# Patient Record
Sex: MALE | Race: ASIAN | NOT HISPANIC OR LATINO | Employment: OTHER | URBAN - METROPOLITAN AREA
[De-identification: names, ages, dates, MRNs, and addresses within clinical notes are randomized per-mention and may not be internally consistent; named-entity substitution may affect disease eponyms.]

---

## 2017-03-18 ENCOUNTER — HOSPITAL ENCOUNTER (EMERGENCY)
Facility: HOSPITAL | Age: 62
Discharge: HOME | End: 2017-03-18
Attending: EMERGENCY MEDICINE
Payer: MEDICARE

## 2017-03-18 VITALS
HEART RATE: 81 BPM | BODY MASS INDEX: 28.93 KG/M2 | RESPIRATION RATE: 16 BRPM | WEIGHT: 180 LBS | SYSTOLIC BLOOD PRESSURE: 121 MMHG | TEMPERATURE: 99 F | OXYGEN SATURATION: 99 % | HEIGHT: 66 IN | DIASTOLIC BLOOD PRESSURE: 63 MMHG

## 2017-03-18 DIAGNOSIS — J40 BRONCHITIS: Primary | ICD-10-CM

## 2017-03-18 PROCEDURE — 99283 EMERGENCY DEPT VISIT LOW MDM: CPT | Mod: ,,, | Performed by: EMERGENCY MEDICINE

## 2017-03-18 PROCEDURE — 99283 EMERGENCY DEPT VISIT LOW MDM: CPT

## 2017-03-18 RX ORDER — BENZONATATE 100 MG/1
100 CAPSULE ORAL 3 TIMES DAILY PRN
Qty: 21 CAPSULE | Refills: 0 | Status: SHIPPED | OUTPATIENT
Start: 2017-03-18 | End: 2017-08-29

## 2017-03-18 RX ORDER — AZITHROMYCIN 250 MG/1
500 TABLET, FILM COATED ORAL DAILY
Qty: 6 TABLET | Refills: 0 | Status: SHIPPED | OUTPATIENT
Start: 2017-03-18 | End: 2017-08-29

## 2017-03-18 NOTE — ED AVS SNAPSHOT
[facility]  [address] [facility] [address] [facility] [address]               Ladarius Solis   3/18/2017 10:17 PM   ED    Description:  Male : 1955   Department:  [facility]           Your Care was Coordinated By:     Provider Role From To    Roberto Soto MD Attending Provider 17 0176 --      Reason for Visit     Sinusitis           Diagnoses this Visit        Comments    Bronchitis    -  Primary       ED Disposition     None           To Do List           Follow-up Information     Follow up with SUSANNA Love In 2 days.    Specialty:  Family Medicine    Contact information:    [address]  [place] [address]  [phone]          Follow up with [facility].    Specialty:  Emergency Medicine    Why:  If symptoms worsen    Contact information:    [address] [facility] [id number] [phone]       These Medications        Disp Refills Start End    azithromycin (ZITHROMAX Z-LOUIS) 250 MG tablet 6 tablet 0 3/18/2017     Take 2 tablets (500 mg total) by mouth once daily. - Oral    Pharmacy: [facility] - [place], [place] - [address] Ph #: [phone]         [facility]     [facility] Nurse Care Line -  Assistance  Registered nurses in the [facility] provide clinical advisement, health education, appointment booking, and other advisory services.  Call for this free service at [phone].             Medications           Message regarding Medications     Verify the changes and/or additions to your medication regime listed below are the same as discussed with your clinician today.  If any of these changes or additions are incorrect, please notify your healthcare provider.        START taking these NEW medications        Refills    azithromycin (ZITHROMAX Z-LOUIS) 250 MG tablet 0    Sig: Take 2 tablets (500 mg total) by mouth once  "daily.    Class: Print    Route: Oral           Verify that the below list of medications is an accurate representation of the medications you are currently taking.  If none reported, the list may be blank. If incorrect, please contact your healthcare provider. Carry this list with you in case of emergency.           Current Medications     lisinopril 10 MG tablet Take 10 mg by mouth once daily.    albuterol 90 mcg/actuation inhaler Inhale 1-2 puffs into the lungs every 6 (six) hours as needed for Wheezing.    azithromycin (ZITHROMAX Z-LOUIS) 250 MG tablet Take 2 tablets (500 mg total) by mouth once daily.    benzonatate (TESSALON) 100 MG capsule Take 1 capsule (100 mg total) by mouth 3 (three) times daily as needed for Cough.           Clinical Reference Information           Your Vitals Were     BP Pulse Temp Resp Height Weight    121/63 (BP Location: Right arm, Patient Position: Sitting) 81 99.4 °F (37.4 °C) (Oral) 16 5' 6" (1.676 m) 81.6 kg (180 lb)    SpO2 BMI             99% 29.05 kg/m2         Allergies as of 3/18/2017     No Known Allergies      Immunizations Administered on Date of Encounter - 3/18/2017     None      ED Micro, Lab, POCT     None      ED Imaging Orders     None        Discharge Instructions         Bronchitis, Antibiotic Treatment (Adult)    Bronchitis is an infection of the air passages (bronchial tubes) in your lungs. It often occurs when you have a cold. This illness is contagious during the first few days and is spread through the air by coughing and sneezing, or by direct contact (touching the sick person and then touching your own eyes, nose, or mouth).  Symptoms of bronchitis include cough with mucus (phlegm) and low-grade fever. Bronchitis usually lasts 7 to 14 days. Mild cases can be treated with simple home remedies. More severe infection is treated with an antibiotic.  Home care  Follow these guidelines when caring for yourself at home:  · If your symptoms are severe, rest at home " for the first 2 to 3 days. When you go back to your usual activities, don't let yourself get too tired.  · Do not smoke. Also avoid being exposed to secondhand smoke.  · You may use over-the-counter medicines to control fever or pain, unless another medicine was prescribed. (Note: If you have chronic liver or kidney disease or have ever had a stomach ulcer or gastrointestinal bleeding, talk with your healthcare provider before using these medicines. Also talk to your provider if you are taking medicine to prevent blood clots.) Aspirin should never be given to anyone younger than 18 years of age who is ill with a viral infection or fever. It may cause severe liver or brain damage.  · Your appetite may be poor, so a light diet is fine. Avoid dehydration by drinking 6 to 8 glasses of fluids per day (such as water, soft drinks, sports drinks, juices, tea, or soup). Extra fluids will help loosen secretions in the nose and lungs.  · Over-the-counter cough, cold, and sore-throat medicines will not shorten the length of the illness, but they may be helpful to reduce symptoms. (Note: Do not use decongestants if you have high blood pressure.)  · Finish all antibiotic medicine. Do this even if you are feeling better after only a few days.  Follow-up care  Follow up with your healthcare provider, or as advised. If you had an X-ray or ECG (electrocardiogram), a specialist will review it. You will be notified of any new findings that may affect your care.  Note: If you are age 65 or older, or if you have a chronic lung disease or condition that affects your immune system, or you smoke, talk to your healthcare provider about having pneumococcal vaccinations and a yearly influenza vaccination (flu shot).  When to seek medical advice  Call your healthcare provider right away if any of these occur:  · Fever of 100.4°F (38°C) or higher  · Coughing up increased amounts of colored sputum  · Weakness, drowsiness, headache, facial pain,  ear pain, or a stiff neck  Call 911, or get immediate medical care  Contact emergency services right away if any of these occur.  · Coughing up blood  · Worsening weakness, drowsiness, headache, or stiff neck  · Trouble breathing, wheezing, or pain with breathing  Date Last Reviewed: 9/13/2015  © 2262-8940 [employer]. [address] [place] [address], [place] [address]. All rights reserved. This information is not intended as a substitute for professional medical care. Always follow your healthcare professional's instructions.           [facility] complies with applicable Federal civil rights laws and does not discriminate on the basis of race, color, national origin, age, disability, or sex.        Language Assistance Services     ATTENTION: Language assistance services are available, free of charge. Please call [phone].      ATENCIÓN: Si habla español, tiene a simon disposición servicios gratuitos de asistencia lingüística. Llame al [phone].     CHÚ Ý: N?u b?n nói Ti?ng Vi?t, có các d?ch v? h? tr? ngôn ng? mi?n phí dành cho b?n. G?i s? [phone].

## 2017-03-19 NOTE — ED PROVIDER NOTES
Encounter Date: 3/18/2017       History     Chief Complaint   Patient presents with    Sinusitis     Sinus congestion and sore throat     Review of patient's allergies indicates:  No Known Allergies  HPI Comments: 3 days of cough and congestion.  No fever    Past Medical History:   Diagnosis Date    Hypertension      History reviewed. No pertinent surgical history.  History reviewed. No pertinent family history.  Social History   Substance Use Topics    Smoking status: Never Smoker    Smokeless tobacco: None    Alcohol use No     Review of Systems   Constitutional: Negative for chills, diaphoresis and fever.   HENT: Positive for congestion.    Eyes: Negative for visual disturbance.   Respiratory: Negative for chest tightness and shortness of breath.        Physical Exam   Initial Vitals   BP Pulse Resp Temp SpO2   03/18/17 1841 03/18/17 1841 03/18/17 1841 03/18/17 1841 03/18/17 1841   121/63 81 16 99.4 °F (37.4 °C) 99 %     Physical Exam    Nursing note and vitals reviewed.  Constitutional: He appears well-developed and well-nourished.   HENT:   Head: Normocephalic.   Eyes: Conjunctivae and EOM are normal. Pupils are equal, round, and reactive to light.   Neck: Normal range of motion. Neck supple.   Cardiovascular: Normal rate, regular rhythm and normal heart sounds.   Pulmonary/Chest: Breath sounds normal. No respiratory distress. He has no wheezes. He has no rhonchi. He has no rales. He exhibits no tenderness.   Abdominal: Soft. Bowel sounds are normal.         ED Course   Procedures  Labs Reviewed - No data to display                            ED Course     Clinical Impression:   The encounter diagnosis was Bronchitis.    Disposition:   Disposition: Discharged  Condition: Stable       Roberto Soto MD  03/18/17 1447

## 2017-03-19 NOTE — DISCHARGE INSTRUCTIONS
Bronchitis, Antibiotic Treatment (Adult)    Bronchitis is an infection of the air passages (bronchial tubes) in your lungs. It often occurs when you have a cold. This illness is contagious during the first few days and is spread through the air by coughing and sneezing, or by direct contact (touching the sick person and then touching your own eyes, nose, or mouth).  Symptoms of bronchitis include cough with mucus (phlegm) and low-grade fever. Bronchitis usually lasts 7 to 14 days. Mild cases can be treated with simple home remedies. More severe infection is treated with an antibiotic.  Home care  Follow these guidelines when caring for yourself at home:  · If your symptoms are severe, rest at home for the first 2 to 3 days. When you go back to your usual activities, don't let yourself get too tired.  · Do not smoke. Also avoid being exposed to secondhand smoke.  · You may use over-the-counter medicines to control fever or pain, unless another medicine was prescribed. (Note: If you have chronic liver or kidney disease or have ever had a stomach ulcer or gastrointestinal bleeding, talk with your healthcare provider before using these medicines. Also talk to your provider if you are taking medicine to prevent blood clots.) Aspirin should never be given to anyone younger than 18 years of age who is ill with a viral infection or fever. It may cause severe liver or brain damage.  · Your appetite may be poor, so a light diet is fine. Avoid dehydration by drinking 6 to 8 glasses of fluids per day (such as water, soft drinks, sports drinks, juices, tea, or soup). Extra fluids will help loosen secretions in the nose and lungs.  · Over-the-counter cough, cold, and sore-throat medicines will not shorten the length of the illness, but they may be helpful to reduce symptoms. (Note: Do not use decongestants if you have high blood pressure.)  · Finish all antibiotic medicine. Do this even if you are feeling better after only a  few days.  Follow-up care  Follow up with your healthcare provider, or as advised. If you had an X-ray or ECG (electrocardiogram), a specialist will review it. You will be notified of any new findings that may affect your care.  Note: If you are age 65 or older, or if you have a chronic lung disease or condition that affects your immune system, or you smoke, talk to your healthcare provider about having pneumococcal vaccinations and a yearly influenza vaccination (flu shot).  When to seek medical advice  Call your healthcare provider right away if any of these occur:  · Fever of 100.4°F (38°C) or higher  · Coughing up increased amounts of colored sputum  · Weakness, drowsiness, headache, facial pain, ear pain, or a stiff neck  Call 911, or get immediate medical care  Contact emergency services right away if any of these occur.  · Coughing up blood  · Worsening weakness, drowsiness, headache, or stiff neck  · Trouble breathing, wheezing, or pain with breathing  Date Last Reviewed: 9/13/2015 © 2000-2016 [employer]. [address] [place] [address], [place] [address]. All rights reserved. This information is not intended as a substitute for professional medical care. Always follow your healthcare professional's instructions.

## 2017-03-19 NOTE — ED TRIAGE NOTES
Pt presents to ED c/o rhinnorhea, sore throat, cough, and headache. Pt has productive cough of clear sputum. Pt denies fever, chills, N/V at this time. Pt stated that chest hurts when he coughs, denies SOB.     LOC: Patient name and date of birth verified.  The patient is awake, alert and aware of environment with an appropriate affect, the patient is oriented x 3 and speaking appropriately.  Pt in NAD.    APPEARANCE: Patient resting comfortably and in no acute distress, patient is clean and well groomed, patient's clothing is properly fastened.  SKIN: The skin is warm and dry, color consistent with ethnicity, patient has normal skin turgor and moist mucus membranes, skin intact, no breakdown or brusing noted.  MUSCULOSKELETAL: Patient moving all extremities well, no obvious swelling or deformities noted.  RESPIRATORY: Airway is open and patent, respirations are spontaneous, patient has a normal effort and rate, no accessory muscle use noted.  CARDIAC: Patient has a normal rate and rhythm, no periphreal edema noted, capillary refill < 3 seconds.  ABDOMEN: Soft and non tender to palpation, no distention noted. Bowel sounds present in all four quadrants.  NEUROLOGIC: Eyes open spontaneously, behavior appropriate to situation, follows commands, facial expression symmetrical, bilateral hand grasp equal and even, purposeful motor response noted, normal sensation in all extremities when touched with a finger.

## 2017-05-22 ENCOUNTER — TELEPHONE (OUTPATIENT)
Dept: ENDOSCOPY | Facility: HOSPITAL | Age: 62
End: 2017-05-22

## 2017-05-22 NOTE — TELEPHONE ENCOUNTER
Mr. Solis is a  patient of [facility] (WILVER Pierre, SUSANNA) Patient states that this provider would like to refer the patient to [facility] to have an endoscopy procedure.    I called [facility] at [phone] and spoke with Brittaney. I requested to speak with WILVER Pierre or her staff and was transferred to a voicemail of Jeff Webb.  Informed TOÑO Webb that someone from WILVER Pierre's office must call the  department, at [facility], to have this referral entered, [phone] number given.  [phone] call back number given, as well.

## 2017-08-11 ENCOUNTER — TELEPHONE (OUTPATIENT)
Dept: GASTROENTEROLOGY | Facility: CLINIC | Age: 62
End: 2017-08-11

## 2017-08-14 ENCOUNTER — TELEPHONE (OUTPATIENT)
Dept: GASTROENTEROLOGY | Facility: CLINIC | Age: 62
End: 2017-08-14

## 2017-08-29 ENCOUNTER — OFFICE VISIT (OUTPATIENT)
Dept: GASTROENTEROLOGY | Facility: CLINIC | Age: 62
End: 2017-08-29
Payer: MEDICARE

## 2017-08-29 ENCOUNTER — TELEPHONE (OUTPATIENT)
Dept: GASTROENTEROLOGY | Facility: CLINIC | Age: 62
End: 2017-08-29

## 2017-08-29 VITALS
HEIGHT: 66 IN | SYSTOLIC BLOOD PRESSURE: 152 MMHG | WEIGHT: 160.69 LBS | HEART RATE: 74 BPM | BODY MASS INDEX: 25.83 KG/M2 | DIASTOLIC BLOOD PRESSURE: 92 MMHG

## 2017-08-29 DIAGNOSIS — D12.6 COLON ADENOMA: Primary | ICD-10-CM

## 2017-08-29 DIAGNOSIS — K63.5 POLYP OF COLON, UNSPECIFIED PART OF COLON, UNSPECIFIED TYPE: Primary | ICD-10-CM

## 2017-08-29 PROCEDURE — 99213 OFFICE O/P EST LOW 20 MIN: CPT | Mod: PBBFAC | Performed by: INTERNAL MEDICINE

## 2017-08-29 PROCEDURE — 99203 OFFICE O/P NEW LOW 30 MIN: CPT | Mod: S$PBB,,, | Performed by: INTERNAL MEDICINE

## 2017-08-29 PROCEDURE — 99999 PR PBB SHADOW E&M-EST. PATIENT-LVL III: CPT | Mod: PBBFAC,,, | Performed by: INTERNAL MEDICINE

## 2017-08-29 RX ORDER — ACETAMINOPHEN 325 MG/1
325 TABLET ORAL EVERY 6 HOURS PRN
COMMUNITY
End: 2018-01-08

## 2017-08-29 NOTE — LETTER
August 29, 2017      Syd Harley MD  [address], [address]  [facility] [address]           [facility]  [address]  [place] [address]  Phone: [phone]  Fax: [phone]          Patient: Ladarius Solis   MR Number: [id number]   YOB: 1955   Date of Visit: 8/29/2017       Dear Dr. Syd Harley:    Thank you for referring Ladarius Solis to me for evaluation. Attached you will find relevant portions of my assessment and plan of care.    If you have questions, please do not hesitate to call me. I look forward to following Ladarius Solis along with you.    Sincerely,    Raul August MD    Enclosure  CC:  No Recipients    If you would like to receive this communication electronically, please contact [email or web address] or [phone] to request more information on [email or web address] Link access.    For providers and/or their staff who would like to refer a patient to [facility], please contact us through our one-stop-shop provider referral line, [facility], at [phone].    If you feel you have received this communication in error or would no longer like to receive these types of communications, please e-mail [email or web address]

## 2017-08-29 NOTE — PROGRESS NOTES
Advanced Endoscopy / [facility]ic[facility]iliary [facility]    Reason for visit (Chief Complaint): large colon adenoma    Referring provider/PCP: Syd Harley MD  [address], [address]  [place] [address]    History of Present Illness: Patient presents for evaluation of a large colon adenoma found on recent screening colonoscopy. Dr. Harley performed a screening colonoscopy in Aug 2017. A large, 25mm sessile polyp was seen near the hepatic flexure. This was tattooed and bx'd. Bx revealed adenoma. Pt is sent here to discuss options for treatment.    Denies abd pain, nausea, vomiting, GI bleeding, wt loss, diarrhea.         Review of Systems   Constitutional: no fever, chills or change in weight   Eyes: no visual changes   ENT: no sore throat or dysphagia  Respiratory: no cough or shortness of breath   Cardiovascular: no chest pain or palpitations   Gastrointestinal: as per HPI  Hematologic/Lymphatic: no easy bruising or lymphadenopathy   Musculoskeletal: no arthralgias or myalgias   Neurological: no seizures, tremors or change in mental status  Behavioral/Psych: no auditory or visual hallucinations    Past Medical History:   Diagnosis Date    Hypertension        History reviewed. No pertinent surgical history.    History reviewed. No pertinent family history.    Social History     Social History    Marital status: Single     Spouse name: N/A    Number of children: N/A    Years of education: N/A     Occupational History    Not on file.     Social History Main Topics    Smoking status: Never Smoker    Smokeless tobacco: Not on file    Alcohol use No    Drug use: No    Sexual activity: Not on file     Other Topics Concern    Not on file     Social History Narrative    No narrative on file       Current Outpatient Prescriptions on File Prior to Visit   Medication Sig Dispense Refill    lisinopril 10 MG tablet Take 10 mg by mouth once daily.      [DISCONTINUED] albuterol 90 mcg/actuation inhaler  Inhale 1-2 puffs into the lungs every 6 (six) hours as needed for Wheezing. 1 Inhaler 0    [DISCONTINUED] azithromycin (ZITHROMAX Z-LOUIS) 250 MG tablet Take 2 tablets (500 mg total) by mouth once daily. 6 tablet 0    [DISCONTINUED] benzonatate (TESSALON) 100 MG capsule Take 1 capsule (100 mg total) by mouth 3 (three) times daily as needed for Cough. 21 capsule 0     No current facility-administered medications on file prior to visit.        Review of patient's allergies indicates:  No Known Allergies    Physical Exam:  General: Well-developed, well-appearing, no acute distress  Neuro: alert and oriented to person, place, time; normal appearing gait  Eyes: No scleral icterus, pupils equally round, normal-appearing conjunctiva  Neck: supple; no cervical lymphadenopathy  CVS: regular rate and rhythm; no murmurs  Lungs: clear to auscultation bilaterally; no labored breathing, no wheezes  Abdomen: soft, non-distended, non-tender, no rebound tenderness or guarding, nomal bowel sounds  Extremities: no cyanosis, edema, or clubbing  Skin: no rash, no jaundice        Laboratory:   Lab Results   Component Value Date    ALT 21 03/17/2006    AST 43 (H) 03/17/2006    ALKPHOS 48 (L) 03/17/2006    BILITOT 1.3 (H) 03/17/2006     Lab Results   Component Value Date    WBC 5.81 10/10/2016    HGB 15.3 10/10/2016    HCT 43.4 10/10/2016    MCV 87 10/10/2016     10/10/2016     Lab Results   Component Value Date    INR 1.1 03/17/2006    INR 1.0 01/04/2006    INR 1.0 01/02/2006       Imaging:  Reviewed outside colonoscopy report and path.    Assessment/Plan:  Large colon adenoma- Discussed options for treatment including endoscopic and surgical. Pt would like to proceed with EMR attempt.    The impression and plan was discussed in detail with the patient. All questions have been answered and the patient voices understanding of our plan at this point. The risk of the procedure was discussed in detail which includes but not limited to  bleeding, infection, inflammation, perforation, and death. An opportunity to ask questions was offered, and all questions were answered to the patient's satisfaction.    PLAN:  Colonoscopy with EMR.      Raul August MD, FASGE  Section Head of Advanced Endoscopy  Department of Gastroenterology

## 2017-09-07 ENCOUNTER — TELEPHONE (OUTPATIENT)
Dept: ENDOSCOPY | Facility: HOSPITAL | Age: 62
End: 2017-09-07

## 2017-09-07 DIAGNOSIS — Z12.11 SPECIAL SCREENING FOR MALIGNANT NEOPLASMS, COLON: Primary | ICD-10-CM

## 2017-09-07 RX ORDER — POLYETHYLENE GLYCOL 3350, SODIUM SULFATE ANHYDROUS, SODIUM BICARBONATE, SODIUM CHLORIDE, POTASSIUM CHLORIDE 236; 22.74; 6.74; 5.86; 2.97 G/4L; G/4L; G/4L; G/4L; G/4L
4 POWDER, FOR SOLUTION ORAL ONCE
Qty: 4000 ML | Refills: 0 | Status: SHIPPED | OUTPATIENT
Start: 2017-09-07 | End: 2017-09-07

## 2017-09-25 ENCOUNTER — SURGERY (OUTPATIENT)
Age: 62
End: 2017-09-25

## 2017-09-25 ENCOUNTER — HOSPITAL ENCOUNTER (OUTPATIENT)
Facility: HOSPITAL | Age: 62
Discharge: HOME | End: 2017-09-25
Attending: INTERNAL MEDICINE | Admitting: INTERNAL MEDICINE
Payer: MEDICARE

## 2017-09-25 ENCOUNTER — ANESTHESIA EVENT (OUTPATIENT)
Dept: ENDOSCOPY | Facility: HOSPITAL | Age: 62
End: 2017-09-25
Payer: MEDICARE

## 2017-09-25 ENCOUNTER — ANESTHESIA (OUTPATIENT)
Dept: ENDOSCOPY | Facility: HOSPITAL | Age: 62
End: 2017-09-25
Payer: MEDICARE

## 2017-09-25 VITALS
RESPIRATION RATE: 18 BRPM | BODY MASS INDEX: 28.93 KG/M2 | HEART RATE: 63 BPM | DIASTOLIC BLOOD PRESSURE: 84 MMHG | SYSTOLIC BLOOD PRESSURE: 142 MMHG | HEIGHT: 66 IN | OXYGEN SATURATION: 100 % | WEIGHT: 180 LBS | TEMPERATURE: 99 F

## 2017-09-25 DIAGNOSIS — D12.6 COLON ADENOMA: Primary | ICD-10-CM

## 2017-09-25 PROCEDURE — D9220A PRA ANESTHESIA: Mod: CRNA,,, | Performed by: NURSE ANESTHETIST, CERTIFIED REGISTERED

## 2017-09-25 PROCEDURE — 45390 COLONOSCOPY W/RESECTION: CPT | Performed by: INTERNAL MEDICINE

## 2017-09-25 PROCEDURE — 27201028 HC NEEDLE, SCLERO: Performed by: INTERNAL MEDICINE

## 2017-09-25 PROCEDURE — 45390 COLONOSCOPY W/RESECTION: CPT | Mod: 59,,, | Performed by: INTERNAL MEDICINE

## 2017-09-25 PROCEDURE — 45381 COLONOSCOPY SUBMUCOUS NJX: CPT | Performed by: INTERNAL MEDICINE

## 2017-09-25 PROCEDURE — D9220A PRA ANESTHESIA: Mod: ANES,,, | Performed by: ANESTHESIOLOGY

## 2017-09-25 PROCEDURE — 27201240 HC KIT, EMR: Performed by: INTERNAL MEDICINE

## 2017-09-25 PROCEDURE — 37000009 HC ANESTHESIA EA ADD 15 MINS: Performed by: INTERNAL MEDICINE

## 2017-09-25 PROCEDURE — 25000003 PHARM REV CODE 250: Performed by: INTERNAL MEDICINE

## 2017-09-25 PROCEDURE — 63600175 PHARM REV CODE 636 W HCPCS: Performed by: NURSE ANESTHETIST, CERTIFIED REGISTERED

## 2017-09-25 PROCEDURE — 45385 COLONOSCOPY W/LESION REMOVAL: CPT | Mod: ,,, | Performed by: INTERNAL MEDICINE

## 2017-09-25 PROCEDURE — 45385 COLONOSCOPY W/LESION REMOVAL: CPT | Performed by: INTERNAL MEDICINE

## 2017-09-25 PROCEDURE — 37000008 HC ANESTHESIA 1ST 15 MINUTES: Performed by: INTERNAL MEDICINE

## 2017-09-25 PROCEDURE — 45381 COLONOSCOPY SUBMUCOUS NJX: CPT | Mod: 59,,, | Performed by: INTERNAL MEDICINE

## 2017-09-25 PROCEDURE — 27201089 HC SNARE, DISP (ANY): Performed by: INTERNAL MEDICINE

## 2017-09-25 PROCEDURE — 88305 TISSUE EXAM BY PATHOLOGIST: CPT | Performed by: PATHOLOGY

## 2017-09-25 RX ORDER — SODIUM CHLORIDE 9 MG/ML
INJECTION, SOLUTION INTRAVENOUS CONTINUOUS
Status: DISCONTINUED | OUTPATIENT
Start: 2017-09-25 | End: 2017-09-25 | Stop reason: HOSPADM

## 2017-09-25 RX ORDER — PROPOFOL 10 MG/ML
VIAL (ML) INTRAVENOUS
Status: DISCONTINUED | OUTPATIENT
Start: 2017-09-25 | End: 2017-09-25

## 2017-09-25 RX ORDER — LIDOCAINE HCL/PF 100 MG/5ML
SYRINGE (ML) INTRAVENOUS
Status: DISCONTINUED | OUTPATIENT
Start: 2017-09-25 | End: 2017-09-25

## 2017-09-25 RX ORDER — ONDANSETRON 2 MG/ML
4 INJECTION INTRAMUSCULAR; INTRAVENOUS DAILY PRN
Status: DISCONTINUED | OUTPATIENT
Start: 2017-09-25 | End: 2017-09-25 | Stop reason: HOSPADM

## 2017-09-25 RX ORDER — PROPOFOL 10 MG/ML
VIAL (ML) INTRAVENOUS CONTINUOUS PRN
Status: DISCONTINUED | OUTPATIENT
Start: 2017-09-25 | End: 2017-09-25

## 2017-09-25 RX ADMIN — LIDOCAINE HYDROCHLORIDE 50 MG: 20 INJECTION, SOLUTION INTRAVENOUS at 10:09

## 2017-09-25 RX ADMIN — PROPOFOL 150 MCG/KG/MIN: 10 INJECTION, EMULSION INTRAVENOUS at 10:09

## 2017-09-25 RX ADMIN — PROPOFOL 50 MG: 10 INJECTION, EMULSION INTRAVENOUS at 10:09

## 2017-09-25 RX ADMIN — SODIUM CHLORIDE: 0.9 INJECTION, SOLUTION INTRAVENOUS at 09:09

## 2017-09-25 NOTE — ANESTHESIA POSTPROCEDURE EVALUATION
"Anesthesia Post Evaluation    Patient: Ladarius Solis    Procedure(s) Performed: Procedure(s) (LRB):  COLONOSCOPY/emr (N/A)    Final Anesthesia Type: general  Patient location during evaluation: PACU  Patient participation: Yes- Able to Participate  Level of consciousness: awake and alert  Post-procedure vital signs: reviewed and stable  Pain management: adequate  Airway patency: patent  PONV status at discharge: No PONV  Anesthetic complications: no      Cardiovascular status: blood pressure returned to baseline  Respiratory status: unassisted  Hydration status: euvolemic  Follow-up not needed.        Visit Vitals  BP (!) 142/84   Pulse 63   Temp 36.9 °C (98.5 °F) (Skin)   Resp 18   Ht 5' 6" (1.676 m)   Wt 81.6 kg (180 lb)   SpO2 100%   BMI 29.05 kg/m²       Pain/Kevin Score: Pain Assessment Performed: Yes (9/25/2017 11:30 AM)  Presence of Pain: denies (9/25/2017 11:30 AM)  Kevin Score: 10 (9/25/2017 11:30 AM)      "

## 2017-09-25 NOTE — PLAN OF CARE
Patient states they are ready to be discharged. Instructions and report given to patient and family. Both verbalize understanding. Patient tolerating po liquids with no difficulty. Patient denies pain. Anesthesia consent and surgical consent in chart upon patient's discharge from [facility].

## 2017-09-25 NOTE — H&P (VIEW-ONLY)
[facility]    Reason for visit (Chief Complaint): large colon adenoma    Referring provider/PCP: Syd Harley MD  [address], [address]  [place] [address]    History of Present Illness: Patient presents for evaluation of a large colon adenoma found on recent screening colonoscopy. Dr. Harley performed a screening colonoscopy in Aug 2017. A large, 25mm sessile polyp was seen near the hepatic flexure. This was tattooed and bx'd. Bx revealed adenoma. Pt is sent here to discuss options for treatment.    Denies abd pain, nausea, vomiting, GI bleeding, wt loss, diarrhea.         Review of Systems   Constitutional: no fever, chills or change in weight   Eyes: no visual changes   ENT: no sore throat or dysphagia  Respiratory: no cough or shortness of breath   Cardiovascular: no chest pain or palpitations   Gastrointestinal: as per HPI  Hematologic/Lymphatic: no easy bruising or lymphadenopathy   Musculoskeletal: no arthralgias or myalgias   Neurological: no seizures, tremors or change in mental status  Behavioral/Psych: no auditory or visual hallucinations    Past Medical History:   Diagnosis Date    Hypertension        History reviewed. No pertinent surgical history.    History reviewed. No pertinent family history.    Social History     Social History    Marital status: Single     Spouse name: N/A    Number of children: N/A    Years of education: N/A     Occupational History    Not on file.     Social History Main Topics    Smoking status: Never Smoker    Smokeless tobacco: Not on file    Alcohol use No    Drug use: No    Sexual activity: Not on file     Other Topics Concern    Not on file     Social History Narrative    No narrative on file       Current Outpatient Prescriptions on File Prior to Visit   Medication Sig Dispense Refill    lisinopril 10 MG tablet Take 10 mg by mouth once daily.      [DISCONTINUED] albuterol 90 mcg/actuation inhaler  Inhale 1-2 puffs into the lungs every 6 (six) hours as needed for Wheezing. 1 Inhaler 0    [DISCONTINUED] azithromycin (ZITHROMAX Z-LOUIS) 250 MG tablet Take 2 tablets (500 mg total) by mouth once daily. 6 tablet 0    [DISCONTINUED] benzonatate (TESSALON) 100 MG capsule Take 1 capsule (100 mg total) by mouth 3 (three) times daily as needed for Cough. 21 capsule 0     No current facility-administered medications on file prior to visit.        Review of patient's allergies indicates:  No Known Allergies    Physical Exam:  General: Well-developed, well-appearing, no acute distress  Neuro: alert and oriented to person, place, time; normal appearing gait  Eyes: No scleral icterus, pupils equally round, normal-appearing conjunctiva  Neck: supple; no cervical lymphadenopathy  CVS: regular rate and rhythm; no murmurs  Lungs: clear to auscultation bilaterally; no labored breathing, no wheezes  Abdomen: soft, non-distended, non-tender, no rebound tenderness or guarding, nomal bowel sounds  Extremities: no cyanosis, edema, or clubbing  Skin: no rash, no jaundice        Laboratory:   Lab Results   Component Value Date    ALT 21 03/17/2006    AST 43 (H) 03/17/2006    ALKPHOS 48 (L) 03/17/2006    BILITOT 1.3 (H) 03/17/2006     Lab Results   Component Value Date    WBC 5.81 10/10/2016    HGB 15.3 10/10/2016    HCT 43.4 10/10/2016    MCV 87 10/10/2016     10/10/2016     Lab Results   Component Value Date    INR 1.1 03/17/2006    INR 1.0 01/04/2006    INR 1.0 01/02/2006       Imaging:  Reviewed outside colonoscopy report and path.    Assessment/Plan:  Large colon adenoma- Discussed options for treatment including endoscopic and surgical. Pt would like to proceed with EMR attempt.    The impression and plan was discussed in detail with the patient. All questions have been answered and the patient voices understanding of our plan at this point. The risk of the procedure was discussed in detail which includes but not limited to  bleeding, infection, inflammation, perforation, and death. An opportunity to ask questions was offered, and all questions were answered to the patient's satisfaction.    PLAN:  Colonoscopy with EMR.      Raul August MD, FASGE  Section Head of Advanced Endoscopy  Department of Gastroenterology

## 2017-09-25 NOTE — TRANSFER OF CARE
"Anesthesia Transfer of Care Note    Patient: Ladarius Solis    Procedure(s) Performed: Procedure(s) (LRB):  COLONOSCOPY/emr (N/A)    Patient location: PACU    Anesthesia Type: general    Transport from OR: Transported from OR on room air with adequate spontaneous ventilation    Post pain: adequate analgesia    Post assessment: no apparent anesthetic complications    Post vital signs: stable    Level of consciousness: awake and alert    Nausea/Vomiting: no nausea/vomiting    Complications: none    Transfer of care protocol was followed      Last vitals:   Visit Vitals  BP (!) 160/94   Pulse 74   Temp 36.7 °C (98.1 °F)   Resp 18   Ht 5' 6" (1.676 m)   Wt 81.6 kg (180 lb)   SpO2 97%   BMI 29.05 kg/m²     "

## 2017-09-25 NOTE — PATIENT INSTRUCTIONS
Discharge Summary/Instructions after an Endoscopic Procedure  Patient Name: Ladarius Solis  Patient MRN: [id number]  Patient YOB: 1955 Monday, September 25, 2017  Raul August MD  RESTRICTIONS:  During your procedure today, you received medications for sedation.  These   medications may affect your judgment, balance and coordination.  Therefore,   for 24 hours, you have the following restrictions:   - DO NOT drive a car, operate machinery, make legal/financial decisions,   sign important papers or drink alcohol.    ACTIVITY:  The following day: return to full activity including work, except no heavy   lifting, straining or running for 3 days if polyps were removed.  DIET:  Eat and drink normally unless instructed otherwise.  TREATMENT FOR COMMON SIDE EFFECTS:  - Mild abdominal pain, belching, bloating or excessive gas: rest, eat   lightly and use a heating pad.  - Sore Throat: treat with throat lozenges and/or gargle with warm salt   water.  SYMPTOMS TO WATCH FOR AND REPORT TO YOUR PHYSICIAN:  1. Abdominal pain or bloating, other than gas cramps.  2. Chest pain.  3. Back pain.  4. Chills or fever occurring within 24 hours after the procedure.  5. Rectal bleeding, which would show as bright red, maroon, or black stools.   (A tablespoon of blood from the rectum is not serious, especially if   hemorrhoids are present.)  6. Vomiting.  7. Weakness or dizziness.  8. Because air was used during the procedure, expelling large amounts of air   from your rectum or belching is normal.  9. If a bowel prep was taken, you may not have a bowel movement for 1-3   days.  This is normal.  GO DIRECTLY TO THE EMERGENCY ROOM IF YOU HAVE ANY OF THE FOLLOWING:   Difficulty breathing   Chills and/or fever over 101 F   Persistent vomiting and/or vomiting blood   Severe abdominal pain   Severe chest pain   Black, tarry stools   Bleeding- more than one tablespoon  Your doctor recommends these additional instructions:  If any  biopsies were taken, your doctors clinic will call you in 1 to 2   weeks with any results.  We are waiting for your pathology results.   Your physician has recommended a repeat colonoscopy in six months for   surveillance after today's piecemeal polypectomy.   Return to your referring physician as previously scheduled.   Return to your primary care physician as previously scheduled.   Do not take any aspirin, ibuprofen (including Advil, Motrin or Nuprin),   naproxen (including Aleve), or any other non-steroidal anti-inflammatory   drugs for 2 weeks after your polyp removal.  For questions, problems or results please call your physician - Raul August MD at Work:  [phone].  [facility], EMERGENCY ROOM PHONE NUMBER: [phone]  IF A COMPLICATION OR EMERGENCY SITUATION ARISES AND YOU ARE UNABLE TO REACH   YOUR PHYSICIAN - GO DIRECTLY TO THE EMERGENCY ROOM.  Raul August MD  9/25/2017 11:20:43 AM  This report has been verified and signed electronically.

## 2017-09-25 NOTE — ANESTHESIA PREPROCEDURE EVALUATION
Past Medical History:   Diagnosis Date    Colon polyps     Hypertension      Past Surgical History:   Procedure Laterality Date    COLONOSCOPY       There is no problem list on file for this patient.    Please See ROS/PMH and Active Problem List above                                                                                                              09/25/2017  Ladarius Solis is a 61 y.o., male.    Anesthesia Evaluation    I have reviewed the Patient Summary Reports.    I have reviewed the Nursing Notes.   I have reviewed the Medications.     Review of Systems  Anesthesia Hx:  No problems with previous Anesthesia    Hematology/Oncology:  Hematology Normal        EENT/Dental:EENT/Dental Normal   Cardiovascular:   Exercise tolerance: good Hypertension    Musculoskeletal:  Musculoskeletal Normal    Neurological:  Neurology Normal    Dermatological:  Skin Normal    Psych:  Psychiatric Normal           Physical Exam  General:  Well nourished    Airway/Jaw/Neck:  Airway Findings: Mouth Opening: Normal Tongue: Normal  General Airway Assessment: Adult  Mallampati: II  TM Distance: 4 - 6 cm  Jaw/Neck Findings:  Neck ROM: Normal ROM     Eyes/Ears/Nose:  Eyes/Ears/Nose Findings:    Dental:  Dental Findings: In tact   Chest/Lungs:  Chest/Lungs Findings: Clear to auscultation, Normal Respiratory Rate     Heart/Vascular:  Heart Findings: Rate: Normal  Rhythm: Regular Rhythm        Mental Status:  Mental Status Findings:  Cooperative, Alert and Oriented         Anesthesia Plan  Type of Anesthesia, risks & benefits discussed:  Anesthesia Type:  general  Patient's Preference:   Intra-op Monitoring Plan:   Intra-op Monitoring Plan Comments:   Post Op Pain Control Plan:   Post Op Pain Control Plan Comments:   Induction:   IV  Beta Blocker:  Patient is not currently on a Beta-Blocker (No further documentation required).       Informed Consent: Patient understands risks and agrees with Anesthesia plan.  Questions  answered. Anesthesia consent signed with patient.  ASA Score: 2     Day of Surgery Review of History & Physical:    H&P update referred to the surgeon.         Ready For Surgery From Anesthesia Perspective.

## 2017-09-25 NOTE — INTERVAL H&P NOTE
The patient has been examined and the H&P has been reviewed:    I concur with the findings and no changes have occurred since H&P was written.    Anesthesia/Surgery risks, benefits and alternative options discussed and understood by patient/family.          Active Hospital Problems    Diagnosis  POA    Colon adenoma [D12.6]  Yes      Resolved Hospital Problems    Diagnosis Date Resolved POA   No resolved problems to display.

## 2017-09-29 ENCOUNTER — TELEPHONE (OUTPATIENT)
Dept: GASTROENTEROLOGY | Facility: CLINIC | Age: 62
End: 2017-09-29

## 2017-09-29 NOTE — TELEPHONE ENCOUNTER
----- Message from Raul August MD sent at 9/28/2017  3:54 PM CDT -----  Misty- Pt will need a repeat colonoscopy in 6 months. Can be at [facility] or [place].

## 2018-01-08 ENCOUNTER — OFFICE VISIT (OUTPATIENT)
Dept: URGENT CARE | Facility: CLINIC | Age: 63
End: 2018-01-08
Payer: MEDICARE

## 2018-01-08 VITALS
DIASTOLIC BLOOD PRESSURE: 52 MMHG | WEIGHT: 180 LBS | HEART RATE: 85 BPM | OXYGEN SATURATION: 96 % | HEIGHT: 66 IN | BODY MASS INDEX: 28.93 KG/M2 | SYSTOLIC BLOOD PRESSURE: 90 MMHG | TEMPERATURE: 98 F

## 2018-01-08 DIAGNOSIS — E86.0 DEHYDRATION: Primary | ICD-10-CM

## 2018-01-08 PROCEDURE — 99214 OFFICE O/P EST MOD 30 MIN: CPT | Mod: S$GLB,,, | Performed by: PHYSICIAN ASSISTANT

## 2018-01-08 RX ORDER — CEFACLOR 500 MG/1
500 TABLET, FILM COATED, EXTENDED RELEASE ORAL 2 TIMES DAILY
COMMUNITY
End: 2018-02-05

## 2018-01-08 NOTE — PROGRESS NOTES
"Subjective:       Patient ID: Ladarius Solis is a 62 y.o. male.    Vitals:  height is 5' 6" (1.676 m) and weight is 81.6 kg (180 lb). His temperature is 98.2 °F (36.8 °C). His blood pressure is 90/52 (abnormal) and his pulse is 85. His oxygen saturation is 96%.     Chief Complaint: Dizziness (Pt. became dizzy and light headed today after work--hadn't eaten anything) and Leg Problem (Left leg stiffness and weakness increasing from cervical spinal stenosis)    Pt. became dizzy and light headed today after work--hadn't eaten anything.   Pts. Left leg is stiff and painful due to his cervical spinal stenosis and he is at a high level of pain today.      Dizziness:   Chronicity:  New  Onset:  Today  Progression since onset:  Gradually improving  Frequency:  Every few hours  Pain Scale:  10/10  Severity:  Severe  Duration:  1 hour  Dizziness characteristics:  Off-balance and lightheaded/impending faint  Initial Spell Date and Length:  1 hour  Frequency of Spells:  Hourly  Duration of Spells:  1 hour   Associated symptoms: weakness and light-headedness.no ear congestion, no ear pain, no fever, no headaches, no nausea, no vomiting (1), no aural fullness, no numbness in extremities and no chest pain.  Aggravated by:  Position changes and exertion  Treatments tried:  Nothing  Improvements on treatment:  No relief    Review of Systems   Constitution: Positive for weakness and malaise/fatigue. Negative for chills, decreased appetite and fever.   HENT: Negative for ear pain and sore throat.    Eyes: Negative for blurred vision.   Cardiovascular: Negative for chest pain.   Respiratory: Negative for shortness of breath.    Skin: Negative for rash.   Musculoskeletal: Positive for muscle cramps, muscle weakness, myalgias and stiffness. Negative for back pain.   Gastrointestinal: Negative for abdominal pain, diarrhea, nausea and vomiting (1).   Neurological: Positive for dizziness, light-headedness, loss of balance and vertigo. " Negative for headaches.   Psychiatric/Behavioral: The patient is not nervous/anxious.        Objective:      Physical Exam   Constitutional: He is oriented to person, place, and time. He appears well-developed and well-nourished. No distress.   HENT:   Head: Normocephalic and atraumatic.   Right Ear: Hearing, tympanic membrane, external ear and ear canal normal.   Left Ear: Hearing, tympanic membrane, external ear and ear canal normal.   Nose: Nose normal. No mucosal edema. Right sinus exhibits no maxillary sinus tenderness and no frontal sinus tenderness. Left sinus exhibits no maxillary sinus tenderness and no frontal sinus tenderness.   Mouth/Throat: Uvula is midline and oropharynx is clear and moist. Mucous membranes are dry. No oropharyngeal exudate, posterior oropharyngeal edema or posterior oropharyngeal erythema.   Eyes: Conjunctivae, EOM and lids are normal. Pupils are equal, round, and reactive to light. Right eye exhibits no discharge. Left eye exhibits no discharge.   Neck: Normal range of motion. Neck supple.   Cardiovascular: Normal rate, regular rhythm and normal heart sounds.  Exam reveals no gallop and no friction rub.    No murmur heard.  Pulmonary/Chest: Effort normal and breath sounds normal. No respiratory distress. He has no decreased breath sounds. He has no wheezes. He has no rhonchi. He has no rales.   Musculoskeletal: Normal range of motion.   Lymphadenopathy:        Head (right side): No submandibular and no tonsillar adenopathy present.        Head (left side): No submandibular and no tonsillar adenopathy present.   Neurological: He is alert and oriented to person, place, and time. He is not disoriented. No sensory deficit. He displays a negative Romberg sign. Coordination normal.   Skin: Skin is warm and dry. No rash noted. No erythema.   Psychiatric: He has a normal mood and affect. His behavior is normal.   Nursing note and vitals reviewed.      Assessment:       1. Dehydration         Plan:       Advised pt to go home and hydrate. If he is not able to hydrate at home, or for any new or worsening symptoms, to report to the ER as he may need IV fluids. Discussed treatment options with patient. Patient understood and verbally agreed with treatment plan.    Dehydration      Patient Instructions     -Go home and hydrate with water.  -If dizziness does not improve, if symptoms worsen, if you can't drink water for any reason, or for any new symptoms, please report to the emergency room. Otherwise, follow up with your primary care provider.    Please follow up with your primary care provider within 2-5 days if your signs and symptoms have not resolved or worsen.     If your condition worsens or fails to improve we recommend that you receive another evaluation at the emergency room immediately or contact your primary medical clinic to discuss your concerns.   You must understand that you have received an Urgent Care treatment only and that you may be released before all of your medical problems are known or treated. You, the patient, will arrange for follow up care as instructed.         Dehydration    The human body is comprised largely of water. If you lose more fluids than you take in, you can become dehydrated. This means there are not enough fluids in your body for it to function right. Mild dehydration can cause weakness, confusion, or muscle cramps. In extreme cases, it can lead to brain damage and even death. That's why prompt treatment is crucial.  Risk factors  Anyone can become dehydrated. But infants, children, and older adults are at greatest risk. You are most likely to lose fluids with severe vomiting, diarrhea, or a fever. Exercising or working hard--especially in hot weather--can also cause excess fluid loss.  What to do  Drinking liquids is the best way to prevent dehydration. Water is best, but juice or frozen pops can also help. Your doctor may suggest electrolyte solutions for sick  infants and young children.  When to go to the emergency room (ER)  Go to an ER right away for these symptoms:  Adults  · Very dark urine and little urine output  · Dizziness, weakness, confusion, fainting  Children  · Sunken eyes  · Little or no urine output (for infants, no wet diaper in 8 hours)  · Very dark urine  · Skin that doesn't bounce back quickly when pinched  · Crying without tears  What to expect in the ER  Your blood pressure, temperature, and heart rate will be checked. You may have blood or urine tests. The main treatment for dehydration is fluids. You may be given these to drink. Or, you may receive them through a vein in your arm. You also may be treated for diarrhea, vomiting, or a high fever.   Date Last Reviewed: 7/18/2015 © 2000-2017 [employer]. [address] [place] [address]. All rights reserved. This information is not intended as a substitute for professional medical care. Always follow your healthcare professional's instructions.        Dehydration (Adult)  Dehydration occurs when your body loses too much fluid. This may be the result of prolonged vomiting or diarrhea, excessive sweating, or a high fever. It may also happen if you dont drink enough fluid when youre sick or out in the heat. Misuse of diuretics (water pills) can also be a cause.  Symptoms include thirst and decreased urine output. You may also feel dizzy, weak, fatigued, or very drowsy. The diet described below is usually enough to treat dehydration. In some cases, you may need medicine.  Home care  · Drink at least 12 8-ounce glasses of fluid every day to resolve the dehydration. Fluid may include water; orange juice; lemonade; apple, grape, or cranberry juice; clear fruit drinks; electrolyte replacement and sports drinks; and teas and coffee without caffeine. If you have been diagnosed with a kidney disease, ask your doctor how much and what types of fluids you should drink to prevent  dehydration. If you have kidney disease, fluid can build up in the body. This can be dangerous to your health.  · If you have a fever, muscle aches, or a headache as a result of a cold or flu, you may take acetaminophen or ibuprofen, unless another medicine was prescribed. If you have chronic liver or kidney disease, or have ever had a stomach ulcer or gastrointestinal bleeding, talk with your health care provider before using these medicines. Don't take aspirin if you are younger than 18 and have a fever. Aspirin raises the chance for severe liver injury.  Follow-up care  Follow up with your health care provider, or as advised.  When to seek medical advice  Call your health care provider right away if any of these occur:  · Continued vomiting  · Frequent diarrhea (more than 5 times a day); blood (red or black color) or mucus in diarrhea  · Blood in vomit or stool  · Swollen abdomen or increasing abdominal pain  · Weakness, dizziness, or fainting  · Unusual drowsiness or confusion  · Reduced urine output or extreme thirst  · Fever of 100.4°F (34°C) or higher  Date Last Reviewed: 5/31/2015  © 3803-1596 [employer]. [address] [place] [address]. All rights reserved. This information is not intended as a substitute for professional medical care. Always follow your healthcare professional's instructions.

## 2018-01-09 NOTE — PATIENT INSTRUCTIONS
-Go home and hydrate with water.  -If dizziness does not improve, if symptoms worsen, if you can't drink water for any reason, or for any new symptoms, please report to the emergency room. Otherwise, follow up with your primary care provider.    Please follow up with your primary care provider within 2-5 days if your signs and symptoms have not resolved or worsen.     If your condition worsens or fails to improve we recommend that you receive another evaluation at the emergency room immediately or contact your primary medical clinic to discuss your concerns.   You must understand that you have received an Urgent Care treatment only and that you may be released before all of your medical problems are known or treated. You, the patient, will arrange for follow up care as instructed.         Dehydration    The human body is comprised largely of water. If you lose more fluids than you take in, you can become dehydrated. This means there are not enough fluids in your body for it to function right. Mild dehydration can cause weakness, confusion, or muscle cramps. In extreme cases, it can lead to brain damage and even death. That's why prompt treatment is crucial.  Risk factors  Anyone can become dehydrated. But infants, children, and older adults are at greatest risk. You are most likely to lose fluids with severe vomiting, diarrhea, or a fever. Exercising or working hard--especially in hot weather--can also cause excess fluid loss.  What to do  Drinking liquids is the best way to prevent dehydration. Water is best, but juice or frozen pops can also help. Your doctor may suggest electrolyte solutions for sick infants and young children.  When to go to the emergency room (ER)  Go to an ER right away for these symptoms:  Adults  · Very dark urine and little urine output  · Dizziness, weakness, confusion, fainting  Children  · Sunken eyes  · Little or no urine output (for infants, no wet diaper in 8 hours)  · Very dark  urine  · Skin that doesn't bounce back quickly when pinched  · Crying without tears  What to expect in the ER  Your blood pressure, temperature, and heart rate will be checked. You may have blood or urine tests. The main treatment for dehydration is fluids. You may be given these to drink. Or, you may receive them through a vein in your arm. You also may be treated for diarrhea, vomiting, or a high fever.   Date Last Reviewed: 7/18/2015 © 2000-2017 [employer]. [address] [place] [id number]. All rights reserved. This information is not intended as a substitute for professional medical care. Always follow your healthcare professional's instructions.        Dehydration (Adult)  Dehydration occurs when your body loses too much fluid. This may be the result of prolonged vomiting or diarrhea, excessive sweating, or a high fever. It may also happen if you dont drink enough fluid when youre sick or out in the heat. Misuse of diuretics (water pills) can also be a cause.  Symptoms include thirst and decreased urine output. You may also feel dizzy, weak, fatigued, or very drowsy. The diet described below is usually enough to treat dehydration. In some cases, you may need medicine.  Home care  · Drink at least 12 8-ounce glasses of fluid every day to resolve the dehydration. Fluid may include water; orange juice; lemonade; apple, grape, or cranberry juice; clear fruit drinks; electrolyte replacement and sports drinks; and teas and coffee without caffeine. If you have been diagnosed with a kidney disease, ask your doctor how much and what types of fluids you should drink to prevent dehydration. If you have kidney disease, fluid can build up in the body. This can be dangerous to your health.  · If you have a fever, muscle aches, or a headache as a result of a cold or flu, you may take acetaminophen or ibuprofen, unless another medicine was prescribed. If you have chronic liver or kidney disease, or  have ever had a stomach ulcer or gastrointestinal bleeding, talk with your health care provider before using these medicines. Don't take aspirin if you are younger than 18 and have a fever. Aspirin raises the chance for severe liver injury.  Follow-up care  Follow up with your health care provider, or as advised.  When to seek medical advice  Call your health care provider right away if any of these occur:  · Continued vomiting  · Frequent diarrhea (more than 5 times a day); blood (red or black color) or mucus in diarrhea  · Blood in vomit or stool  · Swollen abdomen or increasing abdominal pain  · Weakness, dizziness, or fainting  · Unusual drowsiness or confusion  · Reduced urine output or extreme thirst  · Fever of 100.4°F (34°C) or higher  Date Last Reviewed: 5/31/2015  © 4595-2372 [employer]. [address] [place] [address], [place] [address]. All rights reserved. This information is not intended as a substitute for professional medical care. Always follow your healthcare professional's instructions.

## 2018-01-10 ENCOUNTER — HOSPITAL ENCOUNTER (OUTPATIENT)
Dept: RADIOLOGY | Facility: HOSPITAL | Age: 63
Discharge: HOME | End: 2018-01-10
Attending: PHYSICIAN ASSISTANT
Payer: MEDICARE

## 2018-01-10 ENCOUNTER — OFFICE VISIT (OUTPATIENT)
Dept: ORTHOPEDICS | Facility: CLINIC | Age: 63
End: 2018-01-10
Payer: MEDICARE

## 2018-01-10 DIAGNOSIS — M25.562 ACUTE PAIN OF LEFT KNEE: Primary | ICD-10-CM

## 2018-01-10 DIAGNOSIS — M25.562 ACUTE PAIN OF LEFT KNEE: ICD-10-CM

## 2018-01-10 PROCEDURE — 20610 DRAIN/INJ JOINT/BURSA W/O US: CPT | Mod: PBBFAC | Performed by: PHYSICIAN ASSISTANT

## 2018-01-10 PROCEDURE — 73560 X-RAY EXAM OF KNEE 1 OR 2: CPT | Mod: 26,59,RT, | Performed by: RADIOLOGY

## 2018-01-10 PROCEDURE — 99212 OFFICE O/P EST SF 10 MIN: CPT | Mod: PBBFAC,25 | Performed by: PHYSICIAN ASSISTANT

## 2018-01-10 PROCEDURE — 73562 X-RAY EXAM OF KNEE 3: CPT | Mod: 26,LT,, | Performed by: RADIOLOGY

## 2018-01-10 PROCEDURE — 99999 PR PBB SHADOW E&M-EST. PATIENT-LVL II: CPT | Mod: PBBFAC,,, | Performed by: PHYSICIAN ASSISTANT

## 2018-01-10 PROCEDURE — 99214 OFFICE O/P EST MOD 30 MIN: CPT | Mod: 25,S$PBB,, | Performed by: PHYSICIAN ASSISTANT

## 2018-01-10 PROCEDURE — 73560 X-RAY EXAM OF KNEE 1 OR 2: CPT | Mod: TC,RT,59

## 2018-01-10 PROCEDURE — 20610 DRAIN/INJ JOINT/BURSA W/O US: CPT | Mod: S$PBB,LT,, | Performed by: PHYSICIAN ASSISTANT

## 2018-01-10 RX ADMIN — TRIAMCINOLONE ACETONIDE 40 MG: 40 INJECTION, SUSPENSION INTRA-ARTICULAR; INTRAMUSCULAR at 11:01

## 2018-01-12 RX ORDER — TRIAMCINOLONE ACETONIDE 40 MG/ML
40 INJECTION, SUSPENSION INTRA-ARTICULAR; INTRAMUSCULAR
Status: COMPLETED | OUTPATIENT
Start: 2018-01-10 | End: 2018-01-10

## 2018-01-12 NOTE — PROGRESS NOTES
Subjective:      Patient ID: Ladarius Solis is a 62 y.o. male.    Chief Complaint: No chief complaint on file.    HPI    Patient is a 62 year old male who presents to clinic with chief complaint of a-traumatic intermittent left knee tenderness and stiffness x weeks. Everything make the pain worse. He has not done any treatment for this. He denied locking catching popping cracking or feeling unstable.     Review of Systems   Constitution: Negative for chills and fever.   Cardiovascular: Negative for chest pain.   Respiratory: Negative for cough and shortness of breath.    Skin: Negative for color change, dry skin, itching, nail changes, poor wound healing and rash.   Musculoskeletal:        Left knee pain   Neurological: Negative for dizziness.   Psychiatric/Behavioral: Negative for altered mental status. The patient is not nervous/anxious.    All other systems reviewed and are negative.        Objective:      General    Constitutional: He is oriented to person, place, and time. He appears well-developed and well-nourished. No distress.   HENT:   Head: Atraumatic.   Eyes: Conjunctivae are normal.   Cardiovascular: Normal rate.    Pulmonary/Chest: Effort normal.   Neurological: He is alert and oriented to person, place, and time.   Psychiatric: He has a normal mood and affect. His behavior is normal.             Left Knee Exam     Inspection   Swelling: absent  Deformity: deformity  Bruising: absent    Tenderness   The patient tender to palpation of the medial joint line and lateral joint line.    Range of Motion   The patient has normal left knee ROM.    Tests   Meniscus   Lupe:  Medial - negative Lateral - negative  Stability Lachman: normal (-1 to 2mm) PCL-Posterior Drawer: normal (0 to 2mm)  MCL - Valgus: normal (0 to 2mm)  LCL - Varus: normal (0 to 2mm)    Other   Sensation: normal    Muscle Strength   Left Lower Extremity   Quadriceps:  5/5   Hamstrin/5       RADS: There is mild DJD is present in  the patella. No fracture dislocation bone destruction seen.        Assessment:       Encounter Diagnosis   Name Primary?    Acute pain of left knee Yes          Plan:       Discussed treatment options with patient. At this time he would like injection into his knee. He is to return to clinic as needed.     PROCEDURE:  I have explained the risks, benefits, and alternatives of the procedure in detail.  The patient voices understanding and all questions have been answered.  The patient agrees to proceed as planned. So after I performed a sterile prep of the skin in the normal fashion the left knee is injected using a 22 gauge needle from the anterolateral approach with a combination of 4cc 1% plain lidocaine and 40 mg of kenalog.  The patient is cautioned and immediate relief of pain is secondary to the local anesthetic and will be temporary.  After the anesthetic wears off there may be a increase in pain that may last for a few hours or a few days and they should use ice to help alleviate this flair up of pain.

## 2018-01-20 ENCOUNTER — TELEPHONE (OUTPATIENT)
Dept: GASTROENTEROLOGY | Facility: CLINIC | Age: 63
End: 2018-01-20

## 2018-01-20 DIAGNOSIS — K63.5 POLYP OF COLON, UNSPECIFIED PART OF COLON, UNSPECIFIED TYPE: Primary | ICD-10-CM

## 2018-01-24 ENCOUNTER — TELEPHONE (OUTPATIENT)
Dept: GASTROENTEROLOGY | Facility: CLINIC | Age: 63
End: 2018-01-24

## 2018-01-24 NOTE — TELEPHONE ENCOUNTER
Spoke with patient. Colonoscopy scheduled for 3/9 at. Reviewed prep instructions. Mr Solis verbalized understanding. Endoscopy Scheduling nurse will send colon prep to patient.

## 2018-02-05 ENCOUNTER — PATIENT MESSAGE (OUTPATIENT)
Dept: GASTROENTEROLOGY | Facility: CLINIC | Age: 63
End: 2018-02-05

## 2018-02-05 ENCOUNTER — LAB VISIT (OUTPATIENT)
Dept: LAB | Facility: HOSPITAL | Age: 63
End: 2018-02-05
Payer: MEDICARE

## 2018-02-05 ENCOUNTER — INITIAL CONSULT (OUTPATIENT)
Dept: RHEUMATOLOGY | Facility: CLINIC | Age: 63
End: 2018-02-05
Payer: MEDICARE

## 2018-02-05 VITALS
HEIGHT: 66 IN | DIASTOLIC BLOOD PRESSURE: 66 MMHG | SYSTOLIC BLOOD PRESSURE: 114 MMHG | HEART RATE: 80 BPM | BODY MASS INDEX: 26.02 KG/M2 | WEIGHT: 161.88 LBS

## 2018-02-05 DIAGNOSIS — M25.50 ARTHRALGIA, UNSPECIFIED JOINT: Primary | ICD-10-CM

## 2018-02-05 DIAGNOSIS — M54.10 RADICULOPATHY AFFECTING UPPER EXTREMITY: ICD-10-CM

## 2018-02-05 DIAGNOSIS — M25.50 ARTHRALGIA, UNSPECIFIED JOINT: ICD-10-CM

## 2018-02-05 DIAGNOSIS — R20.2 POSITIVE TINEL'S SIGN: ICD-10-CM

## 2018-02-05 DIAGNOSIS — R20.2 POSITIVE PHALEN MANEUVER: ICD-10-CM

## 2018-02-05 LAB
ALBUMIN SERPL BCP-MCNC: 3.5 G/DL
ALP SERPL-CCNC: 70 U/L
ALT SERPL W/O P-5'-P-CCNC: 21 U/L
ANION GAP SERPL CALC-SCNC: 5 MMOL/L
AST SERPL-CCNC: 16 U/L
BASOPHILS # BLD AUTO: 0.06 K/UL
BASOPHILS NFR BLD: 1.1 %
BILIRUB SERPL-MCNC: 0.5 MG/DL
BUN SERPL-MCNC: 25 MG/DL
CALCIUM SERPL-MCNC: 9.1 MG/DL
CCP AB SER IA-ACNC: 3.5 U/ML
CHLORIDE SERPL-SCNC: 104 MMOL/L
CO2 SERPL-SCNC: 28 MMOL/L
CREAT SERPL-MCNC: 1 MG/DL
CRP SERPL-MCNC: 2 MG/L
DIFFERENTIAL METHOD: ABNORMAL
EOSINOPHIL # BLD AUTO: 0.1 K/UL
EOSINOPHIL NFR BLD: 2.4 %
ERYTHROCYTE [DISTWIDTH] IN BLOOD BY AUTOMATED COUNT: 13.4 %
ERYTHROCYTE [SEDIMENTATION RATE] IN BLOOD BY WESTERGREN METHOD: 1 MM/HR
EST. GFR  (AFRICAN AMERICAN): >60 ML/MIN/1.73 M^2
EST. GFR  (NON AFRICAN AMERICAN): >60 ML/MIN/1.73 M^2
GLUCOSE SERPL-MCNC: 107 MG/DL
HCT VFR BLD AUTO: 38.3 %
HGB BLD-MCNC: 13 G/DL
IMM GRANULOCYTES # BLD AUTO: 0.01 K/UL
IMM GRANULOCYTES NFR BLD AUTO: 0.2 %
LYMPHOCYTES # BLD AUTO: 1.6 K/UL
LYMPHOCYTES NFR BLD: 30.3 %
MCH RBC QN AUTO: 30.3 PG
MCHC RBC AUTO-ENTMCNC: 33.9 G/DL
MCV RBC AUTO: 89 FL
MONOCYTES # BLD AUTO: 0.5 K/UL
MONOCYTES NFR BLD: 10 %
NEUTROPHILS # BLD AUTO: 3 K/UL
NEUTROPHILS NFR BLD: 56 %
NRBC BLD-RTO: 0 /100 WBC
PLATELET # BLD AUTO: 157 K/UL
PMV BLD AUTO: 9.8 FL
POTASSIUM SERPL-SCNC: 4.5 MMOL/L
PROT SERPL-MCNC: 6.5 G/DL
RBC # BLD AUTO: 4.29 M/UL
RHEUMATOID FACT SERPL-ACNC: <10 IU/ML
SODIUM SERPL-SCNC: 137 MMOL/L
WBC # BLD AUTO: 5.31 K/UL

## 2018-02-05 PROCEDURE — 99999 PR PBB SHADOW E&M-EST. PATIENT-LVL III: CPT | Mod: PBBFAC,GC,, | Performed by: INTERNAL MEDICINE

## 2018-02-05 PROCEDURE — 85651 RBC SED RATE NONAUTOMATED: CPT

## 2018-02-05 PROCEDURE — 86431 RHEUMATOID FACTOR QUANT: CPT

## 2018-02-05 PROCEDURE — 85025 COMPLETE CBC W/AUTO DIFF WBC: CPT

## 2018-02-05 PROCEDURE — 86140 C-REACTIVE PROTEIN: CPT

## 2018-02-05 PROCEDURE — 99205 OFFICE O/P NEW HI 60 MIN: CPT | Mod: S$PBB,GC,, | Performed by: INTERNAL MEDICINE

## 2018-02-05 PROCEDURE — 80053 COMPREHEN METABOLIC PANEL: CPT

## 2018-02-05 PROCEDURE — 99213 OFFICE O/P EST LOW 20 MIN: CPT | Mod: PBBFAC | Performed by: INTERNAL MEDICINE

## 2018-02-05 PROCEDURE — 86200 CCP ANTIBODY: CPT

## 2018-02-05 PROCEDURE — 36415 COLL VENOUS BLD VENIPUNCTURE: CPT

## 2018-02-05 RX ORDER — DOXAZOSIN 2 MG/1
2 TABLET ORAL DAILY
COMMUNITY
End: 2018-10-08 | Stop reason: ALTCHOICE

## 2018-02-05 ASSESSMENT — ROUTINE ASSESSMENT OF PATIENT INDEX DATA (RAPID3)
PATIENT GLOBAL ASSESSMENT SCORE: 6
TOTAL RAPID3 SCORE: 5
AM STIFFNESS SCORE: 1, YES
PAIN SCORE: 7
PSYCHOLOGICAL DISTRESS SCORE: 3.3
MDHAQ FUNCTION SCORE: .6
FATIGUE SCORE: 10
WHEN YOU AWAKENED IN THE MORNING OVER THE LAST WEEK, PLEASE INDICATE THE AMOUNT OF TIME IT TAKES UNTIL YOU ARE AS LIMBER AS YOU WILL BE FOR THE DAY: 6 HOURS

## 2018-02-05 NOTE — PROGRESS NOTES
Subjective:       Patient ID: Ladarius Solis is a 62 y.o. male.    Chief Complaint: Initial Visit    HPI   Pt is a 63 yo with HTN, h/o carpal tunnel, cervical spinal stenosis presenting with c/o arthralgias. Pt reports his left knee has been hurting intermittently for the last 3-4 months. He denies trauma or swelling. Pt was seen by Ortho on 1/10/18 and underwent a intra articular steroid injection which did not help his pain. The pain is worse after immobility. Other joints involved includes the hands/arms bilaterally, he describes a sensation of pain through out the entire body. He reports all the joints of the hands hurt. Pt reports numbness/tingling of the 2-5 th digits b/l as well.  Pt had an EMG done in 2010 which was suspicious for radiculopathy +/- myelopathy.  Of note pt reports caffeine causes a decrease in his ROM of the shoulders.   He has not tried any OTC products for his joint pains.     Denies fevers,vision changes, SOB, mucosal ulcers, rashes, Raynaud's, bowel/bladder changes.     No family history of an autoimmune conditions.   PMH/Surgical/Social history updated in Epic                                   Widespread pain index 10  Note the areas which the patient has had pain over the last week:                   Shoulder-girdle, left1               Shoulder-girdle, right1                         Upper arm left1                       Upper arm right1                         Lower arm left1                       Lower arm right1    Hip (buttock, trochanter) left  Hip (buttock, trochanter) right                           Upper leg, left                         Upper leg, right                           Lower leg, left1                         Lower leg, right1                                     Jaw, left                                   Jaw, right                                        Chest                                  Abdomen                               Upper back                        "       Lower back1                                        Neck1  Score will be from 0-19:                                         Symptom severity score 5  Fatigue 3  Waking Unrefreshed 2  Cognitive Symptoms   0 = no problem, 1=slight or mild problem 2= moderate; considerable problems often present and/or at a moderate level, 3 = severe, pervasive, continuous, life disturbing problem  For each of the 3 symptoms, indicate the level of severity over the past week using the Scale.  The symptom severity score is the sum of the severity of the 3 symptoms (fatigue, waking unrefreshed, and cognitive symptoms) plus the number of the following symptoms occurring during the previous 6 months:   Headaches  Pain or cramps in the lower abdomen  Depression  The final score is between 0 and 12                                          Criteria  Patient has fibromyalgia if the following 3 conditions are met:  1.  Widespread pain index greater than or equal to 7 and symptom severity score greater than or equal to 5 or widespread pain index between 3- 6, and symptom severity score greater than or equal to 9.    2.  Symptoms have been present in a similar level for at least 3 months  3.  The patient does not have a disorder that would otherwise sufficiently explain the pain          Review of Systems   Constitutional: Positive for activity change and fatigue. Negative for chills and fever.   Eyes: Negative for pain and visual disturbance.   Respiratory: Positive for cough.    Gastrointestinal: Negative for abdominal pain, diarrhea and nausea.   Musculoskeletal: Positive for arthralgias, back pain, neck pain and neck stiffness. Negative for gait problem, joint swelling and myalgias.   Skin: Negative for color change, pallor, rash and wound.   Neurological: Positive for numbness.         Objective:   /66   Pulse 80   Ht 5' 6" (1.676 m)   Wt 73.4 kg (161 lb 14.4 oz)   BMI 26.13 kg/m²      Physical Exam   Constitutional: He is " oriented to person, place, and time and well-developed, well-nourished, and in no distress.   HENT:   Mouth/Throat: Oropharynx is clear and moist.   Eyes: Conjunctivae and EOM are normal. No scleral icterus.   Neck:   Limited ROM in all directions on exam. Flexion/extension.    Cardiovascular: Normal rate, regular rhythm, normal heart sounds and intact distal pulses.    No murmur heard.  Pulmonary/Chest: Effort normal. He has no wheezes. He exhibits tenderness.   Abdominal: Soft. Bowel sounds are normal.       Right Side Rheumatological Exam     The patient is tender to palpation of the shoulder, elbow, wrist, knee, 1st PIP, 1st MCP, 2nd PIP, 2nd MCP, 3rd PIP, 3rd MCP, 4th PIP, 4th MCP, 5th PIP and 5th MCP    Left Side Rheumatological Exam     The patient is tender to palpation of the shoulder, elbow, wrist, knee, 1st PIP, 1st MCP, 2nd PIP, 2nd MCP, 3rd PIP, 3rd MCP, 4th PIP, 4th MCP, 5th PIP and 5th MCP.      Neurological: He is alert and oriented to person, place, and time. Gait normal.   US- hyporeflexia    Skin: Skin is warm and dry. No rash noted. No erythema. No pallor.     Musculoskeletal: He exhibits tenderness. He exhibits no edema or deformity.           Old labs and xrays reviewed:      Ref. Range 2/5/2018 09:56   CCP Antibodies Latest Ref Range: <5.0 U/mL 3.5   Rheumatoid Factor Latest Ref Range: 0.0 - 15.0 IU/mL <10.0        Ref. Range 1/12/2006 17:37 2/5/2018 09:56   Sed Rate Latest Ref Range: 0 - 10 mm/Hr 0 1        Ref. Range 2/5/2018 09:56   CRP Latest Ref Range: 0.0 - 8.2 mg/L 2.0       MRI 2010:   IMPRESSION:   MULTILEVEL DEGENERATIVE CHANGE WITH MODERATE CANAL STENOSIS AND NEURAL   FORAMINAL NARROWING, WORSE AT C5-6 AND C6-7.       XR knee 1/18:   Narrative     3 views:    Left knee: There is mild DJD is present in the patella. No fracture dislocation bone destruction seen.       Assessment:       1. Arthralgia, unspecified joint    2. Positive Tinel's sign    3. Radiculopathy affecting upper  extremity    4. Positive Phalen maneuver        Pt is a 63 yo with HTN, h/o carpal tunnel, cervical spinal stenosis presenting with c/o arthralgias and sx consistent with peripheral neuropathy.   W/u so far negative for rheumatoid arthritis, esr/crp normal- No findings of an autoimmune condition based on labs and assessment today. Pt has OA but given his positive Tinel's sign and Phalen's suspect his pain is due to his cervical stenosis or carpal tunnel.   Pt has multiple tender points on exam with an elevated WPI and SSS possibly fibromyalgia as well.   Concern that cervical symptoms have progressed.   Plan:   - ESR/CRP, RF, CCP, CBC, CMP  - EMG ordered  - Try Tylenol extra strength   - further management based on findings of above but may need to go back to neurosurgery for further management.       Signed,   Raffi Harman  PGY-5

## 2018-02-06 NOTE — PROGRESS NOTES
I have personally taken the history and examined the patient to verify the fellow's notation, and I agree with the impression and plan stated.   Somewhat unusual presentation of hand pain with hx and features that could indicated cervical radiculopathy or carpal tunnel syndrome or inflammatory arthritis (less likely) with superimposed features of fibromyalgia.  We will evaluate further.

## 2018-02-07 ENCOUNTER — TELEPHONE (OUTPATIENT)
Dept: GASTROENTEROLOGY | Facility: CLINIC | Age: 63
End: 2018-02-07

## 2018-02-07 NOTE — TELEPHONE ENCOUNTER
"----- Message from Raul August MD sent at 2/7/2018  3:58 PM CST -----  Contact: [phone]  Please let pt know that neither his anemia, nor any renal problems, would be related to his prior procedure.    ----- Message -----  From: Anali Rodriguez MA  Sent: 2/6/2018  12:25 PM  To: Raul August MD    Please advise  ----- Message -----  From: Sue Dillon MA  Sent: 2/6/2018   8:24 AM  To: Mariangel August  Pt had a colonoscopy in Sept 2017, had some polyps removed. Pt states that no too long after the procedure he started having some arthritic issues and met with Dr Harman ([facility])  Pt said that Dr Harman told him that he has anemia and "renal problems"   Pt is asking if Dr August will go over Dr Harman's notes and see if any these conditions could be related to his procedure    [phone]      "

## 2018-02-26 ENCOUNTER — TELEPHONE (OUTPATIENT)
Dept: ORTHOPEDICS | Facility: CLINIC | Age: 63
End: 2018-02-26

## 2018-02-26 ENCOUNTER — HOSPITAL ENCOUNTER (EMERGENCY)
Facility: HOSPITAL | Age: 63
Discharge: HOME | End: 2018-02-26
Attending: EMERGENCY MEDICINE
Payer: MEDICARE

## 2018-02-26 VITALS
BODY MASS INDEX: 28.93 KG/M2 | WEIGHT: 180 LBS | DIASTOLIC BLOOD PRESSURE: 58 MMHG | OXYGEN SATURATION: 100 % | RESPIRATION RATE: 18 BRPM | HEIGHT: 66 IN | HEART RATE: 80 BPM | TEMPERATURE: 98 F | SYSTOLIC BLOOD PRESSURE: 106 MMHG

## 2018-02-26 DIAGNOSIS — M25.562 CHRONIC PAIN OF LEFT KNEE: Primary | ICD-10-CM

## 2018-02-26 DIAGNOSIS — M25.562 LEFT KNEE PAIN: ICD-10-CM

## 2018-02-26 DIAGNOSIS — G89.29 CHRONIC PAIN OF LEFT KNEE: Primary | ICD-10-CM

## 2018-02-26 PROCEDURE — 99284 EMERGENCY DEPT VISIT MOD MDM: CPT | Mod: 25

## 2018-02-26 PROCEDURE — 99283 EMERGENCY DEPT VISIT LOW MDM: CPT | Mod: ,,, | Performed by: EMERGENCY MEDICINE

## 2018-02-26 PROCEDURE — 29505 APPLICATION LONG LEG SPLINT: CPT | Mod: LT

## 2018-02-26 RX ORDER — ACETAMINOPHEN AND CODEINE PHOSPHATE 300; 30 MG/1; MG/1
1 TABLET ORAL EVERY 6 HOURS PRN
Qty: 12 TABLET | Refills: 0 | Status: SHIPPED | OUTPATIENT
Start: 2018-02-26 | End: 2018-03-08

## 2018-02-26 RX ORDER — IBUPROFEN 800 MG/1
800 TABLET ORAL 3 TIMES DAILY
Qty: 60 TABLET | Refills: 0 | Status: ON HOLD | OUTPATIENT
Start: 2018-02-26 | End: 2018-10-17 | Stop reason: HOSPADM

## 2018-02-26 NOTE — TELEPHONE ENCOUNTER
Called and Informed patient of appointment  With PMR on 02/28/18 at 9:20 am. Patient states verbal understanding and has no further questions.

## 2018-02-26 NOTE — ED TRIAGE NOTES
"Pt presents to the ED c/o left knee pain x 1 week. Denies any trauma. Pt states, "It may be something to do with my cervical spinal stenosis." Pt states he received an injection in the left knee at the ortho clinic one month ago with minimal relief. Pt states he thinks he might need an MRI. Pt reports a burning sensation to the left knee. Endorses tingling in left toes. Denies numbness to LLE. Pt ambulatory.  "

## 2018-02-26 NOTE — ED PROVIDER NOTES
Encounter Date: 2/26/2018       History     Chief Complaint   Patient presents with    Knee Pain     L knee, denies injury     Pain in left knee that has been going on for about 6 weeks. Worse over last 2 days. Seen in rheum clinic about 1 month ago. And seen in ortho clinc about 2 months ago. Given steroid injection and had xrays which were negative. Had a hx of spinal stenosis and may be djd related to that per rheum.           Review of patient's allergies indicates:  No Known Allergies  Past Medical History:   Diagnosis Date    Cervical spinal stenosis 2002    Colon polyps     Hypertension     Vertigo     left ear issues     Past Surgical History:   Procedure Laterality Date    COLONOSCOPY      COLONOSCOPY N/A 9/25/2017    Procedure: COLONOSCOPY/emr;  Surgeon: Raul August MD;  Location: [facility] ([address] [place] [address]);  Service: Endoscopy;  Laterality: N/A;     No family history on file.  Social History   Substance Use Topics    Smoking status: Never Smoker    Smokeless tobacco: Never Used    Alcohol use No     Review of Systems   Constitutional: Negative.    HENT: Negative.    Eyes: Negative.    Respiratory: Negative.    Cardiovascular: Negative.    Gastrointestinal: Negative.    Endocrine: Negative.    Genitourinary: Negative.    Musculoskeletal: Positive for arthralgias and neck pain. Negative for joint swelling.   Skin: Negative.    Allergic/Immunologic: Negative.    Neurological: Negative.    All other systems reviewed and are negative.      Physical Exam     Initial Vitals [02/26/18 1011]   BP Pulse Resp Temp SpO2   (!) 106/58 80 18 97.6 °F (36.4 °C) 100 %      MAP       74         Physical Exam    Nursing note and vitals reviewed.  Constitutional: He appears well-developed and well-nourished.   HENT:   Right Ear: External ear normal.   Left Ear: External ear normal.   Dental fracture to left upper incisor.    Eyes: EOM are normal. Pupils are equal, round, and reactive to light.   Neck: Normal range of  motion. Neck supple.   Cardiovascular: Normal rate, regular rhythm and normal heart sounds.   Pulmonary/Chest: Breath sounds normal.   Abdominal: Soft. Bowel sounds are normal.   Musculoskeletal: He exhibits tenderness.   Tender to left medial aspect of knee, no effusion, normal gait and range of motion.    Neurological: He is alert and oriented to person, place, and time. He has normal strength.   Skin: Skin is warm and dry. Capillary refill takes less than 2 seconds.         ED Course   Procedures  Labs Reviewed - No data to display                               Clinical Impression:   Left knee pain.                          Anshu Álvarez MD  02/26/18 1030       Anshu Álvarez MD  02/26/18 1045       Anshu Álvarez MD  02/26/18 1046

## 2018-02-26 NOTE — TELEPHONE ENCOUNTER
----- Message from Brielle Vazquez sent at 2/26/2018  2:48 PM CST -----  Contact: Self [phone]  Pt is requesting an order for an MRI of his left knee as the pain has increased. On a pain scale of 1 to 10 it is a 10.  Pt went to the ED this morning and was told to follow up with ortho.    Pt is also interested in a referral to pain management.    Pt would like a call and may be reached at [phone].    Thank you.  HERB

## 2018-02-27 ENCOUNTER — TELEPHONE (OUTPATIENT)
Dept: ENDOSCOPY | Facility: HOSPITAL | Age: 63
End: 2018-02-27

## 2018-02-27 DIAGNOSIS — Z12.11 SPECIAL SCREENING FOR MALIGNANT NEOPLASMS, COLON: Primary | ICD-10-CM

## 2018-02-27 RX ORDER — POLYETHYLENE GLYCOL 3350, SODIUM SULFATE ANHYDROUS, SODIUM BICARBONATE, SODIUM CHLORIDE, POTASSIUM CHLORIDE 236; 22.74; 6.74; 5.86; 2.97 G/4L; G/4L; G/4L; G/4L; G/4L
4 POWDER, FOR SOLUTION ORAL ONCE
Qty: 4000 ML | Refills: 0 | Status: SHIPPED | OUTPATIENT
Start: 2018-02-27 | End: 2018-02-27

## 2018-02-27 NOTE — TELEPHONE ENCOUNTER
Called about Colonoscopy scheduled 3/9/18 at 0930.  Left voicemail with call back number for questions.  Instructions mailed.

## 2018-02-28 ENCOUNTER — OFFICE VISIT (OUTPATIENT)
Dept: PHYSICAL MEDICINE AND REHAB | Facility: CLINIC | Age: 63
End: 2018-02-28
Payer: MEDICARE

## 2018-02-28 VITALS
SYSTOLIC BLOOD PRESSURE: 118 MMHG | HEART RATE: 73 BPM | BODY MASS INDEX: 25.01 KG/M2 | DIASTOLIC BLOOD PRESSURE: 72 MMHG | HEIGHT: 66 IN | WEIGHT: 155.63 LBS

## 2018-02-28 DIAGNOSIS — M25.562 ACUTE PAIN OF LEFT KNEE: ICD-10-CM

## 2018-02-28 DIAGNOSIS — M17.12 PRIMARY OSTEOARTHRITIS OF LEFT KNEE: Primary | ICD-10-CM

## 2018-02-28 PROCEDURE — 99203 OFFICE O/P NEW LOW 30 MIN: CPT | Mod: S$PBB,,, | Performed by: PHYSICAL MEDICINE & REHABILITATION

## 2018-02-28 PROCEDURE — 99999 PR PBB SHADOW E&M-EST. PATIENT-LVL III: CPT | Mod: PBBFAC,,, | Performed by: PHYSICAL MEDICINE & REHABILITATION

## 2018-02-28 PROCEDURE — 99213 OFFICE O/P EST LOW 20 MIN: CPT | Mod: PBBFAC | Performed by: PHYSICAL MEDICINE & REHABILITATION

## 2018-02-28 RX ORDER — GABAPENTIN 300 MG/1
300 CAPSULE ORAL 3 TIMES DAILY
Qty: 90 CAPSULE | Refills: 2 | Status: SHIPPED | OUTPATIENT
Start: 2018-02-28 | End: 2018-03-27 | Stop reason: SDUPTHER

## 2018-02-28 RX ORDER — DICLOFENAC SODIUM 10 MG/G
2 GEL TOPICAL 4 TIMES DAILY
Qty: 500 G | Refills: 3 | Status: ON HOLD | OUTPATIENT
Start: 2018-02-28 | End: 2018-10-17

## 2018-02-28 NOTE — PROGRESS NOTES
Subjective:       Patient ID: Ladarius Solis is a 62 y.o. male.    Chief Complaint: Knee Pain    HPI   Mr. Rivera is 61 y/o male who is coming first time to clinic for Left knee pain.   Referred by Thomas Ponce ( he saw her on 1/10/18).  Today, he reports Left knee pain, for . 1 month.  He states that he was seen by Ortho, and received steroid injection that did not help at all.  He reports no trauma, no fall.   He has intermittent left knee tenderness and stiffness for several  Weeks.  NO swelling. No warmth.  Everything make the pain worse. He has not done any treatment for this.   He denied locking catching popping cracking or feeling unstable.   He states that Xray did nto show much, but spurs.  He was told that he needs MRI of knee.   He states that his both legs give away sometimes.   He is here for evaluation and treatment.    Past Medical History:   Diagnosis Date    Cervical spinal stenosis 2002    Colon polyps     Hypertension     Vertigo     left ear issues       Past Surgical History:   Procedure Laterality Date    COLONOSCOPY      COLONOSCOPY N/A 9/25/2017    Procedure: COLONOSCOPY/emr;  Surgeon: Raul August MD;  Location: [address];  Service: Endoscopy;  Laterality: N/A;       No family history on file.    Social History     Social History    Marital status: Single     Spouse name: N/A    Number of children: N/A    Years of education: N/A     Social History Main Topics    Smoking status: Never Smoker    Smokeless tobacco: Never Used    Alcohol use No    Drug use: No    Sexual activity: Not Asked     Other Topics Concern    None     Social History Narrative    None       Current Outpatient Prescriptions   Medication Sig Dispense Refill    acetaminophen-codeine 300-30mg (TYLENOL #3) 300-30 mg Tab Take 1 tablet by mouth every 6 (six) hours as needed. 12 tablet 0    diclofenac sodium (VOLTAREN) 1 % Gel Apply 2 g topically 4 (four) times daily. 500 g 3    doxazosin  (CARDURA) 2 MG tablet Take 2 mg by mouth once daily.      gabapentin (NEURONTIN) 300 MG capsule Take 1 capsule (300 mg total) by mouth 3 (three) times daily. . 90 capsule 2    ibuprofen (ADVIL,MOTRIN) 800 MG tablet Take 1 tablet (800 mg total) by mouth 3 (three) times daily. 60 tablet 0    lisinopril 10 MG tablet Take 10 mg by mouth once daily.       No current facility-administered medications for this visit.        Review of patient's allergies indicates:  No Known Allergies  Review of Systems   Constitutional: Negative for appetite change and fatigue.   Eyes: Negative for visual disturbance.   Respiratory: Negative for shortness of breath.    Cardiovascular: Negative for chest pain.   Gastrointestinal: Negative for constipation and diarrhea.   Genitourinary: Negative for dysuria, frequency and urgency.   Musculoskeletal: Positive for arthralgias (Left knee pain). Negative for back pain, gait problem, joint swelling, myalgias, neck pain and neck stiffness.   Neurological: Negative for dizziness, tremors, weakness, numbness and headaches.   Psychiatric/Behavioral: Negative for dysphoric mood.   All other systems reviewed and are negative.        Objective:      Physical Exam    GENERAL: The patient is alert, oriented, pleasant.  HEENT: PERRLA  NECK: supple, no masses,    CV: S1S2, RRR, no murmurs, rales.  LUNGS: CTA bilateral.   ABDOMEN: soft, non-tender, non distended, bowel sounds nl.  EXTREMITIES: no edema, +2 pulses DP, b/l  SKIN: no skin rash, no skin breakdowns.  MUSCULOSKELETAL:   Gait : normal, walks with no AD.  Full range of motion in all joints x4 extremities, except in Lt knee,  Flex >100, ext to 0.   + min crepitus in LT knee. NO edema, no erythema.   No laxity, neg ant/post drawer sign,   No instability.  Muscle strength 5/5 throughout x4 extremities.   No  joint laxity throughout x4 extremities.   NEUROLOGIC: Cranial nerves II through XII intact.   Deep tendon reflexes is normal, +2 in the upper  and lower extremities bilaterally.   Muscle tone is normal.   Sensory is intact to light touch and pinprick throughout x4 extremities.     Assessment:       1. Acute pain of left knee    2. Primary osteoarthritis of left knee        Plan:        Primary osteoarthritis of left knee  -     MRI Knee Without Contrast Left; Future  -     diclofenac sodium (VOLTAREN) 1 % Gel; Apply 2 g topically 4 (four) times daily.  Dispense: 500 g; Refill: 3  -     HME - OTHER    Acute pain of left knee  -     MRI Knee Without Contrast Left; Future  -     diclofenac sodium (VOLTAREN) 1 % Gel; Apply 2 g topically 4 (four) times daily.  Dispense: 500 g; Refill: 3  -     HME - OTHER      RTC in 4 weeks.       Total time spent face to face with patient was 30 minutes.   More than 50% of that time was spent in counseling on diagnosis , prognosis and treatment options.   I also  patient  on common and most usual side effect of prescribed medications.   I reviewed Primary care , and other specialty's notes to better coordinate patient's  care.   All questions were answered, and patient voiced understanding.

## 2018-03-07 ENCOUNTER — HOSPITAL ENCOUNTER (OUTPATIENT)
Dept: RADIOLOGY | Facility: HOSPITAL | Age: 63
Discharge: HOME | End: 2018-03-07
Attending: PHYSICAL MEDICINE & REHABILITATION
Payer: MEDICARE

## 2018-03-07 DIAGNOSIS — M17.12 PRIMARY OSTEOARTHRITIS OF LEFT KNEE: ICD-10-CM

## 2018-03-07 DIAGNOSIS — M25.562 ACUTE PAIN OF LEFT KNEE: ICD-10-CM

## 2018-03-07 PROCEDURE — 73721 MRI JNT OF LWR EXTRE W/O DYE: CPT | Mod: 26,LT,GC, | Performed by: RADIOLOGY

## 2018-03-07 PROCEDURE — 73721 MRI JNT OF LWR EXTRE W/O DYE: CPT | Mod: TC,LT

## 2018-03-09 ENCOUNTER — ANESTHESIA (OUTPATIENT)
Dept: ENDOSCOPY | Facility: HOSPITAL | Age: 63
End: 2018-03-09
Payer: MEDICARE

## 2018-03-09 ENCOUNTER — HOSPITAL ENCOUNTER (OUTPATIENT)
Facility: HOSPITAL | Age: 63
Discharge: HOME | End: 2018-03-09
Attending: INTERNAL MEDICINE | Admitting: INTERNAL MEDICINE
Payer: MEDICARE

## 2018-03-09 ENCOUNTER — SURGERY (OUTPATIENT)
Age: 63
End: 2018-03-09

## 2018-03-09 ENCOUNTER — ANESTHESIA EVENT (OUTPATIENT)
Dept: ENDOSCOPY | Facility: HOSPITAL | Age: 63
End: 2018-03-09
Payer: MEDICARE

## 2018-03-09 VITALS
BODY MASS INDEX: 25.71 KG/M2 | HEIGHT: 66 IN | WEIGHT: 160 LBS | HEART RATE: 64 BPM | RESPIRATION RATE: 16 BRPM | DIASTOLIC BLOOD PRESSURE: 80 MMHG | OXYGEN SATURATION: 100 % | SYSTOLIC BLOOD PRESSURE: 138 MMHG | TEMPERATURE: 98 F

## 2018-03-09 DIAGNOSIS — D12.6 COLON ADENOMA: Primary | ICD-10-CM

## 2018-03-09 PROCEDURE — 27201012 HC FORCEPS, HOT/COLD, DISP: Performed by: INTERNAL MEDICINE

## 2018-03-09 PROCEDURE — 25000003 PHARM REV CODE 250: Performed by: INTERNAL MEDICINE

## 2018-03-09 PROCEDURE — D9220A PRA ANESTHESIA: Mod: PT,ANES,, | Performed by: ANESTHESIOLOGY

## 2018-03-09 PROCEDURE — 88305 TISSUE EXAM BY PATHOLOGIST: CPT | Performed by: PATHOLOGY

## 2018-03-09 PROCEDURE — 88305 TISSUE EXAM BY PATHOLOGIST: CPT | Mod: 26,,, | Performed by: PATHOLOGY

## 2018-03-09 PROCEDURE — D9220A PRA ANESTHESIA: Mod: PT,CRNA,, | Performed by: NURSE ANESTHETIST, CERTIFIED REGISTERED

## 2018-03-09 PROCEDURE — 37000009 HC ANESTHESIA EA ADD 15 MINS: Performed by: INTERNAL MEDICINE

## 2018-03-09 PROCEDURE — 63600175 PHARM REV CODE 636 W HCPCS: Performed by: NURSE ANESTHETIST, CERTIFIED REGISTERED

## 2018-03-09 PROCEDURE — 45380 COLONOSCOPY AND BIOPSY: CPT | Performed by: INTERNAL MEDICINE

## 2018-03-09 PROCEDURE — 37000008 HC ANESTHESIA 1ST 15 MINUTES: Performed by: INTERNAL MEDICINE

## 2018-03-09 PROCEDURE — 45380 COLONOSCOPY AND BIOPSY: CPT | Mod: PT,,, | Performed by: INTERNAL MEDICINE

## 2018-03-09 RX ORDER — LIDOCAINE HCL/PF 100 MG/5ML
SYRINGE (ML) INTRAVENOUS
Status: DISCONTINUED | OUTPATIENT
Start: 2018-03-09 | End: 2018-03-09

## 2018-03-09 RX ORDER — PROPOFOL 10 MG/ML
VIAL (ML) INTRAVENOUS CONTINUOUS PRN
Status: DISCONTINUED | OUTPATIENT
Start: 2018-03-09 | End: 2018-03-09

## 2018-03-09 RX ORDER — SODIUM CHLORIDE 0.9 % (FLUSH) 0.9 %
3 SYRINGE (ML) INJECTION
Status: DISCONTINUED | OUTPATIENT
Start: 2018-03-09 | End: 2018-03-09 | Stop reason: HOSPADM

## 2018-03-09 RX ORDER — MIDAZOLAM HYDROCHLORIDE 1 MG/ML
INJECTION, SOLUTION INTRAMUSCULAR; INTRAVENOUS
Status: DISCONTINUED | OUTPATIENT
Start: 2018-03-09 | End: 2018-03-09

## 2018-03-09 RX ORDER — SODIUM CHLORIDE 9 MG/ML
INJECTION, SOLUTION INTRAVENOUS CONTINUOUS
Status: DISCONTINUED | OUTPATIENT
Start: 2018-03-09 | End: 2018-03-09 | Stop reason: HOSPADM

## 2018-03-09 RX ORDER — FENTANYL CITRATE 50 UG/ML
25 INJECTION, SOLUTION INTRAMUSCULAR; INTRAVENOUS EVERY 5 MIN PRN
Status: DISCONTINUED | OUTPATIENT
Start: 2018-03-09 | End: 2018-03-09 | Stop reason: HOSPADM

## 2018-03-09 RX ORDER — PROPOFOL 10 MG/ML
VIAL (ML) INTRAVENOUS
Status: DISCONTINUED | OUTPATIENT
Start: 2018-03-09 | End: 2018-03-09

## 2018-03-09 RX ADMIN — PROPOFOL 40 MG: 10 INJECTION, EMULSION INTRAVENOUS at 09:03

## 2018-03-09 RX ADMIN — LIDOCAINE HYDROCHLORIDE 50 MG: 20 INJECTION, SOLUTION INTRAVENOUS at 09:03

## 2018-03-09 RX ADMIN — MIDAZOLAM HYDROCHLORIDE 2 MG: 1 INJECTION, SOLUTION INTRAMUSCULAR; INTRAVENOUS at 09:03

## 2018-03-09 RX ADMIN — PROPOFOL 200 MCG/KG/MIN: 10 INJECTION, EMULSION INTRAVENOUS at 09:03

## 2018-03-09 RX ADMIN — SODIUM CHLORIDE: 0.9 INJECTION, SOLUTION INTRAVENOUS at 09:03

## 2018-03-09 NOTE — ANESTHESIA PREPROCEDURE EVALUATION
03/09/2018  Ladarius Solis is a 62 y.o., male.    Anesthesia Evaluation    I have reviewed the Patient Summary Reports.        Review of Systems  Anesthesia Hx:  No problems with previous Anesthesia    Social:  Non-Smoker    Hematology/Oncology:  Hematology Normal   Oncology Normal     EENT/Dental:EENT/Dental Normal   Cardiovascular:   Hypertension    Pulmonary:  Pulmonary Normal    Renal/:  Renal/ Normal     Hepatic/GI:  Hepatic/GI Normal    Musculoskeletal:   Arthritis     Neurological:  Neurology Normal    Endocrine:  Endocrine Normal    Dermatological:  Skin Normal    Psych:  Psychiatric Normal           Physical Exam  General:  Well nourished    Airway/Jaw/Neck:  Airway Findings: Mouth Opening: Normal Tongue: Normal  General Airway Assessment: Adult  Mallampati: II  Improves to II with phonation.  TM Distance: Normal, at least 6 cm  Jaw/Neck Findings:  Neck ROM: Normal ROM      Dental:  Dental Findings: In tact   Chest/Lungs:  Chest/Lungs Findings: Clear to auscultation, Normal Respiratory Rate     Heart/Vascular:  Heart Findings: Rate: Normal  Rhythm: Regular Rhythm  Sounds: Normal             Anesthesia Plan  Type of Anesthesia, risks & benefits discussed:  Anesthesia Type:  general  Patient's Preference: General  Intra-op Monitoring Plan:   Intra-op Monitoring Plan Comments:   Post Op Pain Control Plan:   Post Op Pain Control Plan Comments:   Induction:   IV  Beta Blocker:  Patient is not currently on a Beta-Blocker (No further documentation required).       Informed Consent: Patient understands risks and agrees with Anesthesia plan.  Questions answered. Anesthesia consent signed with patient.  ASA Score: 2     Day of Surgery Review of History & Physical: I have interviewed and examined the patient. I have reviewed the patient's H&P dated:  There are no significant changes.          Ready  For Surgery From Anesthesia Perspective.

## 2018-03-09 NOTE — TRANSFER OF CARE
"Anesthesia Transfer of Care Note    Patient: Ladarius Solis    Procedure(s) Performed: Procedure(s) (LRB):  COLONOSCOPY (N/A)    Patient location: [facility]    Anesthesia Type: general    Transport from OR: Transported from OR on room air with adequate spontaneous ventilation    Post pain: adequate analgesia    Post assessment: no apparent anesthetic complications    Post vital signs: stable    Level of consciousness: sedated    Nausea/Vomiting: no nausea/vomiting    Complications: none    Transfer of care protocol was followed      Last vitals:   Visit Vitals  /69 (BP Location: Left arm, Patient Position: Lying)   Pulse 72   Temp 36.5 °C (97.7 °F) (Axillary)   Resp 12   Ht 5' 6" (1.676 m)   Wt 72.6 kg (160 lb)   SpO2 100%   BMI 25.82 kg/m²     "

## 2018-03-09 NOTE — PROVATION PATIENT INSTRUCTIONS
Discharge Summary/Instructions after an Endoscopic Procedure  Patient Name: Ladarius Solis  Patient MRN: [id number]  Patient YOB: 1955 Friday, March 09, 2018  Raul August MD  RESTRICTIONS:  During your procedure today, you received medications for sedation.  These   medications may affect your judgment, balance and coordination.  Therefore,   for 24 hours, you have the following restrictions:   - DO NOT drive a car, operate machinery, make legal/financial decisions,   sign important papers or drink alcohol.    ACTIVITY:  The following day: return to full activity including work, except no heavy   lifting, straining or running for 3 days if polyps were removed.  DIET:  Eat and drink normally unless instructed otherwise.     TREATMENT FOR COMMON SIDE EFFECTS:  - Mild abdominal pain, nausea, belching, bloating or excessive gas:  rest,   eat lightly and use a heating pad.  - Sore Throat: treat with throat lozenges and/or gargle with warm salt   water.  - Because air was used during the procedure, expelling large amounts of air   from your rectum or belching is normal.  - If a bowel prep was taken, you may not have a bowel movement for 1-3 days.    This is normal.  SYMPTOMS TO WATCH FOR AND REPORT TO YOUR PHYSICIAN:  1. Abdominal pain or bloating, other than gas cramps.  2. Chest pain.  3. Back pain.  4. Signs of infection such as: chills or fever occurring within 24 hours   after the procedure.  5. Rectal bleeding, which would show as bright red, maroon, or black stools.   (A tablespoon of blood from the rectum is not serious, especially if   hemorrhoids are present.)  6. Vomiting.  7. Weakness or dizziness.  GO DIRECTLY TO THE NEAREST EMERGENCY ROOM IF YOU HAVE ANY OF THE FOLLOWING:      Difficulty breathing  Chills and/or fever over 101 F   Persistent vomiting and/or vomiting blood   Severe abdominal pain   Severe chest pain   Black, tarry stools   Bleeding- more than one tablespoon   Any other symptom or  condition that you feel may need urgent attention  Your doctor recommends these additional instructions:  If any biopsies were taken, your doctors clinic will contact you in 1 to 2   weeks with any results.  You are being discharged to home.   Return to your primary care physician as previously scheduled.   We are waiting for your pathology results.   Your physician has recommended a repeat colonoscopy in three years for   surveillance based on pathology results.  For questions, problems or results please call your physician - Raul August MD at Work:  [phone].  [facility] [place], EMERGENCY ROOM PHONE NUMBER: [phone]  IF A COMPLICATION OR EMERGENCY SITUATION ARISES AND YOU ARE UNABLE TO REACH   YOUR PHYSICIAN - GO DIRECTLY TO THE EMERGENCY ROOM.  Raul August MD  3/9/2018 10:53:45 AM  This report has been verified and signed electronically.

## 2018-03-09 NOTE — ANESTHESIA POSTPROCEDURE EVALUATION
"Anesthesia Post Evaluation    Patient: Ladarius Solis    Procedure(s) Performed: Procedure(s) (LRB):  COLONOSCOPY (N/A)    Final Anesthesia Type: general  Patient location during evaluation: PACU  Patient participation: Yes- Able to Participate  Level of consciousness: awake and alert  Post-procedure vital signs: reviewed and stable  Pain management: adequate  Airway patency: patent  PONV status at discharge: No PONV  Anesthetic complications: no      Cardiovascular status: blood pressure returned to baseline and stable  Respiratory status: unassisted  Hydration status: euvolemic  Follow-up not needed.        Visit Vitals  /70 (BP Location: Left arm, Patient Position: Lying)   Pulse 73   Temp 36.5 °C (97.7 °F) (Axillary)   Resp 16   Ht 5' 6" (1.676 m)   Wt 72.6 kg (160 lb)   SpO2 100%   BMI 25.82 kg/m²       Pain/Kevin Score: Pain Assessment Performed: Yes (3/9/2018  9:57 AM)  Presence of Pain: non-verbal indicators absent (3/9/2018  9:57 AM)      "

## 2018-03-09 NOTE — H&P
Short Stay Endoscopy History and Physical    PCP - SUSANNA Love  Referring Physician - Syd Harley MD  [address] [facility], [address]  [place] [address]    Procedure - colonoscopy  ASA - per anesthesia  Mallampati - per anesthesia  History of Anesthesia problems - no  Family history Anesthesia problems -  no   Plan of anesthesia - General    HPI:  This is a 62 y.o. male here for evaluation of: f/u after colon EMR    Reflux - no  Dysphagia - no  Abdominal pain - no  Diarrhea - no    ROS:  Constitutional: No fevers, chills, No weight loss  CV: No chest pain  Pulm: No cough, No shortness of breath  Ophtho: No vision changes  GI: see HPI  Derm: No rash    Medical History:  has a past medical history of Cervical spinal stenosis (); Colon polyps; Hypertension; and Vertigo.    Surgical History:  has a past surgical history that includes Colonoscopy and Colonoscopy (N/A, 2017).    Family History: family history is not on file..    Social History:  reports that he has never smoked. He has never used smokeless tobacco. He reports that he does not drink alcohol or use drugs.    Review of patient's allergies indicates:  No Known Allergies    Medications:   Prescriptions Prior to Admission   Medication Sig Dispense Refill Last Dose    doxazosin (CARDURA) 2 MG tablet Take 2 mg by mouth once daily.   Past Week at Unknown time    gabapentin (NEURONTIN) 300 MG capsule Take 1 capsule (300 mg total) by mouth 3 (three) times daily. . 90 capsule 2 Past Week at Unknown time    lisinopril 10 MG tablet Take 10 mg by mouth once daily.   Past Week at Unknown time    [] acetaminophen-codeine 300-30mg (TYLENOL #3) 300-30 mg Tab Take 1 tablet by mouth every 6 (six) hours as needed. 12 tablet 0     diclofenac sodium (VOLTAREN) 1 % Gel Apply 2 g topically 4 (four) times daily. 500 g 3     ibuprofen (ADVIL,MOTRIN) 800 MG tablet Take 1 tablet (800 mg total) by mouth 3 (three) times daily. 60 tablet 0  Unknown at Unknown time       Physical Exam:    Vital Signs:   Vitals:    03/09/18 0912   BP: (!) 163/93   Pulse: 76   Resp: 18   Temp: 97.9 °F (36.6 °C)       General Appearance: Well appearing in no acute distress  Eyes:    No scleral icterus  ENT: Neck supple  Lungs: CTA anteriorly  Heart:  Regular rate  Abdomen: Soft, non tender, non distended with normal bowel sounds.  Extremities: No edema  Skin: No rash    Labs:  Lab Results   Component Value Date    WBC 5.31 02/05/2018    HGB 13.0 (L) 02/05/2018    HCT 38.3 (L) 02/05/2018     02/05/2018    CHOL 163 01/02/2006    TRIG 256 (H) 01/02/2006    HDL 33.0 (L) 01/02/2006    ALT 21 02/05/2018    AST 16 02/05/2018     02/05/2018    K 4.5 02/05/2018     02/05/2018    CREATININE 1.0 02/05/2018    BUN 25 (H) 02/05/2018    CO2 28 02/05/2018    TSH 1.9 04/10/2007    INR 1.1 03/17/2006    HGBA1C 5.3 01/02/2006       I have explained the risks and benefits of this endoscopic procedure to the patient including but not limited to bleeding, inflammation, infection, perforation, and death.      Raul August MD

## 2018-03-19 ENCOUNTER — OFFICE VISIT (OUTPATIENT)
Dept: URGENT CARE | Facility: CLINIC | Age: 63
End: 2018-03-19
Payer: MEDICARE

## 2018-03-19 VITALS
HEIGHT: 66 IN | HEART RATE: 94 BPM | SYSTOLIC BLOOD PRESSURE: 92 MMHG | DIASTOLIC BLOOD PRESSURE: 60 MMHG | RESPIRATION RATE: 18 BRPM | BODY MASS INDEX: 25.71 KG/M2 | OXYGEN SATURATION: 98 % | WEIGHT: 160 LBS | TEMPERATURE: 98 F

## 2018-03-19 DIAGNOSIS — R53.1 WEAKNESS: ICD-10-CM

## 2018-03-19 DIAGNOSIS — R42 VERTIGO: Primary | ICD-10-CM

## 2018-03-19 DIAGNOSIS — R03.1 LOW BLOOD PRESSURE READING: ICD-10-CM

## 2018-03-19 LAB
GLUCOSE SERPL-MCNC: 116 MG/DL (ref 70–110)
POC ANION GAP: 17 MMOL/L
POC BUN: 21 MMOL/L
POC CHLORIDE: 100 MMOL/L
POC CREATININE: 1.5 MG/DL (ref 0.6–1.3)
POC HEMATOCRIT: 42 %PCV (ref 42–52)
POC HEMOGLOBIN: 14.3 G/DL (ref 13.5–18)
POC ICA: 1.27 MMOL/L
POC POTASSIUM: 4.1 MMOL/L
POC SODIUM: 140 MMOL/L
POC TCO2: 27 MMOL/L

## 2018-03-19 PROCEDURE — 99214 OFFICE O/P EST MOD 30 MIN: CPT | Mod: S$GLB,,, | Performed by: NURSE PRACTITIONER

## 2018-03-19 PROCEDURE — 80047 BASIC METABLC PNL IONIZED CA: CPT | Mod: QW,S$GLB,, | Performed by: NURSE PRACTITIONER

## 2018-03-19 RX ORDER — MECLIZINE HYDROCHLORIDE 25 MG/1
25 TABLET ORAL 3 TIMES DAILY PRN
Qty: 30 TABLET | Refills: 0 | Status: ON HOLD | OUTPATIENT
Start: 2018-03-19 | End: 2018-10-17

## 2018-03-19 NOTE — PROGRESS NOTES
"Subjective:       Patient ID: Ladarius Solis is a 62 y.o. male.    Vitals:  height is 5' 6" (1.676 m) and weight is 72.6 kg (160 lb). His tympanic temperature is 98.3 °F (36.8 °C). His blood pressure is 92/60 and his pulse is 94. His respiration is 18 and oxygen saturation is 98%.     Chief Complaint: Knee Pain (left since late january)    This is a 62 y.o. male with Past Medical History:  2002: Cervical spinal stenosis  No date: Colon polyps  No date: Hypertension  No date: Vertigo      Comment: left ear issues   who presents today with a chief complaint of left knee pain for 2 months.  HE HAD AN MRI OF HIS LEFT KNEE ON 3/17/18. HE HAS NOT RECEIVED A PHONE CALL ABOUT HIS RESULTS. HE STATES THAT HE READ HIS RESULTS ON HIS MY [facility] ACCOUNT.  HE HAS AN APPOINTMENT WITH Dr. KUHN (RHEUMATOLOGY) ON 3/21/18 FOR AM EMG. PATIENT ALSO BEGAN TAKING GABAPENTIN 300 MG TID ON 2/28/18. HE STATES THAT HE WAS ONLY TAKING IT ONCE A DAY INSTEAD OF 3 BECAUSE HE DIDN'T LIKE THE WAY IT MADE HIM FEEL. HE STOPPED TAKING IT 1 WEEK AGO     HE STATES THAT HE IS HERE TO FIND OUT WHY HE IS HAVING WEAKNESS AND DIZZINESS. HE STATES THAT HE HAD "AN EPISODE TODAY."  HE STATES THAT IT BEGAN AFTER HIS COLONOSCOPY ON 3/9/18.  DENIES BLOOD IN STOOL OR ABDOMINAL PAIN AFTER PROCEDURE. HE REPORTS A HISTORY OF ANEMIA PER Dr. KUHN. HE STATES THAT HE DIDN'T KNOW THAT HE WAS ANEMIC. HE ALSO REPORTS THAT HIS DIZZINESS IS WORSE WHEN LYING DOWN, IT SOMETIMES FEELS LIKE THE ROOM IS SPINNING. CHART REVIEW REVEALS H/O VERTIGO. PATIENT STATES THAT HE HAS BEEN TREATED WITH MECLIZINE FOR VERTIGO IN THE PAST AND IT HELPED HIM.  HE HAS AN APPOINTMENT WITH HIS PCP TOMORROW AT [facility] FOR FOLLOW UP ON BLOOD PRESSURE MEDICATION.  HE HAS BEEN ON THIS MEDICATION FOR > 10 YEARS. HE TOOK LISINOPRIL TODAY BUT UNSURE IF HE TOOK HIS CARDURA.  PATIENT HAS EATEN TODAY. DENIES CHEST PAIN, SHORTNESS OF BREATH, ABDOMINAL PAIN.     OF NOTE PATIENT HAS BEEN " TAKING AN OTC IRON SUPPLEMENT X APPROXIMATELY 1 MONTH.       Knee Pain    Incident onset: 2 months  The incident occurred at home. There was no injury mechanism. The pain is present in the left knee.     Review of Systems   Constitution: Positive for weakness. Negative for chills and fever.   HENT: Negative for sore throat.    Eyes: Negative for blurred vision.   Cardiovascular: Negative for chest pain.   Respiratory: Negative for shortness of breath.    Skin: Negative for rash.   Musculoskeletal: Positive for joint pain (left knee). Negative for back pain.   Gastrointestinal: Negative for abdominal pain, diarrhea, nausea and vomiting.   Neurological: Positive for dizziness. Negative for headaches.   Psychiatric/Behavioral: The patient is not nervous/anxious.        Objective:      Physical Exam   Constitutional: He is oriented to person, place, and time. He appears well-developed and well-nourished. He is cooperative.  Non-toxic appearance. He does not appear ill. No distress.   HENT:   Head: Normocephalic and atraumatic.   Right Ear: Hearing, tympanic membrane, external ear and ear canal normal.   Left Ear: Hearing, tympanic membrane, external ear and ear canal normal.   Nose: Nose normal. No mucosal edema, rhinorrhea or nasal deformity. No epistaxis. Right sinus exhibits no maxillary sinus tenderness and no frontal sinus tenderness. Left sinus exhibits no maxillary sinus tenderness and no frontal sinus tenderness.   Mouth/Throat: Uvula is midline, oropharynx is clear and moist and mucous membranes are normal. No trismus in the jaw. Normal dentition. No uvula swelling. No posterior oropharyngeal erythema.   Eyes: Conjunctivae and lids are normal. Right eye exhibits no discharge. Left eye exhibits no discharge. No scleral icterus.   Sclera clear bilat   Neck: Trachea normal, normal range of motion, full passive range of motion without pain and phonation normal. Neck supple.   Cardiovascular: Normal rate, regular  rhythm, normal heart sounds, intact distal pulses and normal pulses.    Pulmonary/Chest: Effort normal and breath sounds normal. No respiratory distress.   Abdominal: Soft. Normal appearance and bowel sounds are normal. He exhibits no distension, no pulsatile midline mass and no mass. There is no tenderness.   Musculoskeletal: Normal range of motion. He exhibits no edema or deformity.   Neurological: He is alert and oriented to person, place, and time. He exhibits normal muscle tone. Coordination normal.   Skin: Skin is warm, dry and intact. He is not diaphoretic. No pallor.   Psychiatric: He has a normal mood and affect. His speech is normal and behavior is normal. Judgment and thought content normal. Cognition and memory are normal.   Nursing note and vitals reviewed.      Assessment:       1. Vertigo    2. Weakness    3. Low blood pressure reading        Plan:         Vertigo  -     meclizine (ANTIVERT) 25 mg tablet; Take 1 tablet (25 mg total) by mouth 3 (three) times daily as needed for Dizziness.  Dispense: 30 tablet; Refill: 0    Weakness  -     POCT Chemistry Panel    Low blood pressure reading      Patient Instructions   HOME TO REST AND HYDRATE.   IF DIZZINESS OR WEAKNESS WORSENS, GO TO THE EMERGENCY ROOM.  TRY MECLIZINE FOR VERTIGO  THERE IS NO EVIDENCE OF DEHYDRATION OR ANEMIA ON YOUR BLOOD TEST TODAY.   FOLLOW UP WITH Dr. CUEVA TO DISCUSS MRI RESULTS AND FURTHER TREATMENT.  FOLLOW UP WITH YOUR PCP TOMORROW AS SCHEDULED TO DISCUSS BLOOD PRESSURE, BLOOD TESTS AND DIZZINESS.   Weakness (Uncertain Cause)  Based on your exam today, the exact cause of your weakness is not certain. However, your weakness does not seem to be a sign of a serious illness at this time. Keep an eye on your symptoms and get medical advice as instructed below.  Home care  · Rest at home today. Do not over-exert yourself.  · Take any medicine as prescribed.  · For the next few days, drink extra fluids (unless your healthcare provider  wants you to restrict fluids for other reasons). Do not skip meals.  Follow-up care  Follow up with your healthcare provider or as advised.  When to seek medical advice  Call your healthcare provider for any of the following  · Worsening of your symptoms  · Symptoms don't start getting better within 2 days  · Fever of 100.4º F (38º C) or higher, or as directed by your healthcare provider·    Call 911  Get emergency medical care for any of these:  · Chest, arm, neck, jaw or upper back pain  · Trouble breathing  · Numbness or weakness of the face, one arm or one leg  · Slurred speech, confusion, trouble speaking, walking or seeing  · Blood in vomit or stool (black or red color)  · Loss of consciousness  Date Last Reviewed: 6/10/2015  © 9138-0678 [employer]. [address] [place] [address]. All rights reserved. This information is not intended as a substitute for professional medical care. Always follow your healthcare professional's instructions.        Dizziness (Uncertain Cause)  Dizziness is a common symptom. It may be described as lightheadedness, spinning, or feeling like you are going to faint. Dizziness can have many causes.  Be sure to tell the healthcare provider about:  · All medicines you take, including prescription, over-the-counter, herbs, and supplements  · Any other symptoms you have  · Any health problems you are being treated for  · Anything that causes the dizziness to get worse or better  Today's exam did not show an exact cause for your dizziness. Other tests may be needed. Follow up with your healthcare provider.  Home care  · Dizziness that occurs with sudden standing may be a sign of mild dehydration. Drink extra fluids for the next few days.  · If you recently started a new medicine, stopped a medicine, or had the dose of a current medicine changed, talk with the prescribing healthcare provider. Your medicine plan may need adjustment.  · If dizziness lasts more than a  few seconds, sit or lie down until it passes. This may help prevent injury in case you pass out.  · Do not drive or use power tools or dangerous equipment until you have had no dizziness for at least 48 hours.  Follow-up care  Follow up with your healthcare provider for further evaluation within the next 7 days or as advised.  When to seek medical advice  Call your healthcare provider for any of the following:  · Worsening of symptoms or new symptoms  · Passing out or seizure  · Repeated vomiting  · Headache  · Palpitations (the sense that your heart is fluttering or beating fast or hard)  · Shortness of breath  · Blood in vomit or stool (black or red color)  · Weakness of an arm or leg or one side of the face  · Vision or hearing changes  · Trouble walking or speaking  · Chest, arm, neck, back, or jaw pain  Date Last Reviewed: 8/23/2015  © 3730-1113 [employer]. [address] [place] [address], [place] [address]. All rights reserved. This information is not intended as a substitute for professional medical care. Always follow your healthcare professional's instructions.

## 2018-03-19 NOTE — PATIENT INSTRUCTIONS
HOME TO REST AND HYDRATE.   IF DIZZINESS OR WEAKNESS WORSENS, GO TO THE EMERGENCY ROOM.  TRY MECLIZINE FOR VERTIGO  THERE IS NO EVIDENCE OF DEHYDRATION OR ANEMIA ON YOUR BLOOD TEST TODAY.   FOLLOW UP WITH Dr. CUEVA TO DISCUSS MRI RESULTS AND FURTHER TREATMENT.  FOLLOW UP WITH YOUR PCP TOMORROW AS SCHEDULED TO DISCUSS BLOOD PRESSURE, BLOOD TESTS AND DIZZINESS.   Weakness (Uncertain Cause)  Based on your exam today, the exact cause of your weakness is not certain. However, your weakness does not seem to be a sign of a serious illness at this time. Keep an eye on your symptoms and get medical advice as instructed below.  Home care  · Rest at home today. Do not over-exert yourself.  · Take any medicine as prescribed.  · For the next few days, drink extra fluids (unless your healthcare provider wants you to restrict fluids for other reasons). Do not skip meals.  Follow-up care  Follow up with your healthcare provider or as advised.  When to seek medical advice  Call your healthcare provider for any of the following  · Worsening of your symptoms  · Symptoms don't start getting better within 2 days  · Fever of 100.4º F (38º C) or higher, or as directed by your healthcare provider·    Call 911  Get emergency medical care for any of these:  · Chest, arm, neck, jaw or upper back pain  · Trouble breathing  · Numbness or weakness of the face, one arm or one leg  · Slurred speech, confusion, trouble speaking, walking or seeing  · Blood in vomit or stool (black or red color)  · Loss of consciousness  Date Last Reviewed: 6/10/2015  © 1910-8738 [employer]. [address] [place] [address], [place] [address]. All rights reserved. This information is not intended as a substitute for professional medical care. Always follow your healthcare professional's instructions.        Dizziness (Uncertain Cause)  Dizziness is a common symptom. It may be described as lightheadedness, spinning, or feeling like you are going to faint.  Dizziness can have many causes.  Be sure to tell the healthcare provider about:  · All medicines you take, including prescription, over-the-counter, herbs, and supplements  · Any other symptoms you have  · Any health problems you are being treated for  · Anything that causes the dizziness to get worse or better  Today's exam did not show an exact cause for your dizziness. Other tests may be needed. Follow up with your healthcare provider.  Home care  · Dizziness that occurs with sudden standing may be a sign of mild dehydration. Drink extra fluids for the next few days.  · If you recently started a new medicine, stopped a medicine, or had the dose of a current medicine changed, talk with the prescribing healthcare provider. Your medicine plan may need adjustment.  · If dizziness lasts more than a few seconds, sit or lie down until it passes. This may help prevent injury in case you pass out.  · Do not drive or use power tools or dangerous equipment until you have had no dizziness for at least 48 hours.  Follow-up care  Follow up with your healthcare provider for further evaluation within the next 7 days or as advised.  When to seek medical advice  Call your healthcare provider for any of the following:  · Worsening of symptoms or new symptoms  · Passing out or seizure  · Repeated vomiting  · Headache  · Palpitations (the sense that your heart is fluttering or beating fast or hard)  · Shortness of breath  · Blood in vomit or stool (black or red color)  · Weakness of an arm or leg or one side of the face  · Vision or hearing changes  · Trouble walking or speaking  · Chest, arm, neck, back, or jaw pain  Date Last Reviewed: 8/23/2015  © 2906-9835 [employer]. [address] [place] [address], [place] [address]. All rights reserved. This information is not intended as a substitute for professional medical care. Always follow your healthcare professional's instructions.

## 2018-03-21 ENCOUNTER — PROCEDURE VISIT (OUTPATIENT)
Dept: NEUROLOGY | Facility: CLINIC | Age: 63
End: 2018-03-21
Payer: MEDICARE

## 2018-03-21 DIAGNOSIS — M25.50 ARTHRALGIA, UNSPECIFIED JOINT: ICD-10-CM

## 2018-03-21 DIAGNOSIS — R20.2 POSITIVE TINEL'S SIGN: ICD-10-CM

## 2018-03-21 DIAGNOSIS — M54.10 RADICULOPATHY AFFECTING UPPER EXTREMITY: ICD-10-CM

## 2018-03-21 PROCEDURE — 95913 NRV CNDJ TEST 13/> STUDIES: CPT | Mod: 26,S$PBB,, | Performed by: PSYCHIATRY & NEUROLOGY

## 2018-03-21 PROCEDURE — 95913 NRV CNDJ TEST 13/> STUDIES: CPT | Mod: PBBFAC | Performed by: PSYCHIATRY & NEUROLOGY

## 2018-03-21 PROCEDURE — 95886 MUSC TEST DONE W/N TEST COMP: CPT | Mod: PBBFAC | Performed by: PSYCHIATRY & NEUROLOGY

## 2018-03-21 PROCEDURE — 95886 MUSC TEST DONE W/N TEST COMP: CPT | Mod: 26,S$PBB,, | Performed by: PSYCHIATRY & NEUROLOGY

## 2018-03-27 ENCOUNTER — OFFICE VISIT (OUTPATIENT)
Dept: PHYSICAL MEDICINE AND REHAB | Facility: CLINIC | Age: 63
End: 2018-03-27
Payer: MEDICARE

## 2018-03-27 VITALS
SYSTOLIC BLOOD PRESSURE: 123 MMHG | BODY MASS INDEX: 24.43 KG/M2 | HEART RATE: 82 BPM | DIASTOLIC BLOOD PRESSURE: 76 MMHG | WEIGHT: 152 LBS | HEIGHT: 66 IN

## 2018-03-27 DIAGNOSIS — G56.03 BILATERAL CARPAL TUNNEL SYNDROME: ICD-10-CM

## 2018-03-27 DIAGNOSIS — M54.2 NECK PAIN: ICD-10-CM

## 2018-03-27 DIAGNOSIS — M17.12 PRIMARY OSTEOARTHRITIS OF LEFT KNEE: Primary | ICD-10-CM

## 2018-03-27 DIAGNOSIS — M54.12 CERVICAL RADICULOPATHY AT C7: ICD-10-CM

## 2018-03-27 DIAGNOSIS — M25.562 ACUTE PAIN OF LEFT KNEE: ICD-10-CM

## 2018-03-27 PROCEDURE — 99213 OFFICE O/P EST LOW 20 MIN: CPT | Mod: PBBFAC | Performed by: PHYSICAL MEDICINE & REHABILITATION

## 2018-03-27 PROCEDURE — 99214 OFFICE O/P EST MOD 30 MIN: CPT | Mod: S$PBB,,, | Performed by: PHYSICAL MEDICINE & REHABILITATION

## 2018-03-27 PROCEDURE — 99999 PR PBB SHADOW E&M-EST. PATIENT-LVL III: CPT | Mod: PBBFAC,,, | Performed by: PHYSICAL MEDICINE & REHABILITATION

## 2018-03-27 RX ORDER — GABAPENTIN 300 MG/1
300 CAPSULE ORAL 3 TIMES DAILY
Qty: 90 CAPSULE | Refills: 11 | Status: ON HOLD | OUTPATIENT
Start: 2018-03-27 | End: 2018-10-17

## 2018-03-27 NOTE — PROGRESS NOTES
Subjective:       Patient ID: Ladarius Solis is a 62 y.o. male.    Chief Complaint: Knee Pain and Neck Pain    Knee Pain    Pertinent negatives include no numbness.   Neck Pain    Pertinent negatives include no chest pain, headaches, numbness or weakness.      Mr. Nicole Durand is 61 y/o male who returns to clinic for Left knee pain.   [facility] 02/28/18.  Since [facility], he had MRI of Left knee, on 3/7/18, and he is here to discuss MRI of Lt knee results.  He reports that his Lt knee pain is the same, Voltaren gel does not help much.   He states that he wears knee brace off/on  that helps some.  He was also seen by Neurology for neck and RT arm pain.   He had EMG/NCS, that showed b/l CTS.   He has b/l wrist braces that he wears at night.    Today, he still c/o Left knee pain, for >2 months.  Current knee pain is 4, the worst pain is 8/10.  He was seen by Ortho, and received steroid injection that did not help at all.  He reports no trauma, no fall.   He has intermittent left knee tenderness and stiffness for several weeks.  No swelling. No warmth.  Everything makes LT knee pain worse.   He takes Gabapentin 300 mg PO QHS, and Tylenol #3 , 1-2 tabs/day.  He denies locking catching popping cracking or feeling unstable.   He states that Xray did not show much, but spurs and arthritis.  He states that his both legs give away sometimes.   He is here for follow up, and MRI of knee results discussion.      Past Medical History:   Diagnosis Date    Cervical spinal stenosis 2002    Colon polyps     Hypertension     Vertigo     left ear issues       Past Surgical History:   Procedure Laterality Date    COLONOSCOPY      COLONOSCOPY N/A 9/25/2017    Procedure: COLONOSCOPY/emr;  Surgeon: Raul August MD;  Location: [facility] ([address] [place] [address]);  Service: Endoscopy;  Laterality: N/A;    COLONOSCOPY N/A 3/9/2018    Procedure: COLONOSCOPY;  Surgeon: Raul August MD;  Location: [facility] ([address] [place] [address]);  Service: Endoscopy;  Laterality:  N/A;       No family history on file.    Social History     Social History    Marital status: Single     Spouse name: N/A    Number of children: N/A    Years of education: N/A     Social History Main Topics    Smoking status: Never Smoker    Smokeless tobacco: Never Used    Alcohol use No    Drug use: No    Sexual activity: Not Asked     Other Topics Concern    None     Social History Narrative    None       Current Outpatient Prescriptions   Medication Sig Dispense Refill    doxazosin (CARDURA) 2 MG tablet Take 2 mg by mouth once daily.      gabapentin (NEURONTIN) 300 MG capsule Take 1 capsule (300 mg total) by mouth 3 (three) times daily. . 90 capsule 11    lisinopril 10 MG tablet Take 10 mg by mouth once daily.      meclizine (ANTIVERT) 25 mg tablet Take 1 tablet (25 mg total) by mouth 3 (three) times daily as needed for Dizziness. 30 tablet 0    diclofenac sodium (VOLTAREN) 1 % Gel Apply 2 g topically 4 (four) times daily. 500 g 3    ibuprofen (ADVIL,MOTRIN) 800 MG tablet Take 1 tablet (800 mg total) by mouth 3 (three) times daily. 60 tablet 0     No current facility-administered medications for this visit.        Review of patient's allergies indicates:  No Known Allergies  Review of Systems   Constitutional: Negative for appetite change and fatigue.   Eyes: Negative for visual disturbance.   Respiratory: Negative for shortness of breath.    Cardiovascular: Negative for chest pain.   Gastrointestinal: Negative for constipation and diarrhea.   Genitourinary: Negative for dysuria, frequency and urgency.   Musculoskeletal: Positive for arthralgias (Left knee pain) and neck pain (neck pain, RT arm pain and b/l CTS.). Negative for back pain, gait problem, joint swelling, myalgias and neck stiffness.   Neurological: Negative for dizziness, tremors, weakness, numbness and headaches.   Psychiatric/Behavioral: Negative for dysphoric mood.   All other systems reviewed and are negative.        Objective:       Physical Exam    GENERAL: The patient is alert, oriented, pleasant.  HEENT: PERRLA  NECK: supple, no masses,    CV: S1S2, RRR, no murmurs, rales.  LUNGS: CTA bilateral.   ABDOMEN: soft, non-tender, non distended, bowel sounds nl.  EXTREMITIES: no edema, +2 pulses DP, b/l  SKIN: no skin rash, no skin breakdowns.  MUSCULOSKELETAL:   Gait : normal, walks with no AD.  Full range of motion in all joints x4 extremities, except in Lt knee,  Flex >100, ext to 0.   + min crepitus in LT knee. NO edema, no erythema.   No laxity, neg ant/post drawer sign,   No instability.  Muscle strength 5/5 throughout x4 extremities.   No  joint laxity throughout x4 extremities.   NEUROLOGIC: Cranial nerves II through XII intact.   Deep tendon reflexes is normal, +2 in the upper and lower extremities bilaterally.   Muscle tone is normal.   Sensory is intact to light touch and pinprick throughout x4 extremities.       EMG/NCS (3/21/18) showed:   This is an abnormal study.   There is electrophysiologic evidence of:   1. Mild to moderately severe bilateral carpal tunnel syndrome, somewhat worse on the left.   2. Chronic denervation of the right C7 myotome consistent with radiculopathy.     MRI of Lt knee ( 03/07/18)   Menisci:  There is no tear of the lateral meniscus. Horizontal tear in the body of the medial meniscus extending to the inferior meniscal surface.  Ligaments:  ACL, PCL, and LCL complex are intact.  There is high signal seen medial to the MCL, compatible with grade 1 MCL injury.  Tendons:  Extensor mechanism is maintained.  Cartilage:   Patellofemoral: Articular cartilage is maintained.  Medial tibiofemoral: There is 10 x 20 mm osteochondral defect in the medial femoral condyle with surrounding edematous changes.  Additionally, there is thinning of the medial tibial articular cartilage.  Lateral tibiofemoral: Articular cartilage is maintained.  Bone: No fracture or marrow replacing process.  Miscellaneous: There is a small  joint effusion. Subcutaneous edema, medially.  Impression:   1. Osteochondral defect in the medial femoral condyle with surrounding edematous changes, as above.  2. Horizontal tear in the body of the medial meniscus extending to the inferior meniscal surface.  3. Grade 1 medial collateral ligament injury.      MRI OF CERVICAL SPINE ( 2010) showed:  C2-3 - THERE IS NO SIGNIFICANT NEURAL FORAMEN OR SPINAL CANAL STENOSIS.    C3-4 - THERE IS MILD UNCOVERTEBRAL JOINT SPURRING AND MILD NEURAL   FORAMINAL NARROWING ON THE LEFT.    C4-5 - THERE IS NO SIGNIFICANT NEURAL FORAMINAL OR SPINAL CANAL STENOSIS.    C5-6 - THERE IS POSTERIOR DISC-OSTEOPHYTE COMPLEX AND UNCOVERTEBRAL JOINT   SPURRING RESULTING IN AT MOST MILD CANAL STENOSIS AND MODERATE BILATERAL   NEURAL FORAMINAL NARROWING.    C6-7 - THERE IS POSTERIOR DISC-OSTEOPHYTE COMPLEX AND UNCOVERTEBRAL JOINT   SPURRING SOMEWHAT GREATER ON THE RIGHT RESULTING IN MILD CANAL AND   BILATERAL NEURAL FORAMINAL NARROWING.    C7-T1 - THERE IS NO SIGNIFICANT  NEURAL FORAMINAL OR SPINAL CANAL   STENOSIS.   IMULTILEVEL DEGENERATIVE CHANGE WITH MODERATE CANAL STENOSIS AND NEURAL   FORAMINAL NARROWING, WORSE AT C5-6 AND C6-7.    IMPRESSION:  MULTILEVEL DEGENERATIVE CHANGE WITH MODERATE CANAL STENOSIS AND NEURAL   FORAMINAL NARROWING, WORSE AT C5-6 AND C6-7.    Assessment:       1. Primary osteoarthritis of left knee    2. Acute pain of left knee    3. Neck pain    4. Bilateral carpal tunnel syndrome    5. Cervical radiculopathy at C7        Plan:        Primary osteoarthritis of left knee  -     gabapentin (NEURONTIN) 300 MG capsule; Take 1 capsule (300 mg total) by mouth 3 (three) times daily. .  Dispense: 90 capsule; Refill: 11  -     Ambulatory referral to Orthopedics    Acute pain of left knee  -     gabapentin (NEURONTIN) 300 MG capsule; Take 1 capsule (300 mg total) by mouth 3 (three) times daily. .  Dispense: 90 capsule; Refill: 11  -     Ambulatory referral to  Orthopedics    Neck pain  -     gabapentin (NEURONTIN) 300 MG capsule; Take 1 capsule (300 mg total) by mouth 3 (three) times daily. .  Dispense: 90 capsule; Refill: 11  -     Ambulatory referral to Orthopedics    Bilateral carpal tunnel syndrome  -     gabapentin (NEURONTIN) 300 MG capsule; Take 1 capsule (300 mg total) by mouth 3 (three) times daily. .  Dispense: 90 capsule; Refill: 11  -     Ambulatory referral to Orthopedics    Cervical radiculopathy at C7  -     gabapentin (NEURONTIN) 300 MG capsule; Take 1 capsule (300 mg total) by mouth 3 (three) times daily. .  Dispense: 90 capsule; Refill: 11  -     Ambulatory referral to Orthopedics      Patient with chronic Lt knee pain secondary to medial meniscus tear, and grade 1 medial collateral ligament injury.   Also with Neck pain, with possible Rt C7 cervical radiculopathy, and mod-severe B/l CTS ( dx. With recent EMG/NCS).    -- will resume Gabapentin 300 mg PO QHS, and Tylenol #3 prn.  --Lt knee pain, MRI of LT knee results discussed in details,  refer back to Ortho.   -- B/l CTS, recommend to wear braces, does not want to be referred for surgical repair.       RTC prn ( per pt's request).     Total time spent face to face with patient was 25 minutes.   More than 50% of that time was spent in counseling on diagnosis , prognosis and treatment options.   I also  patient  on common and most usual side effect of prescribed medications.   I reviewed Primary care , and other specialty's notes to better coordinate patient's  care.   All questions were answered, and patient voiced understanding.

## 2018-04-16 ENCOUNTER — OFFICE VISIT (OUTPATIENT)
Dept: ORTHOPEDICS | Facility: CLINIC | Age: 63
End: 2018-04-16
Payer: MEDICARE

## 2018-04-16 VITALS
DIASTOLIC BLOOD PRESSURE: 88 MMHG | HEIGHT: 66 IN | WEIGHT: 157.88 LBS | HEART RATE: 70 BPM | BODY MASS INDEX: 25.37 KG/M2 | SYSTOLIC BLOOD PRESSURE: 128 MMHG

## 2018-04-16 DIAGNOSIS — M25.562 ACUTE PAIN OF LEFT KNEE: Primary | ICD-10-CM

## 2018-04-16 PROCEDURE — 99499 UNLISTED E&M SERVICE: CPT | Mod: S$PBB,,, | Performed by: PHYSICIAN ASSISTANT

## 2018-04-16 PROCEDURE — 99999 PR PBB SHADOW E&M-EST. PATIENT-LVL III: CPT | Mod: PBBFAC,,, | Performed by: PHYSICIAN ASSISTANT

## 2018-04-16 PROCEDURE — 99213 OFFICE O/P EST LOW 20 MIN: CPT | Mod: PBBFAC | Performed by: PHYSICIAN ASSISTANT

## 2018-04-16 NOTE — PROGRESS NOTES
CC: LEFT knee pain    62 y.o. Male who presents as a new patient to me. He works in retail. Complaint is left knee pain x 7-8 months with progressive worsening. Denies discrete injury. Pain localizes medially over the medial femoral condyle. Denies swelling or effusions. No prominent mechanical symptoms. Denies instability.  Worse with prolonged walking or standing with associated stiffness. Better with rest. One steroid injection in 2/2018 with minimal relief. No prior problems otherwise. Treatment thus far has included rest, activity modifications, oral medications.     Also reports fairly persistent numbness and tingling over the medial lower leg with extension into the medial and dorsal foot.  Denies more proximal radicular symptoms.  He does report chronic intermittent low back pain.     Negative for tobacco.   Negative for diabetes.      Pain Score: 10-Worst pain ever     This patient is seen in consultation requested by Álvaro Reyez PA-C. We will communicate findings back to the requesting provider via electronic letter.     REVIEW OF SYSTEMS:   Constitution: Negative. Negative for chills, fever and night sweats.    Hematologic/Lymphatic: Negative for bleeding problem. Does not bruise/bleed easily.   Skin: Negative for dry skin, itching and rash.   Musculoskeletal: Negative for falls. Positive for left knee pain and  muscle weakness.     PAST MEDICAL HISTORY:   Past Medical History:   Diagnosis Date    Cervical spinal stenosis 2002    Colon polyps     Hypertension     Vertigo     left ear issues       PAST SURGICAL HISTORY:   Past Surgical History:   Procedure Laterality Date    COLONOSCOPY      COLONOSCOPY N/A 9/25/2017    Procedure: COLONOSCOPY/emr;  Surgeon: Raul August MD;  Location: [address];  Service: Endoscopy;  Laterality: N/A;    COLONOSCOPY N/A 3/9/2018    Procedure: COLONOSCOPY;  Surgeon: Raul August MD;  Location: [address];  Service: Endoscopy;  Laterality: N/A;  "      FAMILY HISTORY:   History reviewed. No pertinent family history.    SOCIAL HISTORY:   Social History     Social History    Marital status: Single     Spouse name: N/A    Number of children: N/A    Years of education: N/A     Occupational History    Not on file.     Social History Main Topics    Smoking status: Never Smoker    Smokeless tobacco: Never Used    Alcohol use No    Drug use: No    Sexual activity: Not on file     Other Topics Concern    Not on file     Social History Narrative    No narrative on file       MEDICATIONS:     Current Outpatient Prescriptions:     diclofenac sodium (VOLTAREN) 1 % Gel, Apply 2 g topically 4 (four) times daily., Disp: 500 g, Rfl: 3    doxazosin (CARDURA) 2 MG tablet, Take 2 mg by mouth once daily., Disp: , Rfl:     gabapentin (NEURONTIN) 300 MG capsule, Take 1 capsule (300 mg total) by mouth 3 (three) times daily. ., Disp: 90 capsule, Rfl: 11    ibuprofen (ADVIL,MOTRIN) 800 MG tablet, Take 1 tablet (800 mg total) by mouth 3 (three) times daily., Disp: 60 tablet, Rfl: 0    lisinopril 10 MG tablet, Take 10 mg by mouth once daily., Disp: , Rfl:     meclizine (ANTIVERT) 25 mg tablet, Take 1 tablet (25 mg total) by mouth 3 (three) times daily as needed for Dizziness., Disp: 30 tablet, Rfl: 0    ALLERGIES:   Review of patient's allergies indicates:  No Known Allergies     PHYSICAL EXAMINATION:  /77   Pulse 72   Ht 5' 6" (1.676 m)   Wt 71.2 kg (157 lb)   BMI 25.34 kg/m²   General: Well-developed well-nourished 62 y.o. malein no acute distress   Cardiovascular: Regular rhythm by palpation of distal pulse, normal color and temperature, no concerning varicosities on symptomatic side   Lungs: No labored breathing or wheezing appreciated   Neuro: Alert and oriented ×3   Psychiatric: well oriented to person, place and time, demonstrates normal mood and affect   Skin: No rashes, lesions or ulcers, normal temperature, turgor, and texture on involved " extremity    Ortho/SPM Exam   Examination of the left lower extremity demonstrates no swelling or effusion.  Prominent tenderness over the medial joint line, pes bursa, and medial femoral condyle. Minimal lateral joint line tenderness. Pain medially with varus load.  Stable to varus and valgus stress at 0 and 30°.  Negative Lachman.  Negative posterior drawer.  Central patellar tracking.  Negative patellar apprehension.  Positive patellar grind.  Generalized tenderness to touch over the medial lower leg with associated paresthesias.  No focal symptoms over the tarsal tunnel.  Bilateral pes planus with minimal hindfoot valgus on standing.     IMAGING:  X-rays including standing, weight bearing AP and flexion bilateral knees, LEFT knee lateral and sunrise views ordered and images reviewed by me show:    There is mild DJD is present in the patella. No fracture dislocation bone destruction seen.    MRI reviewed by me and discussed with patient. Study shows:    1. Osteochondral defect in the medial femoral condyle with surrounding edematous changes, as above.   2. Horizontal tear in the body of the medial meniscus extending to the inferior meniscal surface.   3. Grade 1 medial collateral ligament injury.  Minimal adjacent level medial tibial plateau stress reaction    ASSESSMENT:      ICD-10-CM ICD-9-CM   1. Complex tear of medial meniscus of left knee as current injury, initial encounter S83.232A 836.0   2. Bone marrow edema D75.89 289.89   3. Chondromalacia of knee, left M94.262 717.7   4. Pes anserinus bursitis of left knee M70.52 726.61   5. Left knee pain, unspecified chronicity M25.562 719.46   6. Numbness and tingling of left leg R20.0 782.0    R20.2          PLAN:     Findings were discussed with the patient.  This is a knee headed down the arthritic pathway.  Knee arthroscopy would not be entirely therapeutic in my opinion.  Fairly diffuse bony stress reaction on the femoral side. My recommendation is for an  initial course of conservative treatment to include a medial  brace and medial arch supports. We will obtain insurance authorization for the Euflexxa Injection series. RTC next week to begin the injection. All questions answered.     Patient complains of persistent numbness and tingling in the lower leg.  No entirely consistent with typical lumbar radiculopathy.  Given the extent and duration of this complaint, we'll obtain EMG nerve conduction study for workup.       Procedures

## 2018-04-16 NOTE — LETTER
April 16, 2018      Ny Horne MD  [address]  [facility] [address]           [facility] - Orthopedics  [address]  [facility] [address]  Phone: [phone]          Patient: Ladarius Solis   MR Number: [id number]   YOB: 1955   Date of Visit: 4/16/2018       Dear Dr. Ny Horne:    Thank you for referring Ladarius Solis to me for evaluation. Attached you will find relevant portions of my assessment and plan of care.    If you have questions, please do not hesitate to call me. I look forward to following Ladarius Solis along with you.    Sincerely,    Álvaro Reyez PA-C    Enclosure  CC:  No Recipients    If you would like to receive this communication electronically, please contact [email or web address] or [phone] to request more information on [employer] Link access.    For providers and/or their staff who would like to refer a patient to [facility], please contact us through our one-stop-shop provider referral line, [facility] , at [phone].    If you feel you have received this communication in error or would no longer like to receive these types of communications, please e-mail [email or web address]

## 2018-04-16 NOTE — PROGRESS NOTES
Today for his continued of left knee pain.  He has completed an MRI ordered by Mira Gamboa PA-C which doesn't fact demonstrate meniscal tear.  With this in mind we'll consult to sports medicine for evaluation of surgical interventions he has failed injection therapies and medications as well as activity modification or distal be a no charge visit

## 2018-04-17 ENCOUNTER — OFFICE VISIT (OUTPATIENT)
Dept: SPORTS MEDICINE | Facility: CLINIC | Age: 63
End: 2018-04-17
Payer: MEDICARE

## 2018-04-17 ENCOUNTER — HOSPITAL ENCOUNTER (OUTPATIENT)
Dept: RADIOLOGY | Facility: HOSPITAL | Age: 63
Discharge: HOME | End: 2018-04-17
Attending: ORTHOPAEDIC SURGERY
Payer: MEDICARE

## 2018-04-17 VITALS
WEIGHT: 157 LBS | HEART RATE: 72 BPM | BODY MASS INDEX: 25.23 KG/M2 | DIASTOLIC BLOOD PRESSURE: 77 MMHG | SYSTOLIC BLOOD PRESSURE: 123 MMHG | HEIGHT: 66 IN

## 2018-04-17 DIAGNOSIS — M25.562 LEFT KNEE PAIN, UNSPECIFIED CHRONICITY: ICD-10-CM

## 2018-04-17 DIAGNOSIS — R20.0 NUMBNESS AND TINGLING OF LEFT LEG: ICD-10-CM

## 2018-04-17 DIAGNOSIS — S83.232A COMPLEX TEAR OF MEDIAL MENISCUS OF LEFT KNEE AS CURRENT INJURY, INITIAL ENCOUNTER: Primary | ICD-10-CM

## 2018-04-17 DIAGNOSIS — M94.262 CHONDROMALACIA OF KNEE, LEFT: ICD-10-CM

## 2018-04-17 DIAGNOSIS — R93.7 BONE MARROW EDEMA: ICD-10-CM

## 2018-04-17 DIAGNOSIS — R20.2 NUMBNESS AND TINGLING OF LEFT LEG: ICD-10-CM

## 2018-04-17 DIAGNOSIS — M70.52 PES ANSERINUS BURSITIS OF LEFT KNEE: ICD-10-CM

## 2018-04-17 PROCEDURE — 99213 OFFICE O/P EST LOW 20 MIN: CPT | Mod: PBBFAC,25,PO | Performed by: ORTHOPAEDIC SURGERY

## 2018-04-17 PROCEDURE — 73562 X-RAY EXAM OF KNEE 3: CPT | Mod: TC,FY,PO,RT

## 2018-04-17 PROCEDURE — 99204 OFFICE O/P NEW MOD 45 MIN: CPT | Mod: S$PBB,,, | Performed by: ORTHOPAEDIC SURGERY

## 2018-04-17 PROCEDURE — 73562 X-RAY EXAM OF KNEE 3: CPT | Mod: 26,59,RT, | Performed by: RADIOLOGY

## 2018-04-17 PROCEDURE — 99999 PR PBB SHADOW E&M-EST. PATIENT-LVL III: CPT | Mod: PBBFAC,,, | Performed by: ORTHOPAEDIC SURGERY

## 2018-04-17 PROCEDURE — 73564 X-RAY EXAM KNEE 4 OR MORE: CPT | Mod: 26,LT,, | Performed by: RADIOLOGY

## 2018-04-17 NOTE — LETTER
April 17, 2018      Álvaro Reyez PA-C  [address]  [facility] [address]           [facility]  [address]  [facility] [address]  Phone: [phone]          Patient: Ladarius Solis   MR Number: [id number]   YOB: 1955   Date of Visit: 4/17/2018       Dear Álvaro Reyez:    Thank you for referring Ladarius Solis to me for evaluation. Attached you will find relevant portions of my assessment and plan of care.    If you have questions, please do not hesitate to call me. I look forward to following Ladarius Solis along with you.    Sincerely,    GRETEL Carr MD    Enclosure  CC:  No Recipients    If you would like to receive this communication electronically, please contact [email or web address] or [phone] to request more information on [employer] Link access.    For providers and/or their staff who would like to refer a patient to [facility], please contact us through our one-stop-shop provider referral line, [facility], at [phone].    If you feel you have received this communication in error or would no longer like to receive these types of communications, please e-mail [email or web address]

## 2018-04-18 ENCOUNTER — TELEPHONE (OUTPATIENT)
Dept: SPORTS MEDICINE | Facility: CLINIC | Age: 63
End: 2018-04-18

## 2018-04-18 NOTE — TELEPHONE ENCOUNTER
LVM. Scheduled EMG. Informed pt to call back if time and date do not work.     ----- Message from Soniya Wolf sent at 4/17/2018  6:38 PM CDT -----  EMG was signed. Can you call and schedule the patient?  Thanks!

## 2018-05-09 NOTE — PROGRESS NOTES
Patient is here for follow up of his left knee chondromalacia and arthritis. He has been wearing his medial  brace which has been helping; planning on picking up the arch supports sometime this week.  He is here today for a planned Euflexxa Injection (1/3) (LOT #[id number]). He understands potential risks and benefits of the procedure which includes pseudoseptic reaction and pain. He has failed conservative management to this point for his knee pain including NSAIDS.    Exam findings of left knee remain unchanged from most recent visit. No erythema, warmth, or signs of infection.     The patient tolerated the procedure well. We discussed post-injection care of the knee. RTC next week for injection #2. All questions were answered.     Large Joint Aspiration/Injection  Date/Time: 5/10/2018 10:56 PM  Performed by: GRETEL YEPEZ  Authorized by: GRETEL YEPEZ     Consent Done?:  Yes (Verbal)  Indications:  Pain  Procedure site marked: Yes    Timeout: Prior to procedure the correct patient, procedure, and site was verified      Location:  Knee  Site:  L knee  Prep: Patient was prepped and draped in usual sterile fashion    Ultrasonic Guidance for needle placement: No  Needle size:  22 G  Approach:  Anterolateral  Medications:  20 mg sodium hyaluronate (EUFLEXXA) 10 mg/mL(mw 2.4 -3.6 million)  Patient tolerance:  Patient tolerated the procedure well with no immediate complications

## 2018-05-10 ENCOUNTER — OFFICE VISIT (OUTPATIENT)
Dept: SPORTS MEDICINE | Facility: CLINIC | Age: 63
End: 2018-05-10
Payer: MEDICARE

## 2018-05-10 VITALS
BODY MASS INDEX: 25.23 KG/M2 | HEART RATE: 79 BPM | WEIGHT: 157 LBS | DIASTOLIC BLOOD PRESSURE: 78 MMHG | SYSTOLIC BLOOD PRESSURE: 132 MMHG | HEIGHT: 66 IN

## 2018-05-10 DIAGNOSIS — M17.12 PRIMARY OSTEOARTHRITIS OF LEFT KNEE: ICD-10-CM

## 2018-05-10 DIAGNOSIS — M94.262 CHONDROMALACIA OF KNEE, LEFT: Primary | ICD-10-CM

## 2018-05-10 PROCEDURE — 99499 UNLISTED E&M SERVICE: CPT | Mod: S$PBB,,, | Performed by: ORTHOPAEDIC SURGERY

## 2018-05-10 PROCEDURE — 99213 OFFICE O/P EST LOW 20 MIN: CPT | Mod: PBBFAC,PO | Performed by: ORTHOPAEDIC SURGERY

## 2018-05-10 PROCEDURE — 20610 DRAIN/INJ JOINT/BURSA W/O US: CPT | Mod: PBBFAC,PO | Performed by: ORTHOPAEDIC SURGERY

## 2018-05-10 PROCEDURE — 99999 PR PBB SHADOW E&M-EST. PATIENT-LVL III: CPT | Mod: PBBFAC,,, | Performed by: ORTHOPAEDIC SURGERY

## 2018-05-10 RX ADMIN — Medication 20 MG: at 10:05

## 2018-05-10 NOTE — PROCEDURES
Large Joint Aspiration/Injection  Date/Time: 5/10/2018 8:28 AM  Performed by: GRETEL YEPEZ  Authorized by: GRETEL YEPEZ     Consent Done?:  Yes (Verbal)  Indications:  Pain  Procedure site marked: Yes    Timeout: Prior to procedure the correct patient, procedure, and site was verified      Location:  Knee  Site:  R knee  Prep: Patient was prepped and draped in usual sterile fashion    Ultrasonic Guidance for needle placement: No  Needle size:  22 G  Approach:  Anterolateral  Medications:  20 mg sodium hyaluronate (EUFLEXXA) 10 mg/mL(mw 2.4 -3.6 million)  Patient tolerance:  Patient tolerated the procedure well with no immediate complications

## 2018-05-16 NOTE — PROGRESS NOTES
Patient is here for follow up of his right knee chondromalacia and arthritis. He has been wearing his medial  brace which has been helping; planning on picking up the arch supports sometime this week.  He is here today for a planned Euflexxa Injection (2/3) (LOT #[id number]). He understands potential risks and benefits of the procedure which includes pseudoseptic reaction and pain. He has failed conservative management to this point for his knee pain including NSAIDS.     brace has been helpful.    Exam findings of right knee remain unchanged from most recent visit. No erythema, warmth, or signs of infection.     The patient tolerated the procedure well. We discussed post-injection care of the knee. RTC next week for injection #3. All questions were answered.     Large Joint Aspiration/Injection  Date/Time: 5/17/2018 7:15 AM  Performed by: GRETEL YEPEZ  Authorized by: GRETEL YEPEZ     Consent Done?:  Yes (Verbal)  Indications:  Pain  Procedure site marked: Yes    Timeout: Prior to procedure the correct patient, procedure, and site was verified      Location:  Knee  Site:  R knee  Prep: Patient was prepped and draped in usual sterile fashion    Ultrasonic Guidance for needle placement: No  Needle size:  22 G  Approach:  Anterolateral  Patient tolerance:  Patient tolerated the procedure well with no immediate complications

## 2018-05-17 ENCOUNTER — OFFICE VISIT (OUTPATIENT)
Dept: SPORTS MEDICINE | Facility: CLINIC | Age: 63
End: 2018-05-17
Payer: MEDICARE

## 2018-05-17 VITALS
BODY MASS INDEX: 25.23 KG/M2 | HEIGHT: 66 IN | WEIGHT: 157 LBS | DIASTOLIC BLOOD PRESSURE: 80 MMHG | SYSTOLIC BLOOD PRESSURE: 128 MMHG | HEART RATE: 75 BPM

## 2018-05-17 DIAGNOSIS — M94.261 CHONDROMALACIA, KNEE, RIGHT: Primary | ICD-10-CM

## 2018-05-17 PROCEDURE — 20610 DRAIN/INJ JOINT/BURSA W/O US: CPT | Mod: PBBFAC,PO | Performed by: ORTHOPAEDIC SURGERY

## 2018-05-17 PROCEDURE — 99213 OFFICE O/P EST LOW 20 MIN: CPT | Mod: PBBFAC,PO | Performed by: ORTHOPAEDIC SURGERY

## 2018-05-17 PROCEDURE — 99499 UNLISTED E&M SERVICE: CPT | Mod: S$PBB,,, | Performed by: ORTHOPAEDIC SURGERY

## 2018-05-17 PROCEDURE — 99999 PR PBB SHADOW E&M-EST. PATIENT-LVL III: CPT | Mod: PBBFAC,,, | Performed by: ORTHOPAEDIC SURGERY

## 2018-05-24 ENCOUNTER — TELEPHONE (OUTPATIENT)
Dept: SPORTS MEDICINE | Facility: CLINIC | Age: 63
End: 2018-05-24

## 2018-05-24 ENCOUNTER — OFFICE VISIT (OUTPATIENT)
Dept: SPORTS MEDICINE | Facility: CLINIC | Age: 63
End: 2018-05-24
Payer: MEDICARE

## 2018-05-24 VITALS
HEART RATE: 72 BPM | BODY MASS INDEX: 25.23 KG/M2 | HEIGHT: 66 IN | DIASTOLIC BLOOD PRESSURE: 77 MMHG | WEIGHT: 157 LBS | SYSTOLIC BLOOD PRESSURE: 127 MMHG

## 2018-05-24 DIAGNOSIS — M17.12 PRIMARY OSTEOARTHRITIS OF LEFT KNEE: Primary | ICD-10-CM

## 2018-05-24 PROCEDURE — 99213 OFFICE O/P EST LOW 20 MIN: CPT | Mod: PBBFAC,PO,25 | Performed by: ORTHOPAEDIC SURGERY

## 2018-05-24 PROCEDURE — 20610 DRAIN/INJ JOINT/BURSA W/O US: CPT | Mod: S$PBB,LT,, | Performed by: ORTHOPAEDIC SURGERY

## 2018-05-24 PROCEDURE — 99499 UNLISTED E&M SERVICE: CPT | Mod: S$PBB,,, | Performed by: ORTHOPAEDIC SURGERY

## 2018-05-24 PROCEDURE — 99999 PR PBB SHADOW E&M-EST. PATIENT-LVL III: CPT | Mod: PBBFAC,,, | Performed by: ORTHOPAEDIC SURGERY

## 2018-05-24 PROCEDURE — 20610 DRAIN/INJ JOINT/BURSA W/O US: CPT | Mod: PBBFAC,PO | Performed by: ORTHOPAEDIC SURGERY

## 2018-05-24 NOTE — PROGRESS NOTES
Patient is here for follow up of his right knee chondromalacia and arthritis. He has been wearing his medial  brace which has been helping; planning on picking up the arch supports sometime this week.  He is here today for a planned Euflexxa Injection (3/3) (LOT #[id number]). He understands potential risks and benefits of the procedure which includes pseudoseptic reaction and pain. He has failed conservative management to this point for his knee pain including NSAIDS.     brace has been helpful.    Exam findings of right knee remain unchanged from most recent visit. No erythema, warmth, or signs of infection.     The patient tolerated the procedure well. We discussed post-injection care of the knee. RTC after EMG test or as needed. All questions were answered.     Large Joint Aspiration/Injection  Date/Time: 5/24/2018 8:23 AM  Performed by: HIEN MCCALL  Authorized by: HIEN MCCALL     Indications:  Pain  Procedure site marked: Yes    Timeout: Prior to procedure the correct patient, procedure, and site was verified      Location:  Knee  Prep: Patient was prepped and draped in usual sterile fashion    Needle size:  21 G  Approach:  Superior  Medications:  20 mg sodium hyaluronate (EUFLEXXA) 10 mg/mL(mw 2.4 -3.6 million)  Patient tolerance:  Patient tolerated the procedure well with no immediate complications

## 2018-06-07 ENCOUNTER — PROCEDURE VISIT (OUTPATIENT)
Dept: NEUROLOGY | Facility: CLINIC | Age: 63
End: 2018-06-07
Payer: MEDICARE

## 2018-06-07 DIAGNOSIS — R93.7 BONE MARROW EDEMA: ICD-10-CM

## 2018-06-07 DIAGNOSIS — M25.562 LEFT KNEE PAIN, UNSPECIFIED CHRONICITY: ICD-10-CM

## 2018-06-07 DIAGNOSIS — R20.0 NUMBNESS AND TINGLING OF LEFT LEG: ICD-10-CM

## 2018-06-07 DIAGNOSIS — R20.2 NUMBNESS AND TINGLING OF LEFT LEG: ICD-10-CM

## 2018-06-07 DIAGNOSIS — M94.262 CHONDROMALACIA OF KNEE, LEFT: ICD-10-CM

## 2018-06-07 DIAGNOSIS — S83.232A COMPLEX TEAR OF MEDIAL MENISCUS OF LEFT KNEE AS CURRENT INJURY, INITIAL ENCOUNTER: ICD-10-CM

## 2018-06-07 DIAGNOSIS — M70.52 PES ANSERINUS BURSITIS OF LEFT KNEE: ICD-10-CM

## 2018-06-07 PROCEDURE — 95909 NRV CNDJ TST 5-6 STUDIES: CPT | Mod: 26,S$PBB,, | Performed by: PSYCHIATRY & NEUROLOGY

## 2018-06-07 PROCEDURE — 95885 MUSC TST DONE W/NERV TST LIM: CPT | Mod: 26,S$PBB,, | Performed by: PSYCHIATRY & NEUROLOGY

## 2018-06-07 PROCEDURE — 95909 NRV CNDJ TST 5-6 STUDIES: CPT | Mod: PBBFAC | Performed by: PSYCHIATRY & NEUROLOGY

## 2018-06-07 PROCEDURE — 95885 MUSC TST DONE W/NERV TST LIM: CPT | Mod: PBBFAC | Performed by: PSYCHIATRY & NEUROLOGY

## 2018-06-20 NOTE — PROGRESS NOTES
CC: LEFT knee pain    62 y.o. Male who returns for follow up.  States the knee is feeling better.  Excellent response to Visco injections.  No longer using his  brace because the knee is feeling better. SANE nearly 100.  He does have some intermittent residual liking and popping mechanical symptoms medially.  He is looking into getting a lateral heel wedge.    Negative for tobacco.   Negative for diabetes.        REVIEW OF SYSTEMS:   Constitution: Negative. Negative for chills, fever and night sweats.    Hematologic/Lymphatic: Negative for bleeding problem. Does not bruise/bleed easily.   Skin: Negative for dry skin, itching and rash.   Musculoskeletal: Negative for falls.  No significant knee pain    PAST MEDICAL HISTORY:   Past Medical History:   Diagnosis Date    Cervical spinal stenosis 2002    Colon polyps     Hypertension     Vertigo     left ear issues       PAST SURGICAL HISTORY:   Past Surgical History:   Procedure Laterality Date    COLONOSCOPY      COLONOSCOPY N/A 9/25/2017    Procedure: COLONOSCOPY/emr;  Surgeon: Raul August MD;  Location: [address]);  Service: Endoscopy;  Laterality: N/A;    COLONOSCOPY N/A 3/9/2018    Procedure: COLONOSCOPY;  Surgeon: Raul August MD;  Location: [address]);  Service: Endoscopy;  Laterality: N/A;       FAMILY HISTORY:   History reviewed. No pertinent family history.    SOCIAL HISTORY:   Social History     Social History    Marital status: Single     Spouse name: N/A    Number of children: N/A    Years of education: N/A     Occupational History    Not on file.     Social History Main Topics    Smoking status: Never Smoker    Smokeless tobacco: Never Used    Alcohol use No    Drug use: No    Sexual activity: Not on file     Other Topics Concern    Not on file     Social History Narrative    No narrative on file       MEDICATIONS:     Current Outpatient Prescriptions:     doxazosin (CARDURA) 2 MG tablet, Take 2 mg by mouth  "once daily., Disp: , Rfl:     ibuprofen (ADVIL,MOTRIN) 800 MG tablet, Take 1 tablet (800 mg total) by mouth 3 (three) times daily., Disp: 60 tablet, Rfl: 0    lisinopril 10 MG tablet, Take 10 mg by mouth once daily., Disp: , Rfl:     diclofenac sodium (VOLTAREN) 1 % Gel, Apply 2 g topically 4 (four) times daily., Disp: 500 g, Rfl: 3    gabapentin (NEURONTIN) 300 MG capsule, Take 1 capsule (300 mg total) by mouth 3 (three) times daily. ., Disp: 90 capsule, Rfl: 11    meclizine (ANTIVERT) 25 mg tablet, Take 1 tablet (25 mg total) by mouth 3 (three) times daily as needed for Dizziness., Disp: 30 tablet, Rfl: 0    ALLERGIES:   Review of patient's allergies indicates:  No Known Allergies     PHYSICAL EXAMINATION:  /77   Pulse 70   Ht 5' 6" (1.676 m)   Wt 71.2 kg (157 lb)   BMI 25.34 kg/m²   General: Well-developed well-nourished 62 y.o. malein no acute distress   Cardiovascular: Regular rhythm by palpation of distal pulse, normal color and temperature, no concerning varicosities on symptomatic side   Lungs: No labored breathing or wheezing appreciated   Neuro: Alert and oriented ×3   Psychiatric: well oriented to person, place and time, demonstrates normal mood and affect   Skin: No rashes, lesions or ulcers, normal temperature, turgor, and texture on involved extremity    Ortho/SPM Exam   Examination of the left lower extremity demonstrates no swelling or effusion.  No significant medial joint line tenderness.  Full active range of motion without pain.  Positive Lupe's test for mild discomfort medially. Bilateral pes planus with minimal hindfoot valgus on standing.     IMAGING:  X-rays including standing, weight bearing AP and flexion bilateral knees, LEFT knee lateral and sunrise views ordered and images reviewed by me show:    There is mild DJD is present in the patella. No fracture dislocation bone destruction seen.    MRI reviewed by me and discussed with patient. Study shows:    1. Osteochondral " defect in the medial femoral condyle with surrounding edematous changes, as above.   2. Horizontal tear in the body of the medial meniscus extending to the inferior meniscal surface.   3. Grade 1 medial collateral ligament injury.  Minimal adjacent level medial tibial plateau stress reaction    EMG:   This study is normal. There is no electrophysiologic evidence for a peripheral neuropathy or a lumbar radiculopathy.     ASSESSMENT:      ICD-10-CM ICD-9-CM   1. Complex tear of medial meniscus of left knee as current injury, subsequent encounter S83.232D V58.89   2. Bone marrow edema D75.89 289.89     Intermittent numbness and tingling over the medial aspect of the lower leg with extension into the foot, plantar more than dorsal.  Neurodiagnostics negative.  No tarsal tunnel syndrome on exam.    PLAN:     The patient has responded well to conservative treatment.  Continue current care.  If pain is persistent or recurrent at the knee, consider arthroscopy.  May need repeat MRI to assess for changes to bone marrow edema. Possible subchondroplasty.     Workup negative for neurogenic symptoms.  Patient not too bothered by this at this time.  Hold off on additional studies or treatment.    Procedures

## 2018-06-21 ENCOUNTER — OFFICE VISIT (OUTPATIENT)
Dept: SPORTS MEDICINE | Facility: CLINIC | Age: 63
End: 2018-06-21
Payer: MEDICARE

## 2018-06-21 VITALS
DIASTOLIC BLOOD PRESSURE: 77 MMHG | SYSTOLIC BLOOD PRESSURE: 119 MMHG | WEIGHT: 157 LBS | HEIGHT: 66 IN | BODY MASS INDEX: 25.23 KG/M2 | HEART RATE: 70 BPM

## 2018-06-21 DIAGNOSIS — S83.232D COMPLEX TEAR OF MEDIAL MENISCUS OF LEFT KNEE AS CURRENT INJURY, SUBSEQUENT ENCOUNTER: Primary | ICD-10-CM

## 2018-06-21 DIAGNOSIS — R93.7 BONE MARROW EDEMA: ICD-10-CM

## 2018-06-21 PROCEDURE — 99213 OFFICE O/P EST LOW 20 MIN: CPT | Mod: S$PBB,,, | Performed by: ORTHOPAEDIC SURGERY

## 2018-06-21 PROCEDURE — 99213 OFFICE O/P EST LOW 20 MIN: CPT | Mod: PBBFAC,PO | Performed by: ORTHOPAEDIC SURGERY

## 2018-06-21 PROCEDURE — 99999 PR PBB SHADOW E&M-EST. PATIENT-LVL III: CPT | Mod: PBBFAC,,, | Performed by: ORTHOPAEDIC SURGERY

## 2018-10-08 ENCOUNTER — OFFICE VISIT (OUTPATIENT)
Dept: URGENT CARE | Facility: CLINIC | Age: 63
End: 2018-10-08
Payer: MEDICARE

## 2018-10-08 VITALS
DIASTOLIC BLOOD PRESSURE: 82 MMHG | OXYGEN SATURATION: 98 % | RESPIRATION RATE: 18 BRPM | HEART RATE: 73 BPM | BODY MASS INDEX: 27.32 KG/M2 | WEIGHT: 170 LBS | SYSTOLIC BLOOD PRESSURE: 138 MMHG | TEMPERATURE: 97 F | HEIGHT: 66 IN

## 2018-10-08 DIAGNOSIS — M25.562 ACUTE PAIN OF LEFT KNEE: Primary | ICD-10-CM

## 2018-10-08 PROCEDURE — 99214 OFFICE O/P EST MOD 30 MIN: CPT | Mod: 25,S$GLB,, | Performed by: FAMILY MEDICINE

## 2018-10-08 PROCEDURE — 96372 THER/PROPH/DIAG INJ SC/IM: CPT | Mod: S$GLB,,, | Performed by: FAMILY MEDICINE

## 2018-10-08 RX ORDER — IBUPROFEN 800 MG/1
800 TABLET ORAL EVERY 8 HOURS PRN
Qty: 30 TABLET | Refills: 1 | Status: ON HOLD | OUTPATIENT
Start: 2018-10-08 | End: 2018-10-17 | Stop reason: HOSPADM

## 2018-10-08 RX ORDER — KETOROLAC TROMETHAMINE 30 MG/ML
30 INJECTION, SOLUTION INTRAMUSCULAR; INTRAVENOUS
Status: COMPLETED | OUTPATIENT
Start: 2018-10-08 | End: 2018-10-08

## 2018-10-08 RX ADMIN — KETOROLAC TROMETHAMINE 30 MG: 30 INJECTION, SOLUTION INTRAMUSCULAR; INTRAVENOUS at 11:10

## 2018-10-08 NOTE — PROGRESS NOTES
"Subjective:       Patient ID: Ladarius Solis is a 62 y.o. male.    Vitals:  height is 5' 6" (1.676 m) and weight is 77.1 kg (170 lb). His oral temperature is 97.3 °F (36.3 °C). His blood pressure is 138/82 and his pulse is 73. His respiration is 18 and oxygen saturation is 98%.     Chief Complaint: Knee Pain    This is a 62 y.o. male who presents today with a chief complaint of left knee pain. Hx. Torn Meniscus. He is using an othotic foot support as directed without help. He will call his Orthopedist for appointment. He is requesting pain medication to help him function.      Knee Pain    The incident occurred more than 1 week ago. The incident occurred at home. There was no injury mechanism. The pain is present in the left knee. The quality of the pain is described as aching. The pain is at a severity of 10/10. The pain has been constant since onset. Associated symptoms include tingling. Pertinent negatives include no inability to bear weight. He reports no foreign bodies present. The symptoms are aggravated by weight bearing and movement. He has tried acetaminophen for the symptoms. The treatment provided no relief.     Review of Systems   Musculoskeletal: Positive for joint pain, joint swelling, myalgias and stiffness. Negative for falls.   Neurological: Positive for tingling.       Objective:      Physical Exam   Constitutional: He appears well-developed.   HENT:   Head: Normocephalic and atraumatic.   Eyes: EOM are normal. Pupils are equal, round, and reactive to light.   Neck: Normal range of motion. Neck supple.   Cardiovascular: Normal rate and regular rhythm.   Pulmonary/Chest: Effort normal and breath sounds normal.   Musculoskeletal: Normal range of motion. He exhibits no tenderness (left knee - crepitus and clicking noted) or deformity.   Nursing note and vitals reviewed.      Assessment:       1. Acute pain of left knee        Plan:         Acute pain of left knee  -     Ambulatory Referral to " Sports Medicine    Other orders  -     ketorolac injection 30 mg; Inject 1 mL (30 mg total) into the muscle one time.  -     ibuprofen (ADVIL,MOTRIN) 800 MG tablet; Take 1 tablet (800 mg total) by mouth every 8 (eight) hours as needed.  Dispense: 30 tablet; Refill: 1    advised patient that we do not do chronic pain management and would refer him to his PCP (Alma Pierre for chronic pain management)

## 2018-10-08 NOTE — PATIENT INSTRUCTIONS
Knee Pain  Knee pain is very common. Its especially common in active people who put a lot of pressure on their knees, like runners. It affects women more often than men.  Your kneecap (patella) is a thick, round bone. It covers and protects the front portion of your knee joint. It moves along a groove in your thighbone (femur) as part of the patellofemoral joint. A layer of cartilage surrounds the underside of your kneecap. This layer protects it from grinding against your femur.  When this cartilage softens and breaks down, it can cause knee pain. This is partly because of repetitive stress. The stress irritates the lining of the joint. This causes pain in the underlying bone.  What causes knee pain?  Many things can cause knee pain. You may have more than one cause. Some of these include:  · Overuse of the knee joint  · The kneecap doesnt line up with the tissue around it  · Damage to small nerves in the area  · Damage to the ligament-like structure that holds the kneecap in place (retinaculum)  · Breakdown of the bone under the cartilage  · Swelling in the soft tissues around the kneecap  · Injury  You might be more likely to have knee pain if you:  · Exercise a lot  · Recently increased the intensity of your workouts  · Have a body mass index (BMI) greater than 25  · Have poor alignment of your kneecap  · Walk with your feet turned overly outward or inward  · Have weakness in surrounding muscle groups (inner quad or hip adductor muscles)  · Have too much tightness in surrounding muscle groups (hamstrings or iliotibial band)  · Have a recent history of injury to the area  · Are female  Symptoms of knee pain  This type of knee pain is a dull, aching pain in the front of the knee in the area under and around the kneecap. This pain may start quickly or slowly. Your pain might be worse when you squat, run, or sit for a long time. You might also sometimes feel like your knee is giving out. You may have symptoms in  one or both of your knees.  Diagnosing knee pain  Your healthcare provider will ask about your medical history and your symptoms. Be sure to describe any activities that make your knee pain worse. He or she will look at your knee. This will include tests of your range of motion, strength, and areas of pain of your knee. Your knee alignment will be checked.  Your healthcare provider will need to rule out other causes of your knee pain, such as arthritis. You may need an imaging test, such as an X-ray or MRI.  Treatment for knee pain  Treatments that can help ease your symptoms may include:  · Avoiding activities for a while that make your pain worse, returning to activity over time  · Icing the outside of your knee when it causes you pain  · Taking over-the-counter pain medicine  · Wearing a knee brace or taping your knee to support it  · Wearing special shoe inserts to help keep your feet in the proper alignment  · Doing special exercises to stretch and strengthen the muscles around your hip and your knee  These steps help most people manage knee pain. But some cases of knee pain need to be treated with surgery. You may need surgery right away. Or you may need it later if other treatments dont work. Your healthcare provider may refer you to an orthopedic surgeon. He or she will talk with you about your choices.  Preventing knee pain  Losing weight and correcting excess muscle tightness or muscle weakness may help lower your risk.  In some cases, you can prevent knee pain. To help prevent a flare-up of knee pain, you do these things:  · Regularly do all the exercises your doctor or physical therapist advises  · Support your knee as advised by your doctor or physical therapist  · Increase training gradually, and ease up on training when needed  · Have an expert check your gait for running or other sporting activities  · Stretch properly before and after exercise  · Replace your running shoes regularly  · Lose excess  weight     When to call your healthcare provider  Call your healthcare provider right away if:  · Your symptoms dont get better after a few weeks of treatment  · You have any new symptoms   Date Last Reviewed: 4/1/2017  © 4128-5683 [employer]. [address] [place] [address], [place] [address]. All rights reserved. This information is not intended as a substitute for professional medical care. Always follow your healthcare professional's instructions.

## 2018-10-11 ENCOUNTER — OFFICE VISIT (OUTPATIENT)
Dept: SPORTS MEDICINE | Facility: CLINIC | Age: 63
End: 2018-10-11
Payer: MEDICARE

## 2018-10-11 VITALS
BODY MASS INDEX: 27.32 KG/M2 | HEART RATE: 71 BPM | DIASTOLIC BLOOD PRESSURE: 82 MMHG | HEIGHT: 66 IN | WEIGHT: 170 LBS | SYSTOLIC BLOOD PRESSURE: 143 MMHG

## 2018-10-11 DIAGNOSIS — M94.262 CHONDROMALACIA OF KNEE, LEFT: ICD-10-CM

## 2018-10-11 DIAGNOSIS — R93.7 BONE MARROW EDEMA: ICD-10-CM

## 2018-10-11 DIAGNOSIS — S83.232D COMPLEX TEAR OF MEDIAL MENISCUS OF LEFT KNEE AS CURRENT INJURY, SUBSEQUENT ENCOUNTER: Primary | ICD-10-CM

## 2018-10-11 PROCEDURE — 99999 PR PBB SHADOW E&M-EST. PATIENT-LVL IV: CPT | Mod: PBBFAC,,, | Performed by: ORTHOPAEDIC SURGERY

## 2018-10-11 PROCEDURE — 99214 OFFICE O/P EST MOD 30 MIN: CPT | Mod: PBBFAC,PO | Performed by: ORTHOPAEDIC SURGERY

## 2018-10-11 PROCEDURE — 99214 OFFICE O/P EST MOD 30 MIN: CPT | Mod: S$PBB,,, | Performed by: ORTHOPAEDIC SURGERY

## 2018-10-11 NOTE — PROGRESS NOTES
CC: LEFT knee pain    62 y.o. Male who returns for follow up. Works at [employer]. Diagnosis is L Knee MMT with associated degenerative changes. Completed Visco injection series at the end of may with good relief up until a few weeks ago. Recurrent medial based pain with mechanical symptoms which have become more consistent recently. Has not been wearing the  brace. No relief from the heel wedge. Pain worse with prolonged time on his feet. Additional treatment has included multiple injections, PT, bracing, activity modifications and oral medications.    Negative for tobacco.   Negative for diabetes.      REVIEW OF SYSTEMS:   Constitution: Negative. Negative for chills, fever and night sweats.    Hematologic/Lymphatic: Negative for bleeding problem. Does not bruise/bleed easily.   Skin: Negative for dry skin, itching and rash.   Musculoskeletal: Negative for falls.      PAST MEDICAL HISTORY:   Past Medical History:   Diagnosis Date    Carpal tunnel syndrome, bilateral     Cervical spinal stenosis 2002    Colon polyps     Hypertension     Vertigo     left ear issues       PAST SURGICAL HISTORY:   Past Surgical History:   Procedure Laterality Date    COLONOSCOPY      COLONOSCOPY N/A 9/25/2017    Procedure: COLONOSCOPY/emr;  Surgeon: Raul August MD;  Location: [address]);  Service: Endoscopy;  Laterality: N/A;    COLONOSCOPY N/A 3/9/2018    Procedure: COLONOSCOPY;  Surgeon: Raul August MD;  Location: [address]);  Service: Endoscopy;  Laterality: N/A;    COLONOSCOPY N/A 3/9/2018    Performed by Raul August MD at [facility] ([address] [place] [address])    COLONOSCOPY/emr N/A 9/25/2017    Performed by Raul August MD at [facility] ([address] [place] [address])       FAMILY HISTORY:   Family History   Problem Relation Age of Onset    No Known Problems Mother     No Known Problems Father        SOCIAL HISTORY:   Social History     Socioeconomic History    Marital status: Single     Spouse name: Not on file    Number  "of children: Not on file    Years of education: Not on file    Highest education level: Not on file   Social Needs    Financial resource strain: Not on file    Food insecurity - worry: Not on file    Food insecurity - inability: Not on file    Transportation needs - medical: Not on file    Transportation needs - non-medical: Not on file   Occupational History    Occupation:    Tobacco Use    Smoking status: Never Smoker    Smokeless tobacco: Never Used   Substance and Sexual Activity    Alcohol use: No    Drug use: No    Sexual activity: Not on file   Other Topics Concern    Not on file   Social History Narrative    Not on file       MEDICATIONS:     Current Outpatient Medications:     ibuprofen (ADVIL,MOTRIN) 800 MG tablet, Take 1 tablet (800 mg total) by mouth 3 (three) times daily., Disp: 60 tablet, Rfl: 0    ibuprofen (ADVIL,MOTRIN) 800 MG tablet, Take 1 tablet (800 mg total) by mouth every 8 (eight) hours as needed., Disp: 30 tablet, Rfl: 1    lisinopril 10 MG tablet, Take 10 mg by mouth once daily., Disp: , Rfl:     diclofenac sodium (VOLTAREN) 1 % Gel, Apply 2 g topically 4 (four) times daily., Disp: 500 g, Rfl: 3    gabapentin (NEURONTIN) 300 MG capsule, Take 1 capsule (300 mg total) by mouth 3 (three) times daily. ., Disp: 90 capsule, Rfl: 11    meclizine (ANTIVERT) 25 mg tablet, Take 1 tablet (25 mg total) by mouth 3 (three) times daily as needed for Dizziness., Disp: 30 tablet, Rfl: 0  No current facility-administered medications for this visit.     Facility-Administered Medications Ordered in Other Visits:     sodium hyaluronate (EUFLEXXA) 10 mg/mL(mw 2.4 -3.6 million) Syrg 20 mg, 20 mg, Intra-articular, , W Toi Carr MD, 20 mg at 05/10/18 2608    ALLERGIES:   Review of patient's allergies indicates:  No Known Allergies     PHYSICAL EXAMINATION:  BP (!) 143/82   Pulse 71   Ht 5' 6" (1.676 m)   Wt 77.1 kg (170 lb)   BMI 27.44 kg/m²   General: Well-developed " well-nourished 62 y.o. malein no acute distress   Cardiovascular: Regular rhythm by palpation of distal pulse, normal color and temperature, no concerning varicosities on symptomatic side   Lungs: No labored breathing or wheezing appreciated   Neuro: Alert and oriented ×3   Psychiatric: well oriented to person, place and time, demonstrates normal mood and affect   Skin: No rashes, lesions or ulcers, normal temperature, turgor, and texture on involved extremity    Ortho/SPM Exam   Examination of the left lower extremity demonstrates no swelling or effusion. Intact extensor mechanism.  Positive medial joint line tenderness. Positive Lupe's test for pain and mechanical symptoms. Minimal lateral joint line tenderness. Central patellar tracking.  Ligamentously stable.  Bilateral pes planus with minimal hindfoot valgus on standing.     IMAGING:  X-rays including standing, weight bearing AP and flexion bilateral knees, LEFT knee lateral and sunrise views ordered and images reviewed by me show:    There is mild DJD is present in the patella. No fracture dislocation bone destruction seen.    MRI (3/2018) reviewed by me and discussed with patient. Study shows:    1. Osteochondral defect in the medial femoral condyle with surrounding edematous changes .   2. Horizontal tear in the body of the medial meniscus extending to the inferior meniscal surface.   3. Grade 1 medial collateral ligament injury.  Minimal adjacent level medial tibial plateau stress reaction    EMG:   This study is normal. There is no electrophysiologic evidence for a peripheral neuropathy or a lumbar radiculopathy.     ASSESSMENT:      ICD-10-CM ICD-9-CM   1. Primary osteoarthritis of left knee M17.12 715.16   2. Complex tear of medial meniscus of left knee as current injury, subsequent encounter S83.232D V58.89   3. Bone marrow edema D75.89 289.89   4. Chondromalacia of knee, left M94.262 717.7     Intermittent numbness and tingling over the medial  aspect of the lower leg with extension into the foot, plantar more than dorsal.  Neurodiagnostics negative.  No tarsal tunnel syndrome on exam.    PLAN:     -Finding and treatment options discussed with the patient  -The knee has been a longstanding issue and we have exhausted conservative treatment. Current complaints are not well tolerated with simple day-to-day activity.  Prominent mechanical symptoms by report.  Patient does have signs of medial overload arthritic disease and he understands that a knee arthroscopy very likely would not completely address his current complaints.  Risks of surgery include continued or recurrent pain, stiffness, and potential need for further surgery to include arthroplasty. Nonetheless he wishes to proceed.  -We will plan for a L Knee arthroscopic chondroplasty, partial medial meniscectomy, parapatellar releases as indicated, +/- subchondroplasty [facility] pending a repeat MRI to reassess the area of subchondral bony edema seen on prior scan >6 mos ago.   -Planned DOS for 10/19  -RTC for formal preoperative appointment   -No med clearance needed    Informed Consent:    The details of the surgical procedure were explained, including the location of probable incisions and a description of possible hardware and/or grafts to be used. We also discussed the potential benefit of Amniox tissue biologic augmentation with associated literature support, theoretical risks and benefits. Alternatives to both operative and non-operative options with associated risks and benefits were discussed. The patient understands the likely convalescence after surgery and, in particular, the expected postop rehab and recovery course. The outlined risks and potential complications of the proposed procedure include but are not limited to: infection, poor wound healing, scarring, deformity, stiffness, swelling, continued or recurrent pain, instability, hardware or prosthetic failure if implanted, symptomatic  hardware requiring removal, weakness, neurovascular injury, numbness, chronic regional pain disorder, tissue nonhealing/irreparability/retear, subsequent contralateral limb injury or pathology, chondral injury, arthritis, fracture, blood clot formation, inability to return to previous level of activity, anesthetic or regional block complication up to death, need for additional procedure as indicated intraoperatively, and potential need for further surgery.    The patient was also informed and understands that the risks of surgery are greater for patients with a current condition or history of heart disease, obesity, clotting disorders, recurrent infections, steroid use, current or past smoking, and factors such as sedentary lifestyle and noncompliance with medications, therapy or follow-up. The degree of the increased risk is hard to estimate with any degree of precision. If applicable, smoking cessation was discussed.     All questions were answered. The patient has verbalized understanding of these issues and wishes to proceed with the surgery as discussed.          Procedures

## 2018-10-11 NOTE — LETTER
October 15, 2018      Darrel Forbes MD  [address] [facility] [address]           [place] [id number] [facility] [address]  Phone: [phone]          Patient: Ladarius Solis   MR Number: [id number]   YOB: 1955   Date of Visit: 10/11/2018       Dear Dr. Darrel Forbes:    Thank you for referring Ladarius Solis to me for evaluation. Attached you will find relevant portions of my assessment and plan of care.    If you have questions, please do not hesitate to call me. I look forward to following Ladarius Solis along with you.    Sincerely,    GRETEL Carr MD    Enclosure  CC:  No Recipients    If you would like to receive this communication electronically, please contact [email or web address] or [phone] to request more information on [employer] Link access.    For providers and/or their staff who would like to refer a patient to [facility], please contact us through our one-stop-shop provider referral line, [facility], at [phone].    If you feel you have received this communication in error or would no longer like to receive these types of communications, please e-mail [email or web address]

## 2018-10-15 ENCOUNTER — OFFICE VISIT (OUTPATIENT)
Dept: SPORTS MEDICINE | Facility: CLINIC | Age: 63
End: 2018-10-15
Payer: MEDICARE

## 2018-10-15 ENCOUNTER — TELEPHONE (OUTPATIENT)
Dept: SPORTS MEDICINE | Facility: CLINIC | Age: 63
End: 2018-10-15

## 2018-10-15 VITALS
HEART RATE: 79 BPM | DIASTOLIC BLOOD PRESSURE: 78 MMHG | WEIGHT: 170 LBS | HEIGHT: 66 IN | BODY MASS INDEX: 27.32 KG/M2 | SYSTOLIC BLOOD PRESSURE: 146 MMHG

## 2018-10-15 DIAGNOSIS — R93.7 BONE MARROW EDEMA: ICD-10-CM

## 2018-10-15 DIAGNOSIS — M23.204 OTHER OLD TEAR OF MEDIAL MENISCUS OF LEFT KNEE: ICD-10-CM

## 2018-10-15 DIAGNOSIS — M94.262 CHONDROMALACIA OF LEFT KNEE: Primary | ICD-10-CM

## 2018-10-15 PROCEDURE — 99999 PR PBB SHADOW E&M-EST. PATIENT-LVL IV: CPT | Mod: PBBFAC,,, | Performed by: PHYSICIAN ASSISTANT

## 2018-10-15 PROCEDURE — 99214 OFFICE O/P EST MOD 30 MIN: CPT | Mod: PBBFAC,PO | Performed by: PHYSICIAN ASSISTANT

## 2018-10-15 PROCEDURE — 99499 UNLISTED E&M SERVICE: CPT | Mod: S$PBB,,, | Performed by: PHYSICIAN ASSISTANT

## 2018-10-15 RX ORDER — OXYCODONE AND ACETAMINOPHEN 10; 325 MG/1; MG/1
1 TABLET ORAL EVERY 6 HOURS PRN
Qty: 28 TABLET | Refills: 0 | Status: SHIPPED | OUTPATIENT
Start: 2018-10-15 | End: 2018-12-05

## 2018-10-15 RX ORDER — MUPIROCIN 20 MG/G
OINTMENT TOPICAL
Status: CANCELLED | OUTPATIENT
Start: 2018-10-15

## 2018-10-15 RX ORDER — ASPIRIN 81 MG/1
81 TABLET ORAL 2 TIMES DAILY
Refills: 0 | COMMUNITY
Start: 2018-10-15 | End: 2019-03-29

## 2018-10-15 RX ORDER — ONDANSETRON 4 MG/1
4 TABLET, FILM COATED ORAL EVERY 8 HOURS PRN
Qty: 30 TABLET | Refills: 0 | Status: SHIPPED | OUTPATIENT
Start: 2018-10-15 | End: 2018-10-22

## 2018-10-15 NOTE — TELEPHONE ENCOUNTER
Called patient to let him know he should take his percocet every 8 hours instead of every six hours. Left a number for him to reach me at and said if he had any questions he could give me a call back.

## 2018-10-15 NOTE — TELEPHONE ENCOUNTER
----- Message from Sandra Allan sent at 10/15/2018  1:45 PM CDT -----  Contact: Denisse @ [facility]  Denisse requesting a call back in regards to oxyCODONE-acetaminophen (PERCOCET)  mg per tablet.  [facility]- [phone]

## 2018-10-15 NOTE — H&P (VIEW-ONLY)
Ladarius Solis  is here for a completion of his perioperative paperwork. he  Is scheduled to undergo  Left Knee arthroscopic chondroplasty, meniscectomy, prepatellar releases as indicates, +/- subchondroplasty pending MRI results    on 10/19/18.  He is a healthy individual and does not need clearance for this procedure.     Risks, indications and benefits of the surgical procedure were discussed with the patient. All questions with regard to surgery, rehab, expected return to functional activities, activities of daily living and recreational endeavors were answered to his satisfaction.    Patient was informed and understands the risks of surgery are greater for patients with a current condition or hx of heart disease, obesity, clotting disorders, recurrent infections, steroid use, current or past smoking, and factors such as sedentary lifestyle and noncompliance with medications, therapy or f/u. The degree of the increased risk is hard to estimate w/ any degree of precision.    Once no other questions were asked, a brief history and physical exam was then performed.    PAST MEDICAL HISTORY:   Past Medical History:   Diagnosis Date    Carpal tunnel syndrome, bilateral     Cervical spinal stenosis 2002    Colon polyps     Hypertension     Vertigo     left ear issues     PAST SURGICAL HISTORY:   Past Surgical History:   Procedure Laterality Date    COLONOSCOPY      COLONOSCOPY N/A 9/25/2017    Procedure: COLONOSCOPY/emr;  Surgeon: Raul August MD;  Location: [address]);  Service: Endoscopy;  Laterality: N/A;    COLONOSCOPY N/A 3/9/2018    Procedure: COLONOSCOPY;  Surgeon: Raul August MD;  Location: [address]);  Service: Endoscopy;  Laterality: N/A;    COLONOSCOPY N/A 3/9/2018    Performed by Raul August MD at [facility] ([address] [place] [address])    COLONOSCOPY/emr N/A 9/25/2017    Performed by Raul August MD at [facility] ([address] [place] [address])     FAMILY HISTORY:   Family History   Problem Relation Age  of Onset    No Known Problems Mother     No Known Problems Father      SOCIAL HISTORY:   Social History     Socioeconomic History    Marital status: Single     Spouse name: Not on file    Number of children: Not on file    Years of education: Not on file    Highest education level: Not on file   Social Needs    Financial resource strain: Not on file    Food insecurity - worry: Not on file    Food insecurity - inability: Not on file    Transportation needs - medical: Not on file    Transportation needs - non-medical: Not on file   Occupational History    Occupation:    Tobacco Use    Smoking status: Never Smoker    Smokeless tobacco: Never Used   Substance and Sexual Activity    Alcohol use: No    Drug use: No    Sexual activity: Not on file   Other Topics Concern    Not on file   Social History Narrative    Not on file       MEDICATIONS:   Current Outpatient Medications:     diclofenac sodium (VOLTAREN) 1 % Gel, Apply 2 g topically 4 (four) times daily., Disp: 500 g, Rfl: 3    gabapentin (NEURONTIN) 300 MG capsule, Take 1 capsule (300 mg total) by mouth 3 (three) times daily. ., Disp: 90 capsule, Rfl: 11    ibuprofen (ADVIL,MOTRIN) 800 MG tablet, Take 1 tablet (800 mg total) by mouth 3 (three) times daily., Disp: 60 tablet, Rfl: 0    ibuprofen (ADVIL,MOTRIN) 800 MG tablet, Take 1 tablet (800 mg total) by mouth every 8 (eight) hours as needed., Disp: 30 tablet, Rfl: 1    lisinopril 10 MG tablet, Take 10 mg by mouth once daily., Disp: , Rfl:     meclizine (ANTIVERT) 25 mg tablet, Take 1 tablet (25 mg total) by mouth 3 (three) times daily as needed for Dizziness., Disp: 30 tablet, Rfl: 0  No current facility-administered medications for this visit.     Facility-Administered Medications Ordered in Other Visits:     sodium hyaluronate (EUFLEXXA) 10 mg/mL(mw 2.4 -3.6 million) Syrg 20 mg, 20 mg, Intra-articular, , W Toi Carr MD, 20 mg at 05/10/18 2234  ALLERGIES: Review of  patient's allergies indicates:  No Known Allergies    Review of Systems   Constitution: Negative. Negative for chills, fever and night sweats.   HENT: Negative for congestion and headaches.    Eyes: Negative for blurred vision, left vision loss and right vision loss.   Cardiovascular: Negative for chest pain and syncope.   Respiratory: Negative for cough and shortness of breath.    Endocrine: Negative for polydipsia, polyphagia and polyuria.   Hematologic/Lymphatic: Negative for bleeding problem. Does not bruise/bleed easily.   Skin: Negative for dry skin, itching and rash.   Musculoskeletal: Negative for falls and muscle weakness.   Gastrointestinal: Negative for abdominal pain and bowel incontinence.   Genitourinary: Negative for bladder incontinence and nocturia.   Neurological: Negative for disturbances in coordination, loss of balance and seizures.   Psychiatric/Behavioral: Negative for depression. The patient does not have insomnia.    Allergic/Immunologic: Negative for hives and persistent infections.     PHYSICAL EXAM:  GEN: A&Ox3, WD WN NAD  HEENT: WNL  CHEST: CTAB, no W/R/R  HEART: RRR, no M/R/G   ABD: Soft, NT ND, BS x4 QUADS  MS: Refer to previous note for detailed MS exam  NEURO: CN II-XII intact       The surgical consent was then reviewed with the patient, who agreed with all the contents of the consent form and it was signed.     PHYSICAL THERAPY:  He was also instructed regarding physical therapy and will begin on POD#1-3 at [facility].    POST OP CARE: Instructions were reviewed including care of the wound and dressing after surgery and when he can shower.     PAIN MANAGEMENT: Ladarius Solis was instructed regarding the Polar ice unit that will be in place after surgery and his postoperative pain medications.     MEDICATION:  Percocet 10 mg 1-2 q 4 hours PRN for pain  Zofran 4 mg q 8 hours PRN for nausea and vomiting.  Aspirin 81mg BID x 2 weeks for DVT prophylaxis starting on the  evening after surgery.    Patient was instructed to purchase and take Colace to counter possible GI side effects of taking opiates.     DVT prophylaxis was discussed with the patient today including risk factors for developing DVTs and history of DVTs. The patient was asked if any specific recommendations were given from the doctor/s that did pre-operative surgical clearance.      If the patient was previously taking 81mg baby aspirin, they were told to not take additional baby aspirin, using the above stated aspirin and to restart the 81mg aspirin daily after completion of the aspirin dose.      Patient was also told to buy over the counter Prilosec medication and take it once daily for GI protection as long as they are taking NSAIDs or Aspirin.     The patient was told that narcotic pain medications may make them drowsy and instructions were given to not sign legal documents, drive or operate heavy machinery, cars, or equipment while under the influence of narcotic medications.     Dr. Carr was not present in clinic today. Patient did not have any further questions for me or Dr. Carr.    As there were no other questions to be asked, he was given my business card along with Dr. Carr's business card if he has any questions or concerns prior to surgery or in the postop period.

## 2018-10-15 NOTE — H&P
Ladarius Solis  is here for a completion of his perioperative paperwork. he  Is scheduled to undergo  Left Knee arthroscopic chondroplasty, meniscectomy, prepatellar releases as indicates, +/- subchondroplasty pending MRI results    on 10/19/18.  He is a healthy individual and does not need clearance for this procedure.     Risks, indications and benefits of the surgical procedure were discussed with the patient. All questions with regard to surgery, rehab, expected return to functional activities, activities of daily living and recreational endeavors were answered to his satisfaction.    Patient was informed and understands the risks of surgery are greater for patients with a current condition or hx of heart disease, obesity, clotting disorders, recurrent infections, steroid use, current or past smoking, and factors such as sedentary lifestyle and noncompliance with medications, therapy or f/u. The degree of the increased risk is hard to estimate w/ any degree of precision.    Once no other questions were asked, a brief history and physical exam was then performed.    PAST MEDICAL HISTORY:   Past Medical History:   Diagnosis Date    Carpal tunnel syndrome, bilateral     Cervical spinal stenosis 2002    Colon polyps     Hypertension     Vertigo     left ear issues     PAST SURGICAL HISTORY:   Past Surgical History:   Procedure Laterality Date    COLONOSCOPY      COLONOSCOPY N/A 9/25/2017    Procedure: COLONOSCOPY/emr;  Surgeon: Raul August MD;  Location: [address]);  Service: Endoscopy;  Laterality: N/A;    COLONOSCOPY N/A 3/9/2018    Procedure: COLONOSCOPY;  Surgeon: Raul August MD;  Location: [address]);  Service: Endoscopy;  Laterality: N/A;    COLONOSCOPY N/A 3/9/2018    Performed by Raul August MD at [facility] ([address] [place] [address])    COLONOSCOPY/emr N/A 9/25/2017    Performed by Raul August MD at [facility] ([address] [place] [address])     FAMILY HISTORY:   Family History   Problem Relation Age  of Onset    No Known Problems Mother     No Known Problems Father      SOCIAL HISTORY:   Social History     Socioeconomic History    Marital status: Single     Spouse name: Not on file    Number of children: Not on file    Years of education: Not on file    Highest education level: Not on file   Social Needs    Financial resource strain: Not on file    Food insecurity - worry: Not on file    Food insecurity - inability: Not on file    Transportation needs - medical: Not on file    Transportation needs - non-medical: Not on file   Occupational History    Occupation:    Tobacco Use    Smoking status: Never Smoker    Smokeless tobacco: Never Used   Substance and Sexual Activity    Alcohol use: No    Drug use: No    Sexual activity: Not on file   Other Topics Concern    Not on file   Social History Narrative    Not on file       MEDICATIONS:   Current Outpatient Medications:     diclofenac sodium (VOLTAREN) 1 % Gel, Apply 2 g topically 4 (four) times daily., Disp: 500 g, Rfl: 3    gabapentin (NEURONTIN) 300 MG capsule, Take 1 capsule (300 mg total) by mouth 3 (three) times daily. ., Disp: 90 capsule, Rfl: 11    ibuprofen (ADVIL,MOTRIN) 800 MG tablet, Take 1 tablet (800 mg total) by mouth 3 (three) times daily., Disp: 60 tablet, Rfl: 0    ibuprofen (ADVIL,MOTRIN) 800 MG tablet, Take 1 tablet (800 mg total) by mouth every 8 (eight) hours as needed., Disp: 30 tablet, Rfl: 1    lisinopril 10 MG tablet, Take 10 mg by mouth once daily., Disp: , Rfl:     meclizine (ANTIVERT) 25 mg tablet, Take 1 tablet (25 mg total) by mouth 3 (three) times daily as needed for Dizziness., Disp: 30 tablet, Rfl: 0  No current facility-administered medications for this visit.     Facility-Administered Medications Ordered in Other Visits:     sodium hyaluronate (EUFLEXXA) 10 mg/mL(mw 2.4 -3.6 million) Syrg 20 mg, 20 mg, Intra-articular, , W Toi Carr MD, 20 mg at 05/10/18 [id number]  ALLERGIES: Review of  patient's allergies indicates:  No Known Allergies    Review of Systems   Constitution: Negative. Negative for chills, fever and night sweats.   HENT: Negative for congestion and headaches.    Eyes: Negative for blurred vision, left vision loss and right vision loss.   Cardiovascular: Negative for chest pain and syncope.   Respiratory: Negative for cough and shortness of breath.    Endocrine: Negative for polydipsia, polyphagia and polyuria.   Hematologic/Lymphatic: Negative for bleeding problem. Does not bruise/bleed easily.   Skin: Negative for dry skin, itching and rash.   Musculoskeletal: Negative for falls and muscle weakness.   Gastrointestinal: Negative for abdominal pain and bowel incontinence.   Genitourinary: Negative for bladder incontinence and nocturia.   Neurological: Negative for disturbances in coordination, loss of balance and seizures.   Psychiatric/Behavioral: Negative for depression. The patient does not have insomnia.    Allergic/Immunologic: Negative for hives and persistent infections.     PHYSICAL EXAM:  GEN: A&Ox3, WD WN NAD  HEENT: WNL  CHEST: CTAB, no W/R/R  HEART: RRR, no M/R/G   ABD: Soft, NT ND, BS x4 QUADS  MS: Refer to previous note for detailed MS exam  NEURO: CN II-XII intact       The surgical consent was then reviewed with the patient, who agreed with all the contents of the consent form and it was signed.     PHYSICAL THERAPY:  He was also instructed regarding physical therapy and will begin on POD#1-3 at [facility].    POST OP CARE: Instructions were reviewed including care of the wound and dressing after surgery and when he can shower.     PAIN MANAGEMENT: Ladarius Solis was instructed regarding the Polar ice unit that will be in place after surgery and his postoperative pain medications.     MEDICATION:  Percocet 10 mg 1-2 q 4 hours PRN for pain  Zofran 4 mg q 8 hours PRN for nausea and vomiting.  Aspirin 81mg BID x 2 weeks for DVT prophylaxis starting on the  evening after surgery.    Patient was instructed to purchase and take Colace to counter possible GI side effects of taking opiates.     DVT prophylaxis was discussed with the patient today including risk factors for developing DVTs and history of DVTs. The patient was asked if any specific recommendations were given from the doctor/s that did pre-operative surgical clearance.      If the patient was previously taking 81mg baby aspirin, they were told to not take additional baby aspirin, using the above stated aspirin and to restart the 81mg aspirin daily after completion of the aspirin dose.      Patient was also told to buy over the counter Prilosec medication and take it once daily for GI protection as long as they are taking NSAIDs or Aspirin.     The patient was told that narcotic pain medications may make them drowsy and instructions were given to not sign legal documents, drive or operate heavy machinery, cars, or equipment while under the influence of narcotic medications.     Dr. Carr was not present in clinic today. Patient did not have any further questions for me or Dr. Carr.    As there were no other questions to be asked, he was given my business card along with Dr. Carr's business card if he has any questions or concerns prior to surgery or in the postop period.

## 2018-10-16 ENCOUNTER — HOSPITAL ENCOUNTER (OUTPATIENT)
Dept: RADIOLOGY | Facility: HOSPITAL | Age: 63
Discharge: HOME | End: 2018-10-16
Attending: ORTHOPAEDIC SURGERY
Payer: MEDICARE

## 2018-10-16 DIAGNOSIS — S83.232D COMPLEX TEAR OF MEDIAL MENISCUS OF LEFT KNEE AS CURRENT INJURY, SUBSEQUENT ENCOUNTER: ICD-10-CM

## 2018-10-16 DIAGNOSIS — M94.262 CHONDROMALACIA OF KNEE, LEFT: ICD-10-CM

## 2018-10-16 DIAGNOSIS — R93.7 BONE MARROW EDEMA: ICD-10-CM

## 2018-10-16 PROCEDURE — 73721 MRI JNT OF LWR EXTRE W/O DYE: CPT | Mod: TC,LT

## 2018-10-16 PROCEDURE — 73721 MRI JNT OF LWR EXTRE W/O DYE: CPT | Mod: 26,LT,, | Performed by: RADIOLOGY

## 2018-10-17 ENCOUNTER — ANESTHESIA (OUTPATIENT)
Dept: SURGERY | Facility: HOSPITAL | Age: 63
End: 2018-10-17
Payer: MEDICARE

## 2018-10-17 ENCOUNTER — HOSPITAL ENCOUNTER (OUTPATIENT)
Facility: HOSPITAL | Age: 63
Discharge: HOME | End: 2018-10-17
Attending: ORTHOPAEDIC SURGERY | Admitting: ORTHOPAEDIC SURGERY
Payer: MEDICARE

## 2018-10-17 ENCOUNTER — PATIENT MESSAGE (OUTPATIENT)
Dept: ADMINISTRATIVE | Facility: OTHER | Age: 63
End: 2018-10-17

## 2018-10-17 ENCOUNTER — ANESTHESIA EVENT (OUTPATIENT)
Dept: SURGERY | Facility: HOSPITAL | Age: 63
End: 2018-10-17
Payer: MEDICARE

## 2018-10-17 DIAGNOSIS — M23.204 OTHER OLD TEAR OF MEDIAL MENISCUS OF LEFT KNEE: ICD-10-CM

## 2018-10-17 DIAGNOSIS — M94.262 CHONDROMALACIA OF LEFT KNEE: ICD-10-CM

## 2018-10-17 DIAGNOSIS — R93.7 BONE MARROW EDEMA: ICD-10-CM

## 2018-10-17 PROCEDURE — G8978 MOBILITY CURRENT STATUS: HCPCS | Mod: CI

## 2018-10-17 PROCEDURE — 71000016 HC POSTOP RECOV ADDL HR: Performed by: ORTHOPAEDIC SURGERY

## 2018-10-17 PROCEDURE — 25000003 PHARM REV CODE 250: Performed by: PHYSICIAN ASSISTANT

## 2018-10-17 PROCEDURE — D9220A PRA ANESTHESIA: Mod: CRNA,,, | Performed by: NURSE ANESTHETIST, CERTIFIED REGISTERED

## 2018-10-17 PROCEDURE — 27200651 HC AIRWAY, LMA: Performed by: NURSE ANESTHETIST, CERTIFIED REGISTERED

## 2018-10-17 PROCEDURE — 36000711: Performed by: ORTHOPAEDIC SURGERY

## 2018-10-17 PROCEDURE — 63600175 PHARM REV CODE 636 W HCPCS: Performed by: STUDENT IN AN ORGANIZED HEALTH CARE EDUCATION/TRAINING PROGRAM

## 2018-10-17 PROCEDURE — 64448 NJX AA&/STRD FEM NRV NFS IMG: CPT | Mod: 59,LT,, | Performed by: ANESTHESIOLOGY

## 2018-10-17 PROCEDURE — 27201423 OPTIME MED/SURG SUP & DEVICES STERILE SUPPLY: Performed by: ORTHOPAEDIC SURGERY

## 2018-10-17 PROCEDURE — 25000003 PHARM REV CODE 250: Performed by: NURSE ANESTHETIST, CERTIFIED REGISTERED

## 2018-10-17 PROCEDURE — 63600175 PHARM REV CODE 636 W HCPCS: Performed by: NURSE ANESTHETIST, CERTIFIED REGISTERED

## 2018-10-17 PROCEDURE — 37000009 HC ANESTHESIA EA ADD 15 MINS: Performed by: ORTHOPAEDIC SURGERY

## 2018-10-17 PROCEDURE — 94761 N-INVAS EAR/PLS OXIMETRY MLT: CPT

## 2018-10-17 PROCEDURE — G8980 MOBILITY D/C STATUS: HCPCS | Mod: CI

## 2018-10-17 PROCEDURE — 76942 ECHO GUIDE FOR BIOPSY: CPT | Performed by: ANESTHESIOLOGY

## 2018-10-17 PROCEDURE — 63600175 PHARM REV CODE 636 W HCPCS: Performed by: ANESTHESIOLOGY

## 2018-10-17 PROCEDURE — G8979 MOBILITY GOAL STATUS: HCPCS | Mod: CI

## 2018-10-17 PROCEDURE — 25000003 PHARM REV CODE 250: Performed by: ORTHOPAEDIC SURGERY

## 2018-10-17 PROCEDURE — 71000039 HC RECOVERY, EACH ADD'L HOUR: Performed by: ORTHOPAEDIC SURGERY

## 2018-10-17 PROCEDURE — 37000008 HC ANESTHESIA 1ST 15 MINUTES: Performed by: ORTHOPAEDIC SURGERY

## 2018-10-17 PROCEDURE — 71000015 HC POSTOP RECOV 1ST HR: Performed by: ORTHOPAEDIC SURGERY

## 2018-10-17 PROCEDURE — D9220A PRA ANESTHESIA: Mod: ANES,,, | Performed by: ANESTHESIOLOGY

## 2018-10-17 PROCEDURE — 63600175 PHARM REV CODE 636 W HCPCS: Performed by: ORTHOPAEDIC SURGERY

## 2018-10-17 PROCEDURE — 25000003 PHARM REV CODE 250: Performed by: ANESTHESIOLOGY

## 2018-10-17 PROCEDURE — 27800517 HC TRAY,EPIDURAL-CONTINUOUS: Performed by: ANESTHESIOLOGY

## 2018-10-17 PROCEDURE — 36000710: Performed by: ORTHOPAEDIC SURGERY

## 2018-10-17 PROCEDURE — 29881 ARTHRS KNE SRG MNISECTMY M/L: CPT | Mod: LT,,, | Performed by: ORTHOPAEDIC SURGERY

## 2018-10-17 PROCEDURE — 27000221 HC OXYGEN, UP TO 24 HOURS

## 2018-10-17 PROCEDURE — 97161 PT EVAL LOW COMPLEX 20 MIN: CPT

## 2018-10-17 PROCEDURE — 63600175 PHARM REV CODE 636 W HCPCS: Performed by: PHYSICIAN ASSISTANT

## 2018-10-17 PROCEDURE — 71000033 HC RECOVERY, INTIAL HOUR: Performed by: ORTHOPAEDIC SURGERY

## 2018-10-17 RX ORDER — SODIUM CHLORIDE 0.9 % (FLUSH) 0.9 %
3 SYRINGE (ML) INJECTION
Status: DISCONTINUED | OUTPATIENT
Start: 2018-10-17 | End: 2018-10-17 | Stop reason: HOSPADM

## 2018-10-17 RX ORDER — EPINEPHRINE 1 MG/ML
INJECTION INTRAMUSCULAR; INTRAVENOUS; SUBCUTANEOUS
Status: DISCONTINUED | OUTPATIENT
Start: 2018-10-17 | End: 2018-10-17 | Stop reason: HOSPADM

## 2018-10-17 RX ORDER — ROPIVACAINE HYDROCHLORIDE 5 MG/ML
INJECTION, SOLUTION EPIDURAL; INFILTRATION; PERINEURAL
Status: COMPLETED | OUTPATIENT
Start: 2018-10-17 | End: 2018-10-17

## 2018-10-17 RX ORDER — OXYCODONE HYDROCHLORIDE 5 MG/1
5 TABLET ORAL EVERY 4 HOURS PRN
Status: DISCONTINUED | OUTPATIENT
Start: 2018-10-17 | End: 2018-10-17 | Stop reason: HOSPADM

## 2018-10-17 RX ORDER — OXYCODONE HYDROCHLORIDE 5 MG/1
15 TABLET ORAL EVERY 4 HOURS PRN
Status: DISCONTINUED | OUTPATIENT
Start: 2018-10-17 | End: 2018-10-17 | Stop reason: HOSPADM

## 2018-10-17 RX ORDER — CEFAZOLIN SODIUM 1 G/3ML
2 INJECTION, POWDER, FOR SOLUTION INTRAMUSCULAR; INTRAVENOUS
Status: DISCONTINUED | OUTPATIENT
Start: 2018-10-17 | End: 2018-10-17 | Stop reason: HOSPADM

## 2018-10-17 RX ORDER — LIDOCAINE HYDROCHLORIDE 10 MG/ML
1 INJECTION, SOLUTION EPIDURAL; INFILTRATION; INTRACAUDAL; PERINEURAL ONCE
Status: COMPLETED | OUTPATIENT
Start: 2018-10-17 | End: 2018-10-17

## 2018-10-17 RX ORDER — LIDOCAINE HCL/PF 100 MG/5ML
SYRINGE (ML) INTRAVENOUS
Status: DISCONTINUED | OUTPATIENT
Start: 2018-10-17 | End: 2018-10-17

## 2018-10-17 RX ORDER — PROPOFOL 10 MG/ML
VIAL (ML) INTRAVENOUS
Status: DISCONTINUED | OUTPATIENT
Start: 2018-10-17 | End: 2018-10-17

## 2018-10-17 RX ORDER — MIDAZOLAM HYDROCHLORIDE 1 MG/ML
0.5 INJECTION INTRAMUSCULAR; INTRAVENOUS
Status: DISCONTINUED | OUTPATIENT
Start: 2018-10-17 | End: 2018-10-17 | Stop reason: HOSPADM

## 2018-10-17 RX ORDER — ONDANSETRON 8 MG/1
8 TABLET, ORALLY DISINTEGRATING ORAL ONCE
Status: COMPLETED | OUTPATIENT
Start: 2018-10-17 | End: 2018-10-17

## 2018-10-17 RX ORDER — SODIUM CHLORIDE 9 MG/ML
INJECTION, SOLUTION INTRAVENOUS CONTINUOUS PRN
Status: DISCONTINUED | OUTPATIENT
Start: 2018-10-17 | End: 2018-10-17

## 2018-10-17 RX ORDER — FENTANYL CITRATE 50 UG/ML
25 INJECTION, SOLUTION INTRAMUSCULAR; INTRAVENOUS EVERY 5 MIN PRN
Status: DISCONTINUED | OUTPATIENT
Start: 2018-10-17 | End: 2018-10-17 | Stop reason: HOSPADM

## 2018-10-17 RX ORDER — MUPIROCIN 20 MG/G
OINTMENT TOPICAL
Status: DISCONTINUED | OUTPATIENT
Start: 2018-10-17 | End: 2018-10-17 | Stop reason: HOSPADM

## 2018-10-17 RX ORDER — SODIUM CHLORIDE 9 MG/ML
INJECTION, SOLUTION INTRAVENOUS CONTINUOUS
Status: DISCONTINUED | OUTPATIENT
Start: 2018-10-17 | End: 2018-10-17 | Stop reason: HOSPADM

## 2018-10-17 RX ADMIN — ONDANSETRON 8 MG: 8 TABLET, ORALLY DISINTEGRATING ORAL at 03:10

## 2018-10-17 RX ADMIN — OXYCODONE HYDROCHLORIDE 15 MG: 5 TABLET ORAL at 12:10

## 2018-10-17 RX ADMIN — ROPIVACAINE HYDROCHLORIDE 10 ML: 5 INJECTION, SOLUTION EPIDURAL; INFILTRATION; PERINEURAL at 10:10

## 2018-10-17 RX ADMIN — LIDOCAINE HYDROCHLORIDE 0.3 MG: 10 INJECTION, SOLUTION EPIDURAL; INFILTRATION; INTRACAUDAL at 09:10

## 2018-10-17 RX ADMIN — LIDOCAINE HYDROCHLORIDE 50 MG: 20 INJECTION, SOLUTION INTRAVENOUS at 11:10

## 2018-10-17 RX ADMIN — SODIUM CHLORIDE: 0.9 INJECTION, SOLUTION INTRAVENOUS at 10:10

## 2018-10-17 RX ADMIN — MUPIROCIN: 20 OINTMENT TOPICAL at 09:10

## 2018-10-17 RX ADMIN — MIDAZOLAM HYDROCHLORIDE 2 MG: 1 INJECTION, SOLUTION INTRAMUSCULAR; INTRAVENOUS at 10:10

## 2018-10-17 RX ADMIN — CEFAZOLIN 2 G: 330 INJECTION, POWDER, FOR SOLUTION INTRAMUSCULAR; INTRAVENOUS at 11:10

## 2018-10-17 RX ADMIN — PROPOFOL 150 MG: 10 INJECTION, EMULSION INTRAVENOUS at 11:10

## 2018-10-17 RX ADMIN — ROPIVACAINE HYDROCHLORIDE 8 ML/HR: 2 INJECTION, SOLUTION EPIDURAL; INFILTRATION at 02:10

## 2018-10-17 RX ADMIN — SODIUM CHLORIDE: 0.9 INJECTION, SOLUTION INTRAVENOUS at 09:10

## 2018-10-17 NOTE — ANESTHESIA PREPROCEDURE EVALUATION
10/17/2018  Ladarius Solis is a 62 y.o., male.    Anesthesia Evaluation         Review of Systems  Anesthesia Hx:  No problems with previous Anesthesia   Social:  Non-Smoker    Cardiovascular:   Exercise tolerance: good Hypertension    Pulmonary:  Pulmonary Normal    Hepatic/GI:  Hepatic/GI Normal    Musculoskeletal:   Arthritis     Neurological:   Neuromuscular Disease,    Endocrine:  Endocrine Normal        Physical Exam  General:  Well nourished    Airway/Jaw/Neck:  Airway Findings: Mouth Opening: Normal Tongue: Normal  General Airway Assessment: Adult  Mallampati: II  TM Distance: Normal, at least 6 cm      Dental:  Dental Findings:    Chest/Lungs:  Chest/Lungs Clear    Heart/Vascular:  Heart Findings: Normal            Anesthesia Plan  Type of Anesthesia, risks & benefits discussed:  Anesthesia Type:  general  Patient's Preference:   Intra-op Monitoring Plan: standard ASA monitors  Intra-op Monitoring Plan Comments:   Post Op Pain Control Plan: multimodal analgesia, IV/PO Opioids PRN and peripheral nerve block  Post Op Pain Control Plan Comments: Opioids and analgesic adjuvants as needed  Regional blocks if applicable/indicated  Induction:   IV  Beta Blocker:  Patient is not currently on a Beta-Blocker (No further documentation required).       Informed Consent: Patient understands risks and agrees with Anesthesia plan.  Questions answered. Anesthesia consent signed with patient.  ASA Score: 1     Day of Surgery Review of History & Physical:    H&P update referred to the surgeon.     Anesthesia Plan Notes: I have personally evaluated the patient and discussed risk/benefits/alternatives of general anesthesia.        Ready For Surgery From Anesthesia Perspective.

## 2018-10-17 NOTE — TRANSFER OF CARE
"Anesthesia Transfer of Care Note    Patient: Ladarius Solis    Procedure(s) Performed: Procedure(s) (LRB):  ARTHROSCOPY, KNEE, WITH MENISCECTOMY (Left)  ARTHROSCOPY, KNEE, WITH CHONDROPLASTY (Left)    Patient location: PACU    Anesthesia Type: general    Transport from OR: Transported from OR on 6-10 L/min O2 by face mask with adequate spontaneous ventilation    Post pain: adequate analgesia    Post assessment: no apparent anesthetic complications    Post vital signs: stable    Level of consciousness: responds to stimulation    Nausea/Vomiting: no nausea/vomiting    Complications: none    Transfer of care protocol was followed      Last vitals:   Visit Vitals  BP (!) 146/83 (BP Location: Right arm, Patient Position: Lying)   Pulse (!) 57   Temp 36.6 °C (97.9 °F) (Oral)   Resp 18   Ht 5' 6" (1.676 m)   Wt 81.6 kg (180 lb)   SpO2 100%   BMI 29.05 kg/m²     "

## 2018-10-17 NOTE — PLAN OF CARE
Lata Taylor states she will stay with the patient for 48hrs after surgery. Dr. Mak is at the bedside going over medication ball  with both patient and patient's friend Lata. The patient and her friend were educated on pain ball, what symptoms to watch out for, 24hr help line given, and handout given, verbalized understanding.

## 2018-10-17 NOTE — OP NOTE
[facility]   OPERATIVE REPORT   ORTHOPAEDIC SURGERY   PROVIDER: DR. VERO YEPEZ    PATIENT INFORMATION   Ladarius Solis 62 y.o. male 1955   MRN: [id number]   LOCATION: [facility]     DATE OF PROCEDURE: 10/17/2018     PREOPERATIVE DIAGNOSES:   Left  1. knee medial meniscus tear   2. knee chondromalacia  3. knee bone stress edema     POSTOPERATIVE DIAGNOSES:   Left  1. knee medial meniscus tear, complex degenerative  2. knee chondromalacia  3. knee medial synovial plica  4. knee bone stress edema     PROCEDURE PERFORMED:   Left  1. Knee arthroscopic partial medial meniscectomy (CPT 69744)  2. knee arthroscopic chondroplasty (CPT 14677)  3. knee arthroscopic plica excision(CPT 96220)    Surgeon(s) and Role:     * GRETEL Yepez MD - Primary     * SMA Charisma    ANESTHESIA: General with adductor block    ESTIMATED BLOOD LOSS: 15 cc    IMPLANTS: * No implants in log *     SPECIMENS:   Specimen (12h ago, onward)    None        COMPLICATIONS: None.     INTRAOPERATIVE COUNTS: Correct.     PROPHYLACTIC IV ANTIBIOTICS: Given per [facility] Protocol.    INDICATIONS FOR PROCEDURE:  The patient presented complaining of chronic left knee pain.  Imaging has confirmed medial meniscus pathology, along with chondromalacia of the knee.   The patient has failed a course of conservative treatment.  Given the extent and duration of these complaints, the patient has been indicated for arthroscopic intervention to include meniscus treatment as indicated, chondroplasty, debridement and possible parapatellar releases.  A bone stress reaction of the medial femoral condyle was seen on previous MRI.  This was re-evaluated on repeat MRI yesterday and found to be much improved.  Decision was made not to proceed with the subchondroplasty procedure.  Details of such were reviewed preoperatively to include the expected postoperative rehabilitation and recovery course.  Outlined risks and potential complications  of surgery include but are not limited to: infection, stiffness, progression of arthritis, tissue re-tearing, neurovascular injury, blood clot formation, potential need further surgery. The patient has elected to proceed.    PROCEDURE IN DETAIL:  The patient was identified in preop holding. Permit was obtained for the above procedure. IV antibiotic was initiated for prophylaxis and the patient was brought to the operating room.       After Anesthesia was administered, a Time Out was verbalized with all OR staff agreeing to the patient and procedure.      The left knee and leg were prepped and draped in the usual sterile fashion.      Diagnostic arthroscopy was undertaken after establishing routine anteromedial and anterolateral scope portals.      Significant Findings:  1. Patellofemoral compartment - minimal grade 1-2 chondromalacia seen otherwise predominantly intact compartment; anterior compartment synovitis seen with a medial synovial plica.  Mild adjacent chondral irritation related to the plica.  2. Notch - Normal ACL and PCL  3. Medial Compartment - Meniscus, complex degenerative type tear involving predominantly the inner 3rd from the posterior root to the midbody.  There was a horizontal cleavage tear component. Diffuse grade 2-3 chondromalacia of the medial femoral condyle seen. No grade 4 lesions or focal chondral defect.  Smaller area of grade 2 chondromalacia of the medial tibial plateau  4. Lateral compartment - Meniscus, no tear.  Cartilage, minimal chondral wear  5. Loose bodies - none  6. Other - mild anterior compartment synovitis    Detailed description:     1. Meniscectomy: Partial medial meniscectomy was carried out from the posterior horn to mid body with a combination of biters and tejas.  Trimming was completed to stable, contoured edges.   After debridement, probing demonstrated intact root attachments.  2. Chondroplasty: Debridement was carried out at the medial femoral condyle and  tibial plateau to smooth and stabilize the cartilage with a non-aggressive shaver.  Again there was no exposed bone.  3.  Synovectomy:  A combination of the shaver and cautery devices were also used to take down the medial synovial plica and to remove all inflamed synovial tissue within the anterior compartment.  Care was taken to ensure hemostasis throughout.    Photos were taken. The portals were closed in standard fashion using 3-0 Nylon suture.    The dressing was placed and the patient was awakened and transferred to the recovery room in satisfactory condition.  There were no apparent complications.     POSTOPERATIVE PLAN: Patient may weight bear as tolerates on crutches. Full range of motion to tolerance. Will start physical therapy right away. ASA 81 mg BID x 2 weeks for DVT prophylaxis.

## 2018-10-17 NOTE — INTERVAL H&P NOTE
The patient has been examined and the H&P has been reviewed:    I concur with the findings and no changes have occurred since H&P was written.    Anesthesia/Surgery risks, benefits and alternative options discussed and understood by patient/family.    MRI from yesterday reviewed. Interval partial resolution of the MFC bone stress edema compared to the study in March. Small residual area of reaction mid-posterior, approximately 10 mm in diameter. Adjacent area of FT chondral loss. Discussed with Ladarius. We will not proceed with subchondroplasty in this case. Proceed with scope partial meniscectomy.       Active Hospital Problems    Diagnosis  POA    Chondromalacia of left knee [M94.262]  Yes      Resolved Hospital Problems   No resolved problems to display.

## 2018-10-17 NOTE — PLAN OF CARE
Dr Fry is at the bedside explaining the block with the patient. Different pain options discussed. If he is to receive a pain medication ball he will need someone to stay home with him for 48 hrs to maintain safety. The patient verbalizes understanding. Today he will be going home with Lata Taylor and she will be staying with him today. She states she can not stay with him for 48hrs. She is making phone calls to see if someone in the patient's family can stay with him for 48hrs. Dr. fry is aware.

## 2018-10-17 NOTE — ANESTHESIA PROCEDURE NOTES
Adductor Canal Catheter    Patient location during procedure: pre-op   Block not for primary anesthetic.  Reason for block: at surgeon's request and post-op pain management   Post-op Pain Location: left knee pain  Start time: 10/17/2018 10:12 AM  Timeout: 10/17/2018 10:10 AM   End time: 10/17/2018 10:22 AM  Staffing  Anesthesiologist: Esther Eldridge MD  Resident/CRNA: Ruth Mak MD  Performed: resident/CRNA   Preanesthetic Checklist  Completed: patient identified, site marked, surgical consent, pre-op evaluation, timeout performed, IV checked, risks and benefits discussed and monitors and equipment checked  Peripheral Block  Patient position: supine  Prep: ChloraPrep and site prepped and draped  Patient monitoring: heart rate, cardiac monitor, continuous pulse ox, continuous capnometry and frequent blood pressure checks  Block type: adductor canal  Laterality: left  Injection technique: continuous  Needle  Needle type: Tuohy   Needle gauge: 17 G  Needle length: 3.5 in  Needle localization: anatomical landmarks and ultrasound guidance  Catheter type: spring wound  Catheter size: 19 G  Test dose: lidocaine 1.5% with Epi 1-to-200,000 and negative   -ultrasound image captured on disc.  Assessment  Injection assessment: negative aspiration, negative parasthesia and local visualized surrounding nerve  Paresthesia pain: none  Heart rate change: no  Slow fractionated injection: yes  Additional Notes  VSS.  [facility] RN monitoring vitals throughout procedure.  Patient tolerated procedure well.

## 2018-10-17 NOTE — PT/OT/SLP PROGRESS
Physical Therapy Crutch Evaluation/Training    Ladarius Solis   MRN: [id number]   Admitting Diagnosis: <principal problem not specified>                          Billable Minutes:  Evaluation x 20 min     Order: crutch train  Order Date: 10/17/18    Precautions Weight Bearing Status: weight bearing as tolerated: left leg    Patient Active Problem List   Diagnosis    Colon adenoma    Acute pain of left knee    Primary osteoarthritis of left knee    Neck pain    Bilateral carpal tunnel syndrome    Cervical radiculopathy at C7    Chondromalacia of left knee     Past Medical History:   Diagnosis Date    Carpal tunnel syndrome, bilateral     Cervical spinal stenosis 2002    Colon polyps     Hypertension     Vertigo     left ear issues     Past Surgical History:   Procedure Laterality Date    COLONOSCOPY      COLONOSCOPY N/A 9/25/2017    Procedure: COLONOSCOPY/emr;  Surgeon: Raul August MD;  Location: [facility] ([address] [place] [address]);  Service: Endoscopy;  Laterality: N/A;    COLONOSCOPY N/A 3/9/2018    Procedure: COLONOSCOPY;  Surgeon: Raul August MD;  Location: [facility] ([address] [place] [address]);  Service: Endoscopy;  Laterality: N/A;    COLONOSCOPY N/A 3/9/2018    Performed by Raul August MD at [facility] ([address] [place] [address])    COLONOSCOPY/emr N/A 9/25/2017    Performed by Raul August MD at [facility] ([address] [place] [address])       Subjective Information     Prior level of function: independent  Residence: lives with their family apartment, number of outside stairs: 0   Support available: sister  Equipment owned: Crutches, Type: axillary ( just delivered and fitted to pt    Mental Status: Oriented X 4, Alert and Lethargic  Skin: Intact  Sensation: Intact  Posture: Fair  Hearing: Intact  Vision:  Intact    Pain at time of assessment: Patient is unable to quantify. located in left leg, aggravated by flexion, relieved by cessation of activity.    Objective findings/Assessment  Bed mobility: supervsion with HOB elevated  Transfers: sit to stand  with SBA  Gross assessment: SBA for all mobility  Standing balance: SBA with/without AD  ROM: WFL RLE, LLE limited knee flexion; all other WFL  Strength: WFL RLE; LLE WFL except no resistance to QS, HS  Patient requires crutch fitting and training.    Treatment  Gait: x 20-30 steps with B axillary crutches with 3 point gait pattern,  X 30 feet with no AD, SBA for both trials; v/c for crutches placement/sequecing for WS/WB precautions   Transfers: sit to stand with SBA using axillary crutches  Education provided in form of: gait training, crutch use/safety, transfer safety     AM-PAC 6 CLICK MOBILITY  How much help from another person does this patient currently need?   1 = Unable, Total/Dependent Assistance  2 = A lot, Maximum/Moderate Assistance  3 = A little, Minimum/Contact Guard/Supervision  4 = None, Modified [place]/Independent          AM-PAC Raw Score CMS G-Code Modifier Level of Impairment Assistance   6 % Total / Unable   7 - 9 CM 80 - 100% Maximal Assist   10 - 14 CL 60 - 80% Moderate Assist   15 - 19 CK 40 - 60% Moderate Assist   20 - 22 CJ 20 - 40% Minimal Assist   23 CI 1-20% SBA / CGA   24 CH 0% Independent/ Mod I     Goals/Discharge Status  Patient safely and effectively ambulates with crutches with  modified independent,  weight bearing as tolerated: left leg on level surfaces.    Recommended Plan:  Patient to be discharged to home with family support. progress to OP PT when appropriate.     Leonid Santillan, PT  10/17/2018

## 2018-10-18 VITALS
DIASTOLIC BLOOD PRESSURE: 86 MMHG | HEIGHT: 66 IN | BODY MASS INDEX: 28.93 KG/M2 | RESPIRATION RATE: 18 BRPM | OXYGEN SATURATION: 99 % | TEMPERATURE: 98 F | HEART RATE: 55 BPM | WEIGHT: 180 LBS | SYSTOLIC BLOOD PRESSURE: 177 MMHG

## 2018-10-18 NOTE — ANESTHESIA POSTPROCEDURE EVALUATION
"Anesthesia Post Evaluation    Patient: Ladarius Solis    Procedure(s) Performed: Procedure(s) (LRB):  ARTHROSCOPY, KNEE, WITH MENISCECTOMY (Left)  ARTHROSCOPY, KNEE, WITH CHONDROPLASTY (Left)    Final Anesthesia Type: general  Patient location during evaluation: PACU  Patient participation: Yes- Able to Participate  Level of consciousness: awake and alert and oriented  Post-procedure vital signs: reviewed and stable  Pain management: adequate  Airway patency: patent  PONV status at discharge: No PONV  Anesthetic complications: no      Cardiovascular status: blood pressure returned to baseline  Respiratory status: unassisted, spontaneous ventilation and room air  Hydration status: euvolemic  Follow-up not needed.        Visit Vitals  BP (!) 177/86 (BP Location: Left arm, Patient Position: Sitting)   Pulse (!) 55   Temp 36.5 °C (97.7 °F)   Resp 18   Ht 5' 6" (1.676 m)   Wt 81.6 kg (180 lb)   SpO2 99%   BMI 29.05 kg/m²       Pain/Kevin Score: Pain Assessment Performed: Yes (10/17/2018  3:15 PM)  Presence of Pain: denies (10/17/2018  3:15 PM)  Pain Rating Prior to Med Admin: 0 (10/17/2018  3:15 PM)  Pain Rating Post Med Admin: 0 (10/17/2018  3:15 PM)  Kevin Score: 10 (10/17/2018  3:15 PM)        "

## 2018-10-18 NOTE — ANESTHESIA POST-OP PAIN MANAGEMENT
Contacted patient regarding pain control and OnQ.  Patient states pain is well controlled and denies any new issues.  Will follow-up with patient tomorrow after PNC removed.    Signing Physician:  Ruth Mak MD   Anesthesiology PGY-3/CA-2  10/18/2018  1:17 PM

## 2018-10-19 ENCOUNTER — PATIENT MESSAGE (OUTPATIENT)
Dept: ADMINISTRATIVE | Facility: OTHER | Age: 63
End: 2018-10-19

## 2018-10-19 NOTE — ADDENDUM NOTE
Addendum  created 10/19/18 1344 by Franklin Payne MD    Intraprocedure Event edited, Sign clinical note

## 2018-10-19 NOTE — ANESTHESIA POST-OP PAIN MANAGEMENT
Called patient at [phone]. Unavailable. Left message with instructions for PNC removal and to ensure the blue tip was intact. Encouraged patient to call with any questions/concerns.

## 2018-10-20 ENCOUNTER — PATIENT MESSAGE (OUTPATIENT)
Dept: ADMINISTRATIVE | Facility: OTHER | Age: 63
End: 2018-10-20

## 2018-10-22 ENCOUNTER — CLINICAL SUPPORT (OUTPATIENT)
Dept: REHABILITATION | Facility: HOSPITAL | Age: 63
End: 2018-10-22
Attending: PHYSICIAN ASSISTANT
Payer: MEDICARE

## 2018-10-22 DIAGNOSIS — M94.262 CHONDROMALACIA OF LEFT KNEE: ICD-10-CM

## 2018-10-22 DIAGNOSIS — R93.7 BONE MARROW EDEMA: ICD-10-CM

## 2018-10-22 DIAGNOSIS — M23.204 OTHER OLD TEAR OF MEDIAL MENISCUS OF LEFT KNEE: ICD-10-CM

## 2018-10-22 PROCEDURE — 97161 PT EVAL LOW COMPLEX 20 MIN: CPT

## 2018-10-22 PROCEDURE — G8978 MOBILITY CURRENT STATUS: HCPCS | Mod: CK

## 2018-10-22 PROCEDURE — G8979 MOBILITY GOAL STATUS: HCPCS | Mod: CI

## 2018-10-22 PROCEDURE — 97110 THERAPEUTIC EXERCISES: CPT

## 2018-10-22 PROCEDURE — 97140 MANUAL THERAPY 1/> REGIONS: CPT

## 2018-10-22 NOTE — PROGRESS NOTES
[facility]  Physical Therapy Initial Evaluation    Name: Ladarius Solis  Clinic Number: [id number]    Therapy Diagnosis: No diagnosis found.  Physician: Antonieta Ham PA-C    Physician Orders: {AMB PT KNEE ORDERS:01577} ***  Medical Diagnosis:   M94.262 (ICD-10-CM) - Chondromalacia of left knee   M23.204 (ICD-10-CM) - Other old tear of medial meniscus of left knee   D75.89 (ICD-10-CM) - Bone marrow edema       Evaluation Date: 10/22/2018  Authorization Period Expiration: 12/31/2018  Plan of Care Certification Period: 12/17/2018  Visit # / Visits authorized: 1/ 20    Time In: ***  Time Out: ***  Total Billable Time: *** minutes    Precautions: ***      Referral note:Left Knee arthroscopic chondroplasty, meniscectomy, prepatellar releases as indicates, +/- subchondroplasty pending MRI results     on 10/19/18.   Start PT on POD #1-3  Follow PT per protocol    Op note:POSTOPERATIVE PLAN: Patient may weight bear as tolerates on crutches. Full range of motion to tolerance. Will start physical therapy right away. ASA 81 mg BID x 2 weeks for DVT prophylaxis.      Subjective   Date of onset: ***  History of current condition - Ladarius reports: ***       Past Medical History:   Diagnosis Date    Carpal tunnel syndrome, bilateral     Cervical spinal stenosis 2002    Colon polyps     Hypertension     Vertigo     left ear issues     Ladarius Solis  has a past surgical history that includes Colonoscopy; ARTHROSCOPY, KNEE, WITH MENISCECTOMY (Left, 10/17/2018); ARTHROSCOPY, KNEE, WITH CHONDROPLASTY (Left, 10/17/2018); COLONOSCOPY (N/A, 3/9/2018); and COLONOSCOPY/emr (N/A, 9/25/2017).    Ladarius has a current medication list which includes the following prescription(s): aspirin, lisinopril, ondansetron, and oxycodone-acetaminophen, and the following Facility-Administered Medications: sodium hyaluronate (euflexxa).    Review of patient's allergies indicates:  No Known Allergies      Imaging, MRI studies:   Reading Physician Reading Date Result Priority   Geremias Florez MD 10/17/2018    Gary Vieira MD 10/17/2018       Narrative     EXAMINATION:  MRI KNEE WITHOUT CONTRAST LEFT    CLINICAL HISTORY:  pain;Complex tear of medial meniscus, current injury, left knee, subsequent encounter    TECHNIQUE:  Multiplanar, multisequence MRI of the left knee performed per routine protocol without contrast.    COMPARISON:  MRI left knee dated 03/07/2018 and left knee radiograph dated 04/17/2018    FINDINGS:  Menisci:  There is horizontal tear of the medial meniscus body with extension to the tibial surface.  Lateral meniscus is intact.    Ligaments:  The ACL and PCL are intact.  LCL complex structures are intact.  MCL is intact with improved degree of periligamentous fluid.    Tendons:  Extensor mechanism is maintained.    Cartilage:    Patellofemoral: Articular cartilage is maintained.    Medial tibiofemoral: There is a 1.1 x 1.1 cm nondisplaced subchondral insufficiency fracture of the medial femoral condyle (series 9, image 13).  There is mild persistent surrounding marrow edema, though overall has significantly improved from prior exam.  Overlying cartilage appears maintained.    Lateral tibiofemoral: Articular cartilage is maintained.    Bone: No marrow replacing process.    Miscellaneous: Small knee joint effusion.  Mild prepatellar soft tissue edema.      Impression       Horizontal tear of the body of the medial meniscus, unchanged.    Nondisplaced subchondral insufficiency fracture of the medial femoral condyle with significant improvement of marrow edema.    Small knee joint effusion.    Electronically signed by resident: Gary Vieira  Date: 10/17/2018  Time: 08:14    Electronically signed by: Geremias Florez MD  Date: 10/17/2018  Time: 11:20            Prior Therapy: ***  Social History: *** {LIVES WITH:50158}  Occupation: ***  Prior Level of Function: ***  Current Level of Function:  "***    Pain:  Current {0-10:20507::"0"}/10, worst {0-10:20507::"0"}/10, best {0-10:20507::"0"}/10   Location: {RIGHT LEFT BILATERAL:24802} {LOCATION ON BODY:02963}  Description: {Pain Description:31529}  Aggravating Factors: {Causes; Pain:56291}  Easing Factors: {Pain (activities that relieve):41489}    Pts goals: ***    Objective     Strength:  Hip Left Right   Flexion {AMB PT VESTIBULAR STRENGTH:66781} {AMB PT VESTIBULAR STRENGTH:31308}   Abduction {AMB PT VESTIBULAR STRENGTH:68576} {AMB PT VESTIBULAR STRENGTH:98412}   Adduction {AMB PT VESTIBULAR STRENGTH:84922} {AMB PT VESTIBULAR STRENGTH:21202}   Extension {AMB PT VESTIBULAR STRENGTH:36077} {AMB PT VESTIBULAR STRENGTH:25991}   External Rot {AMB PT VESTIBULAR STRENGTH:46464} {AMB PT VESTIBULAR STRENGTH:59304}   Internal Rot {AMB PT VESTIBULAR STRENGTH:99707} {AMB PT VESTIBULAR STRENGTH:45650}       Knee Left Right   Extension {AMB PT VESTIBULAR STRENGTH:20127} {AMB PT VESTIBULAR STRENGTH:99691}   Flexion {AMB PT VESTIBULAR STRENGTH:08939} {AMB PT VESTIBULAR STRENGTH:88962}         Ankle Left Right   Dorsiflexion {AMB PT VESTIBULAR STRENGTH:87600} {AMB PT VESTIBULAR STRENGTH:39802}   Plantarflexion {AMB PT VESTIBULAR STRENGTH:12651} {AMB PT VESTIBULAR STRENGTH:65420}   Inversion {AMB PT VESTIBULAR STRENGTH:25021} {AMB PT VESTIBULAR STRENGTH:03017}   Eversion {AMB PT VESTIBULAR STRENGTH:48785} {AMB PT VESTIBULAR STRENGTH:80372}       Knee Left Right   Extension *** (***) degrees *** (***) degrees   Flexion *** (***) degrees *** (***) degrees     TTP        CMS Impairment/Limitation/Restriction for FOTO knee Survey    Therapist reviewed FOTO scores for Ladarius Solis on 10/22/2018.   FOTO documents entered into [employer] - see Media section.    Limitation Score: ***%  Category: {Blank single:05612::"Other","Self Care","Body Position","Carrying","Mobility"}    Current : {G Codes:92336}  Goal: {G Codes:20948}  Discharge: {G Codes:91946}         TREATMENT " "  Treatment Time In: ***  Treatment Time Out: ***  Total Treatment time separate from Evaluation time:***    Ladarius received therapeutic exercises to develop {AMB PT PROGRESS OBJECTIVE:31214} for *** minutes including:  ***    Ladarius received the following manual therapy techniques: {AMB PT PROGRESS MANUAL THERAPY:67556} were applied to the: *** for {5-30:28555} minutes, including:  ***      Ladarius received {Blank single:43673::"hot","cold"} pack for *** minutes to {Blank single:83581::"increase circulation and promote tissue healing","to decrease circulation, pain, and swelling"}.    Home Exercises and Patient Education Provided    Education provided re: Importance of ice and use of HEP to manage symptoms.     Written Home Exercises Provided: see therex.  Exercises were reviewed and Ladarius was able to demonstrate them prior to the end of the session.   Pt received a written copy of exercises to perform at home. Ladarius demonstrated {Desc; good/fair/poor:09224} understanding of the education provided.     See EMR under Patient Instructions for exercises provided 10/22/2018.  Assessment   Ladarius is a 62 y.o. male referred to outpatient Physical Therapy with a medical diagnosis of   M94.262 (ICD-10-CM) - Chondromalacia of left knee   M23.204 (ICD-10-CM) - Other old tear of medial meniscus of left knee   D75.89 (ICD-10-CM) - Bone marrow edema     . Pt presents with ***    Pt prognosis is {REHAB PROGNOSIS OHS:82040}.   Pt will benefit from skilled outpatient Physical Therapy to address the deficits stated above and in the chart below, provide pt/family education, and to maximize pt's level of independence.     Plan of care discussed with patient: Yes  Pt's spiritual, cultural and educational needs considered and patient is agreeable to the plan of care and goals as stated below:     Anticipated Barriers for therapy: ***    Medical Necessity is demonstrated by the following  History  Co-morbidities and personal " "factors that may impact the plan of care Co-morbidities:   {Co-morbidities:90185}    Personal Factors:   {Personal Factors:06358}     {Desc; low/moderate/high:681121}   Examination  Body Structures and Functions, activity limitations and participation restrictions that may impact the plan of care Body Regions:   {Body Regions:15854}    Body Systems:    {Body Systems:12365}    Participation Restrictions:   ***    Activity limitations:   Learning and applying knowledge  {Learning and applying knowledge:23925}    General Tasks and Commands  {Gen tasks and commands:84604}    Communication  {Communication:31800}    Mobility  {Mobility:39936}    Self care  {Self Care:30973}    Domestic Life  {Domestic Life:82051}    Interactions/Relationships  {Interactions/Relationships:89175}    Life Areas  {Life Areas:26994}    Community and Social Life  {Community/Social Life:45095}         {Desc; low/moderate/high:367753}   Clinical Presentation {Clinical Presentation :23350} {Desc; low/moderate/high:456864}   Decision Making/ Complexity Score: {Desc; low/moderate/high:064340}     Goals:  Short Term GOALS: 4 weeks. Pt agrees with goals set.  1. Patient demonstrates independence with HEP.   2. Patient demonstrates independence with Postural Awareness.   3. Patient demonstrates independence with body mechanics.     Long Term GOALS: 8-12 weeks. Pt agrees with goals set.  1. Patient demonstrates increased *** to *** to improve tolerance to functional activities pain free.   2. Patient demonstrates increased strength BLE's to 5/5 or greater to improve tolerance to functional activities pain free.   3. Patient demonstrates improved overall function per FOTO Knee Survey to ***% Limitation or less.         Plan   Certification Period/Plan of care expiration: 10/22/2018 to 12/17/2018.    Outpatient Physical Therapy {NUMBERS 1-5:16034} times weekly for {0-10:20624::"0"} weeks to include the following interventions: {TX PLAN:32053}.     Alfonso " Tj, PT

## 2018-10-22 NOTE — PROGRESS NOTES
"[facility]  Physical Therapy Initial Evaluation    Name: Ladarius Solis  Clinic Number: [id number]    Therapy Diagnosis: No diagnosis found.  Physician: Antonieta Ham PA-C    Physician Orders: PT Eval and Treat   Medical Diagnosis:   M94.262 (ICD-10-CM) - Chondromalacia of left knee   M23.204 (ICD-10-CM) - Other old tear of medial meniscus of left knee   D75.89 (ICD-10-CM) - Bone marrow edema       Evaluation Date: 10/22/2018  Authorization Period Expiration: 12/31/2018  Plan of Care Certification Period: 12/17/2018  Visit # / Visits authorized: 1/ 20    Time In: 1300  Time Out: 1350  Total Billable Time: 50 minutes    Precautions: WBAT on crutches; progress per meniscectomy protocol.       Referral note:Left Knee arthroscopic chondroplasty, meniscectomy, prepatellar releases as indicates, +/- subchondroplasty pending MRI results     on 10/19/18.   Start PT on POD #1-3  Follow PT per protocol    Op note:POSTOPERATIVE PLAN: Patient may weight bear as tolerates on crutches. Full range of motion to tolerance. Will start physical therapy right away. ASA 81 mg BID x 2 weeks for DVT prophylaxis.      Subjective   Date of onset: s/p menisectomy 10/19  History of current condition - Ladarius reports: he's currently in 5/10 pain; he feels "groggy" due to taking a pain pill today. He feels like his legs feel weak today.   He says in left knee cap and torn meniscus was giving him problems; his knee had started locking. He reports insidious onset of pain/dysfunction. He states the locking was the main reason he elected to have surgery.        Past Medical History:   Diagnosis Date    Carpal tunnel syndrome, bilateral     Cervical spinal stenosis 2002    Colon polyps     Hypertension     Vertigo     left ear issues     Ladarius Solis  has a past surgical history that includes Colonoscopy; ARTHROSCOPY, KNEE, WITH MENISCECTOMY (Left, 10/17/2018); ARTHROSCOPY, KNEE, WITH CHONDROPLASTY " (Left, 10/17/2018); COLONOSCOPY (N/A, 3/9/2018); and COLONOSCOPY/emr (N/A, 9/25/2017).    Ladarius has a current medication list which includes the following prescription(s): aspirin, lisinopril, ondansetron, and oxycodone-acetaminophen, and the following Facility-Administered Medications: sodium hyaluronate (euflexxa).    Review of patient's allergies indicates:  No Known Allergies     Imaging, MRI studies:   Reading Physician Reading Date Result Priority   Geremias Florez MD 10/17/2018    Gary Vieira MD 10/17/2018       Narrative     EXAMINATION:  MRI KNEE WITHOUT CONTRAST LEFT    CLINICAL HISTORY:  pain;Complex tear of medial meniscus, current injury, left knee, subsequent encounter    TECHNIQUE:  Multiplanar, multisequence MRI of the left knee performed per routine protocol without contrast.    COMPARISON:  MRI left knee dated 03/07/2018 and left knee radiograph dated 04/17/2018    FINDINGS:  Menisci:  There is horizontal tear of the medial meniscus body with extension to the tibial surface.  Lateral meniscus is intact.    Ligaments:  The ACL and PCL are intact.  LCL complex structures are intact.  MCL is intact with improved degree of periligamentous fluid.    Tendons:  Extensor mechanism is maintained.    Cartilage:    Patellofemoral: Articular cartilage is maintained.    Medial tibiofemoral: There is a 1.1 x 1.1 cm nondisplaced subchondral insufficiency fracture of the medial femoral condyle (series 9, image 13).  There is mild persistent surrounding marrow edema, though overall has significantly improved from prior exam.  Overlying cartilage appears maintained.    Lateral tibiofemoral: Articular cartilage is maintained.    Bone: No marrow replacing process.    Miscellaneous: Small knee joint effusion.  Mild prepatellar soft tissue edema.      Impression       Horizontal tear of the body of the medial meniscus, unchanged.    Nondisplaced subchondral insufficiency fracture of the medial femoral condyle  "with significant improvement of marrow edema.    Small knee joint effusion.    Electronically signed by resident: Gary Vieira  Date: 10/17/2018  Time: 08:14    Electronically signed by: Geremias Florez MD  Date: 10/17/2018  Time: 11:20            Prior Therapy: None  Social History: He lives with a friend  Occupation: Part-time at [employer]; patient isn't sure if/when he'll go back.   Prior Level of Function: Independent with difficulty due to knee locking.   Current Level of Function: limited mobility; needs assistance from roommate; ADLs/IADLs are "slow."     Pain:  Current 5/10, worst 5/10, best 0/10 (with pain pack after operation).   Location: left knee   Description: "warmth over knee."   Aggravating Factors: Walking and movement; ice machine requires to have his leg elevated, which hurts his knee.   Easing Factors: rest    Pts goals: Try to get as much ROM back as he can.     Objective     Observation: Pt is stated age, pleasant, appears slightly drowsy.     Gait: Pt is WBAT with crutches; pt was able to leave clinic while holding crutches with no significant antalgic gait noted.     Strength:  Hip Left Right   Flexion 4/5 5/5     Knee Left Right   Extension 4+/5 5/5   Flexion 4+/5 5/5     Range of Motion: A(PROM)  Knee Left Right   Extension -3 (0) degrees 0 (NT) degrees   Flexion 121 (125) degrees 145 (NT) degrees     Joint Mobility: Patellar mobility hypomobile 4-way, partially limited by swelling and dressings; TF joint mobility slightly hypomobile not limited by pain.       CMS Impairment/Limitation/Restriction for FOTO knee Survey    Therapist reviewed FOTO scores for Ladarius Solis on 10/22/2018.   FOTO documents entered into [employer] - see Media section.    Limitation Score: (to be determined when patient takes FOTO%  Category: Mobility    Based on Clinical Judgement:  Current : CK = at least 40% but < 60% impaired, limited or restricted  Goal: CI = at least 1% but < 20% impaired, limited or " restricted  Discharge: NA         TREATMENT   Treatment Time In: 1327  Treatment Time Out: 1350  Total Treatment time separate from Evaluation time:23    Ladarius received therapeutic exercises to develop strength for 15 minutes including:  Quad sets 5 sec hold 20x  Supine SLR 10x  Bridging 10x    Ladarius received the following manual therapy techniques: Joint mobilizations were applied to the: (L) knee joint for 8 minutes, including:  Patellar mobilization 4-way   Anterior TF glide to increase extension gr2-4      Home Exercises and Patient Education Provided    Education provided re: Importance of HEP to manage symptoms and promote optimal recovery.     Written Home Exercises Provided: see Media section.  Exercises were reviewed and Ladarius was able to demonstrate them prior to the end of the session.   Pt received a written copy of exercises to perform at home. Ladarius demonstrated good  understanding of the education provided.     See EMR under Media for exercises provided 10/22/2018.  Assessment   Ladarius is a 62 y.o. male referred to outpatient Physical Therapy with a medical diagnosis of   M94.262 (ICD-10-CM) - Chondromalacia of left knee   M23.204 (ICD-10-CM) - Other old tear of medial meniscus of left knee   D75.89 (ICD-10-CM) - Bone marrow edema     Pt presents with limitations in gait mechanics, ROM, and strength needed for functional mobility. Initial manual and exercise treatments tolerated well with no pain reported. Good quad activation palpated with quad sets, mild/mod difficulty noted with supine SLR, no increased knee ext lag noted with repetitions.     Pt prognosis is Good.   Pt will benefit from skilled outpatient Physical Therapy to address the deficits stated above and in the chart below, provide pt/family education, and to maximize pt's level of independence.     Plan of care discussed with patient: Yes  Pt's spiritual, cultural and educational needs considered and patient is agreeable to the  plan of care and goals as stated below:     Anticipated Barriers for therapy: None    Medical Necessity is demonstrated by the following  History  Co-morbidities and personal factors that may impact the plan of care Co-morbidities:   Hx of neck pain; colon adenoma      Personal Factors:   no deficits     low   Examination  Body Structures and Functions, activity limitations and participation restrictions that may impact the plan of care Body Regions:   lower extremities    Body Systems:    ROM  strength  balance  gait  transfers  motor control    Participation Restrictions:   Walking, bending, work    Activity limitations:   Learning and applying knowledge  no deficits    General Tasks and Commands  no deficits    Communication  no deficits    Mobility  walking  moving around using equipment (WC)    Self care  No deficits    Domestic Life  cooking  doing house work (cleaning house, washing dishes, laundry)  assisting others    Interactions/Relationships  no deficits    Life Areas  no deficits    Community and Social Life  no deficits         low   Clinical Presentation stable and uncomplicated low   Decision Making/ Complexity Score: low     Goals:  Short Term GOALS: 2 weeks. Pt agrees with goals set.  1. Patient demonstrates independence with HEP.   2. Patient demonstrates L knee AROM 0 to 125 deg to increase functional mobility tolerance.    3. Patient maintains TKE with flexion SLR indicating good quad control.     Long Term GOALS: 8 weeks. Pt agrees with goals set.  1. Patient demonstrates L knee AROM 0 to 140 deg to increase functional mobility tolerance.    2. Patient demonstrates increased strength BLE's to 5/5 to improve tolerance to functional activities.   3. Patient reports at least 75% decreased pain with IADLs to increase functional independence.          Plan   Certification Period/Plan of care expiration: 10/22/2018 to 12/17/2018.    Outpatient Physical Therapy 2 times weekly for 8 weeks to include  the following interventions: Gait Training, Manual Therapy, Moist Heat/ Ice, Neuromuscular Re-ed, Patient Education, Therapeutic Activites and Therapeutic Exercise.     Debby Morris, PT, DPT

## 2018-10-24 ENCOUNTER — PATIENT MESSAGE (OUTPATIENT)
Dept: ADMINISTRATIVE | Facility: OTHER | Age: 63
End: 2018-10-24

## 2018-10-26 ENCOUNTER — CLINICAL SUPPORT (OUTPATIENT)
Dept: REHABILITATION | Facility: HOSPITAL | Age: 63
End: 2018-10-26
Attending: PHYSICIAN ASSISTANT
Payer: MEDICARE

## 2018-10-26 PROCEDURE — 97110 THERAPEUTIC EXERCISES: CPT

## 2018-10-26 PROCEDURE — 97140 MANUAL THERAPY 1/> REGIONS: CPT

## 2018-10-26 NOTE — PROGRESS NOTES
"  Physical Therapy Daily Treatment Note     Name: Ladarius Solis  Clinic Number: [id number]    Therapy Diagnosis: No diagnosis found.  Physician: Antonieta Ham PA-C     Physician Orders: PT Eval and Treat   Medical Diagnosis:   M94.262 (ICD-10-CM) - Chondromalacia of left knee   M23.204 (ICD-10-CM) - Other old tear of medial meniscus of left knee   D75.89 (ICD-10-CM) - Bone marrow edema         Evaluation Date: 10/22/2018  Authorization Period Expiration: 12/31/2018  Plan of Care Certification Period: 12/17/2018  Visit # / Visits authorized: 2/ 20     Time In: 1220  Time Out: 1320  Total Billable Time: 50 minutes     Precautions: WBAT on crutches; progress per meniscectomy protocol.         Referral note:Left Knee arthroscopic chondroplasty, meniscectomy, prepatellar releases as indicates, +/- subchondroplasty pending MRI results     on 10/19/18.   Start PT on POD #1-3  Follow PT per protocol     Op note:POSTOPERATIVE PLAN: Patient may weight bear as tolerates on crutches. Full range of motion to tolerance. Will start physical therapy right away. ASA 81 mg BID x 2 weeks for DVT prophylaxis    Subjective     Pt reports:mod pain in L knee when arrived for tx.   He non compliant with home exercise program.  Response to previous treatment: no pain after last tx.   Functional change: antalgic pain    Pain: 3/10  Location: left knee      Objective   PROM L knee - WNL - ambulating into PT w/o AD  Ladarius received therapeutic exercises to develop strength, endurance, ROM, flexibility, posture and core stabilization for 35 minutes including:  Stationary bike x 10' for increased ROM  L QS into hyperext 30x/3"  L SLR 3x10  L SLR 10x 1#  L SLR 10x 2#  L SAQ 2# 30x   L SL hip abd 30x       Ladarius received the following manual therapy techniques: Joint mobilizations and Soft tissue Mobilization were applied to the: L knee  for 15 minutes, including: patella mobs, joint capsular mobs, and stretching.    Home Exercises " Provided and Patient Education Provided     Education provided:   Posture awareness and RICE    Written Home Exercises Provided: yes.  Exercises were reviewed and Ladarius was able to demonstrate them prior to the end of the session.  Ladarius demonstrated good  understanding of the education provided.         Assessment     Pt tolerating tx well with no increased pain but noted mm fatigue after complete. VC/TC for correcting form and technique and quad activation/control.  Ladarius is progressing well towards his goals.   Pt prognosis is Good.     Pt will continue to benefit from skilled outpatient physical therapy to address the deficits listed in the problem list box on initial evaluation, provide pt/family education and to maximize pt's level of independence in the home and community environment.     Pt's spiritual, cultural and educational needs considered and pt agreeable to plan of care and goals.    Anticipated barriers to physical therapy: none    Goals: Short Term GOALS: 2 weeks. Pt agrees with goals set.  1. Patient demonstrates independence with HEP. (not met, Progressing)  2. Patient demonstrates L knee AROM 0 to 125 deg to increase functional mobility tolerance. (not met, Progressing)   3. Patient maintains TKE with flexion SLR indicating good quad control. (not met, Progressing)     Long Term GOALS: 8 weeks. Pt agrees with goals set.  1. Patient demonstrates L knee AROM 0 to 140 deg to increase functional mobility tolerance. (not met, Progressing)   2. Patient demonstrates increased strength BLE's to 5/5 to improve tolerance to functional activities.(not met, Progressing)   3. Patient reports at least 75% decreased pain with IADLs to increase functional independence.  (not met, Progressing)         Plan     Cont with POC and protocol      Luis Muñoz, PTA, STS

## 2018-10-31 ENCOUNTER — TELEPHONE (OUTPATIENT)
Dept: SPORTS MEDICINE | Facility: CLINIC | Age: 63
End: 2018-10-31

## 2018-10-31 DIAGNOSIS — Z98.890 S/P LEFT KNEE ARTHROSCOPY: Primary | ICD-10-CM

## 2018-10-31 RX ORDER — OXYCODONE AND ACETAMINOPHEN 5; 325 MG/1; MG/1
1 TABLET ORAL EVERY 6 HOURS PRN
Qty: 28 TABLET | Refills: 0 | Status: SHIPPED | OUTPATIENT
Start: 2018-10-31 | End: 2019-03-29

## 2018-10-31 NOTE — TELEPHONE ENCOUNTER
Spoke with pt to see how often he was taking the Percocet 10 mg and let him know Antonieta is refilling the rx to 5 mg

## 2018-10-31 NOTE — TELEPHONE ENCOUNTER
----- Message from Nancy Sylvester RN sent at 10/31/2018 11:45 AM CDT -----  Contact: self      ----- Message -----  From: Ariadna Umaña  Sent: 10/31/2018   9:08 AM  To: Jitendra Fung Staff    Pt called requesting a refill for oxyCODONE-acetaminophen (PERCOCET)  mg per tablet and would like it called into [facility] [address] [place] [address] [place],[phone]. Pt could be reached at [phone]

## 2018-11-02 ENCOUNTER — CLINICAL SUPPORT (OUTPATIENT)
Dept: REHABILITATION | Facility: HOSPITAL | Age: 63
End: 2018-11-02
Attending: PHYSICIAN ASSISTANT
Payer: MEDICARE

## 2018-11-02 DIAGNOSIS — M94.262 CHONDROMALACIA OF LEFT KNEE: Primary | ICD-10-CM

## 2018-11-02 PROCEDURE — 97140 MANUAL THERAPY 1/> REGIONS: CPT

## 2018-11-02 PROCEDURE — 97110 THERAPEUTIC EXERCISES: CPT

## 2018-11-02 NOTE — PROGRESS NOTES
"  Physical Therapy Daily Treatment Note     Name: Ladarius Solis  Clinic Number: [id number]    Therapy Diagnosis: No diagnosis found.  Physician: Antonieta Ham PA-C     Physician Orders: PT Eval and Treat   Medical Diagnosis:   M94.262 (ICD-10-CM) - Chondromalacia of left knee   M23.204 (ICD-10-CM) - Other old tear of medial meniscus of left knee   D75.89 (ICD-10-CM) - Bone marrow edema         Evaluation Date: 10/22/2018  Authorization Period Expiration: 12/31/2018  Plan of Care Certification Period: 12/17/2018  Visit # / Visits authorized: 3/ 20     Time In: 1220  Time Out: 1320  Total Billable Time: 50 minutes     Precautions: WBAT on crutches; progress per meniscectomy protocol.         Referral note:Left Knee arthroscopic chondroplasty, meniscectomy, prepatellar releases as indicates, +/- subchondroplasty pending MRI results     Op note:POSTOPERATIVE PLAN: Patient may weight bear as tolerates on crutches. Full range of motion to tolerance. Will start physical therapy right away. ASA 81 mg BID x 2 weeks for DVT prophylaxis    Subjective     Pt reports:min pain in L knee when arrived for tx. Taking pain medication daily   He non compliant with home exercise program due to fatigue and exhaustion    Response to previous treatment: min soreness after last tx.  Functional change: antalgic pain    Pain: 3/10  Location: left knee      Objective   PROM L knee - WNL   Ladarius received therapeutic exercises to develop strength, endurance, ROM, flexibility, posture and core stabilization for 40 minutes including:  Stationary bike x 10' for increased ROM  L QS into hyperext 30x/3"  L SLR 3x10  L SLR 10x 1#  L SLR 10x 2#  L SAQ 4# 30x   L SL hip abd 30x   Prone L hip ext 30x   Hip add ball squeeze 3x10/3" core activation   Bridge 3x10 core activation      Ladarius received the following manual therapy techniques: Joint mobilizations and Soft tissue Mobilization were applied to the: L knee  for 10 minutes, including: " patella mobs, joint capsular mobs, and stretching.    RICE L knee for 10' to decreased circulation, edema, and pain     Home Exercises Provided and Patient Education Provided     Education provided:   Posture awareness and RICE    Written Home Exercises Provided: yes.  Exercises were reviewed and Ladarius was able to demonstrate them prior to the end of the session.  Ladarius demonstrated good  understanding of the education provided.         Assessment     Pt tolerating tx well with no increased pain. Pt appears over medicated for tx. VC/TC for correcting form and technique and quad activation/control.  Ladarius is progressing well towards his goals.   Pt prognosis is Good.     Pt will continue to benefit from skilled outpatient physical therapy to address the deficits listed in the problem list box on initial evaluation, provide pt/family education and to maximize pt's level of independence in the home and community environment.     Pt's spiritual, cultural and educational needs considered and pt agreeable to plan of care and goals.    Anticipated barriers to physical therapy: none    Goals: Short Term GOALS: 2 weeks. Pt agrees with goals set.  1. Patient demonstrates independence with HEP. (not met, Progressing)  2. Patient demonstrates L knee AROM 0 to 125 deg to increase functional mobility tolerance. (not met, Progressing)   3. Patient maintains TKE with flexion SLR indicating good quad control. (not met, Progressing)     Long Term GOALS: 8 weeks. Pt agrees with goals set.  1. Patient demonstrates L knee AROM 0 to 140 deg to increase functional mobility tolerance. (not met, Progressing)   2. Patient demonstrates increased strength BLE's to 5/5 to improve tolerance to functional activities.(not met, Progressing)   3. Patient reports at least 75% decreased pain with IADLs to increase functional independence.  (not met, Progressing)         Plan     Cont with POC and protocol      Luis Muñoz, PTA, STS

## 2018-11-05 NOTE — PROGRESS NOTES
S:Ladarius Solis presents for post-operative evaluation.     DATE OF PROCEDURE: 10/17/2018  PROCEDURE PERFORMED:   Left  1. Knee arthroscopic partial medial meniscectomy   2. knee arthroscopic chondroplasty  3. knee arthroscopic plica excision    Patellofemoral compartment - grade 1-2 chondromalacia   Medial Compartment - grade 2-3 chondromalacia of the medial femoral condyle seen. No grade 4 lesions or focal chondral defect.  Smaller area of grade 2 chondromalacia of the medial tibial plateau  Lateral compartment - minimal chondral wear    Ladarius Solis reports to be doing well 2wk s/p the above mentioned procedure. Denies fevers, chills, night sweats, chest pain, difficulty breathing, calf pain or tenderness. Has been in for PT 3x since surgery at the [place] location. Seeing good progress daily. Generalized soreness with prolonged walking. Pain levels are improving. States the knee feels better than pre-op. Has d/c'd pain medication.     O: LLE - The incisions are healing well.  No signs of infection.  Sutures were removed. No significant pain or unusual tenderness. Full extension. Flexion to 125.     A/P: Arthroscopic pictures were reviewed with the patient. Discussed underlying degenerative changes. Plan to follow the rehab plan as previously outlined.  Focus on quadriceps strengthening and functional stabilization.  RTC in 4 weeks.

## 2018-11-06 ENCOUNTER — OFFICE VISIT (OUTPATIENT)
Dept: SPORTS MEDICINE | Facility: CLINIC | Age: 63
End: 2018-11-06
Payer: MEDICARE

## 2018-11-06 VITALS
SYSTOLIC BLOOD PRESSURE: 126 MMHG | DIASTOLIC BLOOD PRESSURE: 70 MMHG | WEIGHT: 180 LBS | HEART RATE: 83 BPM | BODY MASS INDEX: 28.93 KG/M2 | HEIGHT: 66 IN

## 2018-11-06 DIAGNOSIS — Z47.89 SURGICAL AFTERCARE, MUSCULOSKELETAL SYSTEM: Primary | ICD-10-CM

## 2018-11-06 PROCEDURE — 99024 POSTOP FOLLOW-UP VISIT: CPT | Mod: POP,,, | Performed by: ORTHOPAEDIC SURGERY

## 2018-11-06 PROCEDURE — 99213 OFFICE O/P EST LOW 20 MIN: CPT | Mod: PBBFAC,PO | Performed by: ORTHOPAEDIC SURGERY

## 2018-11-06 PROCEDURE — 99999 PR PBB SHADOW E&M-EST. PATIENT-LVL III: CPT | Mod: PBBFAC,,, | Performed by: ORTHOPAEDIC SURGERY

## 2018-11-07 ENCOUNTER — TELEPHONE (OUTPATIENT)
Dept: SPORTS MEDICINE | Facility: CLINIC | Age: 63
End: 2018-11-07

## 2018-11-07 NOTE — TELEPHONE ENCOUNTER
Spoke to the patient. Informed him that the packet he gave us is not something the doctor has to fill out. It looks like it is requesting medical records. Explained that he would have to sign a medical release form in order for our medical record dept to send those records off for him.    Told him he could find a release of information form online or he could come to our office and fill one out.    Larisa Hernandez MA  [facility]

## 2018-11-07 NOTE — TELEPHONE ENCOUNTER
----- Message from Sandra Allan sent at 11/7/2018 10:34 AM CST -----  Contact: Self  Patient returning Larisa's missed call.  Patient can be reached at [phone]

## 2018-11-16 ENCOUNTER — CLINICAL SUPPORT (OUTPATIENT)
Dept: REHABILITATION | Facility: HOSPITAL | Age: 63
End: 2018-11-16
Attending: PHYSICIAN ASSISTANT
Payer: MEDICARE

## 2018-11-16 DIAGNOSIS — G89.29 CHRONIC PAIN OF LEFT KNEE: Primary | ICD-10-CM

## 2018-11-16 DIAGNOSIS — M25.562 CHRONIC PAIN OF LEFT KNEE: Primary | ICD-10-CM

## 2018-11-16 DIAGNOSIS — M17.12 PRIMARY OSTEOARTHRITIS OF LEFT KNEE: ICD-10-CM

## 2018-11-16 PROCEDURE — 97110 THERAPEUTIC EXERCISES: CPT

## 2018-11-16 NOTE — PROGRESS NOTES
"  Physical Therapy Daily Treatment Note     Name: Ladarius Solis  Clinic Number: [id number]    Therapy Diagnosis: Chronic pain of left knee  Physician: Antonieta Ham PA-C     Physician Orders: PT Eval and Treat   Medical Diagnosis:   M94.262 (ICD-10-CM) - Chondromalacia of left knee   M23.204 (ICD-10-CM) - Other old tear of medial meniscus of left knee   D75.89 (ICD-10-CM) - Bone marrow edema         Evaluation Date: 10/22/2018  Authorization Period Expiration: 12/31/2018  Plan of Care Certification Period: 12/17/2018  Visit # / Visits authorized: 4/ 20     Time In: 2:50  Time Out: 3:30  Total Billable Time: 15 minutes     Precautions: WBAT on crutches; progress per meniscectomy protocol.         Referral note:Left Knee arthroscopic chondroplasty, meniscectomy, prepatellar releases as indicates, +/- subchondroplasty pending MRI results     Op note:POSTOPERATIVE PLAN: Patient may weight bear as tolerates on crutches. Full range of motion to tolerance. Will start physical therapy right away. ASA 81 mg BID x 2 weeks for DVT prophylaxis    Subjective     Pt reports: his knee has been feeling stiff. He has been feeling really tired and hasn't been able to do his exercises due to fatigue. He saw his MD who said that it could be a side-effect of pain medication; he stopped taking the medication right away but he has still felt fatigued.     He non compliant with home exercise program due to fatigue and exhaustion    Response to previous treatment: min soreness after last tx.  Functional change: antalgic pain    Pain: 3/10  Location: left knee      Objective     Update:   - AROM knee extension: 0  - AROM knee flexion: 130  - PROM knee flexion: 135    Ladarius received therapeutic exercises to develop strength, endurance, ROM, flexibility, posture and core stabilization for 15 minutes including:  Stationary bike x 10' for increased ROM  AAROM heel slides c ball 2x 2min  L QS into hyperext 30x/5"  L HS sets 30x/5"  SLR " flexion/abduction/extension 3x10 each   Shuttle squats 3x10 50#  Bridge 3x10     Ladarius received the following manual therapy techniques: Joint mobilizations and Soft tissue Mobilization were applied to the: L knee  for 0 minutes, including: patella mobs, joint capsular mobs, and stretching.      Home Exercises Provided and Patient Education Provided     Education provided:   Posture awareness and RICE    Written Home Exercises Provided: yes.  Exercises were reviewed and Ladarius was able to demonstrate them prior to the end of the session.  Ladarius demonstrated good  understanding of the education provided.         Assessment     Pt completed slight increase in exercise volume without report of symptom aggravation. Encouraged pt to contact MD regarding feeling of being tired/lacking energy despite having D/C pain medication. Pt reported feeling well at end of session.     Ladarius is progressing well towards his goals.   Pt prognosis is Good.     Pt will continue to benefit from skilled outpatient physical therapy to address the deficits listed in the problem list box on initial evaluation, provide pt/family education and to maximize pt's level of independence in the home and community environment.     Pt's spiritual, cultural and educational needs considered and pt agreeable to plan of care and goals.    Anticipated barriers to physical therapy: none    Goals: Short Term GOALS: 2 weeks. Pt agrees with goals set.  1. Patient demonstrates independence with HEP. (not met, Progressing)  2. Patient demonstrates L knee AROM 0 to 125 deg to increase functional mobility tolerance. (not met, Progressing)   3. Patient maintains TKE with flexion SLR indicating good quad control. (not met, Progressing)     Long Term GOALS: 8 weeks. Pt agrees with goals set.  1. Patient demonstrates L knee AROM 0 to 140 deg to increase functional mobility tolerance. (not met, Progressing)   2. Patient demonstrates increased strength BLE's to  5/5 to improve tolerance to functional activities.(not met, Progressing)   3. Patient reports at least 75% decreased pain with IADLs to increase functional independence.  (not met, Progressing)         Plan     Follow up with regards to energy level and tolerance to today's session. Progress per POC and protocol.     Debby Morris, PT, DPT

## 2018-11-20 ENCOUNTER — PATIENT MESSAGE (OUTPATIENT)
Dept: ADMINISTRATIVE | Facility: OTHER | Age: 63
End: 2018-11-20

## 2018-11-23 ENCOUNTER — CLINICAL SUPPORT (OUTPATIENT)
Dept: REHABILITATION | Facility: HOSPITAL | Age: 63
End: 2018-11-23
Attending: PHYSICIAN ASSISTANT
Payer: MEDICARE

## 2018-11-23 PROCEDURE — 97110 THERAPEUTIC EXERCISES: CPT

## 2018-11-23 NOTE — PROGRESS NOTES
"  Physical Therapy Daily Treatment Note     Name: Ladarius Solis  Clinic Number: [id number]    Therapy Diagnosis: Chronic pain of left knee  Physician: Antonieta Ham PA-C     Physician Orders: PT Eval and Treat   Medical Diagnosis:   M94.262 (ICD-10-CM) - Chondromalacia of left knee   M23.204 (ICD-10-CM) - Other old tear of medial meniscus of left knee   D75.89 (ICD-10-CM) - Bone marrow edema         Evaluation Date: 10/22/2018  Authorization Period Expiration: 12/31/2018  Plan of Care Certification Period: 12/17/2018  Visit # / Visits authorized: 5/ 20     Time In: 1045  Time Out: 1145  Total Billable Time: 15 minutes       Op note:POSTOPERATIVE PLAN: Patient may weight bear as tolerates on crutches. Full range of motion to tolerance. Will start physical therapy right away. ASA 81 mg BID x 2 weeks for DVT prophylaxis    Subjective     Pt reports: his knee has been feeling stiff but no pain. Pt is NON COMPLIANT WITH HEP.  Response to previous treatment: min soreness   Functional change: improved gait pattern    Pain: 0/10  Location: left knee      Objective   CMS Impairment/Limitation/Restriction for FOTO Knee Survey  Status Limitation G-Code CMS Severity Modifier  Intake 32% 68%  Predicted 57% 43% Goal Status+ CK - At least 40 percent but less than 60 percent  11/23/2018 39% 61% Current Status CL - At least 60 percent but less than 80 percent  D/C Status CL **only report if this is discharge survey    Ladarius received therapeutic exercises to develop strength, endurance, ROM, flexibility, posture and core stabilization for 15 minutes including:    Stationary bike x 10' for increased ROM, circulation, and endurance   L QS CKC 3x10/5"  L SLR 3x10  L Hip abd 3x10  L prone ext 3x10  L prone knee flexion 3x10  Bridge 3x10   Wall squats 3x10  Standing calf raises 3x10    Home Exercises Provided and Patient Education Provided     Education provided:   Posture awareness and RICE    Written Home Exercises Provided: " yes.  Exercises were reviewed and Ladarius was able to demonstrate them prior to the end of the session.  Ladarius demonstrated good  understanding of the education provided.         Assessment     Pt tolerating tx well with min mm fatigue after tx but no pain. VC/TC for correcting form/technique with therex. Discussed importance with HEP     Ladarius is progressing well towards his goals.   Pt prognosis is Good.     Pt will continue to benefit from skilled outpatient physical therapy to address the deficits listed in the problem list box on initial evaluation, provide pt/family education and to maximize pt's level of independence in the home and community environment.     Pt's spiritual, cultural and educational needs considered and pt agreeable to plan of care and goals.    Anticipated barriers to physical therapy: none    Goals: Short Term GOALS: 2 weeks. Pt agrees with goals set.  1. Patient demonstrates independence with HEP. (not met, Progressing)  2. Patient demonstrates L knee AROM 0 to 125 deg to increase functional mobility tolerance. (not met, Progressing)   3. Patient maintains TKE with flexion SLR indicating good quad control. (not met, Progressing)     Long Term GOALS: 8 weeks. Pt agrees with goals set.  1. Patient demonstrates L knee AROM 0 to 140 deg to increase functional mobility tolerance. (not met, Progressing)   2. Patient demonstrates increased strength BLE's to 5/5 to improve tolerance to functional activities.(not met, Progressing)   3. Patient reports at least 75% decreased pain with IADLs to increase functional independence.  (not met, Progressing)         Plan     Follow up with regards to energy level and tolerance to today's session. Progress per POC and protocol.     Luis Muñoz, PTA, STS

## 2018-11-30 ENCOUNTER — CLINICAL SUPPORT (OUTPATIENT)
Dept: REHABILITATION | Facility: HOSPITAL | Age: 63
End: 2018-11-30
Attending: PHYSICIAN ASSISTANT
Payer: MEDICARE

## 2018-11-30 PROCEDURE — 97110 THERAPEUTIC EXERCISES: CPT

## 2018-11-30 NOTE — PROGRESS NOTES
"  Physical Therapy Daily Treatment Note     Name: Ladarius Solis  Clinic Number: [id number]    Therapy Diagnosis: Chronic pain of left knee  Physician: Antonieta Ham PA-C     Physician Orders: PT Eval and Treat   Medical Diagnosis:   M94.262 (ICD-10-CM) - Chondromalacia of left knee   M23.204 (ICD-10-CM) - Other old tear of medial meniscus of left knee   D75.89 (ICD-10-CM) - Bone marrow edema         Evaluation Date: 10/22/2018  Authorization Period Expiration: 12/31/2018  Plan of Care Certification Period: 12/17/2018  Visit # / Visits authorized: 6/ 20     Time In: 3:35  Time Out: 4:20  Total Billable Time: 25 minutes       Op note:POSTOPERATIVE PLAN: Patient may weight bear as tolerates on crutches. Full range of motion to tolerance. Will start physical therapy right away. ASA 81 mg BID x 2 weeks for DVT prophylaxis    Subjective     Pt reports: he has an appointment with his PCP on Wednesday due to still feeling extremely fatigued. His knee is a little painful right now due to walking from the bus stop on [address], rated 2/10   Pt is NON COMPLIANT WITH HEP.  Response to previous treatment: min soreness   Functional change: improved gait pattern    Pain: 0/10  Location: left knee      Objective     Update:   AROM: 0 to 130 deg   Hip flexion: 4/5  Knee extension: 4+/5  Knee flexion: 4+/5      CMS Impairment/Limitation/Restriction for FOTO Knee Survey  Status Limitation G-Code CMS Severity Modifier  Intake 32% 68%  Predicted 57% 43% Goal Status+ CK - At least 40 percent but less than 60 percent  11/23/2018 39% 61% Current Status CL - At least 60 percent but less than 80 percent  D/C Status CL **only report if this is discharge survey    Ladarius received therapeutic exercises to develop strength, endurance, ROM, flexibility, posture and core stabilization for 25 minutes including:    Stationary bike x 10' for increased ROM, circulation, and endurance   L QS CKC 3x10/5"  L SLR 3x10  L Hip abd 3x10  L prone " ext 3x10  L prone knee flexion 3x10  Bridge 3x10   Wall squats 3x10  Standing calf raises 2x20    Home Exercises Provided and Patient Education Provided     Education provided:   HEP adherence and RICE    Written Home Exercises Provided: yes.  Exercises were reviewed and Ladarius was able to demonstrate them prior to the end of the session.  Ladarius demonstrated good  understanding of the education provided.         Assessment     ROM remains excellent. Strength deficits noted upon reassessment. Discussed importance with HEP to decrease stiffness and increase activity tolerance. Min VC/TC still required for ex's; pt appears to be fatigued, but was able to complete entire session without issues. He reported feeling better/less stiff at end of session.     Ladarius is progressing well towards his goals.   Pt prognosis is Good.     Pt will continue to benefit from skilled outpatient physical therapy to address the deficits listed in the problem list box on initial evaluation, provide pt/family education and to maximize pt's level of independence in the home and community environment.     Pt's spiritual, cultural and educational needs considered and pt agreeable to plan of care and goals.    Anticipated barriers to physical therapy: none    Goals: Short Term GOALS: 2 weeks. Pt agrees with goals set.  1. Patient demonstrates independence with HEP. (not met, Progressing)  2. Patient demonstrates L knee AROM 0 to 125 deg to increase functional mobility tolerance. (not met, Progressing)   3. Patient maintains TKE with flexion SLR indicating good quad control. (not met, Progressing)     Long Term GOALS: 8 weeks. Pt agrees with goals set.  1. Patient demonstrates L knee AROM 0 to 140 deg to increase functional mobility tolerance. (not met, Progressing)   2. Patient demonstrates increased strength BLE's to 5/5 to improve tolerance to functional activities.(not met, Progressing)   3. Patient reports at least 75% decreased pain  with IADLs to increase functional independence.  (not met, Progressing)         Plan     Follow up with regards to energy level and tolerance to today's session. Progress per POC and protocol.     Debby Morris, PT, DPT

## 2018-12-03 ENCOUNTER — OFFICE VISIT (OUTPATIENT)
Dept: SPORTS MEDICINE | Facility: CLINIC | Age: 63
End: 2018-12-03
Payer: MEDICARE

## 2018-12-03 VITALS
BODY MASS INDEX: 28.93 KG/M2 | WEIGHT: 180 LBS | HEIGHT: 66 IN | SYSTOLIC BLOOD PRESSURE: 142 MMHG | DIASTOLIC BLOOD PRESSURE: 86 MMHG | HEART RATE: 78 BPM

## 2018-12-03 DIAGNOSIS — M94.262 CHONDROMALACIA OF LEFT KNEE: ICD-10-CM

## 2018-12-03 DIAGNOSIS — Z98.890 S/P LEFT KNEE ARTHROSCOPY: Primary | ICD-10-CM

## 2018-12-03 PROCEDURE — 99213 OFFICE O/P EST LOW 20 MIN: CPT | Mod: PBBFAC,PO | Performed by: PHYSICIAN ASSISTANT

## 2018-12-03 PROCEDURE — 99999 PR PBB SHADOW E&M-EST. PATIENT-LVL III: CPT | Mod: PBBFAC,,, | Performed by: PHYSICIAN ASSISTANT

## 2018-12-03 PROCEDURE — 99024 POSTOP FOLLOW-UP VISIT: CPT | Mod: POP,,, | Performed by: PHYSICIAN ASSISTANT

## 2018-12-03 NOTE — PROGRESS NOTES
S:Ladarius Solis presents for post-operative evaluation.     DATE OF PROCEDURE: 10/17/2018  PROCEDURE PERFORMED:   Left  1. Knee arthroscopic partial medial meniscectomy   2. knee arthroscopic chondroplasty  3. knee arthroscopic plica excision    Patellofemoral compartment - grade 1-2 chondromalacia   Medial Compartment - grade 2-3 chondromalacia of the medial femoral condyle seen. No grade 4 lesions or focal chondral defect.  Smaller area of grade 2 chondromalacia of the medial tibial plateau  Lateral compartment - minimal chondral wear    Ladarius Solis reports to be doing well 6wk s/p the above mentioned procedure.  Has been going to PT once a week with Wilmar at [facility]. He states that he feels like his quad muscle is weak, but he is not completing a HEP. Generalized soreness with prolonged walking. Pain levels are improving. States the knee feels better than pre-op. He has been taking percocet 5mg once a week as needed for pain.    O: LLE - The incisions are well healed. No significant pain or unusual tenderness. Full extension. Flexion to 130.    A/P: Discussed underlying degenerative changes. Plan to follow the rehab plan as previously outlined.  Focus on quadriceps strengthening and functional stabilization.  RTC in 6 weeks for 3 month post op appt left knee with Dr. Carr.

## 2018-12-05 ENCOUNTER — IMMUNIZATION (OUTPATIENT)
Dept: INTERNAL MEDICINE | Facility: CLINIC | Age: 63
End: 2018-12-05
Payer: MEDICARE

## 2018-12-05 ENCOUNTER — OFFICE VISIT (OUTPATIENT)
Dept: INTERNAL MEDICINE | Facility: CLINIC | Age: 63
End: 2018-12-05
Payer: MEDICARE

## 2018-12-05 VITALS
OXYGEN SATURATION: 98 % | BODY MASS INDEX: 26.93 KG/M2 | DIASTOLIC BLOOD PRESSURE: 90 MMHG | HEART RATE: 73 BPM | WEIGHT: 167.56 LBS | SYSTOLIC BLOOD PRESSURE: 140 MMHG | HEIGHT: 66 IN

## 2018-12-05 DIAGNOSIS — M65.30 TRIGGER FINGER OF RIGHT HAND, UNSPECIFIED FINGER: ICD-10-CM

## 2018-12-05 DIAGNOSIS — M79.641 RIGHT HAND PAIN: Primary | ICD-10-CM

## 2018-12-05 DIAGNOSIS — I10 ESSENTIAL HYPERTENSION: ICD-10-CM

## 2018-12-05 DIAGNOSIS — G56.03 BILATERAL CARPAL TUNNEL SYNDROME: ICD-10-CM

## 2018-12-05 DIAGNOSIS — Z11.59 NEED FOR HEPATITIS C SCREENING TEST: ICD-10-CM

## 2018-12-05 PROCEDURE — 90686 IIV4 VACC NO PRSV 0.5 ML IM: CPT | Mod: PBBFAC

## 2018-12-05 PROCEDURE — 99214 OFFICE O/P EST MOD 30 MIN: CPT | Mod: S$PBB,,, | Performed by: PHYSICIAN ASSISTANT

## 2018-12-05 PROCEDURE — 99214 OFFICE O/P EST MOD 30 MIN: CPT | Mod: PBBFAC,25 | Performed by: PHYSICIAN ASSISTANT

## 2018-12-05 PROCEDURE — 99999 PR PBB SHADOW E&M-EST. PATIENT-LVL IV: CPT | Mod: PBBFAC,,, | Performed by: PHYSICIAN ASSISTANT

## 2018-12-05 RX ORDER — DICLOFENAC SODIUM 75 MG/1
75 TABLET, DELAYED RELEASE ORAL 2 TIMES DAILY PRN
Qty: 30 TABLET | Refills: 0 | Status: SHIPPED | OUTPATIENT
Start: 2018-12-05 | End: 2019-03-29

## 2018-12-05 RX ORDER — LISINOPRIL 10 MG/1
10 TABLET ORAL DAILY
Qty: 30 TABLET | Refills: 6 | Status: SHIPPED | OUTPATIENT
Start: 2018-12-05 | End: 2019-07-03 | Stop reason: SDUPTHER

## 2018-12-05 NOTE — PATIENT INSTRUCTIONS
What is Trigger Finger?  Trigger finger is an inflammation of tissue inside your finger or thumb. It is also called tenosynovitis (ten-oh-sin-oh-VY-tis). Tendons (cordlike fibers that attach muscle to bone and allow you to bend the joints) become swollen. So does the synovium (a slick membrane that allows the tendons to move easily). This makes it difficult to straighten the finger or thumb.    Causes  Repeated use of a tool with strong gripping, such as a drill or wrench, can irritate and inflame the tendons and the synovium. It is also more common in certain medical conditions such as rheumatoid arthritis, gout, and diabetes. But often the cause of trigger finger is unknown.  Inside your finger  Tendons connect muscles in your forearm to the bones in your fingers. The tendons in each finger are surrounded by a protective tendon sheath. This sheath is lined with synovium, which produces a fluid that allows the tendons to slide easily when you bend and straighten the finger. If a tendon is irritated, it becomes inflamed.  When a tendon is inflamed  When a tendon is inflamed, it causes the lining of the tendon sheath to swell and thicken. Or the tendon itself may thicken. Then the sheath pinches the tendon, and the tendon can no longer slide easily inside the sheath. When you straighten your finger, the tendon sticks or locks as it tries to squeeze back through the sheath.    Symptoms  The first sign of trigger finger may be pain where the finger or thumb joins the palm. You may also notice some swelling. As the tendon becomes more inflamed, the finger may start to catch when you try to straighten it. When the locked tendon releases, the finger jumps, as if you were releasing the trigger of a gun. This further irritates the tendon, and may set up a cycle of catching and swelling.   Date Last Reviewed: 9/28/2015  © 7503-5987 [employer]. [address] [place] [address], [place] [address]. All rights reserved.  This information is not intended as a substitute for professional medical care. Always follow your healthcare professional's instructions.      YOU CAN CALL TO SCHEDULE NEW PATIENT APPOINTMENT NEW PCP DR. CRUZ AT YOUR CONVENIENCE.

## 2018-12-05 NOTE — PROGRESS NOTES
Subjective:       Patient ID: Ladarius Solis is a 63 y.o. male.    Chief Complaint: Establish Care    HPI     New pt.     Presents to establish care. Last PCP at [facility] Dr. Pierre.     C/o intermittent right hand stiffness and joint locking for the past 3-4 months. Hx of bilateral carpal tunnel syndrome  Tried ibuprofen without much help.    HTN: BP elevated today, hx of taking lisinopril but out of meds. Denies cp, sob, ha, vision changes or edema.       Health Maintenance  Colonoscopy UTD, last 3/2018 (+polyps, repeat in 3 years_  FLU due      Past Medical History:   Diagnosis Date    Carpal tunnel syndrome, bilateral     Cervical spinal stenosis 2002    Colon polyps     Hypertension     Vertigo     left ear issues     Review of patient's allergies indicates:  No Known Allergies      Current Outpatient Medications:     aspirin (ECOTRIN) 81 MG EC tablet, Take 1 tablet (81 mg total) by mouth 2 (two) times daily. for 14 days, Disp: , Rfl: 0    lisinopril 10 MG tablet, Take 10 mg by mouth once daily., Disp: , Rfl:     oxyCODONE-acetaminophen (PERCOCET)  mg per tablet, Take 1 tablet by mouth every 6 (six) hours as needed for Pain., Disp: 28 tablet, Rfl: 0    oxyCODONE-acetaminophen (PERCOCET) 5-325 mg per tablet, Take 1 tablet by mouth every 6 (six) hours as needed for Pain., Disp: 28 tablet, Rfl: 0  No current facility-administered medications for this visit.     Facility-Administered Medications Ordered in Other Visits:     sodium hyaluronate (EUFLEXXA) 10 mg/mL(mw 2.4 -3.6 million) Syrg 20 mg, 20 mg, Intra-articular, , GRETEL Carr MD, 20 mg at 05/10/18 1987    Past Surgical History:   Procedure Laterality Date    ARTHROSCOPIC CHONDROPLASTY OF KNEE JOINT Left 10/17/2018    Procedure: ARTHROSCOPY, KNEE, WITH CHONDROPLASTY;  Surgeon: GRETEL Carr MD;  Location: [facility] OR [address] [place] [address];  Service: Orthopedics;  Laterality: Left;    ARTHROSCOPY, KNEE, WITH CHONDROPLASTY Left 10/17/2018     [facility]    ARTHROSCOPY, KNEE, WITH MENISCECTOMY Left 10/17/2018    Performed by GRETEL Carr MD at [facility] OR 2ND FLR    COLONOSCOPY      COLONOSCOPY N/A 9/25/2017    Procedure: COLONOSCOPY/emr;  Surgeon: Raul August MD;  [facility]  Service: Endoscopy;  Laterality: N/A;    COLONOSCOPY N/A 3/9/2018    Procedure: COLONOSCOPY;  Surgeon: Raul August MD;  [facility]  Service: Endoscopy;  Laterality: N/A;    COLONOSCOPY N/A 3/9/2018    Performed by [facility] at [facility] (2ND FLR)    COLONOSCOPY/emr N/A 9/25/2017    Performed by [facility] at [facility] (2ND FLR)    KNEE ARTHROSCOPY W/ MENISCECTOMY Left 10/17/2018    Procedure: ARTHROSCOPY, KNEE, WITH MENISCECTOMY;  Surgeon: GRETEL Carr MD;  Location: [facility] OR [address] [place] [address];  Service: Orthopedics;  Laterality: Left;  regional w/catheter adductor  Dimitry/[employer] notified 10-12 LO     Social History     Tobacco Use    Smoking status: Never Smoker    Smokeless tobacco: Never Used   Substance Use Topics    Alcohol use: No    Drug use: No     Family History   Problem Relation Age of Onset    No Known Problems Mother     Arthritis Father     Dementia Father     Heart disease Father            Review of Systems   Constitutional: Negative for activity change, chills, diaphoresis and unexpected weight change.   HENT: Negative for hearing loss, rhinorrhea and trouble swallowing.    Eyes: Negative for discharge and visual disturbance.   Respiratory: Negative for chest tightness and wheezing.    Cardiovascular: Negative for chest pain, palpitations and leg swelling.   Gastrointestinal: Negative for abdominal pain, blood in stool, constipation, diarrhea and vomiting.   Endocrine: Negative for polydipsia and polyuria.   Genitourinary: Negative for difficulty urinating, hematuria and urgency.   Musculoskeletal: Positive for arthralgias. Negative for joint swelling  "and neck pain.   Neurological: Negative for headaches.   Psychiatric/Behavioral: Negative for confusion and dysphoric mood.       Objective: BP (!) 140/90   Pulse 73   Ht 5' 6" (1.676 m)   Wt 76 kg (167 lb 8.8 oz)   SpO2 98%   BMI 27.04 kg/m²         Physical Exam   Constitutional: He is oriented to person, place, and time. He appears well-developed and well-nourished. No distress.   HENT:   Head: Normocephalic and atraumatic.   Mouth/Throat: Oropharynx is clear and moist.   Eyes: Pupils are equal, round, and reactive to light.   Cardiovascular: Normal rate and regular rhythm. Exam reveals no friction rub.   No murmur heard.  Pulmonary/Chest: Effort normal and breath sounds normal. He has no wheezes. He has no rales.   Abdominal: Soft. Bowel sounds are normal. There is no tenderness.   Musculoskeletal:        Right hand: He exhibits decreased range of motion (flexion). He exhibits normal capillary refill and no deformity. Normal sensation noted. Normal strength noted.   No acute locking noted today  No palpable nodules or cords     Neurological: He is alert and oriented to person, place, and time.   Skin: Skin is warm and dry. Capillary refill takes less than 2 seconds. No rash noted.   Psychiatric: He has a normal mood and affect.   Vitals reviewed.      Assessment:       1. Right hand pain    2. Bilateral carpal tunnel syndrome    3. Trigger finger of right hand, unspecified finger    4. Essential hypertension    5. Need for hepatitis C screening test        Plan:         Ladarius was seen today for establish care.    Diagnoses and all orders for this visit:    Right hand pain  -     Ambulatory consult to Orthopedics  -     diclofenac (VOLTAREN) 75 MG EC tablet; Take 1 tablet (75 mg total) by mouth 2 (two) times daily as needed.    Bilateral carpal tunnel syndrome  -     Ambulatory consult to Orthopedics  -     diclofenac (VOLTAREN) 75 MG EC tablet; Take 1 tablet (75 mg total) by mouth 2 (two) times daily as " needed.    Trigger finger of right hand, unspecified finger  -     Ambulatory consult to Orthopedics  -     diclofenac (VOLTAREN) 75 MG EC tablet; Take 1 tablet (75 mg total) by mouth 2 (two) times daily as needed.    Essential hypertension  -     Lipid panel; Future  -     lisinopril 10 MG tablet; Take 1 tablet (10 mg total) by mouth once daily.    Need for hepatitis C screening test  -     Hepatitis C antibody; Future        -Pt to complete Release of information,   -Reports labs at prior pcp about 2-3 months ago concerned about lab cost.   -Pt pt schedule lab at later date  -Elects Dr. Stanton as new PCP, advised to call and schedule new patient appt at his convenience.         NITA WatersC

## 2018-12-06 DIAGNOSIS — M79.642 BILATERAL HAND PAIN: Primary | ICD-10-CM

## 2018-12-06 DIAGNOSIS — M79.641 BILATERAL HAND PAIN: Primary | ICD-10-CM

## 2018-12-07 ENCOUNTER — TELEPHONE (OUTPATIENT)
Dept: ORTHOPEDICS | Facility: CLINIC | Age: 63
End: 2018-12-07

## 2018-12-07 ENCOUNTER — CLINICAL SUPPORT (OUTPATIENT)
Dept: REHABILITATION | Facility: HOSPITAL | Age: 63
End: 2018-12-07
Attending: PHYSICIAN ASSISTANT
Payer: MEDICARE

## 2018-12-07 PROCEDURE — 97110 THERAPEUTIC EXERCISES: CPT

## 2018-12-07 NOTE — TELEPHONE ENCOUNTER
Attempted to call patient to confirm appt on 12/10/2018 with Dr Tyler at [facility]. Line was busy so I was unable to leave a message.

## 2018-12-07 NOTE — PROGRESS NOTES
Physical Therapy Daily Treatment Note     Name: Ladarius Solis  Clinic Number: [id number]    Therapy Diagnosis: Chronic pain of left knee  Physician: Antonieta Ham PA-C     Physician Orders: PT Eval and Treat   Medical Diagnosis:   M94.262 (ICD-10-CM) - Chondromalacia of left knee   M23.204 (ICD-10-CM) - Other old tear of medial meniscus of left knee   D75.89 (ICD-10-CM) - Bone marrow edema         Evaluation Date: 10/22/2018  Authorization Period Expiration: 12/31/2018  Plan of Care Certification Period: 12/17/2018  Visit # / Visits authorized: 7/ 20     Time In: 1:00  Time Out: 1:53  Total Billable Time: 53 minutes       Op note:POSTOPERATIVE PLAN: Patient may weight bear as tolerates on crutches. Full range of motion to tolerance. Will start physical therapy right away. ASA 81 mg BID x 2 weeks for DVT prophylaxis    Subjective     Pt reports: he saw his surgeon who told him he needs to do his exercises everyday. He didn't have a PCP, so he has an appointment to see an internal medicine [facility] at [facility]. He's not in any pain, he's just stiff. He has not done any exercises since last session, he was planning on doing them this morning but he woke up late and was rushing to get here.    Pt is NON COMPLIANT WITH HEP.  Response to previous treatment: min soreness   Functional change: improved gait pattern    Pain: 0/10  Location: left knee      Objective     Update:   AROM: 0 to 130 deg   Hip flexion: 4/5  Knee extension: 4+/5  Knee flexion: 4+/5      CMS Impairment/Limitation/Restriction for FOTO Knee Survey  Status Limitation G-Code CMS Severity Modifier  Intake 32% 68%  Predicted 57% 43% Goal Status+ CK - At least 40 percent but less than 60 percent  11/23/2018 39% 61% Current Status CL - At least 60 percent but less than 80 percent  D/C Status CL **only report if this is discharge survey    Ladarius received therapeutic exercises to develop strength, endurance, ROM, flexibility, posture and core  "stabilization for 53 minutes including:    Stationary bike x 10' for increased ROM, circulation, and endurance   L QS CKC 3x10/5"  L SLR 1.5# 3x10  L Hip abd 1.5# 3x10  L prone ext 3x10  L prone knee flexion 3x10  Bridge 3x10   DL leg press 60# 3x10   Wall squats 3x10  Standing calf raises 2x20  (L) SLS on foam c 1 UE support 3x30 sec  NBOS on foam no UE 3x30 sec    Home Exercises Provided and Patient Education Provided     Education provided:   HEP adherence and RICE    Written Home Exercises Provided: yes.  Exercises were reviewed and Ladarius was able to demonstrate them prior to the end of the session.  Ladarius demonstrated good  understanding of the education provided.         Assessment     ROM remains excellent. Increased difficulty with strengthening ex's and added static balance interventions (no LOB occurred). Discussed importance with HEP to decrease stiffness and increase activity tolerance. Min VC/TC still required for ex's; pt appears to be fatigued, but was able to complete entire session without issues. He reported feeling a little sore but good overall upon departure.     Ladarius is progressing well towards his goals.   Pt prognosis is Good.     Pt will continue to benefit from skilled outpatient physical therapy to address the deficits listed in the problem list box on initial evaluation, provide pt/family education and to maximize pt's level of independence in the home and community environment.     Pt's spiritual, cultural and educational needs considered and pt agreeable to plan of care and goals.    Anticipated barriers to physical therapy: none    Goals: Short Term GOALS: 2 weeks. Pt agrees with goals set.  1. Patient demonstrates independence with HEP. (not met, Progressing)  2. Patient demonstrates L knee AROM 0 to 125 deg to increase functional mobility tolerance. (not met, Progressing)   3. Patient maintains TKE with flexion SLR indicating good quad control. (not met, Progressing)     Long " Term GOALS: 8 weeks. Pt agrees with goals set.  1. Patient demonstrates L knee AROM 0 to 140 deg to increase functional mobility tolerance. (not met, Progressing)   2. Patient demonstrates increased strength BLE's to 5/5 to improve tolerance to functional activities.(not met, Progressing)   3. Patient reports at least 75% decreased pain with IADLs to increase functional independence.  (not met, Progressing)         Plan     Follow up with regards to energy level and tolerance to today's session. Progress per POC and protocol.     Debby Morris, PT, DPT

## 2018-12-10 ENCOUNTER — HOSPITAL ENCOUNTER (OUTPATIENT)
Dept: RADIOLOGY | Facility: OTHER | Age: 63
Discharge: HOME | End: 2018-12-10
Attending: ORTHOPAEDIC SURGERY
Payer: MEDICARE

## 2018-12-10 ENCOUNTER — OFFICE VISIT (OUTPATIENT)
Dept: ORTHOPEDICS | Facility: CLINIC | Age: 63
End: 2018-12-10
Payer: MEDICARE

## 2018-12-10 VITALS
HEART RATE: 77 BPM | BODY MASS INDEX: 26.93 KG/M2 | WEIGHT: 167.56 LBS | DIASTOLIC BLOOD PRESSURE: 88 MMHG | HEIGHT: 66 IN | SYSTOLIC BLOOD PRESSURE: 163 MMHG

## 2018-12-10 DIAGNOSIS — M65.341 TRIGGER FINGER, RIGHT RING FINGER: Primary | ICD-10-CM

## 2018-12-10 DIAGNOSIS — M79.642 BILATERAL HAND PAIN: ICD-10-CM

## 2018-12-10 DIAGNOSIS — M79.641 BILATERAL HAND PAIN: ICD-10-CM

## 2018-12-10 DIAGNOSIS — G56.01 CARPAL TUNNEL SYNDROME OF RIGHT WRIST: ICD-10-CM

## 2018-12-10 PROCEDURE — 99999 PR PBB SHADOW E&M-EST. PATIENT-LVL III: CPT | Mod: PBBFAC,,, | Performed by: ORTHOPAEDIC SURGERY

## 2018-12-10 PROCEDURE — 20550 NJX 1 TENDON SHEATH/LIGAMENT: CPT | Mod: PBBFAC | Performed by: ORTHOPAEDIC SURGERY

## 2018-12-10 PROCEDURE — 73130 X-RAY EXAM OF HAND: CPT | Mod: 50,TC,FY

## 2018-12-10 PROCEDURE — 99213 OFFICE O/P EST LOW 20 MIN: CPT | Mod: PBBFAC,25 | Performed by: ORTHOPAEDIC SURGERY

## 2018-12-10 PROCEDURE — 20526 THER INJECTION CARP TUNNEL: CPT | Mod: PBBFAC | Performed by: ORTHOPAEDIC SURGERY

## 2018-12-10 PROCEDURE — 73130 X-RAY EXAM OF HAND: CPT | Mod: 26,50,, | Performed by: RADIOLOGY

## 2018-12-10 PROCEDURE — 99203 OFFICE O/P NEW LOW 30 MIN: CPT | Mod: S$PBB,25,, | Performed by: ORTHOPAEDIC SURGERY

## 2018-12-10 RX ORDER — DEXAMETHASONE SODIUM PHOSPHATE 4 MG/ML
4 INJECTION, SOLUTION INTRA-ARTICULAR; INTRALESIONAL; INTRAMUSCULAR; INTRAVENOUS; SOFT TISSUE
Status: DISCONTINUED | OUTPATIENT
Start: 2018-12-10 | End: 2018-12-10 | Stop reason: HOSPADM

## 2018-12-10 RX ADMIN — DEXAMETHASONE SODIUM PHOSPHATE 4 MG: 4 INJECTION, SOLUTION INTRAMUSCULAR; INTRAVENOUS at 03:12

## 2018-12-10 NOTE — PROGRESS NOTES
[facility]  History & Physical  Orthopedics    SUBJECTIVE:      Chief Complaint: leti hand N/T, RRF triggering    Referring Provider: Maru Robles PA-C     History of Present Illness:  Patient is a 63 y.o. left hand dominant male who presents today with complaints of RRF triggering and b/l hand N/T.  He is currently on medical leave from his job 2/2 knee surgery but will be returning once cleared.  He reports R>L hand N/T in the IF, LF, and RF.  He has not tried any treatment.     The patient is a/an [employer] employee.    Onset of symptoms/DOI was 3 months ago.    Symptoms are aggravated by activity.    Symptoms are alleviated by rest.    Symptoms consist of pain and N/T, triggering.    The patient rates their pain as a 8/10.    Attempted treatment(s) and/or interventions include rest.     The patient denies any fevers, chills, N/V, D/C and presents for evaluation.       Past Medical History:   Diagnosis Date    Arthritis     Carpal tunnel syndrome, bilateral     Cervical spinal stenosis 2002    Colon polyps     Hypertension     Vertigo     left ear issues     Past Surgical History:   Procedure Laterality Date    ARTHROSCOPIC CHONDROPLASTY OF KNEE JOINT Left 10/17/2018    Procedure: ARTHROSCOPY, KNEE, WITH CHONDROPLASTY;  Surgeon: GRETEL Carr MD;  Location: [facility] OR [address] [place] [address];  Service: Orthopedics;  Laterality: Left;    ARTHROSCOPY, KNEE, WITH CHONDROPLASTY Left 10/17/2018    Performed by GRETEL Carr MD at [facility] OR [address] [place] [address]    ARTHROSCOPY, KNEE, WITH MENISCECTOMY Left 10/17/2018    Performed by GRETEL Carr MD at [facility] OR [address] [place] [address]    COLONOSCOPY      COLONOSCOPY N/A 9/25/2017    Procedure: COLONOSCOPY/emr;  Surgeon: Raul August MD;  Location: [address]);  Service: Endoscopy;  Laterality: N/A;    COLONOSCOPY N/A 3/9/2018    Procedure: COLONOSCOPY;  Surgeon: Raul August MD;  Location: [facility] ([address] [place] [address]);  Service: Endoscopy;  Laterality: N/A;     COLONOSCOPY N/A 3/9/2018    Performed by Raul August MD at [facility] ENDO (2ND FLR)    COLONOSCOPY/emr N/A 9/25/2017    Performed by Raul August MD at [facility] ENDO (2ND FLR)    KNEE ARTHROSCOPY W/ MENISCECTOMY Left 10/17/2018    Procedure: ARTHROSCOPY, KNEE, WITH MENISCECTOMY;  Surgeon: GRETEL Carr MD;  Location: [facility] OR 2ND FLR;  Service: Orthopedics;  Laterality: Left;  regional w/catheter adductor  Dimitry/[employer] notified 10-12 LO     Review of patient's allergies indicates:  No Known Allergies  Social History     Social History Narrative    Not on file     Family History   Problem Relation Age of Onset    No Known Problems Mother     Arthritis Father     Dementia Father     Heart disease Father          Current Outpatient Medications:     lisinopril 10 MG tablet, Take 1 tablet (10 mg total) by mouth once daily., Disp: 30 tablet, Rfl: 6    aspirin (ECOTRIN) 81 MG EC tablet, Take 1 tablet (81 mg total) by mouth 2 (two) times daily. for 14 days, Disp: , Rfl: 0    diclofenac (VOLTAREN) 75 MG EC tablet, Take 1 tablet (75 mg total) by mouth 2 (two) times daily as needed., Disp: 30 tablet, Rfl: 0    oxyCODONE-acetaminophen (PERCOCET) 5-325 mg per tablet, Take 1 tablet by mouth every 6 (six) hours as needed for Pain., Disp: 28 tablet, Rfl: 0  No current facility-administered medications for this visit.     Facility-Administered Medications Ordered in Other Visits:     sodium hyaluronate (EUFLEXXA) 10 mg/mL(mw 2.4 -3.6 million) Syrg 20 mg, 20 mg, Intra-articular, , GRETEL Carr MD, 20 mg at 05/10/18 4656      Review of Systems:  Constitutional: no fever or chills  Eyes: no visual changes  ENT: no nasal congestion or sore throat  Respiratory: no cough or shortness of breath  Cardiovascular: no chest pain  Gastrointestinal: no nausea or vomiting, tolerating diet  Musculoskeletal: pain and numbness/tingling    OBJECTIVE:      Vital Signs (Most Recent):  Vitals:    12/10/18 1335   BP: (!) 163/88   BP  "Location: Left arm   Patient Position: Sitting   BP Method: Large (Automatic)   Pulse: 77   Weight: 76 kg (167 lb 8.8 oz)   Height: 5' 6" (1.676 m)     Body mass index is 27.04 kg/m².      Physical Exam:  Constitutional: The patient appears well-developed and well-nourished. No distress.   Head: Normocephalic and atraumatic.   Nose: Nose normal.   Eyes: Conjunctivae and EOM are normal.   Neck: No tracheal deviation present.   Cardiovascular: Normal rate and intact distal pulses.    Pulmonary/Chest: Effort normal. No respiratory distress.   Abdominal: There is no guarding.   Neurological: The patient is alert.   Psychiatric: The patient has a normal mood and affect.     Bilateral Hand/Wrist Examination:    Observation/Inspection:  Swelling  none    Deformity  none  Discoloration  none     Scars   none    Atrophy  None  TTP A1 pulley with triggering seen RRF    HAND/WRIST EXAMINATION:  Finkelstein's Test   Neg  Snuff box tenderness   Neg  Hurtado's Test    Neg  Hook of Hamate Tenderness  Neg  CMC grind    Neg  Circumduction test   Neg    Neurovascular Exam:  Digits WWP, brisk CR < 3s throughout  NVI motor/LTS to M/R/U nerves, radial pulse 2+  Tinel's Test - Carpal Tunnel  +  Tinel's Test - Cubital Tunnel  Neg  Phalen's Test    +  Median Nerve Compression Test +    ROM hand/wrist/elbow full, painless      Diagnostic Results:     Xray - leti hands with no acute fractures or dislocations  EMG - b/l CTS    ASSESSMENT/PLAN:      63 y.o. yo male with b/l CTS, RRF trigger finger  1) Pt does not want to try braces as he does not tolerate them well  2) RRF trigger finger and CT injections today  3) F/U 6 weeks for reevaluation        Cooper Tyler M.D.    "

## 2018-12-10 NOTE — PROCEDURES
Carpal Tunnel  Date/Time: 12/10/2018 3:07 PM  Performed by: Cooper Tyler MD  Authorized by: Cooper Tyler MD     Consent Done?: Yes (Verbal)  Site marked: The procedure site was marked   Timeout: Prior to procedure the correct patient, procedure, and site was verified      Location:  Wrist (right carpal tunnel)  Prep: Patient was prepped and draped in usual sterile fashion    Ultrasonic Guidance for needle placement: No  Needle size:  25 G  Approach:  Volar  Medications:  4 mg dexamethasone 4 mg/mL  Patient tolerance:  Patient tolerated the procedure well with no immediate complications

## 2018-12-10 NOTE — LETTER
December 10, 2018      Maru Robles PA-C  [address]  [facility] [address]           [facility]  [address], [address]  [facility] [address]  Phone: [phone]          Patient: Ladarius Solis   MR Number: [id number]   YOB: 1955   Date of Visit: 12/10/2018       Dear Maru Robles:    Thank you for referring Ladarius Solis to me for evaluation. Attached you will find relevant portions of my assessment and plan of care.    If you have questions, please do not hesitate to call me. I look forward to following Ladarius Solis along with you.    Sincerely,    Cooper Tyler MD    Enclosure  CC:  No Recipients    If you would like to receive this communication electronically, please contact [email or web address] or [phone] to request more information on [employer] Link access.    For providers and/or their staff who would like to refer a patient to [facility], please contact us through our one-stop-shop provider referral line, [facility] , at [phone].    If you feel you have received this communication in error or would no longer like to receive these types of communications, please e-mail [email or web address]

## 2018-12-10 NOTE — PROCEDURES
Tendon Sheath: R ring MCP  Date/Time: 12/10/2018 3:06 PM  Performed by: Cooper Tyler MD  Authorized by: Cooper Tyler MD     Consent Done?: Yes (Verbal)  Timeout: prior to procedure the correct patient, procedure, and site was verified   Indications:  Pain  Site marked: the procedure site was marked    Timeout: prior to procedure the correct patient, procedure, and site was verified    Location:  Ring finger  Site:  R ring MCP  Prep: patient was prepped and draped in usual sterile fashion    Ultrasonic guidance for needle placement?: No    Needle size:  25 G  Approach:  Volar  Medications:  4 mg dexamethasone 4 mg/mL  Patient tolerance:  Patient tolerated the procedure well with no immediate complications

## 2018-12-14 ENCOUNTER — CLINICAL SUPPORT (OUTPATIENT)
Dept: REHABILITATION | Facility: HOSPITAL | Age: 63
End: 2018-12-14
Attending: PHYSICIAN ASSISTANT
Payer: MEDICARE

## 2018-12-14 PROCEDURE — 97110 THERAPEUTIC EXERCISES: CPT

## 2018-12-14 NOTE — PROGRESS NOTES
"  Physical Therapy Daily Treatment Note     Name: Ladarius Solis  Clinic Number: [id number]    Therapy Diagnosis: Chronic pain of left knee  Physician: Antonieta aHm PA-C     Physician Orders: PT Eval and Treat   Medical Diagnosis:   M94.262 (ICD-10-CM) - Chondromalacia of left knee   M23.204 (ICD-10-CM) - Other old tear of medial meniscus of left knee   D75.89 (ICD-10-CM) - Bone marrow edema         Evaluation Date: 10/22/2018  Authorization Period Expiration: 12/31/2018  Plan of Care Certification Period: 12/17/2018  Visit # / Visits authorized: 8/ 20     Time In: 1:00  Time Out: 1:55  Total Billable Time: 38 minutes       Op note:POSTOPERATIVE PLAN: Patient may weight bear as tolerates on crutches. Full range of motion to tolerance. Will start physical therapy right away. ASA 81 mg BID x 2 weeks for DVT prophylaxis    Subjective     Pt reports: he did his HEP for the first time this morning. He is not sure why he kept procrastinating about doing the exercises, but he did not find them to be very taxing, he feels he will be able to do them more regularly.    He feels stiff currently   Pt is NON COMPLIANT WITH HEP.  Response to previous treatment: min soreness   Functional change: improved gait pattern    Pain: 0/10  Location: left knee      Objective     Update:   AROM: 0 to 130 deg   Hip flexion: 4/5  Knee extension: 4+/5  Knee flexion: 4+/5      Ladarius received therapeutic exercises to develop strength, endurance, ROM, flexibility, posture and core stabilization for 38 minutes including:    Stationary bike x 10' for increased ROM, circulation, and endurance   L QS CKC 3x10/5"  L SLR 2# 3x10  L Hip abd 3x10  L prone ext 3x10  L prone knee flexion 3x10  Bridge 3x10   DL leg press 60# 3x10   Wall squats 3x10  Standing calf raises 2x20  (L) SLS on firm no UE support 3x1 min  (L) SLS on foam c 1 UE support 3x30 sec  NBOS on foam no UE 3x30 sec    Home Exercises Provided and Patient Education Provided "     Education provided:   HEP adherence and RICE    Written Home Exercises Provided: yes.  Exercises were reviewed and Ladarius was able to demonstrate them prior to the end of the session.  Ladarius demonstrated good  understanding of the education provided.         Assessment     ROM remains excellent. Increased fatigue noted with SLR c 2# ankle weight. No other exercises presented significant difficulty. Pt continues to require VC &/or TC for proper exercise form. Reviewed HEP, gave pt strategies for incorporating exercises into daily routine. Patient reported feeling well upon departure.     Ladarius is progressing well towards his goals.   Pt prognosis is Good.     Pt will continue to benefit from skilled outpatient physical therapy to address the deficits listed in the problem list box on initial evaluation, provide pt/family education and to maximize pt's level of independence in the home and community environment.     Pt's spiritual, cultural and educational needs considered and pt agreeable to plan of care and goals.    Anticipated barriers to physical therapy: none    Goals: Short Term GOALS: 2 weeks. Pt agrees with goals set.  1. Patient demonstrates independence with HEP. (not met, Progressing)  2. Patient demonstrates L knee AROM 0 to 125 deg to increase functional mobility tolerance. (not met, Progressing)   3. Patient maintains TKE with flexion SLR indicating good quad control. (not met, Progressing)     Long Term GOALS: 8 weeks. Pt agrees with goals set.  1. Patient demonstrates L knee AROM 0 to 140 deg to increase functional mobility tolerance. (not met, Progressing)   2. Patient demonstrates increased strength BLE's to 5/5 to improve tolerance to functional activities.(not met, Progressing)   3. Patient reports at least 75% decreased pain with IADLs to increase functional independence.  (not met, Progressing)         Plan     Follow up with regards to energy level and tolerance to today's session.  Progress per POC and protocol.     Debby Morris, PT, DPT

## 2018-12-16 ENCOUNTER — PATIENT MESSAGE (OUTPATIENT)
Dept: ADMINISTRATIVE | Facility: OTHER | Age: 63
End: 2018-12-16

## 2018-12-21 ENCOUNTER — CLINICAL SUPPORT (OUTPATIENT)
Dept: REHABILITATION | Facility: HOSPITAL | Age: 63
End: 2018-12-21
Attending: PHYSICIAN ASSISTANT
Payer: MEDICARE

## 2018-12-21 PROCEDURE — 97110 THERAPEUTIC EXERCISES: CPT

## 2018-12-21 NOTE — PROGRESS NOTES
"  Physical Therapy Daily Treatment Note     Name: Ladarius Solis  Clinic Number: [id number]    Therapy Diagnosis: Chronic pain of left knee  Physician: Antonieta Ham PA-C     Physician Orders: PT Eval and Treat   Medical Diagnosis:   M94.262 (ICD-10-CM) - Chondromalacia of left knee   M23.204 (ICD-10-CM) - Other old tear of medial meniscus of left knee   D75.89 (ICD-10-CM) - Bone marrow edema         Evaluation Date: 10/22/2018  Authorization Period Expiration: 12/31/2018  Plan of Care Certification Period: 12/17/2018  Visit # / Visits authorized: 8/ 20     Time In: 11:00  Time Out: 11:50  Total Billable Time: 30 minutes       Op note:POSTOPERATIVE PLAN: Patient may weight bear as tolerates on crutches. Full range of motion to tolerance. Will start physical therapy right away. ASA 81 mg BID x 2 weeks for DVT prophylaxis    Subjective     Pt reports: he did his HEP everyday since last session, no issues with exercises.    He feels stiff currently     Response to previous treatment: min soreness   Functional change: improved gait pattern    Pain: 0/10  Location: left knee      Objective     Update:   AROM: 0 to 130 deg   Hip flexion: 4/5  Knee extension: 4+/5  Knee flexion: 4+/5      Ladarius received therapeutic exercises to develop strength, endurance, ROM, flexibility, posture and core stabilization for 30 minutes including:    Stationary bike x 10' for increased ROM, circulation, and endurance   L QS CKC 3x10/5"  L SLR 2# 3x10  L Hip abd 2# 3x10  L prone ext 2# 3x10  L prone knee flexion 3x10  Bridge 3x10   Clamshells 30x B   DL leg press 60# 3x10   Wall squats 3x10  Standing calf raises 2x20  (L) SLS on firm no UE support 3x1 min  (L) SLS on foam c 1 UE support 3x30 sec  NBOS on foam no UE 3x30 sec    Home Exercises Provided and Patient Education Provided     Education provided:   HEP adherence and RICE    Written Home Exercises Provided: yes.  Exercises were reviewed and Ladarius was able to demonstrate " them prior to the end of the session.  Ladarius demonstrated good  understanding of the education provided.         Assessment     ROM remains excellent. Improved endurance noted with SLR c 2# ankle weight. Mild/mod balance impairment noted with SLS on foam, no LOB. Pt continues to require VC &/or TC for proper exercise form. Patient reported feeling well upon departure.     Ladarius is progressing well towards his goals.   Pt prognosis is Good.     Pt will continue to benefit from skilled outpatient physical therapy to address the deficits listed in the problem list box on initial evaluation, provide pt/family education and to maximize pt's level of independence in the home and community environment.     Pt's spiritual, cultural and educational needs considered and pt agreeable to plan of care and goals.    Anticipated barriers to physical therapy: none    Goals: Short Term GOALS: 2 weeks. Pt agrees with goals set.  1. Patient demonstrates independence with HEP. (not met, Progressing)  2. Patient demonstrates L knee AROM 0 to 125 deg to increase functional mobility tolerance. (not met, Progressing)   3. Patient maintains TKE with flexion SLR indicating good quad control. (not met, Progressing)     Long Term GOALS: 8 weeks. Pt agrees with goals set.  1. Patient demonstrates L knee AROM 0 to 140 deg to increase functional mobility tolerance. (not met, Progressing)   2. Patient demonstrates increased strength BLE's to 5/5 to improve tolerance to functional activities.(not met, Progressing)   3. Patient reports at least 75% decreased pain with IADLs to increase functional independence.  (not met, Progressing)         Plan     Follow up with regards to energy level and tolerance to today's session. Progress per POC and protocol.     Debby Morris, PT, DPT

## 2018-12-28 ENCOUNTER — CLINICAL SUPPORT (OUTPATIENT)
Dept: REHABILITATION | Facility: HOSPITAL | Age: 63
End: 2018-12-28
Attending: PHYSICIAN ASSISTANT
Payer: MEDICARE

## 2018-12-28 DIAGNOSIS — M17.12 PRIMARY OSTEOARTHRITIS OF LEFT KNEE: ICD-10-CM

## 2018-12-28 DIAGNOSIS — M25.562 CHRONIC PAIN OF LEFT KNEE: Primary | ICD-10-CM

## 2018-12-28 DIAGNOSIS — G89.29 CHRONIC PAIN OF LEFT KNEE: Primary | ICD-10-CM

## 2018-12-28 PROCEDURE — 97110 THERAPEUTIC EXERCISES: CPT

## 2018-12-28 NOTE — PROGRESS NOTES
"  Physical Therapy Daily Treatment Note     Name: Ladarius Solis  Clinic Number: [id number]    Therapy Diagnosis: Chronic pain of left knee  Physician: Antonieta Ham PA-C     Physician Orders: PT Eval and Treat   Medical Diagnosis:   M94.262 (ICD-10-CM) - Chondromalacia of left knee   M23.204 (ICD-10-CM) - Other old tear of medial meniscus of left knee   D75.89 (ICD-10-CM) - Bone marrow edema         Evaluation Date: 10/22/2018  Authorization Period Expiration: 12/31/2018  Plan of Care Certification Period: 12/17/2018  Visit # / Visits authorized: 9/ 20     Time In: 1106  Time Out: 1205  Total Billable Time: 45  minutes       Op note:POSTOPERATIVE PLAN: Patient may weight bear as tolerates on crutches. Full range of motion to tolerance. Will start physical therapy right away. ASA 81 mg BID x 2 weeks for DVT prophylaxis    Subjective     Pt states feeling well with no c/o pn in L knee currently but does have some stiffness.    He feels stiff currently     Response to previous treatment: min soreness   Functional change: improved gait pattern    Pain: 0/10  Location: left knee      Objective       Ladarius received therapeutic exercises to develop strength, endurance, ROM, flexibility, posture and core stabilization for 45 minutes including:    Stationary bike x 5' for increased ROM, circulation, and endurance   L SLR 2# 3x10  L Hip abd 2# 3x10  L prone ext 2# 3x10  L prone knee flexion 3x10  Bridge 3x10   LAQ 3 x 10   Clamshells 30x B   DL shuttle press 62.5# 3x10   Step ups 5" 3 x 10   Wall squats 3x10  Standing calf raises 2x20  (L) SLS on foam oval 3 x 30 sec     CP x 10 min to L knee.      Not performed today:   (L) SLS on foam c 1 UE support 3x30 sec  NBOS on foam no UE 3x30 sec  L QS CKC 3x10/5"    Home Exercises Provided and Patient Education Provided     Education provided:   HEP adherence and RICE    Written Home Exercises Provided: yes.  Exercises were reviewed and Ladarius was able to demonstrate " them prior to the end of the session.  Ladarius demonstrated good  understanding of the education provided.         Assessment   Pt showed improved quad function and control during therex.  Pt cont to lack some core stability and strength.  Pt simon tx w/ no c/o pn.      Ladarius is progressing well towards his goals.   Pt prognosis is Good.     Pt will continue to benefit from skilled outpatient physical therapy to address the deficits listed in the problem list box on initial evaluation, provide pt/family education and to maximize pt's level of independence in the home and community environment.     Pt's spiritual, cultural and educational needs considered and pt agreeable to plan of care and goals.    Anticipated barriers to physical therapy: none    Goals: Short Term GOALS: 2 weeks. Pt agrees with goals set.  1. Patient demonstrates independence with HEP. (not met, Progressing)  2. Patient demonstrates L knee AROM 0 to 125 deg to increase functional mobility tolerance. (not met, Progressing)   3. Patient maintains TKE with flexion SLR indicating good quad control. (not met, Progressing)     Long Term GOALS: 8 weeks. Pt agrees with goals set.  1. Patient demonstrates L knee AROM 0 to 140 deg to increase functional mobility tolerance. (not met, Progressing)   2. Patient demonstrates increased strength BLE's to 5/5 to improve tolerance to functional activities.(not met, Progressing)   3. Patient reports at least 75% decreased pain with IADLs to increase functional independence.  (not met, Progressing)         Plan     Cont to progress towards goals set by PT.  Work to improve core stability and strength next visit.      Jairo Mcknight, PTA

## 2019-01-04 ENCOUNTER — CLINICAL SUPPORT (OUTPATIENT)
Dept: REHABILITATION | Facility: HOSPITAL | Age: 64
End: 2019-01-04
Attending: PHYSICIAN ASSISTANT
Payer: MEDICARE

## 2019-01-04 PROCEDURE — 97110 THERAPEUTIC EXERCISES: CPT

## 2019-01-04 NOTE — PROGRESS NOTES
"  Physical Therapy Daily Treatment Note     Name: Ladarius Solis  Clinic Number: [id number]    Therapy Diagnosis: Chronic pain of left knee  Physician: Antonieta Ham PA-C     Physician Orders: PT Eval and Treat   Medical Diagnosis:   M94.262 (ICD-10-CM) - Chondromalacia of left knee   M23.204 (ICD-10-CM) - Other old tear of medial meniscus of left knee   D75.89 (ICD-10-CM) - Bone marrow edema         Evaluation Date: 10/22/2018  Authorization Period Expiration: 12/31/2018  Plan of Care Certification Period: 12/17/2018  Visit # / Visits authorized: 10/ 20     Time In: 3:00  Time Out: 4:00  Total Billable Time: 30  minutes       Op note:POSTOPERATIVE PLAN: Patient may weight bear as tolerates on crutches. Full range of motion to tolerance. Will start physical therapy right away. ASA 81 mg BID x 2 weeks for DVT prophylaxis    Subjective     Pt states : He is still doing his exercises and he feels more "enthusiastic" about getting better/stronger. He still feels like he has no energy, he needs to find a PCP and discuss this issue.     Response to previous treatment: min soreness   Functional change: improved gait pattern    Pain: 0/10  Location: left knee      Objective     UPDATE:  Knee flexion: 4/5  Knee extension: 5/5      CMS Impairment/Limitation/Restriction for FOTO Knee Survey    Therapist reviewed FOTO scores for Ladarius Solis on 1/4/2019.   FOTO documents entered into [email or web address] - see Media section.    Limitation Score: 64%  Category: Other    Current : CL = least 60% but < 80% impaired, limited or restricted  Goal: CK = at least 40% but < 60% impaired, limited or restricted         TREATMENT:    Ladarius received therapeutic exercises to develop strength, endurance, ROM, flexibility, posture and core stabilization for 30 minutes including:    Stationary bike x 10' for increased ROM, circulation, and endurance   LAQ 2.5# 3x10   L SLR 2.5# 3x10  L Hip abd 2.5# 3x10  L prone ext 2.5# 3x10  L prone knee " "flexion 2.5# 3x10  Bridge 3x10   Clamshells 30x B   DL shuttle press 62.5# 3x10   Step ups 5" 3 x 10   Wall squats 3x10  Standing calf raises 2x20  (L) SLS on foam oval 3 x 30 sec     CP x 10 min to L knee.        Home Exercises Provided and Patient Education Provided     Education provided:   HEP adherence and RICE    Written Home Exercises Provided: yes.  Exercises were reviewed and Ladarius was able to demonstrate them prior to the end of the session.  Ladarius demonstrated good  understanding of the education provided.         Assessment     Pt continues to demonstrate strength deficit in HS musculature, good quad note noted with MMT and SLR/LAQ. Pt continues to present with slight tired affect, reporting he has still not seen PCP about the issue despite saying he was seeking PCP appointment to discuss fatigue multiple times over the last 2 months. PT encouraged pt to make PCP appointment. Increased ankle weight with isotonics tolerated well. Patient reported feeling well upon departure.        Ladarius is progressing well towards his goals.   Pt prognosis is Good.     Pt will continue to benefit from skilled outpatient physical therapy to address the deficits listed in the problem list box on initial evaluation, provide pt/family education and to maximize pt's level of independence in the home and community environment.     Pt's spiritual, cultural and educational needs considered and pt agreeable to plan of care and goals.    Anticipated barriers to physical therapy: none    Goals: Short Term GOALS: 2 weeks. Pt agrees with goals set.  1. Patient demonstrates independence with HEP. (Achieved)  2. Patient demonstrates L knee AROM 0 to 125 deg to increase functional mobility tolerance. (Achieved)   3. Patient maintains TKE with flexion SLR indicating good quad control. (Achieved)     Long Term GOALS: 8 weeks. Pt agrees with goals set.  1. Patient demonstrates L knee AROM 0 to 140 deg to increase functional mobility " tolerance. (not met, Progressing)   2. Patient demonstrates increased strength BLE's to 5/5 to improve tolerance to functional activities.(not met, Progressing)   3. Patient reports at least 75% decreased pain with IADLs to increase functional independence.  (not met, Progressing)         Plan     Progress under current protocol/goals as tolerated.     Debby Morris, PT, DPT

## 2019-01-11 ENCOUNTER — CLINICAL SUPPORT (OUTPATIENT)
Dept: REHABILITATION | Facility: HOSPITAL | Age: 64
End: 2019-01-11
Attending: PHYSICIAN ASSISTANT
Payer: MEDICARE

## 2019-01-11 PROCEDURE — 97110 THERAPEUTIC EXERCISES: CPT

## 2019-01-11 NOTE — PROGRESS NOTES
Physical Therapy Daily Treatment Note     Name: Ladarius Solis  Clinic Number: [id number]    Therapy Diagnosis: Chronic pain of left knee  Physician: Antonieta Ham PA-C     Physician Orders: PT Eval and Treat   Medical Diagnosis:   M94.262 (ICD-10-CM) - Chondromalacia of left knee   M23.204 (ICD-10-CM) - Other old tear of medial meniscus of left knee   D75.89 (ICD-10-CM) - Bone marrow edema         Evaluation Date: 10/22/2018  Authorization Period Expiration: 12/31/2018  Plan of Care Certification Period: 12/17/2018  Visit # / Visits authorized: 11/ 20     Time In: 1:00  Time Out: 2:00  Total Billable Time: 30  minutes       Op note:POSTOPERATIVE PLAN: Patient may weight bear as tolerates on crutches. Full range of motion to tolerance. Will start physical therapy right away. ASA 81 mg BID x 2 weeks for DVT prophylaxis    Subjective     Pt states : He is still doing his exercises but he hasn't done it the last few days due to significant decreased energy level. He will see a new PCP on 1/31.     Response to previous treatment: min soreness   Functional change: improved gait pattern    Pain: 0/10  Location: left knee      Objective     UPDATE:  Knee flexion: 4+/5  Knee extension: 5/5      CMS Impairment/Limitation/Restriction for FOTO Knee Survey    Therapist reviewed FOTO scores for Ladarius Slois on 1/11/2019.   FOTO documents entered into [employer] - see Media section.    Limitation Score: 64%  Category: Other    Current : CL = least 60% but < 80% impaired, limited or restricted  Goal: CK = at least 40% but < 60% impaired, limited or restricted         TREATMENT:    Ladarius received therapeutic exercises to develop strength, endurance, ROM, flexibility, posture and core stabilization for 30 minutes including:    Stationary bike x 10' for increased ROM, circulation, and endurance   LAQ 2.5# 3x10   L SLR 2.5# 3x10  L Hip abd 2.5# 3x10  L prone ext 2.5# 3x10  L prone knee flexion 2.5# 3x10  L hip add 2.5#  "3x10  Bridge 3x10   Clamshells 30x B   DL shuttle press 80# 3x10   Step ups 5" 3 x 10   Wall squats 3x10  Standing calf raises 2x20  (L) SLS on foam oval 3 x 30 sec            Home Exercises Provided and Patient Education Provided     Education provided:   HEP adherence and RICE    Written Home Exercises Provided: yes.  Exercises were reviewed and Ladarius was able to demonstrate them prior to the end of the session.  Ladarius demonstrated good  understanding of the education provided.         Assessment     Pt continues to demonstrate strength deficit in HS musculature, good quad note noted with MMT and SLR/LAQ. Pt continues to present with slight tired affect, reporting he has still not seen PCP about the issue despite saying he was seeking PCP appointment to discuss fatigue multiple times over the last 2 months. PT encouraged pt to make PCP appointment. Increased ankle weight with isotonics tolerated well. Patient reported feeling well upon departure.        Ladarius is progressing well towards his goals.   Pt prognosis is Good.     Pt will continue to benefit from skilled outpatient physical therapy to address the deficits listed in the problem list box on initial evaluation, provide pt/family education and to maximize pt's level of independence in the home and community environment.     Pt's spiritual, cultural and educational needs considered and pt agreeable to plan of care and goals.    Anticipated barriers to physical therapy: none    Goals: Short Term GOALS: 2 weeks. Pt agrees with goals set.  1. Patient demonstrates independence with HEP. (Achieved)  2. Patient demonstrates L knee AROM 0 to 125 deg to increase functional mobility tolerance. (Achieved)   3. Patient maintains TKE with flexion SLR indicating good quad control. (Achieved)     Long Term GOALS: 8 weeks. Pt agrees with goals set.  1. Patient demonstrates L knee AROM 0 to 140 deg to increase functional mobility tolerance. (not met, Progressing)   2. " Patient demonstrates increased strength BLE's to 5/5 to improve tolerance to functional activities.(not met, Progressing)   3. Patient reports at least 75% decreased pain with IADLs to increase functional independence.  (not met, Progressing)         Plan     Progress under current protocol/goals as tolerated.     Debby Morris, PT, DPT

## 2019-01-14 NOTE — PROGRESS NOTES
S:Ladarius Solis presents for post-operative evaluation.     DATE OF PROCEDURE: 10/17/2018  PROCEDURE PERFORMED:   Left  1. Knee arthroscopic partial medial meniscectomy   2. knee arthroscopic chondroplasty  3. knee arthroscopic plica excision    Patellofemoral compartment - grade 1-2 chondromalacia   Medial Compartment - grade 2-3 chondromalacia of the medial femoral condyle seen. No grade 4 lesions or focal chondral defect.  Smaller area of grade 2 chondromalacia of the medial tibial plateau  Lateral compartment - minimal chondral wear    Ladarius Solis reports to be doing well 3 mos (90 days) s/p the above mentioned procedure.  Has been going to PT once a week with Wilmar at [facility]. He states that he feels like his quad muscle is weak, but he is not completing a HEP. Generalized soreness with prolonged walking. Pain levels are improving. States the knee feels sig better than pre-op. He has been taking Percocet 5mg once a week as needed for pain.    O: LLE - The incisions are well healed. No focal TTP. Full extension. Flexion to 130. Neg [place]. No effusion. Quad weakness present. The knee looks deconditioned still.    A/P: Discussed underlying degenerative changes. Focus on quadriceps strengthening and functional stabilization.  Instructed to stop narcotic usage and take oral anti-inflammatory medication on days he has some soreness. Euflexxa is an option in the future. RTC as needed.

## 2019-01-15 ENCOUNTER — PATIENT MESSAGE (OUTPATIENT)
Dept: ADMINISTRATIVE | Facility: OTHER | Age: 64
End: 2019-01-15

## 2019-01-17 ENCOUNTER — OFFICE VISIT (OUTPATIENT)
Dept: SPORTS MEDICINE | Facility: CLINIC | Age: 64
End: 2019-01-17
Payer: MEDICARE

## 2019-01-17 VITALS
DIASTOLIC BLOOD PRESSURE: 89 MMHG | HEIGHT: 66 IN | HEART RATE: 81 BPM | WEIGHT: 167 LBS | SYSTOLIC BLOOD PRESSURE: 134 MMHG | BODY MASS INDEX: 26.84 KG/M2

## 2019-01-17 DIAGNOSIS — M94.262 CHONDROMALACIA OF LEFT KNEE: Primary | ICD-10-CM

## 2019-01-17 PROCEDURE — 99213 OFFICE O/P EST LOW 20 MIN: CPT | Mod: PBBFAC,PO | Performed by: ORTHOPAEDIC SURGERY

## 2019-01-17 PROCEDURE — 99999 PR PBB SHADOW E&M-EST. PATIENT-LVL III: ICD-10-PCS | Mod: PBBFAC,,, | Performed by: ORTHOPAEDIC SURGERY

## 2019-01-17 PROCEDURE — 99024 PR POST-OP FOLLOW-UP VISIT: ICD-10-PCS | Mod: POP,,, | Performed by: ORTHOPAEDIC SURGERY

## 2019-01-17 PROCEDURE — 99024 POSTOP FOLLOW-UP VISIT: CPT | Mod: POP,,, | Performed by: ORTHOPAEDIC SURGERY

## 2019-01-17 PROCEDURE — 99999 PR PBB SHADOW E&M-EST. PATIENT-LVL III: CPT | Mod: PBBFAC,,, | Performed by: ORTHOPAEDIC SURGERY

## 2019-01-21 ENCOUNTER — OFFICE VISIT (OUTPATIENT)
Dept: ORTHOPEDICS | Facility: CLINIC | Age: 64
End: 2019-01-21
Payer: MEDICARE

## 2019-01-21 VITALS
HEIGHT: 66 IN | BODY MASS INDEX: 26.86 KG/M2 | WEIGHT: 167.13 LBS | SYSTOLIC BLOOD PRESSURE: 129 MMHG | HEART RATE: 76 BPM | DIASTOLIC BLOOD PRESSURE: 79 MMHG

## 2019-01-21 DIAGNOSIS — G56.03 BILATERAL CARPAL TUNNEL SYNDROME: Primary | ICD-10-CM

## 2019-01-21 DIAGNOSIS — M65.341 TRIGGER FINGER, RIGHT RING FINGER: ICD-10-CM

## 2019-01-21 PROCEDURE — 99999 PR PBB SHADOW E&M-EST. PATIENT-LVL III: CPT | Mod: PBBFAC,,, | Performed by: ORTHOPAEDIC SURGERY

## 2019-01-21 PROCEDURE — 99213 OFFICE O/P EST LOW 20 MIN: CPT | Mod: S$PBB,,, | Performed by: ORTHOPAEDIC SURGERY

## 2019-01-21 PROCEDURE — 99999 PR PBB SHADOW E&M-EST. PATIENT-LVL III: ICD-10-PCS | Mod: PBBFAC,,, | Performed by: ORTHOPAEDIC SURGERY

## 2019-01-21 PROCEDURE — 99213 PR OFFICE/OUTPT VISIT, EST, LEVL III, 20-29 MIN: ICD-10-PCS | Mod: S$PBB,,, | Performed by: ORTHOPAEDIC SURGERY

## 2019-01-21 PROCEDURE — 99213 OFFICE O/P EST LOW 20 MIN: CPT | Mod: PBBFAC | Performed by: ORTHOPAEDIC SURGERY

## 2019-01-21 NOTE — PROGRESS NOTES
[facility]  History & Physical  Orthopedics    SUBJECTIVE:      Chief Complaint: leti hand N/T, RRF triggering    Referring Provider: No ref. provider found     History of Present Illness:  Patient is a 63 y.o. left hand dominant male who presents today with complaints of RRF triggering and b/l hand N/T.  He is currently on medical leave from his job 2/2 knee surgery but will be returning once cleared.  He reports R>L hand N/T in the IF, LF, and RF.  He has not tried any treatment.     Interval history 1/21/19: The patient returns today. He reports the cold weather has made his symtpoms slightly worse since last visit.  He did not get much relief from the injection.  He presents for reevaluation.    The patient is a/an [employer] employee.    Onset of symptoms/DOI was 3 months ago.    Symptoms are aggravated by activity.    Symptoms are alleviated by rest.    Symptoms consist of pain and N/T, triggering.    The patient rates their pain as a 8/10.    Attempted treatment(s) and/or interventions include rest.     The patient denies any fevers, chills, N/V, D/C and presents for evaluation.       Past Medical History:   Diagnosis Date    Arthritis     Carpal tunnel syndrome, bilateral     Cervical spinal stenosis 2002    Colon polyps     Hypertension     Vertigo     left ear issues     Past Surgical History:   Procedure Laterality Date    ARTHROSCOPY, KNEE, WITH CHONDROPLASTY Left 10/17/2018    Performed by GRETEL Carr MD at [facility] OR 2ND FLR    ARTHROSCOPY, KNEE, WITH MENISCECTOMY Left 10/17/2018    Performed by GRETEL Carr MD at [facility] OR 2ND FLR    COLONOSCOPY      COLONOSCOPY N/A 3/9/2018    Performed by Raul August MD at [facility] (2ND FLR)    COLONOSCOPY/emr N/A 9/25/2017    Performed by Raul August MD at [facility] (2ND FLR)     Review of patient's allergies indicates:  No Known Allergies  Social History     Social History Narrative    Not on file     Family History  "  Problem Relation Age of Onset    No Known Problems Mother     Arthritis Father     Dementia Father     Heart disease Father          Current Outpatient Medications:     lisinopril 10 MG tablet, Take 1 tablet (10 mg total) by mouth once daily., Disp: 30 tablet, Rfl: 6    aspirin (ECOTRIN) 81 MG EC tablet, Take 1 tablet (81 mg total) by mouth 2 (two) times daily. for 14 days, Disp: , Rfl: 0    diclofenac (VOLTAREN) 75 MG EC tablet, Take 1 tablet (75 mg total) by mouth 2 (two) times daily as needed., Disp: 30 tablet, Rfl: 0    oxyCODONE-acetaminophen (PERCOCET) 5-325 mg per tablet, Take 1 tablet by mouth every 6 (six) hours as needed for Pain., Disp: 28 tablet, Rfl: 0  No current facility-administered medications for this visit.     Facility-Administered Medications Ordered in Other Visits:     sodium hyaluronate (EUFLEXXA) 10 mg/mL(mw 2.4 -3.6 million) Syrg 20 mg, 20 mg, Intra-articular, , W Toi Carr MD, 20 mg at 05/10/18 7075      Review of Systems:  Constitutional: no fever or chills  Eyes: no visual changes  ENT: no nasal congestion or sore throat  Respiratory: no cough or shortness of breath  Cardiovascular: no chest pain  Gastrointestinal: no nausea or vomiting, tolerating diet  Musculoskeletal: pain and numbness/tingling    OBJECTIVE:      Vital Signs (Most Recent):  Vitals:    01/21/19 1303   BP: 129/79   BP Location: Left arm   Patient Position: Sitting   BP Method: Large (Automatic)   Pulse: 76   Weight: 75.8 kg (167 lb 1.7 oz)   Height: 5' 6" (1.676 m)     Body mass index is 26.97 kg/m².      Physical Exam:  Constitutional: The patient appears well-developed and well-nourished. No distress.   Head: Normocephalic and atraumatic.   Nose: Nose normal.   Eyes: Conjunctivae and EOM are normal.   Neck: No tracheal deviation present.   Cardiovascular: Normal rate and intact distal pulses.    Pulmonary/Chest: Effort normal. No respiratory distress.   Abdominal: There is no guarding.   Neurological: " The patient is alert.   Psychiatric: The patient has a normal mood and affect.     Bilateral Hand/Wrist Examination:    Observation/Inspection:  Swelling  none    Deformity  none  Discoloration  none     Scars   none    Atrophy  None  TTP A1 pulley with triggering seen RRF    HAND/WRIST EXAMINATION:  Finkelstein's Test   Neg  Snuff box tenderness   Neg  Hurtado's Test    Neg  Hook of Hamate Tenderness  Neg  CMC grind    Neg  Circumduction test   Neg    Neurovascular Exam:  Digits WWP, brisk CR < 3s throughout  NVI motor/LTS to M/R/U nerves, radial pulse 2+  Tinel's Test - Carpal Tunnel  +  Tinel's Test - Cubital Tunnel  Neg  Phalen's Test    +  Median Nerve Compression Test +    ROM hand/wrist/elbow full, painless      Diagnostic Results:     Xray - leti hands with no acute fractures or dislocations  EMG - b/l CTS    ASSESSMENT/PLAN:      63 y.o. yo male with b/l CTS, RRF trigger finger  1) Pt not interested in CT braces or surgical intervention  2) We will try a course of OT for his CTS and RRF trigger finger  3) NIghtttime extension splinting advised  4) F/U 3 mos or sooner jairo Tyler M.D.

## 2019-01-25 ENCOUNTER — CLINICAL SUPPORT (OUTPATIENT)
Dept: REHABILITATION | Facility: HOSPITAL | Age: 64
End: 2019-01-25
Attending: PHYSICIAN ASSISTANT
Payer: MEDICARE

## 2019-01-25 PROCEDURE — 97110 THERAPEUTIC EXERCISES: CPT

## 2019-01-29 NOTE — PROGRESS NOTES
Physical Therapy Daily Treatment Note     Name: Ladarius Solis  Clinic Number: [id number]    Therapy Diagnosis: Chronic pain of left knee  Physician: Antonieta Ham PA-C     Physician Orders: PT Eval and Treat   Medical Diagnosis:   M94.262 (ICD-10-CM) - Chondromalacia of left knee   M23.204 (ICD-10-CM) - Other old tear of medial meniscus of left knee   D75.89 (ICD-10-CM) - Bone marrow edema         Evaluation Date: 10/22/2018  Authorization Period Expiration: 12/31/2018  Plan of Care Certification Period: 12/17/2018  Visit # / Visits authorized: 11/ 20     Time In: 1:00  Time Out: 2:00  Total Billable Time: 30  minutes       Op note:POSTOPERATIVE PLAN: Patient may weight bear as tolerates on crutches. Full range of motion to tolerance. Will start physical therapy right away. ASA 81 mg BID x 2 weeks for DVT prophylaxis    Subjective     Pt states : He is still doing his exercises but he hasn't done it the last few days due to significant decreased energy level. He will see a new PCP on 1/31.     Response to previous treatment: min soreness   Functional change: improved gait pattern    Pain: 0/10  Location: left knee      Objective     UPDATE:  Knee flexion: 4+/5  Knee extension: 5/5      CMS Impairment/Limitation/Restriction for FOTO Knee Survey    Therapist reviewed FOTO scores for Ladarius Solis on 1/25/2019.   FOTO documents entered into [employer] - see Media section.    Limitation Score: 64%  Category: Other    Current : CL = least 60% but < 80% impaired, limited or restricted  Goal: CK = at least 40% but < 60% impaired, limited or restricted         TREATMENT:    Ladarius received therapeutic exercises to develop strength, endurance, ROM, flexibility, posture and core stabilization for 30 minutes including:    Stationary bike x 10' for increased ROM, circulation, and endurance   LAQ 2.5# 3x10   L SLR 2.5# 3x10  L Hip abd 2.5# 3x10  L prone ext 2.5# 3x10  L prone knee flexion 2.5# 3x10  L hip add 2.5#  "3x10  Bridge 3x10   Clamshells 30x B   DL shuttle press 80# 3x10   Step ups 5" 3 x 10   Wall squats 3x10  Standing calf raises 2x20  (L) SLS on foam oval 3 x 30 sec            Home Exercises Provided and Patient Education Provided     Education provided:   HEP adherence and RICE    Written Home Exercises Provided: yes.  Exercises were reviewed and Ladarius was able to demonstrate them prior to the end of the session.  Ladarius demonstrated good  understanding of the education provided.         Assessment     Pt continues to demonstrate strength deficit in HS musculature, good quad note noted with MMT and SLR/LAQ. Pt continues to present with slight tired affect, reporting he has still not seen PCP about the issue despite saying he was seeking PCP appointment to discuss fatigue multiple times over the last 2 months. PT encouraged pt to make PCP appointment. Increased ankle weight with isotonics tolerated well. Patient reported feeling well upon departure.        Ladarius is progressing well towards his goals.   Pt prognosis is Good.     Pt will continue to benefit from skilled outpatient physical therapy to address the deficits listed in the problem list box on initial evaluation, provide pt/family education and to maximize pt's level of independence in the home and community environment.     Pt's spiritual, cultural and educational needs considered and pt agreeable to plan of care and goals.    Anticipated barriers to physical therapy: none    Goals: Short Term GOALS: 2 weeks. Pt agrees with goals set.  1. Patient demonstrates independence with HEP. (Achieved)  2. Patient demonstrates L knee AROM 0 to 125 deg to increase functional mobility tolerance. (Achieved)   3. Patient maintains TKE with flexion SLR indicating good quad control. (Achieved)     Long Term GOALS: 8 weeks. Pt agrees with goals set.  1. Patient demonstrates L knee AROM 0 to 140 deg to increase functional mobility tolerance. (not met, Progressing)   2. " Patient demonstrates increased strength BLE's to 5/5 to improve tolerance to functional activities.(not met, Progressing)   3. Patient reports at least 75% decreased pain with IADLs to increase functional independence.  (not met, Progressing)         Plan     Progress under current protocol/goals as tolerated. Discuss D/C after PCP appointment     Debby Morris, PT, DPT

## 2019-01-31 ENCOUNTER — OFFICE VISIT (OUTPATIENT)
Dept: INTERNAL MEDICINE | Facility: CLINIC | Age: 64
End: 2019-01-31
Payer: MEDICARE

## 2019-01-31 VITALS
BODY MASS INDEX: 26.47 KG/M2 | SYSTOLIC BLOOD PRESSURE: 138 MMHG | OXYGEN SATURATION: 98 % | DIASTOLIC BLOOD PRESSURE: 80 MMHG | WEIGHT: 164 LBS | HEART RATE: 76 BPM

## 2019-01-31 DIAGNOSIS — D12.6 COLON ADENOMA: ICD-10-CM

## 2019-01-31 DIAGNOSIS — R01.1 CARDIAC MURMUR: ICD-10-CM

## 2019-01-31 DIAGNOSIS — R73.09 ELEVATED GLUCOSE: ICD-10-CM

## 2019-01-31 DIAGNOSIS — G89.29 CHRONIC PAIN OF LEFT KNEE: ICD-10-CM

## 2019-01-31 DIAGNOSIS — M25.562 CHRONIC PAIN OF LEFT KNEE: ICD-10-CM

## 2019-01-31 DIAGNOSIS — Z00.00 ROUTINE PHYSICAL EXAMINATION: Primary | ICD-10-CM

## 2019-01-31 DIAGNOSIS — R53.83 FATIGUE, UNSPECIFIED TYPE: ICD-10-CM

## 2019-01-31 DIAGNOSIS — G56.03 BILATERAL CARPAL TUNNEL SYNDROME: ICD-10-CM

## 2019-01-31 DIAGNOSIS — I34.0 MITRAL VALVE INSUFFICIENCY, UNSPECIFIED ETIOLOGY: ICD-10-CM

## 2019-01-31 DIAGNOSIS — R35.0 FREQUENCY OF MICTURITION: ICD-10-CM

## 2019-01-31 DIAGNOSIS — M54.2 NECK PAIN: ICD-10-CM

## 2019-01-31 DIAGNOSIS — Z12.5 SCREENING PSA (PROSTATE SPECIFIC ANTIGEN): ICD-10-CM

## 2019-01-31 DIAGNOSIS — M54.12 CERVICAL RADICULOPATHY AT C7: ICD-10-CM

## 2019-01-31 DIAGNOSIS — E78.5 DYSLIPIDEMIA: ICD-10-CM

## 2019-01-31 PROCEDURE — 99999 PR PBB SHADOW E&M-EST. PATIENT-LVL III: ICD-10-PCS | Mod: PBBFAC,,, | Performed by: INTERNAL MEDICINE

## 2019-01-31 PROCEDURE — 99215 PR OFFICE/OUTPT VISIT, EST, LEVL V, 40-54 MIN: ICD-10-PCS | Mod: S$PBB,,, | Performed by: INTERNAL MEDICINE

## 2019-01-31 PROCEDURE — 99999 PR PBB SHADOW E&M-EST. PATIENT-LVL III: CPT | Mod: PBBFAC,,, | Performed by: INTERNAL MEDICINE

## 2019-01-31 PROCEDURE — 99213 OFFICE O/P EST LOW 20 MIN: CPT | Mod: PBBFAC | Performed by: INTERNAL MEDICINE

## 2019-01-31 PROCEDURE — 99215 OFFICE O/P EST HI 40 MIN: CPT | Mod: S$PBB,,, | Performed by: INTERNAL MEDICINE

## 2019-01-31 NOTE — PROGRESS NOTES
Subjective:       Patient ID: Ladarius Solis is a 63 y.o. male.    Chief Complaint: Establish Care; Knee Pain; and Fatigue    HPI:  Patient is new to me here to establish care and discuss fatigue.  Says he had a meniscus surgery on the left knee about 3 months ago.  That has been slow to heal but he feels significant fatigue.  Gets tired easily despite sleeping.  He thinks he wakes up well rested.  He has a history of hypertension that seems to be well controlled.  He has gained a little weight around the time surgery but is now losing it back.  He does wake up at night once urinate.  He denies edema.  No trouble lying flat.  About 13 years ago he had a workup for a TIA that did include trace or minimal aortic and mitral insufficiency.  That he knows of that has not given him any trouble since I updated his personal and family history.      Review of Systems   Constitutional: Positive for activity change, fatigue and unexpected weight change. Negative for chills and fever.   HENT: Negative for hearing loss, nosebleeds, rhinorrhea and trouble swallowing.    Eyes: Negative for pain, discharge and visual disturbance.   Respiratory: Negative for cough, chest tightness, shortness of breath and wheezing.    Cardiovascular: Negative for chest pain and palpitations.   Gastrointestinal: Negative for abdominal pain, blood in stool, constipation, diarrhea, nausea and vomiting.   Endocrine: Negative for polydipsia and polyuria.   Genitourinary: Positive for urgency. Negative for difficulty urinating and hematuria.   Musculoskeletal: Positive for arthralgias and neck pain. Negative for joint swelling.   Skin: Negative for rash.   Neurological: Positive for weakness. Negative for dizziness and headaches.   Hematological: Does not bruise/bleed easily.   Psychiatric/Behavioral: Positive for dysphoric mood. Negative for confusion, sleep disturbance and suicidal ideas.       Objective:      Physical Exam   Constitutional: He is  oriented to person, place, and time. He appears well-developed and well-nourished. No distress.   HENT:   Head: Normocephalic and atraumatic.   Right Ear: External ear normal.   Left Ear: External ear normal.   Nose: Nose normal. No rhinorrhea.   Mouth/Throat: Oropharynx is clear and moist and mucous membranes are normal. No oropharyngeal exudate.   Eyes: Conjunctivae and EOM are normal. Pupils are equal, round, and reactive to light.   Neck: Normal range of motion. Neck supple. No JVD present. No thyromegaly present.   No supraclavicular nodes palpated bilaterally.    Cardiovascular: Normal rate, regular rhythm and intact distal pulses.   Murmur (2-3/6 at the mitral and aortic region) heard.  Pulmonary/Chest: Effort normal and breath sounds normal. He has no wheezes. He has no rales.   Abdominal: Soft. Bowel sounds are normal. He exhibits no distension and no mass. There is no tenderness.   Genitourinary: Rectum normal and prostate normal. Rectal exam shows no tenderness and guaiac negative stool. Prostate is not enlarged and not tender.   Genitourinary Comments: Hemoccult Card Test revealed + control.    Musculoskeletal: Normal range of motion. He exhibits tenderness (Left knee with range of motion). He exhibits no edema.   Lymphadenopathy:     He has no cervical adenopathy.        Right: No supraclavicular adenopathy present.        Left: No supraclavicular adenopathy present.   Neurological: He is alert and oriented to person, place, and time. He has normal reflexes. No cranial nerve deficit.   Reflex Scores:       Bicep reflexes are 2+ on the right side and 2+ on the left side.       Patellar reflexes are 2+ on the right side and 2+ on the left side.  Skin: Skin is warm and dry. No rash noted.   Psychiatric: He has a normal mood and affect. His behavior is normal. He does not exhibit a depressed mood.       Assessment:       1. Routine physical examination    2. Cervical radiculopathy at C7    3. Bilateral  carpal tunnel syndrome    4. Chronic pain of left knee    5. Neck pain    6. Colon adenoma    7. Frequency of micturition    8. Screening PSA (prostate specific antigen)    9. Elevated glucose    10. Dyslipidemia    11. Cardiac murmur    12. Fatigue, unspecified type    13. Mitral valve insufficiency, unspecified etiology        Plan:       Ladarius was seen today for establish care, knee pain and fatigue.    Diagnoses and all orders for this visit:    Routine physical examination  -     Lipid panel; Future  -     CBC auto differential; Future  -     Comprehensive metabolic panel; Future  -     Hemoglobin A1c; Future  -     TSH; Future  -     Hepatitis C antibody; Future    Cervical radiculopathy at C7  -     Lipid panel; Future  -     CBC auto differential; Future  -     Comprehensive metabolic panel; Future  -     Hemoglobin A1c; Future  -     TSH; Future  -     Hepatitis C antibody; Future    Bilateral carpal tunnel syndrome  -     Lipid panel; Future  -     CBC auto differential; Future  -     Comprehensive metabolic panel; Future  -     Hemoglobin A1c; Future  -     TSH; Future  -     Hepatitis C antibody; Future    Chronic pain of left knee  -     Lipid panel; Future  -     CBC auto differential; Future  -     Comprehensive metabolic panel; Future  -     Hemoglobin A1c; Future  -     TSH; Future  -     Hepatitis C antibody; Future    Neck pain  -     Lipid panel; Future  -     CBC auto differential; Future  -     Comprehensive metabolic panel; Future  -     Hemoglobin A1c; Future  -     TSH; Future  -     Hepatitis C antibody; Future    Colon adenoma  -     Lipid panel; Future  -     CBC auto differential; Future  -     Comprehensive metabolic panel; Future  -     Hemoglobin A1c; Future  -     TSH; Future  -     Hepatitis C antibody; Future    Frequency of micturition  -     Lipid panel; Future  -     CBC auto differential; Future  -     Comprehensive metabolic panel; Future  -     Hemoglobin A1c; Future  -      TSH; Future  -     Hepatitis C antibody; Future    Screening PSA (prostate specific antigen)  -     PSA, Screening; Future    Elevated glucose  -     Hemoglobin A1c; Future    Dyslipidemia  -     Lipid panel; Future  -     CBC auto differential; Future  -     Comprehensive metabolic panel; Future  -     Hemoglobin A1c; Future  -     TSH; Future  -     Hepatitis C antibody; Future    Cardiac murmur  -     Transthoracic echo (TTE) complete (Cupid Only); Future  -     Brain natriuretic peptide; Future    Fatigue, unspecified type  -     Transthoracic echo (TTE) complete (Cupid Only); Future  -     Brain natriuretic peptide; Future    Mitral valve insufficiency, unspecified etiology  -     Transthoracic echo (TTE) complete (Cupid Only); Future  -     Brain natriuretic peptide; Future        monitor symptoms.  Healthy low-salt diet.  Continue to see Ortho.  Review after studies

## 2019-02-01 ENCOUNTER — CLINICAL SUPPORT (OUTPATIENT)
Dept: REHABILITATION | Facility: HOSPITAL | Age: 64
End: 2019-02-01
Attending: PHYSICIAN ASSISTANT
Payer: MEDICARE

## 2019-02-01 PROCEDURE — 97110 THERAPEUTIC EXERCISES: CPT

## 2019-02-06 ENCOUNTER — HOSPITAL ENCOUNTER (OUTPATIENT)
Dept: CARDIOLOGY | Facility: CLINIC | Age: 64
Discharge: HOME | End: 2019-02-06
Attending: INTERNAL MEDICINE
Payer: MEDICARE

## 2019-02-06 VITALS
WEIGHT: 160 LBS | DIASTOLIC BLOOD PRESSURE: 76 MMHG | HEART RATE: 74 BPM | BODY MASS INDEX: 25.71 KG/M2 | SYSTOLIC BLOOD PRESSURE: 122 MMHG | HEIGHT: 66 IN

## 2019-02-06 DIAGNOSIS — I34.0 MITRAL VALVE INSUFFICIENCY, UNSPECIFIED ETIOLOGY: ICD-10-CM

## 2019-02-06 DIAGNOSIS — R01.1 CARDIAC MURMUR: ICD-10-CM

## 2019-02-06 DIAGNOSIS — R53.83 FATIGUE, UNSPECIFIED TYPE: ICD-10-CM

## 2019-02-06 LAB
ASCENDING AORTA: 3.27 CM
AV INDEX (PROSTH): 0.98
AV MEAN GRADIENT: 2.71 MMHG
AV PEAK GRADIENT: 5.02 MMHG
AV VALVE AREA: 2.94 CM2
AV VELOCITY RATIO: 0.97
BSA FOR ECHO PROCEDURE: 1.84 M2
CV ECHO LV RWT: 0.4 CM
DOP CALC AO PEAK VEL: 1.12 M/S
DOP CALC AO VTI: 21.85 CM
DOP CALC LVOT AREA: 2.98 CM2
DOP CALC LVOT DIAMETER: 1.95 CM
DOP CALC LVOT PEAK VEL: 1.09 M/S
DOP CALC LVOT STROKE VOLUME: 64.18 CM3
DOP CALCLVOT PEAK VEL VTI: 21.5 CM
E WAVE DECELERATION TIME: 223.24 MSEC
E/A RATIO: 0.93
E/E' RATIO: 7.41
ECHO LV POSTERIOR WALL: 0.8 CM (ref 0.6–1.1)
FRACTIONAL SHORTENING: 33 % (ref 28–44)
INTERVENTRICULAR SEPTUM: 0.9 CM (ref 0.6–1.1)
IVRT: 0.08 MSEC
LA MAJOR: 4.92 CM
LA MINOR: 4.7 CM
LA WIDTH: 3.75 CM
LEFT ATRIUM SIZE: 3.35 CM
LEFT ATRIUM VOLUME INDEX: 28.2 ML/M2
LEFT ATRIUM VOLUME: 51.33 CM3
LEFT INTERNAL DIMENSION IN SYSTOLE: 2.65 CM (ref 2.1–4)
LEFT VENTRICLE DIASTOLIC VOLUME INDEX: 37.94 ML/M2
LEFT VENTRICLE DIASTOLIC VOLUME: 69.02 ML
LEFT VENTRICLE MASS INDEX: 55.3 G/M2
LEFT VENTRICLE SYSTOLIC VOLUME INDEX: 14.2 ML/M2
LEFT VENTRICLE SYSTOLIC VOLUME: 25.85 ML
LEFT VENTRICULAR INTERNAL DIMENSION IN DIASTOLE: 3.98 CM (ref 3.5–6)
LEFT VENTRICULAR MASS: 100.61 G
LV LATERAL E/E' RATIO: 6.3
LV SEPTAL E/E' RATIO: 9
MV PEAK A VEL: 0.68 M/S
MV PEAK E VEL: 0.63 M/S
PISA TR MAX VEL: 2.21 M/S
PULM VEIN S/D RATIO: 0.94
PV PEAK D VEL: 0.5 M/S
PV PEAK S VEL: 0.47 M/S
RA MAJOR: 4.8 CM
RA PRESSURE: 3 MMHG
RA WIDTH: 4.14 CM
RIGHT VENTRICULAR END-DIASTOLIC DIMENSION: 4.01 CM
RV TISSUE DOPPLER FREE WALL SYSTOLIC VELOCITY 1 (APICAL 4 CHAMBER VIEW): 8.37 M/S
SINUS: 3.5 CM
STJ: 2.64 CM
TDI LATERAL: 0.1
TDI SEPTAL: 0.07
TDI: 0.09
TR MAX PG: 19.54 MMHG
TRICUSPID ANNULAR PLANE SYSTOLIC EXCURSION: 1.88 CM
TV REST PULMONARY ARTERY PRESSURE: 23 MMHG

## 2019-02-06 PROCEDURE — 93306 TRANSTHORACIC ECHO (TTE) COMPLETE (CUPID ONLY): ICD-10-PCS | Mod: 26,S$PBB,, | Performed by: INTERNAL MEDICINE

## 2019-02-06 PROCEDURE — 93306 TTE W/DOPPLER COMPLETE: CPT | Mod: PBBFAC | Performed by: INTERNAL MEDICINE

## 2019-02-07 ENCOUNTER — PATIENT MESSAGE (OUTPATIENT)
Dept: INTERNAL MEDICINE | Facility: CLINIC | Age: 64
End: 2019-02-07

## 2019-02-08 ENCOUNTER — CLINICAL SUPPORT (OUTPATIENT)
Dept: REHABILITATION | Facility: HOSPITAL | Age: 64
End: 2019-02-08
Attending: PHYSICIAN ASSISTANT
Payer: MEDICARE

## 2019-02-08 PROCEDURE — 97110 THERAPEUTIC EXERCISES: CPT

## 2019-02-08 NOTE — PROGRESS NOTES
"  Physical Therapy Daily Treatment Note     Name: Ladarius Regalado [place]  Clinic Number: [id number]    Therapy Diagnosis: Chronic pain of left knee  Physician: Antonieta Ham PA-C     Physician Orders: PT Eval and Treat   Medical Diagnosis:   M94.262 (ICD-10-CM) - Chondromalacia of left knee   M23.204 (ICD-10-CM) - Other old tear of medial meniscus of left knee   D75.89 (ICD-10-CM) - Bone marrow edema         Evaluation Date: 10/22/2018  Authorization Period Expiration: 12/31/2018  Plan of Care Certification Period: 12/17/2018  Visit # / Visits authorized: 13/ 20     Time In: 2:00  Time Out: 2:50  Total Billable Time: 30  minutes       Op note:POSTOPERATIVE PLAN: Patient may weight bear as tolerates on crutches. Full range of motion to tolerance. Will start physical therapy right away. ASA 81 mg BID x 2 weeks for DVT prophylaxis    Subjective     Pt states : He heard back from his PCP and all of his test are normal; he's wondering if he'd have more energy when he goes back to work. He feels better than he did last session.   PATIENT IS NON COMPLIANT WITH HEP FOR LAST 3 WEEKS  Response to previous treatment: min soreness   Functional change: improved gait pattern    Pain: 0/10  Location: left knee      Objective     UPDATE:  Knee flexion: 5-/5  Knee extension: 5/5      TREATMENT:    Ladarius received therapeutic exercises to develop strength, endurance, ROM, flexibility, posture and core stabilization for 30 minutes including:    Stationary bike x 10' for increased ROM, circulation, and endurance   LAQ 2.5# 3x10   L SLR 2.5# 3x10  L Hip abd 2.5# 3x10  L prone ext 2.5# 3x10  L prone knee flexion 2.5# 3x10  L hip add 2.5# 3x10  Bridge 3x12 c increased knee flexion each set.   Clamshells 30x B   DL shuttle press 60# 3x10   Step ups 5" 3 x 10   Heel taps 4" 3x10  Wall squats 3x10  SL Standing calf raises 2x20  (L) SLS on foam oval 3 x 30 sec         Home Exercises Provided and Patient Education Provided     Education " provided:   HEP adherence and RICE    Written Home Exercises Provided: yes.  Exercises were reviewed and Ladarius was able to demonstrate them prior to the end of the session.  Ladarius demonstrated good  understanding of the education provided.     See Media section for updated HEP at discharge on 2/8/2019.     Assessment     Reviewed exercises for updated HEP due to patient being appropriate to discharge today. Emphasized importance of daily adherence to HEP in order to continue progress achieved during PT. Pt demonstrated good understanding of education provided. Patient reported feeling well upon departure.     Ladarius is progressing well towards his goals.   Pt prognosis is Good.       Pt's spiritual, cultural and educational needs considered and pt agreeable to plan of care and goals.    Anticipated barriers to physical therapy: none    Goals: Short Term GOALS: 2 weeks. Pt agrees with goals set.  1. Patient demonstrates independence with HEP. (Achieved)  2. Patient demonstrates L knee AROM 0 to 125 deg to increase functional mobility tolerance. (Achieved)   3. Patient maintains TKE with flexion SLR indicating good quad control. (Achieved)     Long Term GOALS: 8 weeks. Pt agrees with goals set.  1. Patient demonstrates L knee AROM 0 to 135 deg to increase functional mobility tolerance. (Achieved)  2. Patient demonstrates increased strength BLE's to 5/5 to improve tolerance to functional activities.(not met, Progressing)   3. Patient reports at least 75% decreased pain with IADLs to increase functional independence. (Achieved)       Plan     Patient is appropriate to be discharged and continue with independent HEP at this time. Patient instructed to contact PT with any questions or concerns following discharge.      Debby Morris, PT, DPT

## 2019-02-11 ENCOUNTER — CLINICAL SUPPORT (OUTPATIENT)
Dept: REHABILITATION | Facility: HOSPITAL | Age: 64
End: 2019-02-11
Attending: ORTHOPAEDIC SURGERY
Payer: MEDICARE

## 2019-02-11 DIAGNOSIS — M65.341 TRIGGER RING FINGER OF RIGHT HAND: ICD-10-CM

## 2019-02-11 PROBLEM — M65.30 RIGHT TRIGGER FINGER: Status: ACTIVE | Noted: 2019-02-11

## 2019-02-11 PROCEDURE — 97165 OT EVAL LOW COMPLEX 30 MIN: CPT

## 2019-02-11 PROCEDURE — 97022 WHIRLPOOL THERAPY: CPT

## 2019-02-11 PROCEDURE — 97110 THERAPEUTIC EXERCISES: CPT

## 2019-02-11 NOTE — PLAN OF CARE
[facility] Occupational Therapy  Initial Evaluation     Name: Ladarius Solis  Clinic Number: [id number]    Therapy Diagnosis:   Encounter Diagnosis   Name Primary?    Trigger ring finger of right hand     bilateral carpal tunnel syndrome   Physician: Cooper Tyler MD    Physician Orders:  eval and treat   Medical Diagnosis: G56.03 bilateral carpal tunnel and M65.341 trigger finger right ring finger   Surgical Procedure/ Date :none   Evaluation Date: 2/11/2019  Insurance:medicare   Insurance Authorization period Expiration: 1/21/2020  Plan of Care Certification Period: 4/30/19     Visit # / Visits Authortized: 1 / 1 per computer at time of eval   Time In:1:00  Time Out: 2:00  Total Billable Time: 60 minutes    Precautions: Standard    Subjective     Involved Side:  right  Dominant Side: Left  Date of Onset:  1  1/2 years ago   Mechanism of Injury:  Started with carpal tunnel issues about a year ago , he is not really worried with any of the carpal tunnel issues , main issue he wants therapy to focus on is right ring tigger finger.  He got trigger finger injection dec 2018 and reports that he has not has any change in pain or function.        History of Current Condition: bilateral carpal tunnel and  Ring  Trigger ifinger issues     Imaging: none   Previous Therapy: Pt has never had therapy for hands     Patient's Goals for Therapy: concerned about finger sticking and he has pain Pain:  Functional Pain Scale Rating 0-10:   4/10 on average  210 at best  6/10 at worst   Locationbilateral: carpal tunnel syndrome and right ring trigger finger.   Description: Burning trigger finger pain   Aggravating Factors: Bending  Easing Factors: massage    Occupation:   Works in furniture and lifting items at [employer]   Working presently: employed  Duties:  Medical til roday 2/11/19 for previous knee surgery      Functional Limitations/Social History:    Previous functional status includes: Independent with  all ADLs.     Current FunctionalStatus   Home/Living environment : lives with their family      Limitation of Functional Status as follows:   ADLs/IADLs:     - Feeding:Not Necessary    - Bathing:Not Necessary    - Dressing/Grooming: Not Necessary    - Driving: Not Necessary     Leisure: Driving      Past Medical History/Physical Systems Review:   Ladarius Solis  has a past medical history of Arthritis, Carpal tunnel syndrome, bilateral, Cervical spinal stenosis, Colon polyps, Hypertension, and Vertigo.    Ladarius Solis  has a past surgical history that includes Colonoscopy; Colonoscopy (N/A, 9/25/2017); Colonoscopy (N/A, 3/9/2018); Knee arthroscopy w/ meniscectomy (Left, 10/17/2018); and arthroscopic chondroplasty of knee joint (Left, 10/17/2018).    Ladarius has a current medication list which includes the following prescription(s): aspirin, diclofenac, lisinopril, and oxycodone-acetaminophen, and the following Facility-Administered Medications: sodium hyaluronate (euflexxa).    Review of patient's allergies indicates:  No Known Allergies       Objective     Observation/Appearance:  Skin intact and Skin dry     Edema. Measured in centimeters. No edema noted     Hand ROM. Measured in degrees.   2/11/2019 2/11/2019       Left Right              Index: MP  wnl wnl                 PIP     wnl wnl                 DIP wnl wnl                 FONSECA wnl wnl              Long:  MP wnl wnl                 PIP wnl wnl                 DIP wnl wnl                 FONSECA                Ring:   MP wnl 0/50                 PIP wnl 0/75                 DIP wnl 0/80                 FONSECA                Small:  MP wnl wnl                  PIP wnl wnl                  DIP wnl wnl                 FONSECA                Thumb: MP wnl wnl                   IP wnl wnl          Rad ADD/ABD wnl wnl          Pal ADD/ABD wnl wnl          Opposition  wnl wnl               WRIST          flexion wnl wnl      Ext  wnl wnl      RD wnl wnl       UD wnl wnl      Pro wnl wnl       Sup  wnl  wnl                             Sensation  Median Nerve Distribution 2/11/2019 2/11/2019    Left Right   [place] [employer]     Normal 1.65-2.83 X X   Diminished Light Touch 3.22-3.61     Diminished Protective 3.84-4.31     Loss of Protective 4.56-6.65  X Index finger only   Untestable >6.65     2 Point Discrimination     Static     Dynamic          Strength (Dyanmometer) and Pinch Strength (Pinch Gauge)  Measured in pounds and psi. Average of three trials.   2/11/2019 2/11/2019        Left Right        Rung II 60 50       Tiwari Pinch 14 13       3pt Pinch 10 9       2pt Pinch 8 8           CMS Impairment/Limitation/Restriction for FOTO initial  Survey    Therapist reviewed FOTO scores for Ladarius Solis on 2/11/2019.   FOTO  Forgot to complete foto on this visit        Limitation Score:   Category: Self care         Current :   Goal:  Discharge:          Treatment     Treatment Time In: 1:30  Treatment Time Out: 2:00  Total Treatment time separate from Evaluation time:40      Ladarius received the following manual therapy techniques for 10 minutes:   -STM with right ring finger over A 1pulley       Ladarius received therapeutic exercises for 10 minutes including:  - TGE and MP blocked in extension with pip flexion     Fabricated trigger finger orficast splint for MP blocking splint to be be worn during the day with activity .   Recommended  Use of wrist splint for night time wear for positioning for carpal tunnel .     Home Exercise Program/Education:  Issued HEP: TGE , use of Mp blocking splint      and educated on modality use for pain management . Exercises were reviewed and Ladarius was able to demonstrate them prior to the end of the session.   Pt received a written copy of exercises to perform at home. Ladarius demonstrated good  understanding of the education provided.  Pt was advised to perform these exercises free of pain, and to stop performing them if pain  occurs.    Patient/Family Education: role of OT, goals for OT, scheduling/cancellations - pt verbalized understanding. Discussed insurance limitations with patient.    Additional Education provided:  None     Assessment     Ladarius Solis is a 63 y.o. male referred to outpatient occupational/hand therapy and presents with a medical diagnosis of  Right trigger finger ( ring finger) resulting in  Decrease function of right hand  and demonstrates limitations as described in the chart below. Following  medical record review it is determined that pt will benefit from occupational therapy services in order to maximize pain free and/or functional use of right hand .     The patient's rehab potential is Good.     Anticipated barriers to occupational therapy: none   Pt has no cultural, educational or language barriers to learning provided.    Profile and History Assessment of Occupational Performance Level of Clinical Decision Making Complexity Score   Occupational Profile:   Ladarius Solis is a 63 y.o. male who lives with their family and is currently employed . Ladarius Solis has difficulty with  Dressing, fine motor to manage clothing   driving/transportation management  affecting his/her daily functional abilities. His/her main goal for therapy is  Right gripping and release .     Comorbidities:    has a past medical history of Arthritis, Carpal tunnel syndrome, bilateral, Cervical spinal stenosis, Colon polyps, Hypertension, and Vertigo.    Medical and Therapy History Review:   Brief               Performance Deficits    Physical:  Joint Mobility    Cognitive:  No Deficits    Psychosocial:    No Deficits     Clinical Decision Making:  low    Assessment Process:  Problem-Focused Assessments    Modification/Need for Assistance:  Not Necessary    Intervention Selection:  Limited Treatment Options       low  Based on PMHX, co morbidities , data from assessments and functional level of assistance required  with task and clinical presentation directly impacting function.       The following goals were discussed with the patient and patient is in agreement with them as to be addressed in the treatment plan.          GOALS: 6  weeks. Pt agrees with goals set.  Goals:     Short Term (4 weeks on 3/11/19 ):  1)   Patient to be IND with HEP and modalities for pain management, progressing   2)   Increase ROM 10 degrees  For  Pip flexion  motion to increase functional hand use for right hand ( ring finger ) , progressing   3)   Increase  strength by  5 lbs. to grasp  Right  hand , progressing       Long Term (by discharge):  1)   Pt will report 1 out of 10 pain with right  ., progressing   2)   Patient to score of CJ on FOTO to demonstrate improved perception of functionalright hand/ arm  Use. Progressing   3)   Pt will return to prior level of function for ADLs and household management, progressing              Plan   Certification Period/Plan of care expiration: 2/11/2019 to  4/30/19 .    Outpatient Occupational Therapy 1 times weekly for 2 weeks may include the following interventions: Fluidotherapy, Manual therapy/joint mobilizations, US 3 mhz, Therapeutic exercises/activities. and Strengthening.      Bailey Lara, OT

## 2019-02-12 ENCOUNTER — PATIENT MESSAGE (OUTPATIENT)
Dept: SPORTS MEDICINE | Facility: CLINIC | Age: 64
End: 2019-02-12

## 2019-02-18 ENCOUNTER — CLINICAL SUPPORT (OUTPATIENT)
Dept: REHABILITATION | Facility: HOSPITAL | Age: 64
End: 2019-02-18
Attending: PHYSICIAN ASSISTANT
Payer: MEDICARE

## 2019-02-18 PROCEDURE — 97110 THERAPEUTIC EXERCISES: CPT

## 2019-02-18 NOTE — PROGRESS NOTES
Occupational Therapy Daily Treatment Note   Date 2/18/2019    Name: Ladarius Solis  Clinic Number: [id number]  Name: Ladarius Solis  Clinic Number: [id number]     Therapy Diagnosis:        Encounter Diagnosis   Name Primary?    Trigger ring finger of right hand      bilateral carpal tunnel syndrome   Physician: Cooper Tyler MD     Physician Orders:  eval and treat   Medical Diagnosis: G56.03 bilateral carpal tunnel and M65.341 trigger finger right ring finger   Surgical Procedure/ Date :none   Evaluation Date: 2/11/2019  Insurance:medicare   Insurance Authorization period Expiration: 1/21/2020  Plan of Care Certification Period: 4/30/19      Visit # / Visits Authortized: 1 / 1 per computer at time of eval   Time In:1:00  Time Out: 2:00  Total Billable Time: 60 minutes     Precautions: Standard  Involved Side:  right  Dominant Side: Left  Date of Onset:  1  1/2 years ago   Mechanism of Injury:  Started with carpal tunnel issues about a year ago , he is not really worried with any of the carpal tunnel issues , main issue he wants therapy to focus on is right ring tigger finger.  He got trigger finger injection dec 2018 and reports that he has not has any change in pain or function.        History of Current Condition: bilateral carpal tunnel and  Ring  Trigger ifinger issues      Imaging: none   Previous Therapy: Pt has never had therapy for hands      Patient's Goals for Therapy: concerned about finger sticking and he has pain Pain:  Functional Pain Scale Rating 0-10:   4/10 on average  210 at best  6/10 at worst   Locationbilateral: carpal tunnel syndrome and right ring trigger finger.   Description: Burning trigger finger pain   Aggravating Factors: Bending  Easing Factors: massage      Subjective   Pt states that he thinks that trigger finger brace has helped .     Pt reports: he was compliant with home exercise program given last session.   Response to previous treatment:good  , he feels that brace is helping    Functional change:     Pain: 3/10  Location: right  Ring fingers      Objective     Ladarius received the following manual therapy techniques for 10 minutes:   -STM with right ring finger over A 1pulley       Modalities: ultrasound 1.0 w/cm2 X 6 minutes     Ladarius received therapeutic exercises for 10 minutes including:  - TGE and MP blocked in extension with pip flexion      Continue wearing trigger finger orficast splint for MP blocking splint to be be worn during the day with activity .   Recommended  Use of wrist splint for night time wear for positioning for carpal tunnel .      Home Exercise Program/Education:  Issued HEP: TGE , use of Mp blocking splint        Home Exercises and Education Provided     Education provided: nothing additional   -   - Progress towards goals     Written Home Exercises Provided: Patient instructed to cont prior HEP.  Exercises were reviewed and Ladarius was able to demonstrate them prior to the end of the session.  Ladarius demonstrated good  understanding of the education provided.   .        Assessment     Pt would continue to benefit from skilled OT. Yes      Ladarius is progressing well towards his goals and there are no updates to goals at this time. Pt prognosis is Good.     Pt will continue to benefit from skilled outpatient occupational therapy to address the deficits listed in the problem list on initial evaluation provide pt/family education and to maximize pt's level of independence in the home and community environment.     Anticipated barriers to occupational therapy: none     Pt's spiritual, cultural and educational needs considered and pt agreeable to plan of care and goals.        GOALS: 6  weeks. Pt agrees with goals set.       Short Term (4 weeks on 3/11/19 ):  1)   Patient to be IND with HEP and modalities for pain management, progressing   2)   Increase ROM 10 degrees  For  Pip flexion  motion to increase functional hand use for  right hand ( ring finger ) , progressing   3)   Increase  strength by  5 lbs. to grasp  Right  hand , progressing         Long Term (by discharge):  1)   Pt will report 1 out of 10 pain with right  ., progressing   2)   Patient to score of CJ on FOTO to demonstrate improved perception of functionalright hand/ arm  Use. Progressing   3)   Pt will return to prior level of function for ADLs and household management, progressing              Discussed Plan of Care with patient: Yes  Updates/Grading for next session: no      Bailey Lara OT

## 2019-02-26 ENCOUNTER — CLINICAL SUPPORT (OUTPATIENT)
Dept: REHABILITATION | Facility: HOSPITAL | Age: 64
End: 2019-02-26
Attending: PHYSICIAN ASSISTANT
Payer: MEDICARE

## 2019-02-26 DIAGNOSIS — M65.341 TRIGGER RING FINGER OF RIGHT HAND: Primary | ICD-10-CM

## 2019-02-26 PROCEDURE — 97035 APP MDLTY 1+ULTRASOUND EA 15: CPT

## 2019-02-26 PROCEDURE — 97110 THERAPEUTIC EXERCISES: CPT

## 2019-02-26 NOTE — PROGRESS NOTES
Occupational Therapy Daily Treatment Note   Date 2/26/2019    Name: Ladarius Solis  Clinic Number: [id number]  Name: Ladarius Solis  Clinic Number: [id number]     Therapy Diagnosis:        Encounter Diagnosis   Name Primary?    Trigger ring finger of right hand      bilateral carpal tunnel syndrome   Physician: Cooper Tyler MD     Physician Orders:  eval and treat   Medical Diagnosis: G56.03 bilateral carpal tunnel and M65.341 trigger finger right ring finger   Surgical Procedure/ Date :none   Evaluation Date: 2/11/2019  Insurance:medicare   Insurance Authorization period Expiration: 1/21/2020  Plan of Care Certification Period: 4/30/19      Visit # / Visits Authortized: 3 / 19 updated in computer   Time In:2:00  Time Out: 3:00  Total Billable Time: 60 minutes     Precautions: Standard  Involved Side:  right  Dominant Side: Left  Date of Onset:  1  1/2 years ago   Mechanism of Injury:  Started with carpal tunnel issues about a year ago , he is not really worried with any of the carpal tunnel issues , main issue he wants therapy to focus on is right ring tigger finger.  He got trigger finger injection dec 2018 and reports that he has not has any change in pain or function.        History of Current Condition: bilateral carpal tunnel and  Ring  Trigger ifinger issues      Imaging: none   Previous Therapy: Pt has never had therapy for hands      Patient's Goals for Therapy: concerned about finger sticking and he has pain Pain:  Functional Pain Scale Rating 0-10:   4/10 on average  210 at best  6/10 at worst   Locationbilateral: carpal tunnel syndrome and right ring trigger finger.   Description: Burning trigger finger pain   Aggravating Factors: Bending  Easing Factors: massage      Subjective   Pt states that he thinks that trigger finger brace has helped .     Pt reports: he was compliant with home exercise program given last session.   Response to previous treatment:good , he  feels that brace is helping    Functional change:     Pain: 3/10  Location: right  Ring fingers      Objective     Ladarius received the following manual therapy techniques for 10 minutes:   -STM with right ring finger over A 1pulley       Modalities: ultrasound 1.0 w/cm2 X 6 minutes     Ladarius received therapeutic exercises for 10 minutes including:  - TGE and MP blocked in extension with pip flexion      Continue wearing trigger finger orficast splint for MP blocking splint to be be worn during the day with activity .   Recommended  Use of wrist splint for night time wear for positioning for carpal tunnel .      Home Exercise Program/Education:  Issued HEP: TGE , use of Mp blocking splint        Home Exercises and Education Provided     Education provided: nothing additional   -   - Progress towards goals     Written Home Exercises Provided: Patient instructed to cont prior HEP.  Exercises were reviewed and Ladarius was able to demonstrate them prior to the end of the session.  Ladarius demonstrated good  understanding of the education provided.   .        Assessment     Pt would continue to benefit from skilled OT. Yes      Ladarius is progressing well towards his goals and there are no updates to goals at this time. Pt prognosis is Good.     Pt will continue to benefit from skilled outpatient occupational therapy to address the deficits listed in the problem list on initial evaluation provide pt/family education and to maximize pt's level of independence in the home and community environment.     Anticipated barriers to occupational therapy: none     Pt's spiritual, cultural and educational needs considered and pt agreeable to plan of care and goals.        GOALS: 6  weeks. Pt agrees with goals set.       Short Term (4 weeks on 3/11/19 ):  1)   Patient to be IND with HEP and modalities for pain management, progressing   2)   Increase ROM 10 degrees  For  Pip flexion  motion to increase functional hand use for right  hand ( ring finger ) , progressing   3)   Increase  strength by  5 lbs. to grasp  Right  hand , progressing         Long Term (by discharge):  1)   Pt will report 1 out of 10 pain with right  ., progressing   2)   Patient to score of CJ on FOTO to demonstrate improved perception of functionalright hand/ arm  Use. Progressing   3)   Pt will return to prior level of function for ADLs and household management, progressing              Discussed Plan of Care with patient: Yes  Updates/Grading for next session: no      Bailey Lara OT

## 2019-03-06 ENCOUNTER — CLINICAL SUPPORT (OUTPATIENT)
Dept: REHABILITATION | Facility: HOSPITAL | Age: 64
End: 2019-03-06
Attending: PHYSICIAN ASSISTANT
Payer: MEDICARE

## 2019-03-06 DIAGNOSIS — M65.341 TRIGGER RING FINGER OF RIGHT HAND: Primary | ICD-10-CM

## 2019-03-06 PROCEDURE — 97110 THERAPEUTIC EXERCISES: CPT

## 2019-03-06 PROCEDURE — 97035 APP MDLTY 1+ULTRASOUND EA 15: CPT

## 2019-03-06 NOTE — PROGRESS NOTES
Occupational Therapy Daily Treatment Note   Date 3/6/2019    Name: Ladarius Solis  Clinic Number: [id number]  Name: Ladarius Solis  Clinic Number: [id number]     Therapy Diagnosis:        Encounter Diagnosis   Name Primary?    Trigger ring finger of right hand      bilateral carpal tunnel syndrome   Physician: Cooper Tyler MD     Physician Orders:  eval and treat   Medical Diagnosis: G56.03 bilateral carpal tunnel and M65.341 trigger finger right ring finger   Surgical Procedure/ Date :none   Evaluation Date: 2/11/2019  Insurance:medicare   Insurance Authorization period Expiration: 1/21/2020  Plan of Care Certification Period: 4/30/19      Visit # / Visits Authortized: 4// 19 updated in computer   Time In:2:00  Time Out: 3:00  Total Billable Time: 60 minutes     Precautions: Standard  Involved Side:  right  Dominant Side: Left  Date of Onset:  1  1/2 years ago   Mechanism of Injury:  Started with carpal tunnel issues about a year ago , he is not really worried with any of the carpal tunnel issues , main issue he wants therapy to focus on is right ring tigger finger.  He got trigger finger injection dec 2018 and reports that he has not has any change in pain or function.        History of Current Condition: bilateral carpal tunnel and  Ring  Trigger ifinger issues      Imaging: none   Previous Therapy: Pt has never had therapy for hands      Patient's Goals for Therapy: concerned about finger sticking and he has pain Pain:  Functional Pain Scale Rating 0-10:   4/10 on average  210 at best  6/10 at worst   Locationbilateral: carpal tunnel syndrome and right ring trigger finger.   Description: Burning trigger finger pain   Aggravating Factors: Bending  Easing Factors: massage      Subjective   Pt states that he thinks that trigger finger brace has helped .     Pt reports: he was compliant with home exercise program given last session.   Response to previous treatment:good , he  feels that brace is helping    Functional change:     Pain: 3/10  Location: right  Ring fingers      Objective     Ladarius received the following manual therapy techniques for 10 minutes:   -STM with right ring finger over A 1pulley       Modalities: ultrasound 1.0 w/cm2 X 6 minutes     Ladarius received therapeutic exercises for 10 minutes including:  - TGE and MP blocked in extension with pip flexion         AROM right ring finger   Mp 0/90  Pip 0/90   Dip 0/60  Continue wearing trigger finger orficast splint for MP blocking splint to be be worn during the day with activity .   Recommended  Use of wrist splint for night time wear for positioning for carpal tunnel .      Home Exercise Program/Education:  Issued HEP: TGE , use of Mp blocking splint        Home Exercises and Education Provided     Education provided: nothing additional   -   - Progress towards goals     Written Home Exercises Provided: Patient instructed to cont prior HEP.  Exercises were reviewed and Ladarius was able to demonstrate them prior to the end of the session.  Ladarius demonstrated good  understanding of the education provided.   .        Assessment     Pt would continue to benefit from skilled OT. Yes      Ladarius is progressing well towards his goals and there are no updates to goals at this time. Pt prognosis is Good.     Pt will continue to benefit from skilled outpatient occupational therapy to address the deficits listed in the problem list on initial evaluation provide pt/family education and to maximize pt's level of independence in the home and community environment.     Anticipated barriers to occupational therapy: none     Pt's spiritual, cultural and educational needs considered and pt agreeable to plan of care and goals.        GOALS: 6  weeks. Pt agrees with goals set.       Short Term (4 weeks on 3/11/19 ):  1)   Patient to be IND with HEP and modalities for pain management, progressing   2)   Increase ROM 10 degrees  For  Pip  flexion  motion to increase functional hand use for right hand ( ring finger ) , progressing   3)   Increase  strength by  5 lbs. to grasp  Right  hand , progressing         Long Term (by discharge):  1)   Pt will report 1 out of 10 pain with right  ., progressing   2)   Patient to score of CJ on FOTO to demonstrate improved perception of functionalright hand/ arm  Use. Progressing   3)   Pt will return to prior level of function for ADLs and household management, progressing              Discussed Plan of Care with patient: Yes  Updates/Grading for next session: no      Bailey Lara, OT

## 2019-03-11 ENCOUNTER — CLINICAL SUPPORT (OUTPATIENT)
Dept: REHABILITATION | Facility: HOSPITAL | Age: 64
End: 2019-03-11
Attending: PHYSICIAN ASSISTANT
Payer: MEDICARE

## 2019-03-11 DIAGNOSIS — M65.341 TRIGGER RING FINGER OF RIGHT HAND: Primary | ICD-10-CM

## 2019-03-11 PROCEDURE — 97022 WHIRLPOOL THERAPY: CPT

## 2019-03-11 PROCEDURE — 97110 THERAPEUTIC EXERCISES: CPT

## 2019-03-11 NOTE — PROGRESS NOTES
Occupational Therapy Daily Treatment Note   Date 3/11/2019    Name: Ladarius Solis  Clinic Number: [id number]  Name: Ladarius Solis  Clinic Number: [id number]     Therapy Diagnosis:        Encounter Diagnosis   Name Primary?    Trigger ring finger of right hand      bilateral carpal tunnel syndrome   Physician: Cooper Tyler MD     Physician Orders:  eval and treat   Medical Diagnosis: G56.03 bilateral carpal tunnel and M65.341 trigger finger right ring finger   Surgical Procedure/ Date :none   Evaluation Date: 2/11/2019  Insurance:medicare   Insurance Authorization period Expiration: 1/21/2020  Plan of Care Certification Period: 4/30/19      Visit # / Visits Authortized: 4// 19 updated in computer   Time In:2:00  Time Out: 3:00  Total Billable Time: 60 minutes     Precautions: Standard  Involved Side:  right  Dominant Side: Left  Date of Onset:  1  1/2 years ago   Mechanism of Injury:  Started with carpal tunnel issues about a year ago , he is not really worried with any of the carpal tunnel issues , main issue he wants therapy to focus on is right ring tigger finger.  He got trigger finger injection dec 2018 and reports that he has not has any change in pain or function.        History of Current Condition: bilateral carpal tunnel and  Ring  Trigger ifinger issues      Imaging: none   Previous Therapy: Pt has never had therapy for hands      Patient's Goals for Therapy: concerned about finger sticking and he has pain Pain:  Functional Pain Scale Rating 0-10:   4/10 on average  210 at best  6/10 at worst   Locationbilateral: carpal tunnel syndrome and right ring trigger finger.   Description: Burning trigger finger pain   Aggravating Factors: Bending  Easing Factors: massage      Subjective   Pt states that he thinks that trigger finger brace has helped .     Pt reports: he was compliant with home exercise program given last session.   Response to previous treatment:good , he  "feels that brace is helping    Functional change:     Pain: 3/10  Location: right  Ring fingers      Objective     Ladarius received the following manual therapy techniques for 10 minutes:   -STM with right ring finger over A 1pulley       Modalities: ultrasound 1.0 w/cm2 X 6 minutes     Ladarius received therapeutic exercises for 10 minutes including:  - TGE and MP blocked in extension with pip flexion      Yellow putty gripping , scapes 2"   AROM right ring finger   Mp 0/90  Pip 0/90   Dip 0/60  Continue wearing trigger finger orficast splint for MP blocking splint to be be worn during the day with activity .   Recommended  Use of wrist splint for night time wear for positioning for carpal tunnel .      Home Exercise Program/Education:  Issued HEP: TGE , use of Mp blocking splint        Home Exercises and Education Provided     Education provided: nothing additional   -   - Progress towards goals     Written Home Exercises Provided: Patient instructed to cont prior HEP.  Exercises were reviewed and Ladarius was able to demonstrate them prior to the end of the session.  Ladarius demonstrated good  understanding of the education provided.   .        Assessment     Pt would continue to benefit from skilled OT. Yes      Ladarius is progressing well towards his goals and there are no updates to goals at this time. Pt prognosis is Good.     Pt will continue to benefit from skilled outpatient occupational therapy to address the deficits listed in the problem list on initial evaluation provide pt/family education and to maximize pt's level of independence in the home and community environment.     Anticipated barriers to occupational therapy: none     Pt's spiritual, cultural and educational needs considered and pt agreeable to plan of care and goals.        GOALS: 6  weeks. Pt agrees with goals set.       Short Term (4 weeks on 3/11/19 ):  1)   Patient to be IND with HEP and modalities for pain management, progressing   2)   " Increase ROM 10 degrees  For  Pip flexion  motion to increase functional hand use for right hand ( ring finger ) , progressing   3)   Increase  strength by  5 lbs. to grasp  Right  hand , progressing         Long Term (by discharge):  1)   Pt will report 1 out of 10 pain with right  ., progressing   2)   Patient to score of CJ on FOTO to demonstrate improved perception of functionalright hand/ arm  Use. Progressing   3)   Pt will return to prior level of function for ADLs and household management, progressing    Certification Period/Plan of care expiration: 2/11/2019 to  4/30/19 .     Outpatient Occupational Therapy 1 times weekly for 2 weeks may include the following interventions: Fluidotherapy, Manual therapy/joint mobilizations, US 3 mhz, Therapeutic exercises/activities. and Strengthening.               Discussed Plan of Care with patient: Yes  Updates/Grading for next session: no      Bailey Lara, OT

## 2019-03-29 ENCOUNTER — OFFICE VISIT (OUTPATIENT)
Dept: OPTOMETRY | Facility: CLINIC | Age: 64
End: 2019-03-29
Payer: MEDICARE

## 2019-03-29 DIAGNOSIS — H10.503 BLEPHAROCONJUNCTIVITIS OF BOTH EYES, UNSPECIFIED BLEPHAROCONJUNCTIVITIS TYPE: Primary | ICD-10-CM

## 2019-03-29 DIAGNOSIS — H04.123 DRY EYE SYNDROME OF BOTH EYES: ICD-10-CM

## 2019-03-29 PROCEDURE — 92002 INTRM OPH EXAM NEW PATIENT: CPT | Mod: S$PBB,,, | Performed by: OPTOMETRIST

## 2019-03-29 PROCEDURE — 99213 OFFICE O/P EST LOW 20 MIN: CPT | Mod: PBBFAC,PO | Performed by: OPTOMETRIST

## 2019-03-29 PROCEDURE — 99999 PR PBB SHADOW E&M-EST. PATIENT-LVL III: CPT | Mod: PBBFAC,,, | Performed by: OPTOMETRIST

## 2019-03-29 PROCEDURE — 92002 PR EYE EXAM, NEW PATIENT,INTERMED: ICD-10-PCS | Mod: S$PBB,,, | Performed by: OPTOMETRIST

## 2019-03-29 PROCEDURE — 99999 PR PBB SHADOW E&M-EST. PATIENT-LVL III: ICD-10-PCS | Mod: PBBFAC,,, | Performed by: OPTOMETRIST

## 2019-03-29 RX ORDER — NEOMYCIN SULFATE, POLYMYXIN B SULFATE AND DEXAMETHASONE 3.5; 10000; 1 MG/ML; [USP'U]/ML; MG/ML
1 SUSPENSION/ DROPS OPHTHALMIC 4 TIMES DAILY
Qty: 5 ML | Refills: 0 | Status: SHIPPED | OUTPATIENT
Start: 2019-03-29 | End: 2019-04-08

## 2019-03-29 NOTE — PROGRESS NOTES
HPI     Pt has had redness,sorness,and crust forming for the past three days OU.   Cold compresses helped with soreness.     Last edited by Stephanie Dickens on 3/29/2019  1:09 PM. (History)        ROS     Positive for: Eyes    Negative for: Constitutional, Gastrointestinal, Neurological, Skin,   Genitourinary, Musculoskeletal, HENT, Endocrine, Cardiovascular,   Respiratory, Psychiatric, Allergic/Imm, Heme/Lymph    Last edited by Pawan Masterson, OD on 3/29/2019  1:17 PM. (History)        Assessment /Plan     For exam results, see Encounter Report.    Blepharoconjunctivitis of both eyes, unspecified blepharoconjunctivitis type  -     neomycin-polymyxin-dexamethasone (MAXITROL) 3.5mg/mL-10,000 unit/mL-0.1 % DrpS; Place 1 drop into both eyes 4 (four) times daily. for 10 days  Dispense: 5 mL; Refill: 0    Dry eye syndrome of both eyes    1. Recommend cold compresses and lid crubs at least BID OU. Rx Maxitrol QID OU x 10 days. RTC 2 weeks Routine  2. Will address w/rec of gel drops at next visit.

## 2019-03-29 NOTE — PATIENT INSTRUCTIONS
Treating Blepharitis: Self-Care    To treat the problem, keep your eyelids clean.      To use an eyelid scrub:  1. Wash your hands with soap and warm water.  2. Use a ready-made eyelid scrub. Or mix 3 drops of baby shampoo in 1/4 cup of warm water.  3. Dip a lint-free pad, cotton swab, or clean washcloth in the scrub.  4. Close one eye and gently scrub the base of the eyelid.  5. Rinse the lid in cool water and dry with a clean towel.  6. Repeat on your other eye.  Date Last Reviewed: 6/6/2015  © [id number] [employer]. [address] [place] [address], [place] [address]. All rights reserved. This information is not intended as a substitute for professional medical care. Always follow your healthcare professional's instructions.

## 2019-04-01 ENCOUNTER — CLINICAL SUPPORT (OUTPATIENT)
Dept: REHABILITATION | Facility: HOSPITAL | Age: 64
End: 2019-04-01
Attending: PHYSICIAN ASSISTANT
Payer: MEDICARE

## 2019-04-01 DIAGNOSIS — M65.341 TRIGGER RING FINGER OF RIGHT HAND: Primary | ICD-10-CM

## 2019-04-01 PROCEDURE — 97110 THERAPEUTIC EXERCISES: CPT

## 2019-04-01 PROCEDURE — 97022 WHIRLPOOL THERAPY: CPT

## 2019-04-01 NOTE — PROGRESS NOTES
Occupational Therapy Daily Treatment Note   Date 4/1/2019    Name: Ladarius Solis  Clinic Number: [id number]  Name: Ladarius Solis  Clinic Number: [id number]     Therapy Diagnosis:        Encounter Diagnosis   Name Primary?    Trigger ring finger of right hand      bilateral carpal tunnel syndrome   Physician: Cooper Tyler MD     Physician Orders:  eval and treat   Medical Diagnosis: G56.03 bilateral carpal tunnel and M65.341 trigger finger right ring finger   Surgical Procedure/ Date :none   Evaluation Date: 2/11/2019  Insurance:medicare   Insurance Authorization period Expiration: 1/21/2020  Plan of Care Certification Period: 4/30/19      Visit # / Visits Authortized: 5/ 19 updated in computer   Time In:2:00  Time Out: 3:00  Total Billable Time: 60 minutes     Precautions: Standard  Involved Side:  right  Dominant Side: Left  Date of Onset:  1  1/2 years ago   Mechanism of Injury:  Started with carpal tunnel issues about a year ago , he is not really worried with any of the carpal tunnel issues , main issue he wants therapy to focus on is right ring tigger finger.  He got trigger finger injection dec 2018 and reports that he has not has any change in pain or function.        History of Current Condition: bilateral carpal tunnel and  Ring  Trigger ifinger issues      Imaging: none   Previous Therapy: Pt has never had therapy for hands      Patient's Goals for Therapy: concerned about finger sticking and he has pain Pain:  Functional Pain Scale Rating 0-10:   4/10 on average  210 at best  6/10 at worst   Locationbilateral: carpal tunnel syndrome and right ring trigger finger.   Description: Burning trigger finger pain   Aggravating Factors: Bending  Easing Factors: massage      Subjective   Pt states that he thinks that trigger finger brace has helped .     Pt reports: he was compliant with home exercise program given last session.   Response to previous treatment:good , he  "feels that brace is helping    Functional change:     Pain: 3/10  Location: right  Ring fingers      Objective     Ladarius received the following manual therapy techniques for 10 minutes:   -STM with right ring finger over A 1pulley       Modalities: ultrasound 1.0 w/cm2 X 6 minutes     Ladarius received therapeutic exercises for 10 minutes including:  - TGE and MP blocked in extension with pip flexion      Yellow putty gripping , scapes 2"   AROM right ring finger   Mp 0/90  Pip 0/90   Dip 0/60  Continue wearing trigger finger orficast splint for MP blocking splint to be be worn during the day with activity .   Recommended  Use of wrist splint for night time wear for positioning for carpal tunnel .      Home Exercise Program/Education:  Issued HEP: TGE , use of Mp blocking splint        Home Exercises and Education Provided     Education provided: nothing additional   -   - Progress towards goals     Written Home Exercises Provided: Patient instructed to cont prior HEP.  Exercises were reviewed and Ladarius was able to demonstrate them prior to the end of the session.  Ladarius demonstrated good  understanding of the education provided.   .        Assessment     Pt would continue to benefit from skilled OT. Yes      Ladarius is progressing well towards his goals and there are no updates to goals at this time. Pt prognosis is Good.     Pt will continue to benefit from skilled outpatient occupational therapy to address the deficits listed in the problem list on initial evaluation provide pt/family education and to maximize pt's level of independence in the home and community environment.     Anticipated barriers to occupational therapy: none     Pt's spiritual, cultural and educational needs considered and pt agreeable to plan of care and goals.        GOALS: 6  weeks. Pt agrees with goals set.       Short Term (4 weeks on 3/11/19 ):  1)   Patient to be IND with HEP and modalities for pain management, progressing   2)   " Increase ROM 10 degrees  For  Pip flexion  motion to increase functional hand use for right hand ( ring finger ) , progressing   3)   Increase  strength by  5 lbs. to grasp  Right  hand , progressing         Long Term (by discharge):  1)   Pt will report 1 out of 10 pain with right  ., progressing   2)   Patient to score of CJ on FOTO to demonstrate improved perception of functionalright hand/ arm  Use. Progressing   3)   Pt will return to prior level of function for ADLs and household management, progressing    Certification Period/Plan of care expiration: 2/11/2019 to  4/30/19 .     Outpatient Occupational Therapy 1 times weekly for 2 weeks may include the following interventions: Fluidotherapy, Manual therapy/joint mobilizations, US 3 mhz, Therapeutic exercises/activities. and Strengthening.               Discussed Plan of Care with patient: Yes  Updates/Grading for next session: no      Bailey Lara, OT

## 2019-04-04 ENCOUNTER — TELEPHONE (OUTPATIENT)
Dept: INTERNAL MEDICINE | Facility: CLINIC | Age: 64
End: 2019-04-04

## 2019-04-04 NOTE — TELEPHONE ENCOUNTER
That is not something I can do if I did not see him. Maybe there are more details but doesn't sound like something I can do for what he gave us.

## 2019-04-04 NOTE — TELEPHONE ENCOUNTER
----- Message from Altaf Shukla sent at 4/4/2019  3:27 PM CDT -----  Contact: Patient [phone]  Patient is returning a phone call.  Who left a message for the patient: Dr lai  Does patient know what this is regarding:  Previous message  Comments:     Please call an advise  Thank you

## 2019-04-04 NOTE — TELEPHONE ENCOUNTER
----- Message from Ann Marie S Angelo sent at 4/3/2019  4:45 PM CDT -----  Contact: Pt self Mobile/Home [phone]  Patient is calling in regards to him wanting to know if you would write him a letter for missing work for almost the whole month of April due to him having a family emergency? He said that he would like for you to write a letter for him to bring to his job [employer] stating that he stayed home from work due to his father having a severe case of dementia. He did not have any address of phone numbers for his job.

## 2019-04-05 ENCOUNTER — TELEPHONE (OUTPATIENT)
Dept: OPHTHALMOLOGY | Facility: CLINIC | Age: 64
End: 2019-04-05

## 2019-04-12 ENCOUNTER — OFFICE VISIT (OUTPATIENT)
Dept: OPTOMETRY | Facility: CLINIC | Age: 64
End: 2019-04-12
Payer: MEDICARE

## 2019-04-12 DIAGNOSIS — Z96.1 PSEUDOPHAKIA: ICD-10-CM

## 2019-04-12 DIAGNOSIS — H52.13 MYOPIA WITH ASTIGMATISM AND PRESBYOPIA, BILATERAL: ICD-10-CM

## 2019-04-12 DIAGNOSIS — H04.123 DRY EYE SYNDROME OF BOTH EYES: Primary | ICD-10-CM

## 2019-04-12 DIAGNOSIS — H52.203 MYOPIA WITH ASTIGMATISM AND PRESBYOPIA, BILATERAL: ICD-10-CM

## 2019-04-12 DIAGNOSIS — H52.4 MYOPIA WITH ASTIGMATISM AND PRESBYOPIA, BILATERAL: ICD-10-CM

## 2019-04-12 PROCEDURE — 99213 OFFICE O/P EST LOW 20 MIN: CPT | Mod: PBBFAC,PO | Performed by: OPTOMETRIST

## 2019-04-12 PROCEDURE — 92015 DETERMINE REFRACTIVE STATE: CPT | Mod: ,,, | Performed by: OPTOMETRIST

## 2019-04-12 PROCEDURE — 92014 PR EYE EXAM, EST PATIENT,COMPREHESV: ICD-10-PCS | Mod: S$PBB,,, | Performed by: OPTOMETRIST

## 2019-04-12 PROCEDURE — 99999 PR PBB SHADOW E&M-EST. PATIENT-LVL III: CPT | Mod: PBBFAC,,, | Performed by: OPTOMETRIST

## 2019-04-12 PROCEDURE — 92014 COMPRE OPH EXAM EST PT 1/>: CPT | Mod: S$PBB,,, | Performed by: OPTOMETRIST

## 2019-04-12 PROCEDURE — 92015 PR REFRACTION: ICD-10-PCS | Mod: ,,, | Performed by: OPTOMETRIST

## 2019-04-12 PROCEDURE — 99999 PR PBB SHADOW E&M-EST. PATIENT-LVL III: ICD-10-PCS | Mod: PBBFAC,,, | Performed by: OPTOMETRIST

## 2019-04-12 NOTE — PROGRESS NOTES
HPI     Patient here for annual eye exam. Patient c/o of stiffness, dryness, and   discharge. Patient denies refraction today. Patient denies any other   ocular complaints.    Eye meds: maxitrol 4-5x daily ou    No previous ocular sx.    Hx of blepharoconjunctivitis ou, dry eye syndrome ou.    Last edited by Cristiane Mensah on 4/12/2019  1:22 PM. (History)        ROS     Negative for: Constitutional, Gastrointestinal, Neurological, Skin,   Genitourinary, Musculoskeletal, HENT, Endocrine, Cardiovascular, Eyes,   Respiratory, Psychiatric, Allergic/Imm, Heme/Lymph    Last edited by Pawan Masterson, OD on 4/12/2019  1:49 PM. (History)        Assessment /Plan     For exam results, see Encounter Report.    Dry eye syndrome of both eyes    Pseudophakia    Myopia with astigmatism and presbyopia, bilateral      1. Rec Refresh Liquigel TID OU.   2. Good result.   3. SRx released to patient. Patient educated on lens options. Normal ocular health. RTC 1 year for routine exam.

## 2019-04-22 ENCOUNTER — CLINICAL SUPPORT (OUTPATIENT)
Dept: REHABILITATION | Facility: HOSPITAL | Age: 64
End: 2019-04-22
Attending: PHYSICIAN ASSISTANT
Payer: MEDICARE

## 2019-04-22 ENCOUNTER — OFFICE VISIT (OUTPATIENT)
Dept: ORTHOPEDICS | Facility: CLINIC | Age: 64
End: 2019-04-22
Payer: MEDICARE

## 2019-04-22 VITALS
DIASTOLIC BLOOD PRESSURE: 77 MMHG | HEART RATE: 80 BPM | BODY MASS INDEX: 25.71 KG/M2 | WEIGHT: 160 LBS | SYSTOLIC BLOOD PRESSURE: 123 MMHG | HEIGHT: 66 IN

## 2019-04-22 DIAGNOSIS — M65.341 TRIGGER RING FINGER OF RIGHT HAND: Primary | ICD-10-CM

## 2019-04-22 PROCEDURE — 99999 PR PBB SHADOW E&M-EST. PATIENT-LVL III: ICD-10-PCS | Mod: PBBFAC,,, | Performed by: ORTHOPAEDIC SURGERY

## 2019-04-22 PROCEDURE — 99999 PR PBB SHADOW E&M-EST. PATIENT-LVL III: CPT | Mod: PBBFAC,,, | Performed by: ORTHOPAEDIC SURGERY

## 2019-04-22 PROCEDURE — 99213 OFFICE O/P EST LOW 20 MIN: CPT | Mod: S$PBB,,, | Performed by: ORTHOPAEDIC SURGERY

## 2019-04-22 PROCEDURE — 99213 PR OFFICE/OUTPT VISIT, EST, LEVL III, 20-29 MIN: ICD-10-PCS | Mod: S$PBB,,, | Performed by: ORTHOPAEDIC SURGERY

## 2019-04-22 PROCEDURE — 99213 OFFICE O/P EST LOW 20 MIN: CPT | Mod: PBBFAC | Performed by: ORTHOPAEDIC SURGERY

## 2019-04-22 NOTE — PROGRESS NOTES
Occupational Therapy Daily Treatment Note   Date 4/22/2019    Name: Ladarius Solis  Clinic Number: [id number]  Name: Ladarius Solis  Clinic Number: [id number]     Therapy Diagnosis:        Encounter Diagnosis   Name Primary?    Trigger ring finger of right hand      bilateral carpal tunnel syndrome   Physician: Cooper Tyler MD     Physician Orders:  eval and treat   Medical Diagnosis: G56.03 bilateral carpal tunnel and M65.341 trigger finger right ring finger   Surgical Procedure/ Date :none   Evaluation Date: 2/11/2019  Insurance:medicare   Insurance Authorization period Expiration: 1/21/2020  Plan of Care Certification Period: 6/30/19      Visit # / Visits Authortized: 6/19 updated in computer   Time In:10:00  Time Out: 11:oo  Total Billable Time: 60 minutes     Precautions: Standard  Involved Side:  right  Dominant Side: Left  Date of Onset:  1  1/2 years ago   Mechanism of Injury:  Started with carpal tunnel issues about a year ago , he is not really worried with any of the carpal tunnel issues , main issue he wants therapy to focus on is right ring tigger finger.  He got trigger finger injection dec 2018 and reports that he has not has any change in pain or function.        History of Current Condition: bilateral carpal tunnel and  Ring  Trigger ifinger issues      Imaging: none   Previous Therapy: Pt has never had therapy for hands      Patient's Goals for Therapy: concerned about finger sticking and he has pain Pain:  Functional Pain Scale Rating 0-10:   4/10 on average  210 at best  6/10 at worst   Locationbilateral: carpal tunnel syndrome and right ring trigger finger.   Description: Burning trigger finger pain   Aggravating Factors: Bending  Easing Factors: massage      Subjective   Pt states that he thinks that trigger finger brace has helped .     Pt reports: he was compliant with home exercise program given last session.   Response to previous treatment:good , he  "feels that brace is helping    Functional change:     Pain: 3/10  Location: right  Ring fingers      Objective     Ladarius received the following manual therapy techniques for 10 minutes:   -STM with right ring finger over A 1pulley       Modalities: ultrasound 1.0 w/cm2 X 6 minutes     Ladarius received therapeutic exercises for 10 minutes including:  - TGE and MP blocked in extension with pip flexion      Yellow putty gripping , scapes 2"   AROM right ring finger   Mp 0/90  Pip 0/90   Dip 0/60  Continue wearing trigger finger orficast splint for MP blocking splint to be be worn during the day with activity .   Recommended  Use of wrist splint for night time wear for positioning for carpal tunnel .      Home Exercise Program/Education:  Issued HEP: TGE , use of Mp blocking splint        Home Exercises and Education Provided     Education provided: nothing additional   -   - Progress towards goals     Written Home Exercises Provided: Patient instructed to cont prior HEP.  Exercises were reviewed and Ladarius was able to demonstrate them prior to the end of the session.  Ladarius demonstrated good  understanding of the education provided.   .        Assessment     Pt would continue to benefit from skilled OT. Yes      Ladarius is progressing well towards his goals and there are no updates to goals at this time. Pt prognosis is Good.     Pt will continue to benefit from skilled outpatient occupational therapy to address the deficits listed in the problem list on initial evaluation provide pt/family education and to maximize pt's level of independence in the home and community environment.     Anticipated barriers to occupational therapy: none     Pt's spiritual, cultural and educational needs considered and pt agreeable to plan of care and goals.        GOALS: 6  weeks. Pt agrees with goals set.       Short Term (4 weeks on 3/11/19 ):  1)   Patient to be IND with HEP and modalities for pain management, progressing   2)   " Increase ROM 10 degrees  For  Pip flexion  motion to increase functional hand use for right hand ( ring finger ) , progressing   3)   Increase  strength by  5 lbs. to grasp  Right  hand , progressing         Long Term (by discharge):  1)   Pt will report 1 out of 10 pain with right  ., progressing   2)   Patient to score of CJ on FOTO to demonstrate improved perception of functionalright hand/ arm  Use. Progressing   3)   Pt will return to prior level of function for ADLs and household management, progressing    Certification Period/Plan of care expiration:  4/22/19 to 6/30/19 .     Outpatient Occupational Therapy 1 times weekly for 2 weeks may include the following interventions: Fluidotherapy, Manual therapy/joint mobilizations, US 3 mhz, Therapeutic exercises/activities. and Strengthening.               Discussed Plan of Care with patient: Yes  Updates/Grading for next session: no      Bailey Lara, OT

## 2019-04-22 NOTE — PROGRESS NOTES
[facility]  History & Physical  Orthopedics    SUBJECTIVE:      Chief Complaint: leti hand N/T, RRF triggering    Referring Provider: No ref. provider found     Interval History 4/22/19:  Here for follow-up for RRF triggering and bilateral carpal tunnel syndrome. Right ring finger improved with injection on 12/10/18 and OT a fair amount. Pain has improved, but still actively triggers. Has some right hand numbness at baseline as well and numbness going up dorsal forearm, which he attributes to radiculopathy.    History of Present Illness:  Patient is a 63 y.o. left hand dominant male who presents today with complaints of RRF triggering and b/l hand N/T.  He is currently on medical leave from his job 2/2 knee surgery but will be returning once cleared.  He reports R>L hand N/T in the IF, LF, and RF.  He has not tried any treatment.     Interval history 1/21/19: The patient returns today. He reports the cold weather has made his symtpoms slightly worse since last visit.  He did not get much relief from the injection.  He presents for reevaluation.    The patient is a/an [employer] employee.    Onset of symptoms/DOI was 3 months ago.    Symptoms are aggravated by activity.    Symptoms are alleviated by rest.    Symptoms consist of pain and N/T, triggering.    The patient rates their pain as a 8/10.    Attempted treatment(s) and/or interventions include rest.     The patient denies any fevers, chills, N/V, D/C and presents for evaluation.       Past Medical History:   Diagnosis Date    Arthritis     Carpal tunnel syndrome, bilateral     Cervical spinal stenosis 2002    Colon polyps     Hypertension     Vertigo     left ear issues     Past Surgical History:   Procedure Laterality Date    ARTHROSCOPY, KNEE, WITH CHONDROPLASTY Left 10/17/2018    [facility] 2ND FLR    ARTHROSCOPY, KNEE, WITH MENISCECTOMY Left 10/17/2018    Performed by [facility] at [facility]  "OR 2ND FLR    COLONOSCOPY      COLONOSCOPY N/A 3/9/2018    [facility] (2ND FLR)    COLONOSCOPY/emr N/A 9/25/2017    [facility] at [facility] (2ND FLR)     Review of patient's allergies indicates:  No Known Allergies  Social History     Social History Narrative    Not on file     Family History   Problem Relation Age of Onset    No Known Problems Mother     Arthritis Father     Dementia Father     Heart disease Father     No Known Problems Brother     Diabetes Neg Hx          Current Outpatient Medications:     lisinopril 10 MG tablet, Take 1 tablet (10 mg total) by mouth once daily., Disp: 30 tablet, Rfl: 6  No current facility-administered medications for this visit.     Facility-Administered Medications Ordered in Other Visits:     sodium hyaluronate (EUFLEXXA) 10 mg/mL(mw 2.4 -3.6 million) Syrg 20 mg, 20 mg, Intra-articular, , W Toi Carr MD, 20 mg at 05/10/18 0588      Review of Systems:  Constitutional: no fever or chills  Eyes: no visual changes  ENT: no nasal congestion or sore throat  Respiratory: no cough or shortness of breath  Cardiovascular: no chest pain  Gastrointestinal: no nausea or vomiting, tolerating diet  Musculoskeletal: pain and numbness/tingling    OBJECTIVE:      Vital Signs (Most Recent):  Vitals:    04/22/19 1300   BP: 123/77   Pulse: 80   Weight: 72.6 kg (160 lb)   Height: 5' 6" (1.676 m)     Body mass index is 25.82 kg/m².      Physical Exam:  Constitutional: The patient appears well-developed and well-nourished. No distress.   Head: Normocephalic and atraumatic.   Nose: Nose normal.   Eyes: Conjunctivae and EOM are normal.   Neck: No tracheal deviation present.   Cardiovascular: Normal rate and intact distal pulses.    Pulmonary/Chest: Effort normal. No respiratory distress.   Abdominal: There is no guarding.   Neurological: The patient is alert.   Psychiatric: The patient has a normal mood and affect.     Bilateral " Hand/Wrist Examination:    Observation/Inspection:  Swelling  none    Deformity  none  Discoloration  none     Scars   none    Atrophy  None  TTP A1 pulley with triggering seen RRF    HAND/WRIST EXAMINATION:  Finkelstein's Test   Neg  Snuff box tenderness   Neg  Hurtado's Test    Neg  Hook of Hamate Tenderness  Neg  CMC grind    Neg  Circumduction test   Neg    Neurovascular Exam:  Digits WWP, brisk CR < 3s throughout  NVI motor/LTS to M/R/U nerves, radial pulse 2+  Tinel's Test - Carpal Tunnel  +  Tinel's Test - Cubital Tunnel  Neg  Phalen's Test    +  Median Nerve Compression Test -    ROM hand/wrist/elbow full, painless      Diagnostic Results:     Xray - leti hands with no acute fractures or dislocations  EMG - b/l CTS    ASSESSMENT/PLAN:      63 y.o. yo male with b/l CTS, RRF trigger finger  1) Pt not interested in surgical intervention or injection at this time  2) Continue OT for his CTS and RRF trigger finger  3) Nightttime extension splinting advised  4) F/U as needed or to discuss surgical management    Cooper Tyler M.D.

## 2019-04-29 ENCOUNTER — CLINICAL SUPPORT (OUTPATIENT)
Dept: REHABILITATION | Facility: HOSPITAL | Age: 64
End: 2019-04-29
Attending: PHYSICIAN ASSISTANT
Payer: MEDICARE

## 2019-04-29 DIAGNOSIS — M65.341 TRIGGER RING FINGER OF RIGHT HAND: Primary | ICD-10-CM

## 2019-04-29 PROCEDURE — 97022 WHIRLPOOL THERAPY: CPT

## 2019-04-29 PROCEDURE — 97110 THERAPEUTIC EXERCISES: CPT

## 2019-04-29 PROCEDURE — 97035 APP MDLTY 1+ULTRASOUND EA 15: CPT

## 2019-04-29 NOTE — PROGRESS NOTES
Occupational Therapy Daily Treatment Note   Date 4/29/2019    Name: Ladarius Solis  Clinic Number: [id number]  Name: Ladarius Solis  Clinic Number: [id number]     Therapy Diagnosis:        Encounter Diagnosis   Name Primary?    Trigger ring finger of right hand      bilateral carpal tunnel syndrome   Physician: Cooper Tyler MD     Physician Orders:  eval and treat   Medical Diagnosis: G56.03 bilateral carpal tunnel and M65.341 trigger finger right ring finger   Surgical Procedure/ Date :none   Evaluation Date: 2/11/2019  Insurance:medicare   Insurance Authorization period Expiration: 1/21/2020  Plan of Care Certification Period: 6/30/19      Visit # / Visits Authortized: 7/ 19 updated in computer   Time In:10:00  Time Out: 11:oo  Total Billable Time: 60 minutes     Precautions: Standard  Involved Side:  right  Dominant Side: Left  Date of Onset:  1  1/2 years ago   Mechanism of Injury:  Started with carpal tunnel issues about a year ago , he is not really worried with any of the carpal tunnel issues , main issue he wants therapy to focus on is right ring tigger finger.  He got trigger finger injection dec 2018 and reports that he has not has any change in pain or function.        History of Current Condition: bilateral carpal tunnel and  Ring  Trigger ifinger issues      Imaging: none   Previous Therapy: Pt has never had therapy for hands      Patient's Goals for Therapy: concerned about finger sticking and he has pain Pain:  Functional Pain Scale Rating 0-10:   4/10 on average  210 at best  6/10 at worst   Locationbilateral: carpal tunnel syndrome and right ring trigger finger.   Description: Burning trigger finger pain   Aggravating Factors: Bending  Easing Factors: massage      Subjective   Pt states that he thinks that trigger finger brace has helped .     Pt reports: he was compliant with home exercise program given last session.   Response to previous treatment:good , he  "feels that brace is helping    Functional change:     Pain: 3/10  Location: right  Ring fingers      Objective     Ladarius received the following manual therapy techniques for 10 minutes:   -STM with right ring finger over A 1pulley       Modalities: ultrasound 1.0 w/cm2 X 6 minutes     Ladarius received therapeutic exercises for 10 minutes including:  - TGE and MP blocked in extension with pip flexion      Yellow putty gripping , scapes 2"   AROM right ring finger   Mp 0/90  Pip 0/90   Dip 0/60  Continue wearing trigger finger orficast splint for MP blocking splint to be be worn during the day with activity .   Recommended  Use of wrist splint for night time wear for positioning for carpal tunnel .      Home Exercise Program/Education:  Issued HEP: TGE , use of Mp blocking splint        Home Exercises and Education Provided     Education provided: nothing additional   -   - Progress towards goals     Written Home Exercises Provided: Patient instructed to cont prior HEP.  Exercises were reviewed and Ladarius was able to demonstrate them prior to the end of the session.  Ladarius demonstrated good  understanding of the education provided.   .        Assessment     Pt would continue to benefit from skilled OT. Yes      Ladarius is progressing well towards his goals and there are no updates to goals at this time. Pt prognosis is Good.     Pt will continue to benefit from skilled outpatient occupational therapy to address the deficits listed in the problem list on initial evaluation provide pt/family education and to maximize pt's level of independence in the home and community environment.     Anticipated barriers to occupational therapy: none     Pt's spiritual, cultural and educational needs considered and pt agreeable to plan of care and goals.        GOALS: 6  weeks. Pt agrees with goals set.       Short Term (4 weeks on 3/11/19 ):  1)   Patient to be IND with HEP and modalities for pain management, progressing   2)   " Increase ROM 10 degrees  For  Pip flexion  motion to increase functional hand use for right hand ( ring finger ) , progressing   3)   Increase  strength by  5 lbs. to grasp  Right  hand , progressing         Long Term (by discharge):  1)   Pt will report 1 out of 10 pain with right  ., progressing   2)   Patient to score of CJ on FOTO to demonstrate improved perception of functionalright hand/ arm  Use. Progressing   3)   Pt will return to prior level of function for ADLs and household management, progressing    Certification Period/Plan of care expiration:  4/22/19 to 6/30/19 .     Outpatient Occupational Therapy 1 times weekly for 2 weeks may include the following interventions: Fluidotherapy, Manual therapy/joint mobilizations, US 3 mhz, Therapeutic exercises/activities. and Strengthening.               Discussed Plan of Care with patient: Yes  Updates/Grading for next session: no      Bailey Lara, OT

## 2019-05-06 ENCOUNTER — CLINICAL SUPPORT (OUTPATIENT)
Dept: REHABILITATION | Facility: HOSPITAL | Age: 64
End: 2019-05-06
Attending: PHYSICIAN ASSISTANT
Payer: MEDICARE

## 2019-05-06 DIAGNOSIS — M65.341 TRIGGER RING FINGER OF RIGHT HAND: Primary | ICD-10-CM

## 2019-05-06 PROCEDURE — 97035 APP MDLTY 1+ULTRASOUND EA 15: CPT

## 2019-05-06 PROCEDURE — 97110 THERAPEUTIC EXERCISES: CPT

## 2019-05-06 PROCEDURE — 97022 WHIRLPOOL THERAPY: CPT

## 2019-05-06 NOTE — PROGRESS NOTES
Occupational Therapy Daily Treatment Note   Date 5/6/2019    Name: Ladarius Solis  Clinic Number: [id number]  Name: Ladarius Solis  Clinic Number: [id number]     Therapy Diagnosis:        Encounter Diagnosis   Name Primary?    Trigger ring finger of right hand      bilateral carpal tunnel syndrome   Physician: Cooper Tyler MD     Physician Orders:  eval and treat   Medical Diagnosis: G56.03 bilateral carpal tunnel and M65.341 trigger finger right ring finger   Surgical Procedure/ Date :none   Evaluation Date: 2/11/2019  Insurance:medicare   Insurance Authorization period Expiration: 1/21/2020  Plan of Care Certification Period: 6/30/19      Visit # / Visits Authortized:8/ 19 updated in computer   Time In:10:00  Time Out: 11:oo  Total Billable Time: 60 minutes     Precautions: Standard  Involved Side:  right  Dominant Side: Left  Date of Onset:  1  1/2 years ago   Mechanism of Injury:  Started with carpal tunnel issues about a year ago , he is not really worried with any of the carpal tunnel issues , main issue he wants therapy to focus on is right ring tigger finger.  He got trigger finger injection dec 2018 and reports that he has not has any change in pain or function.        History of Current Condition: bilateral carpal tunnel and  Ring  Trigger ifinger issues      Imaging: none   Previous Therapy: Pt has never had therapy for hands      Patient's Goals for Therapy: concerned about finger sticking and he has pain Pain:  Functional Pain Scale Rating 0-10:   4/10 on average  210 at best  6/10 at worst   Locationbilateral: carpal tunnel syndrome and right ring trigger finger.   Description: Burning trigger finger pain   Aggravating Factors: Bending  Easing Factors: massage      Subjective   Pt states that he thinks that trigger finger brace has helped .     Pt reports: he was compliant with home exercise program given last session.   Response to previous treatment:good , he  "feels that brace is helping    Functional change:     Pain: 3/10  Location: right  Ring fingers      Objective     Ladarius received the following manual therapy techniques for 10 minutes:   -STM with right ring finger over A 1pulley       Modalities: ultrasound 1.0 w/cm2 X 6 minutes  And fluido X 10 minutes to  Right hand     Ladarius received therapeutic exercises for 10 minutes including:  - TGE and MP blocked in extension with pip flexion      Yellow putty gripping , scapes 2"   AROM right ring finger   Mp 0/90  Pip 0/90   Dip 0/60  Continue wearing trigger finger orficast splint for MP blocking splint to be be worn during the day with activity .   Recommended  Use of wrist splint for night time wear for positioning for carpal tunnel .      Home Exercise Program/Education:  Issued HEP: TGE , use of Mp blocking splint        Home Exercises and Education Provided     Education provided: nothing additional   -   - Progress towards goals     Written Home Exercises Provided: Patient instructed to cont prior HEP.  Exercises were reviewed and Ladarius was able to demonstrate them prior to the end of the session.  Ladarius demonstrated good  understanding of the education provided.   .        Assessment     Pt would continue to benefit from skilled OT. Yes      Ladarius is progressing well towards his goals and there are no updates to goals at this time. Pt prognosis is Good.     Pt will continue to benefit from skilled outpatient occupational therapy to address the deficits listed in the problem list on initial evaluation provide pt/family education and to maximize pt's level of independence in the home and community environment.     Anticipated barriers to occupational therapy: none     Pt's spiritual, cultural and educational needs considered and pt agreeable to plan of care and goals.        GOALS: 6  weeks. Pt agrees with goals set.       Short Term (4 weeks on 3/11/19 ):  1)   Patient to be IND with HEP and modalities " for pain management, progressing   2)   Increase ROM 10 degrees  For  Pip flexion  motion to increase functional hand use for right hand ( ring finger ) , progressing   3)   Increase  strength by  5 lbs. to grasp  Right  hand , progressing         Long Term (by discharge):  1)   Pt will report 1 out of 10 pain with right  ., progressing   2)   Patient to score of CJ on FOTO to demonstrate improved perception of functionalright hand/ arm  Use. Progressing   3)   Pt will return to prior level of function for ADLs and household management, progressing    Certification Period/Plan of care expiration:  4/22/19 to 6/30/19 .     Outpatient Occupational Therapy 1 times weekly for 2 weeks may include the following interventions: Fluidotherapy, Manual therapy/joint mobilizations, US 3 mhz, Therapeutic exercises/activities. and Strengthening.               Discussed Plan of Care with patient: Yes  Updates/Grading for next session: no      Bailey Lara OT

## 2019-05-13 ENCOUNTER — CLINICAL SUPPORT (OUTPATIENT)
Dept: REHABILITATION | Facility: HOSPITAL | Age: 64
End: 2019-05-13
Attending: PHYSICIAN ASSISTANT
Payer: MEDICARE

## 2019-05-13 DIAGNOSIS — M65.341 TRIGGER RING FINGER OF RIGHT HAND: Primary | ICD-10-CM

## 2019-05-13 PROCEDURE — 97035 APP MDLTY 1+ULTRASOUND EA 15: CPT

## 2019-05-13 PROCEDURE — 97110 THERAPEUTIC EXERCISES: CPT

## 2019-05-13 PROCEDURE — 97022 WHIRLPOOL THERAPY: CPT

## 2019-05-13 NOTE — PROGRESS NOTES
Occupational Therapy Daily Treatment Note   Date 5/13/2019    Name: Ladarius Solis  Clinic Number: [id number]  Name: Ladarius Solis  Clinic Number: [id number]     Therapy Diagnosis:        Encounter Diagnosis   Name Primary?    Trigger ring finger of right hand      bilateral carpal tunnel syndrome   Physician: Cooper Tyler MD     Physician Orders:  eval and treat   Medical Diagnosis: G56.03 bilateral carpal tunnel and M65.341 trigger finger right ring finger   Surgical Procedure/ Date :none   Evaluation Date: 2/11/2019  Insurance:medicare   Insurance Authorization period Expiration: 1/21/2020  Plan of Care Certification Period: 6/30/19      Visit # / Visits Authortized 9/ 19 updated in computer   Time In:9:00  Time Out: 10;00 am   Total Billable Time: 60 minutes     Precautions: Standard  Involved Side:  right  Dominant Side: Left  Date of Onset:  1  1/2 years ago   Mechanism of Injury:  Started with carpal tunnel issues about a year ago , he is not really worried with any of the carpal tunnel issues , main issue he wants therapy to focus on is right ring tigger finger.    He got trigger finger injection dec 2018 and reports that he has not has any change in pain or function.        History of Current Condition: bilateral carpal tunnel and  Ring  Trigger ifinger issues      Imaging: none   Previous Therapy: Pt has never had therapy for hands      Patient's Goals for Therapy: concerned about finger sticking and he has pain Pain:  Functional Pain Scale Rating 0-10:   4/10 on average  210 at best  6/10 at worst   Locationbilateral: carpal tunnel syndrome and right ring trigger finger.   Description: Burning trigger finger pain   Aggravating Factors: Bending  Easing Factors: massage      Subjective   Pt states that he thinks that trigger finger brace has helped .  He tends to wear the brace more at night     Pt reports: he was compliant with home exercise program given last  "session.   Response to previous treatment:good , he feels that brace is helping    Functional change:     Pain: 3/10  Location: right  Ring fingers      Objective     Ladarius received the following manual therapy techniques for 10 minutes:   -STM with right ring finger over A 1pulley       Modalities: ultrasound 1.0 w/cm2 X 6 minutes  And fluido X 10 minutes to  Right hand     Ladarius received therapeutic exercises for 10 minutes including:  - TGE and MP blocked in extension with pip flexion      Yellow putty gripping , scapes 2"   AROM right ring finger     Mp 0/90  Pip 0/90   Dip 0/60  Continue wearing trigger finger orficast splint for MP blocking splint to be be worn during the day with activity .   Recommended  Use of wrist splint for night time wear for positioning for carpal tunnel .      Home Exercise Program/Education:  Issued HEP: TGE , use of Mp blocking splint        Home Exercises and Education Provided     Education provided: nothing additional   -   - Progress towards goals     Written Home Exercises Provided: Patient instructed to cont prior HEP.  Exercises were reviewed and Ladarius was able to demonstrate them prior to the end of the session.  Ladarius demonstrated good  understanding of the education provided.   .        Assessment     Pt would continue to benefit from skilled OT. Yes      Ladarius is progressing well towards his goals and there are no updates to goals at this time. Pt prognosis is Good.     Pt will continue to benefit from skilled outpatient occupational therapy to address the deficits listed in the problem list on initial evaluation provide pt/family education and to maximize pt's level of independence in the home and community environment.     Anticipated barriers to occupational therapy: none     Pt's spiritual, cultural and educational needs considered and pt agreeable to plan of care and goals.        GOALS: 6  weeks. Pt agrees with goals set.       Short Term (4 weeks on " 3/11/19 ):  1)   Patient to be IND with HEP and modalities for pain management, progressing   2)   Increase ROM 10 degrees  For  Pip flexion  motion to increase functional hand use for right hand ( ring finger ) , progressing   3)   Increase  strength by  5 lbs. to grasp  Right  hand , progressing         Long Term (by discharge):  1)   Pt will report 1 out of 10 pain with right  ., progressing   2)   Patient to score of CJ on FOTO to demonstrate improved perception of functionalright hand/ arm  Use. Progressing   3)   Pt will return to prior level of function for ADLs and household management, progressing    Certification Period/Plan of care expiration:  4/22/19 to 6/30/19 .     Outpatient Occupational Therapy 1 times weekly for 2 weeks may include the following interventions: Fluidotherapy, Manual therapy/joint mobilizations, US 3 mhz, Therapeutic exercises/activities. and Strengthening.               Discussed Plan of Care with patient: Yes  Updates/Grading for next session: no      Bailey Lara, OT

## 2019-05-20 ENCOUNTER — CLINICAL SUPPORT (OUTPATIENT)
Dept: REHABILITATION | Facility: HOSPITAL | Age: 64
End: 2019-05-20
Attending: PHYSICIAN ASSISTANT
Payer: MEDICARE

## 2019-05-20 DIAGNOSIS — M65.341 TRIGGER RING FINGER OF RIGHT HAND: Primary | ICD-10-CM

## 2019-05-20 PROCEDURE — 97110 THERAPEUTIC EXERCISES: CPT

## 2019-05-20 PROCEDURE — 97035 APP MDLTY 1+ULTRASOUND EA 15: CPT

## 2019-05-20 NOTE — PROGRESS NOTES
Occupational Therapy Daily Treatment Note   Date 5/20/2019    Name: Ladarius Solis  Clinic Number: [id number]  Name: Ladarius Solis  Clinic Number: [id number]     Therapy Diagnosis:        Encounter Diagnosis   Name Primary?    Trigger ring finger of right hand      bilateral carpal tunnel syndrome   Physician: Cooper Tyler MD     Physician Orders:  eval and treat   Medical Diagnosis: G56.03 bilateral carpal tunnel and M65.341 trigger finger right ring finger   Surgical Procedure/ Date :none   Evaluation Date: 2/11/2019  Insurance:medicare   Insurance Authorization period Expiration: 1/21/2020  Plan of Care Certification Period: 6/30/19      Visit # / Visits Authortized 10 /  19 updated in computer   Time In:9:00  Time Out: 10;00 am   Total Billable Time: 60 minutes     Precautions: Standard  Involved Side:  right  Dominant Side: Left  Date of Onset:  1  1/2 years ago   Mechanism of Injury:  Started with carpal tunnel issues about a year ago , he is not really worried with any of the carpal tunnel issues , main issue he wants therapy to focus on is right ring tigger finger.    He got trigger finger injection dec 2018 and reports that he has not has any change in pain or function.        History of Current Condition: bilateral carpal tunnel and  Ring  Trigger ifinger issues      Imaging: none   Previous Therapy: Pt has never had therapy for hands      Patient's Goals for Therapy: concerned about finger sticking and he has pain Pain:  Functional Pain Scale Rating 0-10:   4/10 on average  210 at best  6/10 at worst   Locationbilateral: carpal tunnel syndrome and right ring trigger finger.   Description: Burning trigger finger pain   Aggravating Factors: Bending  Easing Factors: massage      Subjective   Pt states that he thinks that trigger finger brace has helped .  He tends to wear the brace more at night . Therapist asked him to bring in splint today to reassess fit however pt  "forgot To bring the splint in.     Pt reports: he was compliant with home exercise program given last session.   Response to previous treatment:good , he feels that brace is helping    Functional change:     Pain: 3/10  Location: right  Ring fingers      Objective     Ladarius received the following manual therapy techniques for 10 minutes:   -STM with right ring finger over A 1pulley       Modalities: ultrasound 1.0 w/cm2 X 6 minutes  And fluido X 10 minutes to  Right hand     Ladarius received therapeutic exercises for 10 minutes including:  - TGE and MP blocked in extension with pip flexion      Yellow putty gripping , scapes 2"   AROM right ring finger     Mp 0/90  Pip 0/90   Dip 0/60  Continue wearing trigger finger orficast splint for MP blocking splint to be be worn during the day with activity .   Recommended  Use of wrist splint for night time wear for positioning for carpal tunnel .      Home Exercise Program/Education:  Issued HEP: TGE , use of Mp blocking splint        Home Exercises and Education Provided     Education provided: nothing additional   -   - Progress towards goals     Written Home Exercises Provided: Patient instructed to cont prior HEP.  Exercises were reviewed and Ladarius was able to demonstrate them prior to the end of the session.  Ladarius demonstrated good  understanding of the education provided.   .        Assessment     Pt would continue to benefit from skilled OT. Yes      Ladarius is progressing well towards his goals and there are no updates to goals at this time. Pt prognosis is Good.     Pt will continue to benefit from skilled outpatient occupational therapy to address the deficits listed in the problem list on initial evaluation provide pt/family education and to maximize pt's level of independence in the home and community environment.     Anticipated barriers to occupational therapy: none     Pt's spiritual, cultural and educational needs considered and pt agreeable to plan " of care and goals.        GOALS: 6  weeks. Pt agrees with goals set.       Short Term (4 weeks on 3/11/19 ):  1)   Patient to be IND with HEP and modalities for pain management, progressing   2)   Increase ROM 10 degrees  For  Pip flexion  motion to increase functional hand use for right hand ( ring finger ) , progressing   3)   Increase  strength by  5 lbs. to grasp  Right  hand , progressing         Long Term (by discharge):  1)   Pt will report 1 out of 10 pain with right  ., progressing   2)   Patient to score of CJ on FOTO to demonstrate improved perception of functionalright hand/ arm  Use. Progressing   3)   Pt will return to prior level of function for ADLs and household management, progressing    Certification Period/Plan of care expiration:  4/22/19 to 6/30/19 .     Outpatient Occupational Therapy 1 times weekly for 2 weeks may include the following interventions: Fluidotherapy, Manual therapy/joint mobilizations, US 3 mhz, Therapeutic exercises/activities. and Strengthening.               Discussed Plan of Care with patient: Yes  Updates/Grading for next session: no      Bailey Lara OT

## 2019-06-03 ENCOUNTER — CLINICAL SUPPORT (OUTPATIENT)
Dept: REHABILITATION | Facility: HOSPITAL | Age: 64
End: 2019-06-03
Attending: PHYSICIAN ASSISTANT
Payer: MEDICARE

## 2019-06-03 DIAGNOSIS — M65.341 TRIGGER RING FINGER OF RIGHT HAND: Primary | ICD-10-CM

## 2019-06-03 PROCEDURE — 97022 WHIRLPOOL THERAPY: CPT

## 2019-06-03 PROCEDURE — 97035 APP MDLTY 1+ULTRASOUND EA 15: CPT

## 2019-06-03 PROCEDURE — 97110 THERAPEUTIC EXERCISES: CPT

## 2019-06-03 PROCEDURE — 97140 MANUAL THERAPY 1/> REGIONS: CPT

## 2019-06-03 NOTE — PROGRESS NOTES
Occupational Therapy Daily Treatment Note   Date 6/3/2019    Name: Ladarius Solis  Clinic Number: [id number]  Name: Ladarius Solis  Clinic Number: [id number]     Therapy Diagnosis:        Encounter Diagnosis   Name Primary?    Trigger ring finger of right hand      bilateral carpal tunnel syndrome   Physician: Cooper Tyler MD     Physician Orders:  eval and treat   Medical Diagnosis: G56.03 bilateral carpal tunnel and M65.341 trigger finger right ring finger   Surgical Procedure/ Date :none   Evaluation Date: 2/11/2019  Insurance:medicare   Insurance Authorization period Expiration: 1/21/2020  Plan of Care Certification Period: 6/30/19      Visit # / Visits Authortized 11 / 19 updated in computer   Time In:9:55 AM  Time Out: 10:40 AM   Total Time: 45 minutes  Total Treatment Time: 20 minutes  Charges: fluido, 1 MT, , 1 TE     Precautions: Standard  Involved Side:  right  Dominant Side: Left  Date of Onset:  1  1/2 years ago   Mechanism of Injury:  Started with carpal tunnel issues about a year ago , he is not really worried with any of the carpal tunnel issues , main issue he wants therapy to focus on is right ring tigger finger.    He got trigger finger injection dec 2018 and reports that he has not has any change in pain or function.        History of Current Condition: bilateral carpal tunnel and  Ring  Trigger ifinger issues      Imaging: none   Previous Therapy: Pt has never had therapy for hands      Patient's Goals for Therapy: concerned about finger sticking and he has pain Pain:  Functional Pain Scale Rating 0-10:   4/10 on average  210 at best  6/10 at worst   Locationbilateral: carpal tunnel syndrome and right ring trigger finger.   Description: Burning trigger finger pain   Aggravating Factors: Bending  Easing Factors: massage      Subjective        Pt reports: Pt states that he is not wearing his brace as often because it does not feel comfortable. Pt reports that  he was still using his right hand during without the brace for activities.   Response to previous treatment:Pt reports that his finger has been feeling stiff and is still triggering often.   Functional change: none    Pain: 4/10  Location: right  Ring finger      Objective       Ladarius received the following manual therapy techniques for 10 minutes:   -STM with right ring finger over A 1pulley       Modalities: ultrasound 1.0 w/cm2 X 6 minutes  And fluido X 15 minutes to  Right hand     Ladarius received therapeutic exercises for 10 minutes including:  - Finger hooks and MP blocked in extension with pip flexion    - finger extensions and ABD 2 x 10 reps.    Mp 0/90  Pip 0/90   Dip 0/60       Home Exercise Program/Education:  Issued HEP: TGE , use of Mp blocking splint        Home Exercises and Education Provided     Education provided: nothing additional   -   - Progress towards goals: good     Written Home Exercises Provided: Patient instructed to cont prior HEP.  Exercises were reviewed and Ladarius was able to demonstrate them prior to the end of the session.  Ladarius demonstrated good  understanding of the education provided.   .        Assessment   Pt reports that he has not been wearing his splint because it is uncomfortable and was still using his right hand during activities. He also states that he has not been doing his home exercises because his fingers feel stiff. He reports that he is still triggering often. Pt's exercises were modified today because he was triggering during the exercises. He tolerated the above modified activities with mild difficulty. Pt was educated on the importance of wearing his orthotic during activities and at night as well.    Pt would continue to benefit from skilled OT. Yes      Ladarius is progressing well towards his goals and there are no updates to goals at this time. Pt prognosis is Good.     Pt will continue to benefit from skilled outpatient occupational therapy to  "address the deficits listed in the problem list on initial evaluation provide pt/family education and to maximize pt's level of independence in the home and community environment.     Anticipated barriers to occupational therapy: none     Pt's spiritual, cultural and educational needs considered and pt agreeable to plan of care and goals.        GOALS: 6  weeks. Pt agrees with goals set.       Short Term (4 weeks on 3/11/19 ):  1)   Patient to be IND with HEP and modalities for pain management.  progressing  2)   Increase ROM 10 degrees  For  Pip flexion  motion to increase functional hand use for right hand ( ring finger ) , progressing   3)   Increase  strength by  5 lbs. to grasp  Right  hand , progressing         Long Term (by discharge):  1)   Pt will report 1 out of 10 pain with right  ., progressing   2)   Patient to score of CJ on FOTO to demonstrate improved perception of functionalright hand/ arm  Use. Progressing   3)   Pt will return to prior level of function for ADLs and household management, progressing    Certification Period/Plan of care expiration:  4/22/19 to 6/30/19 .     Outpatient Occupational Therapy 1 times weekly for 2 weeks may include the following interventions: Fluidotherapy, Manual therapy/joint mobilizations, US 3 mhz, Therapeutic exercises/activities. and Strengthening.               Discussed Plan of Care with patient: Yes  Updates/Grading for next session: no    Polina JUAREZ  "I certify that I was present in the room directing the student in service delivery and guiding them using my skilled judgement. As the co-signing therapist, I have reviewed the student's documentation and am responsible for the treatment, assessment and plan."  Therapist: DARIELA Barrios, WILMER  Occupational Therapist  Certified Hand Therapist      Malu Barrera, OT   "

## 2019-06-10 ENCOUNTER — CLINICAL SUPPORT (OUTPATIENT)
Dept: REHABILITATION | Facility: HOSPITAL | Age: 64
End: 2019-06-10
Attending: PHYSICIAN ASSISTANT
Payer: MEDICARE

## 2019-06-10 DIAGNOSIS — M65.341 TRIGGER RING FINGER OF RIGHT HAND: Primary | ICD-10-CM

## 2019-06-10 PROCEDURE — 97035 APP MDLTY 1+ULTRASOUND EA 15: CPT

## 2019-06-10 PROCEDURE — 97110 THERAPEUTIC EXERCISES: CPT

## 2019-06-10 NOTE — PROGRESS NOTES
Occupational Therapy Daily Treatment Note   Date 6/10/2019    Name: Ladarius Solis  Clinic Number: [id number]  Name: Ladarius Solis  Clinic Number: [id number]     Therapy Diagnosis:        Encounter Diagnosis   Name Primary?    Trigger ring finger of right hand      bilateral carpal tunnel syndrome   Physician: Cooper Tyler MD     Physician Orders:  eval and treat   Medical Diagnosis: G56.03 bilateral carpal tunnel and M65.341 trigger finger right ring finger   Surgical Procedure/ Date :none   Evaluation Date: 2/11/2019  Insurance:medicare   Insurance Authorization period Expiration: 1/21/2020  Plan of Care Certification Period: 6/30/19      Visit # / Visits Authortized 12 / 19 updated in computer   Time In:9:55 AM  Time Out: 10:40 AM   Total Time: 45 minutes  Total Treatment Time: 20 minutes  Charges: fluido, 1 MT, , 1 TE     Precautions: Standard  Involved Side:  right  Dominant Side: Left  Date of Onset:  1  1/2 years ago   Mechanism of Injury:  Started with carpal tunnel issues about a year ago , he is not really worried with any of the carpal tunnel issues , main issue he wants therapy to focus on is right ring tigger finger.    He got trigger finger injection dec 2018 and reports that he has not has any change in pain or function.        History of Current Condition: bilateral carpal tunnel and  Ring  Trigger ifinger issues      Imaging: none   Previous Therapy: Pt has never had therapy for hands      Patient's Goals for Therapy: concerned about finger sticking and he has pain Pain:  Functional Pain Scale Rating 0-10:   4/10 on average  210 at best  6/10 at worst   Locationbilateral: carpal tunnel syndrome and right ring trigger finger.   Description: Burning trigger finger pain   Aggravating Factors: Bending  Easing Factors: massage      Subjective        Pt reports: Pt states that he is not wearing his brace as often because it does not feel comfortable. Pt reports  that he was still using his right hand during without the brace for activities.   Response to previous treatment:Pt reports that his finger has been feeling stiff and is still triggering often.   Functional change: none    Pain: 4/10  Location: right  Ring finger      Objective       Ladarius received the following manual therapy techniques for 10 minutes:   -STM with right ring finger over A 1pulley       Modalities: ultrasound 1.0 w/cm2 X 6 minutes  And fluido X 15 minutes to  Right hand     Ladarius received therapeutic exercises for 10 minutes including:  - Finger hooks and MP blocked in extension with pip flexion    - finger extensions and ABD 2 x 10 reps.    Mp 0/90  Pip 0/90   Dip 0/60       Home Exercise Program/Education:  Issued HEP: TGE , use of Mp blocking splint        Home Exercises and Education Provided     Education provided: nothing additional   -   - Progress towards goals: good     Written Home Exercises Provided: Patient instructed to cont prior HEP.  Exercises were reviewed and Ladarius was able to demonstrate them prior to the end of the session.  Ladarius demonstrated good  understanding of the education provided.   .        Assessment   Pt reports that he has not been wearing his splint because it is uncomfortable and was still using his right hand during activities. He also states that he has not been doing his home exercises because his fingers feel stiff. He reports that he is still triggering often. Pt's exercises were modified today because he was triggering during the exercises. He tolerated the above modified activities with mild difficulty. Pt was educated on the importance of wearing his orthotic during activities and at night as well.    Pt would continue to benefit from skilled OT. Yes      Ladarius is progressing well towards his goals and there are no updates to goals at this time. Pt prognosis is Good.     Pt will continue to benefit from skilled outpatient occupational therapy to  "address the deficits listed in the problem list on initial evaluation provide pt/family education and to maximize pt's level of independence in the home and community environment.     Anticipated barriers to occupational therapy: none     Pt's spiritual, cultural and educational needs considered and pt agreeable to plan of care and goals.        GOALS: 6  weeks. Pt agrees with goals set.       Short Term (4 weeks on 3/11/19 ):  1)   Patient to be IND with HEP and modalities for pain management.  progressing  2)   Increase ROM 10 degrees  For  Pip flexion  motion to increase functional hand use for right hand ( ring finger ) , progressing   3)   Increase  strength by  5 lbs. to grasp  Right  hand , progressing         Long Term (by discharge):  1)   Pt will report 1 out of 10 pain with right  ., progressing   2)   Patient to score of CJ on FOTO to demonstrate improved perception of functionalright hand/ arm  Use. Progressing   3)   Pt will return to prior level of function for ADLs and household management, progressing    Certification Period/Plan of care expiration:  4/22/19 to 6/30/19 .     Outpatient Occupational Therapy 1 times weekly for 2 weeks may include the following interventions: Fluidotherapy, Manual therapy/joint mobilizations, US 3 mhz, Therapeutic exercises/activities. and Strengthening.               Discussed Plan of Care with patient: Yes  Updates/Grading for next session: no    Polina JUAREZ  "I certify that I was present in the room directing the student in service delivery and guiding them using my skilled judgement. As the co-signing therapist, I have reviewed the student's documentation and am responsible for the treatment, assessment and plan."  Therapist: DARIELA Barrios, WILMER  Occupational Therapist  Certified Hand Therapist      Bailey Lara, OT   "

## 2019-06-17 ENCOUNTER — CLINICAL SUPPORT (OUTPATIENT)
Dept: REHABILITATION | Facility: HOSPITAL | Age: 64
End: 2019-06-17
Attending: PHYSICIAN ASSISTANT
Payer: MEDICARE

## 2019-06-17 DIAGNOSIS — M65.341 TRIGGER RING FINGER OF RIGHT HAND: Primary | ICD-10-CM

## 2019-06-17 PROCEDURE — 97110 THERAPEUTIC EXERCISES: CPT

## 2019-06-17 PROCEDURE — 97022 WHIRLPOOL THERAPY: CPT

## 2019-06-17 NOTE — PROGRESS NOTES
Occupational Therapy Daily Treatment Note   Date 6/17/2019    Name: Ladarius Solis  Clinic Number: [id number]  Name: Ladarius Solis  Clinic Number: [id number]     Therapy Diagnosis:        Encounter Diagnosis   Name Primary?    Trigger ring finger of right hand      bilateral carpal tunnel syndrome   Physician: Cooper Tyler MD     Physician Orders:  eval and treat   Medical Diagnosis: G56.03 bilateral carpal tunnel and M65.341 trigger finger right ring finger   Surgical Procedure/ Date :none   Evaluation Date: 2/11/2019  Insurance:medicare   Insurance Authorization period Expiration: 1/21/2020  Plan of Care Certification Period: 6/30/19      Visit # / Visits Authortized 15/   19 updated in computer   Time In:9:oo AM  Time Out: 10:00 AM   Total Time: 45 minutes  Total Treatment Time: 20 minutes  Charges: fluido, 1 MT, , 1 TE     Precautions: Standard  Involved Side:  right  Dominant Side: Left  Date of Onset:  1  1/2 years ago   Mechanism of Injury:  Started with carpal tunnel issues about a year ago , he is not really worried with any of the carpal tunnel issues , main issue he wants therapy to focus on is right ring tigger finger.    He got trigger finger injection dec 2018 and reports that he has not has any change in pain or function.        History of Current Condition: bilateral carpal tunnel and  Ring  Trigger ifinger issues      Imaging: none   Previous Therapy: Pt has never had therapy for hands      Patient's Goals for Therapy: concerned about finger sticking and he has pain Pain:  Functional Pain Scale Rating 0-10:   4/10 on average  210 at best  6/10 at worst   Locationbilateral: carpal tunnel syndrome and right ring trigger finger.   Description: Burning trigger finger pain   Aggravating Factors: Bending  Easing Factors: massage      Subjective        Pt reports: Pt states that he is not wearing his brace as often because it does not feel comfortable. Pt reports  that he was still using his right hand during without the brace for activities.   Response to previous treatment:Pt reports that his finger has been feeling stiff and is still triggering often.   Functional change: none    Pain: 4/10  Location: right  Ring finger      Objective       Ladarius received the following manual therapy techniques for 10 minutes:   -STM with right ring finger over A 1pulley       Modalities: ultrasound 1.0 w/cm2 X 6 minutes  And fluido X 15 minutes to  Right hand     Ladarius received therapeutic exercises for 10 minutes including:  - Finger hooks and MP blocked in extension with pip flexion    - finger extensions and ABD 2 x 10 reps.    Mp 0/90  Pip 0/90   Dip 0/60       Home Exercise Program/Education:  Issued HEP: TGE , use of Mp blocking splint        Home Exercises and Education Provided     Education provided: nothing additional   -   - Progress towards goals: good     Written Home Exercises Provided: Patient instructed to cont prior HEP.  Exercises were reviewed and Ladarius was able to demonstrate them prior to the end of the session.  Ladarius demonstrated good  understanding of the education provided.   .        Assessment   Pt reports that he has not been wearing his splint because it is uncomfortable and was still using his right hand during activities. He also states that he has not been doing his home exercises because his fingers feel stiff. He reports that he is still triggering often. Pt's exercises were modified today because he was triggering during the exercises. He tolerated the above modified activities with mild difficulty. Pt was educated on the importance of wearing his orthotic during activities and at night as well.    Pt would continue to benefit from skilled OT. Yes      Ladarius is progressing well towards his goals and there are no updates to goals at this time. Pt prognosis is Good.     Pt will continue to benefit from skilled outpatient occupational therapy to  address the deficits listed in the problem list on initial evaluation provide pt/family education and to maximize pt's level of independence in the home and community environment.     Anticipated barriers to occupational therapy: none     Pt's spiritual, cultural and educational needs considered and pt agreeable to plan of care and goals.        GOALS: 6  weeks. Pt agrees with goals set.       Short Term (4 weeks on 3/11/19 ):  1)   Patient to be IND with HEP and modalities for pain management.  progressing  2)   Increase ROM 10 degrees  For  Pip flexion  motion to increase functional hand use for right hand ( ring finger ) , progressing   3)   Increase  strength by  5 lbs. to grasp  Right  hand , progressing         Long Term (by discharge):  1)   Pt will report 1 out of 10 pain with right  ., progressing   2)   Patient to score of CJ on FOTO to demonstrate improved perception of functionalright hand/ arm  Use. Progressing   3)   Pt will return to prior level of function for ADLs and household management, progressing    Certification Period/Plan of care expiration:  4/22/19 to 6/30/19 .     Outpatient Occupational Therapy 1 times weekly for 2 weeks may include the following interventions: Fluidotherapy, Manual therapy/joint mobilizations, US 3 mhz, Therapeutic exercises/activities. and Strengthening.               Discussed Plan of Care with patient: Yes  Updates/Grading for next session: no    Bailey Lara OT

## 2019-06-24 ENCOUNTER — CLINICAL SUPPORT (OUTPATIENT)
Dept: REHABILITATION | Facility: HOSPITAL | Age: 64
End: 2019-06-24
Attending: PHYSICIAN ASSISTANT
Payer: MEDICARE

## 2019-06-24 DIAGNOSIS — M65.341 TRIGGER RING FINGER OF RIGHT HAND: Primary | ICD-10-CM

## 2019-06-24 PROCEDURE — 97035 APP MDLTY 1+ULTRASOUND EA 15: CPT

## 2019-06-24 PROCEDURE — 97022 WHIRLPOOL THERAPY: CPT

## 2019-06-24 PROCEDURE — 97110 THERAPEUTIC EXERCISES: CPT

## 2019-06-24 NOTE — PROGRESS NOTES
Occupational Therapy Daily Treatment Note   Date 6/24/2019    Name: Ladarius Solis  Clinic Number: [id number]  Name: Ladarius Solis  Clinic Number: [id number]     Therapy Diagnosis:        Encounter Diagnosis   Name Primary?    Trigger ring finger of right hand      bilateral carpal tunnel syndrome   Physician: Cooper Tyler MD     Physician Orders:  eval and treat   Medical Diagnosis: G56.03 bilateral carpal tunnel and M65.341 trigger finger right ring finger   Surgical Procedure/ Date :none   Evaluation Date: 2/11/2019  Insurance:medicare   Insurance Authorization period Expiration: 1/21/2020  Plan of Care Certification Period:  7/30/19       Visit # / Visits Authortized 15/   19 updated in computer   Time In:9:oo AM  Time Out: 10:00 AM   Total Time: 45 minutes  Total Treatment Time: 20 minutes  Charges: fluido, 1 MT, , 1 TE     Precautions: Standard  Involved Side:  right  Dominant Side: Left  Date of Onset:  1  1/2 years ago   Mechanism of Injury:  Started with carpal tunnel issues about a year ago , he is not really worried with any of the carpal tunnel issues , main issue he wants therapy to focus on is right ring tigger finger.    He got trigger finger injection dec 2018 and reports that he has not has any change in pain or function.        History of Current Condition: bilateral carpal tunnel and  Ring  Trigger ifinger issues      Imaging: none   Previous Therapy: Pt has never had therapy for hands      Patient's Goals for Therapy: concerned about finger sticking and he has pain Pain:  Functional Pain Scale Rating 0-10:   4/10 on average  210 at best  6/10 at worst   Locationbilateral: carpal tunnel syndrome and right ring trigger finger.   Description: Burning trigger finger pain   Aggravating Factors: Bending  Easing Factors: massage      Subjective        Pt reports: Pt states that he is not wearing his brace as often because it does not feel comfortable. Pt reports  that he was still using his right hand during without the brace for activities.  He has noticed that he is not experiencing any  More clicking in the fingers.     Response to previous treatment:Pt reports that his finger has been feeling stiff and is still triggering often.   Functional change: none    Pain: 4/10  Location: right  Ring finger      Objective       Ladarius received the following manual therapy techniques for 10 minutes:   -STM with right ring finger over A 1pulley       Modalities: ultrasound 1.0 w/cm2 X 6 minutes  And fluido X 15 minutes to  Right hand       Ladarius received therapeutic exercises for 10 minutes including:  - Finger hooks and MP blocked in extension with pip flexion    - finger extensions and ABD 2 x 10 reps.    Mp 0/90  Pip 0/90   Dip 0/60       Home Exercise Program/Education:  Issued HEP: TGE , use of Mp blocking splint        Home Exercises and Education Provided     Education provided: nothing additional   -   - Progress towards goals: good     Written Home Exercises Provided: Patient instructed to cont prior HEP.  Exercises were reviewed and Ladarius was able to demonstrate them prior to the end of the session.  Ladarius demonstrated good  understanding of the education provided.   .        Assessment   Pt reports that he has not been wearing his splint because it is uncomfortable and was still using his right hand during activities. He also states that he has not been doing his home exercises because his fingers feel stiff. He reports that he is still triggering often. Pt's exercises were modified today because he was triggering during the exercises. He tolerated the above modified activities with mild difficulty. Pt was educated on the importance of wearing his orthotic during activities and at night as well.    Pt would continue to benefit from skilled OT. Yes      Ladarius is progressing well towards his goals and there are no updates to goals at this time. Pt prognosis is  Good.     Pt will continue to benefit from skilled outpatient occupational therapy to address the deficits listed in the problem list on initial evaluation provide pt/family education and to maximize pt's level of independence in the home and community environment.     Anticipated barriers to occupational therapy: none     Pt's spiritual, cultural and educational needs considered and pt agreeable to plan of care and goals.        GOALS: 6  weeks. Pt agrees with goals set.       Short Term (4 weeks on 3/11/19 ):  1)   Patient to be IND with HEP and modalities for pain management.  progressing  2)   Increase ROM 10 degrees  For  Pip flexion  motion to increase functional hand use for right hand ( ring finger ) , progressing   3)   Increase  strength by  5 lbs. to grasp  Right  hand , progressing         Long Term (by discharge):  1)   Pt will report 1 out of 10 pain with right  ., progressing   2)   Patient to score of CJ on FOTO to demonstrate improved perception of functionalright hand/ arm  Use. Progressing   3)   Pt will return to prior level of function for ADLs and household management, progressing    Certification Period/Plan of care expiration:  6/24/19 to 7/30/19      Outpatient Occupational Therapy 1 times weekly for 2 weeks may include the following interventions: Fluidotherapy, Manual therapy/joint mobilizations, US 3 mhz, Therapeutic exercises/activities. and Strengthening.               Discussed Plan of Care with patient: Yes  Updates/Grading for next session: no    Bailey Lara OT

## 2019-07-01 ENCOUNTER — CLINICAL SUPPORT (OUTPATIENT)
Dept: REHABILITATION | Facility: HOSPITAL | Age: 64
End: 2019-07-01
Attending: PHYSICIAN ASSISTANT
Payer: MEDICARE

## 2019-07-01 DIAGNOSIS — M65.341 TRIGGER RING FINGER OF RIGHT HAND: Primary | ICD-10-CM

## 2019-07-01 PROCEDURE — 97110 THERAPEUTIC EXERCISES: CPT

## 2019-07-01 NOTE — PROGRESS NOTES
Occupational Therapy Daily Treatment Note   Date 7/1/2019    Name: Ladarius Solis  Clinic Number: [id number]  Name: Ladarius Solis  Clinic Number: [id number]     Therapy Diagnosis:        Encounter Diagnosis   Name Primary?    Trigger ring finger of right hand      bilateral carpal tunnel syndrome   Physician: Cooper Tyler MD     Physician Orders:  eval and treat   Medical Diagnosis: G56.03 bilateral carpal tunnel and M65.341 trigger finger right ring finger   Surgical Procedure/ Date :none   Evaluation Date: 2/11/2019  Insurance:medicare   Insurance Authorization period Expiration: 1/21/2020  Plan of Care Certification Period:  7/30/19       Visit # / Visits Authortized 15/   19 updated in computer   Time In:9:oo AM  Time Out: 10:00 AM   Total Time: 45 minutes  Total Treatment Time: 20 minutes  Charges: fluido, 1 MT, , 1 TE     Precautions: Standard  Involved Side:  right  Dominant Side: Left  Date of Onset:  1  1/2 years ago   Mechanism of Injury:  Started with carpal tunnel issues about a year ago , he is not really worried with any of the carpal tunnel issues , main issue he wants therapy to focus on is right ring tigger finger.    He got trigger finger injection dec 2018 and reports that he has not has any change in pain or function.        History of Current Condition: bilateral carpal tunnel and  Ring  Trigger ifinger issues      Imaging: none   Previous Therapy: Pt has never had therapy for hands      Patient's Goals for Therapy: concerned about finger sticking and he has pain Pain:  Functional Pain Scale Rating 0-10:   4/10 on average  210 at best  6/10 at worst   Locationbilateral: carpal tunnel syndrome and right ring trigger finger.   Description: Burning trigger finger pain   Aggravating Factors: Bending  Easing Factors: massage      Subjective        Pt reports: Pt states that he is not wearing his brace as often because it does not feel comfortable. Pt reports  that he was still using his right hand during without the brace for activities.  He has noticed that he is not experiencing any  More clicking in the fingers.     Response to previous treatment:Pt reports that his finger has been feeling stiff and is still triggering often.   Functional change: none    Pain: 4/10  Location: right  Ring finger      Objective       Ladarius received the following manual therapy techniques for 10 minutes:   -STM with right ring finger over A 1pulley       Modalities: ultrasound 1.0 w/cm2 X 6 minutes  And fluido X 15 minutes to  Right hand       Ladarius received therapeutic exercises for 10 minutes including:  - Finger hooks and MP blocked in extension with pip flexion    - finger extensions and ABD 2 x 10 reps.    Mp 0/90  Pip 0/90   Dip 0/60       Home Exercise Program/Education:  Issued HEP: TGE , use of Mp blocking splint        Home Exercises and Education Provided     Education provided: nothing additional   -   - Progress towards goals: good     Written Home Exercises Provided: Patient instructed to cont prior HEP.  Exercises were reviewed and Ladarius was able to demonstrate them prior to the end of the session.  Ladarius demonstrated good  understanding of the education provided.   .        Assessment   Pt reports that he has not been wearing his splint because it is uncomfortable and was still using his right hand during activities. He also states that he has not been doing his home exercises because his fingers feel stiff. He reports that he is still triggering often. Pt's exercises were modified today because he was triggering during the exercises. He tolerated the above modified activities with mild difficulty. Pt was educated on the importance of wearing his orthotic during activities and at night as well.    Pt would continue to benefit from skilled OT. Yes      Ladarius is progressing well towards his goals and there are no updates to goals at this time. Pt prognosis is  Good.     Pt will continue to benefit from skilled outpatient occupational therapy to address the deficits listed in the problem list on initial evaluation provide pt/family education and to maximize pt's level of independence in the home and community environment.     Anticipated barriers to occupational therapy: none     Pt's spiritual, cultural and educational needs considered and pt agreeable to plan of care and goals.        GOALS: 6  weeks. Pt agrees with goals set.       Short Term   1)   Patient to be IND with HEP and modalities for pain management.  progressing  2)   Increase ROM 10 degrees  For  Pip flexion  motion to increase functional hand use for right hand ( ring finger ) , progressing   3)   Increase  strength by  5 lbs. to grasp  Right  hand , progressing         Long Term (by discharge):  1)   Pt will report 1 out of 10 pain with right  ., progressing   2)   Patient to score of CJ on FOTO to demonstrate improved perception of functionalright hand/ arm  Use. Progressing   3)   Pt will return to prior level of function for ADLs and household management, progressing    Certification Period/Plan of care expiration:  6/24/19 to 7/30/19          Outpatient Occupational Therapy 1 times weekly for 2 weeks may include the following interventions: Fluidotherapy, Manual therapy/joint mobilizations, US 3 mhz, Therapeutic exercises/activities. and Strengthening.               Discussed Plan of Care with patient: Yes  Updates/Grading for next session: no    Bailey Lara OT

## 2019-07-03 DIAGNOSIS — I10 ESSENTIAL HYPERTENSION: ICD-10-CM

## 2019-07-03 RX ORDER — LISINOPRIL 10 MG/1
10 TABLET ORAL DAILY
Qty: 90 TABLET | Refills: 3 | Status: SHIPPED | OUTPATIENT
Start: 2019-07-03 | End: 2020-07-27 | Stop reason: SDUPTHER

## 2019-07-08 ENCOUNTER — CLINICAL SUPPORT (OUTPATIENT)
Dept: REHABILITATION | Facility: HOSPITAL | Age: 64
End: 2019-07-08
Attending: PHYSICIAN ASSISTANT
Payer: MEDICARE

## 2019-07-08 DIAGNOSIS — M65.341 TRIGGER RING FINGER OF RIGHT HAND: Primary | ICD-10-CM

## 2019-07-08 PROCEDURE — 97110 THERAPEUTIC EXERCISES: CPT

## 2019-07-08 PROCEDURE — 97022 WHIRLPOOL THERAPY: CPT

## 2019-07-08 NOTE — PROGRESS NOTES
Occupational Therapy Daily Treatment Note   Date 7/8/2019    Name: Ladarius Solis  Clinic Number: [id number]  Name: Ladarius Solis  Clinic Number: [id number]     Therapy Diagnosis:        Encounter Diagnosis   Name Primary?    Trigger ring finger of right hand      bilateral carpal tunnel syndrome   Physician: Cooper Tyler MD     Physician Orders:  eval and treat   Medical Diagnosis: G56.03 bilateral carpal tunnel and M65.341 trigger finger right ring finger   Surgical Procedure/ Date :none   Evaluation Date: 2/11/2019  Insurance:medicare   Insurance Authorization period Expiration: 1/21/2020  Plan of Care Certification Period:  7/30/19       Visit # / Visits Authortized 15/   19 updated in computer   Time In:9:oo AM  Time Out: 10:00 AM   Total Time: 45 minutes  Total Treatment Time: 20 minutes  Charges: fluido, 1 MT, , 1 TE     Precautions: Standard  Involved Side:  right  Dominant Side: Left  Date of Onset:  1  1/2 years ago   Mechanism of Injury:  Started with carpal tunnel issues about a year ago , he is not really worried with any of the carpal tunnel issues , main issue he wants therapy to focus on is right ring tigger finger.    He got trigger finger injection dec 2018 and reports that he has not has any change in pain or function.        History of Current Condition: bilateral carpal tunnel and  Ring  Trigger ifinger issues      Imaging: none   Previous Therapy: Pt has never had therapy for hands      Patient's Goals for Therapy: concerned about finger sticking and he has pain Pain:  Functional Pain Scale Rating 0-10:   4/10 on average  210 at best  6/10 at worst   Locationbilateral: carpal tunnel syndrome and right ring trigger finger.   Description: Burning trigger finger pain   Aggravating Factors: Bending  Easing Factors: massage      Subjective        Pt reports: Pt states that he is not wearing his brace as often because it does not feel comfortable. Pt reports  that he was still using his right hand during without the brace for activities.  He has noticed that he is not experiencing any  More clicking in the fingers.     Response to previous treatment:Pt reports that his finger has been feeling stiff and is still triggering often.   Functional change: none    Pain: 4/10  Location: right  Ring finger      Objective       Ladarius received the following manual therapy techniques for 10 minutes:   -STM with right ring finger over A 1pulley       Modalities: ultrasound 1.0 w/cm2 X 6 minutes  And fluido X 15 minutes to  Right hand       Ladarius received therapeutic exercises for 10 minutes including:  - Finger hooks and MP blocked in extension with pip flexion    - finger extensions and ABD 2 x 10 reps.    Mp 0/90  Pip 0/90   Dip 0/60       Home Exercise Program/Education:  Issued HEP: TGE , use of Mp blocking splint        Home Exercises and Education Provided     Education provided: nothing additional   -   - Progress towards goals: good     Written Home Exercises Provided: Patient instructed to cont prior HEP.  Exercises were reviewed and Ladarius was able to demonstrate them prior to the end of the session.  Ladarius demonstrated good  understanding of the education provided.   .        Assessment   Pt reports that he has not been wearing his splint because it is uncomfortable and was still using his right hand during activities. He also states that he has not been doing his home exercises because his fingers feel stiff. He reports that he is still triggering often. Pt's exercises were modified today because he was triggering during the exercises. He tolerated the above modified activities with mild difficulty. Pt was educated on the importance of wearing his orthotic during activities and at night as well.    Pt would continue to benefit from skilled OT. Yes      Ladarius is progressing well towards his goals and there are no updates to goals at this time. Pt prognosis is  Good.     Pt will continue to benefit from skilled outpatient occupational therapy to address the deficits listed in the problem list on initial evaluation provide pt/family education and to maximize pt's level of independence in the home and community environment.     Anticipated barriers to occupational therapy: none     Pt's spiritual, cultural and educational needs considered and pt agreeable to plan of care and goals.        GOALS: 6  weeks. Pt agrees with goals set.       Short Term   1)   Patient to be IND with HEP and modalities for pain management.  progressing  2)   Increase ROM 10 degrees  For  Pip flexion  motion to increase functional hand use for right hand ( ring finger ) , progressing   3)   Increase  strength by  5 lbs. to grasp  Right  hand , progressing         Long Term (by discharge):  1)   Pt will report 1 out of 10 pain with right  ., progressing   2)   Patient to score of CJ on FOTO to demonstrate improved perception of functionalright hand/ arm  Use. Progressing   3)   Pt will return to prior level of function for ADLs and household management, progressing    Certification Period/Plan of care expiration:  6/24/19 to 7/30/19          Outpatient Occupational Therapy 1 times weekly for 2 weeks may include the following interventions: Fluidotherapy, Manual therapy/joint mobilizations, US 3 mhz, Therapeutic exercises/activities. and Strengthening.               Discussed Plan of Care with patient: Yes  Updates/Grading for next session: no    Bailey Lara OT

## 2019-07-15 ENCOUNTER — CLINICAL SUPPORT (OUTPATIENT)
Dept: REHABILITATION | Facility: HOSPITAL | Age: 64
End: 2019-07-15
Attending: PHYSICIAN ASSISTANT
Payer: MEDICARE

## 2019-07-15 DIAGNOSIS — M65.341 TRIGGER RING FINGER OF RIGHT HAND: Primary | ICD-10-CM

## 2019-07-15 PROCEDURE — 97110 THERAPEUTIC EXERCISES: CPT

## 2019-07-15 PROCEDURE — 97022 WHIRLPOOL THERAPY: CPT

## 2019-07-15 PROCEDURE — 97035 APP MDLTY 1+ULTRASOUND EA 15: CPT

## 2019-07-15 NOTE — PROGRESS NOTES
Occupational Therapy Daily Treatment Note   Date 7/15/2019    Name: Ladarius Solis  Clinic Number: [id number]  Name: Ladarius Solis  Clinic Number: [id number]     Therapy Diagnosis:        Encounter Diagnosis   Name Primary?    Trigger ring finger of right hand      bilateral carpal tunnel syndrome   Physician: Cooper Tyler MD     Physician Orders:  eval and treat   Medical Diagnosis: G56.03 bilateral carpal tunnel and M65.341 trigger finger right ring finger   Surgical Procedure/ Date :none   Evaluation Date: 2/11/2019  Insurance:medicare   Insurance Authorization period Expiration: 1/21/2020  Plan of Care Certification Period:  7/30/19       Visit # / Visits Authortized 15/   19 updated in computer   Time In:8:00 am   Time Out: 9:00 AM   Total Time: 45 minutes  Total Treatment Time: 20 minutes  Charges: fluido, 1 MT, , 1 TE     Precautions: Standard  Involved Side:  right  Dominant Side: Left  Date of Onset:  1  1/2 years ago   Mechanism of Injury:  Started with carpal tunnel issues about a year ago , he is not really worried with any of the carpal tunnel issues , main issue he wants therapy to focus on is right ring tigger finger.    He got trigger finger injection dec 2018 and reports that he has not has any change in pain or function.        History of Current Condition: bilateral carpal tunnel and  Ring  Trigger ifinger issues      Imaging: none   Previous Therapy: Pt has never had therapy for hands      Patient's Goals for Therapy: concerned about finger sticking and he has pain Pain:  Functional Pain Scale Rating 0-10:   4/10 on average  210 at best  6/10 at worst   Locationbilateral: carpal tunnel syndrome and right ring trigger finger.   Description: Burning trigger finger pain   Aggravating Factors: Bending  Easing Factors: massage      Subjective        Pt reports: Pt states that he is not wearing his brace as often because it does not feel comfortable. Pt reports  that he was still using his right hand during without the brace for activities.  He has noticed that he is not experiencing any  More clicking in the fingers.     Response to previous treatment:Pt reports that his finger has been feeling stiff and is still triggering often.   Functional change: none    Pain: 4/10  Location: right  Ring finger      Objective       Ladarius received the following manual therapy techniques for 10 minutes:   -STM with right ring finger over A 1pulley       Modalities: ultrasound 1.0 w/cm2 X 6 minutes  And fluido X 15 minutes to  Right hand       Ladarius received therapeutic exercises for 10 minutes including:  - Finger hooks and MP blocked in extension with pip flexion    - finger extensions and ABD 2 x 10 reps.    Mp 0/90  Pip 0/90   Dip 0/60       Home Exercise Program/Education:  Issued HEP: TGE , use of Mp blocking splint        Home Exercises and Education Provided     Education provided: nothing additional   -   - Progress towards goals: good     Written Home Exercises Provided: Patient instructed to cont prior HEP.  Exercises were reviewed and Ladarius was able to demonstrate them prior to the end of the session.  Ladarius demonstrated good  understanding of the education provided.   .        Assessment   Pt reports that he has not been wearing his splint because it is uncomfortable and was still using his right hand during activities. He also states that he has not been doing his home exercises because his fingers feel stiff. He reports that he is still triggering often. Pt's exercises were modified today because he was triggering during the exercises. He tolerated the above modified activities with mild difficulty. Pt was educated on the importance of wearing his orthotic during activities and at night as well.    Pt would continue to benefit from skilled OT. Yes      Ladarius is progressing well towards his goals and there are no updates to goals at this time. Pt prognosis is  Good.     Pt will continue to benefit from skilled outpatient occupational therapy to address the deficits listed in the problem list on initial evaluation provide pt/family education and to maximize pt's level of independence in the home and community environment.     Anticipated barriers to occupational therapy: none     Pt's spiritual, cultural and educational needs considered and pt agreeable to plan of care and goals.        GOALS: 6  weeks. Pt agrees with goals set.       Short Term   1)   Patient to be IND with HEP and modalities for pain management.  progressing  2)   Increase ROM 10 degrees  For  Pip flexion  motion to increase functional hand use for right hand ( ring finger ) , progressing   3)   Increase  strength by  5 lbs. to grasp  Right  hand , progressing         Long Term (by discharge):  1)   Pt will report 1 out of 10 pain with right  ., progressing   2)   Patient to score of CJ on FOTO to demonstrate improved perception of functionalright hand/ arm  Use. Progressing   3)   Pt will return to prior level of function for ADLs and household management, progressing    Certification Period/Plan of care expiration:  6/24/19 to 7/30/19          Outpatient Occupational Therapy 1 times weekly for 2 weeks may include the following interventions: Fluidotherapy, Manual therapy/joint mobilizations, US 3 mhz, Therapeutic exercises/activities. and Strengthening.               Discussed Plan of Care with patient: Yes  Updates/Grading for next session: no    Bailey Lara OT

## 2019-07-21 ENCOUNTER — OFFICE VISIT (OUTPATIENT)
Dept: URGENT CARE | Facility: CLINIC | Age: 64
End: 2019-07-21
Payer: MEDICARE

## 2019-07-21 VITALS
DIASTOLIC BLOOD PRESSURE: 73 MMHG | BODY MASS INDEX: 25.71 KG/M2 | HEART RATE: 77 BPM | RESPIRATION RATE: 15 BRPM | SYSTOLIC BLOOD PRESSURE: 120 MMHG | OXYGEN SATURATION: 98 % | WEIGHT: 160 LBS | TEMPERATURE: 97 F | HEIGHT: 66 IN

## 2019-07-21 DIAGNOSIS — R10.11 RIGHT UPPER QUADRANT ABDOMINAL PAIN: Primary | ICD-10-CM

## 2019-07-21 LAB
BILIRUB UR QL STRIP: NEGATIVE
GLUCOSE UR QL STRIP: NEGATIVE
KETONES UR QL STRIP: NEGATIVE
LEUKOCYTE ESTERASE UR QL STRIP: NEGATIVE
PH, POC UA: 5 (ref 5–8)
POC BLOOD, URINE: NEGATIVE
POC NITRATES, URINE: NEGATIVE
PROT UR QL STRIP: NEGATIVE
SP GR UR STRIP: 1.02 (ref 1–1.03)
UROBILINOGEN UR STRIP-ACNC: NEGATIVE (ref 0.3–2.2)

## 2019-07-21 PROCEDURE — 81003 URINALYSIS AUTO W/O SCOPE: CPT | Mod: QW,S$GLB,, | Performed by: NURSE PRACTITIONER

## 2019-07-21 PROCEDURE — 99214 OFFICE O/P EST MOD 30 MIN: CPT | Mod: S$GLB,,, | Performed by: NURSE PRACTITIONER

## 2019-07-21 PROCEDURE — 99214 PR OFFICE/OUTPT VISIT, EST, LEVL IV, 30-39 MIN: ICD-10-PCS | Mod: S$GLB,,, | Performed by: NURSE PRACTITIONER

## 2019-07-21 PROCEDURE — 81003 POCT URINALYSIS, DIPSTICK, AUTOMATED, W/O SCOPE: ICD-10-PCS | Mod: QW,S$GLB,, | Performed by: NURSE PRACTITIONER

## 2019-07-21 NOTE — PROGRESS NOTES
"Subjective:       Patient ID: Ladarius Solis is a 63 y.o. male.    Vitals:  height is 5' 6" (1.676 m) and weight is 72.6 kg (160 lb). His temperature is 97.3 °F (36.3 °C). His blood pressure is 120/73 and his pulse is 77. His respiration is 15 and oxygen saturation is 98%.     Chief Complaint: Abdominal Pain    Patient presents with complaint of constant right upper quadrant abdominal pain for 1 month.  States that the pain is about a 6/10.  States that it is constant and daily.  At 1st he said that nothing made it worse and then he admitted that drinking Coke and eating spicy food seems to make it worse.  He does have a reported case of nausea 3 days ago but this subsided.  He denies any diarrhea or constipation.  He states that he is actually very regular and goes to the bathroom every day.  Denies any hard stools.  Denies any bloody or mucousy stools.  Denies any fever or chills.  States that it is a soreness.  Denies any cramping.  Denies any surgical history to his abdomen.  He does not drink alcohol.  Denies any diaphoresis, chest pain, shortness of breath, indigestion, or belching.  He does feel like maybe his abdomen is tena, denies actual distension.  States that he had a colonoscopy 2 years ago and had polyps which were benign.  No recent changes he is here today just to finally figure out what's going on.    Abdominal Pain   This is a new problem. The current episode started 1 to 4 weeks ago (1month). The onset quality is sudden. The problem occurs constantly. The problem has been unchanged. The pain is located in the RLQ. The pain is at a severity of 6/10. The pain is mild. The quality of the pain is sharp. The abdominal pain radiates to the chest. Pertinent negatives include no anorexia, arthralgias, belching, constipation, diarrhea, dysuria, fever, flatus, frequency, headaches, hematochezia, hematuria, melena, myalgias, nausea, vomiting or weight loss. Exacerbated by: when drinking lots of " Nuvyyo. He has tried nothing for the symptoms. There is no history of abdominal surgery, colon cancer, Crohn's disease, gallstones, GERD, irritable bowel syndrome, pancreatitis, PUD or ulcerative colitis. Patient's medical history does not include kidney stones and UTI.       Constitution: Negative for appetite change, chills, sweating and fever.   Cardiovascular: Negative for chest pain.   Respiratory: Negative for shortness of breath.    Gastrointestinal: Positive for abdominal pain and abdominal bloating. Negative for abdominal trauma, history of abdominal surgery, nausea, vomiting, constipation, diarrhea, bright red blood in stool, dark colored stools and heartburn.   Genitourinary: Negative for dysuria, frequency, hematuria and pelvic pain.   Musculoskeletal: Negative for joint pain, back pain and muscle ache.   Neurological: Negative for headaches.       Objective:      Physical Exam   Constitutional: He is oriented to person, place, and time. Vital signs are normal. He appears well-developed and well-nourished.  Non-toxic appearance. He does not have a sickly appearance. He does not appear ill. No distress.   HENT:   Head: Normocephalic and atraumatic.   Right Ear: External ear normal.   Left Ear: External ear normal.   Nose: Nose normal.   Mouth/Throat: Mucous membranes are normal.   Eyes: Conjunctivae and lids are normal.   Neck: Trachea normal and full passive range of motion without pain. Neck supple.   Cardiovascular: Normal rate, regular rhythm and normal heart sounds.   Pulmonary/Chest: Effort normal and breath sounds normal. No respiratory distress.   Abdominal: Soft. Normal appearance and bowel sounds are normal. He exhibits no distension, no abdominal bruit, no pulsatile midline mass and no mass. There is tenderness in the right upper quadrant. There is positive Loyd's sign. There is no rigidity, no rebound, no guarding, no CVA tenderness and no tenderness at McBurney's point. No hernia.    Musculoskeletal: Normal range of motion. He exhibits no edema.   Neurological: He is alert and oriented to person, place, and time. He has normal strength.   Skin: Skin is warm, dry and intact. Capillary refill takes less than 2 seconds. No rash noted. He is not diaphoretic. No pallor.   Psychiatric: He has a normal mood and affect. His speech is normal and behavior is normal. Judgment and thought content normal. Cognition and memory are normal.   Nursing note and vitals reviewed.      Results for orders placed or performed in visit on 07/21/19   POCT Urinalysis, Dipstick, Automated, W/O Scope   Result Value Ref Range    POC Blood, Urine Negative Negative    POC Bilirubin, Urine Negative Negative    POC Urobilinogen, Urine NEGATIVE 0.3 - 2.2    POC Ketones, Urine Negative Negative    POC Protein, Urine Negative Negative    POC Nitrates, Urine Negative Negative    POC Glucose, Urine Negative Negative    pH, UA 5.0 5 - 8    POC Specific Gravity, Urine 1.025 1.003 - 1.029    POC Leukocytes, Urine Negative Negative     Assessment:       1. Right upper quadrant abdominal pain        Plan:       Scheduled for ultrasound to rule out gallstones.  He was offered Bentyl, he states that the pain is not crampy and he deferred this.  Right upper quadrant abdominal pain  -     POCT Urinalysis, Dipstick, Automated, W/O Scope  -     US Abdomen Complete; Future; Expected date: 07/21/2019      Patient Instructions   Stop the use of foods and drinks that trigger including coke and spicy foods.  Avoid fatty or fried foods as well.  US at 8:45 am, go 30 mins early.  We will call you with results.  Follow up closely with your PCP, General Surgery, or GI depending on results of US.  Go to the ER for any worsening pain, fever, chills, uncontrolled vomiting.  Possible Gallstone with Biliary Colic (Presumed)    Your abdominal pain is may be due to spasm of the gallbladder. This is called gallbladder or biliary colic. The gallbladder is a  small sac under the liver, which stores and releases bile. Bile is a fluid that aids in the digestion of fat. Eating fatty food stimulates the gallbladder to contract, and release the bile. A gallstone may form in this sac. Although most people do not have symptoms, when the stone moves and blocks the passage of bile out of the bladder, it can cause pain and even an infection.  To be more certain of the diagnosis, you may need to have an ultrasound, CT-scan or other special test.  A number of things increase the risk for developing gallstones:  · Women are more likely  · Obesity  · Increasing age  · Losing or gaining weight quickly  · High calorie diet  · Pregnancy  · Hormone therapy  · Diabetes  The most common symptoms are:  · Abdominal pain, cramping, aching  · Nausea, vomiting  · Fever  Many illnesses can cause these symptoms. This pain usually starts in the upper right side of your abdomen. Sometimes it can radiate to your right shoulder, back and arm. It usually starts suddenly, becomes more intense quickly, and then gradually decreases and disappears over a couple of hours. Elderly people and diabetics may have difficulty showing where the pain is exactly.  Home care  · Rest in bed and follow a clear liquid diet until feeling better. If pain or nausea medicine was given to help with your symptoms, take these as directed.  · You can take acetaminophen or ibuprofen for pain, unless you were given a different pain medicine to use.Note: If you have chronic liver or kidney disease or ever had a stomach ulcer or GI bleeding or are taking blood thinner medications, talk with your doctor before using these medicines.  · Fat in your diet makes the gallbladder contract and may cause increased pain. Therefore, avoid fat in your diet over the next two days and follow a low-fat diet after that. If you are overweight, a low fat diet will help you lose weight.  Follow-up care  If a test was already scheduled for you, keep  this appointment. Be sure you know how to prepare yourself for the test. Usually, you will be asked not to eat or drink anything for at least 8 hours before the test. Schedule an appointment with your own doctor after your test is complete to discuss the findings.  When to seek medical advice  Call your healthcare provider if any of the following occur:  · Pain gets worse or moves to the right lower abdomen  · Repeated vomiting  · Swelling of the abdomen  · Pain lasts over 6 hours  · Fever of 100.4º F (38º C) or higher, or as directed by your healthcare provider  · Weakness, dizziness  · Dark urine or light colored stools  · Yellow color of the skin or eyes  · Chest, arm, back, neck or jaw pain  Call 911  Get emergency medical care right away if any of these occur:  · Trouble breathing  · Very confused  · Very drowsy or trouble awakening  · Fainting or loss of consciousness  · Rapid heart rate  Date Last Reviewed: 8/23/2015  © 6626-0828 [employer]. [address] [place] [address], [place] [address]. All rights reserved. This information is not intended as a substitute for professional medical care. Always follow your healthcare professional's instructions.

## 2019-07-21 NOTE — PATIENT INSTRUCTIONS
Stop the use of foods and drinks that trigger including coke and spicy foods.  Avoid fatty or fried foods as well.  US at 8:45 am, go 30 mins early.  We will call you with results.  Follow up closely with your PCP, General Surgery, or GI depending on results of US.  Go to the ER for any worsening pain, fever, chills, uncontrolled vomiting.  Possible Gallstone with Biliary Colic (Presumed)    Your abdominal pain is may be due to spasm of the gallbladder. This is called gallbladder or biliary colic. The gallbladder is a small sac under the liver, which stores and releases bile. Bile is a fluid that aids in the digestion of fat. Eating fatty food stimulates the gallbladder to contract, and release the bile. A gallstone may form in this sac. Although most people do not have symptoms, when the stone moves and blocks the passage of bile out of the bladder, it can cause pain and even an infection.  To be more certain of the diagnosis, you may need to have an ultrasound, CT-scan or other special test.  A number of things increase the risk for developing gallstones:  · Women are more likely  · Obesity  · Increasing age  · Losing or gaining weight quickly  · High calorie diet  · Pregnancy  · Hormone therapy  · Diabetes  The most common symptoms are:  · Abdominal pain, cramping, aching  · Nausea, vomiting  · Fever  Many illnesses can cause these symptoms. This pain usually starts in the upper right side of your abdomen. Sometimes it can radiate to your right shoulder, back and arm. It usually starts suddenly, becomes more intense quickly, and then gradually decreases and disappears over a couple of hours. Elderly people and diabetics may have difficulty showing where the pain is exactly.  Home care  · Rest in bed and follow a clear liquid diet until feeling better. If pain or nausea medicine was given to help with your symptoms, take these as directed.  · You can take acetaminophen or ibuprofen for pain, unless you were given  a different pain medicine to use.Note: If you have chronic liver or kidney disease or ever had a stomach ulcer or GI bleeding or are taking blood thinner medications, talk with your doctor before using these medicines.  · Fat in your diet makes the gallbladder contract and may cause increased pain. Therefore, avoid fat in your diet over the next two days and follow a low-fat diet after that. If you are overweight, a low fat diet will help you lose weight.  Follow-up care  If a test was already scheduled for you, keep this appointment. Be sure you know how to prepare yourself for the test. Usually, you will be asked not to eat or drink anything for at least 8 hours before the test. Schedule an appointment with your own doctor after your test is complete to discuss the findings.  When to seek medical advice  Call your healthcare provider if any of the following occur:  · Pain gets worse or moves to the right lower abdomen  · Repeated vomiting  · Swelling of the abdomen  · Pain lasts over 6 hours  · Fever of 100.4º F (38º C) or higher, or as directed by your healthcare provider  · Weakness, dizziness  · Dark urine or light colored stools  · Yellow color of the skin or eyes  · Chest, arm, back, neck or jaw pain  Call 911  Get emergency medical care right away if any of these occur:  · Trouble breathing  · Very confused  · Very drowsy or trouble awakening  · Fainting or loss of consciousness  · Rapid heart rate  Date Last Reviewed: 8/23/2015  © 8338-6914 [employer]. [address] [place] [address], [place] [address]. All rights reserved. This information is not intended as a substitute for professional medical care. Always follow your healthcare professional's instructions.

## 2019-07-22 ENCOUNTER — TELEPHONE (OUTPATIENT)
Dept: URGENT CARE | Facility: CLINIC | Age: 64
End: 2019-07-22

## 2019-07-22 ENCOUNTER — HOSPITAL ENCOUNTER (OUTPATIENT)
Dept: RADIOLOGY | Facility: HOSPITAL | Age: 64
Discharge: HOME | End: 2019-07-22
Attending: NURSE PRACTITIONER
Payer: MEDICARE

## 2019-07-22 DIAGNOSIS — R10.11 RIGHT UPPER QUADRANT ABDOMINAL PAIN: ICD-10-CM

## 2019-07-22 PROCEDURE — 76700 US EXAM ABDOM COMPLETE: CPT | Mod: 26,,, | Performed by: INTERNAL MEDICINE

## 2019-07-22 PROCEDURE — 76700 US ABDOMEN COMPLETE: ICD-10-PCS | Mod: 26,,, | Performed by: INTERNAL MEDICINE

## 2019-07-22 PROCEDURE — 76700 US EXAM ABDOM COMPLETE: CPT | Mod: TC

## 2019-07-25 ENCOUNTER — TELEPHONE (OUTPATIENT)
Dept: URGENT CARE | Facility: CLINIC | Age: 64
End: 2019-07-25

## 2019-07-25 NOTE — TELEPHONE ENCOUNTER
Patient stated that he is still experiencing constant pain in his right side. He stated that his primary care doctor is full, but he states that he will go to the ER to get an MRI per the PA that accessed him . I told him that when he goes to have that done to call and give an update.

## 2019-07-27 ENCOUNTER — HOSPITAL ENCOUNTER (EMERGENCY)
Facility: HOSPITAL | Age: 64
Discharge: HOME | End: 2019-07-27
Attending: EMERGENCY MEDICINE
Payer: MEDICARE

## 2019-07-27 VITALS
BODY MASS INDEX: 28.12 KG/M2 | WEIGHT: 175 LBS | HEIGHT: 66 IN | HEART RATE: 81 BPM | OXYGEN SATURATION: 96 % | TEMPERATURE: 98 F | RESPIRATION RATE: 15 BRPM | DIASTOLIC BLOOD PRESSURE: 71 MMHG | SYSTOLIC BLOOD PRESSURE: 126 MMHG

## 2019-07-27 DIAGNOSIS — R06.09 EXERTIONAL DYSPNEA: ICD-10-CM

## 2019-07-27 DIAGNOSIS — R10.11 RIGHT UPPER QUADRANT ABDOMINAL PAIN: Primary | ICD-10-CM

## 2019-07-27 LAB
ALBUMIN SERPL BCP-MCNC: 4.1 G/DL (ref 3.5–5.2)
ALP SERPL-CCNC: 89 U/L (ref 55–135)
ALT SERPL W/O P-5'-P-CCNC: 63 U/L (ref 10–44)
ANION GAP SERPL CALC-SCNC: 11 MMOL/L (ref 8–16)
AST SERPL-CCNC: 39 U/L (ref 10–40)
BACTERIA #/AREA URNS HPF: ABNORMAL /HPF
BASOPHILS # BLD AUTO: 0.03 K/UL (ref 0–0.2)
BASOPHILS NFR BLD: 0.5 % (ref 0–1.9)
BILIRUB SERPL-MCNC: 0.6 MG/DL (ref 0.1–1)
BILIRUB UR QL STRIP: NEGATIVE
BNP SERPL-MCNC: 37 PG/ML (ref 0–99)
BUN SERPL-MCNC: 19 MG/DL (ref 8–23)
CALCIUM SERPL-MCNC: 9.7 MG/DL (ref 8.7–10.5)
CHLORIDE SERPL-SCNC: 104 MMOL/L (ref 95–110)
CLARITY UR: CLEAR
CO2 SERPL-SCNC: 23 MMOL/L (ref 23–29)
COLOR UR: YELLOW
CREAT SERPL-MCNC: 0.9 MG/DL (ref 0.5–1.4)
DIFFERENTIAL METHOD: ABNORMAL
EOSINOPHIL # BLD AUTO: 0.1 K/UL (ref 0–0.5)
EOSINOPHIL NFR BLD: 1.6 % (ref 0–8)
ERYTHROCYTE [DISTWIDTH] IN BLOOD BY AUTOMATED COUNT: 13.3 % (ref 11.5–14.5)
EST. GFR  (AFRICAN AMERICAN): >60 ML/MIN/1.73 M^2
EST. GFR  (NON AFRICAN AMERICAN): >60 ML/MIN/1.73 M^2
GLUCOSE SERPL-MCNC: 101 MG/DL (ref 70–110)
GLUCOSE UR QL STRIP: NEGATIVE
HCT VFR BLD AUTO: 40.2 % (ref 40–54)
HGB BLD-MCNC: 13.4 G/DL (ref 14–18)
HGB UR QL STRIP: ABNORMAL
HYALINE CASTS #/AREA URNS LPF: 0 /LPF
KETONES UR QL STRIP: NEGATIVE
LEUKOCYTE ESTERASE UR QL STRIP: NEGATIVE
LIPASE SERPL-CCNC: 22 U/L (ref 4–60)
LYMPHOCYTES # BLD AUTO: 1.7 K/UL (ref 1–4.8)
LYMPHOCYTES NFR BLD: 26.9 % (ref 18–48)
MCH RBC QN AUTO: 29.8 PG (ref 27–31)
MCHC RBC AUTO-ENTMCNC: 33.3 G/DL (ref 32–36)
MCV RBC AUTO: 90 FL (ref 82–98)
MICROSCOPIC COMMENT: ABNORMAL
MONOCYTES # BLD AUTO: 0.5 K/UL (ref 0.3–1)
MONOCYTES NFR BLD: 8.3 % (ref 4–15)
NEUTROPHILS # BLD AUTO: 4 K/UL (ref 1.8–7.7)
NEUTROPHILS NFR BLD: 62.7 % (ref 38–73)
NITRITE UR QL STRIP: NEGATIVE
PH UR STRIP: 6 [PH] (ref 5–8)
PLATELET # BLD AUTO: 159 K/UL (ref 150–350)
PMV BLD AUTO: 11.4 FL (ref 9.2–12.9)
POTASSIUM SERPL-SCNC: 4 MMOL/L (ref 3.5–5.1)
PROT SERPL-MCNC: 7 G/DL (ref 6–8.4)
PROT UR QL STRIP: ABNORMAL
RBC # BLD AUTO: 4.49 M/UL (ref 4.6–6.2)
RBC #/AREA URNS HPF: 1 /HPF (ref 0–4)
SODIUM SERPL-SCNC: 138 MMOL/L (ref 136–145)
SP GR UR STRIP: 1.02 (ref 1–1.03)
TROPONIN I SERPL DL<=0.01 NG/ML-MCNC: 0.01 NG/ML (ref 0–0.03)
URN SPEC COLLECT METH UR: ABNORMAL
UROBILINOGEN UR STRIP-ACNC: NEGATIVE EU/DL
WBC # BLD AUTO: 6.36 K/UL (ref 3.9–12.7)
WBC #/AREA URNS HPF: 15 /HPF (ref 0–5)

## 2019-07-27 PROCEDURE — 63600175 PHARM REV CODE 636 W HCPCS: Performed by: EMERGENCY MEDICINE

## 2019-07-27 PROCEDURE — 84484 ASSAY OF TROPONIN QUANT: CPT

## 2019-07-27 PROCEDURE — 85025 COMPLETE CBC W/AUTO DIFF WBC: CPT

## 2019-07-27 PROCEDURE — 83880 ASSAY OF NATRIURETIC PEPTIDE: CPT

## 2019-07-27 PROCEDURE — 96374 THER/PROPH/DIAG INJ IV PUSH: CPT | Mod: 59

## 2019-07-27 PROCEDURE — 87086 URINE CULTURE/COLONY COUNT: CPT

## 2019-07-27 PROCEDURE — 99284 EMERGENCY DEPT VISIT MOD MDM: CPT | Mod: 25

## 2019-07-27 PROCEDURE — 93005 ELECTROCARDIOGRAM TRACING: CPT

## 2019-07-27 PROCEDURE — 83690 ASSAY OF LIPASE: CPT

## 2019-07-27 PROCEDURE — 81000 URINALYSIS NONAUTO W/SCOPE: CPT

## 2019-07-27 PROCEDURE — 80053 COMPREHEN METABOLIC PANEL: CPT

## 2019-07-27 PROCEDURE — 25500020 PHARM REV CODE 255: Performed by: EMERGENCY MEDICINE

## 2019-07-27 RX ORDER — KETOROLAC TROMETHAMINE 30 MG/ML
30 INJECTION, SOLUTION INTRAMUSCULAR; INTRAVENOUS
Status: COMPLETED | OUTPATIENT
Start: 2019-07-27 | End: 2019-07-27

## 2019-07-27 RX ADMIN — IOHEXOL 75 ML: 350 INJECTION, SOLUTION INTRAVENOUS at 09:07

## 2019-07-27 RX ADMIN — KETOROLAC TROMETHAMINE 30 MG: 30 INJECTION, SOLUTION INTRAMUSCULAR at 10:07

## 2019-07-27 NOTE — ED PROVIDER NOTES
Encounter Date: 7/27/2019    SCRIBE #1 NOTE: I, Bonnie Enrique, am scribing for, and in the presence of,  Dr. Biswas. I have scribed the entire note.       History     Chief Complaint   Patient presents with    Abdominal Pain     had ultrasound last week  and dx with fatty liver, pt refered to ED for MRI, pt complains of RUQ pain x 1 mth more pain after eating and after sitting for a long period of time, denies n/v/     Ladarius Solis is a 63 y.o. male who  has a past medical history of Arthritis, Carpal tunnel syndrome, bilateral, Cervical spinal stenosis (2002), Colon polyps, Hypertension, and Vertigo.    The patient presents to the ED due to right upper quadrant abdominal stabbing pain for a month. Pain is constant and radiates to his back which worsens after eating and sitting for long periods of time. He notes feeling bloated all the time which has reduced his appetite. Patient had an ultrasound last week in which he was diagnosed with fatty liver. It was suggested he refer to the ED for a MRI. He denies any nausea, vomiting, diarrhea, blood in stool, constipation or swelling in lower extremities.     The history is provided by the patient.     Review of patient's allergies indicates:  No Known Allergies  Past Medical History:   Diagnosis Date    Arthritis     Carpal tunnel syndrome, bilateral     Cervical spinal stenosis 2002    Colon polyps     Hypertension     Vertigo     left ear issues     Past Surgical History:   Procedure Laterality Date    ARTHROSCOPY, KNEE, WITH CHONDROPLASTY Left 10/17/2018    Performed by GRETEL Carr MD at [facility] OR 2ND FLR    ARTHROSCOPY, KNEE, WITH MENISCECTOMY Left 10/17/2018    Performed by GRETEL Carr MD at [facility] OR 2ND FLR    COLONOSCOPY      COLONOSCOPY N/A 3/9/2018    Performed by Raul August MD at [facility] ENDO (2ND FLR)    COLONOSCOPY/emr N/A 9/25/2017    Performed by Raul Augsut MD at [facility] ENDO (2ND FLR)     Family History   Problem Relation Age  of Onset    No Known Problems Mother     Arthritis Father     Dementia Father     Heart disease Father     No Known Problems Brother     Diabetes Neg Hx      Social History     Tobacco Use    Smoking status: Never Smoker    Smokeless tobacco: Never Used   Substance Use Topics    Alcohol use: No    Drug use: No     Review of Systems   Constitutional: Negative for chills and fever.   HENT: Negative for sore throat.    Respiratory: Negative for shortness of breath.    Cardiovascular: Negative for chest pain.   Gastrointestinal: Positive for abdominal pain. Negative for constipation, diarrhea, nausea and vomiting.   Genitourinary: Negative for dysuria, frequency and urgency.   Musculoskeletal: Negative for back pain.   Skin: Negative for rash and wound.   Neurological: Negative for weakness.   Hematological: Does not bruise/bleed easily.   Psychiatric/Behavioral: Negative for agitation, behavioral problems and confusion.       Physical Exam     Initial Vitals [07/27/19 1849]   BP Pulse Resp Temp SpO2   (!) 150/83 88 17 98.6 °F (37 °C) 95 %      MAP       --         Physical Exam    Nursing note and vitals reviewed.  Constitutional: He appears well-developed and well-nourished. He is not diaphoretic. No distress.   HENT:   Head: Normocephalic and atraumatic.   Mouth/Throat: Oropharynx is clear and moist.   Eyes: EOM are normal. Pupils are equal, round, and reactive to light.   Neck: No tracheal deviation present.   Cardiovascular: Normal rate, regular rhythm and normal heart sounds.   Pulmonary/Chest: Breath sounds normal. No stridor. No respiratory distress.   Abdominal: Soft. He exhibits no distension and no mass. There is no tenderness. There is no rebound and no guarding.   Mild tenderness to palpation to RUQ.  Normal active bowel sounds.  No flank pain; no percussion.   Musculoskeletal: Normal range of motion. He exhibits no edema.   Neurological: He is alert and oriented to person, place, and time. No  cranial nerve deficit or sensory deficit.   Skin: Skin is warm and dry. Capillary refill takes less than 2 seconds. No rash noted.   Psychiatric: He has a normal mood and affect. His behavior is normal. Thought content normal.         ED Course   Procedures  Labs Reviewed   CBC W/ AUTO DIFFERENTIAL - Abnormal; Notable for the following components:       Result Value    RBC 4.49 (*)     Hemoglobin 13.4 (*)     All other components within normal limits   COMPREHENSIVE METABOLIC PANEL - Abnormal; Notable for the following components:    ALT 63 (*)     All other components within normal limits   URINALYSIS, REFLEX TO URINE CULTURE - Abnormal; Notable for the following components:    Protein, UA 1+ (*)     Occult Blood UA Trace (*)     All other components within normal limits    Narrative:     Preferred Collection Type->Urine, Clean Catch   URINALYSIS MICROSCOPIC - Abnormal; Notable for the following components:    WBC, UA 15 (*)     Bacteria Few (*)     All other components within normal limits    Narrative:     Preferred Collection Type->Urine, Clean Catch   CULTURE, URINE   LIPASE   TROPONIN I   B-TYPE NATRIURETIC PEPTIDE     EKG Readings: (Independently Interpreted)   Normal Sinus Rhythm. RBBB new from 11/13/08. Rate 81/min. WA Interval 172 ms       X-Rays:   Independently Interpreted Readings:   Other Readings:  Reviewed by myself, read by radiology.     Imaging Results          CT Abdomen Pelvis With Contrast (Final result)  Result time 07/27/19 21:39:13    Final result by Lew Tavares MD (07/27/19 21:39:13)                 Impression:      No acute findings in the abdomen or pelvis.    Hepatic steatosis and borderline splenomegaly.    Small bilateral fat containing inguinal hernias.    Prostatomegaly.      Electronically signed by: Lew Tavares MD  Date:    07/27/2019  Time:    21:39             Narrative:    EXAMINATION:  CT ABDOMEN PELVIS WITH CONTRAST    CLINICAL HISTORY:  Abdominal pain,  unspecified;    TECHNIQUE:  Low dose axial images, sagittal and coronal reformations were obtained from the lung bases to the pubic symphysis following the IV administration of 75 mL of Omnipaque 350 .  Oral contrast was not given.    COMPARISON:  Abdominal ultrasound, 07/22/2019.    FINDINGS:  Lower Chest:    Lung bases are clear.  Heart size is normal.    Abdomen:    Liver is normal in size and contour and demonstrates mild diffuse hypoattenuation of the parenchyma suggestive of steatosis.  No suspicious hepatic lesion.  Gallbladder is unremarkable.  No calcified gallstones.  No intrahepatic biliary ductal dilatation.    Spleen is at the upper limits of normal in size, measuring 13 cm in craniocaudal length.  There is mild pancreatic atrophy.  No pancreatic ductal dilatation.  Adrenal glands are unremarkable.    The kidneys are symmetric.  No hydronephrosis.    No small bowel obstruction.  No inflammatory changes identified involving the gastrointestinal tract.  The appendix is normal.    No pneumoperitoneum or organized fluid collection.    No bulky lymphadenopathy.    Abdominal aorta is normal in caliber with mild calcific atherosclerosis.    Portal, splenic, and superior mesenteric veins are patent.    Pelvis:    Urinary bladder is thin walled.  Prostate is enlarged, measuring up to 6 cm in transverse width.  Rectum is unremarkable.  No free fluid in the pelvis.  No pelvic lymphadenopathy.    Bones and soft tissues:    No aggressive osseous lesions.  Age-appropriate degenerative changes in the lumbar spine.  Small bilateral fat containing inguinal hernias.                               X-Ray Chest 1 View (Final result)  Result time 07/27/19 19:54:16    Final result by Crispin Lara MD (07/27/19 19:54:16)                 Impression:      1. No acute cardiopulmonary process, hypoventilatory exam.      Electronically signed by: Crispin Lara MD  Date:    07/27/2019  Time:    19:54             Narrative:     EXAMINATION:  XR CHEST 1 VIEW    CLINICAL HISTORY:  Other forms of dyspnea    TECHNIQUE:  Single frontal view of the chest was performed.    COMPARISON:  10/10/2016    FINDINGS:  The cardiomediastinal silhouette is prominent, magnified by technique, stable..  There is no pleural effusion.  The trachea is midline.  The lungs are symmetrically expanded bilaterally without evidence of acute parenchymal process. No large focal consolidation seen.  There is no pneumothorax.  The osseous structures are remarkable for degenerative changes..                                Medical Decision Making:   Clinical Tests:   Lab Tests: Ordered and Reviewed  Radiological Study: Ordered and Reviewed      Medical decision making:  Patient with normal CT scan and laboratory work ongoing right upper quadrant pain, ultrasound which showed fatty liver only patient is referred to GI via his primary doctor diagnostic considerations tonight were biliary colic, cholecystitis, hepatomegaly, ascites, intra-abdominal pathology all which seemed to be negative this time.                    Clinical Impression:       ICD-10-CM ICD-9-CM   1. Right upper quadrant abdominal pain R10.11 789.01   2. Exertional dyspnea R06.09 786.09         Disposition:   Disposition: Discharged  Condition: Stable    I, Dr. Darrin Biswas, personally performed the services described in this documentation.   All medical record entries made by the scribe were at my direction and in my presence.   I have reviewed the chart and agree that the record is accurate and complete.   Darrin Biswas MD.  10:16 PM 07/27/2019                    Darrin Biswas MD  07/27/19 0055

## 2019-07-28 NOTE — ED TRIAGE NOTES
Pt presents to ED with complaint of RUQ pain x1.5 months.  States was seen at  on 7/21 and scheduled for abdominal ultrasound which had no significant findings.  Denies nausea, vomiting, or constipation.  Last BM was today.

## 2019-07-28 NOTE — DISCHARGE INSTRUCTIONS
Your ct scan and labs are essentially normal, please followup with your primary for a gi referral.

## 2019-07-28 NOTE — ED NOTES
Dr. Biswas notified that patient reports pain is at a tolerable level. 2/10. Patient states he is ready for discharge to home

## 2019-07-29 ENCOUNTER — PATIENT MESSAGE (OUTPATIENT)
Dept: INTERNAL MEDICINE | Facility: CLINIC | Age: 64
End: 2019-07-29

## 2019-07-29 ENCOUNTER — CLINICAL SUPPORT (OUTPATIENT)
Dept: REHABILITATION | Facility: HOSPITAL | Age: 64
End: 2019-07-29
Attending: PHYSICIAN ASSISTANT
Payer: MEDICARE

## 2019-07-29 ENCOUNTER — TELEPHONE (OUTPATIENT)
Dept: INTERNAL MEDICINE | Facility: CLINIC | Age: 64
End: 2019-07-29

## 2019-07-29 DIAGNOSIS — M65.341 TRIGGER RING FINGER OF RIGHT HAND: Primary | ICD-10-CM

## 2019-07-29 DIAGNOSIS — R10.9 CHRONIC ABDOMINAL PAIN: Primary | ICD-10-CM

## 2019-07-29 DIAGNOSIS — G89.29 CHRONIC ABDOMINAL PAIN: Primary | ICD-10-CM

## 2019-07-29 DIAGNOSIS — K76.0 FATTY LIVER: ICD-10-CM

## 2019-07-29 LAB — BACTERIA UR CULT: NORMAL

## 2019-07-29 PROCEDURE — 97110 THERAPEUTIC EXERCISES: CPT

## 2019-07-29 PROCEDURE — 97022 WHIRLPOOL THERAPY: CPT

## 2019-07-29 NOTE — TELEPHONE ENCOUNTER
Let pt know that I received a message from Miesha GODOY asked us to help pt with a GI consult for abdominal pain. I placed the order.

## 2019-07-29 NOTE — PROGRESS NOTES
Occupational Therapy Daily Treatment Note   Date 7/29/2019    Name: Ladarius Solis  Clinic Number: [id number]  Name: Ladarius Solis  Clinic Number: [id number]     Therapy Diagnosis:        Encounter Diagnosis   Name Primary?    Trigger ring finger of right hand      bilateral carpal tunnel syndrome   Physician: Cooper Tyler MD     Physician Orders:  eval and treat   Medical Diagnosis: G56.03 bilateral carpal tunnel and M65.341 trigger finger right ring finger   Surgical Procedure/ Date :none   Evaluation Date: 2/11/2019  Insurance:medicare   Insurance Authorization period Expiration: 1/21/2020  Plan of Care Certification Period:  7/30/19       Visit # / Visits Authortized 15/ 19 updated in computer   Time In: 9:45  am   Time Out: 10;30  Am   Total Time: 45 minutes  Total Treatment Time: 20 minutes  Charges: fluido, 1 MT, , 1 TE     Precautions: Standard  Involved Side:  right  Dominant Side: Left  Date of Onset:  1  1/2 years ago   Mechanism of Injury:  Started with carpal tunnel issues about a year ago , he is not really worried with any of the carpal tunnel issues , main issue he wants therapy to focus on is right ring tigger finger.    He got trigger finger injection dec 2018 and reports that he has not has any change in pain or function.        History of Current Condition: bilateral carpal tunnel and  Ring  Trigger ifinger issues      Imaging: none   Previous Therapy: Pt has never had therapy for hands      Patient's Goals for Therapy: concerned about finger sticking and he has pain Pain:  Functional Pain Scale Rating 0-10:   1/10  on average  1/10  at best      Locationbilateral: carpal tunnel syndrome and right ring trigger finger.   Description: Burning trigger finger pain   Aggravating Factors: Bending  Easing Factors: massage      Subjective        Pt reports: Pt states that he is wearing his trigger finger braces much more and that he has almost no more sticking when  he takes them off and does his exercises.     Response to previous treatment:Pt reports that his finger has not been feeling stiff and is still triggering less    Functional change: none    Pain: 4/10  Location: right  Ring finger      Objective       Ladarius received the following manual therapy techniques for 10 minutes:   -STM with right ring finger over A 1pulley       Modalities: ultrasound 1.0 w/cm2 X 6 minutes  And fluido X 15 minutes to  Right hand       Ladarius received therapeutic exercises for 10 minutes including:  - Finger hooks and MP blocked in extension with pip flexion    - finger extensions and ABD 2 x 10 reps.    Mp 0/90  Pip 0/90   Dip 0/60        strength   Right   50#, 40#, 40#,      Home Exercise Program/Education:  Issued HEP: TGE , use of Mp blocking splint        Home Exercises and Education Provided     Education provided: nothing additional   -   - Progress towards goals: good     Written Home Exercises Provided: Patient instructed to cont prior HEP.  Exercises were reviewed and Ladarius was able to demonstrate them prior to the end of the session.  Ladarius demonstrated good  understanding of the education provided.   .        Assessment   Pt reports that he has  Been wearing his splint much more not been wearing his splint because it is uncomfortable and was still using his right hand during activities. Pt would like to discontinue Ot and try on his own . He thinks that the splint is helping and that things are much better .       Ladarius is progressing well towards his goals and there are no updates to goals at this time. Pt prognosis is Good.     Pt will continue to benefit from skilled outpatient occupational therapy to address the deficits listed in the problem list on initial evaluation provide pt/family education and to maximize pt's level of independence in the home and community environment.     Anticipated barriers to occupational therapy: none     Pt's spiritual, cultural  and educational needs considered and pt agreeable to plan of care and goals.        GOALS: 6  weeks. Pt agrees with goals set.       Short Term   1)   Patient to be IND with HEP and modalities for pain management. MET   2)   Increase ROM 10 degrees  For  Pip flexion  motion to increase functional hand use for right hand ( ring finger ) ,MET   3)   Increase  strength by  5 lbs. to grasp  Right  hand , MET         Long Term (by discharge):  1)   Pt will report 1 out of 10 pain with right  .,MET    2)   Patient to score of CJ on FOTO to demonstrate improved perception of functionalright hand/ arm  Use.  MET   3)   Pt will return to prior level of function for ADLs and household management,  MET    Certification Period/Plan of care expiration:  6/24/19 to 7/30/19        Pt to be d/c from OT              Discussed Plan of Care with patient: Yes  Updates/Grading for next session: no    Bailey Lara, OT

## 2019-07-29 NOTE — TELEPHONE ENCOUNTER
----- Message from Darrin Biswas MD sent at 7/27/2019 10:13 PM CDT -----  Pt here with 6 weeks of upper abdominal pain,  fullness with eating, pt has had normal CT and Labs as well US showing fatty liver. Please refer to appropriate GI for further eval plus followup with patient.    Thank you;   Dr Darrin Biswas, ALEKSANDRA Whiteside.

## 2019-07-29 NOTE — TELEPHONE ENCOUNTER
I called to schedule an appointment for Gastro they transferred me to the Hepatology department stating the patient needs to be seen for both fatty liver and abdominal pain with them. A referral for Hepatology is needed in order for the patient to be scheduled once his chart is reviewed by the department.

## 2019-08-02 ENCOUNTER — OFFICE VISIT (OUTPATIENT)
Dept: INTERNAL MEDICINE | Facility: CLINIC | Age: 64
End: 2019-08-02
Payer: MEDICARE

## 2019-08-02 VITALS
SYSTOLIC BLOOD PRESSURE: 128 MMHG | BODY MASS INDEX: 26.9 KG/M2 | DIASTOLIC BLOOD PRESSURE: 78 MMHG | OXYGEN SATURATION: 97 % | HEART RATE: 94 BPM | WEIGHT: 166.69 LBS

## 2019-08-02 DIAGNOSIS — K76.0 HEPATIC STEATOSIS: ICD-10-CM

## 2019-08-02 DIAGNOSIS — R10.11 RUQ PAIN: Primary | ICD-10-CM

## 2019-08-02 DIAGNOSIS — I10 HYPERTENSION, UNSPECIFIED TYPE: ICD-10-CM

## 2019-08-02 PROCEDURE — 99999 PR PBB SHADOW E&M-EST. PATIENT-LVL III: ICD-10-PCS | Mod: PBBFAC,,, | Performed by: INTERNAL MEDICINE

## 2019-08-02 PROCEDURE — 99214 OFFICE O/P EST MOD 30 MIN: CPT | Mod: S$PBB,,, | Performed by: INTERNAL MEDICINE

## 2019-08-02 PROCEDURE — 99213 OFFICE O/P EST LOW 20 MIN: CPT | Mod: PBBFAC | Performed by: INTERNAL MEDICINE

## 2019-08-02 PROCEDURE — 99999 PR PBB SHADOW E&M-EST. PATIENT-LVL III: CPT | Mod: PBBFAC,,, | Performed by: INTERNAL MEDICINE

## 2019-08-02 PROCEDURE — 99214 PR OFFICE/OUTPT VISIT, EST, LEVL IV, 30-39 MIN: ICD-10-PCS | Mod: S$PBB,,, | Performed by: INTERNAL MEDICINE

## 2019-08-02 RX ORDER — ESOMEPRAZOLE MAGNESIUM 40 MG/1
40 CAPSULE, DELAYED RELEASE ORAL
Qty: 30 CAPSULE | Refills: 11 | Status: SHIPPED | OUTPATIENT
Start: 2019-08-02 | End: 2019-08-16

## 2019-08-02 NOTE — PROGRESS NOTES
Subjective:       Patient ID: Ladarius Solis is a 63 y.o. male.    Chief Complaint: Abdominal Pain    Patient here for follow-up of abdominal pain. He was seen in the emergency room.  At times he says the pain is been present for several months, other times it has been worse lately and is worse with meals.  He tried some Gas-X but no antacid or PPI.  He had a CT scan and chest x-ray and labs.  ALT was elevated mildly and this was new compared to February his imaging study showed fatty change to the liver small hernia which patient says is asymptomatic and enlarged prostate although his PSA was normal recently he says he has normal bowel movements.  He denies any trauma.  We were trying to set him up with gastro and hepatology.  He had a colonoscopy in March of 2018    Abdominal Pain   This is a chronic problem. The current episode started more than 1 month ago. The onset quality is sudden. The problem occurs constantly. The most recent episode lasted 24 hours. The problem has been rapidly worsening. The pain is located in the RLQ. The pain is at a severity of 10/10. The pain is severe. The quality of the pain is a sensation of fullness and sharp. The abdominal pain radiates to the epigastric region, chest and back. Associated symptoms include arthralgias, belching, flatus, frequency and myalgias. Pertinent negatives include no anorexia, constipation, diarrhea, dysuria, fever, headaches, hematochezia, hematuria, melena, nausea, vomiting or weight loss. The pain is aggravated by eating. The pain is relieved by nothing. He has tried nothing for the symptoms. The treatment provided no relief. Prior diagnostic workup includes CT scan and ultrasound. His past medical history is significant for GERD. There is no history of abdominal surgery, colon cancer, Crohn's disease, gallstones, irritable bowel syndrome, pancreatitis, PUD or ulcerative colitis. Patient's medical history does not include kidney stones and UTI.      Review of Systems   Constitutional: Negative for chills, fatigue, fever, unexpected weight change and weight loss.   HENT: Negative for nosebleeds and trouble swallowing.    Eyes: Negative for pain and visual disturbance.   Respiratory: Negative for cough, shortness of breath and wheezing.    Cardiovascular: Negative for chest pain and palpitations.   Gastrointestinal: Positive for abdominal pain and flatus. Negative for anorexia, constipation, diarrhea, hematochezia, melena, nausea and vomiting.   Genitourinary: Positive for frequency. Negative for difficulty urinating, dysuria and hematuria.   Musculoskeletal: Positive for arthralgias and myalgias. Negative for neck pain.   Skin: Negative for rash.   Neurological: Negative for dizziness and headaches.   Hematological: Does not bruise/bleed easily.   Psychiatric/Behavioral: Negative for dysphoric mood, sleep disturbance and suicidal ideas.       Objective:      Physical Exam   Constitutional: He is oriented to person, place, and time. He appears well-developed and well-nourished. No distress.   HENT:   Head: Normocephalic and atraumatic.   Right Ear: External ear normal.   Left Ear: External ear normal.   Mouth/Throat: Oropharynx is clear and moist. No oropharyngeal exudate.   TM's clear, pharynx clear   Eyes: Pupils are equal, round, and reactive to light. Conjunctivae and EOM are normal. No scleral icterus.   Neck: Normal range of motion. Neck supple. No thyromegaly present.   No supraclavicular nodes palpated   Cardiovascular: Normal rate and regular rhythm.   Murmur heard.  Pulmonary/Chest: Effort normal and breath sounds normal. He has no wheezes.   Abdominal: Soft. Bowel sounds are normal. He exhibits no mass. There is tenderness (Right upper quadrant).   Musculoskeletal: He exhibits no edema.   Lymphadenopathy:     He has no cervical adenopathy.   Neurological: He is alert and oriented to person, place, and time.   Skin: No rash noted. No erythema. No  pallor.   Psychiatric: He has a normal mood and affect. His behavior is normal.       Assessment:       1. RUQ pain    2. Hypertension, unspecified type    3. Hepatic steatosis        Plan:       Ladarius was seen today for abdominal pain.    Diagnoses and all orders for this visit:    RUQ pain    Hypertension, unspecified type    Hepatic steatosis    Other orders  -     esomeprazole (NEXIUM) 40 MG capsule; Take 1 capsule (40 mg total) by mouth before breakfast.        trial of PPI pending gastro visit bland diet

## 2019-08-16 ENCOUNTER — PROCEDURE VISIT (OUTPATIENT)
Dept: HEPATOLOGY | Facility: CLINIC | Age: 64
End: 2019-08-16
Attending: NURSE PRACTITIONER
Payer: MEDICARE

## 2019-08-16 ENCOUNTER — OFFICE VISIT (OUTPATIENT)
Dept: HEPATOLOGY | Facility: CLINIC | Age: 64
End: 2019-08-16
Payer: MEDICARE

## 2019-08-16 ENCOUNTER — LAB VISIT (OUTPATIENT)
Dept: LAB | Facility: HOSPITAL | Age: 64
End: 2019-08-16
Payer: MEDICARE

## 2019-08-16 VITALS
HEIGHT: 66 IN | HEART RATE: 74 BPM | SYSTOLIC BLOOD PRESSURE: 143 MMHG | DIASTOLIC BLOOD PRESSURE: 78 MMHG | OXYGEN SATURATION: 96 % | BODY MASS INDEX: 26.86 KG/M2 | WEIGHT: 167.13 LBS | RESPIRATION RATE: 18 BRPM

## 2019-08-16 DIAGNOSIS — R74.01 ELEVATED TRANSAMINASE LEVEL: ICD-10-CM

## 2019-08-16 DIAGNOSIS — K76.0 HEPATIC STEATOSIS: ICD-10-CM

## 2019-08-16 DIAGNOSIS — I10 ESSENTIAL HYPERTENSION: ICD-10-CM

## 2019-08-16 DIAGNOSIS — E66.3 OVERWEIGHT (BMI 25.0-29.9): ICD-10-CM

## 2019-08-16 DIAGNOSIS — R74.01 ELEVATED ALT MEASUREMENT: ICD-10-CM

## 2019-08-16 DIAGNOSIS — K76.0 HEPATIC STEATOSIS: Primary | ICD-10-CM

## 2019-08-16 LAB
ALBUMIN SERPL BCP-MCNC: 3.9 G/DL (ref 3.5–5.2)
ALP SERPL-CCNC: 79 U/L (ref 55–135)
ALT SERPL W/O P-5'-P-CCNC: 25 U/L (ref 10–44)
ANION GAP SERPL CALC-SCNC: 8 MMOL/L (ref 8–16)
AST SERPL-CCNC: 18 U/L (ref 10–40)
BILIRUB SERPL-MCNC: 0.6 MG/DL (ref 0.1–1)
BUN SERPL-MCNC: 17 MG/DL (ref 8–23)
CALCIUM SERPL-MCNC: 9.2 MG/DL (ref 8.7–10.5)
CHLORIDE SERPL-SCNC: 105 MMOL/L (ref 95–110)
CO2 SERPL-SCNC: 28 MMOL/L (ref 23–29)
CREAT SERPL-MCNC: 1 MG/DL (ref 0.5–1.4)
EST. GFR  (AFRICAN AMERICAN): >60 ML/MIN/1.73 M^2
EST. GFR  (NON AFRICAN AMERICAN): >60 ML/MIN/1.73 M^2
FERRITIN SERPL-MCNC: 169 NG/ML (ref 20–300)
GLUCOSE SERPL-MCNC: 103 MG/DL (ref 70–110)
INR PPP: 0.9 (ref 0.8–1.2)
IRON SERPL-MCNC: 65 UG/DL (ref 45–160)
POTASSIUM SERPL-SCNC: 4.1 MMOL/L (ref 3.5–5.1)
PROT SERPL-MCNC: 6.8 G/DL (ref 6–8.4)
PROTHROMBIN TIME: 9.7 SEC (ref 9–12.5)
SATURATED IRON: 16 % (ref 20–50)
SODIUM SERPL-SCNC: 141 MMOL/L (ref 136–145)
TOTAL IRON BINDING CAPACITY: 416 UG/DL (ref 250–450)
TRANSFERRIN SERPL-MCNC: 281 MG/DL (ref 200–375)

## 2019-08-16 PROCEDURE — 91200 LIVER ELASTOGRAPHY: CPT | Mod: 26,S$PBB,, | Performed by: NURSE PRACTITIONER

## 2019-08-16 PROCEDURE — 86705 HEP B CORE ANTIBODY IGM: CPT

## 2019-08-16 PROCEDURE — 87340 HEPATITIS B SURFACE AG IA: CPT

## 2019-08-16 PROCEDURE — 82728 ASSAY OF FERRITIN: CPT

## 2019-08-16 PROCEDURE — 86704 HEP B CORE ANTIBODY TOTAL: CPT

## 2019-08-16 PROCEDURE — 99214 OFFICE O/P EST MOD 30 MIN: CPT | Mod: S$PBB,ICN,, | Performed by: NURSE PRACTITIONER

## 2019-08-16 PROCEDURE — 99999 PR PBB SHADOW E&M-EST. PATIENT-LVL IV: ICD-10-PCS | Mod: PBBFAC,,, | Performed by: NURSE PRACTITIONER

## 2019-08-16 PROCEDURE — 99214 OFFICE O/P EST MOD 30 MIN: CPT | Mod: PBBFAC,25 | Performed by: NURSE PRACTITIONER

## 2019-08-16 PROCEDURE — 86790 VIRUS ANTIBODY NOS: CPT

## 2019-08-16 PROCEDURE — 80321 ALCOHOLS BIOMARKERS 1OR 2: CPT

## 2019-08-16 PROCEDURE — 86706 HEP B SURFACE ANTIBODY: CPT

## 2019-08-16 PROCEDURE — 36415 COLL VENOUS BLD VENIPUNCTURE: CPT

## 2019-08-16 PROCEDURE — 80053 COMPREHEN METABOLIC PANEL: CPT

## 2019-08-16 PROCEDURE — 83540 ASSAY OF IRON: CPT

## 2019-08-16 PROCEDURE — 91200 LIVER ELASTOGRAPHY: CPT | Mod: PBBFAC | Performed by: NURSE PRACTITIONER

## 2019-08-16 PROCEDURE — 91200 PR LIVER ELASTOGRAPHY W/OUT IMAG W/INTERP & REPORT: ICD-10-PCS | Mod: 26,S$PBB,, | Performed by: NURSE PRACTITIONER

## 2019-08-16 PROCEDURE — 99214 PR OFFICE/OUTPT VISIT, EST, LEVL IV, 30-39 MIN: ICD-10-PCS | Mod: S$PBB,ICN,, | Performed by: NURSE PRACTITIONER

## 2019-08-16 PROCEDURE — 99999 PR PBB SHADOW E&M-EST. PATIENT-LVL IV: CPT | Mod: PBBFAC,,, | Performed by: NURSE PRACTITIONER

## 2019-08-16 PROCEDURE — 85610 PROTHROMBIN TIME: CPT

## 2019-08-16 NOTE — PROGRESS NOTES
I have personally performed a face to face diagnostic evaluation on this patient. I have reviewed and agree with today's findings and the care plan outlined by Adriane Suazo NP      My findings are as follows:  Patient presents with RUQ and epigastric pain for 1 month  Body mass index is 26.97 kg/m².    Hypertension  LFTs mildly elevated for the first time in July 2019    - fatty liver on US/CT and fibroscan  - no fibrosis on fibroscan    - suggest:  1) HBV serology  2) Ferritin  3) repeat LFFTs  4) GI consult already booked for Aug 30th to investigate abdo pain    Advised to lose 10 lb in weight  Reassured  No alcohol      he will return to Adriane Suazo NP  for follow-up if his LFTs remain abnormal.

## 2019-08-16 NOTE — PATIENT INSTRUCTIONS
1. Fibroscan today to look for fat or scar tissue in the liver  2. Will check immunity markers for Hepatitis A and B and arrange for vaccination if needed  3. Labs today to rule out other causes of liver disease. I will send you the results of your labs when they are all resulted, which is typically 1 week after your visit  4.  Follow up pending results of above    There is no FDA approved therapy for non-alcoholic fatty liver disease. Therefore, these things are important:  1. Weight loss goal of 10-15% of body weight, referral for [facility] if interested   2. Low carb/sugar, high fiber and protein diet  3. Exercise, 5 days per week, 30 minutes per day, as tolerated  4. Recommend good cholesterol, blood pressure, blood sugar levels     In some people, fatty liver can progress to steatohepatitis (inflamatory fatty liver) and possibly to cirrhosis, putting one at increased risk for liver cancer, liver failure, and death. Therefore, the lifestyle changes are very important to decrease this risk.

## 2019-08-16 NOTE — PROCEDURES
Procedures     Fibroscan Procedure     Name: Ladarius Solis  Date of Procedure : 2019   :: Adriane Suazo NP  Diagnosis: NAFLD    Probe: M    Fibroscan readin.2 KPa    Fibrosis:F 0-1     CAP readin dB/m    Steatosis: :S2

## 2019-08-16 NOTE — LETTER
August 16, 2019      Nick Stanton MD  [address]  [facility] [address]           [facility]  [address]  [facility] [address]  Phone: [phone]  Fax: [phone]          Patient: Ladarius Solis   MR Number: [id number]   YOB: 1955   Date of Visit: 8/16/2019       Dear Dr. Nick Stanton:    Thank you for referring Ladarius Solis to me for evaluation. Attached you will find relevant portions of my assessment and plan of care.    If you have questions, please do not hesitate to call me. I look forward to following Ladarius Solis along with you.    Sincerely,    Adriane Suazo, NP    Enclosure  CC:  No Recipients    If you would like to receive this communication electronically, please contact [email or web address] or [phone] to request more information on [employer] Link access.    For providers and/or their staff who would like to refer a patient to [facility], please contact us through our one-stop-shop provider referral line, Critical access hospitalierge, at [phone].    If you feel you have received this communication in error or would no longer like to receive these types of communications, please e-mail [email or web address]

## 2019-08-16 NOTE — PROGRESS NOTES
[facility]    REFERRING PROVIDER:  Dr. Nick Stanton    CHIEF COMPLAINT: fatty liver    HPI: This is a 63 y.o.  male with PMH noted below, presenting for evaluation of    fatty liver disease (noted on imaging for evaluation of abd pain) with elevated liver enzymes.    Presents today alone    Labs done 7/27/19 show elevated transaminase levels (ALT > AST ), platelets, alk phos WNL.   Duration of elevated transaminase levels - x1 7/2019, previously WNL 2005 - 2/2019  Synthetic liver functioning WNL, except no INR recently    Lab Results   Component Value Date    ALT 63 (H) 07/27/2019    AST 39 07/27/2019    ALKPHOS 89 07/27/2019    BILITOT 0.6 07/27/2019    ALBUMIN 4.1 07/27/2019    INR 1.1 03/17/2006     07/27/2019     Limited previous serologic w/u negative for viral hepatitis C    Abd U/S done 7/22/19 showed fatty liver, 0.9 x 1.2 x 1.1 cm hypoechoic area near the gallbladder fossa likely representing focal fatty sparing. No splenomegaly.     CT done 7/27/19 showed no hepatomegaly, fatty liver, no suspicious hepatic lesions. No biliary dilation   Mild splenomegaly on CT, not on US    Risk factors for NAFLD include overweight, HTN.   Diet: high protein. No formal exercise. Weight loss 10 lbs in past month after stopping large intake of sugary beverages. Stopped all sugary beverages end July 2019    Denies family history of liver disease. Denies  Alcohol consumption, see below  Social History     Substance and Sexual Activity   Alcohol Use No    Frequency: Never     Immunity to Hep A and B - will check today    Occupation: none     Denies jaundice, dark urine, abdominal distention, hematemesis, melena, slowed mentation. No abnormal skin rashes. No generalized joint or muscle pain.      Review of patient's allergies indicates:  No Known Allergies    Current Outpatient Medications on File Prior to Visit   Medication Sig Dispense Refill    esomeprazole (NEXIUM) 40 MG  capsule Take 1 capsule (40 mg total) by mouth before breakfast. 30 capsule 11    lisinopril 10 MG tablet Take 1 tablet (10 mg total) by mouth once daily. 90 tablet 3     Current Facility-Administered Medications on File Prior to Visit   Medication Dose Route Frequency Provider Last Rate Last Dose    sodium hyaluronate (EUFLEXXA) 10 mg/mL(mw 2.4 -3.6 million) Syrg 20 mg  20 mg Intra-articular  W Toi Carr MD   20 mg at 05/10/18 2256       PMHX:  has a past medical history of Arthritis, Carpal tunnel syndrome, bilateral, Cervical spinal stenosis (2002), Colon polyps, Fatty liver, Hypertension, and Vertigo.    PSHX:  has a past surgical history that includes Colonoscopy; Colonoscopy (N/A, 9/25/2017); Colonoscopy (N/A, 3/9/2018); Knee arthroscopy w/ meniscectomy (Left, 10/17/2018); and Arthroscopic chondroplasty of knee joint (Left, 10/17/2018).    FAMILY HISTORY: Negative for liver disease, reviewed in [facility]    SOCIAL HISTORY:   Social History     Tobacco Use   Smoking Status Never Smoker   Smokeless Tobacco Never Used       Social History     Substance and Sexual Activity   Alcohol Use No    Frequency: Never       Social History     Substance and Sexual Activity   Drug Use No       ROS:   GENERAL: Denies fever, chills, +intentional weight loss, denies fatigue  HEENT: Denies headaches, dizziness, vision/hearing changes  CARDIOVASCULAR: Denies chest pain, palpitations, or edema  RESPIRATORY: Denies dyspnea, cough  GI: + epigastric/RUQ abdominal pain, denies rectal bleeding, nausea, vomiting. No change in bowel pattern or color  : Denies dysuria, hematuria   SKIN: Denies rash, itching   NEURO: Denies confusion, memory loss, or mood changes  PSYCH: Denies depression or anxiety  HEME/LYMPH: Denies easy bruising or bleeding    PHYSICAL EXAM:   Friendly  male, in no acute distress; alert and oriented to person, place and time  VITALS: BP (!) 143/78 (BP Location: Left arm, Patient Position: Sitting, BP Method:  "Medium (Automatic))   Pulse 74   Resp 18   Ht 5' 6" (1.676 m)   Wt 75.8 kg (167 lb 1.7 oz)   SpO2 96%   BMI 26.97 kg/m²   HENT: Normocephalic, without obvious abnormality. Oral mucosa pink and moist. Dentition good.  EYES: Sclerae anicteric. No conjunctival pallor.   NECK: Supple. No masses or cervical adenopathy.  CARDIOVASCULAR: Regular rate and rhythm. No murmurs.  RESPIRATORY: Normal respiratory effort. BBS CTA. No wheezes or crackles.  GI: Soft, non-tender, non-distended. No hepatosplenomegaly. No masses palpable. No ascites.  EXTREMITIES:  No clubbing, cyanosis or edema.  SKIN: Warm and dry. No jaundice. No rashes noted to exposed skin. No telangectasias noted. No palmar erythema.  NEURO:  Normal gait. No asterixis.  PSYCH:  Memory intact. Thought and speech pattern appropriate. Behavior normal. No depression or anxiety noted.    DIAGNOSTIC STUDIES:    ABD. U/S-    Done 7/22/19  FINDINGS:  Pancreas: The visualized portions of pancreas appear normal.    Aorta: No aneurysm.    Liver: 14.3 cm, normal in size. diffusely increased parenchymal echogenicity consistent with steatosis.  There is a 0.9 x 1.2 x 1.1 cm hypoechoic area near the gallbladder fossa likely representing focal fatty sparing.    Gallbladder: No calculi, wall thickening, or pericholecystic fluid.  Negative sonographic Loyd's sign.    Biliary system: 3 mm common bile duct.  No intrahepatic ductal dilatation.    Inferior vena cava: Normal in appearance.    Right kidney: 11.0 cm. No hydronephrosis.    Left kidney: 11.3 cm. No hydronephrosis.    Spleen: 11.0 x 3.5 cm.  Normal in size with homogeneous echotexture.    Miscellaneous: No ascites.      Impression       Hepatic steatosis with focal fatty sparing.       CT SCAN-   Done 7/27/19  FINDINGS:  Lower Chest:    Lung bases are clear.  Heart size is normal.    Abdomen:    Liver is normal in size and contour and demonstrates mild diffuse hypoattenuation of the parenchyma suggestive of " steatosis.  No suspicious hepatic lesion.  Gallbladder is unremarkable.  No calcified gallstones.  No intrahepatic biliary ductal dilatation.    Spleen is at the upper limits of normal in size, measuring 13 cm in craniocaudal length.  There is mild pancreatic atrophy.  No pancreatic ductal dilatation.  Adrenal glands are unremarkable.    The kidneys are symmetric.  No hydronephrosis.    No small bowel obstruction.  No inflammatory changes identified involving the gastrointestinal tract.  The appendix is normal.    No pneumoperitoneum or organized fluid collection.    No bulky lymphadenopathy.    Abdominal aorta is normal in caliber with mild calcific atherosclerosis.    Portal, splenic, and superior mesenteric veins are patent.    Pelvis:    Urinary bladder is thin walled.  Prostate is enlarged, measuring up to 6 cm in transverse width.  Rectum is unremarkable.  No free fluid in the pelvis.  No pelvic lymphadenopathy.    Bones and soft tissues:    No aggressive osseous lesions.  Age-appropriate degenerative changes in the lumbar spine.  Small bilateral fat containing inguinal hernias.      Impression       No acute findings in the abdomen or pelvis.    Hepatic steatosis and borderline splenomegaly.    Small bilateral fat containing inguinal hernias.    Prostatomegaly.       FIBROSCAN - to be done today     ASSESSMENT:  63 y.o.  male with:  1.  Fatty liver   - US and CT 7/2019 showed no hepatomegaly, fatty liver. CT suggested possible mild splenomegaly, Us with normal sized spleen   - transaminases ALT>AST, elevated x1 7/2019, previously WNL  - Synthetic liver function WNL except no recent INR  - risk factors for fatty liver disease include overweight, HTN  -- Immunity to Hep A and B will check today     2. Elevated ALT x1    3. Overweight, HTN  -- Body mass index is 26.97 kg/m².   -- no formal exercise, high protein diet, recently stopped all sugary beverages      EDUCATION:   We discussed the manifestations of  non-alcoholic fatty liver disease. At this time, there are no FDA approved therapy for non-alcoholic fatty liver disease.  The patient and I discussed the importance of diet, exercise, and weight loss for management of non-alcoholic fatty liver disease.    We discussed a low carb/sugar, high fiber and protein diet and a goal of 30 minutes of exercise 5 times per week    Recommend to avoid alcohol consumption. It is know that heavy alcohol use is associated with disease progression among patients with fatty liver disease. Information is unknown at this time of the effects on the liver with alcohol use in moderation.    Patient is aware that fatty liver can progress to steatohepatitis and possibly to cirrhosis, putting one at increased risk for liver cancer, liver failure, and death      PLAN:  1. Fibroscan today  2. Will check immunity markers for HBV/HAV  and arrange for vaccination if needed  3. Labs today   Orders Placed This Encounter   Procedures    Comprehensive metabolic panel    Phosphatidylethanol (PETH)    Protime-INR    Hepatitis A antibody, IgG    Hepatitis B core antibody, total    Hepatitis B surface antibody    Hepatitis B core antibody, IgM    Hepatitis B surface antigen    Ferritin    Iron and TIBC     4. Weight loss goal of 10-15% of body weight, referral for [facility] if interested   5. Low carb/sugar, high fiber and protein diet  6. Exercise, 5 days per week, 30 minutes per day, as tolerated  7. Recommend good cholesterol, blood pressure, blood sugar levels   8. GI upcoming appt for abd pain eval  9.  Follow up for pending results of workup.      ADDENDUM: Fibroscan results received.   Fibroscan today = kPA = 6.2 = F0-1 fibrosis.  CAP score = 281 = S2 = >34% steatosis    Discussed fibroscan results, including moderate steatosis but no fibrosis. Reiterated lifestyle changes noted above. Will repeat liver enzymes and screen for Hep B today. If liver enzymes return to normal,  likely will not need hepatology f/u unless enzymes elevated in future     Thank you for allowing me to participate in the care of Ladarius Magallaneslela Suazo NP-C    ADDENDUM 8/20/19: Sero w/u negative for HH, Hep B and C  Needs [employer], sent to [facility] pending  Will repeat labs in 6 months. If normal, no need for hepatology f/u     CC'ed note to:   Nick Stanton MD

## 2019-08-19 LAB
HBV CORE AB SERPL QL IA: NEGATIVE
HBV CORE IGM SERPL QL IA: NEGATIVE
HBV SURFACE AB SER-ACNC: NEGATIVE M[IU]/ML
HBV SURFACE AG SERPL QL IA: NEGATIVE
HEPATITIS A ANTIBODY, IGG: NEGATIVE

## 2019-08-20 ENCOUNTER — PATIENT MESSAGE (OUTPATIENT)
Dept: HEPATOLOGY | Facility: CLINIC | Age: 64
End: 2019-08-20

## 2019-08-20 DIAGNOSIS — Z23 NEED FOR HEPATITIS A AND B VACCINATION: Primary | ICD-10-CM

## 2019-08-22 LAB — PHOSPHATIDYLETHANOL (PETH): NEGATIVE NG/ML

## 2019-08-28 ENCOUNTER — PATIENT OUTREACH (OUTPATIENT)
Dept: ADMINISTRATIVE | Facility: OTHER | Age: 64
End: 2019-08-28

## 2019-08-30 ENCOUNTER — OFFICE VISIT (OUTPATIENT)
Dept: GASTROENTEROLOGY | Facility: CLINIC | Age: 64
End: 2019-08-30
Payer: MEDICARE

## 2019-08-30 VITALS
HEART RATE: 60 BPM | WEIGHT: 166.69 LBS | BODY MASS INDEX: 26.9 KG/M2 | SYSTOLIC BLOOD PRESSURE: 149 MMHG | DIASTOLIC BLOOD PRESSURE: 76 MMHG

## 2019-08-30 DIAGNOSIS — R14.0 BLOATING: ICD-10-CM

## 2019-08-30 DIAGNOSIS — R10.11 RUQ PAIN: Primary | ICD-10-CM

## 2019-08-30 PROCEDURE — 99999 PR PBB SHADOW E&M-EST. PATIENT-LVL III: CPT | Mod: PBBFAC,,, | Performed by: INTERNAL MEDICINE

## 2019-08-30 PROCEDURE — 99999 PR PBB SHADOW E&M-EST. PATIENT-LVL III: ICD-10-PCS | Mod: PBBFAC,,, | Performed by: INTERNAL MEDICINE

## 2019-08-30 PROCEDURE — 99214 OFFICE O/P EST MOD 30 MIN: CPT | Mod: S$PBB,,, | Performed by: INTERNAL MEDICINE

## 2019-08-30 PROCEDURE — 99214 PR OFFICE/OUTPT VISIT, EST, LEVL IV, 30-39 MIN: ICD-10-PCS | Mod: S$PBB,,, | Performed by: INTERNAL MEDICINE

## 2019-08-30 PROCEDURE — 99213 OFFICE O/P EST LOW 20 MIN: CPT | Mod: PBBFAC,PO | Performed by: INTERNAL MEDICINE

## 2019-08-30 RX ORDER — DICYCLOMINE HYDROCHLORIDE 10 MG/1
10 CAPSULE ORAL
Qty: 60 CAPSULE | Refills: 2 | Status: ON HOLD | OUTPATIENT
Start: 2019-08-30 | End: 2019-09-23 | Stop reason: CLARIF

## 2019-08-30 NOTE — PROGRESS NOTES
GASTROENTEROLOGY CLINIC NOTE    Reason for visit: The primary encounter diagnosis was RUQ pain. A diagnosis of Bloating was also pertinent to this visit.  Referring provider/PCP: Nick Stanton MD      HPI:  Ladarius Solis is a 63 y.o. male here today for evalution for abdominal pain. Previously seen by Dr. August for large colon adenoma. New to me.    He is here for approximately 2 months of right upper quadrant pain. Pain is worse almost immediately after eating.  It is associated with abdominal bloating.  Symptoms worse with heavy food and with carbonated beverages as well. Pain at times radiates to the right flank as well as to the left upper quadrant. He states almost immediately after eating he has to have a bowel movement.  The bowel movement is usually formed and without any blood.  He has no changes in the caliber of his stool.  He denies vomiting he denies reflux symptoms.  Was given a trial of a PPI by his primary care doctor but this had no benefit.  He has since stopped the PPI.  Workup has included a CT scan as well as a ultrasound.  He has also had a fiber scan.  All have been unrevealing, as described below in my note.      GI ROS:  Reflux: No  Dysphagia: No   Constipation: No  Diarrhea: No  Rectal bleeding/Melena/hematemesis: No  NSAIDs: No  Anticoagulation: No  Anti-platelet therapy: No    Prior Endoscopy:  EGD: never  Colon:  2017 [facility] for large adenoma. EMR performed.    3/2018 [facility] - follow up EMR.   - recd repeat 3 years ([facility] states can be with Dr. Harley.)    (Portions of this note were dictated using voice recognition software and may contain dictation related errors in spelling/grammar/syntax not found on text review)    Review of Systems   Constitutional: Negative for fever.   Respiratory: Negative for cough and sputum production.    Cardiovascular: Negative for chest pain and palpitations.   Gastrointestinal: Positive for abdominal pain and nausea. Negative for blood in  stool and melena.   Musculoskeletal: Negative for back pain and neck pain.       Past Medical History: has a past medical history of Arthritis, Carpal tunnel syndrome, bilateral, Cervical spinal stenosis, Colon polyps, Fatty liver, Hypertension, Overweight (BMI 25.0-29.9), and Vertigo.    Past Surgical History: has a past surgical history that includes Colonoscopy; Colonoscopy (N/A, 9/25/2017); Colonoscopy (N/A, 3/9/2018); Knee arthroscopy w/ meniscectomy (Left, 10/17/2018); and Arthroscopic chondroplasty of knee joint (Left, 10/17/2018).    Family History:family history includes Arthritis in his father; Dementia in his father; Heart disease in his father; No Known Problems in his brother and mother.    Allergies: Review of patient's allergies indicates:  No Known Allergies    Social History: reports that he has never smoked. He has never used smokeless tobacco. He reports that he does not drink alcohol or use drugs.    Home medications:   Current Outpatient Medications on File Prior to Visit   Medication Sig Dispense Refill    lisinopril 10 MG tablet Take 1 tablet (10 mg total) by mouth once daily. 90 tablet 3     Current Facility-Administered Medications on File Prior to Visit   Medication Dose Route Frequency Provider Last Rate Last Dose    sodium hyaluronate (EUFLEXXA) 10 mg/mL(mw 2.4 -3.6 million) Syrg 20 mg  20 mg Intra-articular  W Toi Carr MD   20 mg at 05/10/18 7918       Vital signs:  BP (!) 149/76   Pulse 60   Wt 75.6 kg (166 lb 10.7 oz)   BMI 26.90 kg/m²     Physical Exam   Constitutional: No distress.   Eyes: Conjunctivae are normal. No scleral icterus.   Cardiovascular: Normal rate.   Pulmonary/Chest: Effort normal. No respiratory distress.   Abdominal: Soft. He exhibits no distension.   TTP under the right rib cage ; no tenderness over the musculature ; no rashes or skin changes over that area. abd soft nonrigid; no guarding.   Psychiatric: He has a normal mood and affect. His behavior is  normal.   Vitals reviewed.      Routine labs:  Lab Results   Component Value Date    WBC 6.36 07/27/2019    HGB 13.4 (L) 07/27/2019    HCT 40.2 07/27/2019    MCV 90 07/27/2019     07/27/2019     Lab Results   Component Value Date    INR 0.9 08/16/2019     Lab Results   Component Value Date    IRON 65 08/16/2019    FERRITIN 169 08/16/2019    TIBC 416 08/16/2019    FESATURATED 16 (L) 08/16/2019     Lab Results   Component Value Date     08/16/2019    K 4.1 08/16/2019     08/16/2019    CO2 28 08/16/2019    BUN 17 08/16/2019    CREATININE 1.0 08/16/2019     Lab Results   Component Value Date    ALBUMIN 3.9 08/16/2019    ALT 25 08/16/2019    AST 18 08/16/2019    ALKPHOS 79 08/16/2019    BILITOT 0.6 08/16/2019     No results found for: GLUCOSE    Imaging:  Reviewed  CT abd 2019  Impression       No acute findings in the abdomen or pelvis.    Hepatic steatosis and borderline splenomegaly.    Small bilateral fat containing inguinal hernias.    Prostatomegaly.     US 2019  Hepatic steatosis with focal fatty sparing.      Assessment:    1. RUQ pain    2. Bloating        Plan:    Orders Placed This Encounter    NM Hepatobiliary Imaging HIDA    dicyclomine (BENTYL) 10 MG capsule    Case request GI: EGD (ESOPHAGOGASTRODUODENOSCOPY)       EGD and HIDA  Trial of bentyl    RTC after studies.    A total of 25 minutes were spent face-to-face with the patient during this encounter and over half of that time was spent on counseling and coordination of care.     Dyllan Maloney MD  [facility]

## 2019-08-30 NOTE — PATIENT INSTRUCTIONS
EGD Prep Instructions    [facility] [address] [place] [address]    You are scheduled for an EGD with Dr. Maloney on 9/23/19 at [facility] located at [address] [place] [address].  Check in at the admit desk, first floor of the hospital (which is the building on the left).   You will receive a call 2-3 days before your EGD to tell you the time to arrive.  If you have not received a call by the day before your procedure, call the Endoscopy Lab at [phone].    Nothing to eat or drink after midnight before the procedure.  You MAY brush your teeth.    You MAY take your blood pressure, heart, and seizure medication on the morning of the procedure, with a SIP of water.  Hold ALL other medications until after the procedure.    If you are on blood thinners THAT YOU HAVE BEEN INSTRUCTED TO HOLD BY YOUR DOCTOR FOR THIS PROCEDURE, then do NOT take this the morning of your EGD.  Do NOT stop these medications on your own, they must be approved to be held by your doctor.  Your EGD can NOT be done if you are on these medications.  Examples of blood thinners include: Coumadin, Aggrenox, Plavix, Pradaxa, Reapro, Pletal, Xarelto, Ticagrelor, Brilinta, Eliquis, and high dose aspirin (325 mg).  You do not have to stop baby aspirin 81 mg.    You will receive a call 2-3 days before your EGD to tell you the time to arrive.  If you have not received a call by the day before your procedure, call the Endoscopy department at [phone].

## 2019-08-30 NOTE — LETTER
August 30, 2019      Nick Stanton MD  [address]  [facility] [address]           [facility]  [address]  [facility] [address]  Phone: [phone]          Patient: Ladarius Solis   MR Number: [id number]   YOB: 1955   Date of Visit: 8/30/2019       Dear Dr. Nick Stanton:    Thank you for referring Ladarius Solis to me for evaluation. Attached you will find relevant portions of my assessment and plan of care.    If you have questions, please do not hesitate to call me. I look forward to following Ladarius Solis along with you.    Sincerely,    Dyllan Maloney MD    Enclosure  CC:  No Recipients    If you would like to receive this communication electronically, please contact [email or web address] or [phone] to request more information on [employer] Link access.    For providers and/or their staff who would like to refer a patient to [facility], please contact us through our one-stop-shop provider referral line, [facility] , at [phone].    If you feel you have received this communication in error or would no longer like to receive these types of communications, please e-mail [email or web address]

## 2019-08-30 NOTE — H&P (VIEW-ONLY)
GASTROENTEROLOGY CLINIC NOTE    Reason for visit: The primary encounter diagnosis was RUQ pain. A diagnosis of Bloating was also pertinent to this visit.  Referring provider/PCP: Nick Stanton MD      HPI:  Ladarius Solis is a 63 y.o. male here today for evalution for abdominal pain. Previously seen by Dr. August for large colon adenoma. New to me.    He is here for approximately 2 months of right upper quadrant pain. Pain is worse almost immediately after eating.  It is associated with abdominal bloating.  Symptoms worse with heavy food and with carbonated beverages as well. Pain at times radiates to the right flank as well as to the left upper quadrant. He states almost immediately after eating he has to have a bowel movement.  The bowel movement is usually formed and without any blood.  He has no changes in the caliber of his stool.  He denies vomiting he denies reflux symptoms.  Was given a trial of a PPI by his primary care doctor but this had no benefit.  He has since stopped the PPI.  Workup has included a CT scan as well as a ultrasound.  He has also had a fiber scan.  All have been unrevealing, as described below in my note.      GI ROS:  Reflux: No  Dysphagia: No   Constipation: No  Diarrhea: No  Rectal bleeding/Melena/hematemesis: No  NSAIDs: No  Anticoagulation: No  Anti-platelet therapy: No    Prior Endoscopy:  EGD: never  Colon:  2017 [facility] for large adenoma. EMR performed.    3/2018 [facility] - follow up EMR.   - recd repeat 3 years ([facility] states can be with Dr. Harley.)    (Portions of this note were dictated using voice recognition software and may contain dictation related errors in spelling/grammar/syntax not found on text review)    Review of Systems   Constitutional: Negative for fever.   Respiratory: Negative for cough and sputum production.    Cardiovascular: Negative for chest pain and palpitations.   Gastrointestinal: Positive for abdominal pain and nausea. Negative for blood in  stool and melena.   Musculoskeletal: Negative for back pain and neck pain.       Past Medical History: has a past medical history of Arthritis, Carpal tunnel syndrome, bilateral, Cervical spinal stenosis, Colon polyps, Fatty liver, Hypertension, Overweight (BMI 25.0-29.9), and Vertigo.    Past Surgical History: has a past surgical history that includes Colonoscopy; Colonoscopy (N/A, 9/25/2017); Colonoscopy (N/A, 3/9/2018); Knee arthroscopy w/ meniscectomy (Left, 10/17/2018); and Arthroscopic chondroplasty of knee joint (Left, 10/17/2018).    Family History:family history includes Arthritis in his father; Dementia in his father; Heart disease in his father; No Known Problems in his brother and mother.    Allergies: Review of patient's allergies indicates:  No Known Allergies    Social History: reports that he has never smoked. He has never used smokeless tobacco. He reports that he does not drink alcohol or use drugs.    Home medications:   Current Outpatient Medications on File Prior to Visit   Medication Sig Dispense Refill    lisinopril 10 MG tablet Take 1 tablet (10 mg total) by mouth once daily. 90 tablet 3     Current Facility-Administered Medications on File Prior to Visit   Medication Dose Route Frequency Provider Last Rate Last Dose    sodium hyaluronate (EUFLEXXA) 10 mg/mL(mw 2.4 -3.6 million) Syrg 20 mg  20 mg Intra-articular  W Toi Carr MD   20 mg at 05/10/18 8073       Vital signs:  BP (!) 149/76   Pulse 60   Wt 75.6 kg (166 lb 10.7 oz)   BMI 26.90 kg/m²     Physical Exam   Constitutional: No distress.   Eyes: Conjunctivae are normal. No scleral icterus.   Cardiovascular: Normal rate.   Pulmonary/Chest: Effort normal. No respiratory distress.   Abdominal: Soft. He exhibits no distension.   TTP under the right rib cage ; no tenderness over the musculature ; no rashes or skin changes over that area. abd soft nonrigid; no guarding.   Psychiatric: He has a normal mood and affect. His behavior is  normal.   Vitals reviewed.      Routine labs:  Lab Results   Component Value Date    WBC 6.36 07/27/2019    HGB 13.4 (L) 07/27/2019    HCT 40.2 07/27/2019    MCV 90 07/27/2019     07/27/2019     Lab Results   Component Value Date    INR 0.9 08/16/2019     Lab Results   Component Value Date    IRON 65 08/16/2019    FERRITIN 169 08/16/2019    TIBC 416 08/16/2019    FESATURATED 16 (L) 08/16/2019     Lab Results   Component Value Date     08/16/2019    K 4.1 08/16/2019     08/16/2019    CO2 28 08/16/2019    BUN 17 08/16/2019    CREATININE 1.0 08/16/2019     Lab Results   Component Value Date    ALBUMIN 3.9 08/16/2019    ALT 25 08/16/2019    AST 18 08/16/2019    ALKPHOS 79 08/16/2019    BILITOT 0.6 08/16/2019     No results found for: GLUCOSE    Imaging:  Reviewed  CT abd 2019  Impression       No acute findings in the abdomen or pelvis.    Hepatic steatosis and borderline splenomegaly.    Small bilateral fat containing inguinal hernias.    Prostatomegaly.     US 2019  Hepatic steatosis with focal fatty sparing.      Assessment:    1. RUQ pain    2. Bloating        Plan:    Orders Placed This Encounter    NM Hepatobiliary Imaging HIDA    dicyclomine (BENTYL) 10 MG capsule    Case request GI: EGD (ESOPHAGOGASTRODUODENOSCOPY)       EGD and HIDA  Trial of bentyl    RTC after studies.    A total of 25 minutes were spent face-to-face with the patient during this encounter and over half of that time was spent on counseling and coordination of care.     Dyllan Maloney MD  [facility]

## 2019-09-19 ENCOUNTER — TELEPHONE (OUTPATIENT)
Dept: ENDOSCOPY | Facility: HOSPITAL | Age: 64
End: 2019-09-19

## 2019-09-19 NOTE — TELEPHONE ENCOUNTER
Spoke with patient about arrival time @ 1330    NPO status reviewed: Patient must have nothing to eat after midnight.  Pt may have CLEAR liquids ONLY until completely NPO 3 hrs @ 1030    Medications: Do not take Insulin or oral diabetic medications the day of the procedure.  Take as prescribed: heart, seizure and blood pressure medication in the morning with a sip of water (less than an ounce).  Take any breathing medications and bring inhalers to hospital with you Leave all valuables and jewelry at home.     Wear comfortable clothes to procedure to change into hospital gown You cannot drive for 24 hours after your procedure because you will receive sedation for your procedure to make you comfortable.  A ride must be provided at discharge.

## 2019-09-23 ENCOUNTER — ANESTHESIA (OUTPATIENT)
Dept: ENDOSCOPY | Facility: HOSPITAL | Age: 64
End: 2019-09-23
Payer: MEDICARE

## 2019-09-23 ENCOUNTER — HOSPITAL ENCOUNTER (OUTPATIENT)
Facility: HOSPITAL | Age: 64
Discharge: HOME | End: 2019-09-23
Attending: INTERNAL MEDICINE | Admitting: INTERNAL MEDICINE
Payer: MEDICARE

## 2019-09-23 ENCOUNTER — ANESTHESIA EVENT (OUTPATIENT)
Dept: ENDOSCOPY | Facility: HOSPITAL | Age: 64
End: 2019-09-23
Payer: MEDICARE

## 2019-09-23 VITALS
DIASTOLIC BLOOD PRESSURE: 98 MMHG | HEART RATE: 67 BPM | WEIGHT: 170 LBS | BODY MASS INDEX: 27.32 KG/M2 | RESPIRATION RATE: 20 BRPM | SYSTOLIC BLOOD PRESSURE: 152 MMHG | TEMPERATURE: 98 F | HEIGHT: 66 IN | OXYGEN SATURATION: 98 %

## 2019-09-23 DIAGNOSIS — K21.9 GERD (GASTROESOPHAGEAL REFLUX DISEASE): ICD-10-CM

## 2019-09-23 DIAGNOSIS — R10.11 RUQ PAIN: Primary | ICD-10-CM

## 2019-09-23 PROCEDURE — 43239 PR EGD, FLEX, W/BIOPSY, SGL/MULTI: ICD-10-PCS | Mod: ,,, | Performed by: INTERNAL MEDICINE

## 2019-09-23 PROCEDURE — 63600175 PHARM REV CODE 636 W HCPCS: Performed by: NURSE ANESTHETIST, CERTIFIED REGISTERED

## 2019-09-23 PROCEDURE — 88305 TISSUE SPECIMEN TO PATHOLOGY - SURGERY: ICD-10-PCS | Mod: 26,,, | Performed by: PATHOLOGY

## 2019-09-23 PROCEDURE — 37000008 HC ANESTHESIA 1ST 15 MINUTES: Performed by: INTERNAL MEDICINE

## 2019-09-23 PROCEDURE — 88342 IMHCHEM/IMCYTCHM 1ST ANTB: CPT | Performed by: PATHOLOGY

## 2019-09-23 PROCEDURE — 37000009 HC ANESTHESIA EA ADD 15 MINS: Performed by: INTERNAL MEDICINE

## 2019-09-23 PROCEDURE — 43239 EGD BIOPSY SINGLE/MULTIPLE: CPT | Mod: ,,, | Performed by: INTERNAL MEDICINE

## 2019-09-23 PROCEDURE — 88305 TISSUE EXAM BY PATHOLOGIST: CPT | Mod: 59 | Performed by: PATHOLOGY

## 2019-09-23 PROCEDURE — 88342 TISSUE SPECIMEN TO PATHOLOGY - SURGERY: ICD-10-PCS | Mod: 26,,, | Performed by: PATHOLOGY

## 2019-09-23 PROCEDURE — 88342 IMHCHEM/IMCYTCHM 1ST ANTB: CPT | Mod: 26,,, | Performed by: PATHOLOGY

## 2019-09-23 PROCEDURE — 88305 TISSUE EXAM BY PATHOLOGIST: CPT | Mod: 26,,, | Performed by: PATHOLOGY

## 2019-09-23 PROCEDURE — 63600175 PHARM REV CODE 636 W HCPCS: Performed by: INTERNAL MEDICINE

## 2019-09-23 PROCEDURE — 43239 EGD BIOPSY SINGLE/MULTIPLE: CPT | Performed by: INTERNAL MEDICINE

## 2019-09-23 PROCEDURE — 27201012 HC FORCEPS, HOT/COLD, DISP: Performed by: INTERNAL MEDICINE

## 2019-09-23 RX ORDER — SUCRALFATE 1 G/1
1 TABLET ORAL
Qty: 90 TABLET | Refills: 2 | Status: SHIPPED | OUTPATIENT
Start: 2019-09-23 | End: 2021-02-24

## 2019-09-23 RX ORDER — SODIUM CHLORIDE 0.9 % (FLUSH) 0.9 %
10 SYRINGE (ML) INJECTION
Status: DISCONTINUED | OUTPATIENT
Start: 2019-09-23 | End: 2019-09-23 | Stop reason: HOSPADM

## 2019-09-23 RX ORDER — PROPOFOL 10 MG/ML
VIAL (ML) INTRAVENOUS
Status: DISCONTINUED | OUTPATIENT
Start: 2019-09-23 | End: 2019-09-23

## 2019-09-23 RX ORDER — PANTOPRAZOLE SODIUM 40 MG/1
40 TABLET, DELAYED RELEASE ORAL DAILY
Qty: 30 TABLET | Refills: 11 | Status: SHIPPED | OUTPATIENT
Start: 2019-09-23 | End: 2021-03-21

## 2019-09-23 RX ORDER — PROPOFOL 10 MG/ML
VIAL (ML) INTRAVENOUS CONTINUOUS PRN
Status: DISCONTINUED | OUTPATIENT
Start: 2019-09-23 | End: 2019-09-23

## 2019-09-23 RX ORDER — SODIUM CHLORIDE 9 MG/ML
INJECTION, SOLUTION INTRAVENOUS CONTINUOUS
Status: DISCONTINUED | OUTPATIENT
Start: 2019-09-23 | End: 2019-09-23 | Stop reason: HOSPADM

## 2019-09-23 RX ORDER — LIDOCAINE HCL/PF 100 MG/5ML
SYRINGE (ML) INTRAVENOUS
Status: DISCONTINUED | OUTPATIENT
Start: 2019-09-23 | End: 2019-09-23

## 2019-09-23 RX ADMIN — PROPOFOL 70 MG: 10 INJECTION, EMULSION INTRAVENOUS at 02:09

## 2019-09-23 RX ADMIN — PROPOFOL 150 MCG/KG/MIN: 10 INJECTION, EMULSION INTRAVENOUS at 02:09

## 2019-09-23 RX ADMIN — SODIUM CHLORIDE 20 ML/HR: 0.9 INJECTION, SOLUTION INTRAVENOUS at 01:09

## 2019-09-23 RX ADMIN — LIDOCAINE HYDROCHLORIDE 100 MG: 20 INJECTION, SOLUTION INTRAVENOUS at 02:09

## 2019-09-23 NOTE — [employer] PATIENT INSTRUCTIONS
Discharge Summary/Instructions after an Endoscopic Procedure  Patient Name: Ladarius Solis  Patient MRN: [id number]  Patient YOB: 1955 Monday, September 23, 2019  Dyllan Maloney MD  RESTRICTIONS:  During your procedure today, you received medications for sedation.  These   medications may affect your judgment, balance and coordination.  Therefore,   for 24 hours, you have the following restrictions:   - DO NOT drive a car, operate machinery, make legal/financial decisions,   sign important papers or drink alcohol.    ACTIVITY:  Today: no heavy lifting, straining or running due to procedural   sedation/anesthesia.  The following day: return to full activity including work.  DIET:  Eat and drink normally unless instructed otherwise.     TREATMENT FOR COMMON SIDE EFFECTS:  - Mild abdominal pain, nausea, belching, bloating or excessive gas:  rest,   eat lightly and use a heating pad.  - Sore Throat: treat with throat lozenges and/or gargle with warm salt   water.  - Because air was used during the procedure, expelling large amounts of air   from your rectum or belching is normal.  - If a bowel prep was taken, you may not have a bowel movement for 1-3 days.    This is normal.  SYMPTOMS TO WATCH FOR AND REPORT TO YOUR PHYSICIAN:  1. Abdominal pain or bloating, other than gas cramps.  2. Chest pain.  3. Back pain.  4. Signs of infection such as: chills or fever occurring within 24 hours   after the procedure.  5. Rectal bleeding, which would show as bright red, maroon, or black stools.   (A tablespoon of blood from the rectum is not serious, especially if   hemorrhoids are present.)  6. Vomiting.  7. Weakness or dizziness.  GO DIRECTLY TO THE NEAREST EMERGENCY ROOM IF YOU HAVE ANY OF THE FOLLOWING:      Difficulty breathing              Chills and/or fever over 101 F   Persistent vomiting and/or vomiting blood   Severe abdominal pain   Severe chest pain   Black, tarry stools   Bleeding- more than one  tablespoon   Any other symptom or condition that you feel may need urgent attention  Your doctor recommends these additional instructions:  If any biopsies were taken, your doctors clinic will contact you in 1 to 2   weeks with any results.  - Discharge patient to home.   - Patient has a contact number available for emergencies.  The signs and   symptoms of potential delayed complications were discussed with the   patient.  Return to normal activities tomorrow.  Written discharge   instructions were provided to the patient.   - Resume previous diet.   - Continue present medications.   - No aspirin, ibuprofen, naproxen, or other non-steroidal anti-inflammatory   drugs for 5 days after biopsy.   - Await pathology results.   - Repeat upper endoscopy in 3 years for surveillance based on pathology   results, unless pathology otherwise indicates sooner.   - Use Protonix (pantoprazole) 40 mg PO daily.   - Use sucralfate tablets 1 gram PO BID.  For questions, problems or results please call your physician - Dyllan Maloney MD at Work:  [phone].  EMERGENCY PHONE NUMBER: [phone],  LAB RESULTS: [phone]  IF A COMPLICATION OR EMERGENCY SITUATION ARISES AND YOU ARE UNABLE TO REACH   YOUR PHYSICIAN - GO DIRECTLY TO THE EMERGENCY ROOM.  Dyllan Maloney MD  9/23/2019 3:05:27 PM  This report has been verified and signed electronically.  [employer]

## 2019-09-23 NOTE — ANESTHESIA PREPROCEDURE EVALUATION
09/23/2019  Ladarius Solis is a 63 y.o., male presents for EGD under MAC.    Past Medical History:   Diagnosis Date    Arthritis     Carpal tunnel syndrome, bilateral     Cervical spinal stenosis 2002    Colon polyps     Fatty liver     Hypertension     Overweight (BMI 25.0-29.9) 8/16/2019    Vertigo     left ear issues     Past Surgical History:   Procedure Laterality Date    ARTHROSCOPY, KNEE, WITH CHONDROPLASTY Left 10/17/2018    [facility] 2ND FLR    ARTHROSCOPY, KNEE, WITH MENISCECTOMY Left 10/17/2018    [facility] OR 2ND FLR    COLONOSCOPY      COLONOSCOPY N/A 3/9/2018    Per[facility] Raul August [facility] at [facility] ENDO (2ND FLR)    COLONOSCOPY/emr N/A 9/25/2017    Performed by Raul August [facility] at [facility] ENDO (2ND FLR)         Pre-op Assessment    I have reviewed the Patient Summary Reports.     I have reviewed the Nursing Notes.   I have reviewed the Medications.     Review of Systems  Anesthesia Hx:  No problems with previous Anesthesia    Social:  Non-Smoker    Cardiovascular:   Exercise tolerance: good Hypertension    Pulmonary:  Pulmonary Normal    Hepatic/GI:  Hepatic/GI Normal    Musculoskeletal:   Arthritis     Neurological:   Neuromuscular Disease,    Endocrine:  Endocrine Normal        Physical Exam  General:  Well nourished    Airway/Jaw/Neck:  Airway Findings: Mouth Opening: Normal Tongue: Normal  General Airway Assessment: Adult  Mallampati: II  TM Distance: Normal, at least 6 cm      Dental:  Dental Findings:    Chest/Lungs:  Chest/Lungs Clear    Heart/Vascular:  Heart Findings: Normal            Anesthesia Plan  Type of Anesthesia, risks & benefits discussed:  Anesthesia Type:  MAC  Patient's Preference:   Intra-op Monitoring Plan: standard ASA monitors  Intra-op Monitoring Plan Comments:   Post Op Pain Control  Plan: multimodal analgesia, IV/PO Opioids PRN and peripheral nerve block  Post Op Pain Control Plan Comments: Opioids and analgesic adjuvants as needed  Regional blocks if applicable/indicated  Induction:   IV  Beta Blocker:  Patient is not currently on a Beta-Blocker (No further documentation required).       Informed Consent: Patient understands risks and agrees with Anesthesia plan.  Questions answered. Anesthesia consent signed with patient.  ASA Score: 1     Day of Surgery Review of History & Physical:  There are no significant changes.  H&P update referred to the surgeon.     Anesthesia Plan Notes: I have personally evaluated the patient and discussed risk/benefits/alternatives of general anesthesia.        Ready For Surgery From Anesthesia Perspective.

## 2019-09-23 NOTE — ANESTHESIA POSTPROCEDURE EVALUATION
Anesthesia Post Evaluation    Patient: Ladarius Solis    Procedure(s) Performed: Procedure(s) (LRB):  EGD (ESOPHAGOGASTRODUODENOSCOPY) (N/A)    Final Anesthesia Type: MAC  Patient location during evaluation: GI PACU  Patient participation: Yes- Able to Participate  Level of consciousness: awake and alert and oriented  Post-procedure vital signs: reviewed and stable  Pain management: adequate  Airway patency: patent  PONV status at discharge: No PONV  Anesthetic complications: no      Cardiovascular status: blood pressure returned to baseline, hemodynamically stable and stable  Respiratory status: unassisted, spontaneous ventilation and room air  Hydration status: euvolemic  Follow-up not needed.          Vitals Value Taken Time   /90 9/23/2019  1:23 PM   Temp 36.8 °C (98.2 °F) 9/23/2019  1:23 PM   Pulse 69 9/23/2019  1:23 PM   Resp 18 9/23/2019  1:23 PM   SpO2 95 % 9/23/2019  1:23 PM         No case tracking events are documented in the log.      Pain/Kevin Score: No data recorded

## 2019-09-23 NOTE — TRANSFER OF CARE
"Anesthesia Transfer of Care Note    Patient: Ladarius Solis    Procedure(s) Performed: Procedure(s) (LRB):  EGD (ESOPHAGOGASTRODUODENOSCOPY) (N/A)    Patient location: GI    Anesthesia Type: MAC    Transport from OR: Transported from OR on room air with adequate spontaneous ventilation    Post pain: adequate analgesia    Post assessment: no apparent anesthetic complications and tolerated procedure well    Post vital signs: stable    Level of consciousness: awake, alert and oriented    Nausea/Vomiting: no nausea/vomiting    Complications: none    Transfer of care protocol was followed      Last vitals:   Visit Vitals  BP (!) 152/90 (BP Location: Left arm, Patient Position: Lying)   Pulse 69   Temp 36.8 °C (98.2 °F) (Oral)   Resp 18   Ht 5' 6" (1.676 m)   Wt 77.1 kg (170 lb)   SpO2 95%   BMI 27.44 kg/m²     "

## 2019-09-23 NOTE — PLAN OF CARE
Recovery complete. Patient recovered to baseline. Discharge instructions reviewed with patient and he verbalized understanding. Dr. Maloney spoke to the patient about the outcome of the procedure and about his new medications. VSS and no complaints of pain or discomfort noted. Patient ready for discharge.

## 2019-09-27 ENCOUNTER — HOSPITAL ENCOUNTER (OUTPATIENT)
Dept: RADIOLOGY | Facility: HOSPITAL | Age: 64
Discharge: HOME | End: 2019-09-27
Attending: INTERNAL MEDICINE
Payer: MEDICARE

## 2019-09-27 ENCOUNTER — PATIENT MESSAGE (OUTPATIENT)
Dept: GASTROENTEROLOGY | Facility: CLINIC | Age: 64
End: 2019-09-27

## 2019-09-27 DIAGNOSIS — R14.0 BLOATING: ICD-10-CM

## 2019-09-27 DIAGNOSIS — R10.11 RUQ PAIN: ICD-10-CM

## 2019-09-27 PROCEDURE — 78226 HEPATOBILIARY SYSTEM IMAGING: CPT | Mod: 26,,, | Performed by: RADIOLOGY

## 2019-09-27 PROCEDURE — 78226 NM HEPATOBILIARY IMAGING INC GB (HIDA): ICD-10-PCS | Mod: 26,,, | Performed by: RADIOLOGY

## 2019-09-27 PROCEDURE — A9537 TC99M MEBROFENIN: HCPCS

## 2019-10-02 ENCOUNTER — TELEPHONE (OUTPATIENT)
Dept: GASTROENTEROLOGY | Facility: CLINIC | Age: 64
End: 2019-10-02

## 2019-10-02 NOTE — TELEPHONE ENCOUNTER
Informed pt of Path results. Patient requests a f/uvisit because he is still having symptoms. Offered patient an appointment in [place] this week and he decline. Patient has been scheduled on 11/11/19 at 10:00am. Verbal Understanding.

## 2019-10-02 NOTE — TELEPHONE ENCOUNTER
----- Message from Dyllan Maloney MD sent at 10/2/2019 12:32 PM CDT -----  Please inform, biopsies are all negative for infecition and precancerous changes.  ====  1. SMALL BOWEL, DUODENUM, BIOPSY:  Small bowel mucosa with no significant histopathologic alterations.  No evidence of gluten sensitive enteropathy or intervillous parasitic microorganisms.  2. STOMACH, BIOPSY:  Oxyntic mucosa with mild chronic inactive gastritis.  No Helicobacter pylori microorganisms identified on H&E or immunohistochemical stain.  No intestinal metaplasia or dysplasia.  3. STOMACH, GASTRIC POLYP, BIOPSY:  Fundic gland polyp.  No Helicobacter pylori microorganisms identified on H&E stain.  No intestinal metaplasia or dysplasia.  4. GASTROESOPHAGEAL JUNCTION, NODULAR MUCOSA, BIOPSY:  Squamous and squamocolumnar mucosa with reflux associated changes.  No intestinal metaplasia or dysplasia.  5. GASTROESOPHAGEAL JUNCTION, SALMON-COLORED MUCOSA, BIOPSY:  Columnar and squamocolumnar mucosa with reflux associated changes.  No intestinal metaplasia or dysplasia.

## 2019-11-08 ENCOUNTER — PATIENT OUTREACH (OUTPATIENT)
Dept: ADMINISTRATIVE | Facility: OTHER | Age: 64
End: 2019-11-08

## 2019-11-11 ENCOUNTER — OFFICE VISIT (OUTPATIENT)
Dept: GASTROENTEROLOGY | Facility: CLINIC | Age: 64
End: 2019-11-11
Payer: MEDICARE

## 2019-11-11 VITALS
DIASTOLIC BLOOD PRESSURE: 89 MMHG | WEIGHT: 168.19 LBS | HEIGHT: 66 IN | BODY MASS INDEX: 27.03 KG/M2 | SYSTOLIC BLOOD PRESSURE: 141 MMHG

## 2019-11-11 DIAGNOSIS — R10.9 CHRONIC ABDOMINAL PAIN: ICD-10-CM

## 2019-11-11 DIAGNOSIS — G89.29 CHRONIC ABDOMINAL PAIN: ICD-10-CM

## 2019-11-11 DIAGNOSIS — K30 FUNCTIONAL DYSPEPSIA: Chronic | ICD-10-CM

## 2019-11-11 DIAGNOSIS — K21.9 GASTROESOPHAGEAL REFLUX DISEASE, ESOPHAGITIS PRESENCE NOT SPECIFIED: Primary | ICD-10-CM

## 2019-11-11 PROCEDURE — 99214 PR OFFICE/OUTPT VISIT, EST, LEVL IV, 30-39 MIN: ICD-10-PCS | Mod: S$PBB,,, | Performed by: INTERNAL MEDICINE

## 2019-11-11 PROCEDURE — 99214 OFFICE O/P EST MOD 30 MIN: CPT | Mod: S$PBB,,, | Performed by: INTERNAL MEDICINE

## 2019-11-11 PROCEDURE — 99999 PR PBB SHADOW E&M-EST. PATIENT-LVL III: ICD-10-PCS | Mod: PBBFAC,,, | Performed by: INTERNAL MEDICINE

## 2019-11-11 PROCEDURE — 99999 PR PBB SHADOW E&M-EST. PATIENT-LVL III: CPT | Mod: PBBFAC,,, | Performed by: INTERNAL MEDICINE

## 2019-11-11 PROCEDURE — 99213 OFFICE O/P EST LOW 20 MIN: CPT | Mod: PBBFAC,PO | Performed by: INTERNAL MEDICINE

## 2019-11-11 NOTE — PROGRESS NOTES
GASTROENTEROLOGY CLINIC NOTE    Reason for visit: The primary encounter diagnosis was Gastroesophageal reflux disease, esophagitis presence not specified. Diagnoses of Chronic abdominal pain and Functional dyspepsia were also pertinent to this visit.  Referring provider/PCP: Nick Stanton MD      HPI:  Ladarius Solis is a 63 y.o. male here today for follow up of epiagstric / ruq pain.    Interval hx:  Still having same symptoms. No improvement with PPI daily or with carafate. Had EGD with me as below.  workup thus far has been fairly unremarkable.  I have reviewed the workup with him and we discussed all the results.  He has had a CT, HIDA scan, ultrasound, EGD which have all been unrevealing.  He even had a fibro scan that did not show advanced fibrosis.  Symptoms are fairly constant throughout the day and include feelings of abdominal discomfort in the right upper quadrant as well as epigastric area.  He states the symptoms are almost immediately worse upon eating, however he has no odynophagia or dysphagia.  He has no nausea or regurgitation or vomiting. He has no weight loss.  He has no blood loss.  Symptoms immediately worse postprandially described as a discomfort as well as some bloating.  He says he has excess flatulence.  He is drinking a lot of carbonated beverages.  He asked if there is a psychosomatic component to this.      ======  Initial HPI  He is here for approximately 2 months of right upper quadrant pain. Pain is worse almost immediately after eating.  It is associated with abdominal bloating.  Symptoms worse with heavy food and with carbonated beverages as well. Pain at times radiates to the right flank as well as to the left upper quadrant. He states almost immediately after eating he has to have a bowel movement.  The bowel movement is usually formed and without any blood.  He has no changes in the caliber of his stool.  He denies vomiting he denies reflux symptoms.  Was given a  trial of a PPI by his primary care doctor but this had no benefit.  He has since stopped the PPI.  Workup has included a CT scan as well as a ultrasound.  He has also had a fiber scan.  All have been unrevealing, as described below in my note.      Prior Endoscopy:  EGD:   9/2019 me  Impression:           - LA Grade B reflux esophagitis.                        - [place]-colored mucosa classified as Chen's                         stage C0-M1 per [place] criteria. Biopsied.                        - Esophageal mucosal changes were present,                         including erythema. Findings are suggestive of                         reflux inflammation. Biopsied.                        - Bilious gastric fluid.                        - Erythematous mucosa in the prepyloric region of                         the stomach. Biopsied.                        - A few gastric polyps. Biopsied.                        - Normal examined duodenum. Biopsied.  PATH normal    Colon:  2017 [facility] for large adenoma. EMR performed.    3/2018 [facility] - follow up EMR.   - recd repeat 3 years ([facility] states can be with Dr. Harley.)    (Portions of this note were dictated using voice recognition software and may contain dictation related errors in spelling/grammar/syntax not found on text review)    Review of Systems   Constitutional: Negative for fever and weight loss.   Respiratory: Negative for cough and sputum production.    Cardiovascular: Negative for chest pain and palpitations.   Gastrointestinal: Positive for abdominal pain. Negative for blood in stool, constipation, melena, nausea and vomiting.   Musculoskeletal: Negative for back pain and neck pain.       Past Medical History: has a past medical history of Arthritis, Carpal tunnel syndrome, bilateral, Cervical spinal stenosis, Colon polyps, Fatty liver, Hypertension, Overweight (BMI 25.0-29.9), and Vertigo.    Past Surgical History: has a past surgical history that includes  "Colonoscopy; Colonoscopy (N/A, 9/25/2017); Colonoscopy (N/A, 3/9/2018); Knee arthroscopy w/ meniscectomy (Left, 10/17/2018); Arthroscopic chondroplasty of knee joint (Left, 10/17/2018); and Esophagogastroduodenoscopy (N/A, 9/23/2019).    Family History:family history includes Arthritis in his father; Dementia in his father; Heart disease in his father; No Known Problems in his brother and mother.    Allergies: Review of patient's allergies indicates:  No Known Allergies    Social History: reports that he has never smoked. He has never used smokeless tobacco. He reports that he does not drink alcohol or use drugs.    Home medications:   Current Outpatient Medications on File Prior to Visit   Medication Sig Dispense Refill    lisinopril 10 MG tablet Take 1 tablet (10 mg total) by mouth once daily. 90 tablet 3    pantoprazole (PROTONIX) 40 MG tablet Take 1 tablet (40 mg total) by mouth once daily. 30 tablet 11    sucralfate (CARAFATE) 1 gram tablet Take 1 tablet (1 g total) by mouth every meal as needed (for bloating , pain). 90 tablet 2     Current Facility-Administered Medications on File Prior to Visit   Medication Dose Route Frequency Provider Last Rate Last Dose    sodium hyaluronate (EUFLEXXA) 10 mg/mL(mw 2.4 -3.6 million) Syrg 20 mg  20 mg Intra-articular  W Toi Carr MD   20 mg at 05/10/18 2256       Vital signs:  BP (!) 141/89   Ht 5' 6" (1.676 m)   Wt 76.3 kg (168 lb 3.4 oz)   BMI 27.15 kg/m²     Physical Exam   Constitutional: No distress.   Eyes: Conjunctivae are normal. No scleral icterus.   Cardiovascular: Normal rate.   Pulmonary/Chest: Effort normal. No respiratory distress.   Abdominal: Soft. He exhibits no distension.   TTP throughout upper abd ; no rigidity ; no skin changes ; no rebound or guarding; no pain over the rib cages   Musculoskeletal:   No back tenderness   Psychiatric: He has a normal mood and affect. His behavior is normal.   Vitals reviewed.      Routine labs:  Lab Results "   Component Value Date    WBC 6.36 07/27/2019    HGB 13.4 (L) 07/27/2019    HCT 40.2 07/27/2019    MCV 90 07/27/2019     07/27/2019     Lab Results   Component Value Date    INR 0.9 08/16/2019     Lab Results   Component Value Date    IRON 65 08/16/2019    FERRITIN 169 08/16/2019    TIBC 416 08/16/2019    FESATURATED 16 (L) 08/16/2019     Lab Results   Component Value Date     08/16/2019    K 4.1 08/16/2019     08/16/2019    CO2 28 08/16/2019    BUN 17 08/16/2019    CREATININE 1.0 08/16/2019     Lab Results   Component Value Date    ALBUMIN 3.9 08/16/2019    ALT 25 08/16/2019    AST 18 08/16/2019    ALKPHOS 79 08/16/2019    BILITOT 0.6 08/16/2019     No results found for: GLUCOSE    Imaging:  Reviewed  CT abd 2019  Impression       No acute findings in the abdomen or pelvis.    Hepatic steatosis and borderline splenomegaly.    Small bilateral fat containing inguinal hernias.    Prostatomegaly.     US 2019  Hepatic steatosis with focal fatty sparing.    HIDA normal      Assessment:    1. Gastroesophageal reflux disease, esophagitis presence not specified    2. Chronic abdominal pain    3. Functional dyspepsia        Plan:       We had a long discussion regarding his workup which has been fairly unremarkable.  I suspect there is a functional component to his pain at this time given the unremarkable workup, consistent with functional dyspepsia or epigastric pain syndrome.    Will give a trial of FD guard and I have given samples in the clinic.  Also advised a trial of Gaviscon.  Okay to stop Carafate given no benefit.    He does have evidence of esophageal reflux on his biopsies and thus I would like him to continue the Protonix for now.    Ultimately I also advised that he have follow-up with his primary care for potential etiologies including his back, given significant history of cervical stenosis in the past.  I wonder if there is potentially a thoracic component to his back pain as well.    If  no benefit from the above measures and still has symptoms, will need to consider a trial of nortriptyline for functional dyspepsia and epigastric pain syndrome.    RTC 3 months    A total of 25 minutes were spent face-to-face with the patient during this encounter and over half of that time was spent on counseling and coordination of care.     Dyllan Maloney MD  [facility]

## 2019-12-11 ENCOUNTER — PATIENT MESSAGE (OUTPATIENT)
Dept: INTERNAL MEDICINE | Facility: CLINIC | Age: 64
End: 2019-12-11

## 2019-12-12 ENCOUNTER — PATIENT OUTREACH (OUTPATIENT)
Dept: ADMINISTRATIVE | Facility: HOSPITAL | Age: 64
End: 2019-12-12

## 2020-07-15 DIAGNOSIS — Z71.89 COMPLEX CARE COORDINATION: ICD-10-CM

## 2020-07-27 ENCOUNTER — TELEPHONE (OUTPATIENT)
Dept: INTERNAL MEDICINE | Facility: CLINIC | Age: 65
End: 2020-07-27

## 2020-07-27 DIAGNOSIS — I10 ESSENTIAL HYPERTENSION: ICD-10-CM

## 2020-07-27 RX ORDER — LISINOPRIL 10 MG/1
10 TABLET ORAL DAILY
Qty: 90 TABLET | Refills: 3 | Status: SHIPPED | OUTPATIENT
Start: 2020-07-27 | End: 2021-09-01 | Stop reason: SDUPTHER

## 2020-07-27 NOTE — TELEPHONE ENCOUNTER
----- Message from Amber Bedoya sent at 7/27/2020  8:46 AM CDT -----  Contact: Justin [phone]--  Rx Refill/Request     Is this a Refill:--Yes--      Rx Name and Strength:    1.lisinopril 10 MG tablet    Preferred Pharmacy with phone number:--[facility]--[phone]--  [address] [address]    Communication Preference:--[phone]--    Additional Information: Refill request is needed for medication listed above. Pleae call to advise when sent to the pharmacy.

## 2020-10-01 ENCOUNTER — PATIENT MESSAGE (OUTPATIENT)
Dept: OTHER | Facility: OTHER | Age: 65
End: 2020-10-01

## 2020-10-05 ENCOUNTER — PATIENT MESSAGE (OUTPATIENT)
Dept: ADMINISTRATIVE | Facility: HOSPITAL | Age: 65
End: 2020-10-05

## 2020-12-11 ENCOUNTER — PATIENT MESSAGE (OUTPATIENT)
Dept: OTHER | Facility: OTHER | Age: 65
End: 2020-12-11

## 2021-01-04 ENCOUNTER — PATIENT MESSAGE (OUTPATIENT)
Dept: ADMINISTRATIVE | Facility: HOSPITAL | Age: 66
End: 2021-01-04

## 2021-02-13 ENCOUNTER — HOSPITAL ENCOUNTER (EMERGENCY)
Facility: HOSPITAL | Age: 66
Discharge: HOME | End: 2021-02-13
Attending: EMERGENCY MEDICINE
Payer: MEDICARE

## 2021-02-13 VITALS
OXYGEN SATURATION: 98 % | HEIGHT: 66 IN | SYSTOLIC BLOOD PRESSURE: 135 MMHG | HEART RATE: 78 BPM | BODY MASS INDEX: 28.93 KG/M2 | RESPIRATION RATE: 18 BRPM | TEMPERATURE: 99 F | WEIGHT: 180 LBS | DIASTOLIC BLOOD PRESSURE: 81 MMHG

## 2021-02-13 DIAGNOSIS — Z87.39 HISTORY OF SPINAL STENOSIS: Primary | ICD-10-CM

## 2021-02-13 DIAGNOSIS — M54.2 CHRONIC NECK PAIN: ICD-10-CM

## 2021-02-13 DIAGNOSIS — G89.29 CHRONIC NECK PAIN: ICD-10-CM

## 2021-02-13 PROCEDURE — 99284 EMERGENCY DEPT VISIT MOD MDM: CPT | Mod: 25

## 2021-02-13 RX ORDER — IBUPROFEN 800 MG/1
800 TABLET ORAL EVERY 6 HOURS PRN
Qty: 12 TABLET | Refills: 0 | Status: SHIPPED | OUTPATIENT
Start: 2021-02-13 | End: 2021-02-16

## 2021-02-13 RX ORDER — ORPHENADRINE CITRATE 100 MG/1
100 TABLET, EXTENDED RELEASE ORAL 2 TIMES DAILY PRN
Qty: 20 TABLET | Refills: 0 | Status: SHIPPED | OUTPATIENT
Start: 2021-02-13 | End: 2021-02-24

## 2021-02-13 RX ORDER — KETOROLAC TROMETHAMINE 30 MG/ML
30 INJECTION, SOLUTION INTRAMUSCULAR; INTRAVENOUS
Status: DISCONTINUED | OUTPATIENT
Start: 2021-02-13 | End: 2021-02-13 | Stop reason: HOSPADM

## 2021-02-24 ENCOUNTER — HOSPITAL ENCOUNTER (EMERGENCY)
Facility: HOSPITAL | Age: 66
Discharge: PSYCHIATRIC OR SUBSTANCE USE FACILITY | End: 2021-02-25
Attending: EMERGENCY MEDICINE
Payer: MEDICARE

## 2021-02-24 DIAGNOSIS — R45.851 SUICIDAL IDEATIONS: Primary | ICD-10-CM

## 2021-02-24 DIAGNOSIS — R45.850 HOMICIDAL THOUGHTS: ICD-10-CM

## 2021-02-24 LAB
ALBUMIN SERPL BCP-MCNC: 4.2 G/DL (ref 3.5–5.2)
ALP SERPL-CCNC: 67 U/L (ref 55–135)
ALT SERPL W/O P-5'-P-CCNC: 27 U/L (ref 10–44)
AMPHET+METHAMPHET UR QL: NEGATIVE
ANION GAP SERPL CALC-SCNC: 7 MMOL/L (ref 8–16)
APAP SERPL-MCNC: <3 UG/ML (ref 10–20)
AST SERPL-CCNC: 18 U/L (ref 10–40)
BARBITURATES UR QL SCN>200 NG/ML: NEGATIVE
BASOPHILS # BLD AUTO: 0.06 K/UL (ref 0–0.2)
BASOPHILS NFR BLD: 0.8 % (ref 0–1.9)
BENZODIAZ UR QL SCN>200 NG/ML: NEGATIVE
BILIRUB SERPL-MCNC: 0.6 MG/DL (ref 0.1–1)
BILIRUB UR QL STRIP: NEGATIVE
BUN SERPL-MCNC: 16 MG/DL (ref 8–23)
BZE UR QL SCN: NEGATIVE
CALCIUM SERPL-MCNC: 9 MG/DL (ref 8.7–10.5)
CANNABINOIDS UR QL SCN: NEGATIVE
CHLORIDE SERPL-SCNC: 102 MMOL/L (ref 95–110)
CLARITY UR: CLEAR
CO2 SERPL-SCNC: 26 MMOL/L (ref 23–29)
COLOR UR: YELLOW
CREAT SERPL-MCNC: 1.1 MG/DL (ref 0.5–1.4)
CREAT UR-MCNC: 108 MG/DL (ref 23–375)
DIFFERENTIAL METHOD: ABNORMAL
EOSINOPHIL # BLD AUTO: 0.1 K/UL (ref 0–0.5)
EOSINOPHIL NFR BLD: 1.7 % (ref 0–8)
ERYTHROCYTE [DISTWIDTH] IN BLOOD BY AUTOMATED COUNT: 12.8 % (ref 11.5–14.5)
EST. GFR  (AFRICAN AMERICAN): >60 ML/MIN/1.73 M^2
EST. GFR  (NON AFRICAN AMERICAN): >60 ML/MIN/1.73 M^2
ETHANOL SERPL-MCNC: <10 MG/DL
GLUCOSE SERPL-MCNC: 99 MG/DL (ref 70–110)
GLUCOSE UR QL STRIP: NEGATIVE
HCT VFR BLD AUTO: 43.8 % (ref 40–54)
HGB BLD-MCNC: 14.6 G/DL (ref 14–18)
HGB UR QL STRIP: NEGATIVE
IMM GRANULOCYTES # BLD AUTO: 0.04 K/UL (ref 0–0.04)
IMM GRANULOCYTES NFR BLD AUTO: 0.6 % (ref 0–0.5)
KETONES UR QL STRIP: NEGATIVE
LEUKOCYTE ESTERASE UR QL STRIP: NEGATIVE
LYMPHOCYTES # BLD AUTO: 2.1 K/UL (ref 1–4.8)
LYMPHOCYTES NFR BLD: 29.6 % (ref 18–48)
MCH RBC QN AUTO: 30.2 PG (ref 27–31)
MCHC RBC AUTO-ENTMCNC: 33.3 G/DL (ref 32–36)
MCV RBC AUTO: 91 FL (ref 82–98)
METHADONE UR QL SCN>300 NG/ML: NEGATIVE
MONOCYTES # BLD AUTO: 0.5 K/UL (ref 0.3–1)
MONOCYTES NFR BLD: 7.4 % (ref 4–15)
NEUTROPHILS # BLD AUTO: 4.3 K/UL (ref 1.8–7.7)
NEUTROPHILS NFR BLD: 59.9 % (ref 38–73)
NITRITE UR QL STRIP: NEGATIVE
NRBC BLD-RTO: 0 /100 WBC
OPIATES UR QL SCN: NEGATIVE
PCP UR QL SCN>25 NG/ML: NEGATIVE
PH UR STRIP: 6 [PH] (ref 5–8)
PLATELET # BLD AUTO: 219 K/UL (ref 150–350)
PMV BLD AUTO: 10 FL (ref 9.2–12.9)
POTASSIUM SERPL-SCNC: 4.3 MMOL/L (ref 3.5–5.1)
PROT SERPL-MCNC: 6.9 G/DL (ref 6–8.4)
PROT UR QL STRIP: NEGATIVE
RBC # BLD AUTO: 4.84 M/UL (ref 4.6–6.2)
SARS-COV-2 RDRP RESP QL NAA+PROBE: NEGATIVE
SODIUM SERPL-SCNC: 135 MMOL/L (ref 136–145)
SP GR UR STRIP: 1.01 (ref 1–1.03)
TOXICOLOGY INFORMATION: NORMAL
TSH SERPL DL<=0.005 MIU/L-ACNC: 2.29 UIU/ML (ref 0.4–4)
URN SPEC COLLECT METH UR: NORMAL
UROBILINOGEN UR STRIP-ACNC: NEGATIVE EU/DL
WBC # BLD AUTO: 7.19 K/UL (ref 3.9–12.7)

## 2021-02-24 PROCEDURE — 81003 URINALYSIS AUTO W/O SCOPE: CPT | Mod: 59

## 2021-02-24 PROCEDURE — 80053 COMPREHEN METABOLIC PANEL: CPT

## 2021-02-24 PROCEDURE — 80307 DRUG TEST PRSMV CHEM ANLYZR: CPT

## 2021-02-24 PROCEDURE — 84443 ASSAY THYROID STIM HORMONE: CPT

## 2021-02-24 PROCEDURE — 85025 COMPLETE CBC W/AUTO DIFF WBC: CPT

## 2021-02-24 PROCEDURE — 99285 EMERGENCY DEPT VISIT HI MDM: CPT

## 2021-02-24 PROCEDURE — 80143 DRUG ASSAY ACETAMINOPHEN: CPT

## 2021-02-24 PROCEDURE — 82077 ASSAY SPEC XCP UR&BREATH IA: CPT

## 2021-02-24 PROCEDURE — U0002 COVID-19 LAB TEST NON-CDC: HCPCS

## 2021-02-25 VITALS
WEIGHT: 180 LBS | SYSTOLIC BLOOD PRESSURE: 164 MMHG | HEART RATE: 76 BPM | RESPIRATION RATE: 16 BRPM | TEMPERATURE: 98 F | HEIGHT: 66 IN | DIASTOLIC BLOOD PRESSURE: 93 MMHG | BODY MASS INDEX: 28.93 KG/M2 | OXYGEN SATURATION: 98 %

## 2021-02-25 PROCEDURE — 25000003 PHARM REV CODE 250: Performed by: EMERGENCY MEDICINE

## 2021-02-25 RX ORDER — LISINOPRIL 10 MG/1
10 TABLET ORAL
Status: COMPLETED | OUTPATIENT
Start: 2021-02-25 | End: 2021-02-25

## 2021-02-25 RX ORDER — IBUPROFEN 400 MG/1
800 TABLET ORAL
Status: COMPLETED | OUTPATIENT
Start: 2021-02-25 | End: 2021-02-25

## 2021-02-25 RX ADMIN — IBUPROFEN 800 MG: 400 TABLET, FILM COATED ORAL at 01:02

## 2021-02-25 RX ADMIN — LISINOPRIL 10 MG: 10 TABLET ORAL at 01:02

## 2021-03-12 ENCOUNTER — HOSPITAL ENCOUNTER (EMERGENCY)
Facility: HOSPITAL | Age: 66
Discharge: HOME | End: 2021-03-12
Attending: EMERGENCY MEDICINE
Payer: MEDICARE

## 2021-03-12 VITALS
WEIGHT: 160 LBS | TEMPERATURE: 98 F | HEART RATE: 88 BPM | RESPIRATION RATE: 16 BRPM | SYSTOLIC BLOOD PRESSURE: 118 MMHG | BODY MASS INDEX: 25.82 KG/M2 | OXYGEN SATURATION: 96 % | DIASTOLIC BLOOD PRESSURE: 72 MMHG

## 2021-03-12 DIAGNOSIS — S61.210A LACERATION OF RIGHT INDEX FINGER WITHOUT FOREIGN BODY WITHOUT DAMAGE TO NAIL, INITIAL ENCOUNTER: Primary | ICD-10-CM

## 2021-03-12 PROCEDURE — 63600175 PHARM REV CODE 636 W HCPCS: Performed by: PHYSICIAN ASSISTANT

## 2021-03-12 PROCEDURE — 12001 RPR S/N/AX/GEN/TRNK 2.5CM/<: CPT | Mod: F6

## 2021-03-12 PROCEDURE — 90715 TDAP VACCINE 7 YRS/> IM: CPT | Performed by: PHYSICIAN ASSISTANT

## 2021-03-12 PROCEDURE — 25000003 PHARM REV CODE 250: Performed by: PHYSICIAN ASSISTANT

## 2021-03-12 PROCEDURE — 99284 EMERGENCY DEPT VISIT MOD MDM: CPT | Mod: 25

## 2021-03-12 PROCEDURE — 90471 IMMUNIZATION ADMIN: CPT | Performed by: PHYSICIAN ASSISTANT

## 2021-03-12 RX ORDER — CEFAZOLIN SODIUM 1 G/3ML
1 INJECTION, POWDER, FOR SOLUTION INTRAMUSCULAR; INTRAVENOUS
Status: COMPLETED | OUTPATIENT
Start: 2021-03-12 | End: 2021-03-12

## 2021-03-12 RX ORDER — SULFAMETHOXAZOLE AND TRIMETHOPRIM 800; 160 MG/1; MG/1
1 TABLET ORAL 2 TIMES DAILY
Qty: 14 TABLET | Refills: 0 | Status: SHIPPED | OUTPATIENT
Start: 2021-03-12 | End: 2021-03-19

## 2021-03-12 RX ORDER — LIDOCAINE HYDROCHLORIDE 10 MG/ML
10 INJECTION INFILTRATION; PERINEURAL
Status: COMPLETED | OUTPATIENT
Start: 2021-03-12 | End: 2021-03-12

## 2021-03-12 RX ADMIN — CEFAZOLIN 1 G: 330 INJECTION, POWDER, FOR SOLUTION INTRAMUSCULAR; INTRAVENOUS at 09:03

## 2021-03-12 RX ADMIN — CLOSTRIDIUM TETANI TOXOID ANTIGEN (FORMALDEHYDE INACTIVATED), CORYNEBACTERIUM DIPHTHERIAE TOXOID ANTIGEN (FORMALDEHYDE INACTIVATED), BORDETELLA PERTUSSIS TOXOID ANTIGEN (GLUTARALDEHYDE INACTIVATED), BORDETELLA PERTUSSIS FILAMENTOUS HEMAGGLUTININ ANTIGEN (FORMALDEHYDE INACTIVATED), BORDETELLA PERTUSSIS PERTACTIN ANTIGEN, AND BORDETELLA PERTUSSIS FIMBRIAE 2/3 ANTIGEN 0.5 ML: 5; 2; 2.5; 5; 3; 5 INJECTION, SUSPENSION INTRAMUSCULAR at 09:03

## 2021-03-12 RX ADMIN — LIDOCAINE HYDROCHLORIDE 10 ML: 10 INJECTION, SOLUTION INFILTRATION; PERINEURAL at 09:03

## 2021-03-21 ENCOUNTER — OFFICE VISIT (OUTPATIENT)
Dept: URGENT CARE | Facility: CLINIC | Age: 66
End: 2021-03-21
Payer: MEDICARE

## 2021-03-21 VITALS
OXYGEN SATURATION: 97 % | BODY MASS INDEX: 25.71 KG/M2 | WEIGHT: 160 LBS | HEIGHT: 66 IN | HEART RATE: 75 BPM | DIASTOLIC BLOOD PRESSURE: 90 MMHG | SYSTOLIC BLOOD PRESSURE: 160 MMHG | TEMPERATURE: 98 F

## 2021-03-21 DIAGNOSIS — R51.9 NONINTRACTABLE HEADACHE, UNSPECIFIED CHRONICITY PATTERN, UNSPECIFIED HEADACHE TYPE: ICD-10-CM

## 2021-03-21 DIAGNOSIS — Z48.02 VISIT FOR SUTURE REMOVAL: Primary | ICD-10-CM

## 2021-03-21 PROCEDURE — 99499 NO LOS: ICD-10-PCS | Mod: S$GLB,,, | Performed by: NURSE PRACTITIONER

## 2021-03-21 PROCEDURE — 99499 UNLISTED E&M SERVICE: CPT | Mod: S$GLB,,, | Performed by: NURSE PRACTITIONER

## 2021-03-21 RX ORDER — GABAPENTIN 400 MG/1
400 CAPSULE ORAL 3 TIMES DAILY
COMMUNITY
End: 2021-04-20

## 2021-03-21 RX ORDER — TRAZODONE HYDROCHLORIDE 100 MG/1
100 TABLET ORAL NIGHTLY
COMMUNITY
End: 2021-04-20

## 2021-03-30 ENCOUNTER — TELEPHONE (OUTPATIENT)
Dept: INTERNAL MEDICINE | Facility: CLINIC | Age: 66
End: 2021-03-30

## 2021-04-20 ENCOUNTER — OFFICE VISIT (OUTPATIENT)
Dept: SPINE | Facility: CLINIC | Age: 66
End: 2021-04-20
Payer: MEDICARE

## 2021-04-20 VITALS
HEIGHT: 66 IN | SYSTOLIC BLOOD PRESSURE: 155 MMHG | HEART RATE: 92 BPM | DIASTOLIC BLOOD PRESSURE: 88 MMHG | BODY MASS INDEX: 25.72 KG/M2 | WEIGHT: 160.06 LBS

## 2021-04-20 DIAGNOSIS — Z87.39 HISTORY OF SPINAL STENOSIS: ICD-10-CM

## 2021-04-20 DIAGNOSIS — M48.02 SPINAL STENOSIS, CERVICAL REGION: ICD-10-CM

## 2021-04-20 DIAGNOSIS — M54.12 CERVICAL RADICULOPATHY: Primary | ICD-10-CM

## 2021-04-20 DIAGNOSIS — M54.16 LUMBAR RADICULOPATHY: ICD-10-CM

## 2021-04-20 DIAGNOSIS — M54.9 DORSALGIA, UNSPECIFIED: ICD-10-CM

## 2021-04-20 DIAGNOSIS — M54.12 RADICULOPATHY, CERVICAL REGION: ICD-10-CM

## 2021-04-20 PROCEDURE — 99999 PR PBB SHADOW E&M-EST. PATIENT-LVL IV: ICD-10-PCS | Mod: PBBFAC,,, | Performed by: NURSE PRACTITIONER

## 2021-04-20 PROCEDURE — 99204 PR OFFICE/OUTPT VISIT, NEW, LEVL IV, 45-59 MIN: ICD-10-PCS | Mod: S$PBB,,, | Performed by: NURSE PRACTITIONER

## 2021-04-20 PROCEDURE — 99204 OFFICE O/P NEW MOD 45 MIN: CPT | Mod: S$PBB,,, | Performed by: NURSE PRACTITIONER

## 2021-04-20 PROCEDURE — 99999 PR PBB SHADOW E&M-EST. PATIENT-LVL IV: CPT | Mod: PBBFAC,,, | Performed by: NURSE PRACTITIONER

## 2021-04-20 PROCEDURE — 99214 OFFICE O/P EST MOD 30 MIN: CPT | Mod: PBBFAC | Performed by: NURSE PRACTITIONER

## 2021-04-20 RX ORDER — TIZANIDINE 4 MG/1
4 TABLET ORAL EVERY 8 HOURS PRN
Qty: 30 TABLET | Refills: 0 | Status: SHIPPED | OUTPATIENT
Start: 2021-04-20 | End: 2021-04-30

## 2021-04-20 RX ORDER — MELOXICAM 15 MG/1
15 TABLET ORAL DAILY
Qty: 30 TABLET | Refills: 2 | Status: SHIPPED | OUTPATIENT
Start: 2021-04-20 | End: 2021-05-20

## 2021-04-26 ENCOUNTER — CLINICAL SUPPORT (OUTPATIENT)
Dept: REHABILITATION | Facility: HOSPITAL | Age: 66
End: 2021-04-26
Attending: NURSE PRACTITIONER
Payer: MEDICARE

## 2021-04-26 DIAGNOSIS — M54.12 CERVICAL RADICULOPATHY: ICD-10-CM

## 2021-04-26 DIAGNOSIS — M54.16 LUMBAR RADICULOPATHY: ICD-10-CM

## 2021-04-26 PROCEDURE — 97162 PT EVAL MOD COMPLEX 30 MIN: CPT

## 2021-04-26 PROCEDURE — 97110 THERAPEUTIC EXERCISES: CPT

## 2021-04-28 ENCOUNTER — HOSPITAL ENCOUNTER (OUTPATIENT)
Dept: RADIOLOGY | Facility: HOSPITAL | Age: 66
Discharge: HOME | End: 2021-04-28
Attending: NURSE PRACTITIONER
Payer: MEDICARE

## 2021-04-28 DIAGNOSIS — M54.12 RADICULOPATHY, CERVICAL REGION: ICD-10-CM

## 2021-04-28 DIAGNOSIS — M54.9 DORSALGIA, UNSPECIFIED: ICD-10-CM

## 2021-04-28 DIAGNOSIS — M48.02 SPINAL STENOSIS, CERVICAL REGION: ICD-10-CM

## 2021-04-28 PROCEDURE — 72110 X-RAY EXAM L-2 SPINE 4/>VWS: CPT | Mod: 26,,, | Performed by: RADIOLOGY

## 2021-04-28 PROCEDURE — 72141 MRI CERVICAL SPINE WITHOUT CONTRAST: ICD-10-PCS | Mod: 26,,, | Performed by: RADIOLOGY

## 2021-04-28 PROCEDURE — 72110 X-RAY EXAM L-2 SPINE 4/>VWS: CPT | Mod: TC

## 2021-04-28 PROCEDURE — 72148 MRI LUMBAR SPINE WITHOUT CONTRAST: ICD-10-PCS | Mod: 26,,, | Performed by: RADIOLOGY

## 2021-04-28 PROCEDURE — 72110 XR LUMBAR SPINE 5 VIEW WITH FLEX AND EXT: ICD-10-PCS | Mod: 26,,, | Performed by: RADIOLOGY

## 2021-04-28 PROCEDURE — 72141 MRI NECK SPINE W/O DYE: CPT | Mod: 26,,, | Performed by: RADIOLOGY

## 2021-04-28 PROCEDURE — 72148 MRI LUMBAR SPINE W/O DYE: CPT | Mod: TC

## 2021-04-28 PROCEDURE — 72141 MRI NECK SPINE W/O DYE: CPT | Mod: TC

## 2021-04-28 PROCEDURE — 72148 MRI LUMBAR SPINE W/O DYE: CPT | Mod: 26,,, | Performed by: RADIOLOGY

## 2021-04-30 ENCOUNTER — OFFICE VISIT (OUTPATIENT)
Dept: SPINE | Facility: CLINIC | Age: 66
End: 2021-04-30
Payer: MEDICARE

## 2021-04-30 VITALS
SYSTOLIC BLOOD PRESSURE: 152 MMHG | HEART RATE: 74 BPM | HEIGHT: 66 IN | DIASTOLIC BLOOD PRESSURE: 89 MMHG | BODY MASS INDEX: 25.72 KG/M2 | WEIGHT: 160.06 LBS

## 2021-04-30 DIAGNOSIS — Z87.39 HISTORY OF SPINAL STENOSIS: ICD-10-CM

## 2021-04-30 DIAGNOSIS — M54.16 LUMBAR RADICULOPATHY: ICD-10-CM

## 2021-04-30 DIAGNOSIS — M54.12 RADICULOPATHY, CERVICAL REGION: Primary | ICD-10-CM

## 2021-04-30 DIAGNOSIS — M54.12 CERVICAL RADICULOPATHY: ICD-10-CM

## 2021-04-30 DIAGNOSIS — M54.9 DORSALGIA, UNSPECIFIED: ICD-10-CM

## 2021-04-30 PROCEDURE — 99213 OFFICE O/P EST LOW 20 MIN: CPT | Mod: PBBFAC | Performed by: NURSE PRACTITIONER

## 2021-04-30 PROCEDURE — 99213 PR OFFICE/OUTPT VISIT, EST, LEVL III, 20-29 MIN: ICD-10-PCS | Mod: S$PBB,,, | Performed by: NURSE PRACTITIONER

## 2021-04-30 PROCEDURE — 99999 PR PBB SHADOW E&M-EST. PATIENT-LVL III: ICD-10-PCS | Mod: PBBFAC,,, | Performed by: NURSE PRACTITIONER

## 2021-04-30 PROCEDURE — 99999 PR PBB SHADOW E&M-EST. PATIENT-LVL III: CPT | Mod: PBBFAC,,, | Performed by: NURSE PRACTITIONER

## 2021-04-30 PROCEDURE — 99213 OFFICE O/P EST LOW 20 MIN: CPT | Mod: S$PBB,,, | Performed by: NURSE PRACTITIONER

## 2021-05-03 ENCOUNTER — OFFICE VISIT (OUTPATIENT)
Dept: INTERNAL MEDICINE | Facility: CLINIC | Age: 66
End: 2021-05-03
Payer: MEDICARE

## 2021-05-03 VITALS
OXYGEN SATURATION: 99 % | HEIGHT: 66 IN | SYSTOLIC BLOOD PRESSURE: 135 MMHG | HEART RATE: 78 BPM | DIASTOLIC BLOOD PRESSURE: 86 MMHG | WEIGHT: 173.94 LBS | BODY MASS INDEX: 27.95 KG/M2

## 2021-05-03 DIAGNOSIS — K21.9 GASTROESOPHAGEAL REFLUX DISEASE, UNSPECIFIED WHETHER ESOPHAGITIS PRESENT: ICD-10-CM

## 2021-05-03 DIAGNOSIS — I10 ESSENTIAL HYPERTENSION: Primary | ICD-10-CM

## 2021-05-03 DIAGNOSIS — M17.12 PRIMARY OSTEOARTHRITIS OF LEFT KNEE: ICD-10-CM

## 2021-05-03 DIAGNOSIS — F32.A DEPRESSION, UNSPECIFIED DEPRESSION TYPE: ICD-10-CM

## 2021-05-03 DIAGNOSIS — Z12.11 COLON CANCER SCREENING: ICD-10-CM

## 2021-05-03 DIAGNOSIS — K76.0 HEPATIC STEATOSIS: ICD-10-CM

## 2021-05-03 DIAGNOSIS — Z12.5 SCREENING FOR PROSTATE CANCER: ICD-10-CM

## 2021-05-03 DIAGNOSIS — M54.12 CERVICAL RADICULOPATHY AT C7: ICD-10-CM

## 2021-05-03 DIAGNOSIS — F41.9 ANXIETY: ICD-10-CM

## 2021-05-03 PROCEDURE — 99999 PR PBB SHADOW E&M-EST. PATIENT-LVL IV: CPT | Mod: PBBFAC,,, | Performed by: INTERNAL MEDICINE

## 2021-05-03 PROCEDURE — 99999 PR PBB SHADOW E&M-EST. PATIENT-LVL IV: ICD-10-PCS | Mod: PBBFAC,,, | Performed by: INTERNAL MEDICINE

## 2021-05-03 PROCEDURE — 99214 OFFICE O/P EST MOD 30 MIN: CPT | Mod: PBBFAC | Performed by: INTERNAL MEDICINE

## 2021-05-03 PROCEDURE — 99214 PR OFFICE/OUTPT VISIT, EST, LEVL IV, 30-39 MIN: ICD-10-PCS | Mod: S$PBB,,, | Performed by: INTERNAL MEDICINE

## 2021-05-03 PROCEDURE — 99214 OFFICE O/P EST MOD 30 MIN: CPT | Mod: S$PBB,,, | Performed by: INTERNAL MEDICINE

## 2021-05-04 ENCOUNTER — PATIENT MESSAGE (OUTPATIENT)
Dept: INTERNAL MEDICINE | Facility: CLINIC | Age: 66
End: 2021-05-04

## 2021-05-07 ENCOUNTER — CLINICAL SUPPORT (OUTPATIENT)
Dept: REHABILITATION | Facility: HOSPITAL | Age: 66
End: 2021-05-07
Attending: NURSE PRACTITIONER
Payer: MEDICARE

## 2021-05-07 DIAGNOSIS — M54.2 NECK PAIN: Primary | ICD-10-CM

## 2021-05-07 PROCEDURE — 97110 THERAPEUTIC EXERCISES: CPT | Mod: CQ

## 2021-05-11 ENCOUNTER — PATIENT OUTREACH (OUTPATIENT)
Dept: ADMINISTRATIVE | Facility: OTHER | Age: 66
End: 2021-05-11

## 2021-05-11 ENCOUNTER — OFFICE VISIT (OUTPATIENT)
Dept: OPTOMETRY | Facility: CLINIC | Age: 66
End: 2021-05-11
Payer: MEDICARE

## 2021-05-11 DIAGNOSIS — H52.203 MYOPIA WITH ASTIGMATISM AND PRESBYOPIA, BILATERAL: ICD-10-CM

## 2021-05-11 DIAGNOSIS — H04.123 DRY EYE SYNDROME OF BOTH EYES: ICD-10-CM

## 2021-05-11 DIAGNOSIS — H52.13 MYOPIA WITH ASTIGMATISM AND PRESBYOPIA, BILATERAL: ICD-10-CM

## 2021-05-11 DIAGNOSIS — G44.89 OTHER HEADACHE SYNDROME: ICD-10-CM

## 2021-05-11 DIAGNOSIS — H43.393 VISUAL FLOATERS, BILATERAL: Primary | ICD-10-CM

## 2021-05-11 DIAGNOSIS — Z96.1 PSEUDOPHAKIA: ICD-10-CM

## 2021-05-11 DIAGNOSIS — H26.499 POSTERIOR CAPSULAR OPACIFICATION, UNSPECIFIED LATERALITY: ICD-10-CM

## 2021-05-11 DIAGNOSIS — H52.4 MYOPIA WITH ASTIGMATISM AND PRESBYOPIA, BILATERAL: ICD-10-CM

## 2021-05-11 PROCEDURE — 99213 OFFICE O/P EST LOW 20 MIN: CPT | Mod: PBBFAC | Performed by: OPTOMETRIST

## 2021-05-11 PROCEDURE — 92014 COMPRE OPH EXAM EST PT 1/>: CPT | Mod: S$PBB,,, | Performed by: OPTOMETRIST

## 2021-05-11 PROCEDURE — 92014 PR EYE EXAM, EST PATIENT,COMPREHESV: ICD-10-PCS | Mod: S$PBB,,, | Performed by: OPTOMETRIST

## 2021-05-11 PROCEDURE — 99999 PR PBB SHADOW E&M-EST. PATIENT-LVL III: ICD-10-PCS | Mod: PBBFAC,,, | Performed by: OPTOMETRIST

## 2021-05-11 PROCEDURE — 99999 PR PBB SHADOW E&M-EST. PATIENT-LVL III: CPT | Mod: PBBFAC,,, | Performed by: OPTOMETRIST

## 2021-05-11 RX ORDER — MELOXICAM 7.5 MG/1
7.5 TABLET ORAL DAILY
COMMUNITY
Start: 2021-03-12 | End: 2021-08-25

## 2021-05-11 RX ORDER — OMEPRAZOLE 20 MG/1
20 CAPSULE, DELAYED RELEASE ORAL DAILY
COMMUNITY
Start: 2021-03-12 | End: 2021-08-25

## 2021-05-11 RX ORDER — BUSPIRONE HYDROCHLORIDE 10 MG/1
10 TABLET ORAL 3 TIMES DAILY
COMMUNITY
Start: 2021-03-12 | End: 2021-08-25

## 2021-05-11 RX ORDER — VENLAFAXINE HYDROCHLORIDE 75 MG/1
75 CAPSULE, EXTENDED RELEASE ORAL DAILY
COMMUNITY
Start: 2021-03-12 | End: 2021-07-12

## 2021-05-11 RX ORDER — VENLAFAXINE HYDROCHLORIDE 37.5 MG/1
37.5 CAPSULE, EXTENDED RELEASE ORAL DAILY
COMMUNITY
Start: 2021-03-12 | End: 2021-07-12

## 2021-05-11 RX ORDER — GABAPENTIN 100 MG/1
100 CAPSULE ORAL 3 TIMES DAILY
COMMUNITY
Start: 2021-03-12 | End: 2021-08-25

## 2021-05-11 RX ORDER — TRAZODONE HYDROCHLORIDE 50 MG/1
TABLET ORAL
COMMUNITY
Start: 2021-03-12 | End: 2021-07-12

## 2021-05-12 ENCOUNTER — TELEPHONE (OUTPATIENT)
Dept: OPHTHALMOLOGY | Facility: CLINIC | Age: 66
End: 2021-05-12

## 2021-05-18 ENCOUNTER — CLINICAL SUPPORT (OUTPATIENT)
Dept: REHABILITATION | Facility: HOSPITAL | Age: 66
End: 2021-05-18
Attending: NURSE PRACTITIONER
Payer: MEDICARE

## 2021-05-18 PROCEDURE — 97110 THERAPEUTIC EXERCISES: CPT

## 2021-05-21 ENCOUNTER — CLINICAL SUPPORT (OUTPATIENT)
Dept: REHABILITATION | Facility: HOSPITAL | Age: 66
End: 2021-05-21
Attending: NURSE PRACTITIONER
Payer: MEDICARE

## 2021-05-21 PROCEDURE — 97110 THERAPEUTIC EXERCISES: CPT

## 2021-05-25 ENCOUNTER — TELEPHONE (OUTPATIENT)
Dept: OPTOMETRY | Facility: CLINIC | Age: 66
End: 2021-05-25

## 2021-05-26 ENCOUNTER — OFFICE VISIT (OUTPATIENT)
Dept: SPINE | Facility: CLINIC | Age: 66
End: 2021-05-26
Payer: MEDICARE

## 2021-05-26 VITALS
HEART RATE: 73 BPM | HEIGHT: 66 IN | SYSTOLIC BLOOD PRESSURE: 153 MMHG | DIASTOLIC BLOOD PRESSURE: 92 MMHG | BODY MASS INDEX: 27.95 KG/M2 | WEIGHT: 173.94 LBS

## 2021-05-26 DIAGNOSIS — M47.817 LUMBOSACRAL SPONDYLOSIS WITHOUT MYELOPATHY: Primary | ICD-10-CM

## 2021-05-26 PROCEDURE — 99213 OFFICE O/P EST LOW 20 MIN: CPT | Mod: S$PBB,,, | Performed by: NURSE PRACTITIONER

## 2021-05-26 PROCEDURE — 99999 PR PBB SHADOW E&M-EST. PATIENT-LVL III: ICD-10-PCS | Mod: PBBFAC,,, | Performed by: NURSE PRACTITIONER

## 2021-05-26 PROCEDURE — 99999 PR PBB SHADOW E&M-EST. PATIENT-LVL III: CPT | Mod: PBBFAC,,, | Performed by: NURSE PRACTITIONER

## 2021-05-26 PROCEDURE — 99213 PR OFFICE/OUTPT VISIT, EST, LEVL III, 20-29 MIN: ICD-10-PCS | Mod: S$PBB,,, | Performed by: NURSE PRACTITIONER

## 2021-05-26 PROCEDURE — 99213 OFFICE O/P EST LOW 20 MIN: CPT | Mod: PBBFAC | Performed by: NURSE PRACTITIONER

## 2021-05-26 RX ORDER — IBUPROFEN 600 MG/1
600 TABLET ORAL 3 TIMES DAILY
COMMUNITY
Start: 2020-12-08 | End: 2021-07-12

## 2021-05-27 ENCOUNTER — TELEPHONE (OUTPATIENT)
Dept: PAIN MEDICINE | Facility: OTHER | Age: 66
End: 2021-05-27

## 2021-05-27 ENCOUNTER — OFFICE VISIT (OUTPATIENT)
Dept: OPHTHALMOLOGY | Facility: CLINIC | Age: 66
End: 2021-05-27
Payer: MEDICARE

## 2021-05-27 ENCOUNTER — PATIENT MESSAGE (OUTPATIENT)
Dept: PAIN MEDICINE | Facility: OTHER | Age: 66
End: 2021-05-27

## 2021-05-27 DIAGNOSIS — H26.499 POSTERIOR CAPSULAR OPACIFICATION, UNSPECIFIED LATERALITY: ICD-10-CM

## 2021-05-27 DIAGNOSIS — M47.817 LUMBOSACRAL SPONDYLOSIS WITHOUT MYELOPATHY: Primary | ICD-10-CM

## 2021-05-27 DIAGNOSIS — Z96.1 PSEUDOPHAKIA: ICD-10-CM

## 2021-05-27 PROCEDURE — 99204 PR OFFICE/OUTPT VISIT, NEW, LEVL IV, 45-59 MIN: ICD-10-PCS | Mod: 57,S$PBB,, | Performed by: OPHTHALMOLOGY

## 2021-05-27 PROCEDURE — 99204 OFFICE O/P NEW MOD 45 MIN: CPT | Mod: 57,S$PBB,, | Performed by: OPHTHALMOLOGY

## 2021-05-27 PROCEDURE — 99999 PR PBB SHADOW E&M-EST. PATIENT-LVL III: CPT | Mod: PBBFAC,,, | Performed by: OPHTHALMOLOGY

## 2021-05-27 PROCEDURE — 66821 YAG CAPSULOTOMY - OD - RIGHT EYE: ICD-10-PCS | Mod: S$PBB,RT,, | Performed by: OPHTHALMOLOGY

## 2021-05-27 PROCEDURE — 99999 PR PBB SHADOW E&M-EST. PATIENT-LVL III: ICD-10-PCS | Mod: PBBFAC,,, | Performed by: OPHTHALMOLOGY

## 2021-05-27 PROCEDURE — 66821 AFTER CATARACT LASER SURGERY: CPT | Mod: PBBFAC,RT | Performed by: OPHTHALMOLOGY

## 2021-05-27 PROCEDURE — 99213 OFFICE O/P EST LOW 20 MIN: CPT | Mod: PBBFAC | Performed by: OPHTHALMOLOGY

## 2021-05-28 ENCOUNTER — TELEPHONE (OUTPATIENT)
Dept: ADMINISTRATIVE | Facility: OTHER | Age: 66
End: 2021-05-28

## 2021-05-28 ENCOUNTER — CLINICAL SUPPORT (OUTPATIENT)
Dept: REHABILITATION | Facility: HOSPITAL | Age: 66
End: 2021-05-28
Attending: NURSE PRACTITIONER
Payer: MEDICARE

## 2021-05-28 ENCOUNTER — PATIENT MESSAGE (OUTPATIENT)
Dept: PAIN MEDICINE | Facility: OTHER | Age: 66
End: 2021-05-28

## 2021-05-28 DIAGNOSIS — M54.2 NECK PAIN: Primary | ICD-10-CM

## 2021-05-28 PROCEDURE — 97110 THERAPEUTIC EXERCISES: CPT | Mod: CQ

## 2021-05-31 ENCOUNTER — CLINICAL SUPPORT (OUTPATIENT)
Dept: REHABILITATION | Facility: HOSPITAL | Age: 66
End: 2021-05-31
Attending: NURSE PRACTITIONER
Payer: MEDICARE

## 2021-05-31 DIAGNOSIS — M54.2 NECK PAIN: Primary | ICD-10-CM

## 2021-05-31 PROCEDURE — 97110 THERAPEUTIC EXERCISES: CPT

## 2021-06-04 ENCOUNTER — CLINICAL SUPPORT (OUTPATIENT)
Dept: REHABILITATION | Facility: HOSPITAL | Age: 66
End: 2021-06-04
Attending: NURSE PRACTITIONER
Payer: MEDICARE

## 2021-06-04 PROCEDURE — 97110 THERAPEUTIC EXERCISES: CPT

## 2021-06-08 ENCOUNTER — OFFICE VISIT (OUTPATIENT)
Dept: OPTOMETRY | Facility: CLINIC | Age: 66
End: 2021-06-08
Payer: MEDICARE

## 2021-06-08 DIAGNOSIS — H52.203 MYOPIA WITH ASTIGMATISM AND PRESBYOPIA, BILATERAL: Primary | ICD-10-CM

## 2021-06-08 DIAGNOSIS — H53.15 VISUAL DISTORTIONS OF SHAPE AND SIZE: ICD-10-CM

## 2021-06-08 DIAGNOSIS — H52.4 MYOPIA WITH ASTIGMATISM AND PRESBYOPIA, BILATERAL: Primary | ICD-10-CM

## 2021-06-08 DIAGNOSIS — H04.123 DRY EYE SYNDROME OF BOTH EYES: ICD-10-CM

## 2021-06-08 DIAGNOSIS — H53.8 BLURRY VISION, RIGHT EYE: ICD-10-CM

## 2021-06-08 DIAGNOSIS — H52.13 MYOPIA WITH ASTIGMATISM AND PRESBYOPIA, BILATERAL: Primary | ICD-10-CM

## 2021-06-08 DIAGNOSIS — H54.61 DECREASED VISION OF RIGHT EYE: ICD-10-CM

## 2021-06-08 PROCEDURE — 92134 CPTRZ OPH DX IMG PST SGM RTA: CPT | Mod: PBBFAC | Performed by: OPTOMETRIST

## 2021-06-08 PROCEDURE — 92134 POSTERIOR SEGMENT OCT RETINA (OCULAR COHERENCE TOMOGRAPHY)-BOTH EYES: ICD-10-PCS | Mod: 26,S$PBB,, | Performed by: OPTOMETRIST

## 2021-06-08 PROCEDURE — 92015 PR REFRACTION: ICD-10-PCS | Mod: ,,, | Performed by: OPTOMETRIST

## 2021-06-08 PROCEDURE — 92015 DETERMINE REFRACTIVE STATE: CPT | Mod: ,,, | Performed by: OPTOMETRIST

## 2021-06-08 PROCEDURE — 92012 INTRM OPH EXAM EST PATIENT: CPT | Mod: S$PBB,,, | Performed by: OPTOMETRIST

## 2021-06-08 PROCEDURE — 92012 PR EYE EXAM, EST PATIENT,INTERMED: ICD-10-PCS | Mod: S$PBB,,, | Performed by: OPTOMETRIST

## 2021-06-08 PROCEDURE — 99999 PR PBB SHADOW E&M-EST. PATIENT-LVL III: ICD-10-PCS | Mod: PBBFAC,,, | Performed by: OPTOMETRIST

## 2021-06-08 PROCEDURE — 99999 PR PBB SHADOW E&M-EST. PATIENT-LVL III: CPT | Mod: PBBFAC,,, | Performed by: OPTOMETRIST

## 2021-06-08 PROCEDURE — 99213 OFFICE O/P EST LOW 20 MIN: CPT | Mod: PBBFAC,25 | Performed by: OPTOMETRIST

## 2021-06-09 ENCOUNTER — PATIENT MESSAGE (OUTPATIENT)
Dept: INTERNAL MEDICINE | Facility: CLINIC | Age: 66
End: 2021-06-09

## 2021-06-10 ENCOUNTER — IMMUNIZATION (OUTPATIENT)
Dept: INTERNAL MEDICINE | Facility: CLINIC | Age: 66
End: 2021-06-10
Payer: MEDICARE

## 2021-06-10 DIAGNOSIS — Z23 NEED FOR VACCINATION: Primary | ICD-10-CM

## 2021-06-10 PROCEDURE — 91300 COVID-19, MRNA, LNP-S, PF, 30 MCG/0.3 ML DOSE VACCINE: CPT | Mod: PBBFAC

## 2021-06-16 ENCOUNTER — CLINICAL SUPPORT (OUTPATIENT)
Dept: REHABILITATION | Facility: HOSPITAL | Age: 66
End: 2021-06-16
Attending: NURSE PRACTITIONER
Payer: MEDICARE

## 2021-06-16 DIAGNOSIS — M54.2 NECK PAIN: Primary | ICD-10-CM

## 2021-06-16 PROCEDURE — 97110 THERAPEUTIC EXERCISES: CPT | Mod: CQ

## 2021-06-24 ENCOUNTER — DOCUMENTATION ONLY (OUTPATIENT)
Dept: REHABILITATION | Facility: HOSPITAL | Age: 66
End: 2021-06-24

## 2021-06-25 ENCOUNTER — CLINICAL SUPPORT (OUTPATIENT)
Dept: REHABILITATION | Facility: HOSPITAL | Age: 66
End: 2021-06-25
Attending: NURSE PRACTITIONER
Payer: MEDICARE

## 2021-06-25 DIAGNOSIS — M54.2 NECK PAIN: Primary | ICD-10-CM

## 2021-06-25 PROCEDURE — 97110 THERAPEUTIC EXERCISES: CPT | Mod: CQ

## 2021-06-30 ENCOUNTER — CLINICAL SUPPORT (OUTPATIENT)
Dept: REHABILITATION | Facility: HOSPITAL | Age: 66
End: 2021-06-30
Payer: MEDICARE

## 2021-06-30 DIAGNOSIS — M54.2 NECK PAIN: Primary | ICD-10-CM

## 2021-06-30 PROCEDURE — 97110 THERAPEUTIC EXERCISES: CPT | Mod: CQ

## 2021-07-01 ENCOUNTER — IMMUNIZATION (OUTPATIENT)
Dept: INTERNAL MEDICINE | Facility: CLINIC | Age: 66
End: 2021-07-01
Payer: MEDICARE

## 2021-07-01 DIAGNOSIS — Z23 NEED FOR VACCINATION: Primary | ICD-10-CM

## 2021-07-01 PROCEDURE — 0002A COVID-19, MRNA, LNP-S, PF, 30 MCG/0.3 ML DOSE VACCINE: CPT | Mod: PBBFAC

## 2021-07-01 PROCEDURE — 91300 COVID-19, MRNA, LNP-S, PF, 30 MCG/0.3 ML DOSE VACCINE: CPT | Mod: PBBFAC

## 2021-07-02 ENCOUNTER — CLINICAL SUPPORT (OUTPATIENT)
Dept: REHABILITATION | Facility: HOSPITAL | Age: 66
End: 2021-07-02
Attending: NURSE PRACTITIONER
Payer: MEDICARE

## 2021-07-05 ENCOUNTER — PATIENT MESSAGE (OUTPATIENT)
Dept: SPINE | Facility: CLINIC | Age: 66
End: 2021-07-05

## 2021-07-07 ENCOUNTER — TELEPHONE (OUTPATIENT)
Dept: PAIN MEDICINE | Facility: OTHER | Age: 66
End: 2021-07-07

## 2021-07-07 DIAGNOSIS — M47.817 LUMBOSACRAL SPONDYLOSIS WITHOUT MYELOPATHY: Primary | ICD-10-CM

## 2021-07-12 ENCOUNTER — OFFICE VISIT (OUTPATIENT)
Dept: PSYCHIATRY | Facility: CLINIC | Age: 66
End: 2021-07-12
Payer: MEDICARE

## 2021-07-12 ENCOUNTER — DOCUMENTATION ONLY (OUTPATIENT)
Dept: REHABILITATION | Facility: HOSPITAL | Age: 66
End: 2021-07-12

## 2021-07-12 VITALS
HEART RATE: 83 BPM | WEIGHT: 180 LBS | SYSTOLIC BLOOD PRESSURE: 139 MMHG | DIASTOLIC BLOOD PRESSURE: 83 MMHG | BODY MASS INDEX: 29.05 KG/M2

## 2021-07-12 DIAGNOSIS — F32.A DEPRESSION, UNSPECIFIED DEPRESSION TYPE: ICD-10-CM

## 2021-07-12 DIAGNOSIS — F41.9 ANXIETY: ICD-10-CM

## 2021-07-12 PROCEDURE — 99213 OFFICE O/P EST LOW 20 MIN: CPT | Mod: PBBFAC | Performed by: NURSE PRACTITIONER

## 2021-07-12 PROCEDURE — 90792 PSYCH DIAG EVAL W/MED SRVCS: CPT | Mod: ,,, | Performed by: NURSE PRACTITIONER

## 2021-07-12 PROCEDURE — 90792 PR PSYCHIATRIC DIAGNOSTIC EVALUATION W/MEDICAL SERVICES: ICD-10-PCS | Mod: ,,, | Performed by: NURSE PRACTITIONER

## 2021-07-12 PROCEDURE — 99999 PR PBB SHADOW E&M-EST. PATIENT-LVL III: ICD-10-PCS | Mod: PBBFAC,,, | Performed by: NURSE PRACTITIONER

## 2021-07-12 PROCEDURE — 99999 PR PBB SHADOW E&M-EST. PATIENT-LVL III: CPT | Mod: PBBFAC,,, | Performed by: NURSE PRACTITIONER

## 2021-07-12 RX ORDER — VENLAFAXINE HYDROCHLORIDE 37.5 MG/1
CAPSULE, EXTENDED RELEASE ORAL
Qty: 53 CAPSULE | Refills: 0 | Status: SHIPPED | OUTPATIENT
Start: 2021-07-12 | End: 2021-08-03

## 2021-07-12 RX ORDER — TRAZODONE HYDROCHLORIDE 50 MG/1
50 TABLET ORAL NIGHTLY PRN
Qty: 30 TABLET | Refills: 2 | Status: SHIPPED | OUTPATIENT
Start: 2021-07-12 | End: 2022-01-21

## 2021-07-13 ENCOUNTER — CLINICAL SUPPORT (OUTPATIENT)
Dept: REHABILITATION | Facility: HOSPITAL | Age: 66
End: 2021-07-13
Attending: NURSE PRACTITIONER
Payer: MEDICARE

## 2021-07-13 DIAGNOSIS — M54.2 NECK PAIN: Primary | ICD-10-CM

## 2021-07-13 PROCEDURE — 97110 THERAPEUTIC EXERCISES: CPT | Mod: CQ

## 2021-07-19 ENCOUNTER — OFFICE VISIT (OUTPATIENT)
Dept: PSYCHIATRY | Facility: CLINIC | Age: 66
End: 2021-07-19
Payer: MEDICARE

## 2021-07-19 DIAGNOSIS — F41.9 ANXIETY: Primary | ICD-10-CM

## 2021-07-19 PROCEDURE — 99999 PR PBB SHADOW E&M-EST. PATIENT-LVL I: ICD-10-PCS | Mod: PBBFAC,,, | Performed by: SOCIAL WORKER

## 2021-07-19 PROCEDURE — 90834 PR PSYCHOTHERAPY W/PATIENT, 45 MIN: ICD-10-PCS | Mod: ,,, | Performed by: SOCIAL WORKER

## 2021-07-19 PROCEDURE — 99211 OFF/OP EST MAY X REQ PHY/QHP: CPT | Mod: PBBFAC | Performed by: SOCIAL WORKER

## 2021-07-19 PROCEDURE — 90834 PSYTX W PT 45 MINUTES: CPT | Mod: ,,, | Performed by: SOCIAL WORKER

## 2021-07-19 PROCEDURE — 99999 PR PBB SHADOW E&M-EST. PATIENT-LVL I: CPT | Mod: PBBFAC,,, | Performed by: SOCIAL WORKER

## 2021-07-20 ENCOUNTER — PATIENT OUTREACH (OUTPATIENT)
Dept: ADMINISTRATIVE | Facility: OTHER | Age: 66
End: 2021-07-20

## 2021-07-21 ENCOUNTER — OFFICE VISIT (OUTPATIENT)
Dept: SPINE | Facility: CLINIC | Age: 66
End: 2021-07-21
Payer: MEDICARE

## 2021-07-21 VITALS
DIASTOLIC BLOOD PRESSURE: 81 MMHG | BODY MASS INDEX: 28.91 KG/M2 | WEIGHT: 179.88 LBS | SYSTOLIC BLOOD PRESSURE: 136 MMHG | HEIGHT: 66 IN | HEART RATE: 73 BPM

## 2021-07-21 DIAGNOSIS — M54.9 DORSALGIA, UNSPECIFIED: Primary | ICD-10-CM

## 2021-07-21 DIAGNOSIS — M47.817 LUMBOSACRAL SPONDYLOSIS WITHOUT MYELOPATHY: ICD-10-CM

## 2021-07-21 PROCEDURE — 99213 OFFICE O/P EST LOW 20 MIN: CPT | Mod: S$PBB,,, | Performed by: NURSE PRACTITIONER

## 2021-07-21 PROCEDURE — 99213 PR OFFICE/OUTPT VISIT, EST, LEVL III, 20-29 MIN: ICD-10-PCS | Mod: S$PBB,,, | Performed by: NURSE PRACTITIONER

## 2021-07-21 PROCEDURE — 99999 PR PBB SHADOW E&M-EST. PATIENT-LVL III: ICD-10-PCS | Mod: PBBFAC,,, | Performed by: NURSE PRACTITIONER

## 2021-07-21 PROCEDURE — 99999 PR PBB SHADOW E&M-EST. PATIENT-LVL III: CPT | Mod: PBBFAC,,, | Performed by: NURSE PRACTITIONER

## 2021-07-21 PROCEDURE — 99213 OFFICE O/P EST LOW 20 MIN: CPT | Mod: PBBFAC | Performed by: NURSE PRACTITIONER

## 2021-07-22 ENCOUNTER — CLINICAL SUPPORT (OUTPATIENT)
Dept: REHABILITATION | Facility: HOSPITAL | Age: 66
End: 2021-07-22
Attending: NURSE PRACTITIONER
Payer: MEDICARE

## 2021-07-22 DIAGNOSIS — M54.2 NECK PAIN: Primary | ICD-10-CM

## 2021-07-22 PROCEDURE — 97110 THERAPEUTIC EXERCISES: CPT | Mod: CQ

## 2021-07-28 ENCOUNTER — CLINICAL SUPPORT (OUTPATIENT)
Dept: REHABILITATION | Facility: HOSPITAL | Age: 66
End: 2021-07-28
Payer: MEDICARE

## 2021-07-28 DIAGNOSIS — M54.2 NECK PAIN: Primary | ICD-10-CM

## 2021-07-28 PROCEDURE — 97110 THERAPEUTIC EXERCISES: CPT | Mod: CQ

## 2021-08-02 ENCOUNTER — DOCUMENTATION ONLY (OUTPATIENT)
Dept: REHABILITATION | Facility: HOSPITAL | Age: 66
End: 2021-08-02

## 2021-08-02 ENCOUNTER — PATIENT MESSAGE (OUTPATIENT)
Dept: PSYCHIATRY | Facility: CLINIC | Age: 66
End: 2021-08-02

## 2021-08-03 ENCOUNTER — CLINICAL SUPPORT (OUTPATIENT)
Dept: REHABILITATION | Facility: HOSPITAL | Age: 66
End: 2021-08-03
Payer: MEDICARE

## 2021-08-03 DIAGNOSIS — M54.2 NECK PAIN: Primary | ICD-10-CM

## 2021-08-03 PROCEDURE — 97110 THERAPEUTIC EXERCISES: CPT | Mod: CQ

## 2021-08-03 RX ORDER — VENLAFAXINE HYDROCHLORIDE 75 MG/1
75 CAPSULE, EXTENDED RELEASE ORAL DAILY
Qty: 30 CAPSULE | Refills: 2 | Status: SHIPPED | OUTPATIENT
Start: 2021-08-03 | End: 2021-11-03 | Stop reason: SDUPTHER

## 2021-08-06 ENCOUNTER — HOSPITAL ENCOUNTER (OUTPATIENT)
Facility: OTHER | Age: 66
Discharge: HOME | End: 2021-08-06
Attending: ANESTHESIOLOGY | Admitting: ANESTHESIOLOGY
Payer: MEDICARE

## 2021-08-06 VITALS
DIASTOLIC BLOOD PRESSURE: 51 MMHG | RESPIRATION RATE: 16 BRPM | HEIGHT: 66 IN | SYSTOLIC BLOOD PRESSURE: 154 MMHG | HEART RATE: 64 BPM | BODY MASS INDEX: 28.93 KG/M2 | TEMPERATURE: 98 F | WEIGHT: 180 LBS | OXYGEN SATURATION: 96 %

## 2021-08-06 DIAGNOSIS — G89.29 CHRONIC PAIN: ICD-10-CM

## 2021-08-06 DIAGNOSIS — M47.816 LUMBAR SPONDYLOSIS: Primary | ICD-10-CM

## 2021-08-06 PROCEDURE — 64494 INJ PARAVERT F JNT L/S 2 LEV: CPT | Mod: 50 | Performed by: ANESTHESIOLOGY

## 2021-08-06 PROCEDURE — 64493 INJ PARAVERT F JNT L/S 1 LEV: CPT | Mod: 50,,, | Performed by: ANESTHESIOLOGY

## 2021-08-06 PROCEDURE — 63600175 PHARM REV CODE 636 W HCPCS: Performed by: ANESTHESIOLOGY

## 2021-08-06 PROCEDURE — 25500020 PHARM REV CODE 255: Performed by: ANESTHESIOLOGY

## 2021-08-06 PROCEDURE — 64494 PR INJ DX/THER AGNT PARAVERT FACET JOINT,IMG GUIDE,LUMBAR/SAC, 2ND LEVEL: ICD-10-PCS | Mod: 50,,, | Performed by: ANESTHESIOLOGY

## 2021-08-06 PROCEDURE — 64493 INJ PARAVERT F JNT L/S 1 LEV: CPT | Mod: 50 | Performed by: ANESTHESIOLOGY

## 2021-08-06 PROCEDURE — 25000003 PHARM REV CODE 250: Performed by: ANESTHESIOLOGY

## 2021-08-06 PROCEDURE — 64494 INJ PARAVERT F JNT L/S 2 LEV: CPT | Mod: 50,,, | Performed by: ANESTHESIOLOGY

## 2021-08-06 PROCEDURE — 64493 PR INJ DX/THER AGNT PARAVERT FACET JOINT,IMG GUIDE,LUMBAR/SAC,1ST LVL: ICD-10-PCS | Mod: 50,,, | Performed by: ANESTHESIOLOGY

## 2021-08-06 RX ORDER — MIDAZOLAM HYDROCHLORIDE 1 MG/ML
INJECTION INTRAMUSCULAR; INTRAVENOUS
Status: DISCONTINUED | OUTPATIENT
Start: 2021-08-06 | End: 2021-08-06 | Stop reason: HOSPADM

## 2021-08-06 RX ORDER — TRIAMCINOLONE ACETONIDE 40 MG/ML
INJECTION, SUSPENSION INTRA-ARTICULAR; INTRAMUSCULAR
Status: DISCONTINUED | OUTPATIENT
Start: 2021-08-06 | End: 2021-08-06 | Stop reason: HOSPADM

## 2021-08-06 RX ORDER — SODIUM CHLORIDE 9 MG/ML
INJECTION, SOLUTION INTRAVENOUS CONTINUOUS
Status: DISCONTINUED | OUTPATIENT
Start: 2021-08-06 | End: 2021-08-06 | Stop reason: HOSPADM

## 2021-08-06 RX ORDER — LIDOCAINE HYDROCHLORIDE 10 MG/ML
INJECTION INFILTRATION; PERINEURAL
Status: DISCONTINUED | OUTPATIENT
Start: 2021-08-06 | End: 2021-08-06 | Stop reason: HOSPADM

## 2021-08-06 RX ORDER — FENTANYL CITRATE 50 UG/ML
INJECTION, SOLUTION INTRAMUSCULAR; INTRAVENOUS
Status: DISCONTINUED | OUTPATIENT
Start: 2021-08-06 | End: 2021-08-06 | Stop reason: HOSPADM

## 2021-08-06 RX ORDER — BUPIVACAINE HYDROCHLORIDE 2.5 MG/ML
INJECTION, SOLUTION EPIDURAL; INFILTRATION; INTRACAUDAL
Status: DISCONTINUED | OUTPATIENT
Start: 2021-08-06 | End: 2021-08-06 | Stop reason: HOSPADM

## 2021-08-09 ENCOUNTER — TELEPHONE (OUTPATIENT)
Dept: PAIN MEDICINE | Facility: CLINIC | Age: 66
End: 2021-08-09

## 2021-08-12 ENCOUNTER — CLINICAL SUPPORT (OUTPATIENT)
Dept: REHABILITATION | Facility: HOSPITAL | Age: 66
End: 2021-08-12
Payer: MEDICARE

## 2021-08-16 ENCOUNTER — OFFICE VISIT (OUTPATIENT)
Dept: PSYCHIATRY | Facility: CLINIC | Age: 66
End: 2021-08-16
Payer: MEDICARE

## 2021-08-16 DIAGNOSIS — F41.9 ANXIETY: ICD-10-CM

## 2021-08-16 DIAGNOSIS — F32.A DEPRESSION, UNSPECIFIED DEPRESSION TYPE: Primary | ICD-10-CM

## 2021-08-16 PROCEDURE — 99214 OFFICE O/P EST MOD 30 MIN: CPT | Mod: 95,,, | Performed by: NURSE PRACTITIONER

## 2021-08-16 PROCEDURE — 99214 PR OFFICE/OUTPT VISIT, EST, LEVL IV, 30-39 MIN: ICD-10-PCS | Mod: 95,,, | Performed by: NURSE PRACTITIONER

## 2021-08-16 RX ORDER — VENLAFAXINE HYDROCHLORIDE 37.5 MG/1
37.5 CAPSULE, EXTENDED RELEASE ORAL DAILY
Qty: 30 CAPSULE | Refills: 2 | Status: SHIPPED | OUTPATIENT
Start: 2021-08-16 | End: 2022-01-31 | Stop reason: SDUPTHER

## 2021-08-25 ENCOUNTER — OFFICE VISIT (OUTPATIENT)
Dept: SPINE | Facility: CLINIC | Age: 66
End: 2021-08-25
Payer: MEDICARE

## 2021-08-25 VITALS
HEIGHT: 66 IN | DIASTOLIC BLOOD PRESSURE: 87 MMHG | SYSTOLIC BLOOD PRESSURE: 157 MMHG | HEART RATE: 78 BPM | BODY MASS INDEX: 28.91 KG/M2 | WEIGHT: 179.88 LBS

## 2021-08-25 DIAGNOSIS — M54.9 DORSALGIA, UNSPECIFIED: ICD-10-CM

## 2021-08-25 DIAGNOSIS — M47.817 LUMBOSACRAL SPONDYLOSIS WITHOUT MYELOPATHY: Primary | ICD-10-CM

## 2021-08-25 PROCEDURE — 99213 OFFICE O/P EST LOW 20 MIN: CPT | Mod: PBBFAC | Performed by: NURSE PRACTITIONER

## 2021-08-25 PROCEDURE — 99999 PR PBB SHADOW E&M-EST. PATIENT-LVL III: ICD-10-PCS | Mod: PBBFAC,,, | Performed by: NURSE PRACTITIONER

## 2021-08-25 PROCEDURE — 99214 OFFICE O/P EST MOD 30 MIN: CPT | Mod: S$PBB,,, | Performed by: NURSE PRACTITIONER

## 2021-08-25 PROCEDURE — 99214 PR OFFICE/OUTPT VISIT, EST, LEVL IV, 30-39 MIN: ICD-10-PCS | Mod: S$PBB,,, | Performed by: NURSE PRACTITIONER

## 2021-08-25 PROCEDURE — 99999 PR PBB SHADOW E&M-EST. PATIENT-LVL III: CPT | Mod: PBBFAC,,, | Performed by: NURSE PRACTITIONER

## 2021-09-01 ENCOUNTER — PATIENT MESSAGE (OUTPATIENT)
Dept: INTERNAL MEDICINE | Facility: CLINIC | Age: 66
End: 2021-09-01

## 2021-09-01 DIAGNOSIS — I10 ESSENTIAL HYPERTENSION: ICD-10-CM

## 2021-09-01 RX ORDER — LISINOPRIL 10 MG/1
10 TABLET ORAL DAILY
Qty: 90 TABLET | Refills: 1 | Status: SHIPPED | OUTPATIENT
Start: 2021-09-01 | End: 2022-03-09 | Stop reason: SDUPTHER

## 2021-09-15 ENCOUNTER — TELEPHONE (OUTPATIENT)
Dept: PAIN MEDICINE | Facility: CLINIC | Age: 66
End: 2021-09-15

## 2021-09-15 ENCOUNTER — PATIENT MESSAGE (OUTPATIENT)
Dept: PAIN MEDICINE | Facility: CLINIC | Age: 66
End: 2021-09-15

## 2021-09-15 DIAGNOSIS — M77.11 LATERAL EPICONDYLITIS OF RIGHT ELBOW: Primary | ICD-10-CM

## 2021-09-16 ENCOUNTER — OFFICE VISIT (OUTPATIENT)
Dept: PSYCHIATRY | Facility: CLINIC | Age: 66
End: 2021-09-16
Payer: MEDICARE

## 2021-09-16 ENCOUNTER — OFFICE VISIT (OUTPATIENT)
Dept: PAIN MEDICINE | Facility: CLINIC | Age: 66
End: 2021-09-16
Payer: MEDICARE

## 2021-09-16 VITALS
RESPIRATION RATE: 18 BRPM | SYSTOLIC BLOOD PRESSURE: 148 MMHG | HEART RATE: 71 BPM | TEMPERATURE: 98 F | BODY MASS INDEX: 26.52 KG/M2 | HEIGHT: 66 IN | WEIGHT: 165 LBS | DIASTOLIC BLOOD PRESSURE: 94 MMHG

## 2021-09-16 DIAGNOSIS — M77.11 LATERAL EPICONDYLITIS OF RIGHT ELBOW: Primary | ICD-10-CM

## 2021-09-16 DIAGNOSIS — F41.9 ANXIETY: ICD-10-CM

## 2021-09-16 DIAGNOSIS — F32.A DEPRESSION, UNSPECIFIED DEPRESSION TYPE: Primary | ICD-10-CM

## 2021-09-16 PROCEDURE — 99214 PR OFFICE/OUTPT VISIT, EST, LEVL IV, 30-39 MIN: ICD-10-PCS | Mod: 95,,, | Performed by: NURSE PRACTITIONER

## 2021-09-16 PROCEDURE — 99204 PR OFFICE/OUTPT VISIT, NEW, LEVL IV, 45-59 MIN: ICD-10-PCS | Mod: S$PBB,,, | Performed by: ANESTHESIOLOGY

## 2021-09-16 PROCEDURE — 99999 PR PBB SHADOW E&M-EST. PATIENT-LVL III: ICD-10-PCS | Mod: PBBFAC,,, | Performed by: ANESTHESIOLOGY

## 2021-09-16 PROCEDURE — 99999 PR PBB SHADOW E&M-EST. PATIENT-LVL III: CPT | Mod: PBBFAC,,, | Performed by: ANESTHESIOLOGY

## 2021-09-16 PROCEDURE — 99213 OFFICE O/P EST LOW 20 MIN: CPT | Mod: PBBFAC | Performed by: ANESTHESIOLOGY

## 2021-09-16 PROCEDURE — 99204 OFFICE O/P NEW MOD 45 MIN: CPT | Mod: S$PBB,,, | Performed by: ANESTHESIOLOGY

## 2021-09-16 PROCEDURE — 99214 OFFICE O/P EST MOD 30 MIN: CPT | Mod: 95,,, | Performed by: NURSE PRACTITIONER

## 2021-09-20 ENCOUNTER — OFFICE VISIT (OUTPATIENT)
Dept: PSYCHIATRY | Facility: CLINIC | Age: 66
End: 2021-09-20
Payer: MEDICARE

## 2021-09-20 DIAGNOSIS — F41.1 GAD (GENERALIZED ANXIETY DISORDER): Primary | ICD-10-CM

## 2021-09-20 DIAGNOSIS — F32.A DEPRESSION, UNSPECIFIED DEPRESSION TYPE: ICD-10-CM

## 2021-09-20 PROCEDURE — 99211 OFF/OP EST MAY X REQ PHY/QHP: CPT | Mod: PBBFAC | Performed by: SOCIAL WORKER

## 2021-09-20 PROCEDURE — 90834 PSYTX W PT 45 MINUTES: CPT | Mod: ,,, | Performed by: SOCIAL WORKER

## 2021-09-20 PROCEDURE — 99999 PR PBB SHADOW E&M-EST. PATIENT-LVL I: CPT | Mod: PBBFAC,,, | Performed by: SOCIAL WORKER

## 2021-09-20 PROCEDURE — 90834 PR PSYCHOTHERAPY W/PATIENT, 45 MIN: ICD-10-PCS | Mod: ,,, | Performed by: SOCIAL WORKER

## 2021-09-20 PROCEDURE — 99999 PR PBB SHADOW E&M-EST. PATIENT-LVL I: ICD-10-PCS | Mod: PBBFAC,,, | Performed by: SOCIAL WORKER

## 2021-09-22 ENCOUNTER — TELEPHONE (OUTPATIENT)
Dept: PAIN MEDICINE | Facility: CLINIC | Age: 66
End: 2021-09-22

## 2021-09-23 ENCOUNTER — TELEPHONE (OUTPATIENT)
Dept: PAIN MEDICINE | Facility: CLINIC | Age: 66
End: 2021-09-23

## 2021-09-24 ENCOUNTER — PROCEDURE VISIT (OUTPATIENT)
Dept: PAIN MEDICINE | Facility: CLINIC | Age: 66
End: 2021-09-24
Payer: MEDICARE

## 2021-09-24 VITALS
RESPIRATION RATE: 19 BRPM | DIASTOLIC BLOOD PRESSURE: 93 MMHG | HEIGHT: 66 IN | TEMPERATURE: 98 F | WEIGHT: 167 LBS | SYSTOLIC BLOOD PRESSURE: 144 MMHG | HEART RATE: 68 BPM | BODY MASS INDEX: 26.84 KG/M2

## 2021-09-24 DIAGNOSIS — M77.11 LATERAL EPICONDYLITIS OF RIGHT ELBOW: Primary | ICD-10-CM

## 2021-09-29 ENCOUNTER — OFFICE VISIT (OUTPATIENT)
Dept: PSYCHIATRY | Facility: CLINIC | Age: 66
End: 2021-09-29
Payer: MEDICARE

## 2021-09-29 DIAGNOSIS — F41.1 GAD (GENERALIZED ANXIETY DISORDER): Primary | ICD-10-CM

## 2021-09-29 PROCEDURE — 90834 PR PSYCHOTHERAPY W/PATIENT, 45 MIN: ICD-10-PCS | Mod: ,,, | Performed by: SOCIAL WORKER

## 2021-09-29 PROCEDURE — 99999 PR PBB SHADOW E&M-EST. PATIENT-LVL I: CPT | Mod: PBBFAC,,, | Performed by: SOCIAL WORKER

## 2021-09-29 PROCEDURE — 99211 OFF/OP EST MAY X REQ PHY/QHP: CPT | Mod: PBBFAC | Performed by: SOCIAL WORKER

## 2021-09-29 PROCEDURE — 90834 PSYTX W PT 45 MINUTES: CPT | Mod: ,,, | Performed by: SOCIAL WORKER

## 2021-09-29 PROCEDURE — 99999 PR PBB SHADOW E&M-EST. PATIENT-LVL I: ICD-10-PCS | Mod: PBBFAC,,, | Performed by: SOCIAL WORKER

## 2021-10-05 ENCOUNTER — OFFICE VISIT (OUTPATIENT)
Dept: PSYCHIATRY | Facility: CLINIC | Age: 66
End: 2021-10-05
Payer: MEDICARE

## 2021-10-05 DIAGNOSIS — F41.1 GAD (GENERALIZED ANXIETY DISORDER): Primary | ICD-10-CM

## 2021-10-05 PROCEDURE — 99999 PR PBB SHADOW E&M-EST. PATIENT-LVL I: CPT | Mod: PBBFAC,,, | Performed by: SOCIAL WORKER

## 2021-10-05 PROCEDURE — 99211 OFF/OP EST MAY X REQ PHY/QHP: CPT | Mod: PBBFAC | Performed by: SOCIAL WORKER

## 2021-10-05 PROCEDURE — 99999 PR PBB SHADOW E&M-EST. PATIENT-LVL I: ICD-10-PCS | Mod: PBBFAC,,, | Performed by: SOCIAL WORKER

## 2021-10-05 PROCEDURE — 90834 PR PSYCHOTHERAPY W/PATIENT, 45 MIN: ICD-10-PCS | Mod: ,,, | Performed by: SOCIAL WORKER

## 2021-10-05 PROCEDURE — 90834 PSYTX W PT 45 MINUTES: CPT | Mod: ,,, | Performed by: SOCIAL WORKER

## 2021-10-12 ENCOUNTER — OFFICE VISIT (OUTPATIENT)
Dept: PSYCHIATRY | Facility: CLINIC | Age: 66
End: 2021-10-12
Payer: MEDICARE

## 2021-10-12 DIAGNOSIS — F41.1 GAD (GENERALIZED ANXIETY DISORDER): Primary | ICD-10-CM

## 2021-10-12 PROCEDURE — 99999 PR PBB SHADOW E&M-EST. PATIENT-LVL I: CPT | Mod: PBBFAC,,, | Performed by: SOCIAL WORKER

## 2021-10-12 PROCEDURE — 99211 OFF/OP EST MAY X REQ PHY/QHP: CPT | Mod: PBBFAC | Performed by: SOCIAL WORKER

## 2021-10-12 PROCEDURE — 90834 PR PSYCHOTHERAPY W/PATIENT, 45 MIN: ICD-10-PCS | Mod: ,,, | Performed by: SOCIAL WORKER

## 2021-10-12 PROCEDURE — 90834 PSYTX W PT 45 MINUTES: CPT | Mod: ,,, | Performed by: SOCIAL WORKER

## 2021-10-12 PROCEDURE — 99999 PR PBB SHADOW E&M-EST. PATIENT-LVL I: ICD-10-PCS | Mod: PBBFAC,,, | Performed by: SOCIAL WORKER

## 2021-10-19 ENCOUNTER — OFFICE VISIT (OUTPATIENT)
Dept: PSYCHIATRY | Facility: CLINIC | Age: 66
End: 2021-10-19
Payer: MEDICARE

## 2021-10-19 DIAGNOSIS — F41.1 GAD (GENERALIZED ANXIETY DISORDER): Primary | ICD-10-CM

## 2021-10-19 PROCEDURE — 90834 PSYTX W PT 45 MINUTES: CPT | Mod: ,,, | Performed by: SOCIAL WORKER

## 2021-10-19 PROCEDURE — 99211 OFF/OP EST MAY X REQ PHY/QHP: CPT | Mod: PBBFAC | Performed by: SOCIAL WORKER

## 2021-10-19 PROCEDURE — 99999 PR PBB SHADOW E&M-EST. PATIENT-LVL I: CPT | Mod: PBBFAC,,, | Performed by: SOCIAL WORKER

## 2021-10-19 PROCEDURE — 99999 PR PBB SHADOW E&M-EST. PATIENT-LVL I: ICD-10-PCS | Mod: PBBFAC,,, | Performed by: SOCIAL WORKER

## 2021-10-19 PROCEDURE — 90834 PR PSYCHOTHERAPY W/PATIENT, 45 MIN: ICD-10-PCS | Mod: ,,, | Performed by: SOCIAL WORKER

## 2021-10-21 ENCOUNTER — PATIENT MESSAGE (OUTPATIENT)
Dept: PAIN MEDICINE | Facility: CLINIC | Age: 66
End: 2021-10-21
Payer: MEDICARE

## 2021-10-22 ENCOUNTER — OFFICE VISIT (OUTPATIENT)
Dept: PAIN MEDICINE | Facility: CLINIC | Age: 66
End: 2021-10-22
Payer: MEDICARE

## 2021-10-22 DIAGNOSIS — M54.12 CERVICAL RADICULOPATHY: Primary | ICD-10-CM

## 2021-10-22 PROCEDURE — 99214 PR OFFICE/OUTPT VISIT, EST, LEVL IV, 30-39 MIN: ICD-10-PCS | Mod: 95,,, | Performed by: ANESTHESIOLOGY

## 2021-10-22 PROCEDURE — 99214 OFFICE O/P EST MOD 30 MIN: CPT | Mod: 95,,, | Performed by: ANESTHESIOLOGY

## 2021-10-26 ENCOUNTER — OFFICE VISIT (OUTPATIENT)
Dept: PSYCHIATRY | Facility: CLINIC | Age: 66
End: 2021-10-26
Payer: MEDICARE

## 2021-10-26 DIAGNOSIS — F41.1 GAD (GENERALIZED ANXIETY DISORDER): Primary | ICD-10-CM

## 2021-10-26 PROCEDURE — 99999 PR PBB SHADOW E&M-EST. PATIENT-LVL I: ICD-10-PCS | Mod: PBBFAC,,, | Performed by: SOCIAL WORKER

## 2021-10-26 PROCEDURE — 90834 PSYTX W PT 45 MINUTES: CPT | Mod: ,,, | Performed by: SOCIAL WORKER

## 2021-10-26 PROCEDURE — 90834 PR PSYCHOTHERAPY W/PATIENT, 45 MIN: ICD-10-PCS | Mod: ,,, | Performed by: SOCIAL WORKER

## 2021-10-26 PROCEDURE — 99999 PR PBB SHADOW E&M-EST. PATIENT-LVL I: CPT | Mod: PBBFAC,,, | Performed by: SOCIAL WORKER

## 2021-10-26 PROCEDURE — 99211 OFF/OP EST MAY X REQ PHY/QHP: CPT | Mod: PBBFAC | Performed by: SOCIAL WORKER

## 2021-10-28 ENCOUNTER — PATIENT MESSAGE (OUTPATIENT)
Dept: PAIN MEDICINE | Facility: OTHER | Age: 66
End: 2021-10-28
Payer: MEDICARE

## 2021-11-04 ENCOUNTER — HOSPITAL ENCOUNTER (OUTPATIENT)
Facility: OTHER | Age: 66
Discharge: HOME | End: 2021-11-04
Attending: ANESTHESIOLOGY | Admitting: ANESTHESIOLOGY
Payer: MEDICARE

## 2021-11-04 VITALS
HEIGHT: 66 IN | OXYGEN SATURATION: 99 % | HEART RATE: 69 BPM | SYSTOLIC BLOOD PRESSURE: 160 MMHG | WEIGHT: 160 LBS | BODY MASS INDEX: 25.71 KG/M2 | DIASTOLIC BLOOD PRESSURE: 83 MMHG | RESPIRATION RATE: 16 BRPM

## 2021-11-04 DIAGNOSIS — M54.12 CERVICAL RADICULOPATHY AT C7: Primary | ICD-10-CM

## 2021-11-04 DIAGNOSIS — G89.29 CHRONIC PAIN: ICD-10-CM

## 2021-11-04 PROCEDURE — 62321 PR INJ CERV/THORAC, W/GUIDANCE: ICD-10-PCS | Mod: ,,, | Performed by: ANESTHESIOLOGY

## 2021-11-04 PROCEDURE — 25000003 PHARM REV CODE 250: Performed by: ANESTHESIOLOGY

## 2021-11-04 PROCEDURE — 25500020 PHARM REV CODE 255: Performed by: ANESTHESIOLOGY

## 2021-11-04 PROCEDURE — 25000003 PHARM REV CODE 250: Performed by: STUDENT IN AN ORGANIZED HEALTH CARE EDUCATION/TRAINING PROGRAM

## 2021-11-04 PROCEDURE — A4216 STERILE WATER/SALINE, 10 ML: HCPCS | Performed by: ANESTHESIOLOGY

## 2021-11-04 PROCEDURE — 63600175 PHARM REV CODE 636 W HCPCS: Performed by: ANESTHESIOLOGY

## 2021-11-04 PROCEDURE — 62321 NJX INTERLAMINAR CRV/THRC: CPT | Performed by: ANESTHESIOLOGY

## 2021-11-04 PROCEDURE — 62321 NJX INTERLAMINAR CRV/THRC: CPT | Mod: ,,, | Performed by: ANESTHESIOLOGY

## 2021-11-04 RX ORDER — SODIUM CHLORIDE 9 MG/ML
INJECTION, SOLUTION INTRAVENOUS CONTINUOUS
Status: DISCONTINUED | OUTPATIENT
Start: 2021-11-04 | End: 2021-11-04 | Stop reason: HOSPADM

## 2021-11-04 RX ORDER — LIDOCAINE HYDROCHLORIDE 10 MG/ML
INJECTION, SOLUTION EPIDURAL; INFILTRATION; INTRACAUDAL; PERINEURAL
Status: DISCONTINUED | OUTPATIENT
Start: 2021-11-04 | End: 2021-11-04 | Stop reason: HOSPADM

## 2021-11-04 RX ORDER — DEXAMETHASONE SODIUM PHOSPHATE 10 MG/ML
INJECTION INTRAMUSCULAR; INTRAVENOUS
Status: DISCONTINUED | OUTPATIENT
Start: 2021-11-04 | End: 2021-11-04 | Stop reason: HOSPADM

## 2021-11-04 RX ORDER — FENTANYL CITRATE 50 UG/ML
INJECTION, SOLUTION INTRAMUSCULAR; INTRAVENOUS
Status: DISCONTINUED | OUTPATIENT
Start: 2021-11-04 | End: 2021-11-04 | Stop reason: HOSPADM

## 2021-11-04 RX ORDER — SODIUM CHLORIDE 9 MG/ML
INJECTION, SOLUTION INTRAMUSCULAR; INTRAVENOUS; SUBCUTANEOUS
Status: DISCONTINUED | OUTPATIENT
Start: 2021-11-04 | End: 2021-11-04 | Stop reason: HOSPADM

## 2021-11-04 RX ORDER — LIDOCAINE HYDROCHLORIDE 20 MG/ML
INJECTION, SOLUTION INFILTRATION; PERINEURAL
Status: DISCONTINUED | OUTPATIENT
Start: 2021-11-04 | End: 2021-11-04 | Stop reason: HOSPADM

## 2021-11-04 RX ORDER — MIDAZOLAM HYDROCHLORIDE 1 MG/ML
INJECTION INTRAMUSCULAR; INTRAVENOUS
Status: DISCONTINUED | OUTPATIENT
Start: 2021-11-04 | End: 2021-11-04 | Stop reason: HOSPADM

## 2021-11-15 ENCOUNTER — OFFICE VISIT (OUTPATIENT)
Dept: PSYCHIATRY | Facility: CLINIC | Age: 66
End: 2021-11-15
Payer: MEDICARE

## 2021-11-15 DIAGNOSIS — F41.1 GAD (GENERALIZED ANXIETY DISORDER): Primary | ICD-10-CM

## 2021-11-15 PROCEDURE — 99211 OFF/OP EST MAY X REQ PHY/QHP: CPT | Mod: PBBFAC | Performed by: SOCIAL WORKER

## 2021-11-15 PROCEDURE — 99999 PR PBB SHADOW E&M-EST. PATIENT-LVL I: ICD-10-PCS | Mod: PBBFAC,,, | Performed by: SOCIAL WORKER

## 2021-11-15 PROCEDURE — 90834 PR PSYCHOTHERAPY W/PATIENT, 45 MIN: ICD-10-PCS | Mod: ,,, | Performed by: SOCIAL WORKER

## 2021-11-15 PROCEDURE — 99999 PR PBB SHADOW E&M-EST. PATIENT-LVL I: CPT | Mod: PBBFAC,,, | Performed by: SOCIAL WORKER

## 2021-11-15 PROCEDURE — 90834 PSYTX W PT 45 MINUTES: CPT | Mod: ,,, | Performed by: SOCIAL WORKER

## 2021-11-19 ENCOUNTER — TELEPHONE (OUTPATIENT)
Dept: PAIN MEDICINE | Facility: CLINIC | Age: 66
End: 2021-11-19
Payer: MEDICARE

## 2021-11-22 ENCOUNTER — OFFICE VISIT (OUTPATIENT)
Dept: PSYCHIATRY | Facility: CLINIC | Age: 66
End: 2021-11-22
Payer: MEDICARE

## 2021-11-22 ENCOUNTER — OFFICE VISIT (OUTPATIENT)
Dept: PAIN MEDICINE | Facility: CLINIC | Age: 66
End: 2021-11-22
Payer: MEDICARE

## 2021-11-22 DIAGNOSIS — F32.A DEPRESSION, UNSPECIFIED DEPRESSION TYPE: ICD-10-CM

## 2021-11-22 DIAGNOSIS — M54.12 CERVICAL RADICULOPATHY AT C7: ICD-10-CM

## 2021-11-22 DIAGNOSIS — F41.1 GAD (GENERALIZED ANXIETY DISORDER): Primary | ICD-10-CM

## 2021-11-22 DIAGNOSIS — M54.2 NECK PAIN: Primary | ICD-10-CM

## 2021-11-22 PROCEDURE — 99999 PR PBB SHADOW E&M-EST. PATIENT-LVL II: CPT | Mod: PBBFAC,,, | Performed by: SOCIAL WORKER

## 2021-11-22 PROCEDURE — 99213 PR OFFICE/OUTPT VISIT, EST, LEVL III, 20-29 MIN: ICD-10-PCS | Mod: 95,,, | Performed by: ANESTHESIOLOGY

## 2021-11-22 PROCEDURE — 90832 PR PSYCHOTHERAPY W/PATIENT, 30 MIN: ICD-10-PCS | Mod: ,,, | Performed by: SOCIAL WORKER

## 2021-11-22 PROCEDURE — 99999 PR PBB SHADOW E&M-EST. PATIENT-LVL II: ICD-10-PCS | Mod: PBBFAC,,, | Performed by: SOCIAL WORKER

## 2021-11-22 PROCEDURE — 99213 OFFICE O/P EST LOW 20 MIN: CPT | Mod: 95,,, | Performed by: ANESTHESIOLOGY

## 2021-11-22 PROCEDURE — 90832 PSYTX W PT 30 MINUTES: CPT | Mod: ,,, | Performed by: SOCIAL WORKER

## 2021-11-22 PROCEDURE — 99212 OFFICE O/P EST SF 10 MIN: CPT | Mod: PBBFAC | Performed by: SOCIAL WORKER

## 2021-11-29 ENCOUNTER — PATIENT MESSAGE (OUTPATIENT)
Dept: PSYCHIATRY | Facility: CLINIC | Age: 66
End: 2021-11-29
Payer: MEDICARE

## 2021-12-06 ENCOUNTER — PATIENT MESSAGE (OUTPATIENT)
Dept: URGENT CARE | Facility: CLINIC | Age: 66
End: 2021-12-06

## 2021-12-06 ENCOUNTER — OFFICE VISIT (OUTPATIENT)
Dept: URGENT CARE | Facility: CLINIC | Age: 66
End: 2021-12-06
Payer: MEDICARE

## 2021-12-06 VITALS
DIASTOLIC BLOOD PRESSURE: 98 MMHG | SYSTOLIC BLOOD PRESSURE: 153 MMHG | RESPIRATION RATE: 20 BRPM | HEIGHT: 66 IN | HEART RATE: 85 BPM | TEMPERATURE: 98 F | OXYGEN SATURATION: 98 % | WEIGHT: 160 LBS | BODY MASS INDEX: 25.71 KG/M2

## 2021-12-06 DIAGNOSIS — R11.2 NAUSEA AND VOMITING, INTRACTABILITY OF VOMITING NOT SPECIFIED, UNSPECIFIED VOMITING TYPE: ICD-10-CM

## 2021-12-06 DIAGNOSIS — T50.905A MEDICATION SIDE EFFECT, INITIAL ENCOUNTER: Primary | ICD-10-CM

## 2021-12-06 LAB
BILIRUB UR QL STRIP: NEGATIVE
GLUCOSE UR QL STRIP: NEGATIVE
KETONES UR QL STRIP: POSITIVE
LEUKOCYTE ESTERASE UR QL STRIP: NEGATIVE
PH, POC UA: 7 (ref 5–8)
POC BLOOD, URINE: NEGATIVE
POC NITRATES, URINE: NEGATIVE
PROT UR QL STRIP: NEGATIVE
SP GR UR STRIP: 1.01 (ref 1–1.03)
UROBILINOGEN UR STRIP-ACNC: NORMAL (ref 0.3–2.2)

## 2021-12-06 PROCEDURE — 96372 PR INJECTION,THERAP/PROPH/DIAG2ST, IM OR SUBCUT: ICD-10-PCS | Mod: S$GLB,,, | Performed by: FAMILY MEDICINE

## 2021-12-06 PROCEDURE — 81003 POCT URINALYSIS, DIPSTICK, AUTOMATED, W/O SCOPE: ICD-10-PCS | Mod: QW,S$GLB,, | Performed by: FAMILY MEDICINE

## 2021-12-06 PROCEDURE — 96372 THER/PROPH/DIAG INJ SC/IM: CPT | Mod: S$GLB,,, | Performed by: FAMILY MEDICINE

## 2021-12-06 PROCEDURE — 81003 URINALYSIS AUTO W/O SCOPE: CPT | Mod: QW,S$GLB,, | Performed by: FAMILY MEDICINE

## 2021-12-06 PROCEDURE — 99214 OFFICE O/P EST MOD 30 MIN: CPT | Mod: 25,S$GLB,, | Performed by: FAMILY MEDICINE

## 2021-12-06 PROCEDURE — 99214 PR OFFICE/OUTPT VISIT, EST, LEVL IV, 30-39 MIN: ICD-10-PCS | Mod: 25,S$GLB,, | Performed by: FAMILY MEDICINE

## 2021-12-06 RX ORDER — ONDANSETRON 2 MG/ML
4 INJECTION INTRAMUSCULAR; INTRAVENOUS
Status: COMPLETED | OUTPATIENT
Start: 2021-12-06 | End: 2021-12-06

## 2021-12-06 RX ORDER — AMOXICILLIN 500 MG/1
500 CAPSULE ORAL 3 TIMES DAILY
COMMUNITY
Start: 2021-10-18 | End: 2022-02-07

## 2021-12-06 RX ORDER — OXYCODONE AND ACETAMINOPHEN 10; 325 MG/1; MG/1
1 TABLET ORAL 4 TIMES DAILY PRN
COMMUNITY
Start: 2021-10-18 | End: 2022-02-07

## 2021-12-06 RX ORDER — ONDANSETRON 4 MG/1
4 TABLET, FILM COATED ORAL EVERY 8 HOURS PRN
Qty: 12 TABLET | Refills: 0 | Status: SHIPPED | OUTPATIENT
Start: 2021-12-06 | End: 2022-02-07

## 2021-12-06 RX ADMIN — ONDANSETRON 4 MG: 2 INJECTION INTRAMUSCULAR; INTRAVENOUS at 12:12

## 2021-12-14 ENCOUNTER — CLINICAL SUPPORT (OUTPATIENT)
Dept: REHABILITATION | Facility: HOSPITAL | Age: 66
End: 2021-12-14
Payer: MEDICARE

## 2021-12-14 DIAGNOSIS — M54.12 CERVICAL RADICULOPATHY AT C7: ICD-10-CM

## 2021-12-14 PROCEDURE — 97110 THERAPEUTIC EXERCISES: CPT | Mod: KX

## 2021-12-14 PROCEDURE — 97161 PT EVAL LOW COMPLEX 20 MIN: CPT | Mod: KX

## 2021-12-20 ENCOUNTER — PATIENT MESSAGE (OUTPATIENT)
Dept: PSYCHIATRY | Facility: CLINIC | Age: 66
End: 2021-12-20
Payer: MEDICARE

## 2022-01-04 ENCOUNTER — OFFICE VISIT (OUTPATIENT)
Dept: PSYCHIATRY | Facility: CLINIC | Age: 67
End: 2022-01-04
Payer: MEDICARE

## 2022-01-04 DIAGNOSIS — F32.A DEPRESSION, UNSPECIFIED DEPRESSION TYPE: ICD-10-CM

## 2022-01-04 DIAGNOSIS — F41.1 GAD (GENERALIZED ANXIETY DISORDER): Primary | ICD-10-CM

## 2022-01-04 PROCEDURE — 90834 PR PSYCHOTHERAPY W/PATIENT, 45 MIN: ICD-10-PCS | Mod: ,,, | Performed by: SOCIAL WORKER

## 2022-01-04 PROCEDURE — 99999 PR PBB SHADOW E&M-EST. PATIENT-LVL I: CPT | Mod: PBBFAC,,, | Performed by: SOCIAL WORKER

## 2022-01-04 PROCEDURE — 99211 OFF/OP EST MAY X REQ PHY/QHP: CPT | Mod: PBBFAC | Performed by: SOCIAL WORKER

## 2022-01-04 PROCEDURE — 99999 PR PBB SHADOW E&M-EST. PATIENT-LVL I: ICD-10-PCS | Mod: PBBFAC,,, | Performed by: SOCIAL WORKER

## 2022-01-04 PROCEDURE — 90834 PSYTX W PT 45 MINUTES: CPT | Mod: ,,, | Performed by: SOCIAL WORKER

## 2022-01-05 ENCOUNTER — CLINICAL SUPPORT (OUTPATIENT)
Dept: REHABILITATION | Facility: HOSPITAL | Age: 67
End: 2022-01-05
Payer: MEDICARE

## 2022-01-05 DIAGNOSIS — M54.12 CERVICAL RADICULOPATHY AT C7: ICD-10-CM

## 2022-01-05 DIAGNOSIS — M54.2 NECK PAIN: Primary | ICD-10-CM

## 2022-01-05 DIAGNOSIS — G89.29 OTHER CHRONIC PAIN: ICD-10-CM

## 2022-01-05 PROCEDURE — 97110 THERAPEUTIC EXERCISES: CPT

## 2022-01-05 PROCEDURE — 97010 HOT OR COLD PACKS THERAPY: CPT

## 2022-01-05 NOTE — PROGRESS NOTES
"[facility]   Physical Therapy Treatment Note     Name: Ladarius Solis  Clinic Number: [id number]    Therapy Diagnosis:   Encounter Diagnoses   Name Primary?    Neck pain Yes    Cervical radiculopathy at C7     Other chronic pain      Physician: Brielle Jacobs, *    Visit Date: 1/5/2022    Physician Orders: PT Eval and Treat   Medical Diagnosis from Referral:   M54.12 (ICD-10-CM) - Cervical radiculopathy at C7   M77.11 (ICD-10-CM) - Lateral epicondylitis of right elbow   Evaluation Date: 12/14/2021  Authorization Period Expiration: 6/1/2022  Plan of Care Expiration: 2/14/2022  Progress Note Due: 1/14/2022  Visit # / Visits authorized: 1/1, 1/20   FOTO: 0/5     Precautions: Standard and HTN         PTA Visit #: 0/5     Time In: 145  Time Out: 245  Total Billable Time: 60 minutes    SUBJECTIVE     Pt reports: that his pain has been constant recently, making sleep difficult for him. He states that he feels like he is very stiff. He reports improved symptoms after treatment session aside from one instance of his back "locking up".  He was somewhat compliant with home exercise program.  Response to previous treatment: no adverse effects  Functional change: none noted    Pain: 10/10  Location: bilateral back      OBJECTIVE     Objective Measures updated at progress report unless specified.     Treatment     Ladarius received the treatments listed below:      therapeutic exercises to develop strength, endurance, ROM, flexibility, posture and core stabilization for 50 minutes including:  Recumbent bike 5 minutes (VCs for pacing and ROM)  Seated hamstring stretch 3x30s BLE  Upper trap stretch 2x30s B  LTRs 3 minutes  Bridges 3x10  Rows red theraband 2x10  Shoulder extension with marching green theraband x10 BLE  Shuttle 2.5 cords 3x10    FOTO:45%    hot pack for 10 minutes to back pre-treatment.        Patient Education and Home Exercises     Home Exercises Provided and Patient " "Education Provided     Education provided:   - HEP    Written Home Exercises Provided: yes. Exercises were reviewed and Ladarius was able to demonstrate them prior to the end of the session.  Ladarius demonstrated good  understanding of the education provided. See EMR under Patient Instructions for exercises provided during therapy sessions    ASSESSMENT     Pt tolerated therapy session fairly well today. Pt with one instance of back "locking up" that resolved for the most part by the end of the session. He responded well to therapy session with improved symptoms reported afterwards. Pt provided with new HEP at this visit for improved tolerance. Will monitor response and progress as tolerated.    Ladarius Is progressing well towards his goals.   Patient prognosis is Fair.   Patientt will benefit from skilled outpatient Physical Therapy to address the deficits stated above and in the chart below, provide patient /family education, and to maximize patientt's level of independence.      Plan of care discussed with patient: Yes  Patient's spiritual, cultural and educational needs considered and patient is agreeable to the plan of care and goals as stated below:      Anticipated Barriers for therapy: chronicity of pain, previous PT attempts with non-compliance to HEP      Goals:   Short Term Goals (4 Weeks):   1. Pt will be independent with HEP to supplement PT in improving pain free cervical mobility  2. Pt will improve cervical and lumbar AROM 10 deg in planes to improve cervical mobility for driving  3. Pt will improve impaired MMTs by 1/2 grade in all planes to improve strength for lifting and carrying tasks.  4. Pt will demonstrate improved sitting posture to decrease pain experienced in head and neck.  Long Term Goals (8 Weeks):   1. Pt will improve FOTO to </=39% limitation to improve perceived limitation with changing and maintaining mobility.  2. Pt will improve cervical and lumbar AROM to WNL in all planes to " improve cervical mobility for driving   3. Pt will improve MMTs 1 grade in all planes to improve strength for lifting and carrying tasks.  4. Pt will report no pain with lifting 10 lbs to promote physical activity.   5. Pt will report no pain with cervical AROM in all planes to promote QOL.      PLAN     Continue per established PT POC.    Javier Claros, PT

## 2022-01-06 ENCOUNTER — IMMUNIZATION (OUTPATIENT)
Dept: INTERNAL MEDICINE | Facility: CLINIC | Age: 67
End: 2022-01-06
Payer: MEDICARE

## 2022-01-06 DIAGNOSIS — Z23 NEED FOR VACCINATION: Primary | ICD-10-CM

## 2022-01-06 PROCEDURE — 0004A COVID-19, MRNA, LNP-S, PF, 30 MCG/0.3 ML DOSE VACCINE: CPT | Mod: PBBFAC,CV19

## 2022-01-06 NOTE — PROGRESS NOTES
Individual Psychotherapy (PhD/LCSW)    1/4/2022    Site:  [facility]         Therapeutic Intervention: Met with patient.  Outpatient - Insight oriented psychotherapy 45 min - CPT code 21765    Chief complaint/reason for encounter: depression, mood swings, anxiety, sleep, appetite, behavior, somatic and interpersonal     Interval history and content of current session: discussed family, grief and loss, relationships, getting along with his room mate, is very spiritual, family, taking care of himself, care taking of others, and what he likes, past situations, good progress, and trust, safety, and taking care of himself, pain and medical issues, sleep, and management of money and being on a limited budget all addressed. And goals addressed. Communications, and having time to relax addressed and dealing with boredom addressed also and feelings.    Treatment plan:  · Target symptoms: depression, anxiety , adjustment, grief  · Why chosen therapy is appropriate versus another modality: relevant to diagnosis, patient responds to this modality, evidence based practice  · Outcome monitoring methods: self-report, observation  · Therapeutic intervention type: insight oriented psychotherapy, behavior modifying psychotherapy, supportive psychotherapy    Risk parameters:  Patient reports no suicidal ideation  Patient reports no homicidal ideation  Patient reports no self-injurious behavior  Patient reports no violent behavior    Verbal deficits: None    Patient's response to intervention:  The patient's response to intervention is accepting, motivated.    Progress toward goals and other mental status changes:  The patient's progress toward goals is fair .    Diagnosis:     ICD-10-CM ICD-9-CM   1. RUBIO (generalized anxiety disorder)  F41.1 300.02   2. Depression, unspecified depression type  F32.A 311       Plan:  individual psychotherapy and consult psychiatrist for medication evaluation    Return to clinic: 1 week    Length  of Service (minutes): 45

## 2022-01-07 ENCOUNTER — CLINICAL SUPPORT (OUTPATIENT)
Dept: REHABILITATION | Facility: HOSPITAL | Age: 67
End: 2022-01-07
Payer: MEDICARE

## 2022-01-07 DIAGNOSIS — M54.12 CERVICAL RADICULOPATHY AT C7: ICD-10-CM

## 2022-01-07 DIAGNOSIS — G89.29 OTHER CHRONIC PAIN: ICD-10-CM

## 2022-01-07 DIAGNOSIS — M54.2 NECK PAIN: ICD-10-CM

## 2022-01-07 PROCEDURE — 97110 THERAPEUTIC EXERCISES: CPT

## 2022-01-07 PROCEDURE — 97010 HOT OR COLD PACKS THERAPY: CPT

## 2022-01-07 NOTE — PROGRESS NOTES
"[facility]   Physical Therapy Treatment Note     Name: Ladarius Regalado Solis  Clinic Number: [id number]    Therapy Diagnosis:   Encounter Diagnoses   Name Primary?    Other chronic pain     Neck pain     Cervical radiculopathy at C7      Physician: Brielle Jacobs, *    Visit Date: 1/7/2022    Physician Orders: PT Eval and Treat   Medical Diagnosis from Referral:   M54.12 (ICD-10-CM) - Cervical radiculopathy at C7   M77.11 (ICD-10-CM) - Lateral epicondylitis of right elbow   Evaluation Date: 12/14/2021  Authorization Period Expiration: 6/1/2022  Plan of Care Expiration: 2/14/2022  Progress Note Due: 1/14/2022  Visit # / Visits authorized: 1/1, 2/20   FOTO: 1/5     Precautions: Standard and HTN         PTA Visit #: 0/5     Time In: 145  Time Out: 245  Total Billable Time: 60 minutes    SUBJECTIVE     Pt reports: that he felt much more limber after last session with improvements in symptoms. He states that the symptoms came back later that night when he tried to go to sleep.   He was somewhat compliant with home exercise program.  Response to previous treatment: no adverse effects  Functional change: none noted    Pain: 8/10  Location: bilateral back      OBJECTIVE     Objective Measures updated at progress report unless specified.     Treatment     Ladarius received the treatments listed below:      therapeutic exercises to develop strength, endurance, ROM, flexibility, posture and core stabilization for 50 minutes including:  Recumbent bike 5 minutes (VCs for pacing and ROM)  Seated hamstring stretch 3x30s BLE  Upper trap stretch 2x30s B  Chin tucks 2x10, 3" hold  LTRs 3 minutes  Bridges 3x10  Rows green theraband 2x10  Shoulder extension with marching green theraband x10 BLE  Shuttle 2.5 cords 3x10    hot pack for 10 minutes to back and neck pre-treatment.        Patient Education and Home Exercises     Home Exercises Provided and Patient Education Provided     Education provided: "   - HEP    Written Home Exercises Provided: yes. Exercises were reviewed and Ladarius was able to demonstrate them prior to the end of the session.  Ladarius demonstrated good  understanding of the education provided. See EMR under Patient Instructions for exercises provided during therapy sessions    ASSESSMENT     Pt with fairly good tolerance to therapy session today. He continues to respond well to moist heat and exercises. Pt required minimal VCs throughout. Pt attempted levator scapulae stretch but felt no stretch, so that exercise was held for now. Pt with good tolerance to new exercise of chin tucks today.     Ladarius Is progressing well towards his goals.   Patient prognosis is Fair.   Patientt will benefit from skilled outpatient Physical Therapy to address the deficits stated above and in the chart below, provide patient /family education, and to maximize patientt's level of independence.      Plan of care discussed with patient: Yes  Patient's spiritual, cultural and educational needs considered and patient is agreeable to the plan of care and goals as stated below:      Anticipated Barriers for therapy: chronicity of pain, previous PT attempts with non-compliance to HEP      Goals:   Short Term Goals (4 Weeks):   1. Pt will be independent with HEP to supplement PT in improving pain free cervical mobility  2. Pt will improve cervical and lumbar AROM 10 deg in planes to improve cervical mobility for driving  3. Pt will improve impaired MMTs by 1/2 grade in all planes to improve strength for lifting and carrying tasks.  4. Pt will demonstrate improved sitting posture to decrease pain experienced in head and neck.  Long Term Goals (8 Weeks):   1. Pt will improve FOTO to </=39% limitation to improve perceived limitation with changing and maintaining mobility.  2. Pt will improve cervical and lumbar AROM to WNL in all planes to improve cervical mobility for driving   3. Pt will improve MMTs 1 grade in all  planes to improve strength for lifting and carrying tasks.  4. Pt will report no pain with lifting 10 lbs to promote physical activity.   5. Pt will report no pain with cervical AROM in all planes to promote QOL.      PLAN     Continue per established PT POC.    Javier Claros, PT

## 2022-01-11 ENCOUNTER — OFFICE VISIT (OUTPATIENT)
Dept: PSYCHIATRY | Facility: CLINIC | Age: 67
End: 2022-01-11
Payer: MEDICARE

## 2022-01-11 DIAGNOSIS — F41.1 GAD (GENERALIZED ANXIETY DISORDER): Primary | ICD-10-CM

## 2022-01-11 PROCEDURE — 90834 PR PSYCHOTHERAPY W/PATIENT, 45 MIN: ICD-10-PCS | Mod: ,,, | Performed by: SOCIAL WORKER

## 2022-01-11 PROCEDURE — 90834 PSYTX W PT 45 MINUTES: CPT | Mod: ,,, | Performed by: SOCIAL WORKER

## 2022-01-11 PROCEDURE — 99999 PR PBB SHADOW E&M-EST. PATIENT-LVL I: ICD-10-PCS | Mod: PBBFAC,,, | Performed by: SOCIAL WORKER

## 2022-01-11 PROCEDURE — 99999 PR PBB SHADOW E&M-EST. PATIENT-LVL I: CPT | Mod: PBBFAC,,, | Performed by: SOCIAL WORKER

## 2022-01-11 PROCEDURE — 99211 OFF/OP EST MAY X REQ PHY/QHP: CPT | Mod: PBBFAC | Performed by: SOCIAL WORKER

## 2022-01-11 NOTE — PROGRESS NOTES
Individual Psychotherapy (PhD/LCSW)    1/11/2022    Site:  [facility]         Therapeutic Intervention: Met with patient.  Outpatient - Insight oriented psychotherapy 45 min - CPT code 28706    Chief complaint/reason for encounter: depression, anxiety, behavior and interpersonal     Interval history and content of current session: discussed issues about his roommate and communications, how to get along with her, and how to take care of himself, coping skills, and also said he views porn a lot and wants to cut back so we agreed to use a harm reduction approach where he would cut down gradually over time. Suggested to begin with he only view this every other day versus every day. He agreed to do so. Volunteering, part time job addressed, being quiet and nice and the advantages and the not advantages of this focused on. Family, care taking of others, his strengths, and hard to talk about the porn issues all focused on.    Treatment plan:  · Target symptoms: depression, recurrent depression, anxiety , mood swings, mood disorder, adjustment  · Why chosen therapy is appropriate versus another modality: relevant to diagnosis, patient responds to this modality, evidence based practice  · Outcome monitoring methods: self-report, observation  · Therapeutic intervention type: insight oriented psychotherapy, behavior modifying psychotherapy, supportive psychotherapy    Risk parameters:  Patient reports no suicidal ideation  Patient reports no homicidal ideation  Patient reports no self-injurious behavior  Patient reports no violent behavior    Verbal deficits: None    Patient's response to intervention:  The patient's response to intervention is accepting.    Progress toward goals and other mental status changes:  The patient's progress toward goals is limited.    Diagnosis:     ICD-10-CM ICD-9-CM   1. RUBIO (generalized anxiety disorder)  F41.1 300.02       Plan:  consult psychiatrist for medication evaluation    Return  to clinic: 2 weeks    Length of Service (minutes): 30

## 2022-01-12 ENCOUNTER — CLINICAL SUPPORT (OUTPATIENT)
Dept: REHABILITATION | Facility: HOSPITAL | Age: 67
End: 2022-01-12
Payer: MEDICARE

## 2022-01-12 DIAGNOSIS — M54.2 NECK PAIN: ICD-10-CM

## 2022-01-12 DIAGNOSIS — G89.29 OTHER CHRONIC PAIN: ICD-10-CM

## 2022-01-12 DIAGNOSIS — M54.12 CERVICAL RADICULOPATHY AT C7: ICD-10-CM

## 2022-01-12 PROCEDURE — 97010 HOT OR COLD PACKS THERAPY: CPT

## 2022-01-12 PROCEDURE — 97110 THERAPEUTIC EXERCISES: CPT

## 2022-01-12 NOTE — PROGRESS NOTES
"[facility]   Physical Therapy Treatment Note     Name: Ladarius Regalado Solis  Clinic Number: [id number]    Therapy Diagnosis:   Encounter Diagnoses   Name Primary?    Other chronic pain     Neck pain     Cervical radiculopathy at C7      Physician: Brielle Jacobs, *    Visit Date: 1/12/2022    Physician Orders: PT Eval and Treat   Medical Diagnosis from Referral:   M54.12 (ICD-10-CM) - Cervical radiculopathy at C7   M77.11 (ICD-10-CM) - Lateral epicondylitis of right elbow   Evaluation Date: 12/14/2021  Authorization Period Expiration: 6/1/2022  Plan of Care Expiration: 2/14/2022  Progress Note Due: 1/14/2022  Visit # / Visits authorized: 1/1, 3/20   FOTO: 3/5     Precautions: Standard and HTN         PTA Visit #: 0/5     Time In: 200  Time Out: 245  Total Billable Time: 45 minutes    SUBJECTIVE     Pt reports: that he had some muscle spasms in his back over the last few days. He states that he heats a wash cloth and puts it on his back and that has helped with the pain.  He was somewhat compliant with home exercise program.  Response to previous treatment: no adverse effects  Functional change: none noted    Pain: 8/10  Location: bilateral back      OBJECTIVE     Objective Measures updated at progress report unless specified.     Treatment     Ladarius received the treatments listed below:      therapeutic exercises to develop strength, endurance, ROM, flexibility, posture and core stabilization for 35 minutes including:  Recumbent bike 5 minutes (VCs for pacing and ROM)  Standing calf stretch 2x30s BLE  Seated hamstring stretch 2x30s BLE  Upper trap stretch 2x30s B  Chin tucks 2x10, 3" hold  LTRs 3 minutes NP  Bridges 3x10 NP  Rows green theraband 2x10  Shoulder extension with marching green theraband x10 BLE  Shuttle 2.5 cords 3x10    hot pack for 10 minutes to back and neck pre-treatment.        Patient Education and Home Exercises     Home Exercises Provided and Patient " Education Provided     Education provided:   - HEP    Written Home Exercises Provided: yes. Exercises were reviewed and Ladarius was able to demonstrate them prior to the end of the session.  Ladarius demonstrated good  understanding of the education provided. See EMR under Patient Instructions for exercises provided during therapy sessions    ASSESSMENT     Pt continues to tolerate therapy sessions well. Pt with dec'd symptoms after session than when he came in. Pt progressed with standing calf stretch with no adverse effects.     Ladarius Is progressing well towards his goals.   Patient prognosis is Fair.   Patientt will benefit from skilled outpatient Physical Therapy to address the deficits stated above and in the chart below, provide patient /family education, and to maximize patientt's level of independence.      Plan of care discussed with patient: Yes  Patient's spiritual, cultural and educational needs considered and patient is agreeable to the plan of care and goals as stated below:      Anticipated Barriers for therapy: chronicity of pain, previous PT attempts with non-compliance to HEP      Goals:   Short Term Goals (4 Weeks):   1. Pt will be independent with HEP to supplement PT in improving pain free cervical mobility  2. Pt will improve cervical and lumbar AROM 10 deg in planes to improve cervical mobility for driving  3. Pt will improve impaired MMTs by 1/2 grade in all planes to improve strength for lifting and carrying tasks.  4. Pt will demonstrate improved sitting posture to decrease pain experienced in head and neck.  Long Term Goals (8 Weeks):   1. Pt will improve FOTO to </=39% limitation to improve perceived limitation with changing and maintaining mobility.  2. Pt will improve cervical and lumbar AROM to WNL in all planes to improve cervical mobility for driving   3. Pt will improve MMTs 1 grade in all planes to improve strength for lifting and carrying tasks.  4. Pt will report no pain with  lifting 10 lbs to promote physical activity.   5. Pt will report no pain with cervical AROM in all planes to promote QOL.      PLAN     Continue per established PT POC.    Javier Claros, PT

## 2022-01-14 ENCOUNTER — CLINICAL SUPPORT (OUTPATIENT)
Dept: REHABILITATION | Facility: HOSPITAL | Age: 67
End: 2022-01-14
Payer: MEDICARE

## 2022-01-14 DIAGNOSIS — G89.29 OTHER CHRONIC PAIN: ICD-10-CM

## 2022-01-14 DIAGNOSIS — M54.12 CERVICAL RADICULOPATHY AT C7: ICD-10-CM

## 2022-01-14 DIAGNOSIS — M54.2 NECK PAIN: ICD-10-CM

## 2022-01-14 PROCEDURE — 97110 THERAPEUTIC EXERCISES: CPT

## 2022-01-14 PROCEDURE — 97140 MANUAL THERAPY 1/> REGIONS: CPT

## 2022-01-14 NOTE — PROGRESS NOTES
"[facility]   Physical Therapy Treatment Note     Name: Ladarius Solis  Clinic Number: [id number]    Therapy Diagnosis:   Encounter Diagnoses   Name Primary?    Other chronic pain     Neck pain     Cervical radiculopathy at C7      Physician: Brielle Jacobs, *    Visit Date: 1/14/2022    Physician Orders: PT Eval and Treat   Medical Diagnosis from Referral:   M54.12 (ICD-10-CM) - Cervical radiculopathy at C7   M77.11 (ICD-10-CM) - Lateral epicondylitis of right elbow   Evaluation Date: 12/14/2021  Authorization Period Expiration: 6/1/2022  Plan of Care Expiration: 2/14/2022  Progress Note Due: 1/14/2022  Visit # / Visits authorized: 1/1, 5/20   FOTO: 5/5     Precautions: Standard and HTN         PTA Visit #: 0/5     Time In: 145  Time Out: 230  Total Billable Time: 45 minutes    SUBJECTIVE     Pt reports: that he tried stretching while applying the moist heat at home and he got good relief from that. He states that he didn't sleep well last night because his R arm was throbbing and painful. He states it's coming from his neck down to his arm. It sometimes causes him to drop things.  He was compliant with home exercise program.  Response to previous treatment: no adverse effects  Functional change: none noted    Pain: 8/10  Location: bilateral back      OBJECTIVE     Objective Measures updated at progress report unless specified.     ULTT for median, ulnar, and radial nerve all negative    Treatment     Ladarius received the treatments listed below:      therapeutic exercises to develop strength, endurance, ROM, flexibility, posture and core stabilization for 27 minutes including:  Recumbent bike 5 minutes (VCs for pacing and ROM)  Standing calf stretch 2x30s BLE  Seated hamstring stretch 2x30s BLE  Upper trap stretch 2x30s B  Chin tucks 2x10, 3" hold  LTRs 3 minutes   Bridges 3x10 NP  Rows blue theraband 2x10  Shoulder extension with marching green theraband x10 BLE  Shuttle " 2.5 cords 3x10    FOTO: 41%    manual therapy techniques and objective measures above: Joint mobilizations were applied to the: c-spine for 8 minutes, including:  Manual cervical traction - better results without side bend in either direction        hot pack for 10 minutes to back and neck pre-treatment.      Patient Education and Home Exercises     Home Exercises Provided and Patient Education Provided     Education provided:   - HEP    Written Home Exercises Provided: yes. Exercises were reviewed and Ladarius was able to demonstrate them prior to the end of the session.  Ladarius demonstrated good  understanding of the education provided. See EMR under Patient Instructions for exercises provided during therapy sessions    ASSESSMENT     Pt with good tolerance to therapy session again today. He shows improvements in FOTO score as indicated above. Pt requires VCs to hold chin tucks for no longer than 5 seconds. Pt with improved UE symptoms during and following cervical distraction. He tolerated all exercises well today with no adverse effects.     Ladarius Is progressing well towards his goals.   Patient prognosis is Fair.   Patientt will benefit from skilled outpatient Physical Therapy to address the deficits stated above and in the chart below, provide patient /family education, and to maximize patientt's level of independence.      Plan of care discussed with patient: Yes  Patient's spiritual, cultural and educational needs considered and patient is agreeable to the plan of care and goals as stated below:      Anticipated Barriers for therapy: chronicity of pain, previous PT attempts with non-compliance to HEP      Goals:   Short Term Goals (4 Weeks):   1. Pt will be independent with HEP to supplement PT in improving pain free cervical mobility  2. Pt will improve cervical and lumbar AROM 10 deg in planes to improve cervical mobility for driving  3. Pt will improve impaired MMTs by 1/2 grade in all planes to improve  strength for lifting and carrying tasks.  4. Pt will demonstrate improved sitting posture to decrease pain experienced in head and neck.  Long Term Goals (8 Weeks):   1. Pt will improve FOTO to </=39% limitation to improve perceived limitation with changing and maintaining mobility.  2. Pt will improve cervical and lumbar AROM to WNL in all planes to improve cervical mobility for driving   3. Pt will improve MMTs 1 grade in all planes to improve strength for lifting and carrying tasks.  4. Pt will report no pain with lifting 10 lbs to promote physical activity.   5. Pt will report no pain with cervical AROM in all planes to promote QOL.      PLAN     Continue per established PT POC.    Javier Claros, PT

## 2022-01-18 ENCOUNTER — OFFICE VISIT (OUTPATIENT)
Dept: PSYCHIATRY | Facility: CLINIC | Age: 67
End: 2022-01-18
Payer: MEDICARE

## 2022-01-18 DIAGNOSIS — F41.1 GAD (GENERALIZED ANXIETY DISORDER): Primary | ICD-10-CM

## 2022-01-18 PROCEDURE — 99999 PR PBB SHADOW E&M-EST. PATIENT-LVL I: CPT | Mod: PBBFAC,,, | Performed by: SOCIAL WORKER

## 2022-01-18 PROCEDURE — 99999 PR PBB SHADOW E&M-EST. PATIENT-LVL I: ICD-10-PCS | Mod: PBBFAC,,, | Performed by: SOCIAL WORKER

## 2022-01-18 PROCEDURE — 90834 PR PSYCHOTHERAPY W/PATIENT, 45 MIN: ICD-10-PCS | Mod: ,,, | Performed by: SOCIAL WORKER

## 2022-01-18 PROCEDURE — 90834 PSYTX W PT 45 MINUTES: CPT | Mod: ,,, | Performed by: SOCIAL WORKER

## 2022-01-18 PROCEDURE — 99211 OFF/OP EST MAY X REQ PHY/QHP: CPT | Mod: PBBFAC | Performed by: SOCIAL WORKER

## 2022-01-19 ENCOUNTER — CLINICAL SUPPORT (OUTPATIENT)
Dept: REHABILITATION | Facility: HOSPITAL | Age: 67
End: 2022-01-19
Payer: MEDICARE

## 2022-01-19 DIAGNOSIS — M54.2 NECK PAIN: ICD-10-CM

## 2022-01-19 DIAGNOSIS — G89.29 OTHER CHRONIC PAIN: ICD-10-CM

## 2022-01-19 DIAGNOSIS — M54.12 CERVICAL RADICULOPATHY AT C7: ICD-10-CM

## 2022-01-19 PROCEDURE — 97010 HOT OR COLD PACKS THERAPY: CPT

## 2022-01-19 PROCEDURE — 97110 THERAPEUTIC EXERCISES: CPT

## 2022-01-19 NOTE — PROGRESS NOTES
Individual Psychotherapy (PhD/LCSW)    1/18/2022    Site:  [facility]         Therapeutic Intervention: Met with patient.  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 Also he has experience doing care taking as he took care of his parents as they aged and were needing help. Room mate issues addressed, his communications with his room mate focused on, his past and the changes he has made addressed in many positive ways, and the benefit he gets from attending [employer] and his spirituality addressed as this is very important for him he states. Over all gave support, discussed boundaries, and coping skill and the importance of his taking care of himself at this time.    Treatment plan:  · Target symptoms: depression, recurrent depression, anxiety , mood swings, mood disorder, adjustment, grief, work stress  · Why chosen therapy is appropriate versus another modality: relevant to diagnosis, patient responds to this modality, evidence based practice  · Outcome monitoring methods: self-report, observation  · Therapeutic intervention type: insight oriented psychotherapy, behavior modifying psychotherapy, supportive psychotherapy    Risk parameters:  Patient reports no suicidal ideation  Patient reports no homicidal ideation  Patient reports no self-injurious behavior  Patient reports no violent behavior    Verbal deficits: None    Patient's response to intervention:  The patient's response to intervention is accepting, motivated.    Progress toward goals and other mental status changes:  The patient's progress toward goals is fair .    Diagnosis:     ICD-10-CM ICD-9-CM    1. RUBIO (generalized anxiety disorder)  F41.1 300.02       Plan:  individual psychotherapy, consult psychiatrist for medication evaluation and medication management by physician    Return to clinic: 1 week    Length of Service (minutes): 45

## 2022-01-19 NOTE — PROGRESS NOTES
"[facility]   Physical Therapy Treatment Note     Name: Ladarius Regalado Solis  Clinic Number: [id number]    Therapy Diagnosis:   Encounter Diagnoses   Name Primary?    Other chronic pain     Neck pain     Cervical radiculopathy at C7      Physician: Brielle aJcobs, *    Visit Date: 1/19/2022    Physician Orders: PT Eval and Treat   Medical Diagnosis from Referral:   M54.12 (ICD-10-CM) - Cervical radiculopathy at C7   M77.11 (ICD-10-CM) - Lateral epicondylitis of right elbow   Evaluation Date: 12/14/2021  Authorization Period Expiration: 6/1/2022  Plan of Care Expiration: 2/14/2022  Progress Note Due: 1/14/2022  Visit # / Visits authorized: 1/1, 5/20   FOTO: 5/5     Precautions: Standard and HTN         PTA Visit #: 0/5     Time In: 144  Time Out: 237  Total Billable Time: 53 minutes    SUBJECTIVE     Pt reports: his neck and arm feel much better. He hasn't noticed the numbness or tingling in his arm in the last few days. He states that he just feels a bit of a stiffness in the arm but it's not bad. He states that his back and legs have been bothering him more so lately.  He was compliant with home exercise program.  Response to previous treatment: no adverse effects  Functional change: none noted    Pain: 8/10  Location: bilateral back      OBJECTIVE     Objective Measures updated at progress report unless specified.     Treatment     Ladarius received the treatments listed below:      therapeutic exercises to develop strength, endurance, ROM, flexibility, posture and core stabilization for 43 minutes including:  Recumbent bike 5 minutes (VCs for pacing and ROM)  Standing calf stretch 2x30s BLE  Seated hamstring stretch 2x30s BLE  Upper trap stretch 2x30s B  Chin tucks 2x10, 3" hold  LTRs 3 minutes   Bridges 3x10 NP  Rows blue theraband 2x10  Shoulder extension with marching green theraband x10 BLE  Shuttle 2.5 cords 3x10      manual therapy techniques and objective measures above: " Joint mobilizations were applied to the: c-spine for 0 minutes, including:  Manual cervical traction - better results without side bend in either direction not performed        hot pack for 10 minutes to back and neck pre-treatment.      Patient Education and Home Exercises     Home Exercises Provided and Patient Education Provided     Education provided:   - HEP    Written Home Exercises Provided: yes. Exercises were reviewed and Ladarius was able to demonstrate them prior to the end of the session.  Ladarius demonstrated good  understanding of the education provided. See EMR under Patient Instructions for exercises provided during therapy sessions    ASSESSMENT     Pt tolerated therapy session well again today with good results. Added inc'd hip strengthening exercises today to address knee pain with no adverse effects.     Ladarius Is progressing well towards his goals.   Patient prognosis is Fair.   Patientt will benefit from skilled outpatient Physical Therapy to address the deficits stated above and in the chart below, provide patient /family education, and to maximize patientt's level of independence.      Plan of care discussed with patient: Yes  Patient's spiritual, cultural and educational needs considered and patient is agreeable to the plan of care and goals as stated below:      Anticipated Barriers for therapy: chronicity of pain, previous PT attempts with non-compliance to HEP      Goals:   Short Term Goals (4 Weeks):   1. Pt will be independent with HEP to supplement PT in improving pain free cervical mobility  2. Pt will improve cervical and lumbar AROM 10 deg in planes to improve cervical mobility for driving  3. Pt will improve impaired MMTs by 1/2 grade in all planes to improve strength for lifting and carrying tasks.  4. Pt will demonstrate improved sitting posture to decrease pain experienced in head and neck.  Long Term Goals (8 Weeks):   1. Pt will improve FOTO to </=39% limitation to improve  perceived limitation with changing and maintaining mobility.  2. Pt will improve cervical and lumbar AROM to WNL in all planes to improve cervical mobility for driving   3. Pt will improve MMTs 1 grade in all planes to improve strength for lifting and carrying tasks.  4. Pt will report no pain with lifting 10 lbs to promote physical activity.   5. Pt will report no pain with cervical AROM in all planes to promote QOL.      PLAN     Continue per established PT POC.    Javier Claros, PT

## 2022-01-21 ENCOUNTER — HOSPITAL ENCOUNTER (EMERGENCY)
Facility: HOSPITAL | Age: 67
Discharge: HOME | End: 2022-01-21
Attending: EMERGENCY MEDICINE
Payer: MEDICARE

## 2022-01-21 VITALS
HEART RATE: 79 BPM | RESPIRATION RATE: 18 BRPM | BODY MASS INDEX: 25.71 KG/M2 | HEIGHT: 66 IN | DIASTOLIC BLOOD PRESSURE: 83 MMHG | OXYGEN SATURATION: 100 % | SYSTOLIC BLOOD PRESSURE: 171 MMHG | WEIGHT: 160 LBS | TEMPERATURE: 98 F

## 2022-01-21 DIAGNOSIS — Z71.1 FEARED COMPLAINT WITHOUT DIAGNOSIS: Primary | ICD-10-CM

## 2022-01-21 PROCEDURE — 99282 PR EMERGENCY DEPT VISIT,LEVEL II: ICD-10-PCS | Mod: ,,, | Performed by: PHYSICIAN ASSISTANT

## 2022-01-21 PROCEDURE — 99282 EMERGENCY DEPT VISIT SF MDM: CPT

## 2022-01-21 PROCEDURE — 99282 EMERGENCY DEPT VISIT SF MDM: CPT | Mod: ,,, | Performed by: PHYSICIAN ASSISTANT

## 2022-01-21 NOTE — ED NOTES
Two patient identifiers have been checked and are correct.      Appearance: Pt awake, alert & oriented to person, place & time. Pt in no acute distress at present time. Pt is clean and well groomed with clothes appropriately fastened.   Skin: Skin warm, dry & intact. Color consistent with ethnicity. Mucous membranes moist. No breakdown or brusing noted.   Musculoskeletal: Patient moving all extremities well, no obvious swelling or deformities noted.   Respiratory: Respirations spontaneous, even, and non-labored. Visible chest rise noted. Airway is open and patent. No accessory muscle use noted.   Neurologic: Sensation is intact. Speech is clear and appropriate. Eyes open spontaneously, behavior appropriate to situation, follows commands, facial expression symmetrical, bilateral hand grasp equal and even, purposeful motor response noted.  Cardiac: All peripheral pulses present. No Bilateral lower extremity edema. Cap refill is <3 seconds.

## 2022-01-21 NOTE — ED PROVIDER NOTES
"Encounter Date: 1/21/2022       History     Chief Complaint   Patient presents with    Otalgia     Left ear pain, states "feels like there is water in it", onset few weeks ago after getting covid booster shot 1/6/22, also c/o slight headache     66-year-old male with HTN, depression, cervical spinal stenosis, arthritis presents to the ED with ear concerns.  Patient noticed ear pain when he walked outside in the cold weather today.  He currently denies pain and did not have pain before he went outside today. Denies hearing loss, fever, dizziness.  He had ear infections in the past that developed into dizziness, so he wanted to get evaluated before his symptoms got bad.         Review of patient's allergies indicates:  No Known Allergies  Past Medical History:   Diagnosis Date    Arthritis     Carpal tunnel syndrome, bilateral     Cervical spinal stenosis 2002    Colon polyps     Fatty liver     Hypertension     Overweight (BMI 25.0-29.9) 8/16/2019    Vertigo     left ear issues     Past Surgical History:   Procedure Laterality Date    ARTHROSCOPIC CHONDROPLASTY OF KNEE JOINT Left 10/17/2018    Procedure: ARTHROSCOPY, KNEE, WITH CHONDROPLASTY;  Surgeon: GRETEL Carr MD;  Location: [facility] OR [address] [place] [address];  Service: Orthopedics;  Laterality: Left;    COLONOSCOPY      COLONOSCOPY N/A 9/25/2017    Procedure: COLONOSCOPY/emr;  Surgeon: Raul August MD;  Location: [address]);  Service: Endoscopy;  Laterality: N/A;    COLONOSCOPY N/A 3/9/2018    Procedure: COLONOSCOPY;  Surgeon: Raul August MD;  Location: [address]);  Service: Endoscopy;  Laterality: N/A;    EPIDURAL STEROID INJECTION N/A 11/4/2021    Procedure: INJECTION, STEROID, EPIDURAL, C7-T1  NEED CONSENT;  Surgeon: Bozena Mena MD;  Location: [facility] PAIN MGT;  Service: Pain Management;  Laterality: N/A;    ESOPHAGOGASTRODUODENOSCOPY N/A 9/23/2019    Procedure: EGD (ESOPHAGOGASTRODUODENOSCOPY);  Surgeon: Dyllan Maloney MD;  " Location: [facility] ENDO;  Service: General;  Laterality: N/A;    INJECTION OF FACET JOINT Bilateral 8/6/2021    Procedure: FACET JOINT INJECTION BILATERAL L4/5 AND L5/S1 DIRECT REFERRAL NEEDS CONSENT;  Surgeon: Jasiel Aviles MD;  [facility];  Service: Pain Management;  Laterality: Bilateral;    KNEE ARTHROSCOPY W/ MENISCECTOMY Left 10/17/2018    Procedure: ARTHROSCOPY, KNEE, WITH MENISCECTOMY;  Surgeon: GRETEL Carr MD;  Location: [facility] OR [address] [place] [address];  Service: Orthopedics;  Laterality: Left;  regional w/catheter adductor  Dimitry/[employer] notified 10-12 LO     Family History   Problem Relation Age of Onset    No Known Problems Mother     Arthritis Father     Dementia Father     Heart disease Father     No Known Problems Brother     Diabetes Neg Hx     Cirrhosis Neg Hx      Social History     Tobacco Use    Smoking status: Never Smoker    Smokeless tobacco: Never Used   Substance Use Topics    Alcohol use: No    Drug use: No     Review of Systems   Constitutional: Negative for fever.   HENT: Positive for ear pain. Negative for sore throat.    Respiratory: Negative for shortness of breath.    Cardiovascular: Negative for chest pain.   Gastrointestinal: Negative for nausea.   Genitourinary: Negative for dysuria.   Musculoskeletal: Negative for back pain.   Skin: Negative for rash.   Neurological: Negative for weakness.   Hematological: Does not bruise/bleed easily.       Physical Exam     Initial Vitals [01/21/22 1235]   BP Pulse Resp Temp SpO2   (!) 171/83 79 18 98.2 °F (36.8 °C) 100 %      MAP       --         Physical Exam    Nursing note and vitals reviewed.  Constitutional: He appears well-developed and well-nourished.  Non-toxic appearance. He does not appear ill. No distress.   HENT:   Head: Normocephalic and atraumatic.   Right Ear: Tympanic membrane and ear canal normal. No tenderness.   Left Ear: Tympanic membrane and ear canal normal. No tenderness.   Neck: Neck supple.   Normal  range of motion.  Cardiovascular: Normal rate and regular rhythm. Exam reveals no gallop, no distant heart sounds and no friction rub.    No murmur heard.  Pulmonary/Chest: Effort normal and breath sounds normal. No accessory muscle usage. No tachypnea. No respiratory distress. He has no decreased breath sounds. He has no wheezes. He has no rhonchi. He has no rales.   Abdominal: He exhibits no distension.   Musculoskeletal:      Cervical back: Normal range of motion and neck supple.     Neurological: He is alert.   Skin: No rash noted.         ED Course   Procedures  Labs Reviewed - No data to display       Imaging Results    None          Medications - No data to display  Medical Decision Making:   History:   Old Medical Records: I decided to obtain old medical records.  Initial Assessment:   66-year-old male presents to the ED with your pain that he noticed when he walked into the cold weather today. BP elevated at 171/83. Vitals otherwise wnl. Pt in no acute distress. Afebrile.   ED Management:  Patient had no evidence of ear infection.  He was discharged in stable condition.  Advised that he follow-up with his primary doctor if his ear pain becomes more consistent or he develops additional symptoms.  He voiced understanding and is comfortable with discharge plan.                      Clinical Impression:   Final diagnoses:  [Z71.1] Feared complaint without diagnosis (Primary)          ED Disposition Condition    Discharge Stable        ED Prescriptions     None        Follow-up Information    None          Destiney Case PA-C  01/21/22 2636

## 2022-01-21 NOTE — ED TRIAGE NOTES
"+ left sided ear "fluid" since 1/6/2021. Denies injury or hearing loss. Denies fever or chills.   "

## 2022-01-25 ENCOUNTER — OFFICE VISIT (OUTPATIENT)
Dept: PSYCHIATRY | Facility: CLINIC | Age: 67
End: 2022-01-25
Payer: MEDICARE

## 2022-01-25 DIAGNOSIS — F41.1 GAD (GENERALIZED ANXIETY DISORDER): Primary | ICD-10-CM

## 2022-01-25 PROCEDURE — 99999 PR PBB SHADOW E&M-EST. PATIENT-LVL I: ICD-10-PCS | Mod: PBBFAC,,, | Performed by: SOCIAL WORKER

## 2022-01-25 PROCEDURE — 99211 OFF/OP EST MAY X REQ PHY/QHP: CPT | Mod: PBBFAC | Performed by: SOCIAL WORKER

## 2022-01-25 PROCEDURE — 90834 PSYTX W PT 45 MINUTES: CPT | Mod: ,,, | Performed by: SOCIAL WORKER

## 2022-01-25 PROCEDURE — 90834 PR PSYCHOTHERAPY W/PATIENT, 45 MIN: ICD-10-PCS | Mod: ,,, | Performed by: SOCIAL WORKER

## 2022-01-25 PROCEDURE — 99999 PR PBB SHADOW E&M-EST. PATIENT-LVL I: CPT | Mod: PBBFAC,,, | Performed by: SOCIAL WORKER

## 2022-01-26 ENCOUNTER — CLINICAL SUPPORT (OUTPATIENT)
Dept: REHABILITATION | Facility: HOSPITAL | Age: 67
End: 2022-01-26
Payer: MEDICARE

## 2022-01-26 DIAGNOSIS — M54.2 NECK PAIN: ICD-10-CM

## 2022-01-26 DIAGNOSIS — M54.12 CERVICAL RADICULOPATHY AT C7: ICD-10-CM

## 2022-01-26 DIAGNOSIS — G89.29 OTHER CHRONIC PAIN: ICD-10-CM

## 2022-01-26 PROCEDURE — 97140 MANUAL THERAPY 1/> REGIONS: CPT | Mod: KX

## 2022-01-26 PROCEDURE — 97110 THERAPEUTIC EXERCISES: CPT | Mod: KX

## 2022-01-26 NOTE — PROGRESS NOTES
"[facility]   Physical Therapy Treatment Note     Name: Ladarius Solis  Clinic Number: [id number]    Therapy Diagnosis:   Encounter Diagnoses   Name Primary?    Other chronic pain     Neck pain     Cervical radiculopathy at C7      Physician: Brielle Jacobs, *    Visit Date: 1/26/2022    Physician Orders: PT Eval and Treat   Medical Diagnosis from Referral:   M54.12 (ICD-10-CM) - Cervical radiculopathy at C7   M77.11 (ICD-10-CM) - Lateral epicondylitis of right elbow   Evaluation Date: 12/14/2021  Authorization Period Expiration: 6/1/2022  Plan of Care Expiration: 2/14/2022  Progress Note Due: 1/14/2022  Visit # / Visits authorized: 1/1, 6/20   FOTO: 0/5     Precautions: Standard and HTN         PTA Visit #: 0/5     Time In: 203  Time Out: 251  Total Billable Time: 48 minutes    SUBJECTIVE     Pt reports: his arm was bothering him a good bit over the past few days and is still a little numb today. He states that sitting for a long period of time then standing causes inc'd pain.  He was compliant with home exercise program.  Response to previous treatment: no adverse effects  Functional change: none noted    Pain: 8/10  Location: bilateral back      OBJECTIVE     Objective Measures updated at progress report unless specified.   FOTO: 49%    Treatment     Ladarius received the treatments listed below:      therapeutic exercises to develop strength, endurance, ROM, flexibility, posture and core stabilization for 35 minutes including:  Recumbent bike 5 minutes (VCs for pacing and ROM)  Standing calf stretch 2x30s BLE  Seated hamstring stretch 2x30s BLE  Upper trap stretch 2x30s B  Chin tucks 2x10, 3" hold  LTRs 3 minutes   -Bridges 3x10 NP  -Rows blue theraband 2x10  -Shoulder extension with marching green theraband x10 BLE  -Shuttle 2.5 cords 3x10      manual therapy techniques and objective measures above: Joint mobilizations were applied to the: c-spine for 8 minutes, " including:  Manual cervical traction - better results without side bend in either direction       hot pack for 10 minutes to back and neck pre-treatment.      Patient Education and Home Exercises     Home Exercises Provided and Patient Education Provided     Education provided:   - HEP compliance    Written Home Exercises Provided: yes. Exercises were reviewed and Ladarius was able to demonstrate them prior to the end of the session.  Ladarius demonstrated good  understanding of the education provided. See EMR under Patient Instructions for exercises provided during therapy sessions    ASSESSMENT     Pt continues to tolerate therapy sessions well with no adverse effects. He continues to have subjective reports of improvements following session compared to beginning of session. He continues to have good response to manual therapy. Again educated pt on importance of HEP for maintenance of progress.     Ladarius Is progressing well towards his goals.   Patient prognosis is Fair.   Patientt will benefit from skilled outpatient Physical Therapy to address the deficits stated above and in the chart below, provide patient /family education, and to maximize patientt's level of independence.      Plan of care discussed with patient: Yes  Patient's spiritual, cultural and educational needs considered and patient is agreeable to the plan of care and goals as stated below:      Anticipated Barriers for therapy: chronicity of pain, previous PT attempts with non-compliance to HEP      Goals:   Short Term Goals (4 Weeks):   1. Pt will be independent with HEP to supplement PT in improving pain free cervical mobility  2. Pt will improve cervical and lumbar AROM 10 deg in planes to improve cervical mobility for driving  3. Pt will improve impaired MMTs by 1/2 grade in all planes to improve strength for lifting and carrying tasks.  4. Pt will demonstrate improved sitting posture to decrease pain experienced in head and neck.  Long Term  Goals (8 Weeks):   1. Pt will improve FOTO to </=39% limitation to improve perceived limitation with changing and maintaining mobility.  2. Pt will improve cervical and lumbar AROM to WNL in all planes to improve cervical mobility for driving   3. Pt will improve MMTs 1 grade in all planes to improve strength for lifting and carrying tasks.  4. Pt will report no pain with lifting 10 lbs to promote physical activity.   5. Pt will report no pain with cervical AROM in all planes to promote QOL.      PLAN     Continue per established PT POC.    Javier Claros, PT

## 2022-01-26 NOTE — PROGRESS NOTES
Individual Psychotherapy (PhD/LCSW)    1/25/2022    Site:  [facility]         Therapeutic Intervention: Met with patient.  Outpatient - Insight oriented psychotherapy 45 min - CPT code 24564    Chief complaint/reason for encounter: depression, anger, anxiety, sleep, appetite, behavior, somatic and interpersonal     Interval history and content of current session: discussed being angry at times with room mate as she is hard to get along with, and thinks he will find his own apartment and is looking at this time, also wants to work more, and find more structure in his time, and says he likes working, and medical, pain, sleep and loss and grief discussed, and coping skills, taking care of himself and how to communicate all focused on.    Treatment plan:  · Target symptoms: depression, anxiety , adjustment, grief, work stress  · Why chosen therapy is appropriate versus another modality: relevant to diagnosis, patient responds to this modality, evidence based practice  · Outcome monitoring methods: self-report, observation  · Therapeutic intervention type: insight oriented psychotherapy, behavior modifying psychotherapy, supportive psychotherapy    Risk parameters:  Patient reports no suicidal ideation  Patient reports no homicidal ideation  Patient reports no self-injurious behavior  Patient reports no violent behavior    Verbal deficits: None    Patient's response to intervention:  The patient's response to intervention is accepting.    Progress toward goals and other mental status changes:  The patient's progress toward goals is limited.    Diagnosis:     ICD-10-CM ICD-9-CM   1. RUBIO (generalized anxiety disorder)  F41.1 300.02       Plan:  individual psychotherapy and consult psychiatrist for medication evaluation    Return to clinic: 1 week    Length of Service (minutes): 45

## 2022-01-28 ENCOUNTER — CLINICAL SUPPORT (OUTPATIENT)
Dept: REHABILITATION | Facility: HOSPITAL | Age: 67
End: 2022-01-28
Payer: MEDICARE

## 2022-01-28 DIAGNOSIS — G89.29 OTHER CHRONIC PAIN: ICD-10-CM

## 2022-01-28 DIAGNOSIS — M54.12 CERVICAL RADICULOPATHY AT C7: ICD-10-CM

## 2022-01-28 DIAGNOSIS — M54.2 NECK PAIN: ICD-10-CM

## 2022-01-28 PROCEDURE — 97140 MANUAL THERAPY 1/> REGIONS: CPT | Mod: KX,CQ

## 2022-01-28 PROCEDURE — 97110 THERAPEUTIC EXERCISES: CPT | Mod: KX,CQ

## 2022-01-28 NOTE — PROGRESS NOTES
"[facility]   Physical Therapy Treatment Note     Name: Ladarius Solis  Clinic Number: [id number]    Therapy Diagnosis:   Encounter Diagnoses   Name Primary?    Other chronic pain     Neck pain     Cervical radiculopathy at C7      Physician: Brielle Jacobs, *    Visit Date: 1/28/2022    Physician Orders: PT Eval and Treat   Medical Diagnosis from Referral:   M54.12 (ICD-10-CM) - Cervical radiculopathy at C7   M77.11 (ICD-10-CM) - Lateral epicondylitis of right elbow   Evaluation Date: 12/14/2021  Authorization Period Expiration: 6/1/2022  Plan of Care Expiration: 2/14/2022  Progress Note Due: 1/14/2022  Visit # / Visits authorized: 1/1, 7/20   FOTO: 1/5     Precautions: Standard and HTN         PTA Visit #: 1/5     Time In: 1:55 pm   Time Out: 2:51 pm   Total Billable Time: 46 minutes    SUBJECTIVE     Pt reports: that he is feeling a little stiff today due to the weather. Pt states that he is getting some stiffness in his back and neck and having some tingling in his right hand.   He was compliant with home exercise program.  Response to previous treatment: no adverse effects  Functional change: none noted    Pain: 8/10  Location: bilateral back      OBJECTIVE     Objective Measures updated at progress report unless specified.   FOTO: 49%    Treatment     Ladarius received the treatments listed below:      therapeutic exercises to develop strength, endurance, ROM, flexibility, posture and core stabilization for 38 minutes including:  Recumbent bike 5 minutes (VCs for pacing and ROM)  Standing calf stretch 2x30s BLE  Seated hamstring stretch 2x30s BLE  Upper trap stretch 2x30s B  Chin tucks 2x10, 3" hold  -Bridges 3x10       LTRs 3 minutes   -Rows blue theraband 2x10  -Shoulder extension with marching green theraband x10 BLE  -Shuttle 2.5 cords 3x10      manual therapy techniques and objective measures above: Joint mobilizations were applied to the: c-spine for 8 minutes, " including:  Manual cervical traction - better results without side bend in either direction       hot pack for 10 minutes to back and neck pre-treatment.      Patient Education and Home Exercises     Home Exercises Provided and Patient Education Provided     Education provided:   - HEP compliance    Written Home Exercises Provided: yes. Exercises were reviewed and Ladarius was able to demonstrate them prior to the end of the session.  Ladarius demonstrated good  understanding of the education provided. See EMR under Patient Instructions for exercises provided during therapy sessions    ASSESSMENT     Performed therex and manual therapy this session in order to aid in mobility and decreasing UE symptoms. Pt noted significant decrease in right UE symptoms, however did not completely elimnate symptoms. Will continue to progress pt per his tolerance and POC.     Ladarius Is progressing well towards his goals.   Patient prognosis is Fair.   Patientt will benefit from skilled outpatient Physical Therapy to address the deficits stated above and in the chart below, provide patient /family education, and to maximize patientt's level of independence.      Plan of care discussed with patient: Yes  Patient's spiritual, cultural and educational needs considered and patient is agreeable to the plan of care and goals as stated below:      Anticipated Barriers for therapy: chronicity of pain, previous PT attempts with non-compliance to HEP      Goals:   Short Term Goals (4 Weeks):   1. Pt will be independent with HEP to supplement PT in improving pain free cervical mobility  2. Pt will improve cervical and lumbar AROM 10 deg in planes to improve cervical mobility for driving  3. Pt will improve impaired MMTs by 1/2 grade in all planes to improve strength for lifting and carrying tasks.  4. Pt will demonstrate improved sitting posture to decrease pain experienced in head and neck.  Long Term Goals (8 Weeks):   1. Pt will improve FOTO  to </=39% limitation to improve perceived limitation with changing and maintaining mobility.  2. Pt will improve cervical and lumbar AROM to WNL in all planes to improve cervical mobility for driving   3. Pt will improve MMTs 1 grade in all planes to improve strength for lifting and carrying tasks.  4. Pt will report no pain with lifting 10 lbs to promote physical activity.   5. Pt will report no pain with cervical AROM in all planes to promote QOL.      PLAN     Continue per established PT POC.    Debby Reyes, PTA

## 2022-01-31 RX ORDER — VENLAFAXINE HYDROCHLORIDE 37.5 MG/1
37.5 CAPSULE, EXTENDED RELEASE ORAL DAILY
Qty: 30 CAPSULE | Refills: 2 | Status: SHIPPED | OUTPATIENT
Start: 2022-01-31 | End: 2022-02-07

## 2022-01-31 RX ORDER — VENLAFAXINE HYDROCHLORIDE 75 MG/1
75 CAPSULE, EXTENDED RELEASE ORAL DAILY
Qty: 30 CAPSULE | Refills: 2 | Status: SHIPPED | OUTPATIENT
Start: 2022-01-31 | End: 2022-02-07

## 2022-02-02 ENCOUNTER — CLINICAL SUPPORT (OUTPATIENT)
Dept: REHABILITATION | Facility: HOSPITAL | Age: 67
End: 2022-02-02
Payer: MEDICARE

## 2022-02-02 DIAGNOSIS — M54.2 NECK PAIN: ICD-10-CM

## 2022-02-02 DIAGNOSIS — G89.29 OTHER CHRONIC PAIN: ICD-10-CM

## 2022-02-02 DIAGNOSIS — M54.12 CERVICAL RADICULOPATHY AT C7: ICD-10-CM

## 2022-02-02 PROCEDURE — 97110 THERAPEUTIC EXERCISES: CPT | Mod: CQ

## 2022-02-02 PROCEDURE — 97140 MANUAL THERAPY 1/> REGIONS: CPT | Mod: CQ

## 2022-02-02 NOTE — PROGRESS NOTES
"[facility]   Physical Therapy Treatment Note     Name: Ladarius Solis  Clinic Number: [id number]    Therapy Diagnosis:   Encounter Diagnoses   Name Primary?    Neck pain     Cervical radiculopathy at C7     Other chronic pain      Physician: Brielle Jacobs, *    Visit Date: 2/2/2022    Physician Orders: PT Eval and Treat   Medical Diagnosis from Referral:   M54.12 (ICD-10-CM) - Cervical radiculopathy at C7   M77.11 (ICD-10-CM) - Lateral epicondylitis of right elbow   Evaluation Date: 12/14/2021  Authorization Period Expiration: 6/1/2022  Plan of Care Expiration: 2/14/2022  Progress Note Due: 1/14/2022  Visit # / Visits authorized: 1/1, 8/20   FOTO: 3/5       Precautions: Standard and HTN         PTA Visit #: 2/5     Time In: 1:45 pm   Time Out: 3:55 pm   Total Billable Time: 60 minutes    SUBJECTIVE     Pt reports:He did get about a day and half of relief after his last visit. He continues to get temporary relief from MHP but the issue returns. He also states that the numbness and tingling sensation is on/off  in his right hand and difficulty sleeping and this is the main problem.   He was compliant with home exercise program.  Response to previous treatment: no adverse effects  Functional change: see subjective statement    Pain: 4/10  Location: bilateral back      OBJECTIVE     Objective Measures updated at progress report unless specified.   FOTO: 49%    Treatment     Ladarius received the treatments listed below:      therapeutic exercises to develop strength, endurance, ROM, flexibility, posture and core stabilization for 42 minutes including:  Recumbent bike 5 minutes   Standing calf stretch 3 x 30s BLE  Seated hamstring stretch 3x30s BLE  Upper trap stretch 3x30s B  Chin tucks 2x10, 3" hold  -Bridges 3x10       LTRs 3 minutes   -Rows blue theraband 2x10  -Shoulder extension with marching green theraband x10 BLE  -Shuttle 2.5 cords 3x10      manual therapy techniques and " objective measures above: Joint mobilizations were applied to the: c-spine for 8 minutes, including:  Manual cervical traction - better results without side bend in either direction       hot pack for 10 minutes to back and neck pre-treatment.      Patient Education and Home Exercises     Home Exercises Provided and Patient Education Provided     Education provided:   - HEP compliance    Written Home Exercises Provided: yes. Exercises were reviewed and Ladarius was able to demonstrate them prior to the end of the session.  Ladarius demonstrated good  understanding of the education provided. See EMR under Patient Instructions for exercises provided during therapy sessions    ASSESSMENT     Performed therex this session in order to aid in mobility and decreasing UE symptoms. Pt noted significant decrease in right UE symptoms post manual therapy session, however did not completely elimnate symptoms. Will continue to progress pt per his tolerance and POC.     Ladarius Is progressing well towards his goals.   Patient prognosis is Fair.   Patientt will benefit from skilled outpatient Physical Therapy to address the deficits stated above and in the chart below, provide patient /family education, and to maximize patientt's level of independence.      Plan of care discussed with patient: Yes  Patient's spiritual, cultural and educational needs considered and patient is agreeable to the plan of care and goals as stated below:      Anticipated Barriers for therapy: chronicity of pain, previous PT attempts with non-compliance to HEP      Goals:   Short Term Goals (4 Weeks):   1. Pt will be independent with HEP to supplement PT in improving pain free cervical mobility  2. Pt will improve cervical and lumbar AROM 10 deg in planes to improve cervical mobility for driving  3. Pt will improve impaired MMTs by 1/2 grade in all planes to improve strength for lifting and carrying tasks.  4. Pt will demonstrate improved sitting posture to  decrease pain experienced in head and neck.  Long Term Goals (8 Weeks):   1. Pt will improve FOTO to </=39% limitation to improve perceived limitation with changing and maintaining mobility.  2. Pt will improve cervical and lumbar AROM to WNL in all planes to improve cervical mobility for driving   3. Pt will improve MMTs 1 grade in all planes to improve strength for lifting and carrying tasks.  4. Pt will report no pain with lifting 10 lbs to promote physical activity.   5. Pt will report no pain with cervical AROM in all planes to promote QOL.      PLAN     Continue per established PT POC.    Trino Wang, PTA

## 2022-02-04 ENCOUNTER — CLINICAL SUPPORT (OUTPATIENT)
Dept: REHABILITATION | Facility: HOSPITAL | Age: 67
End: 2022-02-04
Payer: MEDICARE

## 2022-02-04 DIAGNOSIS — G89.29 OTHER CHRONIC PAIN: ICD-10-CM

## 2022-02-04 DIAGNOSIS — M54.12 CERVICAL RADICULOPATHY AT C7: ICD-10-CM

## 2022-02-04 DIAGNOSIS — M54.2 NECK PAIN: ICD-10-CM

## 2022-02-04 PROCEDURE — 97110 THERAPEUTIC EXERCISES: CPT | Mod: CQ

## 2022-02-04 NOTE — PROGRESS NOTES
"[facility]   Physical Therapy Treatment Note     Name: Ladarius Solis  Clinic Number: [id number]    Therapy Diagnosis:   Encounter Diagnoses   Name Primary?    Other chronic pain     Neck pain     Cervical radiculopathy at C7      Physician: Brielle Jacobs, *    Visit Date: 2/4/2022    Physician Orders: PT Eval and Treat   Medical Diagnosis from Referral:   M54.12 (ICD-10-CM) - Cervical radiculopathy at C7   M77.11 (ICD-10-CM) - Lateral epicondylitis of right elbow   Evaluation Date: 12/14/2021  Authorization Period Expiration: 6/1/2022  Plan of Care Expiration: 2/14/2022  Progress Note Due: 1/14/2022  Visit # / Visits authorized: 1/1, 9/20   FOTO: 4/5       Precautions: Standard and HTN         PTA Visit #: 3/5     Time In: 1:55 pm   Time Out: 2:50 pm   Total Billable Time: 55 minutes    SUBJECTIVE     Pt reports:He did get about two days of relief after his last visit. He also states that the numbness and tingling sensation has not been an issue since last visit.  He was compliant with home exercise program.  Response to previous treatment: no adverse effects  Functional change: see subjective statement    Pain: 5/10  Location: bilateral back      OBJECTIVE     Objective Measures updated at progress report unless specified.   FOTO: 49%    Treatment     Ladarius received the treatments listed below:      therapeutic exercises to develop strength, endurance, ROM, flexibility, posture and core stabilization for 40 minutes including:  Recumbent bike 5 minutes   UBE 4 min Bk/Fw  Standing calf stretch 3 x 30s BLE  Seated hamstring stretch 3x30s BLE  Upper trap stretch 3x30s B  Chin tucks 2x10, 3" hold  -Bridges 3x10     LTRs 3 minutes   -Rows blue theraband 2x10  -Shoulder extension with marching green theraband x10 BLE  -Shuttle 2.5 cords 3x10      manual therapy techniques and objective measures above: Joint mobilizations were applied to the: c-spine for 5 minutes, " including:  Manual cervical traction       hot pack for 10 minutes to back and neck pre-treatment.      Patient Education and Home Exercises     Home Exercises Provided and Patient Education Provided     Education provided:   - HEP compliance    Written Home Exercises Provided: yes. Exercises were reviewed and Ladarius was able to demonstrate them prior to the end of the session.  Ladarius demonstrated good  understanding of the education provided. See EMR under Patient Instructions for exercises provided during therapy sessions    ASSESSMENT     Performed therex this session in order to aid in mobility and decreasing UE symptoms. Pt with good response to last session as evidenced by significant decrease in R UE symptoms. Pt continues with mobility issues around the house. Taut B trapezius noted > on R side. Pt noted significant decrease in right UE symptoms post manual therapy session. Will continue to progress pt per his tolerance and POC.     Ladarius Is progressing well towards his goals.   Patient prognosis is Fair.   Patientt will benefit from skilled outpatient Physical Therapy to address the deficits stated above and in the chart below, provide patient /family education, and to maximize patientt's level of independence.      Plan of care discussed with patient: Yes  Patient's spiritual, cultural and educational needs considered and patient is agreeable to the plan of care and goals as stated below:      Anticipated Barriers for therapy: chronicity of pain, previous PT attempts with non-compliance to HEP      Goals:   Short Term Goals (4 Weeks):   1. Pt will be independent with HEP to supplement PT in improving pain free cervical mobility  2. Pt will improve cervical and lumbar AROM 10 deg in planes to improve cervical mobility for driving  3. Pt will improve impaired MMTs by 1/2 grade in all planes to improve strength for lifting and carrying tasks.  4. Pt will demonstrate improved sitting posture to decrease  pain experienced in head and neck.  Long Term Goals (8 Weeks):   1. Pt will improve FOTO to </=39% limitation to improve perceived limitation with changing and maintaining mobility.  2. Pt will improve cervical and lumbar AROM to WNL in all planes to improve cervical mobility for driving   3. Pt will improve MMTs 1 grade in all planes to improve strength for lifting and carrying tasks.  4. Pt will report no pain with lifting 10 lbs to promote physical activity.   5. Pt will report no pain with cervical AROM in all planes to promote QOL.      PLAN     Continue per established PT POC.    Trino Wang, PTA

## 2022-02-07 ENCOUNTER — OFFICE VISIT (OUTPATIENT)
Dept: PSYCHIATRY | Facility: CLINIC | Age: 67
End: 2022-02-07
Payer: MEDICARE

## 2022-02-07 DIAGNOSIS — F41.1 GAD (GENERALIZED ANXIETY DISORDER): Primary | ICD-10-CM

## 2022-02-07 PROCEDURE — 99214 PR OFFICE/OUTPT VISIT, EST, LEVL IV, 30-39 MIN: ICD-10-PCS | Mod: 95,,, | Performed by: NURSE PRACTITIONER

## 2022-02-07 PROCEDURE — 99214 OFFICE O/P EST MOD 30 MIN: CPT | Mod: 95,,, | Performed by: NURSE PRACTITIONER

## 2022-02-07 RX ORDER — VENLAFAXINE HYDROCHLORIDE 37.5 MG/1
37.5 CAPSULE, EXTENDED RELEASE ORAL DAILY
Qty: 30 CAPSULE | Refills: 11 | Status: SHIPPED | OUTPATIENT
Start: 2022-02-07 | End: 2023-02-19 | Stop reason: SDUPTHER

## 2022-02-07 RX ORDER — VENLAFAXINE HYDROCHLORIDE 75 MG/1
75 CAPSULE, EXTENDED RELEASE ORAL DAILY
Qty: 30 CAPSULE | Refills: 11 | Status: SHIPPED | OUTPATIENT
Start: 2022-02-07 | End: 2023-02-19 | Stop reason: SDUPTHER

## 2022-02-07 NOTE — PROGRESS NOTES
"2/7/2022 4:00 PM   Ladarius Solsi   1955   [id number]                                                                   OUTPATIENT PSYCHIATRY FOLLOW- UP VISIT     Reason for Encounter:  Ladarius Solis, a 66 y.o. male,who presents today for follow up of depression, anxiety. Met with patient.     Interval History and Content of Current Session:     Today,  Pt reports since last visit, reports had been depleted of medication, and "couldn't deal with things." Reports since last visit started back on medications about two days prior.     Reports pain has been keeping patient up at night due to cervical stenosis and reports will need surgery soon to help with this. Mood has been "ok" but mostly disheartened due to pain.  Reports most of anxiety/mood issues have been related to pain lately.     Reports enjoys going to restaurants, will have coffee, "just sit there and relax." "I like to do things that help me relax." Reports still having issues with spending money consistently, had to declare bankruptcy, reports spending 50 to 100 mg overspending per week. Reports spending money, some possible hoarding of [employer] books when younger.     Reports has been still having issues with roommate and dynamic.      Reports PT is helpful but is temporary, but no other issues. Things have gotten a lot worse, stress, financial situation has deteriorated.               Psychosocial stressors: financial, social, family        Review Of Systems:      Medical Review Of Systems:  Pertinent items are noted in HPI.     Psychiatric Review Of Systems:  sleep: yes - improved, 7 to 8 hours per night with trazodone  appetite changes: no  weight changes: no  energy/anergy: no  interest/pleasure/anhedonia: no  somatic symptoms: no  libido: no  anxiety/panic: no  guilty/hopeless: no  S.I.B.s/risky behavior: no  any drugs: no  alcohol: no       Sleep: 4 to 5 hours of sleep         Risk Parameters:  Patient reports no suicidal " "ideation  Patient reports no homicidal ideation  Patient reports no self-injurious behavior  Patient reports no violent behavior        Current meds- venlafaxine     Compliance: yes     Side effects: "loopiness with trazodone"        Psychiatric, social, and family history reviewed this visit           Objective      ALL MEDICATIONS:     Current Outpatient Medications:     lisinopriL 10 MG tablet, Take 1 tablet (10 mg total) by mouth once daily., Disp: 90 tablet, Rfl: 3    venlafaxine (EFFEXOR-XR) 75 MG 24 hr capsule, Take 1 capsule (75 mg total) by mouth once daily., Disp: 30 capsule, Rfl: 2  Venlafaxine ER 37.5 mg by mouth once daily  No current facility-administered medications for this visit.     Facility-Administered Medications Ordered in Other Visits:     sodium hyaluronate (EUFLEXXA) 10 mg/mL(mw 2.4 -3.6 million) Syrg 20 mg, 20 mg, Intra-articular, , W Toi Carr MD, 20 mg at 05/10/18 2596     ALLERGIES:  Review of patient's allergies indicates:  No Known Allergies     RELEVANT LABS/STUDIES:              Lab Results   Component Value Date     WBC 7.19 02/24/2021     HGB 14.6 02/24/2021     HCT 43.8 02/24/2021     MCV 91 02/24/2021      02/24/2021      BMP            Lab Results   Component Value Date      (L) 02/24/2021     K 4.3 02/24/2021      02/24/2021     CO2 26 02/24/2021     BUN 16 02/24/2021     CREATININE 1.1 02/24/2021     CALCIUM 9.0 02/24/2021     ANIONGAP 7 (L) 02/24/2021     ESTGFRAFRICA >60 02/24/2021     EGFRNONAA >60 02/24/2021                Lab Results   Component Value Date     ALT 27 02/24/2021     AST 18 02/24/2021     ALKPHOS 67 02/24/2021     BILITOT 0.6 02/24/2021                Lab Results   Component Value Date     TSH 2.292 02/24/2021                Lab Results   Component Value Date     HGBA1C 5.6 02/06/2019         Constitutional  Vitals:  Most recent vital signs, dated less than 90 days prior to this appointment, were reviewed.    There were no vitals " filed for this visit.            PHYSICAL EXAM  General: well developed, well nourished  Neurologic:   Gait: Normal   Psychomotor signs:  No involuntary movements or tremor  AIMS: none     PSYCHIATRIC EXAM:     Mental Status Exam:  Appearance: unremarkable, age appropriate  Behavior/Cooperation: normal, cooperative  Speech: normal tone, normal rate, normal pitch, normal volume  Language: uses words appropriately; NO aphasia or dysarthria  Mood: steady  Affect: full, congruent with mood and appropriate to content/situation  Thought Process: normal and logical  Thought Content: normal, no suicidality, no homicidality, delusions, or paranoia  Level of Consciousness: Alert and Oriented x3  Memory:  Intact  Attention/concentration: appropriate for age/education.   Fund of Knowledge: appears adequate  Insight:  Intact - fair  Judgment: Intact      Assessment and Diagnosis   Status/Progress: Based on the examination today, the patient's problem(s) is/are improved and adequately but not ideally controlled.  New problems have not been presented today.   Co-morbidities are not complicating management of the primary condition.  There are no active rule-out diagnoses for this patient at this time.      General Impression:   RUBIO  Cluster C traits versus OCPD  Rule out OCD  Social Anxiety  Rule out complex PTSD        Intervention/Counseling/Treatment Plan   · Medication Management: -        Continue venlafaxine 112.5 mg by mouth once daily  · Labs: reviewed most recent  · The treatment plan and follow up plan were reviewed with the patient.  · Discussed with patient informed consent, risks vs. benefits, alternative treatments, side effect profile and the inherent unpredictability of individual responses to these treatments. The patient expresses understanding of the above and displays the capacity to agree with this current plan and had no other questions.  · Encouraged Patient to keep future appointments.   · Take medications  as prescribed and abstain from substance abuse.   · In the event of an emergency patient was advised to go to the emergency room.     Return to Clinic: 3 to 6 months     > than 50% of total time spend on coordination of care and counseling   (which included pts differential diagnosis and prognosis for psychiatric conditions, risks, benefits of treatments, instructions and adherence to treatment plan, risk reduction, reviewing current psychiatric medication regimen, medical problems and social stressors. In addtion to possible discussion with other healthcare provider/s)     Add on Psychotherapy time:0  Total Face time: 25 min     The patient location is:  [place], at home  The chief complaint leading to consultation is: med f/u     Visit type: audiovisual     Face to Face time with patient: 25 min  30 minutes of total time spent on the encounter, which includes face to face time and non-face to face time preparing to see the patient (eg, review of tests), Obtaining and/or reviewing separately obtained history, Documenting clinical information in the electronic or other health record, Independently interpreting results (not separately reported) and communicating results to the patient/family/caregiver, or Care coordination (not separately reported).            Each patient to whom he or she provides medical services by telemedicine is:  (1) informed of the relationship between the physician and patient and the respective role of any other health care provider with respect to management of the patient; and (2) notified that he or she may decline to receive medical services by telemedicine and may withdraw from such care at any time.     Notes:      Stan Noland, MSN, APRN, PMHNP-BC [facility]   2/7/2022 4:00 PM

## 2022-02-08 ENCOUNTER — DOCUMENTATION ONLY (OUTPATIENT)
Dept: REHABILITATION | Facility: HOSPITAL | Age: 67
End: 2022-02-08

## 2022-02-08 NOTE — PROGRESS NOTES
PT/PTA met face to face to discuss pt's treatment plan and progress towards established goals. Pt will be seen by a physical therapist minimally every 6th visit or every 30 days.    Trino Wang PTA

## 2022-02-08 NOTE — PROGRESS NOTES
"[facility]   Physical Therapy Treatment Note     Name: Ladarius Solis  Clinic Number: [id number]    Therapy Diagnosis:   Encounter Diagnoses   Name Primary?    Other chronic pain     Neck pain     Cervical radiculopathy at C7      Physician: Brielle Jacobs, *    Visit Date: 2/9/2022    Physician Orders: PT Eval and Treat   Medical Diagnosis from Referral:   M54.12 (ICD-10-CM) - Cervical radiculopathy at C7   M77.11 (ICD-10-CM) - Lateral epicondylitis of right elbow   Evaluation Date: 12/14/2021  Authorization Period Expiration: 6/1/2022  Plan of Care Expiration: 2/14/2022  Progress Note Due: 1/14/2022  Visit # / Visits authorized: 1/1, 10/20   FOTO: 5/5       Precautions: Standard and HTN         PTA Visit #: 4/5     Time In: 1:50 pm   Time Out: 2:52 pm   Total Billable Time: 62 minutes    SUBJECTIVE     Pt reports: No neck pain, mostly mild pain in his low back. Having difficulty sleeping due to decreased comfort. It's better when he gets moving around.  He was compliant with home exercise program.  Response to previous treatment: no adverse effects  Functional change: see subjective statement    Pain: 2/10  Location: Low back    OBJECTIVE     Objective Measures updated at progress report unless specified.   FOTO: 49%    Treatment     Ladarius received the treatments listed below:      therapeutic exercises to develop strength, endurance, ROM, flexibility, posture and core stabilization for 57 minutes including:  Nu step 5 minutes   UBE 4 min Bk/Fw  Standing calf stretch 3 x 30s BLE  Seated hamstring stretch 3x30s BLE  Upper trap stretch 3x30s B  Chin tucks 2x10, 3" hold  -Bridges 3x10 3"  Seated Piriformis stretch 3/30s  LTRs 3 minutes   -Rows blue theraband 2x10  -Shoulder extension with marching green theraband x10 BLE  -Shuttle 2.5 cords 3x10- NP      manual therapy techniques and objective measures above: STM were applied to the: c-spine for 5 minutes, including:  Manual " cervical traction       hot pack for 10 minutes to back and neck pre-treatment.      Patient Education and Home Exercises     Home Exercises Provided and Patient Education Provided     Education provided:   - HEP compliance    Written Home Exercises Provided: yes. Exercises were reviewed and Ladarius was able to demonstrate them prior to the end of the session.  Ladarius demonstrated good  understanding of the education provided. See EMR under Patient Instructions for exercises provided during therapy sessions    ASSESSMENT     Pt is progressing as evidenced by a decrease in radicular symptoms into R UE. He continues with sleeping difficulty sleeping. Pt requires extra time to perform bridges due to core weakness and low back pain from prolonged supine position. Will continue to progress pt per his tolerance and POC.     Ladarius Is progressing well towards his goals.   Patient prognosis is Fair.   Patientt will benefit from skilled outpatient Physical Therapy to address the deficits stated above and in the chart below, provide patient /family education, and to maximize patientt's level of independence.      Plan of care discussed with patient: Yes  Patient's spiritual, cultural and educational needs considered and patient is agreeable to the plan of care and goals as stated below:      Anticipated Barriers for therapy: chronicity of pain, previous PT attempts with non-compliance to HEP      Goals:   Short Term Goals (4 Weeks):   1. Pt will be independent with HEP to supplement PT in improving pain free cervical mobility  2. Pt will improve cervical and lumbar AROM 10 deg in planes to improve cervical mobility for driving  3. Pt will improve impaired MMTs by 1/2 grade in all planes to improve strength for lifting and carrying tasks.  4. Pt will demonstrate improved sitting posture to decrease pain experienced in head and neck.  Long Term Goals (8 Weeks):   1. Pt will improve FOTO to </=39% limitation to improve  perceived limitation with changing and maintaining mobility.  2. Pt will improve cervical and lumbar AROM to WNL in all planes to improve cervical mobility for driving   3. Pt will improve MMTs 1 grade in all planes to improve strength for lifting and carrying tasks.  4. Pt will report no pain with lifting 10 lbs to promote physical activity.   5. Pt will report no pain with cervical AROM in all planes to promote QOL.      PLAN     Continue per established PT POC.    Trino Wang, PTA

## 2022-02-08 NOTE — PROGRESS NOTES
PT/PTA met face to face to discuss pt's treatment plan and progress towards established goals. Pt will be seen by a physical therapist minimally every 6th visit or every 30 days.    Debby Reyes PTA    Face to Face PTA Conference performed with Debby Reyes PTA regarding patient's current status, overall progress, and plan of care.    Javier Claros, PT

## 2022-02-09 ENCOUNTER — CLINICAL SUPPORT (OUTPATIENT)
Dept: REHABILITATION | Facility: HOSPITAL | Age: 67
End: 2022-02-09
Payer: MEDICARE

## 2022-02-09 DIAGNOSIS — M54.12 CERVICAL RADICULOPATHY AT C7: ICD-10-CM

## 2022-02-09 DIAGNOSIS — M54.2 NECK PAIN: ICD-10-CM

## 2022-02-09 DIAGNOSIS — G89.29 OTHER CHRONIC PAIN: ICD-10-CM

## 2022-02-09 PROCEDURE — 97110 THERAPEUTIC EXERCISES: CPT | Mod: CQ

## 2022-02-11 ENCOUNTER — CLINICAL SUPPORT (OUTPATIENT)
Dept: REHABILITATION | Facility: HOSPITAL | Age: 67
End: 2022-02-11
Payer: MEDICARE

## 2022-02-11 DIAGNOSIS — G89.29 OTHER CHRONIC PAIN: ICD-10-CM

## 2022-02-11 DIAGNOSIS — M54.12 CERVICAL RADICULOPATHY AT C7: ICD-10-CM

## 2022-02-11 DIAGNOSIS — M54.2 NECK PAIN: ICD-10-CM

## 2022-02-11 PROCEDURE — 97110 THERAPEUTIC EXERCISES: CPT | Mod: CQ

## 2022-02-11 NOTE — PROGRESS NOTES
"[facility]   Physical Therapy Treatment Note     Name: Ladarius Solis  Clinic Number: [id number]    Therapy Diagnosis:   No diagnosis found.  Physician: Brielle Jacobs, *    Visit Date: 2/11/2022    Physician Orders: PT Eval and Treat   Medical Diagnosis from Referral:   M54.12 (ICD-10-CM) - Cervical radiculopathy at C7   M77.11 (ICD-10-CM) - Lateral epicondylitis of right elbow   Evaluation Date: 12/14/2021  Authorization Period Expiration: 6/1/2022  Plan of Care Expiration: 2/14/2022  Progress Note Due: 1/14/2022  Visit # / Visits authorized: 1/1, 11/20   FOTO: 6       Precautions: Standard and HTN         PTA Visit #: 5/5     Time In: 1:38 pm   Time Out: 2:48 pm   Total Billable Time: 70 minutes    SUBJECTIVE     Pt reports: Very mild pain, it increases with prolonged standing. He tries to position pillows throughout the night but it doesn't help. He feels the exercises are what really is helping him the most. Decreased RUE radicular symptoms.  He was compliant with home exercise program.  Response to previous treatment: no adverse effects  Functional change: see subjective statement    Pain: 2/10  Location: Low back    OBJECTIVE     Objective Measures updated at progress report unless specified.   FOTO: 49%    Treatment     Ladarius received the treatments listed below:      therapeutic exercises to develop strength, endurance, ROM, flexibility, posture and core stabilization for 60 minutes including:  Nu step 5 minutes   UBE 4 min Bk/Fw  Standing calf stretch 3 x 30s BLE  Seated hamstring stretch 3x30s BLE  Upper trap stretch 3x30s B  Chin tucks 2x10, 3" hold  -Bridges 3x10 3"  Seated Piriformis stretch 3/30s  LTRs 3 minutes 3"  -Rows blue theraband 2x10  -Shoulder extension with marching green theraband x10 BLE  -Shuttle 2.5 cords 3x10  Corner stretch 3x30"      manual therapy techniques and objective measures above: STM were applied to the: L-spine for 10 minutes, " including:  Percussion gun 5 min    hot pack for 10 minutes to back and neck pre-treatment.      Patient Education and Home Exercises     Home Exercises Provided and Patient Education Provided     Education provided:   - HEP compliance    Written Home Exercises Provided: yes. Exercises were reviewed and Ladarius was able to demonstrate them prior to the end of the session.  Ladarius demonstrated good  understanding of the education provided. See EMR under Patient Instructions for exercises provided during therapy sessions    ASSESSMENT     Pt is progressing as evidenced by a decrease in radicular symptoms into R UE. He continues with sleeping difficulty sleeping. Pt requires extra time to perform bridges due to core weakness and low back pain from prolonged supine position. Pt tolerated addition of corner stretch. Will continue to progress pt per his tolerance and POC.     Ladarius Is progressing well towards his goals.   Patient prognosis is Fair.   Patientt will benefit from skilled outpatient Physical Therapy to address the deficits stated above and in the chart below, provide patient /family education, and to maximize patientt's level of independence.      Plan of care discussed with patient: Yes  Patient's spiritual, cultural and educational needs considered and patient is agreeable to the plan of care and goals as stated below:      Anticipated Barriers for therapy: chronicity of pain, previous PT attempts with non-compliance to HEP      Goals:   Short Term Goals (4 Weeks):   1. Pt will be independent with HEP to supplement PT in improving pain free cervical mobility  2. Pt will improve cervical and lumbar AROM 10 deg in planes to improve cervical mobility for driving  3. Pt will improve impaired MMTs by 1/2 grade in all planes to improve strength for lifting and carrying tasks.  4. Pt will demonstrate improved sitting posture to decrease pain experienced in head and neck.  Long Term Goals (8 Weeks):   1. Pt will  improve FOTO to </=39% limitation to improve perceived limitation with changing and maintaining mobility.  2. Pt will improve cervical and lumbar AROM to WNL in all planes to improve cervical mobility for driving   3. Pt will improve MMTs 1 grade in all planes to improve strength for lifting and carrying tasks.  4. Pt will report no pain with lifting 10 lbs to promote physical activity.   5. Pt will report no pain with cervical AROM in all planes to promote QOL.      PLAN     Continue per established PT POC.    Trino Wang, PTA

## 2022-02-16 ENCOUNTER — CLINICAL SUPPORT (OUTPATIENT)
Dept: REHABILITATION | Facility: HOSPITAL | Age: 67
End: 2022-02-16
Payer: MEDICARE

## 2022-02-16 DIAGNOSIS — M54.12 CERVICAL RADICULOPATHY AT C7: ICD-10-CM

## 2022-02-16 DIAGNOSIS — M54.2 NECK PAIN: ICD-10-CM

## 2022-02-16 DIAGNOSIS — G89.29 OTHER CHRONIC PAIN: ICD-10-CM

## 2022-02-16 PROCEDURE — 97110 THERAPEUTIC EXERCISES: CPT | Mod: CQ

## 2022-02-16 NOTE — PLAN OF CARE
Outpatient Therapy Updated Plan of Care     Visit Date: 2022  Name: Ladarius Solis  Clinic Number: [id number]    Therapy Diagnosis:   Encounter Diagnoses   Name Primary?    Other chronic pain     Neck pain     Cervical radiculopathy at C7      Physician: Brielle Jacobs, *    Physician Orders: PT Eval and Treat   Medical Diagnosis from Referral:   M54.12 (ICD-10-CM) - Cervical radiculopathy at C7   M77.11 (ICD-10-CM) - Lateral epicondylitis of right elbow   Evaluation Date: 2021  Authorization Period Expiration: 2022  Plan of Care Expiration: 2022  Visit # / Visits authorized:      Current Certification Period:  2022 to 2022  Precautions:  Standard and HTN    Subjective     Update: Pt reports that he feels like coming to physical therapy has helped tremendously with his functioning and ability to tolerate activity. He continues to endorse some R UE pain, but all pain improves with his exercises and moist heat. He states that he does still get pain frequently that he manages with exercises. The numbness in his hand has improved to hardly notice it.     Objective     Update:   Lumbar AROM: Pain/Dysfunction with Movement:   Flexion: WFL    Extension: WFL     Right side bendin degrees     Left side bendin degrees    Right rotation: ~50% limited     Left rotation: ~50% limited          Right Left Comment   Hip Flexion: 4+/5 4+/5     Hip ABD: 4+/5 4+/5     Hip ADD: 4+/5 4+/5     Hip Extension: NT NT Performs fairly good bridge against gravity with no pain   Hip IR: 4/5 4/5    Hip ER: 4/5 4/5             Right Left Comment   DF: 4+/5 4+/5     PF: 4+/5 4+/5     Knee ext: 4+/5 4+/5     Knee flex: 4/5 4/5            Assessment     Update: Pt shows some objective improvements as indicated above, but his subjective reports indicate significant improvement in function and pain levels. He has improved greatly in lumbar flexion ROM and is able to perform all planes of  lumbar ROM without increasing pain levels. Discussed putting therapy on hold for one month while maintaining with HEP to assess progress at home. Pt to contact clinic with any questions or concerns. Pt agreeable to plan.  Pt would benefit from skilled physical therapy services to address remaining impairments and to continue to progress towards pt specific goals should hold trial fail.     Previous Short Term Goals Status:     Short Term Goals (4 Weeks):   1. Pt will be independent with HEP to supplement PT in improving pain free cervical mobility - MET  2. Pt will improve cervical and lumbar AROM 10 deg in planes to improve cervical mobility for driving - PROGRESSING, NOT MET  3. Pt will improve impaired MMTs by 1/2 grade in all planes to improve strength for lifting and carrying tasks. - MET  4. Pt will demonstrate improved sitting posture to decrease pain experienced in head and neck. - MET    New Short Term Goals Status:   Continue to progress towards STGs and LTGs from initial POC.  Long Term Goal Status:   continue per initial plan of care.  Reasons for Recertification of Therapy:    POC    Plan     Updated Certification Period: 2022 to 2022  Recommended Treatment Plan: 1 times per week for 8 weeks: Cervical/Lumbar Traction, Electrical Stimulation  , Gait Training, Manual Therapy, Moist Heat/ Ice, Neuromuscular Re-ed, Patient Education, Self Care, Therapeutic Activities, Therapeutic Exercise, Ultrasound and kinesiotaping, IASTM, and dry needling    Javier Claros, PT  2022      I CERTIFY THE NEED FOR THESE SERVICES FURNISHED UNDER THIS PLAN OF TREATMENT AND WHILE UNDER MY CARE    Physician's comments:        Physician's Signature: ___________________________________________________

## 2022-02-16 NOTE — PROGRESS NOTES
"[facility]   Physical Therapy Treatment Note     Name: Ladarius Solis  Clinic Number: [id number]    Therapy Diagnosis:   No diagnosis found.  Physician: Brielle Jacobs, *    Visit Date: 2/16/2022    Physician Orders: PT Eval and Treat   Medical Diagnosis from Referral:   M54.12 (ICD-10-CM) - Cervical radiculopathy at C7   M77.11 (ICD-10-CM) - Lateral epicondylitis of right elbow   Evaluation Date: 12/14/2021  Authorization Period Expiration: 6/1/2022  Plan of Care Expiration: 2/14/2022  Progress Note Due: 1/14/2022  Visit # / Visits authorized: 1/1, 12/20   FOTO: 7       Precautions: Standard and HTN         PTA Visit #: 0/5     Time In: 145 pm   Time Out: 303 pm   Total Billable Time: 78 minutes    SUBJECTIVE     Pt reports: R forearm pain  He was compliant with home exercise program.  Response to previous treatment: no adverse effects  Functional change: see subjective statement    Pain: 2/10  Location: Low back    OBJECTIVE     Objective Measures updated at progress report unless specified.   FOTO: 49%    Treatment     Ladarius received the treatments listed below:      therapeutic exercises to develop strength, endurance, ROM, flexibility, posture and core stabilization for 60 minutes including:  Nu step 6 minutes   UBE 4 min Bk/Fw  Standing calf stretch 3 x 30s BLE  Seated hamstring stretch 3x30s BLE  Upper trap stretch 3x30s B  Chin tucks 2x10, 3" hold  -Bridges 3x10 3"  Seated Piriformis stretch 3/30s  LTRs 3 minutes 3"  -Rows blue theraband 2x10  -Shoulder extension with marching green theraband x10 BLE  -Shuttle 2.5 cords 3x10  Corner stretch 3x30"  PPT x 20      manual therapy techniques and objective measures above: STM were applied to the: L-spine for 0 minutes, including:  Percussion gun 5 min    hot pack for 15 minutes neck pre-treatment.      Patient Education and Home Exercises     Home Exercises Provided and Patient Education Provided     Education provided:   - " HEP compliance  -recurrence of symptoms    Written Home Exercises Provided: yes. Exercises were reviewed and Ladarius was able to demonstrate them prior to the end of the session.  Ladarius demonstrated good  understanding of the education provided. See EMR under Patient Instructions for exercises provided during therapy sessions    ASSESSMENT   Patients plan of care to be updated prior to patients treatment session by Javier Byrd DPT.  Pt tolerated treatment well. Pt continues with low back pain and mild redicular symptoms into R UE. Patient has made significant gains, but I feel he has reached his maximum potential at this time. Pt discussed taking a break from therapy, continue his HOME EXERCISE PROGRAM daily and see how he responds to it.    Ladarius Is progressing well towards his goals.   Patient prognosis is Fair.   Patientt will benefit from skilled outpatient Physical Therapy to address the deficits stated above and in the chart below, provide patient /family education, and to maximize patientt's level of independence.      Plan of care discussed with patient: Yes  Patient's spiritual, cultural and educational needs considered and patient is agreeable to the plan of care and goals as stated below:      Anticipated Barriers for therapy: chronicity of pain, previous PT attempts with non-compliance to HEP      Goals:   Short Term Goals (4 Weeks):   1. Pt will be independent with HEP to supplement PT in improving pain free cervical mobility  2. Pt will improve cervical and lumbar AROM 10 deg in planes to improve cervical mobility for driving  3. Pt will improve impaired MMTs by 1/2 grade in all planes to improve strength for lifting and carrying tasks.  4. Pt will demonstrate improved sitting posture to decrease pain experienced in head and neck.  Long Term Goals (8 Weeks):   1. Pt will improve FOTO to </=39% limitation to improve perceived limitation with changing and maintaining mobility.  2. Pt will improve  cervical and lumbar AROM to WNL in all planes to improve cervical mobility for driving   3. Pt will improve MMTs 1 grade in all planes to improve strength for lifting and carrying tasks.  4. Pt will report no pain with lifting 10 lbs to promote physical activity.   5. Pt will report no pain with cervical AROM in all planes to promote QOL.      PLAN     Continue per established PT POC.    Trino Wang, PTA

## 2022-03-15 ENCOUNTER — OFFICE VISIT (OUTPATIENT)
Dept: PSYCHIATRY | Facility: CLINIC | Age: 67
End: 2022-03-15
Payer: MEDICARE

## 2022-03-15 DIAGNOSIS — F32.A DEPRESSION, UNSPECIFIED DEPRESSION TYPE: Primary | ICD-10-CM

## 2022-03-15 PROCEDURE — 99999 PR PBB SHADOW E&M-EST. PATIENT-LVL I: ICD-10-PCS | Mod: PBBFAC,,, | Performed by: SOCIAL WORKER

## 2022-03-15 PROCEDURE — 99999 PR PBB SHADOW E&M-EST. PATIENT-LVL I: CPT | Mod: PBBFAC,,, | Performed by: SOCIAL WORKER

## 2022-03-15 PROCEDURE — 90834 PSYTX W PT 45 MINUTES: CPT | Mod: S$GLB,,, | Performed by: SOCIAL WORKER

## 2022-03-15 PROCEDURE — 90834 PR PSYCHOTHERAPY W/PATIENT, 45 MIN: ICD-10-PCS | Mod: S$GLB,,, | Performed by: SOCIAL WORKER

## 2022-03-16 NOTE — PROGRESS NOTES
Individual Psychotherapy (PhD/LCSW)    3/15/2022    Site:  [facility]         Therapeutic Intervention: Met with patient.  Outpatient - Insight oriented psychotherapy 45 min - CPT code 91834    Chief complaint/reason for encounter: depression, anxiety, behavior and interpersonal     Interval history and content of current session: discussed loss and grief, discussed dealing with his room mate, and changes he has made over the years, past relationships with family, taking care of family, what he likes to do, mood is better, anxiety is less, and he is feeling better gradually over time. How to cope, taking care of himself, goals, and being in the present and doing as well as he can addressed, and hope and goals, changes and feeling better all focused on, coping skills and taking care of himself all addressed.    Treatment plan:  · Target symptoms: depression, recurrent depression, anxiety , mood swings, mood disorder, adjustment, grief  · Why chosen therapy is appropriate versus another modality: relevant to diagnosis, patient responds to this modality, evidence based practice  · Outcome monitoring methods: self-report, observation  · Therapeutic intervention type: insight oriented psychotherapy, behavior modifying psychotherapy, supportive psychotherapy    Risk parameters:  Patient reports no suicidal ideation  Patient reports no homicidal ideation  Patient reports no self-injurious behavior  Patient reports no violent behavior    Verbal deficits: None    Patient's response to intervention:  The patient's response to intervention is accepting, motivated.    Progress toward goals and other mental status changes:  The patient's progress toward goals is fair .    Diagnosis:     ICD-10-CM ICD-9-CM   1. Depression, unspecified depression type  F32.A 311       Plan:  individual psychotherapy, consult psychiatrist for medication evaluation and medication management by physician    Return to clinic: 2 weeks    Length  of Service (minutes): 45

## 2022-03-22 ENCOUNTER — DOCUMENTATION ONLY (OUTPATIENT)
Dept: REHABILITATION | Facility: HOSPITAL | Age: 67
End: 2022-03-22
Payer: MEDICARE

## 2022-03-22 NOTE — PROGRESS NOTES
Pt to be discharged from outpatient physical therapy services at this time. Pt has not attended therapy in >30 days without contacting clinic.

## 2022-03-28 ENCOUNTER — PATIENT MESSAGE (OUTPATIENT)
Dept: PSYCHIATRY | Facility: CLINIC | Age: 67
End: 2022-03-28
Payer: MEDICARE

## 2022-03-29 ENCOUNTER — PATIENT MESSAGE (OUTPATIENT)
Dept: PSYCHIATRY | Facility: CLINIC | Age: 67
End: 2022-03-29
Payer: MEDICARE

## 2022-03-29 ENCOUNTER — OFFICE VISIT (OUTPATIENT)
Dept: PSYCHIATRY | Facility: CLINIC | Age: 67
End: 2022-03-29
Payer: MEDICARE

## 2022-03-29 DIAGNOSIS — F41.1 GAD (GENERALIZED ANXIETY DISORDER): Primary | ICD-10-CM

## 2022-03-29 DIAGNOSIS — F32.A DEPRESSION, UNSPECIFIED DEPRESSION TYPE: ICD-10-CM

## 2022-03-29 PROCEDURE — 99999 PR PBB SHADOW E&M-EST. PATIENT-LVL I: CPT | Mod: PBBFAC,,, | Performed by: SOCIAL WORKER

## 2022-03-29 PROCEDURE — 99999 PR PBB SHADOW E&M-EST. PATIENT-LVL I: ICD-10-PCS | Mod: PBBFAC,,, | Performed by: SOCIAL WORKER

## 2022-03-29 PROCEDURE — 90834 PSYTX W PT 45 MINUTES: CPT | Mod: S$GLB,,, | Performed by: SOCIAL WORKER

## 2022-03-29 PROCEDURE — 90834 PR PSYCHOTHERAPY W/PATIENT, 45 MIN: ICD-10-PCS | Mod: S$GLB,,, | Performed by: SOCIAL WORKER

## 2022-03-29 NOTE — PROGRESS NOTES
Individual Psychotherapy (PhD/LCSW)    3/29/2022    Site:  [facility]         Therapeutic Intervention: Met with patient.  Outpatient - Insight oriented psychotherapy 45 min - CPT code 21728    Chief complaint/reason for encounter: depression, mood swings, anger, anxiety, sleep, appetite, behavior, somatic, interpersonal and loss and grief and issues with room mate     Interval history and content of current session: discussed his room mate's  moved in with them, he is friendly and sometimes arrogant the client states, the room mate has  Lot of problems and is getting help he states, taking care of himself, communications, managing his own feelings and behavior, and emotions focused on, and not over reacting addressed as his room mate over reacts often. Taking care of himself, boundaries, and not getting into drama of other people discussed and coping skills, likes to stay with himself. Continue to take things one day at a time, encouraged.    Treatment plan:  · Target symptoms: depression, recurrent depression, anxiety , mood swings, mood disorder, adjustment, grief  · Why chosen therapy is appropriate versus another modality: relevant to diagnosis, patient responds to this modality, evidence based practice  · Outcome monitoring methods: self-report, observation  · Therapeutic intervention type: insight oriented psychotherapy, behavior modifying psychotherapy, supportive psychotherapy    Risk parameters:  Patient reports no suicidal ideation  Patient reports no homicidal ideation  Patient reports no self-injurious behavior  Patient reports no violent behavior    Verbal deficits: None    Patient's response to intervention:  The patient's response to intervention is accepting.    Progress toward goals and other mental status changes:  The patient's progress toward goals is fair .    Diagnosis:     ICD-10-CM ICD-9-CM   1. RUBIO (generalized anxiety disorder)  F41.1 300.02   2. Depression, unspecified  depression type  F32.A 311       Plan:  individual psychotherapy, consult psychiatrist for medication evaluation and medication management by physician    Return to clinic: 1 week    Length of Service (minutes): 45

## 2022-04-05 ENCOUNTER — OFFICE VISIT (OUTPATIENT)
Dept: PSYCHIATRY | Facility: CLINIC | Age: 67
End: 2022-04-05
Payer: MEDICARE

## 2022-04-05 DIAGNOSIS — F41.1 GAD (GENERALIZED ANXIETY DISORDER): Primary | ICD-10-CM

## 2022-04-05 PROCEDURE — 99999 PR PBB SHADOW E&M-EST. PATIENT-LVL I: ICD-10-PCS | Mod: PBBFAC,,, | Performed by: SOCIAL WORKER

## 2022-04-05 PROCEDURE — 90834 PR PSYCHOTHERAPY W/PATIENT, 45 MIN: ICD-10-PCS | Mod: S$GLB,,, | Performed by: SOCIAL WORKER

## 2022-04-05 PROCEDURE — 90834 PSYTX W PT 45 MINUTES: CPT | Mod: S$GLB,,, | Performed by: SOCIAL WORKER

## 2022-04-05 PROCEDURE — 99999 PR PBB SHADOW E&M-EST. PATIENT-LVL I: CPT | Mod: PBBFAC,,, | Performed by: SOCIAL WORKER

## 2022-04-06 NOTE — PROGRESS NOTES
Individual Psychotherapy (PhD/LCSW)    4/5/2022    Site:  [facility]         Therapeutic Intervention: Met with patient.  Outpatient - Insight oriented psychotherapy 45 min - CPT code 89051    Chief complaint/reason for encounter: depression, anxiety, sleep, appetite, behavior, somatic and interpersonal     Interval history and content of current session: discussed his family, and history of his family, discussed being helped over the years in therapy, discussed his health and medical health, and also his pain levels at times, coping skills, taking time for himself, and also getting along with his room mate who is hard to get along with at times, addressed, how to communicate, how to cope and how to mange his feelings when she does certain things and states certain things that are upsetting addressed.. boundaries, and her  moved in and he is easier to get along with, and sometimes, the room mate and her  yell at each other and this us upsetting for the client as he likes peace and quite. So he talks with them about being more calm and not yell and they are doing this less.    Treatment plan:  · Target symptoms: depression, anxiety , adjustment  · Why chosen therapy is appropriate versus another modality: relevant to diagnosis, patient responds to this modality, evidence based practice  · Outcome monitoring methods: self-report, observation  · Therapeutic intervention type: insight oriented psychotherapy, behavior modifying psychotherapy, supportive psychotherapy    Risk parameters:  Patient reports no suicidal ideation  Patient reports no homicidal ideation  Patient reports no self-injurious behavior  Patient reports no violent behavior    Verbal deficits: None    Patient's response to intervention:  The patient's response to intervention is accepting, motivated.    Progress toward goals and other mental status changes:  The patient's progress toward goals is limited.    Diagnosis:     ICD-10-CM  ICD-9-CM   1. RUBIO (generalized anxiety disorder)  F41.1 300.02       Plan:  individual psychotherapy and consult psychiatrist for medication evaluation    Return to clinic: 2 weeks    Length of Service (minutes): 45

## 2022-04-12 ENCOUNTER — OFFICE VISIT (OUTPATIENT)
Dept: PSYCHIATRY | Facility: CLINIC | Age: 67
End: 2022-04-12
Payer: MEDICARE

## 2022-04-12 DIAGNOSIS — F41.1 GAD (GENERALIZED ANXIETY DISORDER): Primary | ICD-10-CM

## 2022-04-12 PROCEDURE — 90834 PSYTX W PT 45 MINUTES: CPT | Mod: S$GLB,,, | Performed by: SOCIAL WORKER

## 2022-04-12 PROCEDURE — 99999 PR PBB SHADOW E&M-EST. PATIENT-LVL I: ICD-10-PCS | Mod: PBBFAC,,, | Performed by: SOCIAL WORKER

## 2022-04-12 PROCEDURE — 99999 PR PBB SHADOW E&M-EST. PATIENT-LVL I: CPT | Mod: PBBFAC,,, | Performed by: SOCIAL WORKER

## 2022-04-12 PROCEDURE — 90834 PR PSYCHOTHERAPY W/PATIENT, 45 MIN: ICD-10-PCS | Mod: S$GLB,,, | Performed by: SOCIAL WORKER

## 2022-04-13 NOTE — PROGRESS NOTES
Individual Psychotherapy (PhD/LCSW)    4/12/2022    Site:  [facility]         Therapeutic Intervention: Met with patient.  Outpatient - Insight oriented psychotherapy 45 min - CPT code 97217    Chief complaint/reason for encounter: depression, mood swings, anger, anxiety, sleep, appetite, behavior, somatic and interpersonal     Interval history and content of current session: discussed health and medical concerns, lots of medical pain, sleep, communications, says seeing professionals does help, discussed he does not want anymore surgery, physical therapy has helped but may have reached the end of this, living arrangements addressed, getting along and not getting along with room mate addressed, and how she seems to be controlling and telling others what to do all the time. He gets along with her  okay, and at times with his room mate however she can be difficult at times, he likes to fix things and repair electronic related devices and or machines. Sleep, appetite, taking care of himself, pain, and coping skills all addressed and growth over the years from attending therapy all addressed also.a    Treatment plan:  · Target symptoms: depression, recurrent depression, anxiety , mood swings, mood disorder, adjustment  · Why chosen therapy is appropriate versus another modality: relevant to diagnosis, patient responds to this modality, evidence based practice  · Outcome monitoring methods: self-report, observation  · Therapeutic intervention type: insight oriented psychotherapy, behavior modifying psychotherapy, supportive psychotherapy    Risk parameters:  Patient reports no suicidal ideation  Patient reports no homicidal ideation  Patient reports no self-injurious behavior  Patient reports no violent behavior    Verbal deficits: None    Patient's response to intervention:  The patient's response to intervention is accepting, motivated.    Progress toward goals and other mental status changes:  The  patient's progress toward goals is limited.    Diagnosis:     ICD-10-CM ICD-9-CM   1. RUBIO (generalized anxiety disorder)  F41.1 300.02       Plan:  individual psychotherapy, consult psychiatrist for medication evaluation and medication management by physician    Return to clinic: 2 weeks    Length of Service (minutes): 45

## 2022-04-25 ENCOUNTER — PES CALL (OUTPATIENT)
Dept: ADMINISTRATIVE | Facility: CLINIC | Age: 67
End: 2022-04-25
Payer: MEDICARE

## 2022-04-26 ENCOUNTER — OFFICE VISIT (OUTPATIENT)
Dept: PSYCHIATRY | Facility: CLINIC | Age: 67
End: 2022-04-26
Payer: MEDICARE

## 2022-04-26 DIAGNOSIS — F41.1 GAD (GENERALIZED ANXIETY DISORDER): Primary | ICD-10-CM

## 2022-04-26 PROCEDURE — 99999 PR PBB SHADOW E&M-EST. PATIENT-LVL I: ICD-10-PCS | Mod: PBBFAC,,, | Performed by: SOCIAL WORKER

## 2022-04-26 PROCEDURE — 90834 PR PSYCHOTHERAPY W/PATIENT, 45 MIN: ICD-10-PCS | Mod: S$GLB,,, | Performed by: SOCIAL WORKER

## 2022-04-26 PROCEDURE — 99999 PR PBB SHADOW E&M-EST. PATIENT-LVL I: CPT | Mod: PBBFAC,,, | Performed by: SOCIAL WORKER

## 2022-04-26 PROCEDURE — 90834 PSYTX W PT 45 MINUTES: CPT | Mod: S$GLB,,, | Performed by: SOCIAL WORKER

## 2022-05-03 ENCOUNTER — OFFICE VISIT (OUTPATIENT)
Dept: PSYCHIATRY | Facility: CLINIC | Age: 67
End: 2022-05-03
Payer: MEDICARE

## 2022-05-03 DIAGNOSIS — F41.1 GAD (GENERALIZED ANXIETY DISORDER): Primary | ICD-10-CM

## 2022-05-03 PROCEDURE — 99999 PR PBB SHADOW E&M-EST. PATIENT-LVL I: CPT | Mod: PBBFAC,,, | Performed by: SOCIAL WORKER

## 2022-05-03 PROCEDURE — 90834 PSYTX W PT 45 MINUTES: CPT | Mod: S$GLB,,, | Performed by: SOCIAL WORKER

## 2022-05-03 PROCEDURE — 99999 PR PBB SHADOW E&M-EST. PATIENT-LVL I: ICD-10-PCS | Mod: PBBFAC,,, | Performed by: SOCIAL WORKER

## 2022-05-03 PROCEDURE — 90834 PR PSYCHOTHERAPY W/PATIENT, 45 MIN: ICD-10-PCS | Mod: S$GLB,,, | Performed by: SOCIAL WORKER

## 2022-05-03 NOTE — PROGRESS NOTES
Individual Psychotherapy (PhD/LCSW)    5/3/2022    Site:  [facility]         Therapeutic Intervention: Met with patient.  Outpatient - Insight oriented psychotherapy 45 min - CPT code 60861    Chief complaint/reason for encounter: depression, anxiety, sleep, appetite, behavior, somatic and interpersonal     Interval history and content of current session: discussed the level of medical pain he has is very pain ful for him, previous treatments have only helped for a short time, coping skills, how to take care of himself, getting sleep, and pain at night and pain when weather changes occur. How to communicate with his room mates, and grief and loss feelings and memories of his family all focused on. And taking time to relax, likes to fix things and put things together, enjoys helping others also. Coping and getting rest addressed also.    Treatment plan:  · Target symptoms: depression, anxiety , adjustment, medical, pain, loss and grief, sleep, mood  · Why chosen therapy is appropriate versus another modality: relevant to diagnosis, patient responds to this modality, evidence based practice  · Outcome monitoring methods: self-report, observation  · Therapeutic intervention type: insight oriented psychotherapy, behavior modifying psychotherapy, supportive psychotherapy    Risk parameters:  Patient reports no suicidal ideation  Patient reports no homicidal ideation  Patient reports no self-injurious behavior  Patient reports no violent behavior    Verbal deficits: None    Patient's response to intervention:  The patient's response to intervention is accepting, motivated.    Progress toward goals and other mental status changes:  The patient's progress toward goals is limited.    Diagnosis:     ICD-10-CM ICD-9-CM   1. RUBIO (generalized anxiety disorder)  F41.1 300.02       Plan:  individual psychotherapy and medication management by physician    Return to clinic: 2 weeks    Length of Service (minutes): 45

## 2022-05-07 ENCOUNTER — HOSPITAL ENCOUNTER (EMERGENCY)
Facility: HOSPITAL | Age: 67
Discharge: HOME | End: 2022-05-07
Attending: EMERGENCY MEDICINE
Payer: MEDICARE

## 2022-05-07 VITALS
HEIGHT: 66 IN | TEMPERATURE: 100 F | WEIGHT: 180 LBS | RESPIRATION RATE: 16 BRPM | DIASTOLIC BLOOD PRESSURE: 73 MMHG | HEART RATE: 102 BPM | BODY MASS INDEX: 28.93 KG/M2 | OXYGEN SATURATION: 97 % | SYSTOLIC BLOOD PRESSURE: 154 MMHG

## 2022-05-07 DIAGNOSIS — U07.1 COVID: Primary | ICD-10-CM

## 2022-05-07 DIAGNOSIS — U07.1 COVID-19 VIRUS DETECTED: ICD-10-CM

## 2022-05-07 DIAGNOSIS — R05.9 COUGH: ICD-10-CM

## 2022-05-07 LAB
CTP QC/QA: YES
SARS-COV-2 RDRP RESP QL NAA+PROBE: POSITIVE

## 2022-05-07 PROCEDURE — 99284 PR EMERGENCY DEPT VISIT,LEVEL IV: ICD-10-PCS | Mod: CR,,, | Performed by: PHYSICIAN ASSISTANT

## 2022-05-07 PROCEDURE — 99284 EMERGENCY DEPT VISIT MOD MDM: CPT | Mod: CR,,, | Performed by: PHYSICIAN ASSISTANT

## 2022-05-07 PROCEDURE — 99283 EMERGENCY DEPT VISIT LOW MDM: CPT | Mod: 25

## 2022-05-07 PROCEDURE — U0002 COVID-19 LAB TEST NON-CDC: HCPCS | Performed by: EMERGENCY MEDICINE

## 2022-05-07 NOTE — ED PROVIDER NOTES
Encounter Date: 5/7/2022       History     Chief Complaint   Patient presents with    Nasal Congestion     Patient is a 66-year-old male with history of hypertension who presents to the emergency department with nasal congestion, postnasal drip, and cough.  Patient states his symptoms began yesterday.  He denies chest pain or shortness of breath.  He denies lower extremity swelling.  He denies any recent sick exposures.  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  He denies nausea or vomiting.  He denies fevers.  He states he has been vaccinated and boosted for COVID.    The history is provided by the patient.     Review of patient's allergies indicates:  No Known Allergies  Past Medical History:   Diagnosis Date    Arthritis     Carpal tunnel syndrome, bilateral     Cervical spinal stenosis 2002    Colon polyps     Fatty liver     Hypertension     Overweight (BMI 25.0-29.9) 8/16/2019    Vertigo     left ear issues     Past Surgical History:   Procedure Laterality Date    ARTHROSCOPIC CHONDROPLASTY OF KNEE JOINT Left 10/17/2018    Procedure: ARTHROSCOPY, KNEE, WITH CHONDROPLASTY;  Surgeon: GRETEL Carr MD;  Location: [facility] OR [address] [place] [address];  Service: Orthopedics;  Laterality: Left;    COLONOSCOPY      COLONOSCOPY N/A 9/25/2017    Procedure: COLONOSCOPY/emr;  Surgeon: Raul August MD;  Location: [facility] ([address] [place] [address]);  Service: Endoscopy;  Laterality: N/A;    COLONOSCOPY N/A 3/9/2018    Procedure: COLONOSCOPY;  Surgeon: Raul August MD;  Location: [facility] ([address] [place] [address]);  Service: Endoscopy;  Laterality: N/A;    EPIDURAL STEROID INJECTION N/A 11/4/2021    Procedure: INJECTION, STEROID, EPIDURAL, C7-T1  NEED CONSENT;  Surgeon: Bozena Mena MD;  Location: [facility] PAIN [facility]GT;  Service: Pain Management;  Laterality: N/A;    ESOPHAGOGASTRODUODENOSCOPY N/A 9/23/2019    Procedure: EGD (ESOPHAGOGASTRODUODENOSCOPY);  Surgeon: Dyllan Maloney MD;  Location: [facility] ENDO;  Service: General;  Laterality: N/A;    INJECTION  OF FACET JOINT Bilateral 8/6/2021    Procedure: FACET JOINT INJECTION BILATERAL L4/5 AND L5/S1 DIRECT REFERRAL NEEDS CONSENT;  Surgeon: Jasiel Aviles MD;  Location: [facility];  Service: Pain Management;  Laterality: Bilateral;    KNEE ARTHROSCOPY W/ MENISCECTOMY Left 10/17/2018    Procedure: ARTHROSCOPY, KNEE, WITH MENISCECTOMY;  Surgeon: GRETEL Carr MD;  Location: [facility] OR [address] [place] [address];  Service: Orthopedics;  Laterality: Left;  regional w/catheter adductor  Dimitry[employer] notified 10-12 LO     Family History   Problem Relation Age of Onset    No Known Problems Mother     Arthritis Father     Dementia Father     Heart disease Father     No Known Problems Brother     Diabetes Neg Hx     Cirrhosis Neg Hx      Social History     Tobacco Use    Smoking status: Never Smoker    Smokeless tobacco: Never Used   Substance Use Topics    Alcohol use: No    Drug use: No     Review of Systems   Constitutional: Negative for activity change, appetite change, chills, fatigue and fever.   HENT: Positive for congestion, postnasal drip, rhinorrhea and sore throat. Negative for ear discharge, ear pain and trouble swallowing.    Respiratory: Positive for cough. Negative for shortness of breath.    Cardiovascular: Negative for chest pain.   Gastrointestinal: Negative for abdominal pain, blood in stool, constipation, diarrhea, nausea and vomiting.   Genitourinary: Negative for dysuria, flank pain and hematuria.   Musculoskeletal: Negative for back pain, neck pain and neck stiffness.   Skin: Negative for rash and wound.   Neurological: Negative for dizziness, weakness, light-headedness and headaches.       Physical Exam     Initial Vitals [05/07/22 0800]   BP Pulse Resp Temp SpO2   (!) 154/73 96 16 99.7 °F (37.6 °C) 97 %      MAP       --         Physical Exam    Nursing note and vitals reviewed.  Constitutional: He appears well-developed and well-nourished. He is not diaphoretic.  Non-toxic appearance. No distress.    HENT:   Head: Normocephalic.   Right Ear: Hearing and external ear normal.   Left Ear: Hearing and external ear normal.   Mouth/Throat: Oropharynx is clear and moist and mucous membranes are normal.   Cardiovascular: Normal rate and regular rhythm.   Pulmonary/Chest: Breath sounds normal. No respiratory distress. He has no wheezes. He has no rhonchi. He has no rales. He exhibits no tenderness.     Neurological: He is alert and oriented to person, place, and time.   Skin: Skin is warm. Capillary refill takes less than 2 seconds.   Psychiatric: He has a normal mood and affect.         ED Course   Procedures  Labs Reviewed   SARS-COV-2 RDRP GENE - Abnormal; Notable for the following components:       Result Value    POC Rapid COVID Positive (*)     All other components within normal limits          Imaging Results          X-Ray Chest AP Portable (Final result)  Result time 05/07/22 09:22:53    Final result by Toi Diehl MD (05/07/22 09:22:53)                 Impression:      No convincing evidence of acute cardiopulmonary disease.      Electronically signed by: Toi Diehl  Date:    05/07/2022  Time:    09:22             Narrative:    EXAMINATION:  XR CHEST AP PORTABLE    CLINICAL HISTORY:  Cough, unspecified    TECHNIQUE:  Single frontal view of the chest was performed.    COMPARISON:  Chest radiograph 07/27/2019    FINDINGS:  Cardiomediastinal contour appears grossly unchanged.  Lungs appear essentially clear.  No definite pneumothorax or large volume pleural effusion.  No acute findings identified in the visualized abdomen.  Osseous and soft tissue structures appear without definite acute change.                                 Medications - No data to display  Medical Decision Making:   Initial Assessment:   Urgent evaluation of a 66-year-old male with history of hypertension who presents to the emergency department with nasal congestion and rhinorrhea.  Patient is afebrile, nontoxic appearing,  hemodynamically stable.  Patient is satting at 97% on room air.  Lungs are clear to auscultation.  No lower extremity edema.  Patient is positive for COVID.  He has been vaccinated with a booster shot.  I will obtain chest x-ray to ensure no developing pneumonia.    ED Management:  9:32 AM  Chest x-ray reveals no acute cardiopulmonary process.  Patient has not decided while here in the emergency department.  Will discharge with quarantine instructions.  Will discharge with strict return precautions.  Advised follow-up with PCP.                      Clinical Impression:   Final diagnoses:  [R05.9] Cough  [U07.1] COVID (Primary)          ED Disposition Condition    Discharge Stable        ED Prescriptions     None        Follow-up Information    None          Nabila Carter PA-C  05/07/22 0930

## 2022-05-07 NOTE — ED NOTES
Discharge home, states understanding to follow up as directed. Ambulates out of ED without difficulty. Pulse ox given for home use

## 2022-05-07 NOTE — Clinical Note
"Ladarius Monique (Richard)ell was seen and treated in our emergency department on 5/7/2022.  He may return to work on 05/11/2022.       If you have any questions or concerns, please don't hesitate to call.      Nabila Carter PA-C"

## 2022-05-23 ENCOUNTER — OFFICE VISIT (OUTPATIENT)
Dept: INTERNAL MEDICINE | Facility: CLINIC | Age: 67
End: 2022-05-23
Payer: MEDICARE

## 2022-05-23 VITALS
BODY MASS INDEX: 27.67 KG/M2 | WEIGHT: 172.19 LBS | SYSTOLIC BLOOD PRESSURE: 138 MMHG | RESPIRATION RATE: 16 BRPM | OXYGEN SATURATION: 97 % | DIASTOLIC BLOOD PRESSURE: 88 MMHG | HEIGHT: 66 IN | HEART RATE: 90 BPM

## 2022-05-23 DIAGNOSIS — Z86.16 HISTORY OF COVID-19: Primary | ICD-10-CM

## 2022-05-23 DIAGNOSIS — Z86.010 HISTORY OF COLON POLYPS: ICD-10-CM

## 2022-05-23 DIAGNOSIS — Z12.11 COLON CANCER SCREENING: ICD-10-CM

## 2022-05-23 PROCEDURE — 99999 PR PBB SHADOW E&M-EST. PATIENT-LVL IV: CPT | Mod: PBBFAC,,, | Performed by: PHYSICIAN ASSISTANT

## 2022-05-23 PROCEDURE — 99999 PR PBB SHADOW E&M-EST. PATIENT-LVL IV: ICD-10-PCS | Mod: PBBFAC,,, | Performed by: PHYSICIAN ASSISTANT

## 2022-05-23 PROCEDURE — 99213 OFFICE O/P EST LOW 20 MIN: CPT | Mod: S$GLB,,, | Performed by: PHYSICIAN ASSISTANT

## 2022-05-23 PROCEDURE — 99213 PR OFFICE/OUTPT VISIT, EST, LEVL III, 20-29 MIN: ICD-10-PCS | Mod: S$GLB,,, | Performed by: PHYSICIAN ASSISTANT

## 2022-05-24 ENCOUNTER — PATIENT MESSAGE (OUTPATIENT)
Dept: INTERNAL MEDICINE | Facility: CLINIC | Age: 67
End: 2022-05-24
Payer: MEDICARE

## 2022-05-24 ENCOUNTER — OFFICE VISIT (OUTPATIENT)
Dept: PSYCHIATRY | Facility: CLINIC | Age: 67
End: 2022-05-24
Payer: MEDICARE

## 2022-05-24 DIAGNOSIS — F32.A DEPRESSION, UNSPECIFIED DEPRESSION TYPE: Primary | ICD-10-CM

## 2022-05-24 PROCEDURE — 90832 PSYTX W PT 30 MINUTES: CPT | Mod: S$GLB,,, | Performed by: SOCIAL WORKER

## 2022-05-24 PROCEDURE — 90832 PR PSYCHOTHERAPY W/PATIENT, 30 MIN: ICD-10-PCS | Mod: S$GLB,,, | Performed by: SOCIAL WORKER

## 2022-05-24 PROCEDURE — 99999 PR PBB SHADOW E&M-EST. PATIENT-LVL I: CPT | Mod: PBBFAC,,, | Performed by: SOCIAL WORKER

## 2022-05-24 PROCEDURE — 99999 PR PBB SHADOW E&M-EST. PATIENT-LVL I: ICD-10-PCS | Mod: PBBFAC,,, | Performed by: SOCIAL WORKER

## 2022-05-24 NOTE — PROGRESS NOTES
"    Subjective:       Patient ID: Ladarius Solis is a 66 y.o. male.    Chief Complaint: Follow-up    HPI     Established pt of Nick Stanton MD (new to me)    Here for follow up. Had COVID on 5/5,. completed 10 day home isolation. Initially symptoms were congestion and headache. He treated symptoms with Claritin. Today he is feeling much better. Feels all his symptoms have resolved. No acute complaints.       Review of Systems    Answers for HPI/ROS submitted by the patient on 5/16/2022  activity change: No  unexpected weight change: No  neck pain: No  hearing loss: No  rhinorrhea: No  trouble swallowing: No  eye discharge: No  visual disturbance: No  chest tightness: No  wheezing: No  chest pain: No  palpitations: No  blood in stool: No  constipation: No  vomiting: No  diarrhea: No  polydipsia: No  polyuria: No  difficulty urinating: No  urgency: No  hematuria: No  joint swelling: No  arthralgias: No  headaches: No  weakness: No  confusion: No  dysphoric mood: No    Objective: /88 (BP Location: Left arm, Patient Position: Sitting, BP Method: Large (Manual))   Pulse 90   Resp 16   Ht 5' 6" (1.676 m)   Wt 78.1 kg (172 lb 2.9 oz)   SpO2 97%   BMI 27.79 kg/m²         Physical Exam  Vitals reviewed.   Constitutional:       General: He is not in acute distress.     Appearance: He is well-developed. He is not ill-appearing.   HENT:      Head: Normocephalic and atraumatic.      Right Ear: Tympanic membrane, ear canal and external ear normal.      Left Ear: Tympanic membrane, ear canal and external ear normal.      Nose: Nose normal.      Mouth/Throat:      Mouth: Mucous membranes are moist.      Pharynx: Oropharynx is clear.   Eyes:      Conjunctiva/sclera: Conjunctivae normal.   Cardiovascular:      Rate and Rhythm: Normal rate and regular rhythm.      Heart sounds: No murmur heard.  Pulmonary:      Effort: Pulmonary effort is normal.      Breath sounds: Normal breath sounds. No wheezing or rales. "   Musculoskeletal:      Right lower leg: No edema.      Left lower leg: No edema.   Lymphadenopathy:      Cervical: No cervical adenopathy.   Skin:     General: Skin is warm and dry.      Findings: No rash.   Neurological:      Mental Status: He is alert.         Assessment:       Problem List Items Addressed This Visit    None     Visit Diagnoses     History of COVID-19    -  Primary          Plan:             Ladarius was seen today for follow-up.    Diagnoses and all orders for this visit:    History of COVID-19  Pt doing well  Has fully recovered  RTC prn    Colon cancer screening  -     Case Request Endoscopy: COLONOSCOPY    History of colon polyps  -     Case Request Endoscopy: COLONOSCOPY        Maru Robles PA-C

## 2022-05-27 ENCOUNTER — PATIENT MESSAGE (OUTPATIENT)
Dept: PSYCHIATRY | Facility: CLINIC | Age: 67
End: 2022-05-27
Payer: MEDICARE

## 2022-08-10 ENCOUNTER — OFFICE VISIT (OUTPATIENT)
Dept: PSYCHIATRY | Facility: CLINIC | Age: 67
End: 2022-08-10
Payer: MEDICARE

## 2022-08-10 DIAGNOSIS — F41.1 GAD (GENERALIZED ANXIETY DISORDER): Primary | ICD-10-CM

## 2022-08-10 PROCEDURE — 99999 PR PBB SHADOW E&M-EST. PATIENT-LVL I: CPT | Mod: PBBFAC,,, | Performed by: SOCIAL WORKER

## 2022-08-10 PROCEDURE — 1159F MED LIST DOCD IN RCRD: CPT | Mod: CPTII,S$GLB,, | Performed by: SOCIAL WORKER

## 2022-08-10 PROCEDURE — 4010F ACE/ARB THERAPY RXD/TAKEN: CPT | Mod: CPTII,S$GLB,, | Performed by: SOCIAL WORKER

## 2022-08-10 PROCEDURE — 90834 PR PSYCHOTHERAPY W/PATIENT, 45 MIN: ICD-10-PCS | Mod: S$GLB,,, | Performed by: SOCIAL WORKER

## 2022-08-10 PROCEDURE — 4010F PR ACE/ARB THEARPY RXD/TAKEN: ICD-10-PCS | Mod: CPTII,S$GLB,, | Performed by: SOCIAL WORKER

## 2022-08-10 PROCEDURE — 1159F PR MEDICATION LIST DOCUMENTED IN MEDICAL RECORD: ICD-10-PCS | Mod: CPTII,S$GLB,, | Performed by: SOCIAL WORKER

## 2022-08-10 PROCEDURE — 90834 PSYTX W PT 45 MINUTES: CPT | Mod: S$GLB,,, | Performed by: SOCIAL WORKER

## 2022-08-10 PROCEDURE — 99999 PR PBB SHADOW E&M-EST. PATIENT-LVL I: ICD-10-PCS | Mod: PBBFAC,,, | Performed by: SOCIAL WORKER

## 2022-08-11 NOTE — PROGRESS NOTES
Individual Psychotherapy (PhD/LCSW)    8/10/2022    Site:  [facility]         Therapeutic Intervention: Met with patient.  Outpatient - Insight oriented psychotherapy 45 min - CPT code 94232    Chief complaint/reason for encounter: depression, anxiety, sleep, appetite, behavior, somatic and interpersonal     Interval history and content of current session: he is anxious over an upcoming move, and dealing with the issues, room mates, and coping skills, sleep and health and communications addressed, stress management skills, and taking care of himself addressed, and over all taking things one at a time for his changes in residence. Says it will occur gradually over the next few weeks. The client focused on coping skills, how to get along with room mates and taking care of himself and having time for calm and        Relaxation when he needs this.    Treatment plan:  · Target symptoms: depression, anxiety , adjustment  · Why chosen therapy is appropriate versus another modality: relevant to diagnosis, patient responds to this modality, evidence based practice  · Outcome monitoring methods: self-report, observation  · Therapeutic intervention type: insight oriented psychotherapy, behavior modifying psychotherapy, supportive psychotherapy    Risk parameters:  Patient reports no suicidal ideation  Patient reports no homicidal ideation  Patient reports no self-injurious behavior  Patient reports no violent behavior    Verbal deficits: None    Patient's response to intervention:  The patient's response to intervention is accepting.    Progress toward goals and other mental status changes:  The patient's progress toward goals is limited.    Diagnosis:   No diagnosis found.    Plan:  individual psychotherapy, consult psychiatrist for medication evaluation and medication management by physician    Return to clinic: 2 weeks    Length of Service (minutes): 45    Also discussed money and how to not over spend and how to  keep wit a budget that is workable.

## 2022-08-24 ENCOUNTER — PES CALL (OUTPATIENT)
Dept: ADMINISTRATIVE | Facility: OTHER | Age: 67
End: 2022-08-24
Payer: MEDICARE

## 2022-08-26 ENCOUNTER — PES CALL (OUTPATIENT)
Dept: ADMINISTRATIVE | Facility: CLINIC | Age: 67
End: 2022-08-26
Payer: MEDICARE

## 2022-08-29 ENCOUNTER — OFFICE VISIT (OUTPATIENT)
Dept: INTERNAL MEDICINE | Facility: CLINIC | Age: 67
End: 2022-08-29
Payer: MEDICARE

## 2022-08-29 VITALS
SYSTOLIC BLOOD PRESSURE: 130 MMHG | BODY MASS INDEX: 27.74 KG/M2 | HEIGHT: 66 IN | HEART RATE: 75 BPM | WEIGHT: 172.63 LBS | DIASTOLIC BLOOD PRESSURE: 84 MMHG | RESPIRATION RATE: 16 BRPM

## 2022-08-29 DIAGNOSIS — K21.9 GASTROESOPHAGEAL REFLUX DISEASE, UNSPECIFIED WHETHER ESOPHAGITIS PRESENT: ICD-10-CM

## 2022-08-29 DIAGNOSIS — K76.0 HEPATIC STEATOSIS: ICD-10-CM

## 2022-08-29 DIAGNOSIS — F32.A DEPRESSION, UNSPECIFIED DEPRESSION TYPE: ICD-10-CM

## 2022-08-29 DIAGNOSIS — I70.0 AORTIC ATHEROSCLEROSIS: ICD-10-CM

## 2022-08-29 DIAGNOSIS — I10 PRIMARY HYPERTENSION: ICD-10-CM

## 2022-08-29 DIAGNOSIS — R26.9 ABNORMALITY OF GAIT AND MOBILITY: ICD-10-CM

## 2022-08-29 DIAGNOSIS — E66.3 OVERWEIGHT (BMI 25.0-29.9): ICD-10-CM

## 2022-08-29 DIAGNOSIS — Z00.00 ENCOUNTER FOR PREVENTIVE HEALTH EXAMINATION: Primary | ICD-10-CM

## 2022-08-29 DIAGNOSIS — F41.1 GAD (GENERALIZED ANXIETY DISORDER): ICD-10-CM

## 2022-08-29 DIAGNOSIS — G89.29 OTHER CHRONIC PAIN: ICD-10-CM

## 2022-08-29 DIAGNOSIS — D12.6 COLON ADENOMA: ICD-10-CM

## 2022-08-29 PROCEDURE — 1160F PR REVIEW ALL MEDS BY PRESCRIBER/CLIN PHARMACIST DOCUMENTED: ICD-10-PCS | Mod: CPTII,S$GLB,, | Performed by: NURSE PRACTITIONER

## 2022-08-29 PROCEDURE — 3008F PR BODY MASS INDEX (BMI) DOCUMENTED: ICD-10-PCS | Mod: CPTII,S$GLB,, | Performed by: NURSE PRACTITIONER

## 2022-08-29 PROCEDURE — 99999 PR PBB SHADOW E&M-EST. PATIENT-LVL IV: ICD-10-PCS | Mod: PBBFAC,,, | Performed by: NURSE PRACTITIONER

## 2022-08-29 PROCEDURE — 3075F PR MOST RECENT SYSTOLIC BLOOD PRESS GE 130-139MM HG: ICD-10-PCS | Mod: CPTII,S$GLB,, | Performed by: NURSE PRACTITIONER

## 2022-08-29 PROCEDURE — 3079F PR MOST RECENT DIASTOLIC BLOOD PRESSURE 80-89 MM HG: ICD-10-PCS | Mod: CPTII,S$GLB,, | Performed by: NURSE PRACTITIONER

## 2022-08-29 PROCEDURE — 99999 PR PBB SHADOW E&M-EST. PATIENT-LVL IV: CPT | Mod: PBBFAC,,, | Performed by: NURSE PRACTITIONER

## 2022-08-29 PROCEDURE — 3008F BODY MASS INDEX DOCD: CPT | Mod: CPTII,S$GLB,, | Performed by: NURSE PRACTITIONER

## 2022-08-29 PROCEDURE — 3079F DIAST BP 80-89 MM HG: CPT | Mod: CPTII,S$GLB,, | Performed by: NURSE PRACTITIONER

## 2022-08-29 PROCEDURE — 1159F PR MEDICATION LIST DOCUMENTED IN MEDICAL RECORD: ICD-10-PCS | Mod: CPTII,S$GLB,, | Performed by: NURSE PRACTITIONER

## 2022-08-29 PROCEDURE — G0439 PPPS, SUBSEQ VISIT: HCPCS | Mod: S$GLB,,, | Performed by: NURSE PRACTITIONER

## 2022-08-29 PROCEDURE — 4010F PR ACE/ARB THEARPY RXD/TAKEN: ICD-10-PCS | Mod: CPTII,S$GLB,, | Performed by: NURSE PRACTITIONER

## 2022-08-29 PROCEDURE — 1159F MED LIST DOCD IN RCRD: CPT | Mod: CPTII,S$GLB,, | Performed by: NURSE PRACTITIONER

## 2022-08-29 PROCEDURE — G0439 PR MEDICARE ANNUAL WELLNESS SUBSEQUENT VISIT: ICD-10-PCS | Mod: S$GLB,,, | Performed by: NURSE PRACTITIONER

## 2022-08-29 PROCEDURE — 1160F RVW MEDS BY RX/DR IN RCRD: CPT | Mod: CPTII,S$GLB,, | Performed by: NURSE PRACTITIONER

## 2022-08-29 PROCEDURE — 3075F SYST BP GE 130 - 139MM HG: CPT | Mod: CPTII,S$GLB,, | Performed by: NURSE PRACTITIONER

## 2022-08-29 PROCEDURE — 4010F ACE/ARB THERAPY RXD/TAKEN: CPT | Mod: CPTII,S$GLB,, | Performed by: NURSE PRACTITIONER

## 2022-08-29 NOTE — Clinical Note
Medicare AWV completed. Discussed shingrix and covid booster. Instructed to schedule colonoscopy. Can consider HIV screening with next blood work. Instructed to schedule annual with PCP.  --Tawana

## 2022-08-29 NOTE — PATIENT INSTRUCTIONS
1. Schedule annual with Dr. Nick Stanton MD.    2. Colonoscopy ordered. Please call [phone] to schedule your colonoscopy.    3. Eligible for additional covid booster if interested.    4. Can aiken shingles vaccines (SHINGRIX) through insurance if interested.    Counseling and Referral of Other Preventative  (Italic type indicates deductible and co-insurance are waived)    Patient Name: Ladarius Solis  Today's Date: 8/29/2022    Health Maintenance         Date Due Completion Date    HIV Screening Never done ---    Shingles Vaccine (1 of 2) Never done ---    Colorectal Cancer Screening 03/09/2021 3/9/2018    COVID-19 Vaccine (4 - Booster for Pfizer series) 05/06/2022 1/6/2022    Influenza Vaccine (1) 09/01/2022 9/28/2020    Lipid Panel 02/06/2024 2/6/2019    TETANUS VACCINE 03/12/2031 3/12/2021          No orders of the defined types were placed in this encounter.      The following information is provided to all patients.  This information is to help you find resources for any of the problems found today that may be affecting your health:                Living healthy guide: www.[facility].[place].gov      Understanding Diabetes: www.diabetes.org      Eating healthy: www.cdc.gov/healthyweight      CDC home safety checklist: www.cdc.gov/steadi/patient.html      Agency on Aging: www.goea.[place].[facility]      Alcoholics anonymous (AA): www.aa.org      Physical Activity: www.dion.nih.gov/ez4crye      Tobacco use: www.quitwithusla.org

## 2022-08-29 NOTE — PROGRESS NOTES
"Ladarius Solis presented for a  Medicare AWV and comprehensive Health Risk Assessment today. The following components were reviewed and updated:    Medical history  Family History  Social history  Allergies and Current Medications  Health Risk Assessment  Health Maintenance  Care Team         ** See Completed Assessments for Annual Wellness Visit within the encounter summary.**         The following assessments were completed:  Living Situation  CAGE  Depression Screening  Timed Get Up and Go  Whisper Test  Cognitive Function Screening    Nutrition Screening  ADL Screening  PAQ Screening        Vitals:    08/29/22 1515   BP: 130/84   BP Location: Left arm   Patient Position: Sitting   BP Method: Medium (Manual)   Pulse: 75   Resp: 16   Weight: 78.3 kg (172 lb 9.9 oz)   Height: 5' 6" (1.676 m)     Body mass index is 27.86 kg/m².    Physical Exam  Vitals reviewed.   Constitutional:       Appearance: Normal appearance.   HENT:      Head: Normocephalic.   Cardiovascular:      Rate and Rhythm: Normal rate.   Pulmonary:      Effort: Pulmonary effort is normal.   Abdominal:      General: Bowel sounds are normal.   Musculoskeletal:      Cervical back: Normal range of motion.      Right lower leg: No edema.      Left lower leg: No edema.      Comments: Analgic gait   Skin:     General: Skin is warm and dry.      Capillary Refill: Capillary refill takes less than 2 seconds.   Neurological:      Mental Status: He is alert and oriented to person, place, and time.   Psychiatric:         Behavior: Behavior normal.         Thought Content: Thought content normal.         Judgment: Judgment normal.             Diagnoses and health risks identified today and associated recommendations/orders:    1. Encounter for preventive health examination  Assessments completed.  HM recommendations reviewed. Discussed shingrix and covid booster. Instructed to schedule colonoscopy. Consider HIV screening with next blood work.  Instructed to " schedule annual with PCP.    2. Aortic atherosclerosis  Chronic, stable on current regimen. Followed by PCP. Not on statin.    3. Primary hypertension  Chronic, stable on current regimen. Followed by PCP.    4. Overweight (BMI 25.0-29.9)  Chronic, stable on current regimen. Followed by PCP.    5. Hepatic steatosis  Chronic, stable on current regimen. Followed by hepatology.    6. RUBIO (generalized anxiety disorder)  Chronic, stable on current regimen. Followed by psychiatry.    7. Depression, unspecified depression type  Chronic, stable on current regimen. Followed by psychiatry.    8. Gastroesophageal reflux disease, unspecified whether esophagitis present  Chronic, stable on current regimen. Followed by PCP.    9. Other chronic pain  Chronic, stable on current regimen. Followed by pain med.    10. Colon adenoma  Chronic, stable. Due for colonoscopy. Given endoscopy scheduling info. Followed by PCP.    11. Abnormality of gait and mobility  Chronic, stable. No recent falls. Chronic pain limits function at times. Antalgic gait. Does not use mobility devices. Followed by pain med / PCP.      Provided Ladarius with a 5-10 year written screening schedule and personal prevention plan. Recommendations were developed using the USPSTF age appropriate recommendations. Education, counseling, and referrals were provided as needed. After Visit Summary printed and given to patient which includes a list of additional screenings\tests needed.    Follow up in about 1 year (around 8/29/2023) for Medicare AWV and schedule annual with PCP.       Tawana Thompson NP    I offered to discuss advanced care planning, including how to pick a person who would make decisions for you if you were unable to make them for yourself, called a health care power of , and what kind of decisions you might make such as use of life sustaining treatments such as ventilators and tube feeding when faced with a life limiting illness recorded on a living  will that they will need to know. (How you want to be cared for as you near the end of your natural life)     X Patient is interested in learning more about how to make advanced directives.  I provided them paperwork and offered to discuss this with them.

## 2022-08-30 ENCOUNTER — OFFICE VISIT (OUTPATIENT)
Dept: PSYCHIATRY | Facility: CLINIC | Age: 67
End: 2022-08-30
Payer: MEDICARE

## 2022-08-30 DIAGNOSIS — F41.1 GAD (GENERALIZED ANXIETY DISORDER): Primary | ICD-10-CM

## 2022-08-30 PROCEDURE — 90834 PSYTX W PT 45 MINUTES: CPT | Mod: S$GLB,,, | Performed by: SOCIAL WORKER

## 2022-08-30 PROCEDURE — 99999 PR PBB SHADOW E&M-EST. PATIENT-LVL I: ICD-10-PCS | Mod: PBBFAC,,, | Performed by: SOCIAL WORKER

## 2022-08-30 PROCEDURE — 99999 PR PBB SHADOW E&M-EST. PATIENT-LVL I: CPT | Mod: PBBFAC,,, | Performed by: SOCIAL WORKER

## 2022-08-30 PROCEDURE — 4010F ACE/ARB THERAPY RXD/TAKEN: CPT | Mod: CPTII,S$GLB,, | Performed by: SOCIAL WORKER

## 2022-08-30 PROCEDURE — 1159F MED LIST DOCD IN RCRD: CPT | Mod: CPTII,S$GLB,, | Performed by: SOCIAL WORKER

## 2022-08-30 PROCEDURE — 4010F PR ACE/ARB THEARPY RXD/TAKEN: ICD-10-PCS | Mod: CPTII,S$GLB,, | Performed by: SOCIAL WORKER

## 2022-08-30 PROCEDURE — 1159F PR MEDICATION LIST DOCUMENTED IN MEDICAL RECORD: ICD-10-PCS | Mod: CPTII,S$GLB,, | Performed by: SOCIAL WORKER

## 2022-08-30 PROCEDURE — 90834 PR PSYCHOTHERAPY W/PATIENT, 45 MIN: ICD-10-PCS | Mod: S$GLB,,, | Performed by: SOCIAL WORKER
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
## 2022-08-31 ENCOUNTER — OFFICE VISIT (OUTPATIENT)
Dept: PSYCHIATRY | Facility: CLINIC | Age: 67
End: 2022-08-31
Payer: MEDICARE

## 2022-08-31 DIAGNOSIS — F41.1 GAD (GENERALIZED ANXIETY DISORDER): Primary | ICD-10-CM

## 2022-08-31 DIAGNOSIS — I10 HYPERTENSION: ICD-10-CM

## 2022-08-31 PROCEDURE — 4010F PR ACE/ARB THEARPY RXD/TAKEN: ICD-10-PCS | Mod: CPTII,S$GLB,, | Performed by: SOCIAL WORKER

## 2022-08-31 PROCEDURE — 4010F ACE/ARB THERAPY RXD/TAKEN: CPT | Mod: CPTII,S$GLB,, | Performed by: SOCIAL WORKER

## 2022-08-31 PROCEDURE — 90834 PR PSYCHOTHERAPY W/PATIENT, 45 MIN: ICD-10-PCS | Mod: S$GLB,,, | Performed by: SOCIAL WORKER

## 2022-08-31 PROCEDURE — 1159F PR MEDICATION LIST DOCUMENTED IN MEDICAL RECORD: ICD-10-PCS | Mod: CPTII,S$GLB,, | Performed by: SOCIAL WORKER

## 2022-08-31 PROCEDURE — 99999 PR PBB SHADOW E&M-EST. PATIENT-LVL I: ICD-10-PCS | Mod: PBBFAC,,, | Performed by: SOCIAL WORKER

## 2022-08-31 PROCEDURE — 1159F MED LIST DOCD IN RCRD: CPT | Mod: CPTII,S$GLB,, | Performed by: SOCIAL WORKER

## 2022-08-31 PROCEDURE — 99999 PR PBB SHADOW E&M-EST. PATIENT-LVL I: CPT | Mod: PBBFAC,,, | Performed by: SOCIAL WORKER

## 2022-08-31 PROCEDURE — 90834 PSYTX W PT 45 MINUTES: CPT | Mod: S$GLB,,, | Performed by: SOCIAL WORKER

## 2022-09-01 NOTE — PROGRESS NOTES
Individual Psychotherapy (PhD/LCSW)    8/31/2022    Site:  [facility]         Therapeutic Intervention: Met with patient.  Outpatient - Insight oriented psychotherapy 45 min - CPT code 78695    Chief complaint/reason for encounter: depression, anxiety, sleep, appetite, behavior, somatic, and interpersonal     Interval history and content of current session: discussed he is moved into the house his room mate purchased and he is renting from her, issues about the move, issues about the room mate, care taking for others, time for himself, taking care of himself, and how to be calm and have time for quiet discussed, taking care of himself, boundaries, connecting with others, positive progress, and  feeling better addressed, and how to proceed and continue to improve addressed also. And coping skills addressed and grief and loss addressed. And over all is making good progress.    Treatment plan:  Target symptoms: depression, recurrent depression, anxiety , mood swings, mood disorder, adjustment, grief, work stress  Why chosen therapy is appropriate versus another modality: relevant to diagnosis, patient responds to this modality, evidence based practice  Outcome monitoring methods: self-report, observation  Therapeutic intervention type: insight oriented psychotherapy, behavior modifying psychotherapy, supportive psychotherapy    Risk parameters:  Patient reports no suicidal ideation  Patient reports no homicidal ideation  Patient reports no self-injurious behavior  Patient reports no violent behavior    Verbal deficits: None    Patient's response to intervention:  The patient's response to intervention is accepting, motivated.    Progress toward goals and other mental status changes:  The patient's progress toward goals is fair .    Diagnosis:     ICD-10-CM ICD-9-CM   1. RUBIO (generalized anxiety disorder)  F41.1 300.02       Plan:  individual psychotherapy    Return to clinic: 1 week    Length of Service  (minutes): 45

## 2022-09-07 ENCOUNTER — PATIENT MESSAGE (OUTPATIENT)
Dept: PSYCHIATRY | Facility: CLINIC | Age: 67
End: 2022-09-07
Payer: MEDICARE

## 2022-09-13 ENCOUNTER — OFFICE VISIT (OUTPATIENT)
Dept: PSYCHIATRY | Facility: CLINIC | Age: 67
End: 2022-09-13
Payer: MEDICARE

## 2022-09-13 ENCOUNTER — PATIENT MESSAGE (OUTPATIENT)
Dept: PSYCHIATRY | Facility: CLINIC | Age: 67
End: 2022-09-13
Payer: MEDICARE

## 2022-09-13 DIAGNOSIS — F32.A DEPRESSION, UNSPECIFIED DEPRESSION TYPE: Primary | ICD-10-CM

## 2022-09-13 PROCEDURE — 1159F PR MEDICATION LIST DOCUMENTED IN MEDICAL RECORD: ICD-10-PCS | Mod: CPTII,S$GLB,, | Performed by: SOCIAL WORKER

## 2022-09-13 PROCEDURE — 1159F MED LIST DOCD IN RCRD: CPT | Mod: CPTII,S$GLB,, | Performed by: SOCIAL WORKER

## 2022-09-13 PROCEDURE — 90834 PSYTX W PT 45 MINUTES: CPT | Mod: S$GLB,,, | Performed by: SOCIAL WORKER

## 2022-09-13 PROCEDURE — 99999 PR PBB SHADOW E&M-EST. PATIENT-LVL I: ICD-10-PCS | Mod: PBBFAC,,, | Performed by: SOCIAL WORKER

## 2022-09-13 PROCEDURE — 99999 PR PBB SHADOW E&M-EST. PATIENT-LVL I: CPT | Mod: PBBFAC,,, | Performed by: SOCIAL WORKER

## 2022-09-13 PROCEDURE — 4010F ACE/ARB THERAPY RXD/TAKEN: CPT | Mod: CPTII,S$GLB,, | Performed by: SOCIAL WORKER

## 2022-09-13 PROCEDURE — 4010F PR ACE/ARB THEARPY RXD/TAKEN: ICD-10-PCS | Mod: CPTII,S$GLB,, | Performed by: SOCIAL WORKER

## 2022-09-13 PROCEDURE — 90834 PR PSYCHOTHERAPY W/PATIENT, 45 MIN: ICD-10-PCS | Mod: S$GLB,,, | Performed by: SOCIAL WORKER

## 2022-09-14 NOTE — PROGRESS NOTES
Individual Psychotherapy (PhD/LCSW)    9/13/2022    Site:  [facility]         Therapeutic Intervention: Met with patient.  Outpatient - Insight oriented psychotherapy 45 min - CPT code 36308    Chief complaint/reason for encounter: depression, mood swings, anxiety, sleep, behavior, and interpersonal     Interval history and content of current session: discussed he has moved, coping skills and communications, getting along with room mates, how to cope, how to take better care of himself, boundaries, and communications with others all addressed, sleep, mood and taking care of himself addressed, how to let go of anger, and ways to calm down addressed. And taking care of others addressed also. Things that are important for him addressed also. One is helping others. Keep his progress going encouraged.    Treatment plan:  Target symptoms: depression, anxiety , adjustment, work stress  Why chosen therapy is appropriate versus another modality: relevant to diagnosis, patient responds to this modality, evidence based practice  Outcome monitoring methods: self-report, observation  Therapeutic intervention type: insight oriented psychotherapy, behavior modifying psychotherapy, supportive psychotherapy    Risk parameters:  Patient reports no suicidal ideation  Patient reports no homicidal ideation  Patient reports no self-injurious behavior  Patient reports no violent behavior    Verbal deficits: None    Patient's response to intervention:  The patient's response to intervention is accepting, motivated.    Progress toward goals and other mental status changes:  The patient's progress toward goals is fair .    Diagnosis:     ICD-10-CM ICD-9-CM   1. Depression, unspecified depression type  F32.A 311       Plan:  individual psychotherapy and medication management by physician    Return to clinic: 2 weeks    Length of Service (minutes): 45

## 2022-09-18 ENCOUNTER — HOSPITAL ENCOUNTER (EMERGENCY)
Facility: HOSPITAL | Age: 67
Discharge: HOME | End: 2022-09-18
Attending: EMERGENCY MEDICINE
Payer: MEDICARE

## 2022-09-18 VITALS
OXYGEN SATURATION: 99 % | RESPIRATION RATE: 15 BRPM | TEMPERATURE: 98 F | HEIGHT: 66 IN | HEART RATE: 77 BPM | BODY MASS INDEX: 28.93 KG/M2 | WEIGHT: 180 LBS | SYSTOLIC BLOOD PRESSURE: 149 MMHG | DIASTOLIC BLOOD PRESSURE: 79 MMHG

## 2022-09-18 DIAGNOSIS — B34.9 VIRAL SYNDROME: ICD-10-CM

## 2022-09-18 DIAGNOSIS — B34.9 VIRAL ILLNESS: Primary | ICD-10-CM

## 2022-09-18 DIAGNOSIS — R07.9 CHEST PAIN: ICD-10-CM

## 2022-09-18 DIAGNOSIS — R11.2 NAUSEA & VOMITING: ICD-10-CM

## 2022-09-18 LAB
ALBUMIN SERPL BCP-MCNC: 4.3 G/DL (ref 3.5–5.2)
ALP SERPL-CCNC: 80 U/L (ref 55–135)
ALT SERPL W/O P-5'-P-CCNC: 46 U/L (ref 10–44)
ANION GAP SERPL CALC-SCNC: 10 MMOL/L (ref 8–16)
AST SERPL-CCNC: 22 U/L (ref 10–40)
BACTERIA #/AREA URNS AUTO: ABNORMAL /HPF
BASOPHILS # BLD AUTO: 0.06 K/UL (ref 0–0.2)
BASOPHILS NFR BLD: 0.7 % (ref 0–1.9)
BILIRUB SERPL-MCNC: 1.1 MG/DL (ref 0.1–1)
BILIRUB UR QL STRIP: NEGATIVE
BNP SERPL-MCNC: 28 PG/ML (ref 0–99)
BUN SERPL-MCNC: 20 MG/DL (ref 8–23)
CALCIUM SERPL-MCNC: 9.6 MG/DL (ref 8.7–10.5)
CHLORIDE SERPL-SCNC: 102 MMOL/L (ref 95–110)
CLARITY UR REFRACT.AUTO: CLEAR
CO2 SERPL-SCNC: 23 MMOL/L (ref 23–29)
COLOR UR AUTO: YELLOW
CREAT SERPL-MCNC: 0.9 MG/DL (ref 0.5–1.4)
DIFFERENTIAL METHOD: ABNORMAL
EOSINOPHIL # BLD AUTO: 0 K/UL (ref 0–0.5)
EOSINOPHIL NFR BLD: 0.5 % (ref 0–8)
ERYTHROCYTE [DISTWIDTH] IN BLOOD BY AUTOMATED COUNT: 13.1 % (ref 11.5–14.5)
EST. GFR  (NO RACE VARIABLE): >60 ML/MIN/1.73 M^2
GLUCOSE SERPL-MCNC: 150 MG/DL (ref 70–110)
GLUCOSE UR QL STRIP: NEGATIVE
HCT VFR BLD AUTO: 45.2 % (ref 40–54)
HCV AB SERPL QL IA: NORMAL
HGB BLD-MCNC: 15.5 G/DL (ref 14–18)
HGB UR QL STRIP: NEGATIVE
HIV 1+2 AB+HIV1 P24 AG SERPL QL IA: NORMAL
HYALINE CASTS UR QL AUTO: 5 /LPF
IMM GRANULOCYTES # BLD AUTO: 0.04 K/UL (ref 0–0.04)
IMM GRANULOCYTES NFR BLD AUTO: 0.5 % (ref 0–0.5)
KETONES UR QL STRIP: ABNORMAL
LEUKOCYTE ESTERASE UR QL STRIP: NEGATIVE
LIPASE SERPL-CCNC: 21 U/L (ref 4–60)
LYMPHOCYTES # BLD AUTO: 1 K/UL (ref 1–4.8)
LYMPHOCYTES NFR BLD: 12.7 % (ref 18–48)
MCH RBC QN AUTO: 30.3 PG (ref 27–31)
MCHC RBC AUTO-ENTMCNC: 34.3 G/DL (ref 32–36)
MCV RBC AUTO: 89 FL (ref 82–98)
MICROSCOPIC COMMENT: ABNORMAL
MONOCYTES # BLD AUTO: 0.3 K/UL (ref 0.3–1)
MONOCYTES NFR BLD: 3.8 % (ref 4–15)
NEUTROPHILS # BLD AUTO: 6.6 K/UL (ref 1.8–7.7)
NEUTROPHILS NFR BLD: 81.8 % (ref 38–73)
NITRITE UR QL STRIP: NEGATIVE
NRBC BLD-RTO: 0 /100 WBC
PH UR STRIP: 6 [PH] (ref 5–8)
PLATELET # BLD AUTO: 194 K/UL (ref 150–450)
PMV BLD AUTO: 10.2 FL (ref 9.2–12.9)
POTASSIUM SERPL-SCNC: 4.1 MMOL/L (ref 3.5–5.1)
PROT SERPL-MCNC: 7.4 G/DL (ref 6–8.4)
PROT UR QL STRIP: ABNORMAL
RBC # BLD AUTO: 5.11 M/UL (ref 4.6–6.2)
RBC #/AREA URNS AUTO: 1 /HPF (ref 0–4)
SARS-COV-2 RDRP RESP QL NAA+PROBE: NEGATIVE
SODIUM SERPL-SCNC: 135 MMOL/L (ref 136–145)
SP GR UR STRIP: 1.03 (ref 1–1.03)
TROPONIN I SERPL DL<=0.01 NG/ML-MCNC: <0.006 NG/ML (ref 0–0.03)
TSH SERPL DL<=0.005 MIU/L-ACNC: 1.8 UIU/ML (ref 0.4–4)
URN SPEC COLLECT METH UR: ABNORMAL
WBC # BLD AUTO: 8.12 K/UL (ref 3.9–12.7)
WBC #/AREA URNS AUTO: 2 /HPF (ref 0–5)

## 2022-09-18 PROCEDURE — 93010 ELECTROCARDIOGRAM REPORT: CPT | Mod: ,,, | Performed by: INTERNAL MEDICINE

## 2022-09-18 PROCEDURE — 63600175 PHARM REV CODE 636 W HCPCS: Performed by: PHYSICIAN ASSISTANT

## 2022-09-18 PROCEDURE — 99285 EMERGENCY DEPT VISIT HI MDM: CPT | Mod: 25

## 2022-09-18 PROCEDURE — 83690 ASSAY OF LIPASE: CPT | Performed by: PHYSICIAN ASSISTANT

## 2022-09-18 PROCEDURE — 99284 EMERGENCY DEPT VISIT MOD MDM: CPT | Mod: CS,,, | Performed by: PHYSICIAN ASSISTANT

## 2022-09-18 PROCEDURE — 25000003 PHARM REV CODE 250: Performed by: PHYSICIAN ASSISTANT

## 2022-09-18 PROCEDURE — U0002 COVID-19 LAB TEST NON-CDC: HCPCS | Performed by: PHYSICIAN ASSISTANT

## 2022-09-18 PROCEDURE — 80053 COMPREHEN METABOLIC PANEL: CPT | Performed by: PHYSICIAN ASSISTANT

## 2022-09-18 PROCEDURE — 84443 ASSAY THYROID STIM HORMONE: CPT | Performed by: PHYSICIAN ASSISTANT

## 2022-09-18 PROCEDURE — 81001 URINALYSIS AUTO W/SCOPE: CPT | Performed by: PHYSICIAN ASSISTANT

## 2022-09-18 PROCEDURE — 84484 ASSAY OF TROPONIN QUANT: CPT | Performed by: PHYSICIAN ASSISTANT

## 2022-09-18 PROCEDURE — 96375 TX/PRO/DX INJ NEW DRUG ADDON: CPT

## 2022-09-18 PROCEDURE — 85025 COMPLETE CBC W/AUTO DIFF WBC: CPT | Performed by: PHYSICIAN ASSISTANT

## 2022-09-18 PROCEDURE — 93005 ELECTROCARDIOGRAM TRACING: CPT

## 2022-09-18 PROCEDURE — 83880 ASSAY OF NATRIURETIC PEPTIDE: CPT | Performed by: PHYSICIAN ASSISTANT

## 2022-09-18 PROCEDURE — 99284 PR EMERGENCY DEPT VISIT,LEVEL IV: ICD-10-PCS | Mod: CS,,, | Performed by: PHYSICIAN ASSISTANT

## 2022-09-18 PROCEDURE — 96374 THER/PROPH/DIAG INJ IV PUSH: CPT

## 2022-09-18 PROCEDURE — 86803 HEPATITIS C AB TEST: CPT | Performed by: PHYSICIAN ASSISTANT

## 2022-09-18 PROCEDURE — 87389 HIV-1 AG W/HIV-1&-2 AB AG IA: CPT | Performed by: PHYSICIAN ASSISTANT

## 2022-09-18 PROCEDURE — 93010 EKG 12-LEAD: ICD-10-PCS | Mod: ,,, | Performed by: INTERNAL MEDICINE

## 2022-09-18 RX ORDER — KETOROLAC TROMETHAMINE 30 MG/ML
10 INJECTION, SOLUTION INTRAMUSCULAR; INTRAVENOUS
Status: COMPLETED | OUTPATIENT
Start: 2022-09-18 | End: 2022-09-18

## 2022-09-18 RX ORDER — FAMOTIDINE 20 MG/1
20 TABLET, FILM COATED ORAL
Status: COMPLETED | OUTPATIENT
Start: 2022-09-18 | End: 2022-09-18

## 2022-09-18 RX ORDER — ONDANSETRON 4 MG/1
4 TABLET, FILM COATED ORAL EVERY 8 HOURS PRN
Qty: 20 TABLET | Refills: 0 | Status: SHIPPED | OUTPATIENT
Start: 2022-09-18 | End: 2022-09-25

## 2022-09-18 RX ORDER — ONDANSETRON 2 MG/ML
4 INJECTION INTRAMUSCULAR; INTRAVENOUS
Status: COMPLETED | OUTPATIENT
Start: 2022-09-18 | End: 2022-09-18

## 2022-09-18 RX ADMIN — SODIUM CHLORIDE 1000 ML: 0.9 INJECTION, SOLUTION INTRAVENOUS at 01:09

## 2022-09-18 RX ADMIN — ONDANSETRON 4 MG: 2 INJECTION INTRAMUSCULAR; INTRAVENOUS at 01:09

## 2022-09-18 RX ADMIN — FAMOTIDINE 20 MG: 20 TABLET, FILM COATED ORAL at 03:09

## 2022-09-18 RX ADMIN — KETOROLAC TROMETHAMINE 10 MG: 30 INJECTION, SOLUTION INTRAMUSCULAR; INTRAVENOUS at 03:09

## 2022-09-18 NOTE — ED PROVIDER NOTES
Encounter Date: 9/18/2022       History     Chief Complaint   Patient presents with    Generalized Body Aches     Body aches, nausea and emesis for 2 days     This is a 66 year old male with a PMH Of HTN cervical spine stenosis, vertigo presenting to the ED with a chief complaint of vomiting. He reports a 2 day history of nausea, vomiting, lightheadedness and arthralgias. He states the nausea made him feel bad so he self induced vomiting. He describes whole body stiffness. The nausea is prominent when he gets dizzy. The dizziness is positional in nature but different than prior episodes of vertigo. He reports sleeping a lot since onset. Denies fever, URI symptoms, chest pain, SOB, abdominal pain, diarrhea. He is a nonsmoker. Family history of heart disease.      Review of patient's allergies indicates:  No Known Allergies  Past Medical History:   Diagnosis Date    Arthritis     Carpal tunnel syndrome, bilateral     Cervical spinal stenosis 2002    Colon polyps     Fatty liver     Hypertension     Overweight (BMI 25.0-29.9) 8/16/2019    Vertigo     left ear issues     Past Surgical History:   Procedure Laterality Date    ARTHROSCOPIC CHONDROPLASTY OF KNEE JOINT Left 10/17/2018    Procedure: ARTHROSCOPY, KNEE, WITH CHONDROPLASTY;  Surgeon: GRETEL Carr MD;  Location: [facility] OR [address] [place] [address];  Service: Orthopedics;  Laterality: Left;    COLONOSCOPY      COLONOSCOPY N/A 9/25/2017    Procedure: COLONOSCOPY/emr;  Surgeon: Raul August MD;  Location: [address]);  Service: Endoscopy;  Laterality: N/A;    COLONOSCOPY N/A 3/9/2018    Procedure: COLONOSCOPY;  Surgeon: Raul August MD;  Location: [address]);  Service: Endoscopy;  Laterality: N/A;    EPIDURAL STEROID INJECTION N/A 11/4/2021    Procedure: INJECTION, STEROID, EPIDURAL, C7-T1  NEED CONSENT;  Surgeon: Bozena Mena MD;  Location: [facility] [place] [facility];  Service: Pain Management;  Laterality: N/A;    ESOPHAGOGASTRODUODENOSCOPY N/A 9/23/2019     Procedure: EGD (ESOPHAGOGASTRODUODENOSCOPY);  Surgeon: Dyllan Maloney MD;  Location: [facility];  Service: General;  Laterality: N/A;    INJECTION OF FACET JOINT Bilateral 8/6/2021    Procedure: FACET JOINT INJECTION BILATERAL L4/5 AND L5/S1 DIRECT REFERRAL NEEDS CONSENT;  Surgeon: Jasiel Aviles MD;  Location: [facility];  Service: Pain Management;  Laterality: Bilateral;    KNEE ARTHROSCOPY W/ MENISCECTOMY Left 10/17/2018    Procedure: ARTHROSCOPY, KNEE, WITH MENISCECTOMY;  Surgeon: GRETEL Carr MD;  Location: [facility] OR [address] [place] [address];  Service: Orthopedics;  Laterality: Left;  regional w/catheter adductor  Dimitry/[employer] notified 10-12 LO     Family History   Problem Relation Age of Onset    No Known Problems Mother     Arthritis Father     Dementia Father     Heart disease Father     No Known Problems Brother     Diabetes Neg Hx     Cirrhosis Neg Hx      Social History     Tobacco Use    Smoking status: Never    Smokeless tobacco: Never   Substance Use Topics    Alcohol use: No    Drug use: No     Review of Systems   Constitutional:  Positive for fatigue. Negative for chills and fever.   HENT:  Negative for sore throat.    Respiratory:  Negative for shortness of breath.    Cardiovascular:  Negative for chest pain.   Gastrointestinal:  Positive for diarrhea, nausea and vomiting. Negative for abdominal pain.   Genitourinary:  Negative for dysuria.   Musculoskeletal:  Positive for myalgias.   Skin:  Negative for rash.   Neurological:  Positive for dizziness, weakness (generalized) and light-headedness. Negative for numbness.   Hematological:  Does not bruise/bleed easily.     Physical Exam     Initial Vitals [09/18/22 1201]   BP Pulse Resp Temp SpO2   (!) 150/84 80 20 97.8 °F (36.6 °C) 98 %      MAP       --         Physical Exam    Constitutional: He appears well-developed and well-nourished. No distress.   HENT:   Head: Atraumatic.   Right Ear: Tympanic membrane and ear canal normal.   Left Ear: Tympanic  membrane and ear canal normal.   Eyes: Conjunctivae and EOM are normal. Pupils are equal, round, and reactive to light.   Cardiovascular:  Normal rate, regular rhythm and normal heart sounds.           Pulmonary/Chest: Breath sounds normal. No respiratory distress. He has no wheezes. He has no rhonchi. He has no rales.   Abdominal: Abdomen is soft. Bowel sounds are normal. There is abdominal tenderness in the right upper quadrant.     Neurological: He is alert and oriented to person, place, and time. He has normal strength. No cranial nerve deficit or sensory deficit. Coordination and gait normal.   Normal finger to nose and rapid alternating hand movements.   Skin: Skin is warm and dry. No rash noted.       ED Course   Procedures  Labs Reviewed   CBC W/ AUTO DIFFERENTIAL - Abnormal; Notable for the following components:       Result Value    Gran % 81.8 (*)     Lymph % 12.7 (*)     Mono % 3.8 (*)     All other components within normal limits   COMPREHENSIVE METABOLIC PANEL - Abnormal; Notable for the following components:    Sodium 135 (*)     Glucose 150 (*)     Total Bilirubin 1.1 (*)     ALT 46 (*)     All other components within normal limits   URINALYSIS, REFLEX TO URINE CULTURE - Abnormal; Notable for the following components:    Protein, UA 1+ (*)     Ketones, UA 2+ (*)     All other components within normal limits    Narrative:     Specimen Source->Urine   URINALYSIS MICROSCOPIC - Abnormal; Notable for the following components:    Hyaline Casts, UA 5 (*)     All other components within normal limits    Narrative:     Specimen Source->Urine   HIV 1 / 2 ANTIBODY    Narrative:     Release to patient->Immediate   HEPATITIS C ANTIBODY    Narrative:     Release to patient->Immediate   TROPONIN I   B-TYPE NATRIURETIC PEPTIDE   SARS-COV-2 RNA AMPLIFICATION, QUAL   TSH   LIPASE   LIPASE    Narrative:     ADD ON LIPASE PER PRASAD FREEMAN RN PER HAI TAYLOR MD   ORDER#  09/18/2022  16:46           Imaging  Results              X-Ray Chest PA And Lateral (Final result)  Result time 09/18/22 15:07:54      Final result by Montez Berry MD (09/18/22 15:07:54)                   Impression:      No acute process.      Electronically signed by: Montez Berry MD  Date:    09/18/2022  Time:    15:07               Narrative:    EXAMINATION:  XR CHEST PA AND LATERAL    CLINICAL HISTORY:  Chest Pain;    TECHNIQUE:  PA and lateral views of the chest were performed.    COMPARISON:  05/07/2022.    FINDINGS:  The trachea is unremarkable.  The cardiomediastinal silhouette is within normal limits.  The hemidiaphragms are unremarkable.  There is no evidence of free air beneath the hemidiaphragms there are no pleural effusions.  There is no evidence of a pneumothorax.  There is no evidence of pneumomediastinum.  No airspace opacity is present.  The osseous structures are unremarkable.                                       US Abdomen Limited (Gallbladder) (Final result)  Result time 09/18/22 14:33:31      Final result by Estuardo Mckee MD (09/18/22 14:33:31)                   Impression:      Unremarkable gallbladder.    Echogenic liver parenchyma, which could reflect steatosis and/or chronic liver disease.      Electronically signed by: Estuardo Mckee  Date:    09/18/2022  Time:    14:33               Narrative:    EXAMINATION:  US ABDOMEN LIMITED    CLINICAL HISTORY:  Nausea with vomiting, unspecified    TECHNIQUE:  Limited ultrasound of the right upper quadrant of the abdomen (including pancreas, gallbladder, common bile duct) was performed.    COMPARISON:  Abdominal ultrasound 07/22/2019; CT abdomen pelvis 07/27/2019    FINDINGS:  Pancreas: Poorly visualized.    Gallbladder: No calculi, wall thickening, or pericholecystic fluid.  No sonographic Loyd's sign.    Biliary system: The common duct is not dilated, measuring 3 mm.  No intrahepatic ductal dilatation.    Miscellaneous: Echogenic liver parenchyma.  No upper abdominal ascites.                                        Medications   sodium chloride 0.9% bolus 1,000 mL (0 mLs Intravenous Stopped 9/18/22 1327)   ondansetron injection 4 mg (4 mg Intravenous Given 9/18/22 1313)   ketorolac injection 9.999 mg (9.999 mg Intravenous Given 9/18/22 1538)   famotidine tablet 20 mg (20 mg Oral Given 9/18/22 1538)     Medical Decision Making:   Clinical Tests:   Lab Tests: Ordered and Reviewed  Radiological Study: Ordered and Reviewed     APC / Resident Notes:   66 y.o. year old male presenting with nausea, generalized weakness, dizziness and myalgias.    DDx includes but is not limited to BPPV, viral syndrome, arrhyhtmia, ACS, cholecystitis, biliary colic.    Labs are stable without leukocytosis, no elevation in LFTs, negative cardiac biomarkers, negative Covid screen.   CXR no acute process. Abdominal US with unremarkable gallbladder.  Urinalysis 2+ ketones.    Plan  Patient reports symptomatic improvement with treatment in ED. He is ambulating without dizziness. Reassessment of abdomen is benign. He is tolerating oral intake. Advised supportive care measures. PRN zofran was provided at discharge.    Discussed findings and plan with patient who verbalized understanding and agrees with the plan and course of treatment. Return to ED precautions discussed. Patient is stable for discharge. I discussed the care of this patient with my supervising physician.         Attending Attestation:     Physician Attestation Statement for NP/PA:   I discussed this assessment and plan of this patient with the NP/PA, but I did not personally examine the patient. The face to face encounter was performed by the NP/PA.                         Clinical Impression:   Final diagnoses:  [R07.9] Chest pain  [R11.2] Nausea & vomiting  [B34.9] Viral illness (Primary)  [B34.9] Viral syndrome      ED Disposition Condition    Discharge Stable          ED Prescriptions       Medication Sig Dispense Start Date End Date Auth. Provider     ondansetron (ZOFRAN) 4 MG tablet Take 1 tablet (4 mg total) by mouth every 8 (eight) hours as needed for Nausea. 20 tablet 9/18/2022 9/25/2022 Migdalia Guerra PA-C          Follow-up Information       Follow up With Specialties Details Why Contact Info    Nick Stanton MD Internal Medicine   [address]  [place] [address]  [phone]               Migdalia Guerra PA-C  09/18/22 2131       Mandy Gomez MD  09/18/22 2140

## 2022-09-18 NOTE — ED TRIAGE NOTES
Patient presents to the ER with complaints of generalized weakness, nausea, and fatigue since Friday. Patient states he had cervical stenosis due to an accident that is getting worse and cramping in both of his hands. Patient denies chest pain or shortness of breath. PCP recommended patient see a pain specialist. Patient unable to get effexor filled from pharmacy.

## 2022-09-19 ENCOUNTER — PATIENT MESSAGE (OUTPATIENT)
Dept: PSYCHIATRY | Facility: CLINIC | Age: 67
End: 2022-09-19
Payer: MEDICARE

## 2022-10-04 ENCOUNTER — OFFICE VISIT (OUTPATIENT)
Dept: PSYCHIATRY | Facility: CLINIC | Age: 67
End: 2022-10-04
Payer: MEDICARE

## 2022-10-04 DIAGNOSIS — F41.1 GAD (GENERALIZED ANXIETY DISORDER): Primary | ICD-10-CM

## 2022-10-04 PROCEDURE — 4010F ACE/ARB THERAPY RXD/TAKEN: CPT | Mod: CPTII,S$GLB,, | Performed by: SOCIAL WORKER

## 2022-10-04 PROCEDURE — 99999 PR PBB SHADOW E&M-EST. PATIENT-LVL I: ICD-10-PCS | Mod: PBBFAC,,, | Performed by: SOCIAL WORKER

## 2022-10-04 PROCEDURE — 4010F PR ACE/ARB THEARPY RXD/TAKEN: ICD-10-PCS | Mod: CPTII,S$GLB,, | Performed by: SOCIAL WORKER

## 2022-10-04 PROCEDURE — 1159F PR MEDICATION LIST DOCUMENTED IN MEDICAL RECORD: ICD-10-PCS | Mod: CPTII,S$GLB,, | Performed by: SOCIAL WORKER

## 2022-10-04 PROCEDURE — 1159F MED LIST DOCD IN RCRD: CPT | Mod: CPTII,S$GLB,, | Performed by: SOCIAL WORKER

## 2022-10-04 PROCEDURE — 99999 PR PBB SHADOW E&M-EST. PATIENT-LVL I: CPT | Mod: PBBFAC,,, | Performed by: SOCIAL WORKER

## 2022-10-05 NOTE — PROGRESS NOTES
Individual Psychotherapy (PhD/LCSW)    10/4/2022    Site:  [facility]         Therapeutic Intervention: Met with patient.  Outpatient - Insight oriented psychotherapy 30 min - CPT code 32311    Chief complaint/reason for encounter: depression, anxiety, behavior, and interpersonal     Interval history and content of current session: discussed he moved, coping skills, is with room mate and her  in a house, health, stress, taking care of himself and communications all addressed, and how to improve his situation addressed, over all he is stable, and doing okay, and helps take care of someone, and likes it, and likes to have peace and quiet and be alone at times. Continue his positive direction was encouraged.    Treatment plan:  Target symptoms: depression, anxiety , adjustment, grief, work stress  Why chosen therapy is appropriate versus another modality: relevant to diagnosis, patient responds to this modality, evidence based practice  Outcome monitoring methods: self-report, observation  Therapeutic intervention type: insight oriented psychotherapy, behavior modifying psychotherapy, supportive psychotherapy    Risk parameters:  Patient reports no suicidal ideation  Patient reports no homicidal ideation  Patient reports no self-injurious behavior  Patient reports no violent behavior    Verbal deficits: None    Patient's response to intervention:  The patient's response to intervention is accepting.    Progress toward goals and other mental status changes:  The patient's progress toward goals is fair .    Diagnosis:     ICD-10-CM ICD-9-CM   1. RUBIO (generalized anxiety disorder)  F41.1 300.02       Plan:  individual psychotherapy and consult psychiatrist for medication evaluation    Return to clinic: 2 weeks    Length of Service (minutes): 30

## 2022-10-26 ENCOUNTER — OFFICE VISIT (OUTPATIENT)
Dept: PSYCHIATRY | Facility: CLINIC | Age: 67
End: 2022-10-26
Payer: MEDICARE

## 2022-10-26 DIAGNOSIS — F41.1 GAD (GENERALIZED ANXIETY DISORDER): Primary | ICD-10-CM

## 2022-10-26 DIAGNOSIS — F32.A DEPRESSION, UNSPECIFIED DEPRESSION TYPE: ICD-10-CM

## 2022-10-26 PROCEDURE — 4010F ACE/ARB THERAPY RXD/TAKEN: CPT | Mod: CPTII,95,, | Performed by: NURSE PRACTITIONER

## 2022-10-26 PROCEDURE — 4010F PR ACE/ARB THEARPY RXD/TAKEN: ICD-10-PCS | Mod: CPTII,95,, | Performed by: NURSE PRACTITIONER

## 2022-10-26 NOTE — PROGRESS NOTES
"10/26/2022 7:30 AM   Ladarius Solis   1955   [id number]                                                                   OUTPATIENT PSYCHIATRY FOLLOW- UP VISIT     Reason for Encounter:  Ladarius Solis, a 66 y.o. male,who presents today for follow up of depression, anxiety. Met with patient.     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      Reports has been having lucid/vivid dreams, do wake up from dreams, "these dreams I remember." "Going through everyday situations, been pretty peaceful dreams."  Reports has been feeling more confident with roommate, standing up to roommate and asserting needs. Reports mood has been good, anxiety has been better controlled. Continues to deny side effects from medication.     Reports going to places socially and sometimes wanting to socialize and other times not. Reports "I find myself not wanting to know their names because it means I'll have to connect with them."     Reports has been having arthritic pain in knees and spine, tennis elbow bilaterally, and has been acting up badly.     Denies SI/HI, AVH, racing thoughts.         Psychosocial stressors: financial, social, family        Review Of Systems:      Medical Review Of Systems:  Pertinent items are noted in HPI.     Psychiatric Review Of Systems:  sleep: yes - improved, 7 to 8 hours per night with trazodone  appetite changes: no  weight changes: no  energy/anergy: no  interest/pleasure/anhedonia: no  somatic symptoms: no  libido: no  anxiety/panic: no  guilty/hopeless: no  S.I.B.s/risky behavior: no  any drugs: no  alcohol: no       Sleep: "more than 8 hours per night." "Always have restless sleep."   "Seem to be able to get " "back to sleep very easily."         Risk Parameters:  Patient reports no suicidal ideation  Patient reports no homicidal ideation  Patient reports no self-injurious behavior  Patient reports no violent behavior        Current meds- venlafaxine     Compliance: yes     Side effects: none        Psychiatric, social, and family history reviewed this visit           Objective      ALL MEDICATIONS:     Current Outpatient Medications:     lisinopriL 10 MG tablet, Take 1 tablet (10 mg total) by mouth once daily., Disp: 90 tablet, Rfl: 3    venlafaxine (EFFEXOR-XR) 75 MG 24 hr capsule, Take 1 capsule (75 mg total) by mouth once daily., Disp: 30 capsule, Rfl: 2  Venlafaxine ER 37.5 mg by mouth once daily  No current facility-administered medications for this visit.   Facility-Administered Medications Ordered in Other Visits:     sodium hyaluronate (EUFLEXXA) 10 mg/mL(mw 2.4 -3.6 million) Syrg 20 mg, 20 mg, Intra-articular, , W Toi Carr MD, 20 mg at 05/10/18 1598     ALLERGIES:  Review of patient's allergies indicates:  No Known Allergies     RELEVANT LABS/STUDIES:              Lab Results   Component Value Date     WBC 7.19 02/24/2021     HGB 14.6 02/24/2021     HCT 43.8 02/24/2021     MCV 91 02/24/2021      02/24/2021      BMP            Lab Results   Component Value Date      (L) 02/24/2021     K 4.3 02/24/2021      02/24/2021     CO2 26 02/24/2021     BUN 16 02/24/2021     CREATININE 1.1 02/24/2021     CALCIUM 9.0 02/24/2021     ANIONGAP 7 (L) 02/24/2021     ESTGFRAFRICA >60 02/24/2021     EGFRNONAA >60 02/24/2021                Lab Results   Component Value Date     ALT 27 02/24/2021     AST 18 02/24/2021     ALKPHOS 67 02/24/2021     BILITOT 0.6 02/24/2021                Lab Results   Component Value Date     TSH 2.292 02/24/2021                Lab Results   Component Value Date     HGBA1C 5.6 02/06/2019         Constitutional  Vitals:  Most recent vital signs, dated less than 90 days prior to " this appointment, were reviewed.    There were no vitals filed for this visit.            PHYSICAL EXAM  General: well developed, well nourished  Neurologic:   Gait: Normal   Psychomotor signs:  No involuntary movements or tremor  AIMS: none     PSYCHIATRIC EXAM:     Mental Status Exam:  Appearance: unremarkable, age appropriate  Behavior/Cooperation: normal, cooperative  Speech: normal tone, normal rate, normal pitch, normal volume  Language: uses words appropriately; NO aphasia or dysarthria  Mood: steady  Affect: full, congruent with mood and appropriate to content/situation  Thought Process: normal and logical  Thought Content: normal, no suicidality, no homicidality, delusions, or paranoia  Level of Consciousness: Alert and Oriented x3  Memory:  Intact  Attention/concentration: appropriate for age/education.   Fund of Knowledge: appears adequate  Insight:  Intact - improving  Judgment: Intact      Assessment and Diagnosis   Status/Progress: Based on the examination today, the patient's problem(s) is/are improved and adequately but not ideally controlled.  New problems have not been presented today.   Co-morbidities are not complicating management of the primary condition.  There are no active rule-out diagnoses for this patient at this time.      General Impression:   RUBIO  Cluster C traits versus OCPD  Rule out OCD  Social Anxiety  Rule out complex PTSD        Intervention/Counseling/Treatment Plan   Medication Management: -        Continue venlafaxine 112.5 mg by mouth once daily  Labs: reviewed most recent  The treatment plan and follow up plan were reviewed with the patient.  Discussed with patient informed consent, risks vs. benefits, alternative treatments, side effect profile and the inherent unpredictability of individual responses to these treatments. The patient expresses understanding of the above and displays the capacity to agree with this current plan and had no other questions.  Encouraged Patient  to keep future appointments.   Take medications as prescribed and abstain from substance abuse.   In the event of an emergency patient was advised to go to the emergency room.     Return to Clinic: 6 months     > than 50% of total time spend on coordination of care and counseling   (which included pts differential diagnosis and prognosis for psychiatric conditions, risks, benefits of treatments, instructions and adherence to treatment plan, risk reduction, reviewing current psychiatric medication regimen, medical problems and social stressors. In addtion to possible discussion with other healthcare provider/s)     Add on Psychotherapy time:0  Total Face time: 25 min     The patient location is:  [place], at home  The chief complaint leading to consultation is: med f/u     Visit type: audiovisual     Face to Face time with patient: 20 min  25 minutes of total time spent on the encounter, which includes face to face time and non-face to face time preparing to see the patient (eg, review of tests), Obtaining and/or reviewing separately obtained history, Documenting clinical information in the electronic or other health record, Independently interpreting results (not separately reported) and communicating results to the patient/family/caregiver, or Care coordination (not separately reported).            Each patient to whom he or she provides medical services by telemedicine is:  (1) informed of the relationship between the physician and patient and the respective role of any other health care provider with respect to management of the patient; and (2) notified that he or she may decline to receive medical services by telemedicine and may withdraw from such care at any time.     Notes:      Stan Noland, MSN, APRN, PMHNP-BC  [facility]   10/26/2022 7:30 AM

## 2022-11-01 ENCOUNTER — OFFICE VISIT (OUTPATIENT)
Dept: PSYCHIATRY | Facility: CLINIC | Age: 67
End: 2022-11-01
Payer: MEDICARE

## 2022-11-01 DIAGNOSIS — F32.A DEPRESSION, UNSPECIFIED DEPRESSION TYPE: Primary | ICD-10-CM

## 2022-11-01 PROCEDURE — 1159F PR MEDICATION LIST DOCUMENTED IN MEDICAL RECORD: ICD-10-PCS | Mod: CPTII,S$GLB,, | Performed by: SOCIAL WORKER

## 2022-11-01 PROCEDURE — 99999 PR PBB SHADOW E&M-EST. PATIENT-LVL I: CPT | Mod: PBBFAC,,, | Performed by: SOCIAL WORKER

## 2022-11-01 PROCEDURE — 99999 PR PBB SHADOW E&M-EST. PATIENT-LVL I: ICD-10-PCS | Mod: PBBFAC,,, | Performed by: SOCIAL WORKER

## 2022-11-01 PROCEDURE — 1159F MED LIST DOCD IN RCRD: CPT | Mod: CPTII,S$GLB,, | Performed by: SOCIAL WORKER

## 2022-11-01 PROCEDURE — 4010F PR ACE/ARB THEARPY RXD/TAKEN: ICD-10-PCS | Mod: CPTII,S$GLB,, | Performed by: SOCIAL WORKER

## 2022-11-01 PROCEDURE — 4010F ACE/ARB THERAPY RXD/TAKEN: CPT | Mod: CPTII,S$GLB,, | Performed by: SOCIAL WORKER

## 2022-11-02 NOTE — PROGRESS NOTES
Individual Psychotherapy (PhD/LCSW)    11/1/2022    Site:  [facility]         Therapeutic Intervention: Met with patient.  Outpatient - Insight oriented psychotherapy 45 min - CPT code 97046    Chief complaint/reason for encounter: depression, anxiety, behavior, and interpersonal     Interval history and content of current session: discussed medical pain, and feels bad when the weather changes due to arthritius, and joint pain, will see MD for help with this, living with room mates is a challenge. Communications, boundaries, and taking care of himself, feelings, and getting along with others, also addressed, says right now he can not do much because of pain. Coping skills and taking care of himself and having time for himself encouraged at this time.    Treatment plan:  Target symptoms: depression, recurrent depression, anxiety , mood swings, mood disorder, adjustment  Why chosen therapy is appropriate versus another modality: relevant to diagnosis, patient responds to this modality, evidence based practice  Outcome monitoring methods: self-report, observation  Therapeutic intervention type: insight oriented psychotherapy, behavior modifying psychotherapy, supportive psychotherapy    Risk parameters:  Patient reports no suicidal ideation  Patient reports no homicidal ideation  Patient reports no self-injurious behavior  Patient reports no violent behavior    Verbal deficits: None    Patient's response to intervention:  The patient's response to intervention is accepting.    Progress toward goals and other mental status changes:  The patient's progress toward goals is limited.    Diagnosis:     ICD-10-CM ICD-9-CM   1. Depression, unspecified depression type  F32.A 311       Plan:  individual psychotherapy, consult psychiatrist for medication evaluation, and medication management by physician    Return to clinic: 2 weeks    Length of Service (minutes): 45

## 2022-11-08 ENCOUNTER — OFFICE VISIT (OUTPATIENT)
Dept: OPTOMETRY | Facility: CLINIC | Age: 67
End: 2022-11-08
Payer: MEDICARE

## 2022-11-08 DIAGNOSIS — H52.203 MYOPIA WITH ASTIGMATISM AND PRESBYOPIA, BILATERAL: ICD-10-CM

## 2022-11-08 DIAGNOSIS — H04.123 DRY EYE SYNDROME OF BOTH EYES: Primary | ICD-10-CM

## 2022-11-08 DIAGNOSIS — Z96.1 PSEUDOPHAKIA: ICD-10-CM

## 2022-11-08 DIAGNOSIS — H52.13 MYOPIA WITH ASTIGMATISM AND PRESBYOPIA, BILATERAL: ICD-10-CM

## 2022-11-08 DIAGNOSIS — H02.403 PTOSIS OF EYELID, BILATERAL: ICD-10-CM

## 2022-11-08 DIAGNOSIS — H52.4 MYOPIA WITH ASTIGMATISM AND PRESBYOPIA, BILATERAL: ICD-10-CM

## 2022-11-08 PROCEDURE — 99999 PR PBB SHADOW E&M-EST. PATIENT-LVL II: ICD-10-PCS | Mod: PBBFAC,,, | Performed by: OPTOMETRIST

## 2022-11-08 PROCEDURE — 99999 PR PBB SHADOW E&M-EST. PATIENT-LVL II: CPT | Mod: PBBFAC,,, | Performed by: OPTOMETRIST

## 2022-11-08 PROCEDURE — 92015 DETERMINE REFRACTIVE STATE: CPT | Mod: S$GLB,,, | Performed by: OPTOMETRIST

## 2022-11-08 PROCEDURE — 92014 PR EYE EXAM, EST PATIENT,COMPREHESV: ICD-10-PCS | Mod: S$GLB,,, | Performed by: OPTOMETRIST

## 2022-11-08 PROCEDURE — 4010F ACE/ARB THERAPY RXD/TAKEN: CPT | Mod: CPTII,S$GLB,, | Performed by: OPTOMETRIST

## 2022-11-08 PROCEDURE — 92015 PR REFRACTION: ICD-10-PCS | Mod: S$GLB,,, | Performed by: OPTOMETRIST

## 2022-11-08 PROCEDURE — 1159F PR MEDICATION LIST DOCUMENTED IN MEDICAL RECORD: ICD-10-PCS | Mod: CPTII,S$GLB,, | Performed by: OPTOMETRIST

## 2022-11-08 PROCEDURE — 1160F RVW MEDS BY RX/DR IN RCRD: CPT | Mod: CPTII,S$GLB,, | Performed by: OPTOMETRIST

## 2022-11-08 PROCEDURE — 4010F PR ACE/ARB THEARPY RXD/TAKEN: ICD-10-PCS | Mod: CPTII,S$GLB,, | Performed by: OPTOMETRIST

## 2022-11-08 PROCEDURE — 92014 COMPRE OPH EXAM EST PT 1/>: CPT | Mod: S$GLB,,, | Performed by: OPTOMETRIST

## 2022-11-08 PROCEDURE — 1159F MED LIST DOCD IN RCRD: CPT | Mod: CPTII,S$GLB,, | Performed by: OPTOMETRIST

## 2022-11-08 PROCEDURE — 1160F PR REVIEW ALL MEDS BY PRESCRIBER/CLIN PHARMACIST DOCUMENTED: ICD-10-PCS | Mod: CPTII,S$GLB,, | Performed by: OPTOMETRIST

## 2022-11-08 RX ORDER — CYCLOBENZAPRINE HCL 10 MG
10 TABLET ORAL NIGHTLY PRN
COMMUNITY
Start: 2022-05-18 | End: 2023-03-16

## 2022-11-08 NOTE — PROGRESS NOTES
FRANCOISE    AYDEN: [birth year]  Chief complaint (CC): Patient is here for annual exam.  Patient had   trouble adjusting to progressive lenses and may want a lined trifocal.    Glasses? + 1 yr. old  Contacts? -  H/o eye surgery, injections or laser: PC IOL OU  H/o eye injury: -  Known eye conditions? See above  Family h/o eye conditions? Father with glaucoma  Eye gtts? AT's once a day      (-) Flashes (-)  Floaters (-) Mucous   (-)  Tearing (-) Itching (-) Burning   (-) Headaches (-) Eye Pain/discomfort (-) Irritation   (-)  Redness (-) Double vision (-) Blurry vision    Diabetic? -  A1c? -      Last edited by Joselin Gill on 11/8/2022  3:24 PM.            Assessment /Plan     For exam results, see Encounter Report.      Dry eye syndrome of both eyes  Increase to TID OU    Myopia with astigmatism and presbyopia, bilateral  SRx released to patient. Patient educated on lens options. Normal ocular health. RTC 1 year for routine exam.     Pseudophakia  Good result.     Ptosis of eyelid, bilateral  No visual complaints. Monitor.

## 2022-11-16 DIAGNOSIS — Z86.010 HISTORY OF COLON POLYPS: ICD-10-CM

## 2022-11-16 DIAGNOSIS — Z12.11 COLON CANCER SCREENING: Primary | ICD-10-CM

## 2022-11-28 ENCOUNTER — OFFICE VISIT (OUTPATIENT)
Dept: PSYCHIATRY | Facility: CLINIC | Age: 67
End: 2022-11-28
Payer: MEDICARE

## 2022-11-28 DIAGNOSIS — F41.1 GAD (GENERALIZED ANXIETY DISORDER): Primary | ICD-10-CM

## 2022-11-28 PROCEDURE — 4010F ACE/ARB THERAPY RXD/TAKEN: CPT | Mod: CPTII,S$GLB,, | Performed by: SOCIAL WORKER

## 2022-11-28 PROCEDURE — 4010F PR ACE/ARB THEARPY RXD/TAKEN: ICD-10-PCS | Mod: CPTII,S$GLB,, | Performed by: SOCIAL WORKER

## 2022-11-28 PROCEDURE — 90834 PR PSYCHOTHERAPY W/PATIENT, 45 MIN: ICD-10-PCS | Mod: S$GLB,,, | Performed by: SOCIAL WORKER

## 2022-11-28 PROCEDURE — 99999 PR PBB SHADOW E&M-EST. PATIENT-LVL I: CPT | Mod: PBBFAC,,, | Performed by: SOCIAL WORKER

## 2022-11-28 PROCEDURE — 90834 PSYTX W PT 45 MINUTES: CPT | Mod: S$GLB,,, | Performed by: SOCIAL WORKER

## 2022-11-28 PROCEDURE — 99999 PR PBB SHADOW E&M-EST. PATIENT-LVL I: ICD-10-PCS | Mod: PBBFAC,,, | Performed by: SOCIAL WORKER

## 2022-11-28 PROCEDURE — 1159F PR MEDICATION LIST DOCUMENTED IN MEDICAL RECORD: ICD-10-PCS | Mod: CPTII,S$GLB,, | Performed by: SOCIAL WORKER

## 2022-11-28 PROCEDURE — 1159F MED LIST DOCD IN RCRD: CPT | Mod: CPTII,S$GLB,, | Performed by: SOCIAL WORKER

## 2022-11-28 NOTE — PROGRESS NOTES
Individual Psychotherapy (PhD/LCSW)    11/28/2022    Site:  [facility]         Therapeutic Intervention: Met with patient.  Outpatient - Insight oriented psychotherapy 45 min - CPT code 15611    Chief complaint/reason for encounter: depression, mood swings, anxiety, sleep, and appetite, pain, medical, health,     Interval history and content of current session: client in a lot of medical pain due to arthritius, and sees PCP in December to discuss options for help. Family, loss and grief, taking care of himself, and his strengths, and coping skills addressed, and how to take care of himself, and also get help and a balance of this addressed, holidays, stress, liking to be along and liking quiet time addressed. Helping others addressed, management of stress skills and how to have good boundaries addressed, communications, and taking things one at a time, addressed.    Treatment plan:  Target symptoms: depression, anxiety , adjustment, grief  Why chosen therapy is appropriate versus another modality: relevant to diagnosis  Outcome monitoring methods: self-report, observation  Therapeutic intervention type: insight oriented psychotherapy, behavior modifying psychotherapy, supportive psychotherapy    Risk parameters:  Patient reports no suicidal ideation  Patient reports no homicidal ideation  Patient reports no self-injurious behavior  Patient reports no violent behavior    Verbal deficits: None    Patient's response to intervention:  The patient's response to intervention is accepting.    Progress toward goals and other mental status changes:  The patient's progress toward goals is fair .    Diagnosis:     ICD-10-CM ICD-9-CM   1. RUBIO (generalized anxiety disorder)  F41.1 300.02       Plan:  individual psychotherapy and medication management by physician    Return to clinic: 2 weeks    Length of Service (minutes): 45

## 2022-12-05 ENCOUNTER — OFFICE VISIT (OUTPATIENT)
Dept: PSYCHIATRY | Facility: CLINIC | Age: 67
End: 2022-12-05
Payer: MEDICARE

## 2022-12-05 DIAGNOSIS — F41.1 GAD (GENERALIZED ANXIETY DISORDER): Primary | ICD-10-CM

## 2022-12-05 PROCEDURE — 4010F ACE/ARB THERAPY RXD/TAKEN: CPT | Mod: CPTII,S$GLB,, | Performed by: SOCIAL WORKER

## 2022-12-05 PROCEDURE — 90834 PR PSYCHOTHERAPY W/PATIENT, 45 MIN: ICD-10-PCS | Mod: S$GLB,,, | Performed by: SOCIAL WORKER

## 2022-12-05 PROCEDURE — 4010F PR ACE/ARB THEARPY RXD/TAKEN: ICD-10-PCS | Mod: CPTII,S$GLB,, | Performed by: SOCIAL WORKER

## 2022-12-05 PROCEDURE — 90834 PSYTX W PT 45 MINUTES: CPT | Mod: S$GLB,,, | Performed by: SOCIAL WORKER

## 2022-12-06 NOTE — PROGRESS NOTES
Saw client for an individual session, he is in a lot of pain due to arthritus, and is seeing his PCP soon. Coping skills, how to manage his pain and feelings addressed, sleep, health, and emotions  addressed also. Has some issues with his room mate and her way of relating and they basically get along, he likes to be alone at times, and he likes quiet so that is hard to get at time, due to her being loud. 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 He likes doing that. He discussed grief and loss, coping skills, communications and how to get along with her and her  and also have his needs met for being alone and having quiet times. Diego Blanc LCSW, PH.D.

## 2022-12-08 ENCOUNTER — OFFICE VISIT (OUTPATIENT)
Dept: PSYCHIATRY | Facility: CLINIC | Age: 67
End: 2022-12-08
Payer: MEDICARE

## 2022-12-08 DIAGNOSIS — F32.A DEPRESSION, UNSPECIFIED DEPRESSION TYPE: ICD-10-CM

## 2022-12-08 DIAGNOSIS — F41.1 GAD (GENERALIZED ANXIETY DISORDER): Primary | ICD-10-CM

## 2022-12-08 PROCEDURE — 99999 PR PBB SHADOW E&M-EST. PATIENT-LVL I: ICD-10-PCS | Mod: PBBFAC,,, | Performed by: SOCIAL WORKER

## 2022-12-08 PROCEDURE — 1159F MED LIST DOCD IN RCRD: CPT | Mod: CPTII,S$GLB,, | Performed by: SOCIAL WORKER

## 2022-12-08 PROCEDURE — 4010F PR ACE/ARB THEARPY RXD/TAKEN: ICD-10-PCS | Mod: CPTII,S$GLB,, | Performed by: SOCIAL WORKER

## 2022-12-08 PROCEDURE — 4010F ACE/ARB THERAPY RXD/TAKEN: CPT | Mod: CPTII,S$GLB,, | Performed by: SOCIAL WORKER

## 2022-12-08 PROCEDURE — 1159F PR MEDICATION LIST DOCUMENTED IN MEDICAL RECORD: ICD-10-PCS | Mod: CPTII,S$GLB,, | Performed by: SOCIAL WORKER

## 2022-12-08 PROCEDURE — 90834 PR PSYCHOTHERAPY W/PATIENT, 45 MIN: ICD-10-PCS | Mod: S$GLB,,, | Performed by: SOCIAL WORKER

## 2022-12-08 PROCEDURE — 99999 PR PBB SHADOW E&M-EST. PATIENT-LVL I: CPT | Mod: PBBFAC,,, | Performed by: SOCIAL WORKER

## 2022-12-08 PROCEDURE — 90834 PSYTX W PT 45 MINUTES: CPT | Mod: S$GLB,,, | Performed by: SOCIAL WORKER

## 2022-12-10 NOTE — PROGRESS NOTES
Individual Psychotherapy (PhD/LCSW)    12/8/2022    Site:  [facility]         Therapeutic Intervention: Met with patient.  Outpatient - Insight oriented psychotherapy 45 min - CPT code 41635    Chief complaint/reason for encounter: depression, anxiety, behavior, somatic, and interpersonal     Interval history and content of current session: he is having a lot of pain from arthritius, and sees his PCP soon, coping skills, liking to be alone, and issues of communications with his room mate addressed, past issues in his life of care taking for his parents addressed, taking care of himself, he acknowledges he has made progress over the years in his health and mental health, coping skills, and time to reflect and make changes all focused on.    Treatment plan:  Target symptoms: depression, recurrent depression, anxiety , mood swings, mood disorder, adjustment, work stress  Why chosen therapy is appropriate versus another modality: relevant to diagnosis, patient responds to this modality, evidence based practice  Outcome monitoring methods: self-report, observation  Therapeutic intervention type: insight oriented psychotherapy, behavior modifying psychotherapy, supportive psychotherapy    Risk parameters:  Patient reports no suicidal ideation  Patient reports no homicidal ideation  Patient reports no self-injurious behavior  Patient reports no violent behavior    Verbal deficits: None    Patient's response to intervention:  The patient's response to intervention is accepting.    Progress toward goals and other mental status changes:  The patient's progress toward goals is limited.    Diagnosis:     ICD-10-CM ICD-9-CM   1. RUBIO (generalized anxiety disorder)  F41.1 300.02   2. Depression, unspecified depression type  F32.A 311       Plan:  individual psychotherapy    Return to clinic: 2 weeks    Length of Service (minutes): 45

## 2022-12-12 ENCOUNTER — OFFICE VISIT (OUTPATIENT)
Dept: INTERNAL MEDICINE | Facility: CLINIC | Age: 67
End: 2022-12-12
Payer: MEDICARE

## 2022-12-12 VITALS
SYSTOLIC BLOOD PRESSURE: 130 MMHG | WEIGHT: 175.25 LBS | DIASTOLIC BLOOD PRESSURE: 82 MMHG | BODY MASS INDEX: 28.16 KG/M2 | HEIGHT: 66 IN | HEART RATE: 91 BPM | OXYGEN SATURATION: 97 %

## 2022-12-12 DIAGNOSIS — M25.50 ARTHRALGIA, UNSPECIFIED JOINT: Primary | ICD-10-CM

## 2022-12-12 DIAGNOSIS — M65.341 TRIGGER RING FINGER OF RIGHT HAND: ICD-10-CM

## 2022-12-12 DIAGNOSIS — K21.9 GASTROESOPHAGEAL REFLUX DISEASE, UNSPECIFIED WHETHER ESOPHAGITIS PRESENT: ICD-10-CM

## 2022-12-12 DIAGNOSIS — M17.10 ARTHRITIS OF KNEE: ICD-10-CM

## 2022-12-12 PROCEDURE — 1101F PT FALLS ASSESS-DOCD LE1/YR: CPT | Mod: CPTII,S$GLB,, | Performed by: INTERNAL MEDICINE

## 2022-12-12 PROCEDURE — 3288F PR FALLS RISK ASSESSMENT DOCUMENTED: ICD-10-PCS | Mod: CPTII,S$GLB,, | Performed by: INTERNAL MEDICINE

## 2022-12-12 PROCEDURE — 1101F PR PT FALLS ASSESS DOC 0-1 FALLS W/OUT INJ PAST YR: ICD-10-PCS | Mod: CPTII,S$GLB,, | Performed by: INTERNAL MEDICINE

## 2022-12-12 PROCEDURE — 1159F MED LIST DOCD IN RCRD: CPT | Mod: CPTII,S$GLB,, | Performed by: INTERNAL MEDICINE

## 2022-12-12 PROCEDURE — 3075F PR MOST RECENT SYSTOLIC BLOOD PRESS GE 130-139MM HG: ICD-10-PCS | Mod: CPTII,S$GLB,, | Performed by: INTERNAL MEDICINE

## 2022-12-12 PROCEDURE — 99999 PR PBB SHADOW E&M-EST. PATIENT-LVL III: CPT | Mod: PBBFAC,,, | Performed by: INTERNAL MEDICINE

## 2022-12-12 PROCEDURE — 99213 PR OFFICE/OUTPT VISIT, EST, LEVL III, 20-29 MIN: ICD-10-PCS | Mod: S$GLB,,, | Performed by: INTERNAL MEDICINE

## 2022-12-12 PROCEDURE — 3008F PR BODY MASS INDEX (BMI) DOCUMENTED: ICD-10-PCS | Mod: CPTII,S$GLB,, | Performed by: INTERNAL MEDICINE

## 2022-12-12 PROCEDURE — 3079F DIAST BP 80-89 MM HG: CPT | Mod: CPTII,S$GLB,, | Performed by: INTERNAL MEDICINE

## 2022-12-12 PROCEDURE — 99999 PR PBB SHADOW E&M-EST. PATIENT-LVL III: ICD-10-PCS | Mod: PBBFAC,,, | Performed by: INTERNAL MEDICINE

## 2022-12-12 PROCEDURE — 1160F PR REVIEW ALL MEDS BY PRESCRIBER/CLIN PHARMACIST DOCUMENTED: ICD-10-PCS | Mod: CPTII,S$GLB,, | Performed by: INTERNAL MEDICINE

## 2022-12-12 PROCEDURE — 3008F BODY MASS INDEX DOCD: CPT | Mod: CPTII,S$GLB,, | Performed by: INTERNAL MEDICINE

## 2022-12-12 PROCEDURE — 99213 OFFICE O/P EST LOW 20 MIN: CPT | Mod: S$GLB,,, | Performed by: INTERNAL MEDICINE

## 2022-12-12 PROCEDURE — 1160F RVW MEDS BY RX/DR IN RCRD: CPT | Mod: CPTII,S$GLB,, | Performed by: INTERNAL MEDICINE

## 2022-12-12 PROCEDURE — 3079F PR MOST RECENT DIASTOLIC BLOOD PRESSURE 80-89 MM HG: ICD-10-PCS | Mod: CPTII,S$GLB,, | Performed by: INTERNAL MEDICINE

## 2022-12-12 PROCEDURE — 1159F PR MEDICATION LIST DOCUMENTED IN MEDICAL RECORD: ICD-10-PCS | Mod: CPTII,S$GLB,, | Performed by: INTERNAL MEDICINE

## 2022-12-12 PROCEDURE — 4010F PR ACE/ARB THEARPY RXD/TAKEN: ICD-10-PCS | Mod: CPTII,S$GLB,, | Performed by: INTERNAL MEDICINE

## 2022-12-12 PROCEDURE — 3288F FALL RISK ASSESSMENT DOCD: CPT | Mod: CPTII,S$GLB,, | Performed by: INTERNAL MEDICINE

## 2022-12-12 PROCEDURE — 3075F SYST BP GE 130 - 139MM HG: CPT | Mod: CPTII,S$GLB,, | Performed by: INTERNAL MEDICINE

## 2022-12-12 PROCEDURE — 4010F ACE/ARB THERAPY RXD/TAKEN: CPT | Mod: CPTII,S$GLB,, | Performed by: INTERNAL MEDICINE

## 2022-12-12 RX ORDER — MELOXICAM 15 MG/1
15 TABLET ORAL DAILY
Qty: 30 TABLET | Refills: 3 | Status: SHIPPED | OUTPATIENT
Start: 2022-12-12 | End: 2023-02-06 | Stop reason: SDUPTHER

## 2022-12-12 NOTE — PROGRESS NOTES
Subjective:       Patient ID: Ladarius Solis is a 67 y.o. male.   Chief Complaint: Arthritis    HPI: Pt complains of joints being stiff and sore for weeks or longer.  He has had this off and on over the years and has had injections in the knees.  He said he was diagnosed with a trigger finger and tennis elbow in the hand and elbows.  He also had an IV in the left elbow and still feels some slight soreness.  He has not tried any heat.  There is no redness warmth or drainage.  We talked about seeing either individual orthopedic specialist for the knees and elbow/fingers however he would like to try a prescription anti-inflammatory.  He does not recall trying meloxicam in the past although I see it on his list in the history profile.  We will give it a try and see if this helps and then he can see orthopedics if not improving.  We talked about some heat to what I suspect is a small irritated vein after an IV.     Review of Systems   Musculoskeletal:  Positive for arthralgias, gait problem and leg pain.        Objective:      Physical Exam  Cardiovascular:      Comments: Probable small superficial thrombosed vein proximal to the left antecubital fossa medially.  No redness or warmth.  Minimal tenderness  Musculoskeletal:         General: Tenderness present.      Comments: Mild stiffness and soreness with range of motion especially the left elbow, both knees and the left hand.       Assessment:       Problem List Items Addressed This Visit          GI    GERD (gastroesophageal reflux disease)       Orthopedic    Trigger ring finger of right hand     Other Visit Diagnoses       Arthralgia, unspecified joint    -  Primary    Arthritis of knee                  Plan:       Ladarius was seen today for arthritis.    Diagnoses and all orders for this visit:    Arthralgia, unspecified joint    Arthritis of knee    Trigger ring finger of right hand    Gastroesophageal reflux disease, unspecified whether esophagitis  "present    Other orders  -     meloxicam (MOBIC) 15 MG tablet; Take 1 tablet (15 mg total) by mouth once daily.       Call if no improvement to consider orthopedic evaluation    Pt declines labs for now. I think he should have a PSA in the near future.       Portions of this note may have been created with voice recognition software. Occasional "wrong-word" or "sound-a-like" substitutions may have occurred due to the inherent limitations of voice recognition software. Please, read the note carefully and recognize, using context, where substitutions have occurred.  "

## 2022-12-13 ENCOUNTER — OFFICE VISIT (OUTPATIENT)
Dept: PSYCHIATRY | Facility: CLINIC | Age: 67
End: 2022-12-13
Payer: MEDICARE

## 2022-12-13 DIAGNOSIS — F41.1 GAD (GENERALIZED ANXIETY DISORDER): Primary | ICD-10-CM

## 2022-12-13 PROCEDURE — 1159F PR MEDICATION LIST DOCUMENTED IN MEDICAL RECORD: ICD-10-PCS | Mod: CPTII,S$GLB,, | Performed by: SOCIAL WORKER

## 2022-12-13 PROCEDURE — 1159F MED LIST DOCD IN RCRD: CPT | Mod: CPTII,S$GLB,, | Performed by: SOCIAL WORKER

## 2022-12-13 PROCEDURE — 99999 PR PBB SHADOW E&M-EST. PATIENT-LVL I: CPT | Mod: PBBFAC,,, | Performed by: SOCIAL WORKER

## 2022-12-13 PROCEDURE — 4010F ACE/ARB THERAPY RXD/TAKEN: CPT | Mod: CPTII,S$GLB,, | Performed by: SOCIAL WORKER

## 2022-12-13 PROCEDURE — 90834 PSYTX W PT 45 MINUTES: CPT | Mod: S$GLB,,, | Performed by: SOCIAL WORKER

## 2022-12-13 PROCEDURE — 4010F PR ACE/ARB THEARPY RXD/TAKEN: ICD-10-PCS | Mod: CPTII,S$GLB,, | Performed by: SOCIAL WORKER

## 2022-12-13 PROCEDURE — 99999 PR PBB SHADOW E&M-EST. PATIENT-LVL I: ICD-10-PCS | Mod: PBBFAC,,, | Performed by: SOCIAL WORKER

## 2022-12-13 PROCEDURE — 90834 PR PSYCHOTHERAPY W/PATIENT, 45 MIN: ICD-10-PCS | Mod: S$GLB,,, | Performed by: SOCIAL WORKER

## 2022-12-14 ENCOUNTER — OFFICE VISIT (OUTPATIENT)
Dept: PSYCHIATRY | Facility: CLINIC | Age: 67
End: 2022-12-14
Payer: MEDICARE

## 2022-12-14 DIAGNOSIS — F41.1 GAD (GENERALIZED ANXIETY DISORDER): Primary | ICD-10-CM

## 2022-12-14 DIAGNOSIS — F32.A DEPRESSION, UNSPECIFIED DEPRESSION TYPE: ICD-10-CM

## 2022-12-14 PROCEDURE — 1159F PR MEDICATION LIST DOCUMENTED IN MEDICAL RECORD: ICD-10-PCS | Mod: CPTII,S$GLB,, | Performed by: SOCIAL WORKER

## 2022-12-14 PROCEDURE — 90834 PR PSYCHOTHERAPY W/PATIENT, 45 MIN: ICD-10-PCS | Mod: S$GLB,,, | Performed by: SOCIAL WORKER

## 2022-12-14 PROCEDURE — 90834 PSYTX W PT 45 MINUTES: CPT | Mod: S$GLB,,, | Performed by: SOCIAL WORKER

## 2022-12-14 PROCEDURE — 4010F PR ACE/ARB THEARPY RXD/TAKEN: ICD-10-PCS | Mod: CPTII,S$GLB,, | Performed by: SOCIAL WORKER

## 2022-12-14 PROCEDURE — 4010F ACE/ARB THERAPY RXD/TAKEN: CPT | Mod: CPTII,S$GLB,, | Performed by: SOCIAL WORKER

## 2022-12-14 PROCEDURE — 1159F MED LIST DOCD IN RCRD: CPT | Mod: CPTII,S$GLB,, | Performed by: SOCIAL WORKER

## 2022-12-14 PROCEDURE — 99999 PR PBB SHADOW E&M-EST. PATIENT-LVL I: CPT | Mod: PBBFAC,,, | Performed by: SOCIAL WORKER

## 2022-12-14 PROCEDURE — 99999 PR PBB SHADOW E&M-EST. PATIENT-LVL I: ICD-10-PCS | Mod: PBBFAC,,, | Performed by: SOCIAL WORKER

## 2022-12-15 NOTE — PROGRESS NOTES
Individual Psychotherapy (PhD/LCSW)    12/13/2022    Site:  [facility]         Therapeutic Intervention: Met with patient.  Outpatient - Insight oriented psychotherapy 45 min - CPT code 82091    Chief complaint/reason for encounter: depression, anxiety, sleep, appetite, behavior, somatic, and interpersonal     Interval history and content of current session: discussed health, pain, saw his MD and got meds for pain and arthritius, family, loss and grief, job, taking care of himself, and getting along wit his room mate and her  addressed, and finding time for being calm and being quiet addressed, issues from the past addressed, his feeling and keeping his life simple addressed and being able to fix things also addressed. And having trouble with money at times also addressed.    Treatment plan:  Target symptoms: depression, anxiety , adjustment, grief  Why chosen therapy is appropriate versus another modality: relevant to diagnosis, patient responds to this modality, evidence based practice  Outcome monitoring methods: self-report, observation  Therapeutic intervention type: insight oriented psychotherapy, behavior modifying psychotherapy, supportive psychotherapy    Risk parameters:  Patient reports no suicidal ideation  Patient reports no homicidal ideation  Patient reports no self-injurious behavior  Patient reports no violent behavior    Verbal deficits: None    Patient's response to intervention:  The patient's response to intervention is accepting, motivated.    Progress toward goals and other mental status changes:  The patient's progress toward goals is fair .    Diagnosis:     ICD-10-CM ICD-9-CM   1. RUBIO (generalized anxiety disorder)  F41.1 300.02       Plan:  individual psychotherapy, consult psychiatrist for medication evaluation, and medication management by physician    Return to clinic: 2 weeks    Length of Service (minutes): 45

## 2022-12-16 NOTE — PROGRESS NOTES
Individual Psychotherapy (PhD/LCSW)    12/14/2022    Site:  [facility] [address]         Therapeutic Intervention: Met with patient.  Outpatient - Insight oriented psychotherapy 45 min - CPT code 28022    Chief complaint/reason for encounter: depression, mood swings, anxiety, sleep, appetite, behavior, somatic, and interpersonal     Interval history and content of current session: client is in a lot of pain caused by arthritius, and has seen his MD and has a new medication, and just started taking it with hopes of some relief, how to cope, taking care of himself, dealing with his roommate and her  addressed, and communications, having me time, and quiet and calm are what he likes and needs and finding this challenging at times to get. Boundaries, decisions, and not over extend himself, addressed, grief and loss from his past, and feelings about his past and current life addressed and what he has learned from his decisions all addressed. Taking care of himself emphasized. And time for relaxation addressed also.    Treatment plan:  Target symptoms: depression, recurrent depression, anxiety , mood swings, mood disorder, adjustment, grief  Why chosen therapy is appropriate versus another modality: relevant to diagnosis, patient responds to this modality, evidence based practice  Outcome monitoring methods: self-report, observation  Therapeutic intervention type: insight oriented psychotherapy, behavior modifying psychotherapy, supportive psychotherapy    Risk parameters:  Patient reports no suicidal ideation  Patient reports no homicidal ideation  Patient reports no self-injurious behavior  Patient reports no violent behavior    Verbal deficits: None    Patient's response to intervention:  The patient's response to intervention is accepting, motivated.    Progress toward goals and other mental status changes:  The patient's progress toward goals is fair .    Diagnosis:     ICD-10-CM ICD-9-CM   1. RUBIO (generalized  anxiety disorder)  F41.1 300.02   2. Depression, unspecified depression type  F32.A 311       Plan:  individual psychotherapy, consult psychiatrist for medication evaluation, and medication management by physician    Return to clinic: 1 week    Length of Service (minutes): 45

## 2022-12-31 ENCOUNTER — HOSPITAL ENCOUNTER (EMERGENCY)
Facility: HOSPITAL | Age: 67
Discharge: HOME | End: 2022-12-31
Attending: EMERGENCY MEDICINE
Payer: MEDICARE

## 2022-12-31 VITALS
OXYGEN SATURATION: 99 % | WEIGHT: 165 LBS | TEMPERATURE: 98 F | HEIGHT: 66 IN | HEART RATE: 80 BPM | BODY MASS INDEX: 26.52 KG/M2 | RESPIRATION RATE: 15 BRPM | DIASTOLIC BLOOD PRESSURE: 99 MMHG | SYSTOLIC BLOOD PRESSURE: 164 MMHG

## 2022-12-31 DIAGNOSIS — I10 PRIMARY HYPERTENSION: ICD-10-CM

## 2022-12-31 DIAGNOSIS — J06.9 URI WITH COUGH AND CONGESTION: Primary | ICD-10-CM

## 2022-12-31 LAB
INFLUENZA A, MOLECULAR: NOT DETECTED
INFLUENZA B, MOLECULAR: NOT DETECTED
RSV AG BY MOLECULAR METHOD: NOT DETECTED
SARS-COV-2 RNA RESP QL NAA+PROBE: NOT DETECTED

## 2022-12-31 PROCEDURE — 0241U SARS-COV2 (COVID) WITH FLU/RSV BY PCR: CPT | Performed by: EMERGENCY MEDICINE

## 2022-12-31 PROCEDURE — 99284 PR EMERGENCY DEPT VISIT,LEVEL IV: ICD-10-PCS | Mod: CS,,, | Performed by: PHYSICIAN ASSISTANT

## 2022-12-31 PROCEDURE — 99284 EMERGENCY DEPT VISIT MOD MDM: CPT | Mod: CS,,, | Performed by: PHYSICIAN ASSISTANT

## 2022-12-31 PROCEDURE — 99283 EMERGENCY DEPT VISIT LOW MDM: CPT

## 2022-12-31 RX ORDER — PROMETHAZINE HYDROCHLORIDE AND DEXTROMETHORPHAN HYDROBROMIDE 6.25; 15 MG/5ML; MG/5ML
SYRUP ORAL
Qty: 60 ML | Refills: 0 | Status: SHIPPED | OUTPATIENT
Start: 2022-12-31 | End: 2023-03-16

## 2022-12-31 NOTE — ED NOTES
LOC: The patient is awake and alert; oriented x 3 and speaking appropriately.  APPEARANCE: Patient resting comfortably, patient is clean and well groomed  SKIN: warm and dry, normal skin turgor & moist mucus membranes, skin intact, no breakdown noted.  MUSCULOSKELETAL: Patient moving all extremities well, no obvious swelling or deformities noted  RESPIRATORY: Airway is open and patent, c/o cough  and  sore throat.  respirations are spontaneous, normal effort and rate  CARDIAC: Patient has a normal rate, no peripheral edema noted, capillary refill < 3 seconds; No complaints of chest pain   ABDOMEN: Soft and non tender to palpation, no distention noted.

## 2022-12-31 NOTE — ED TRIAGE NOTES
Sore throat and cough , sneezing w/runny nose, body aces. Denies fever/diarrhea/n/v. Denies sick contacts. Onset s/s several days. Taking coricidan  .

## 2023-01-02 NOTE — ED PROVIDER NOTES
Encounter Date: 12/31/2022       History     Chief Complaint   Patient presents with    URI     Sinus pressure     The patient is a 67 year old male, who has a past medical history of HTN, vertigo, cervical DDD, cervical stenosis, CTS, and fatty liver. He presents to the ER requesting a Covid test. He reports acute onset of cough, nasal congestion, scratchy throat, sneezing, clear rhinorrhea, and diffuse body aces. He states that symptoms have been present for the past 3-4 days. He states that the degree is mild to moderate. He states that the course is constant. He has been taking Coricidan at home with some relief. He reports receiving Covid vaccine. He denies any chest pain, SOB/RAZO, HA, nausea, vomiting, diarrhea, dizziness or palpitations.      Review of patient's allergies indicates:  No Known Allergies  Past Medical History:   Diagnosis Date    Arthritis     Carpal tunnel syndrome, bilateral     Cervical spinal stenosis 2002    Colon polyps     Fatty liver     Hypertension     Overweight (BMI 25.0-29.9) 8/16/2019    Vertigo     left ear issues     Past Surgical History:   Procedure Laterality Date    ARTHROSCOPIC CHONDROPLASTY OF KNEE JOINT Left 10/17/2018    Procedure: ARTHROSCOPY, KNEE, WITH CHONDROPLASTY;  Surgeon: GRETEL Carr MD;  Location: [facility] OR [address] [place] [address];  Service: Orthopedics;  Laterality: Left;    COLONOSCOPY      COLONOSCOPY N/A 9/25/2017    Procedure: COLONOSCOPY/emr;  Surgeon: Raul August MD;  Location: [facility] ENDO ([address] [place] [address]);  Service: Endoscopy;  Laterality: N/A;    COLONOSCOPY N/A 3/9/2018    Procedure: COLONOSCOPY;  Surgeon: Raul August MD;  Location: [facility] ENDO ([address] [place] [address]);  Service: Endoscopy;  Laterality: N/A;    EPIDURAL STEROID INJECTION N/A 11/4/2021    Procedure: INJECTION, STEROID, EPIDURAL, C7-T1  NEED CONSENT;  Surgeon: Bozena Mena MD;  Location: [facility] PAIN MGT;  Service: Pain Management;  Laterality: N/A;    ESOPHAGOGASTRODUODENOSCOPY N/A 9/23/2019    Procedure: EGD  (ESOPHAGOGASTRODUODENOSCOPY);  Surgeon: Dyllan Maloney MD;  Location: [facility] ENDO;  Service: General;  Laterality: N/A;    INJECTION OF FACET JOINT Bilateral 8/6/2021    Procedure: FACET JOINT INJECTION BILATERAL L4/5 AND L5/S1 DIRECT REFERRAL NEEDS CONSENT;  Surgeon: Jasiel Aviles MD;  [facility];  Service: Pain Management;  Laterality: Bilateral;    KNEE ARTHROSCOPY W/ MENISCECTOMY Left 10/17/2018    Procedure: ARTHROSCOPY, KNEE, WITH MENISCECTOMY;  Surgeon: GRETEL Carr MD;  Location: [facility] OR [address] [place] [address];  Service: Orthopedics;  Laterality: Left;  regional w/catheter adductor  Dimitry/[employer] notified 10-12 LO     Family History   Problem Relation Age of Onset    No Known Problems Mother     Arthritis Father     Dementia Father     Heart disease Father     No Known Problems Brother     Diabetes Neg Hx     Cirrhosis Neg Hx      Social History     Tobacco Use    Smoking status: Never    Smokeless tobacco: Never   Substance Use Topics    Alcohol use: No    Drug use: No     Review of Systems   Constitutional:  Negative for activity change, appetite change, chills, diaphoresis, fatigue, fever and unexpected weight change.   HENT:  Positive for congestion, rhinorrhea, sinus pressure and sore throat. Negative for ear pain, facial swelling, tinnitus, trouble swallowing and voice change.    Eyes:  Negative for pain and visual disturbance.   Respiratory:  Positive for cough. Negative for shortness of breath, wheezing and stridor.    Cardiovascular:  Negative for chest pain, palpitations and leg swelling.   Gastrointestinal:  Negative for abdominal pain, diarrhea, nausea and vomiting.   Genitourinary:  Negative for decreased urine volume, difficulty urinating, dysuria, flank pain, frequency and hematuria.   Musculoskeletal:  Positive for myalgias. Negative for arthralgias, back pain, gait problem, joint swelling, neck pain and neck stiffness.   Skin:  Negative for rash and wound.   Allergic/Immunologic:  Negative for immunocompromised state.   Neurological:  Negative for dizziness, tremors, seizures, syncope, facial asymmetry, speech difficulty, light-headedness, numbness and headaches.   Hematological:  Negative for adenopathy.   Psychiatric/Behavioral:  Negative for confusion.      Physical Exam     Initial Vitals [12/31/22 1207]   BP Pulse Resp Temp SpO2   (!) 179/86 84 18 98.9 °F (37.2 °C) 98 %      MAP       --         Physical Exam    Nursing note and vitals reviewed.  Constitutional: He appears well-developed and well-nourished. He is not diaphoretic.   Alert and ambulatory. Non-toxic appearing.    HENT:   Head: Normocephalic.   Nasal congestion. Moist mucous membranes. Benign oropharynx. No hoarseness or muffled voice.    Eyes: Conjunctivae are normal. Pupils are equal, round, and reactive to light.   Neck: Neck supple.   Normal range of motion.  Cardiovascular:  Normal rate.           Pulmonary/Chest: No respiratory distress. He has no wheezes.   Occasional cough. No wheezes or respiratory distress.    Abdominal: Abdomen is soft. He exhibits no distension. There is no abdominal tenderness.   Musculoskeletal:         General: Normal range of motion.      Cervical back: Normal range of motion and neck supple.     Lymphadenopathy:     He has no cervical adenopathy.   Neurological: He is alert and oriented to person, place, and time.   Skin: Skin is warm and dry. No rash noted.   Psychiatric: He has a normal mood and affect. His behavior is normal.       ED Course   Procedures  Labs Reviewed   SARS-COV2 (COVID) WITH FLU/RSV BY PCR     Results for orders placed or performed during the hospital encounter of 12/31/22   SARS-Cov2 (COVID) with FLU/RSV by PCR   Result Value Ref Range    SARS-CoV2 (COVID-19) Qualitative PCR Not Detected Not Detected    Influenza A, Molecular Not Detected Not Detected    Influenza B, Molecular Not Detected Not Detected    RSV Ag by Molecular Method Not Detected Not Detected             Imaging Results    None          Medications - No data to display  Medical Decision Making:   History:   Old Medical Records: I decided to obtain old medical records.  Initial Assessment:   68 yo male, here requesting Covid testing due to acute URI symptoms   Differential Diagnosis:   Covid, Influenza, URI, sinusitis, bronchitis, viral syndrome, pharyngitis, etc   Clinical Tests:   Lab Tests: Ordered and Reviewed  ED Management:  Vital signs reviewed - elevated BP reading noted - pt aware - pt has history of HTN   Covid and Influenza testing negative   Pt advised to rest, hydrate, and to take Tylenol as directed as needed for any fever or aches   Pt advised to follow up with primary care in the next 2-3 days for re-evaluation and further management   Pt advised to return to the ER if unimproved or if worse in any way                         Clinical Impression:   Final diagnoses:  [J06.9] URI with cough and congestion (Primary)  [I10] Primary hypertension        ED Disposition Condition    Discharge Stable          ED Prescriptions       Medication Sig Dispense Start Date End Date Auth. Provider    promethazine-dextromethorphan (PROMETHAZINE-DM) 6.25-15 mg/5 mL Syrp Take 1 teaspoon PO q 4 hrs prn cold & flu-like symptoms 60 mL 12/31/2022 -- Cheyenne Verdugo PA-C          Follow-up Information       Follow up With Specialties Details Why Contact Info    Nick Stanton MD Internal Medicine Schedule an appointment as soon as possible for a visit in 2 days  [address]  [place] [address]  [phone]      [facility] - Emergency Dept Emergency Medicine  If symptoms worsen in any way. Rest, hydrate, and take Tylenol as directed as needed for any fever or aches. [address] [facility] [id number] [phone]             Cheyenne Verdugo PA-C  01/02/23 1002

## 2023-01-18 ENCOUNTER — OFFICE VISIT (OUTPATIENT)
Dept: PSYCHIATRY | Facility: CLINIC | Age: 68
End: 2023-01-18
Payer: MEDICARE

## 2023-01-18 DIAGNOSIS — F32.A DEPRESSION, UNSPECIFIED DEPRESSION TYPE: Primary | ICD-10-CM

## 2023-01-18 PROCEDURE — 99999 PR PBB SHADOW E&M-EST. PATIENT-LVL I: ICD-10-PCS | Mod: PBBFAC,,, | Performed by: SOCIAL WORKER

## 2023-01-18 PROCEDURE — 90834 PSYTX W PT 45 MINUTES: CPT | Mod: S$GLB,,, | Performed by: SOCIAL WORKER

## 2023-01-18 PROCEDURE — 90834 PR PSYCHOTHERAPY W/PATIENT, 45 MIN: ICD-10-PCS | Mod: S$GLB,,, | Performed by: SOCIAL WORKER

## 2023-01-18 PROCEDURE — 1159F MED LIST DOCD IN RCRD: CPT | Mod: CPTII,S$GLB,, | Performed by: SOCIAL WORKER

## 2023-01-18 PROCEDURE — 99999 PR PBB SHADOW E&M-EST. PATIENT-LVL I: CPT | Mod: PBBFAC,,, | Performed by: SOCIAL WORKER

## 2023-01-18 PROCEDURE — 1159F PR MEDICATION LIST DOCUMENTED IN MEDICAL RECORD: ICD-10-PCS | Mod: CPTII,S$GLB,, | Performed by: SOCIAL WORKER

## 2023-01-19 ENCOUNTER — OFFICE VISIT (OUTPATIENT)
Dept: PSYCHIATRY | Facility: CLINIC | Age: 68
End: 2023-01-19
Payer: MEDICARE

## 2023-01-19 DIAGNOSIS — F32.A DEPRESSION, UNSPECIFIED DEPRESSION TYPE: Primary | ICD-10-CM

## 2023-01-19 DIAGNOSIS — F41.1 GAD (GENERALIZED ANXIETY DISORDER): ICD-10-CM

## 2023-01-19 PROCEDURE — 90832 PR PSYCHOTHERAPY W/PATIENT, 30 MIN: ICD-10-PCS | Mod: S$GLB,,, | Performed by: SOCIAL WORKER

## 2023-01-19 PROCEDURE — 1159F PR MEDICATION LIST DOCUMENTED IN MEDICAL RECORD: ICD-10-PCS | Mod: CPTII,S$GLB,, | Performed by: SOCIAL WORKER

## 2023-01-19 PROCEDURE — 90832 PSYTX W PT 30 MINUTES: CPT | Mod: S$GLB,,, | Performed by: SOCIAL WORKER

## 2023-01-19 PROCEDURE — 99999 PR PBB SHADOW E&M-EST. PATIENT-LVL I: ICD-10-PCS | Mod: PBBFAC,,, | Performed by: SOCIAL WORKER

## 2023-01-19 PROCEDURE — 1159F MED LIST DOCD IN RCRD: CPT | Mod: CPTII,S$GLB,, | Performed by: SOCIAL WORKER

## 2023-01-19 PROCEDURE — 99999 PR PBB SHADOW E&M-EST. PATIENT-LVL I: CPT | Mod: PBBFAC,,, | Performed by: SOCIAL WORKER

## 2023-01-19 NOTE — PROGRESS NOTES
Individual Psychotherapy (PhD/LCSW)    1/18/2023    Site:  [facility]         Therapeutic Intervention: Met with patient.  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 At times room mate asks too many questions. Client calm today, discussed family, the past, money, staying in his budget, having privacy, likes calm and quiet and  is doing okay outside his room mate asking too many questions at times, and some stress from money and the past, client likes to help others he states. Coping skills, communications and boundaries all focused on also.    Treatment plan:  Target symptoms: depression, anxiety , adjustment  Why chosen therapy is appropriate versus another modality: relevant to diagnosis, patient responds to this modality, evidence based practice  Outcome monitoring methods: self-report, observation  Therapeutic intervention type: insight oriented psychotherapy, behavior modifying psychotherapy, supportive psychotherapy    Risk parameters:  Patient reports no suicidal ideation  Patient reports no homicidal ideation  Patient reports no self-injurious behavior  Patient reports no violent behavior    Verbal deficits: None    Patient's response to intervention:  The patient's response to intervention is accepting, motivated.    Progress toward goals and other mental status changes:  The patient's progress toward goals is fair .    Diagnosis:     ICD-10-CM ICD-9-CM   1. Depression, unspecified depression type  F32.A 311       Plan:  individual psychotherapy and consult psychiatrist for medication evaluation    Return to clinic: 1 week    Length of Service (minutes): 45

## 2023-01-22 NOTE — PROGRESS NOTES
Individual Psychotherapy (PhD/LCSW)    1/19/2023    Site:  [facility]         Therapeutic Intervention: Met with patient.  Outpatient - Insight oriented psychotherapy 45 min - CPT code 37313    Chief complaint/reason for encounter: depression, anxiety, behavior, and interpersonal     Interval history and content of current session: discussed his living situation, helping a friend from his [employer], conversations with his room mate, staying in his room, sleep, and what he likes to do, taking care of himself, boundaries, feelings, his past, and loss and grief, and feelings. And how he likes to keep with himself, and not be around others very much. Taking care of himself, money, and his life and what is important to him all focused on today.    Treatment plan:  Target symptoms: depression, anxiety , adjustment  Why chosen therapy is appropriate versus another modality: relevant to diagnosis, patient responds to this modality, evidence based practice  Outcome monitoring methods: self-report, observation  Therapeutic intervention type: insight oriented psychotherapy, behavior modifying psychotherapy, supportive psychotherapy    Risk parameters:  Patient reports no suicidal ideation  Patient reports no homicidal ideation  Patient reports no self-injurious behavior  Patient reports no violent behavior    Verbal deficits: None    Patient's response to intervention:  The patient's response to intervention is accepting, motivated.    Progress toward goals and other mental status changes:  The patient's progress toward goals is limited.    Diagnosis:     ICD-10-CM ICD-9-CM   1. Depression, unspecified depression type  F32.A 311   2. RUBIO (generalized anxiety disorder)  F41.1 300.02       Plan:  individual psychotherapy    Return to clinic: 1 week    Length of Service (minutes): 45

## 2023-01-24 ENCOUNTER — TELEPHONE (OUTPATIENT)
Dept: PSYCHIATRY | Facility: CLINIC | Age: 68
End: 2023-01-24

## 2023-01-24 ENCOUNTER — PATIENT MESSAGE (OUTPATIENT)
Dept: PSYCHIATRY | Facility: CLINIC | Age: 68
End: 2023-01-24
Payer: MEDICARE

## 2023-02-07 NOTE — PROGRESS NOTES
"  Physical Therapy Daily Treatment Note     Name: Ladarius Regalado Solis  Clinic Number: [id number]    Therapy Diagnosis: Chronic pain of left knee  Physician: Antonieta Ham PA-C     Physician Orders: PT Eval and Treat   Medical Diagnosis:   M94.262 (ICD-10-CM) - Chondromalacia of left knee   M23.204 (ICD-10-CM) - Other old tear of medial meniscus of left knee   D75.89 (ICD-10-CM) - Bone marrow edema         Evaluation Date: 10/22/2018  Authorization Period Expiration: 12/31/2018  Plan of Care Certification Period: 12/17/2018  Visit # / Visits authorized: 12/ 20     Time In: 10:00  Time Out: 11:00  Total Billable Time: 30  minutes       Op note:POSTOPERATIVE PLAN: Patient may weight bear as tolerates on crutches. Full range of motion to tolerance. Will start physical therapy right away. ASA 81 mg BID x 2 weeks for DVT prophylaxis    Subjective     Pt states : He was his PCP yesterday and he's going to run tests to see why he's been so tired. He still hasn't been doing his HEP. His knee feels stiff.   PATIENT IS NON COMPLIANT WITH HEP FOR LAST 2 WEEKS  Response to previous treatment: min soreness   Functional change: improved gait pattern    Pain: 0/10  Location: left knee      Objective     UPDATE:  Knee flexion: 5-/5  Knee extension: 5/5      TREATMENT:    Ladarius received therapeutic exercises to develop strength, endurance, ROM, flexibility, posture and core stabilization for 30 minutes including:    Stationary bike x 10' for increased ROM, circulation, and endurance   LAQ 2.5# 3x10   L SLR 2.5# 3x10  L Hip abd 2.5# 3x10  L prone ext 2.5# 3x10  L prone knee flexion 2.5# 3x10  L hip add 2.5# 3x10  Bridge 3x12 c increased knee flexion each set.   Clamshells 30x B   DL shuttle press 70# 3x10   Step ups 5" 3 x 10   Heel taps 4" 3x10  Wall squats 3x10  SL Standing calf raises 2x20  (L) SLS on foam oval 3 x 30 sec         Home Exercises Provided and Patient Education Provided     Education provided:   HEP adherence and " Outgoing call to [facility] after receiving medical records on patient. Writer wanted to clarify that patient has not had any work up on his GERD. [facility] confirms [facility].    Outgoing call to patient. Discussed with patient that since he has not had any work up on his GERD that we would recommend him seeing our GI clinic first to determine if there is anything surgically related to his gastric reflux. Writer discussed this with the  and they will call the [facility] tomorrow to have them send another referral to the GI clinic. Writer instructed patient that if he does not here from GI within the next week or two, to give the [facility] a call to make sure things are moving forward. Informed patient that if GI thinks a referral back to general surgery is necessary after his visit, we would be happy to see him. Patient confirms understanding on the above and had no further questions for me.   CARLOS    Written Home Exercises Provided: yes.  Exercises were reviewed and Ladarius was able to demonstrate them prior to the end of the session.  Ladarius demonstrated good  understanding of the education provided.         Assessment     Pt demonstrates slight strength improvement in HS musculature compared to previous assessment, good quad note noted with MMT and SLR/LAQ. Pt continues to present with slight tired affect, reporting his new PCP has ordered testing to determine cause. He felt decreased stiffness after exercise, and he states he would like to continue for one additional session after today. PT encouraged patient to continue with HEP. Patient reported feeling well upon departure.        Ladarius is progressing well towards his goals.   Pt prognosis is Good.     Pt will continue to benefit from skilled outpatient physical therapy to address the deficits listed in the problem list box on initial evaluation, provide pt/family education and to maximize pt's level of independence in the home and community environment.     Pt's spiritual, cultural and educational needs considered and pt agreeable to plan of care and goals.    Anticipated barriers to physical therapy: none    Goals: Short Term GOALS: 2 weeks. Pt agrees with goals set.  1. Patient demonstrates independence with HEP. (Achieved)  2. Patient demonstrates L knee AROM 0 to 125 deg to increase functional mobility tolerance. (Achieved)   3. Patient maintains TKE with flexion SLR indicating good quad control. (Achieved)     Long Term GOALS: 8 weeks. Pt agrees with goals set.  1. Patient demonstrates L knee AROM 0 to 135 deg to increase functional mobility tolerance. (Achieved)  2. Patient demonstrates increased strength BLE's to 5/5 to improve tolerance to functional activities.(not met, Progressing)   3. Patient reports at least 75% decreased pain with IADLs to increase functional independence.  (not met, Progressing)         Plan     Progress under  current protocol/goals as tolerated. Discuss D/C after next session    Debby Morris, PT, DPT

## 2023-02-13 ENCOUNTER — CLINICAL SUPPORT (OUTPATIENT)
Dept: ENDOSCOPY | Facility: HOSPITAL | Age: 68
End: 2023-02-13
Payer: MEDICARE

## 2023-02-13 VITALS — BODY MASS INDEX: 28.93 KG/M2 | WEIGHT: 180 LBS | HEIGHT: 66 IN

## 2023-02-13 DIAGNOSIS — Z12.11 SPECIAL SCREENING FOR MALIGNANT NEOPLASMS, COLON: Primary | ICD-10-CM

## 2023-02-13 DIAGNOSIS — Z12.11 COLON CANCER SCREENING: ICD-10-CM

## 2023-02-13 DIAGNOSIS — Z86.010 HISTORY OF COLON POLYPS: ICD-10-CM

## 2023-02-13 RX ORDER — POLYETHYLENE GLYCOL-3350 AND ELECTROLYTES WITH FLAVOR PACK 240; 5.84; 2.98; 6.72; 22.72 G/278.26G; G/278.26G; G/278.26G; G/278.26G; G/278.26G
4000 POWDER, FOR SOLUTION ORAL ONCE
Qty: 4000 ML | Refills: 0 | Status: SHIPPED | OUTPATIENT
Start: 2023-02-13 | End: 2023-02-13

## 2023-02-13 NOTE — PLAN OF CARE
Endoscopy procedure scheduled on 3/23/23, prep instructions reviewed, Pt verbalized understanding.

## 2023-02-19 DIAGNOSIS — I10 ESSENTIAL HYPERTENSION: ICD-10-CM

## 2023-02-19 NOTE — TELEPHONE ENCOUNTER
No new care gaps identified.  [facility] Embedded Care Gaps. Reference number: [id number]. 2/19/2023   9:44:41 AM CST

## 2023-02-20 ENCOUNTER — PES CALL (OUTPATIENT)
Dept: ADMINISTRATIVE | Facility: CLINIC | Age: 68
End: 2023-02-20
Payer: MEDICARE

## 2023-02-20 RX ORDER — LISINOPRIL 10 MG/1
10 TABLET ORAL DAILY
Qty: 90 TABLET | Refills: 1 | OUTPATIENT
Start: 2023-02-20

## 2023-02-20 NOTE — TELEPHONE ENCOUNTER
Refill Decision Note   Ladarius Solis  is requesting a refill authorization.  Brief Assessment and Rationale for Refill:  Quick Discontinue     Medication Therapy Plan:       Medication Reconciliation Completed: No   Comments: Duplicate medication request--pended in a previous encounter and awaiting assessment    No Care Gaps recommended.     Note composed:5:11 PM 02/20/2023

## 2023-02-23 ENCOUNTER — PATIENT MESSAGE (OUTPATIENT)
Dept: PSYCHIATRY | Facility: CLINIC | Age: 68
End: 2023-02-23
Payer: MEDICARE

## 2023-03-16 ENCOUNTER — OFFICE VISIT (OUTPATIENT)
Dept: INTERNAL MEDICINE | Facility: CLINIC | Age: 68
End: 2023-03-16
Payer: MEDICARE

## 2023-03-16 VITALS
OXYGEN SATURATION: 98 % | BODY MASS INDEX: 28.89 KG/M2 | WEIGHT: 179 LBS | SYSTOLIC BLOOD PRESSURE: 152 MMHG | DIASTOLIC BLOOD PRESSURE: 90 MMHG | HEART RATE: 73 BPM

## 2023-03-16 DIAGNOSIS — E66.3 OVERWEIGHT (BMI 25.0-29.9): ICD-10-CM

## 2023-03-16 DIAGNOSIS — D12.6 COLON ADENOMA: ICD-10-CM

## 2023-03-16 DIAGNOSIS — F41.1 GAD (GENERALIZED ANXIETY DISORDER): ICD-10-CM

## 2023-03-16 DIAGNOSIS — I10 HYPERTENSION, UNSPECIFIED TYPE: ICD-10-CM

## 2023-03-16 DIAGNOSIS — I70.0 AORTIC ATHEROSCLEROSIS: ICD-10-CM

## 2023-03-16 DIAGNOSIS — Z00.00 ENCOUNTER FOR PREVENTIVE HEALTH EXAMINATION: Primary | ICD-10-CM

## 2023-03-16 DIAGNOSIS — K30 FUNCTIONAL DYSPEPSIA: Chronic | ICD-10-CM

## 2023-03-16 PROCEDURE — 3008F BODY MASS INDEX DOCD: CPT | Mod: CPTII,S$GLB,, | Performed by: NURSE PRACTITIONER

## 2023-03-16 PROCEDURE — 3008F PR BODY MASS INDEX (BMI) DOCUMENTED: ICD-10-PCS | Mod: CPTII,S$GLB,, | Performed by: NURSE PRACTITIONER

## 2023-03-16 PROCEDURE — G0402 PR WELCOME MEDICARE PREVENTIVE VISIT NEW ENROLLEE: ICD-10-PCS | Mod: S$GLB,,, | Performed by: NURSE PRACTITIONER

## 2023-03-16 PROCEDURE — 1170F FXNL STATUS ASSESSED: CPT | Mod: CPTII,S$GLB,, | Performed by: NURSE PRACTITIONER

## 2023-03-16 PROCEDURE — 1101F PR PT FALLS ASSESS DOC 0-1 FALLS W/OUT INJ PAST YR: ICD-10-PCS | Mod: CPTII,S$GLB,, | Performed by: NURSE PRACTITIONER

## 2023-03-16 PROCEDURE — 99999 PR PBB SHADOW E&M-EST. PATIENT-LVL III: ICD-10-PCS | Mod: PBBFAC,,, | Performed by: NURSE PRACTITIONER

## 2023-03-16 PROCEDURE — G0402 INITIAL PREVENTIVE EXAM: HCPCS | Mod: S$GLB,,, | Performed by: NURSE PRACTITIONER

## 2023-03-16 PROCEDURE — 1101F PT FALLS ASSESS-DOCD LE1/YR: CPT | Mod: CPTII,S$GLB,, | Performed by: NURSE PRACTITIONER

## 2023-03-16 PROCEDURE — 1170F PR FUNCTIONAL STATUS ASSESSED: ICD-10-PCS | Mod: CPTII,S$GLB,, | Performed by: NURSE PRACTITIONER

## 2023-03-16 PROCEDURE — 4010F ACE/ARB THERAPY RXD/TAKEN: CPT | Mod: CPTII,S$GLB,, | Performed by: NURSE PRACTITIONER

## 2023-03-16 PROCEDURE — 3077F SYST BP >= 140 MM HG: CPT | Mod: CPTII,S$GLB,, | Performed by: NURSE PRACTITIONER

## 2023-03-16 PROCEDURE — 3080F PR MOST RECENT DIASTOLIC BLOOD PRESSURE >= 90 MM HG: ICD-10-PCS | Mod: CPTII,S$GLB,, | Performed by: NURSE PRACTITIONER

## 2023-03-16 PROCEDURE — 3080F DIAST BP >= 90 MM HG: CPT | Mod: CPTII,S$GLB,, | Performed by: NURSE PRACTITIONER

## 2023-03-16 PROCEDURE — 99999 PR PBB SHADOW E&M-EST. PATIENT-LVL III: CPT | Mod: PBBFAC,,, | Performed by: NURSE PRACTITIONER

## 2023-03-16 PROCEDURE — 3288F PR FALLS RISK ASSESSMENT DOCUMENTED: ICD-10-PCS | Mod: CPTII,S$GLB,, | Performed by: NURSE PRACTITIONER

## 2023-03-16 PROCEDURE — 3077F PR MOST RECENT SYSTOLIC BLOOD PRESSURE >= 140 MM HG: ICD-10-PCS | Mod: CPTII,S$GLB,, | Performed by: NURSE PRACTITIONER

## 2023-03-16 PROCEDURE — 3288F FALL RISK ASSESSMENT DOCD: CPT | Mod: CPTII,S$GLB,, | Performed by: NURSE PRACTITIONER

## 2023-03-16 PROCEDURE — 4010F PR ACE/ARB THEARPY RXD/TAKEN: ICD-10-PCS | Mod: CPTII,S$GLB,, | Performed by: NURSE PRACTITIONER

## 2023-03-16 NOTE — PROGRESS NOTES
Ladarius Solis presented for a  Medicare AWV and comprehensive Health Risk Assessment today. The following components were reviewed and updated:    Medical history  Family History  Social history  Allergies and Current Medications  Health Risk Assessment  Health Maintenance  Care Team         ** See Completed Assessments for Annual Wellness Visit within the encounter summary.**         The following assessments were completed:  Living Situation  CAGE  Depression Screening  Timed Get Up and Go  Whisper Test  Cognitive Function Screening  Nutrition Screening  ADL Screening  PAQ Screening          Vitals:    03/16/23 1021 03/16/23 1040   BP: (!) 152/92 (!) 152/90   Pulse: 73    SpO2: 98%    Weight: 81.2 kg (179 lb 0.2 oz)      Body mass index is 28.89 kg/m².  Physical Exam  Vitals and nursing note reviewed.   Constitutional:       Appearance: He is well-developed.   HENT:      Head: Normocephalic and atraumatic.      Right Ear: External ear normal.      Left Ear: External ear normal.   Eyes:      Conjunctiva/sclera: Conjunctivae normal.      Pupils: Pupils are equal, round, and reactive to light.   Cardiovascular:      Rate and Rhythm: Normal rate and regular rhythm.   Pulmonary:      Effort: Pulmonary effort is normal.      Breath sounds: Normal breath sounds.   Abdominal:      General: Bowel sounds are normal.      Palpations: Abdomen is soft.   Musculoskeletal:         General: Normal range of motion.      Cervical back: Normal range of motion and neck supple.   Skin:     General: Skin is warm and dry.   Neurological:      Mental Status: He is alert and oriented to person, place, and time.           Diagnoses and health risks identified today and associated recommendations/orders:    1. Encounter for preventive health examination  Health Maintenance updated   Records reviewed   Exam done   Discussed benefits of vaccines. Patient prefers to hold off until after colonoscopy.     2. Aortic atherosclerosis  Stable and  chronic.  Followed by PCP.     3. RUBIO (generalized anxiety disorder)  - Ambulatory referral/consult to Psychology; Future    4. Hypertension, unspecified type  Elevated today. Reports its due to coffee. Chronic and stable, followed by PCP   Take medications as prescribed.   Advised to monitor BP at home, goal BP < or = 140/80, call office if consistently above this range.   Follow low salt DASH diet and exercise.   BMI reviewed.   - Hemoglobin A1C; Future    5. Overweight (BMI 25.0-29.9)  BMI reviewed.  - Hemoglobin A1C; Future    6. Colon adenoma  Stable and chronic. Followed by PCP.     7. Functional dyspepsia  Stable and chronic. Followed by PCP.       Counseling and Referral of Other Preventative  (Italic type indicates deductible and co-insurance are waived)    Patient Name: Ladarius Solis  Today's Date: 3/16/2023    Health Maintenance         Date Due Completion Date    Shingles Vaccine (1 of 2) Never done ---    Pneumococcal Vaccines (Age 65+) (2 - PPSV23 if available, else PCV20) 01/06/2020 11/11/2019    Colorectal Cancer Screening 03/09/2021 3/9/2018    Hemoglobin A1c (Diabetic Prevention Screening) 02/06/2022 2/6/2019    COVID-19 Vaccine (4 - Booster for Pfizer series) 03/03/2022 1/6/2022    Influenza Vaccine (1) 09/01/2022 9/28/2020    Lipid Panel 02/06/2024 2/6/2019    TETANUS VACCINE 03/12/2031 3/12/2021          Orders Placed This Encounter   Procedures    Hemoglobin A1C    Ambulatory referral/consult to Psychology       Provided Ladarius with a 5-10 year written screening schedule and personal prevention plan. Recommendations were developed using the USPSTF age appropriate recommendations. Education, counseling, and referrals were provided as needed. After Visit Summary printed and given to patient which includes a list of additional screenings\tests needed.    Follow up in about 6 weeks (around 4/26/2023) for follow up with PCP.    Jacqui Villalpando NP      I offered to discuss advanced care planning,  including how to pick a person who would make decisions for you if you were unable to make them for yourself, called a health care power of , and what kind of decisions you might make such as use of life sustaining treatments such as ventilators and tube feeding when faced with a life limiting illness recorded on a living will that they will need to know. (How you want to be cared for as you near the end of your natural life)     X Patient is interested in learning more about how to make advanced directives.  I provided them paperwork and offered to discuss this with them.  Review for Opioid Screening: Pt does not have Rx for Opioids     Review for Substance Use Disorders: Patient does not use substance

## 2023-03-16 NOTE — PATIENT INSTRUCTIONS
Counseling and Referral of Other Preventative  (Italic type indicates deductible and co-insurance are waived)    Patient Name: Ladarius Solis  Today's Date: 3/16/2023    Health Maintenance       Date Due Completion Date    Shingles Vaccine (1 of 2) Never done ---    Pneumococcal Vaccines (Age 65+) (2 - PPSV23 if available, else PCV20) 01/06/2020 11/11/2019    Colorectal Cancer Screening 03/09/2021 3/9/2018    Hemoglobin A1c (Diabetic Prevention Screening) 02/06/2022 2/6/2019    COVID-19 Vaccine (4 - Booster for Pfizer series) 03/03/2022 1/6/2022    Influenza Vaccine (1) 09/01/2022 9/28/2020    Lipid Panel 02/06/2024 2/6/2019    TETANUS VACCINE 03/12/2031 3/12/2021        Orders Placed This Encounter   Procedures    Hemoglobin A1C    Ambulatory referral/consult to Psychology       The following information is provided to all patients.  This information is to help you find resources for any of the problems found today that may be affecting your health:                Living healthy guide: www.[facility].[place].gov      Understanding Diabetes: www.diabetes.org      Eating healthy: www.cdc.gov/healthyweight      CDC home safety checklist: www.cdc.gov/steadi/patient.html      Agency on Aging: www.goea.[place].gov      Alcoholics anonymous (AA): www.aa.org      Physical Activity: www.dion.nih.gov/of2nryl      Tobacco use: www.quitwithusla.org

## 2023-03-18 ENCOUNTER — LAB VISIT (OUTPATIENT)
Dept: LAB | Facility: HOSPITAL | Age: 68
End: 2023-03-18
Payer: MEDICARE

## 2023-03-18 ENCOUNTER — PATIENT MESSAGE (OUTPATIENT)
Dept: INTERNAL MEDICINE | Facility: CLINIC | Age: 68
End: 2023-03-18
Payer: MEDICARE

## 2023-03-18 DIAGNOSIS — E66.3 OVERWEIGHT (BMI 25.0-29.9): ICD-10-CM

## 2023-03-18 DIAGNOSIS — I10 HYPERTENSION, UNSPECIFIED TYPE: ICD-10-CM

## 2023-03-18 LAB
ESTIMATED AVG GLUCOSE: 137 MG/DL (ref 68–131)
HBA1C MFR BLD: 6.4 % (ref 4–5.6)

## 2023-03-18 PROCEDURE — 83036 HEMOGLOBIN GLYCOSYLATED A1C: CPT | Performed by: NURSE PRACTITIONER

## 2023-03-18 PROCEDURE — 36415 COLL VENOUS BLD VENIPUNCTURE: CPT | Performed by: NURSE PRACTITIONER

## 2023-03-19 ENCOUNTER — PATIENT MESSAGE (OUTPATIENT)
Dept: INTERNAL MEDICINE | Facility: CLINIC | Age: 68
End: 2023-03-19
Payer: MEDICARE

## 2023-03-23 ENCOUNTER — HOSPITAL ENCOUNTER (OUTPATIENT)
Facility: HOSPITAL | Age: 68
Discharge: HOME | End: 2023-03-23
Attending: INTERNAL MEDICINE | Admitting: INTERNAL MEDICINE
Payer: MEDICARE

## 2023-03-23 ENCOUNTER — ANESTHESIA (OUTPATIENT)
Dept: ENDOSCOPY | Facility: HOSPITAL | Age: 68
End: 2023-03-23
Payer: MEDICARE

## 2023-03-23 ENCOUNTER — ANESTHESIA EVENT (OUTPATIENT)
Dept: ENDOSCOPY | Facility: HOSPITAL | Age: 68
End: 2023-03-23
Payer: MEDICARE

## 2023-03-23 VITALS
OXYGEN SATURATION: 99 % | TEMPERATURE: 98 F | WEIGHT: 170 LBS | DIASTOLIC BLOOD PRESSURE: 86 MMHG | HEART RATE: 78 BPM | RESPIRATION RATE: 16 BRPM | BODY MASS INDEX: 27.32 KG/M2 | HEIGHT: 66 IN | SYSTOLIC BLOOD PRESSURE: 141 MMHG

## 2023-03-23 DIAGNOSIS — Z12.11 COLON CANCER SCREENING: ICD-10-CM

## 2023-03-23 PROCEDURE — E9220 PRA ENDO ANESTHESIA: ICD-10-PCS | Mod: PT,,, | Performed by: NURSE ANESTHETIST, CERTIFIED REGISTERED

## 2023-03-23 PROCEDURE — 27201089 HC SNARE, DISP (ANY): Performed by: INTERNAL MEDICINE

## 2023-03-23 PROCEDURE — 45385 PR COLONOSCOPY,REMV LESN,SNARE: ICD-10-PCS | Mod: PT,,, | Performed by: INTERNAL MEDICINE

## 2023-03-23 PROCEDURE — E9220 PRA ENDO ANESTHESIA: HCPCS | Mod: PT,,, | Performed by: NURSE ANESTHETIST, CERTIFIED REGISTERED

## 2023-03-23 PROCEDURE — 37000009 HC ANESTHESIA EA ADD 15 MINS: Performed by: INTERNAL MEDICINE

## 2023-03-23 PROCEDURE — 63600175 PHARM REV CODE 636 W HCPCS: Performed by: NURSE ANESTHETIST, CERTIFIED REGISTERED

## 2023-03-23 PROCEDURE — 37000008 HC ANESTHESIA 1ST 15 MINUTES: Performed by: INTERNAL MEDICINE

## 2023-03-23 PROCEDURE — 88305 TISSUE EXAM BY PATHOLOGIST: ICD-10-PCS | Mod: 26,,, | Performed by: PATHOLOGY

## 2023-03-23 PROCEDURE — 25000003 PHARM REV CODE 250: Performed by: INTERNAL MEDICINE

## 2023-03-23 PROCEDURE — 25000003 PHARM REV CODE 250: Performed by: NURSE ANESTHETIST, CERTIFIED REGISTERED

## 2023-03-23 PROCEDURE — 45385 COLONOSCOPY W/LESION REMOVAL: CPT | Mod: PT,,, | Performed by: INTERNAL MEDICINE

## 2023-03-23 PROCEDURE — 88305 TISSUE EXAM BY PATHOLOGIST: CPT | Performed by: PATHOLOGY

## 2023-03-23 PROCEDURE — 45385 COLONOSCOPY W/LESION REMOVAL: CPT | Mod: PT | Performed by: INTERNAL MEDICINE

## 2023-03-23 PROCEDURE — 88305 TISSUE EXAM BY PATHOLOGIST: CPT | Mod: 26,,, | Performed by: PATHOLOGY

## 2023-03-23 RX ORDER — SODIUM CHLORIDE 9 MG/ML
INJECTION, SOLUTION INTRAVENOUS CONTINUOUS
Status: CANCELLED | OUTPATIENT
Start: 2023-03-23

## 2023-03-23 RX ORDER — SODIUM CHLORIDE 0.9 % (FLUSH) 0.9 %
10 SYRINGE (ML) INJECTION
Status: CANCELLED | OUTPATIENT
Start: 2023-03-23

## 2023-03-23 RX ORDER — LIDOCAINE HYDROCHLORIDE 20 MG/ML
INJECTION INTRAVENOUS
Status: DISCONTINUED | OUTPATIENT
Start: 2023-03-23 | End: 2023-03-23

## 2023-03-23 RX ORDER — SODIUM CHLORIDE 9 MG/ML
INJECTION, SOLUTION INTRAVENOUS CONTINUOUS
Status: DISCONTINUED | OUTPATIENT
Start: 2023-03-23 | End: 2023-03-23 | Stop reason: HOSPADM

## 2023-03-23 RX ORDER — PROPOFOL 10 MG/ML
VIAL (ML) INTRAVENOUS
Status: DISCONTINUED | OUTPATIENT
Start: 2023-03-23 | End: 2023-03-23

## 2023-03-23 RX ORDER — PROPOFOL 10 MG/ML
VIAL (ML) INTRAVENOUS CONTINUOUS PRN
Status: DISCONTINUED | OUTPATIENT
Start: 2023-03-23 | End: 2023-03-23

## 2023-03-23 RX ADMIN — LIDOCAINE HYDROCHLORIDE 50 MG: 20 INJECTION INTRAVENOUS at 01:03

## 2023-03-23 RX ADMIN — PROPOFOL 150 MCG/KG/MIN: 10 INJECTION, EMULSION INTRAVENOUS at 01:03

## 2023-03-23 RX ADMIN — SODIUM CHLORIDE: 9 INJECTION, SOLUTION INTRAVENOUS at 12:03

## 2023-03-23 RX ADMIN — PROPOFOL 70 MG: 10 INJECTION, EMULSION INTRAVENOUS at 01:03

## 2023-03-23 NOTE — H&P
Short Stay Endoscopy History and Physical    PCP - Nick Stanton MD  Referring Physician - NITA MendezC  [address] [place] [address]    Procedure - colonoscopy  ASA - per anesthesia  Mallampati - per anesthesia  History of Anesthesia problems - no  Family history Anesthesia problems -  no   Plan of anesthesia - General    HPI:  This is a 67 y.o. male here for evaluation of: screening    Reflux - no  Dysphagia - no  Abdominal pain - no  Diarrhea - no    ROS:  Constitutional: No fevers, chills, No weight loss  CV: No chest pain  Pulm: No cough, No shortness of breath  Ophtho: No vision changes  GI: see HPI  Derm: No rash    Medical History:  has a past medical history of Arthritis, Carpal tunnel syndrome, bilateral, Cervical spinal stenosis (2002), Colon polyps, Fatty liver, Hypertension, Overweight (BMI 25.0-29.9) (8/16/2019), and Vertigo.    Surgical History:  has a past surgical history that includes Colonoscopy; Colonoscopy (N/A, 9/25/2017); Colonoscopy (N/A, 3/9/2018); Knee arthroscopy w/ meniscectomy (Left, 10/17/2018); Arthroscopic chondroplasty of knee joint (Left, 10/17/2018); Esophagogastroduodenoscopy (N/A, 9/23/2019); Injection of facet joint (Bilateral, 8/6/2021); and Epidural steroid injection (N/A, 11/4/2021).    Family History: family history includes Arthritis in his father; Dementia in his father; Heart disease in his father; No Known Problems in his brother and mother..    Social History:  reports that he has never smoked. He has never used smokeless tobacco. He reports that he does not drink alcohol and does not use drugs.    Review of patient's allergies indicates:  No Known Allergies    Medications:   Medications Prior to Admission   Medication Sig Dispense Refill Last Dose    lisinopriL 10 MG tablet Take 1 tablet (10 mg total) by mouth once daily. 90 tablet 1 Past Week    meloxicam (MOBIC) 15 MG tablet Take 1 tablet (15 mg total) by mouth once daily. 30 tablet 3  Past Week       Physical Exam:    Vital Signs:   Vitals:    03/23/23 1258   BP: (!) 147/76   Pulse: 86   Resp: 16   Temp: 98.1 °F (36.7 °C)       General Appearance: Well appearing in no acute distress    Labs:  Lab Results   Component Value Date    WBC 8.12 09/18/2022    HGB 15.5 09/18/2022    HCT 45.2 09/18/2022     09/18/2022    CHOL 174 02/06/2019    TRIG 139 02/06/2019    HDL 44 02/06/2019    ALT 46 (H) 09/18/2022    AST 22 09/18/2022     (L) 09/18/2022    K 4.1 09/18/2022     09/18/2022    CREATININE 0.9 09/18/2022    BUN 20 09/18/2022    CO2 23 09/18/2022    TSH 1.803 09/18/2022    PSA 1.3 02/06/2019    INR 0.9 08/16/2019    HGBA1C 6.4 (H) 03/18/2023       I have explained the risks and benefits of this endoscopic procedure to the patient including but not limited to bleeding, inflammation, infection, perforation, and death.      Aleksander Grimes MD

## 2023-03-23 NOTE — TRANSFER OF CARE
"Anesthesia Transfer of Care Note    Patient: Ladarius Solis    Procedure(s) Performed: Procedure(s) (LRB):  COLONOSCOPY (N/A)    Patient location: GI    Anesthesia Type: general    Transport from OR: Transported from OR on room air with adequate spontaneous ventilation    Post pain: adequate analgesia    Post assessment: no apparent anesthetic complications and tolerated procedure well    Post vital signs: stable    Level of consciousness: awake, alert and oriented    Nausea/Vomiting: no nausea/vomiting    Complications: none    Transfer of care protocol was followed      Last vitals:   Visit Vitals  /60   Pulse 92   Temp 36.7 °C (98.1 °F) (Temporal)   Resp 16   Ht 5' 6" (1.676 m)   Wt 77.1 kg (170 lb)   SpO2 96%   BMI 27.44 kg/m²     "

## 2023-03-23 NOTE — ANESTHESIA POSTPROCEDURE EVALUATION
Anesthesia Post Evaluation    Patient: Ladarius Solis    Procedure(s) Performed: Procedure(s) (LRB):  COLONOSCOPY (N/A)    Final Anesthesia Type: general      Patient location during evaluation: PACU  Patient participation: Yes- Able to Participate  Level of consciousness: awake and alert and oriented  Pain management: adequate  Airway patency: patent    PONV status at discharge: No PONV  Anesthetic complications: no      Cardiovascular status: blood pressure returned to baseline and hemodynamically stable  Respiratory status: unassisted  Hydration status: euvolemic  Follow-up not needed.          Vitals Value Taken Time   /86 03/23/23 1414     03/23/23 1417   Pulse 78 03/23/23 1414   Resp 16 03/23/23 1414   SpO2 99 % 03/23/23 1414         No case tracking events are documented in the log.      Pain/Kevin Score: Kevin Score: 10 (3/23/2023  1:45 PM)

## 2023-03-23 NOTE — PROVATION PATIENT INSTRUCTIONS
Discharge Summary/Instructions after an Endoscopic Procedure  Patient Name: Ladarius Solis  Patient MRN: [id number]  Patient YOB: 1955 Thursday, March 23, 2023  Aleksander Grimes MD  Dear patient,  As a result of recent federal legislation (The Federal Cures Act), you may   receive lab or pathology results from your procedure in your [facility]   account before your physician is able to contact you. Your physician or   their representative will relay the results to you with their   recommendations at their soonest availability.  Thank you,  RESTRICTIONS:  During your procedure today, you received medications for sedation.  These   medications may affect your judgment, balance and coordination.  Therefore,   for 24 hours, you have the following restrictions:   - DO NOT drive a car, operate machinery, make legal/financial decisions,   sign important papers or drink alcohol.    ACTIVITY:  Today: no heavy lifting, straining or running due to procedural   sedation/anesthesia.  The following day: return to full activity including work.  DIET:  Eat and drink normally unless instructed otherwise.     TREATMENT FOR COMMON SIDE EFFECTS:  - Mild abdominal pain, nausea, belching, bloating or excessive gas:  rest,   eat lightly and use a heating pad.  - Sore Throat: treat with throat lozenges and/or gargle with warm salt   water.  - Because air was used during the procedure, expelling large amounts of air   from your rectum or belching is normal.  - If a bowel prep was taken, you may not have a bowel movement for 1-3 days.    This is normal.  SYMPTOMS TO WATCH FOR AND REPORT TO YOUR PHYSICIAN:  1. Abdominal pain or bloating, other than gas cramps.  2. Chest pain.  3. Back pain.  4. Signs of infection such as: chills or fever occurring within 24 hours   after the procedure.  5. Rectal bleeding, which would show as bright red, maroon, or black stools.   (A tablespoon of blood from the rectum is not serious, especially if    hemorrhoids are present.)  6. Vomiting.  7. Weakness or dizziness.  GO DIRECTLY TO THE NEAREST EMERGENCY ROOM IF YOU HAVE ANY OF THE FOLLOWING:      Difficulty breathing              Chills and/or fever over 101 F   Persistent vomiting and/or vomiting blood   Severe abdominal pain   Severe chest pain   Black, tarry stools   Bleeding- more than one tablespoon   Any other symptom or condition that you feel may need urgent attention  Your doctor recommends these additional instructions:  If any biopsies were taken, your doctors clinic will contact you in 1 to 2   weeks with any results.  - Discharge patient to home.   - Resume previous diet.   - Continue present medications.   - Await pathology results.   - Repeat colonoscopy in 7 years for surveillance.   - Return to primary care physician as previously scheduled.  For questions, problems or results please call your physician - Aleksander Grimes MD at Work:  [phone].  [facility] [place], EMERGENCY ROOM PHONE NUMBER: [phone]  IF A COMPLICATION OR EMERGENCY SITUATION ARISES AND YOU ARE UNABLE TO REACH   YOUR PHYSICIAN - GO DIRECTLY TO THE EMERGENCY ROOM.  Aleksander Grimes MD  3/23/2023 1:38:00 PM  This report has been verified and signed electronically.  Dear patient,  As a result of recent federal legislation (The Federal Cures Act), you may   receive lab or pathology results from your procedure in your [facility]   account before your physician is able to contact you. Your physician or   their representative will relay the results to you with their   recommendations at their soonest availability.  Thank you,  PROVATION

## 2023-03-23 NOTE — ANESTHESIA PREPROCEDURE EVALUATION
03/23/2023  Ladarius Solis is a 67 y.o., male.    Active Problem List with Overview Notes    Diagnosis Date Noted    Aortic atherosclerosis 03/16/2023    Chronic pain 08/06/2021    Chronic abdominal pain 11/11/2019    Functional dyspepsia 11/11/2019    GERD (gastroesophageal reflux disease) 09/23/2019    Overweight (BMI 25.0-29.9) 08/16/2019    Hypertension 08/02/2019    Hepatic steatosis 08/02/2019    Trigger ring finger of right hand 03/06/2019    Right trigger finger 02/11/2019    Chronic pain of left knee 11/16/2018    Chondromalacia of left knee 10/17/2018    Neck pain 03/27/2018    Bilateral carpal tunnel syndrome 03/27/2018    Cervical radiculopathy at C7 03/27/2018    Acute pain of left knee 02/28/2018    Primary osteoarthritis of left knee 02/28/2018    Colon adenoma 09/25/2017     Past Surgical History:   Procedure Laterality Date    ARTHROSCOPIC CHONDROPLASTY OF KNEE JOINT Left 10/17/2018    Procedure: ARTHROSCOPY, KNEE, WITH CHONDROPLASTY;  Surgeon: GRETEL Carr MD;  [facility];  Service: Orthopedics;  Laterality: Left;    COLONOSCOPY      COLONOSCOPY N/A 9/25/2017    Procedure: COLONOSCOPY/emr;  Surgeon: Raul August MD;  [facility]  Service: Endoscopy;  Laterality: N/A;    COLONOSCOPY N/A 3/9/2018    Procedure: COLONOSCOPY;  Surgeon: Raul August MD;  [facility]  Service: Endoscopy;  Laterality: N/A;    EPIDURAL STEROID INJECTION N/A 11/4/2021    Procedure: INJECTION, STEROID, EPIDURAL, C7-T1  NEED CONSENT;  Surgeon: Bozena Mena MD;  Location: [facility] PAIN MGT;  Service: Pain Management;  Laterality: N/A;    ESOPHAGOGASTRODUODENOSCOPY N/A 9/23/2019    Procedure: EGD (ESOPHAGOGASTRODUODENOSCOPY);  Surgeon: Dyllan Maloney MD;  Location: [facility] ENDO;  Service: General;  Laterality: N/A;    INJECTION  OF FACET JOINT Bilateral 8/6/2021    Procedure: FACET JOINT INJECTION BILATERAL L4/5 AND L5/S1 DIRECT REFERRAL NEEDS CONSENT;  Surgeon: Jasiel Aviles MD;  Location: [facility];  Service: Pain Management;  Laterality: Bilateral;    KNEE ARTHROSCOPY W/ MENISCECTOMY Left 10/17/2018    Procedure: ARTHROSCOPY, KNEE, WITH MENISCECTOMY;  Surgeon: GRETEL Carr MD;  Location: [facility] OR [address] [place] [address];  Service: Orthopedics;  Laterality: Left;  regional w/catheter adductor  Dimitry/[employer] notified 10-12 LO     Results for orders placed or performed during the hospital encounter of 09/18/22   EKG 12-lead    Collection Time: 09/18/22 12:58 PM    Narrative    Test Reason : R07.9,    Vent. Rate : 073 BPM     Atrial Rate : 073 BPM     P-R Int : 162 ms          QRS Dur : 144 ms      QT Int : 404 ms       P-R-T Axes : 040 056 000 degrees     QTc Int : 445 ms    Normal sinus rhythm  Possible Left atrial enlargement  Right bundle branch block  T wave abnormality, consider inferior ischemia  Abnormal ECG  When compared with ECG of 27-JUL-2019 19:34,  No significant change was found  Confirmed by RICHARD ERICKSON MD ([id number]) on 9/19/2022 9:27:16 AM    Referred By: [email or web address]   SELF           Confirmed By:RICHARD ERICKSON MD       Pre-op Assessment    I have reviewed the Patient Summary Reports.    I have reviewed the NPO Status.   I have reviewed the Medications.     Review of Systems  Anesthesia Hx:  No problems with previous Anesthesia    Hematology/Oncology:  Hematology Normal   Oncology Normal     EENT/Dental:EENT/Dental Normal   Cardiovascular:   Hypertension    Pulmonary:  Pulmonary Normal    Renal/:  Renal/ Normal     Hepatic/GI:   GERD Liver Disease,    Musculoskeletal:   Arthritis     Neurological:   Neuromuscular Disease,    Endocrine:  Endocrine Normal    Dermatological:  Skin Normal    Psych:  Psychiatric Normal           Physical Exam  General: Well nourished, Cooperative, Alert and Oriented    Airway:  Mallampati: II    Mouth Opening: Normal  TM Distance: Normal  Tongue: Normal  Neck ROM: Normal ROM    Dental:  Intact, Partial Dentures  Partial denture upper & lower left at home  Chest/Lungs:  Normal Respiratory Rate        Anesthesia Plan  Type of Anesthesia, risks & benefits discussed:    Anesthesia Type: Gen Natural Airway  Intra-op Monitoring Plan: Standard ASA Monitors  Post Op Pain Control Plan: multimodal analgesia  Induction:  IV  Informed Consent: Informed consent signed with the Patient and all parties understand the risks and agree with anesthesia plan.  All questions answered.   ASA Score: 2  Day of Surgery Review of History & Physical: H&P Update referred to the surgeon/provider.    Ready For Surgery From Anesthesia Perspective.     .

## 2023-03-29 LAB
FINAL PATHOLOGIC DIAGNOSIS: NORMAL
GROSS: NORMAL
Lab: NORMAL

## 2023-04-03 ENCOUNTER — HOSPITAL ENCOUNTER (EMERGENCY)
Facility: HOSPITAL | Age: 68
Discharge: HOME | End: 2023-04-03
Attending: EMERGENCY MEDICINE
Payer: MEDICARE

## 2023-04-03 ENCOUNTER — OFFICE VISIT (OUTPATIENT)
Dept: OPHTHALMOLOGY | Facility: CLINIC | Age: 68
End: 2023-04-03
Payer: MEDICARE

## 2023-04-03 VITALS
BODY MASS INDEX: 28.93 KG/M2 | SYSTOLIC BLOOD PRESSURE: 178 MMHG | HEIGHT: 66 IN | TEMPERATURE: 98 F | WEIGHT: 180 LBS | OXYGEN SATURATION: 100 % | RESPIRATION RATE: 18 BRPM | DIASTOLIC BLOOD PRESSURE: 97 MMHG | HEART RATE: 81 BPM

## 2023-04-03 DIAGNOSIS — H33.001 RETINAL DETACHMENT, RHEGMATOGENOUS, RIGHT EYE: Primary | ICD-10-CM

## 2023-04-03 DIAGNOSIS — H53.8 HAZY VISION: Primary | ICD-10-CM

## 2023-04-03 LAB
POCT GLUCOSE: 135 MG/DL (ref 70–110)
POCT GLUCOSE: 138 MG/DL (ref 70–110)

## 2023-04-03 PROCEDURE — 4010F PR ACE/ARB THEARPY RXD/TAKEN: ICD-10-PCS | Mod: CPTII,S$GLB,, | Performed by: OPHTHALMOLOGY

## 2023-04-03 PROCEDURE — 92134 POSTERIOR SEGMENT OCT RETINA (OCULAR COHERENCE TOMOGRAPHY)-BOTH EYES: ICD-10-PCS | Mod: S$GLB,,, | Performed by: OPHTHALMOLOGY

## 2023-04-03 PROCEDURE — 82962 GLUCOSE BLOOD TEST: CPT

## 2023-04-03 PROCEDURE — 99214 OFFICE O/P EST MOD 30 MIN: CPT | Mod: 57,S$GLB,, | Performed by: OPHTHALMOLOGY

## 2023-04-03 PROCEDURE — 99283 EMERGENCY DEPT VISIT LOW MDM: CPT

## 2023-04-03 PROCEDURE — 92201 OPSCPY EXTND RTA DRAW UNI/BI: CPT | Mod: S$GLB,,, | Performed by: OPHTHALMOLOGY

## 2023-04-03 PROCEDURE — 1159F PR MEDICATION LIST DOCUMENTED IN MEDICAL RECORD: ICD-10-PCS | Mod: CPTII,S$GLB,, | Performed by: OPHTHALMOLOGY

## 2023-04-03 PROCEDURE — 99999 PR PBB SHADOW E&M-EST. PATIENT-LVL III: CPT | Mod: PBBFAC,,, | Performed by: OPHTHALMOLOGY

## 2023-04-03 PROCEDURE — 1160F RVW MEDS BY RX/DR IN RCRD: CPT | Mod: CPTII,S$GLB,, | Performed by: OPHTHALMOLOGY

## 2023-04-03 PROCEDURE — 99214 PR OFFICE/OUTPT VISIT, EST, LEVL IV, 30-39 MIN: ICD-10-PCS | Mod: 57,S$GLB,, | Performed by: OPHTHALMOLOGY

## 2023-04-03 PROCEDURE — 99999 PR PBB SHADOW E&M-EST. PATIENT-LVL III: ICD-10-PCS | Mod: PBBFAC,,, | Performed by: OPHTHALMOLOGY

## 2023-04-03 PROCEDURE — 3044F HG A1C LEVEL LT 7.0%: CPT | Mod: CPTII,S$GLB,, | Performed by: OPHTHALMOLOGY

## 2023-04-03 PROCEDURE — 25000003 PHARM REV CODE 250: Performed by: EMERGENCY MEDICINE

## 2023-04-03 PROCEDURE — 1159F MED LIST DOCD IN RCRD: CPT | Mod: CPTII,S$GLB,, | Performed by: OPHTHALMOLOGY

## 2023-04-03 PROCEDURE — 4010F ACE/ARB THERAPY RXD/TAKEN: CPT | Mod: CPTII,S$GLB,, | Performed by: OPHTHALMOLOGY

## 2023-04-03 PROCEDURE — 3044F PR MOST RECENT HEMOGLOBIN A1C LEVEL <7.0%: ICD-10-PCS | Mod: CPTII,S$GLB,, | Performed by: OPHTHALMOLOGY

## 2023-04-03 PROCEDURE — 92134 CPTRZ OPH DX IMG PST SGM RTA: CPT | Mod: S$GLB,,, | Performed by: OPHTHALMOLOGY

## 2023-04-03 PROCEDURE — 1126F AMNT PAIN NOTED NONE PRSNT: CPT | Mod: CPTII,S$GLB,, | Performed by: OPHTHALMOLOGY

## 2023-04-03 PROCEDURE — 99284 EMERGENCY DEPT VISIT MOD MDM: CPT | Mod: ,,, | Performed by: EMERGENCY MEDICINE

## 2023-04-03 PROCEDURE — 1126F PR PAIN SEVERITY QUANTIFIED, NO PAIN PRESENT: ICD-10-PCS | Mod: CPTII,S$GLB,, | Performed by: OPHTHALMOLOGY

## 2023-04-03 PROCEDURE — 99284 PR EMERGENCY DEPT VISIT,LEVEL IV: ICD-10-PCS | Mod: ,,, | Performed by: EMERGENCY MEDICINE

## 2023-04-03 PROCEDURE — 1160F PR REVIEW ALL MEDS BY PRESCRIBER/CLIN PHARMACIST DOCUMENTED: ICD-10-PCS | Mod: CPTII,S$GLB,, | Performed by: OPHTHALMOLOGY

## 2023-04-03 PROCEDURE — 92201 PR OPHTHALMOSCOPY, EXT, W/RET DRAW/SCLERAL DEPR, I&R, UNI/BI: ICD-10-PCS | Mod: S$GLB,,, | Performed by: OPHTHALMOLOGY

## 2023-04-03 RX ORDER — ATROPINE SULFATE 10 MG/ML
1 SOLUTION/ DROPS OPHTHALMIC
Status: CANCELLED | OUTPATIENT
Start: 2023-04-03

## 2023-04-03 RX ORDER — TROPICAMIDE 10 MG/ML
1 SOLUTION/ DROPS OPHTHALMIC
Status: CANCELLED | OUTPATIENT
Start: 2023-04-03

## 2023-04-03 RX ORDER — TETRACAINE HYDROCHLORIDE 5 MG/ML
1 SOLUTION OPHTHALMIC
Status: CANCELLED | OUTPATIENT
Start: 2023-04-03

## 2023-04-03 RX ORDER — CYCLOPENTOLATE HYDROCHLORIDE 10 MG/ML
1 SOLUTION/ DROPS OPHTHALMIC
Status: CANCELLED | OUTPATIENT
Start: 2023-04-03

## 2023-04-03 RX ORDER — SODIUM CHLORIDE 0.9 % (FLUSH) 0.9 %
10 SYRINGE (ML) INJECTION
Status: SHIPPED | OUTPATIENT
Start: 2023-04-03

## 2023-04-03 RX ORDER — PROPARACAINE HYDROCHLORIDE 5 MG/ML
1 SOLUTION/ DROPS OPHTHALMIC
Status: COMPLETED | OUTPATIENT
Start: 2023-04-03 | End: 2023-04-03

## 2023-04-03 RX ORDER — PREDNISOLONE ACETATE 10 MG/ML
1 SUSPENSION/ DROPS OPHTHALMIC
Status: CANCELLED | OUTPATIENT
Start: 2023-04-03

## 2023-04-03 RX ORDER — POLYETHYLENE GLYCOL-3350 AND ELECTROLYTES WITH FLAVOR PACK 240; 5.84; 2.98; 6.72; 22.72 G/278.26G; G/278.26G; G/278.26G; G/278.26G; G/278.26G
4000 POWDER, FOR SOLUTION ORAL ONCE
COMMUNITY
Start: 2023-02-13 | End: 2023-11-06

## 2023-04-03 RX ORDER — PHENYLEPHRINE HYDROCHLORIDE 25 MG/ML
1 SOLUTION/ DROPS OPHTHALMIC
Status: CANCELLED | OUTPATIENT
Start: 2023-04-03

## 2023-04-03 RX ORDER — MOXIFLOXACIN 5 MG/ML
1 SOLUTION/ DROPS OPHTHALMIC
Status: CANCELLED | OUTPATIENT
Start: 2023-04-03

## 2023-04-03 RX ADMIN — PROPARACAINE HYDROCHLORIDE 1 DROP: 5 SOLUTION/ DROPS OPHTHALMIC at 10:04

## 2023-04-03 NOTE — ED NOTES
"Patient stats right eye " shaded" reports left eye a "little" shaded, sattes has to squint to see out of left eye, unable to see "hardly at all"   "

## 2023-04-03 NOTE — ED NOTES
Patient identifiers verified and correct for  Mr Solis  C/C: Blurred vision SEE NN  APPEARANCE: awake and alert in NAD. PAIN  */10  SKIN: warm, dry and intact. No breakdown or bruising.  MUSCULOSKELETAL: Patient moving all extremities spontaneously, no obvious swelling or deformities noted. Ambulates independently.  RESPIRATORY: Denies shortness of breath.Respirations unlabored.   CARDIAC: Denies CP, 2+ distal pulses; no peripheral edema  ABDOMEN: S/ND/NT, Denies nausea  : voids spontaneously, denies difficulty  Neurologic: AAO x 4; follows commands equal strength in all extremities; denies numbness/tingling. Denies dizziness Deneis tommy goldberg, reports head pressure and decr vision right, left

## 2023-04-03 NOTE — ED PROVIDER NOTES
"Encounter Date: 4/3/2023    SCRIBE #1 NOTE: I, Karin Jenkins, am scribing for, and in the presence of,  Denilson Penaloza MD. I have scribed the entire note.     History     Chief Complaint   Patient presents with    Eye Problem     Cloudy vision for few days, NP told me i'm borderline diabetic     Cough     Time patient was seen by the provider: 9:42 AM      The patient is a 67 y.o. male with past medical history of HTN who presents to the ED with a complaint of R eye blurry/clouded vision ongoing for the last 3 days. Described as a dark "shadow and haziness" that impairs his vision in the R eye. Pt further explains that his visual discrepancy occurs all throughout his line of sight. There is no delineating factors. Associated symptoms include mild eye pain and HA. Denies diplopia at this time. He reports that he is borderline diabetic and has concerns for diabetic eye disease. No other exacerbating or alleviating factors at this time.    The history is provided by the patient and medical records. No  was used.   Review of patient's allergies indicates:  No Known Allergies  Past Medical History:   Diagnosis Date    Arthritis     Carpal tunnel syndrome, bilateral     Cervical spinal stenosis 2002    Colon polyps     Fatty liver     Hypertension     Overweight (BMI 25.0-29.9) 8/16/2019    Vertigo     left ear issues     Past Surgical History:   Procedure Laterality Date    ARTHROSCOPIC CHONDROPLASTY OF KNEE JOINT Left 10/17/2018    Procedure: ARTHROSCOPY, KNEE, WITH CHONDROPLASTY;  Surgeon: GRETEL Carr MD;  Location: [facility] OR [address] [place] [address];  Service: Orthopedics;  Laterality: Left;    COLONOSCOPY      COLONOSCOPY N/A 9/25/2017    Procedure: COLONOSCOPY/emr;  Surgeon: Raul August MD;  Location: [facility] ENDO ([address] [place] [address]);  Service: Endoscopy;  Laterality: N/A;    COLONOSCOPY N/A 3/9/2018    Procedure: COLONOSCOPY;  Surgeon: Raul August MD;  Location: [facility] ENDO ([address] [place] [address]);  Service: Endoscopy;  " Laterality: N/A;    COLONOSCOPY N/A 3/23/2023    Procedure: COLONOSCOPY;  Surgeon: Aleksander Grimes MD;  Location: [facility] (4TH FLR);  Service: Endoscopy;  Laterality: N/A;  2/13 instructions to portal-st  pre call done- AJ  does not want to come in earlier 3/22 EB    EPIDURAL STEROID INJECTION N/A 11/4/2021    Procedure: INJECTION, STEROID, EPIDURAL, C7-T1  NEED CONSENT;  Surgeon: Bozena Mena MD;  Location: [facility] PAIN MGT;  Service: Pain Management;  Laterality: N/A;    ESOPHAGOGASTRODUODENOSCOPY N/A 9/23/2019    Procedure: EGD (ESOPHAGOGASTRODUODENOSCOPY);  Surgeon: Dyllan Maloney MD;  Location: [facility];  Service: General;  Laterality: N/A;    INJECTION OF FACET JOINT Bilateral 8/6/2021    Procedure: FACET JOINT INJECTION BILATERAL L4/5 AND L5/S1 DIRECT REFERRAL NEEDS CONSENT;  Surgeon: Jasiel Aviles MD;  Location: [facility] PAIN T;  Service: Pain Management;  Laterality: Bilateral;    KNEE ARTHROSCOPY W/ MENISCECTOMY Left 10/17/2018    Procedure: ARTHROSCOPY, KNEE, WITH MENISCECTOMY;  Surgeon: GRETEL Carr MD;  [facility];  Service: Orthopedics;  Laterality: Left;  regional w/catheter adductor  Dimitry/Linm[employer]oz[employer] notified 10-12 LO     Family History   Problem Relation Age of Onset    No Known Problems Mother     Arthritis Father     Dementia Father     Heart disease Father     No Known Problems Brother     Diabetes Neg Hx     Cirrhosis Neg Hx      Social History     Tobacco Use    Smoking status: Never    Smokeless tobacco: Never   Substance Use Topics    Alcohol use: No    Drug use: No     Review of Systems   Constitutional:  Negative for fever.   HENT:  Negative for sore throat.    Eyes:  Positive for pain and visual disturbance.   Respiratory:  Negative for cough and shortness of breath.    Cardiovascular:  Negative for chest pain.   Gastrointestinal:  Negative for abdominal pain, diarrhea, nausea and vomiting.   Genitourinary:  Negative for dysuria.   Musculoskeletal:  Negative for  neck pain.   Skin:  Negative for rash and wound.   Allergic/Immunologic: Negative for immunocompromised state.   Neurological:  Positive for headaches. Negative for syncope.   Psychiatric/Behavioral:  Negative for confusion.      Physical Exam     Initial Vitals [04/03/23 0907]   BP Pulse Resp Temp SpO2   (!) 178/97 81 18 98 °F (36.7 °C) 100 %      MAP       --         Physical Exam    Nursing note and vitals reviewed.  Constitutional: He appears well-developed and well-nourished. He is not diaphoretic.  Non-toxic appearance. No distress.   HENT:   Head: Normocephalic and atraumatic.   No temporal tenderness or palpable cords.   Eyes: EOM are normal. Pupils are equal, round, and reactive to light. No foreign body present in the right eye. No foreign body present in the left eye.   PERRL at approximately 4mm to 2 mm.  No foreign body visualized. Alli-pen is broken. I will defer the exam to ophthalmology.   Neck: Neck supple.   No lymphadenopathy.   Normal range of motion.  Cardiovascular:  Normal rate, regular rhythm, normal heart sounds and intact distal pulses.     Exam reveals no gallop and no friction rub.       No murmur heard.  Pulmonary/Chest: Breath sounds normal. No respiratory distress. He has no wheezes. He has no rhonchi. He has no rales.   Abdominal: Abdomen is soft. He exhibits no distension. There is no abdominal tenderness. There is no rebound and no guarding.   Musculoskeletal:         General: No tenderness or edema. Normal range of motion.      Cervical back: Normal range of motion and neck supple.     Neurological: He is alert and oriented to person, place, and time. He has normal strength.   Skin: Skin is warm and dry. No rash noted.       ED Course   Procedures  Labs Reviewed   POCT GLUCOSE - Abnormal; Notable for the following components:       Result Value    POCT Glucose 135 (*)     All other components within normal limits   POCT GLUCOSE - Abnormal; Notable for the following components:     POCT Glucose 138 (*)     All other components within normal limits          Imaging Results    None          Medications   proparacaine 0.5 % ophthalmic solution 1 drop (1 drop Both Eyes Given by Other 4/3/23 1025)     Medical Decision Making:   History:   Old Medical Records: I decided to obtain old medical records.  Initial Assessment:   This is an emergent evaluation. The etiology unilateral change in vision is unclear. He describes the vision change as a blurriness. Visual acuity, intraocular pressure, and a thorough corneal exam will be performed. Based off of my findings, I will determine whether ophthalmology will need to be consulted emergently. I will reassess.  Clinical Tests:   Lab Tests: Ordered and Reviewed  ED Management:  11:00  The Alli-pen is broken. I discussed with the ophthalmology triage clinic. They states that they will call me back.    11:02  Ophthalmology with corrective lenses. The pt's visual acuity is OD 20 and OS 25.    11:04  Opthalmology has now called back and state that they will see him in clinic. I will discharge the patient directly to clinic. Of note, the pt's blood sugar is 138.        Scribe Attestation:   Scribe #1: I performed the above scribed service and the documentation accurately describes the services I performed. I attest to the accuracy of the note.            I, Dr. Denilson Penaloza, personally performed the services described in this documentation. All medical record entries made by the scribe were at my direction and in my presence.  I have reviewed the chart and agree that the record reflects my personal performance and is accurate and complete. Denilson Penaloza MD.  11:50 AM 04/03/2023        Clinical Impression:   Final diagnoses:  [H53.8] Hazy vision (Primary)        ED Disposition Condition    Discharge Stable          ED Prescriptions    None       Follow-up Information       Follow up With Specialties Details Why Contact Info Additional Information    [address] Ophthalmology Today You have an appointment in the 10th floor Ophthalmology Triage Clinic NOW [facility]  [facility] [address] [phone] Please arrive on the 10th floor for check-in.             Denilson Penaloza MD  04/03/23 6736

## 2023-04-03 NOTE — H&P
Pre-operative History and Physical  Ophthalmology Service    CC: right eye retinal detachment     HPI:   Patient is a 67 y.o. male presents with an eye problem right eye  requiring surgery as noted in the most recent ophthalmology progress note.    Past Medical History:   Diagnosis Date    Arthritis     Carpal tunnel syndrome, bilateral     Cervical spinal stenosis 2002    Colon polyps     Fatty liver     Hypertension     Overweight (BMI 25.0-29.9) 8/16/2019    Vertigo     left ear issues       Past Surgical History:   Procedure Laterality Date    ARTHROSCOPIC CHONDROPLASTY OF KNEE JOINT Left 10/17/2018    Procedure: ARTHROSCOPY, KNEE, WITH CHONDROPLASTY;  Surgeon: GRETEL Carr MD;  Location: [facility] OR 2ND FLR;  Service: Orthopedics;  Laterality: Left;    COLONOSCOPY      COLONOSCOPY N/A 9/25/2017    Procedure: COLONOSCOPY/emr;  Surgeon: Raul August MD;  Location: [facility] (2ND FLR);  Service: Endoscopy;  Laterality: N/A;    COLONOSCOPY N/A 3/9/2018    Procedure: COLONOSCOPY;  Surgeon: Raul August MD;  Location: [facility] (2ND FLR);  Service: Endoscopy;  Laterality: N/A;    COLONOSCOPY N/A 3/23/2023    Procedure: COLONOSCOPY;  Surgeon: Aleksander Grimes MD;  Location: [facility] (4TH FLR);  Service: Endoscopy;  Laterality: N/A;  2/13 instructions to portal-st  pre call done- AJ  does not want to come in earlier 3/22 EB    EPIDURAL STEROID INJECTION N/A 11/4/2021    Procedure: INJECTION, STEROID, EPIDURAL, C7-T1  NEED CONSENT;  Surgeon: Bozena Mena MD;  [facility];  Service: Pain Management;  Laterality: N/A;    ESOPHAGOGASTRODUODENOSCOPY N/A 9/23/2019    Procedure: EGD (ESOPHAGOGASTRODUODENOSCOPY);  Surgeon: Dyllan Maloney MD;  Location: [facility] ENDO;  Service: General;  Laterality: N/A;    INJECTION OF FACET JOINT Bilateral 8/6/2021    Procedure: FACET JOINT INJECTION BILATERAL L4/5 AND L5/S1 DIRECT REFERRAL NEEDS CONSENT;  Surgeon: Jasiel Aviles MD;  [facility]: [facility];  Service:  Pain Management;  Laterality: Bilateral;    KNEE ARTHROSCOPY W/ MENISCECTOMY Left 10/17/2018    Procedure: ARTHROSCOPY, KNEE, WITH MENISCECTOMY;  Surgeon: GRETEL Carr MD;  Location: [facility] OR [address] [place] [address];  Service: Orthopedics;  Laterality: Left;  regional w/catheter adductor  Dimitry/[employer] notified 10-12 LO       Family History   Problem Relation Age of Onset    No Known Problems Mother     Arthritis Father     Dementia Father     Heart disease Father     No Known Problems Brother     Diabetes Neg Hx     Cirrhosis Neg Hx        Social History     Socioeconomic History    Marital status: Single   Occupational History    Occupation:    Tobacco Use    Smoking status: Never    Smokeless tobacco: Never   Substance and Sexual Activity    Alcohol use: No    Drug use: No     Social Determinants of Health     Financial Resource Strain: Low Risk     Difficulty of Paying Living Expenses: Not hard at all   Food Insecurity: No Food Insecurity    Worried About Running Out of Food in the Last Year: Never true    Ran Out of Food in the Last Year: Never true   Transportation Needs: No Transportation Needs    Lack of Transportation (Medical): No    Lack of Transportation (Non-Medical): No   Physical Activity: Inactive    Days of Exercise per Week: 0 days    Minutes of Exercise per Session: 0 min   Stress: No Stress Concern Present    Feeling of Stress : Not at all   Social Connections: Unknown    Frequency of Communication with Friends and Family: Never    Frequency of Social Gatherings with Friends and Family: Never    Active Member of Clubs or Organizations: Yes    Attends Club or Organization Meetings: More than 4 times per year    Marital Status: Never    Housing Stability: Low Risk     Unable to Pay for Housing in the Last Year: No    Number of Places Lived in the Last Year: 1    Unstable Housing in the Last Year: No       Current Outpatient Medications   Medication Sig Dispense Refill    GAVILYTE-C  240-22.72-6.72 -5.84 gram SolR Take 4,000 mLs by mouth once.      lisinopriL 10 MG tablet Take 1 tablet (10 mg total) by mouth once daily. 90 tablet 1    meloxicam (MOBIC) 15 MG tablet Take 1 tablet (15 mg total) by mouth once daily. 30 tablet 3     No current facility-administered medications for this visit.     Facility-Administered Medications Ordered in Other Visits   Medication Dose Route Frequency Provider Last Rate Last Admin    sodium hyaluronate (EUFLEXXA) 10 mg/mL(mw 2.4 -3.6 million) Syrg 20 mg  20 mg Intra-articular  W Toi Carr MD   20 mg at 05/10/18 2801       Review of patient's allergies indicates:  No Known Allergies      Review of systems:  Gen: denies fevers, chills, nausea, vomitting, weight changes  HEENT: Denies discharge or coughing/sneezing. +blurry vision right eye   Resp: Denies SOB  CV: Denies CP or palpitations  Abd: Denies tenderness, diarrhea, constipation, nausea  Ext:  Denies pain or edema    Physical Exam  BP: Vital signs stable  General: No apparent distress  HEENT: Normocephalic, atraumatic, see past ophtho note for eye exam OD  Lungs: Adequate respirations, no respiratory distress  Heart: Pulses intact  Abdomen: Soft, no distention  Rectal/pelvic: Deferred    Assessment  Patient is a 67 y.o. male with eye problem right eye total retinal detachment    - Risks/benefits/alternatives of the procedure including, but not limited to scarring, bleeding, infection, loss or decreased vision, and/or need for possible repeat surgery discussed with the patient and/or family, and Informed consent obtained prior to surgery and the patient/family voiced good understanding, witnessed, and placed in chart.  - Will proceed with ppv/el/gas vs oil, possible scleral buckle if needed   - Plan for  General anesthesia   - see status of aspirin and other blood thinners in progress note from today

## 2023-04-03 NOTE — ED NOTES
Discharge to follow up on 10th floor clinic, states understanding to follow up as directed. Ambulates out of ED without difficulty.

## 2023-04-04 ENCOUNTER — ANESTHESIA (OUTPATIENT)
Dept: SURGERY | Facility: HOSPITAL | Age: 68
End: 2023-04-04
Payer: MEDICARE

## 2023-04-04 ENCOUNTER — ANESTHESIA EVENT (OUTPATIENT)
Dept: SURGERY | Facility: HOSPITAL | Age: 68
End: 2023-04-04
Payer: MEDICARE

## 2023-04-04 ENCOUNTER — HOSPITAL ENCOUNTER (OUTPATIENT)
Facility: HOSPITAL | Age: 68
Discharge: HOME | End: 2023-04-04
Attending: OPHTHALMOLOGY | Admitting: OPHTHALMOLOGY
Payer: MEDICARE

## 2023-04-04 VITALS
TEMPERATURE: 97 F | DIASTOLIC BLOOD PRESSURE: 68 MMHG | HEART RATE: 81 BPM | OXYGEN SATURATION: 98 % | RESPIRATION RATE: 20 BRPM | SYSTOLIC BLOOD PRESSURE: 128 MMHG

## 2023-04-04 DIAGNOSIS — H33.001 RETINAL DETACHMENT, RHEGMATOGENOUS, RIGHT EYE: ICD-10-CM

## 2023-04-04 DIAGNOSIS — H53.40 VISUAL FIELD DEFECT OF RIGHT EYE: ICD-10-CM

## 2023-04-04 PROCEDURE — 25000003 PHARM REV CODE 250: Performed by: STUDENT IN AN ORGANIZED HEALTH CARE EDUCATION/TRAINING PROGRAM

## 2023-04-04 PROCEDURE — 37000008 HC ANESTHESIA 1ST 15 MINUTES: Performed by: OPHTHALMOLOGY

## 2023-04-04 PROCEDURE — 37000009 HC ANESTHESIA EA ADD 15 MINS: Performed by: OPHTHALMOLOGY

## 2023-04-04 PROCEDURE — D9220A PRA ANESTHESIA: Mod: ANES,,, | Performed by: ANESTHESIOLOGY

## 2023-04-04 PROCEDURE — 71000044 HC DOSC ROUTINE RECOVERY FIRST HOUR: Performed by: OPHTHALMOLOGY

## 2023-04-04 PROCEDURE — D9220A PRA ANESTHESIA: Mod: CRNA,,, | Performed by: NURSE ANESTHETIST, CERTIFIED REGISTERED

## 2023-04-04 PROCEDURE — 71000015 HC POSTOP RECOV 1ST HR: Performed by: OPHTHALMOLOGY

## 2023-04-04 PROCEDURE — 27201423 OPTIME MED/SURG SUP & DEVICES STERILE SUPPLY: Performed by: OPHTHALMOLOGY

## 2023-04-04 PROCEDURE — 36000707: Performed by: OPHTHALMOLOGY

## 2023-04-04 PROCEDURE — 99499 UNLISTED E&M SERVICE: CPT | Mod: ,,, | Performed by: STUDENT IN AN ORGANIZED HEALTH CARE EDUCATION/TRAINING PROGRAM

## 2023-04-04 PROCEDURE — 25000003 PHARM REV CODE 250: Performed by: OPHTHALMOLOGY

## 2023-04-04 PROCEDURE — D9220A PRA ANESTHESIA: ICD-10-PCS | Mod: CRNA,,, | Performed by: NURSE ANESTHETIST, CERTIFIED REGISTERED

## 2023-04-04 PROCEDURE — 67108 PR REPAIR DETACH RETINA,W VITRECTOMY: ICD-10-PCS | Mod: RT,,, | Performed by: OPHTHALMOLOGY

## 2023-04-04 PROCEDURE — 99499 NO LOS: ICD-10-PCS | Mod: ,,, | Performed by: STUDENT IN AN ORGANIZED HEALTH CARE EDUCATION/TRAINING PROGRAM

## 2023-04-04 PROCEDURE — 25000003 PHARM REV CODE 250: Performed by: NURSE ANESTHETIST, CERTIFIED REGISTERED

## 2023-04-04 PROCEDURE — 36000706: Performed by: OPHTHALMOLOGY

## 2023-04-04 PROCEDURE — 63600175 PHARM REV CODE 636 W HCPCS: Performed by: NURSE ANESTHETIST, CERTIFIED REGISTERED

## 2023-04-04 PROCEDURE — 71000016 HC POSTOP RECOV ADDL HR: Performed by: OPHTHALMOLOGY

## 2023-04-04 PROCEDURE — 67108 REPAIR DETACHED RETINA: CPT | Mod: RT,,, | Performed by: OPHTHALMOLOGY

## 2023-04-04 PROCEDURE — 63600175 PHARM REV CODE 636 W HCPCS: Performed by: OPHTHALMOLOGY

## 2023-04-04 PROCEDURE — D9220A PRA ANESTHESIA: ICD-10-PCS | Mod: ANES,,, | Performed by: ANESTHESIOLOGY

## 2023-04-04 RX ORDER — HYDROMORPHONE HYDROCHLORIDE 1 MG/ML
0.2 INJECTION, SOLUTION INTRAMUSCULAR; INTRAVENOUS; SUBCUTANEOUS EVERY 5 MIN PRN
Status: DISCONTINUED | OUTPATIENT
Start: 2023-04-04 | End: 2023-04-04 | Stop reason: HOSPADM

## 2023-04-04 RX ORDER — VANCOMYCIN HYDROCHLORIDE 500 MG/10ML
INJECTION, POWDER, LYOPHILIZED, FOR SOLUTION INTRAVENOUS
Status: DISCONTINUED | OUTPATIENT
Start: 2023-04-04 | End: 2023-04-04 | Stop reason: HOSPADM

## 2023-04-04 RX ORDER — DEXAMETHASONE SODIUM PHOSPHATE 4 MG/ML
INJECTION, SOLUTION INTRA-ARTICULAR; INTRALESIONAL; INTRAMUSCULAR; INTRAVENOUS; SOFT TISSUE
Status: DISCONTINUED | OUTPATIENT
Start: 2023-04-04 | End: 2023-04-04 | Stop reason: HOSPADM

## 2023-04-04 RX ORDER — VANCOMYCIN HYDROCHLORIDE 500 MG/10ML
INJECTION, POWDER, LYOPHILIZED, FOR SOLUTION INTRAVENOUS
Status: DISCONTINUED
Start: 2023-04-04 | End: 2023-04-04 | Stop reason: HOSPADM

## 2023-04-04 RX ORDER — INDOCYANINE GREEN AND WATER 25 MG
KIT INJECTION
Status: DISCONTINUED
Start: 2023-04-04 | End: 2023-04-04 | Stop reason: HOSPADM

## 2023-04-04 RX ORDER — BUPIVACAINE HYDROCHLORIDE 7.5 MG/ML
INJECTION, SOLUTION EPIDURAL; RETROBULBAR
Status: DISCONTINUED
Start: 2023-04-04 | End: 2023-04-04 | Stop reason: HOSPADM

## 2023-04-04 RX ORDER — ATROPINE SULFATE 10 MG/ML
1 SOLUTION/ DROPS OPHTHALMIC
Status: DISCONTINUED | OUTPATIENT
Start: 2023-04-04 | End: 2023-04-04 | Stop reason: HOSPADM

## 2023-04-04 RX ORDER — EPINEPHRINE 1 MG/ML
INJECTION, SOLUTION, CONCENTRATE INTRAVENOUS
Status: DISCONTINUED
Start: 2023-04-04 | End: 2023-04-04 | Stop reason: HOSPADM

## 2023-04-04 RX ORDER — TROPICAMIDE 10 MG/ML
1 SOLUTION/ DROPS OPHTHALMIC
Status: DISCONTINUED | OUTPATIENT
Start: 2023-04-04 | End: 2023-04-04 | Stop reason: HOSPADM

## 2023-04-04 RX ORDER — LIDOCAINE HYDROCHLORIDE 20 MG/ML
INJECTION, SOLUTION EPIDURAL; INFILTRATION; INTRACAUDAL; PERINEURAL
Status: DISCONTINUED
Start: 2023-04-04 | End: 2023-04-04 | Stop reason: HOSPADM

## 2023-04-04 RX ORDER — VASOPRESSIN 20 [USP'U]/ML
INJECTION, SOLUTION INTRAMUSCULAR; SUBCUTANEOUS
Status: DISCONTINUED | OUTPATIENT
Start: 2023-04-04 | End: 2023-04-04

## 2023-04-04 RX ORDER — MOXIFLOXACIN 5 MG/ML
1 SOLUTION/ DROPS OPHTHALMIC
Status: DISCONTINUED | OUTPATIENT
Start: 2023-04-04 | End: 2023-04-04 | Stop reason: HOSPADM

## 2023-04-04 RX ORDER — TETRACAINE HYDROCHLORIDE 5 MG/ML
1 SOLUTION OPHTHALMIC
Status: DISCONTINUED | OUTPATIENT
Start: 2023-04-04 | End: 2023-04-04 | Stop reason: HOSPADM

## 2023-04-04 RX ORDER — OXYCODONE HYDROCHLORIDE 5 MG/1
5 TABLET ORAL
Status: DISCONTINUED | OUTPATIENT
Start: 2023-04-04 | End: 2023-04-04 | Stop reason: HOSPADM

## 2023-04-04 RX ORDER — SODIUM CHLORIDE 0.9 % (FLUSH) 0.9 %
10 SYRINGE (ML) INJECTION
Status: DISCONTINUED | OUTPATIENT
Start: 2023-04-04 | End: 2023-04-04 | Stop reason: HOSPADM

## 2023-04-04 RX ORDER — DEXAMETHASONE SODIUM PHOSPHATE 4 MG/ML
INJECTION, SOLUTION INTRA-ARTICULAR; INTRALESIONAL; INTRAMUSCULAR; INTRAVENOUS; SOFT TISSUE
Status: DISCONTINUED
Start: 2023-04-04 | End: 2023-04-04 | Stop reason: HOSPADM

## 2023-04-04 RX ORDER — ACETAMINOPHEN 325 MG/1
325 TABLET ORAL EVERY 6 HOURS PRN
Refills: 0 | Status: ON HOLD
Start: 2023-04-04 | End: 2023-04-18 | Stop reason: HOSPADM

## 2023-04-04 RX ORDER — PROPOFOL 10 MG/ML
VIAL (ML) INTRAVENOUS
Status: DISCONTINUED | OUTPATIENT
Start: 2023-04-04 | End: 2023-04-04

## 2023-04-04 RX ORDER — LIDOCAINE HYDROCHLORIDE 20 MG/ML
INJECTION INTRAVENOUS
Status: DISCONTINUED | OUTPATIENT
Start: 2023-04-04 | End: 2023-04-04

## 2023-04-04 RX ORDER — CYCLOPENTOLATE HYDROCHLORIDE 10 MG/ML
1 SOLUTION/ DROPS OPHTHALMIC
Status: DISCONTINUED | OUTPATIENT
Start: 2023-04-04 | End: 2023-04-04 | Stop reason: HOSPADM

## 2023-04-04 RX ORDER — EPINEPHRINE 1 MG/ML
INJECTION, SOLUTION, CONCENTRATE INTRAVENOUS
Status: DISCONTINUED | OUTPATIENT
Start: 2023-04-04 | End: 2023-04-04 | Stop reason: HOSPADM

## 2023-04-04 RX ORDER — MIDAZOLAM HYDROCHLORIDE 1 MG/ML
INJECTION, SOLUTION INTRAMUSCULAR; INTRAVENOUS
Status: DISCONTINUED | OUTPATIENT
Start: 2023-04-04 | End: 2023-04-04

## 2023-04-04 RX ORDER — NEOMYCIN SULFATE, POLYMYXIN B SULFATE, AND DEXAMETHASONE 3.5; 10000; 1 MG/G; [USP'U]/G; MG/G
OINTMENT OPHTHALMIC
Status: DISCONTINUED
Start: 2023-04-04 | End: 2023-04-04 | Stop reason: HOSPADM

## 2023-04-04 RX ORDER — HYDROCODONE BITARTRATE AND ACETAMINOPHEN 5; 325 MG/1; MG/1
1 TABLET ORAL EVERY 4 HOURS PRN
Status: DISCONTINUED | OUTPATIENT
Start: 2023-04-04 | End: 2023-04-04 | Stop reason: HOSPADM

## 2023-04-04 RX ORDER — PROCHLORPERAZINE EDISYLATE 5 MG/ML
5 INJECTION INTRAMUSCULAR; INTRAVENOUS EVERY 30 MIN PRN
Status: DISCONTINUED | OUTPATIENT
Start: 2023-04-04 | End: 2023-04-04 | Stop reason: HOSPADM

## 2023-04-04 RX ORDER — ONDANSETRON 2 MG/ML
INJECTION INTRAMUSCULAR; INTRAVENOUS
Status: DISCONTINUED | OUTPATIENT
Start: 2023-04-04 | End: 2023-04-04

## 2023-04-04 RX ORDER — PREDNISOLONE ACETATE 10 MG/ML
1 SUSPENSION/ DROPS OPHTHALMIC
Status: DISCONTINUED | OUTPATIENT
Start: 2023-04-04 | End: 2023-04-04 | Stop reason: HOSPADM

## 2023-04-04 RX ORDER — NEOMYCIN SULFATE, POLYMYXIN B SULFATE, AND DEXAMETHASONE 3.5; 10000; 1 MG/G; [USP'U]/G; MG/G
OINTMENT OPHTHALMIC
Status: DISCONTINUED | OUTPATIENT
Start: 2023-04-04 | End: 2023-04-04 | Stop reason: HOSPADM

## 2023-04-04 RX ORDER — TRIAMCINOLONE ACETONIDE 40 MG/ML
INJECTION, SUSPENSION INTRA-ARTICULAR; INTRAMUSCULAR
Status: DISCONTINUED | OUTPATIENT
Start: 2023-04-04 | End: 2023-04-04 | Stop reason: HOSPADM

## 2023-04-04 RX ORDER — ACETAMINOPHEN 325 MG/1
650 TABLET ORAL EVERY 4 HOURS PRN
Status: DISCONTINUED | OUTPATIENT
Start: 2023-04-04 | End: 2023-04-04 | Stop reason: HOSPADM

## 2023-04-04 RX ORDER — TRIAMCINOLONE ACETONIDE 40 MG/ML
INJECTION, SUSPENSION INTRA-ARTICULAR; INTRAMUSCULAR
Status: DISCONTINUED
Start: 2023-04-04 | End: 2023-04-04 | Stop reason: HOSPADM

## 2023-04-04 RX ORDER — PHENYLEPHRINE HYDROCHLORIDE 10 MG/ML
INJECTION INTRAVENOUS
Status: DISCONTINUED | OUTPATIENT
Start: 2023-04-04 | End: 2023-04-04

## 2023-04-04 RX ORDER — FENTANYL CITRATE 50 UG/ML
INJECTION, SOLUTION INTRAMUSCULAR; INTRAVENOUS
Status: DISCONTINUED | OUTPATIENT
Start: 2023-04-04 | End: 2023-04-04

## 2023-04-04 RX ORDER — PHENYLEPHRINE HYDROCHLORIDE 25 MG/ML
1 SOLUTION/ DROPS OPHTHALMIC
Status: DISCONTINUED | OUTPATIENT
Start: 2023-04-04 | End: 2023-04-04 | Stop reason: HOSPADM

## 2023-04-04 RX ADMIN — PHENYLEPHRINE HYDROCHLORIDE 100 MCG: 10 INJECTION INTRAVENOUS at 07:04

## 2023-04-04 RX ADMIN — FENTANYL CITRATE 25 MCG: 50 INJECTION, SOLUTION INTRAMUSCULAR; INTRAVENOUS at 07:04

## 2023-04-04 RX ADMIN — CYCLOPENTOLATE HYDROCHLORIDE 1 DROP: 10 SOLUTION/ DROPS OPHTHALMIC at 06:04

## 2023-04-04 RX ADMIN — LIDOCAINE HYDROCHLORIDE 100 MG: 20 INJECTION INTRAVENOUS at 07:04

## 2023-04-04 RX ADMIN — ATROPINE SULFATE 1 DROP: 10 SOLUTION OPHTHALMIC at 06:04

## 2023-04-04 RX ADMIN — TROPICAMIDE 1 DROP: 10 SOLUTION/ DROPS OPHTHALMIC at 06:04

## 2023-04-04 RX ADMIN — SODIUM CHLORIDE, SODIUM GLUCONATE, SODIUM ACETATE, POTASSIUM CHLORIDE, MAGNESIUM CHLORIDE, SODIUM PHOSPHATE, DIBASIC, AND POTASSIUM PHOSPHATE: .53; .5; .37; .037; .03; .012; .00082 INJECTION, SOLUTION INTRAVENOUS at 08:04

## 2023-04-04 RX ADMIN — SODIUM CHLORIDE: 0.9 INJECTION, SOLUTION INTRAVENOUS at 07:04

## 2023-04-04 RX ADMIN — MOXIFLOXACIN OPHTHALMIC 1 DROP: 5 SOLUTION/ DROPS OPHTHALMIC at 06:04

## 2023-04-04 RX ADMIN — PHENYLEPHRINE HYDROCHLORIDE 200 MCG: 10 INJECTION INTRAVENOUS at 07:04

## 2023-04-04 RX ADMIN — PREDNISOLONE ACETATE 1 DROP: 10 SUSPENSION/ DROPS OPHTHALMIC at 06:04

## 2023-04-04 RX ADMIN — ONDANSETRON 4 MG: 2 INJECTION INTRAMUSCULAR; INTRAVENOUS at 08:04

## 2023-04-04 RX ADMIN — PROPOFOL 200 MG: 10 INJECTION, EMULSION INTRAVENOUS at 07:04

## 2023-04-04 RX ADMIN — MIDAZOLAM HYDROCHLORIDE 2 MG: 1 INJECTION, SOLUTION INTRAMUSCULAR; INTRAVENOUS at 07:04

## 2023-04-04 RX ADMIN — PHENYLEPHRINE HYDROCHLORIDE 1 DROP: 25 SOLUTION/ DROPS OPHTHALMIC at 06:04

## 2023-04-04 RX ADMIN — VASOPRESSIN 1 UNITS: 20 INJECTION INTRAVENOUS at 08:04

## 2023-04-04 RX ADMIN — VASOPRESSIN 2 UNITS: 20 INJECTION INTRAVENOUS at 08:04

## 2023-04-04 RX ADMIN — FENTANYL CITRATE 50 MCG: 50 INJECTION, SOLUTION INTRAMUSCULAR; INTRAVENOUS at 08:04

## 2023-04-04 RX ADMIN — TETRACAINE HYDROCHLORIDE 1 DROP: 5 SOLUTION OPHTHALMIC at 06:04

## 2023-04-04 NOTE — PROGRESS NOTES
Dr Arroyo at bedside talking to pt post surgery  
Instructed pt and pt's ride on follow up tomorrow morning at 9am and to bring little grey bag, to leave dressing in place until tomorrow at follow up visit MD will remove and go over drops, and once home today to lie with face pointing down or left side down positioning.  Both verbalized understanding  
Opt out

## 2023-04-04 NOTE — ANESTHESIA POSTPROCEDURE EVALUATION
Anesthesia Post Evaluation    Patient: Ladarius Solis    Procedure(s) Performed: Procedure(s) (LRB):  REPAIR, RETINAL DETACHMENT, WITH VITRECTOMY (Right)    Final Anesthesia Type: general      Patient location during evaluation: PACU  Patient participation: Yes- Able to Participate  Level of consciousness: awake and alert  Post-procedure vital signs: reviewed and stable  Pain management: adequate  Airway patency: patent    PONV status at discharge: No PONV  Anesthetic complications: no      Cardiovascular status: hemodynamically stable  Respiratory status: unassisted, spontaneous ventilation and room air  Hydration status: euvolemic  Follow-up not needed.          Vitals Value Taken Time   /68 04/04/23 1015   Temp 36.1 °C (97 °F) 04/04/23 0901   Pulse 79 04/04/23 1015   Resp 25 04/04/23 1015   SpO2 98 % 04/04/23 1015   Vitals shown include unvalidated device data.      No case tracking events are documented in the log.      Pain/Kevin Score: Kevin Score: 9 (4/4/2023  9:15 AM)

## 2023-04-04 NOTE — OP NOTE
Date of Procedure: 04/04/2023   Pre-Op Dx: Macula off retinal detachment, right eye  Post Op Dx: Same  Procedure Performed: Repair of retinal detachment by pars plana vitrectomy, endolaser, injection of 20% SF6 gas, right eye  Attending Surgeon: CHANDRA Arroyo  Assistant Surgeon: JAYCE Lucio  Anesthesia: General  Estimated blood loss: Minimal  Complication: None  Specimen: None  Disposition: Stable to recovery  Findings: 8:00 retinal tear, very small 12:00 retinal tear at end of meridional fold, suspicious defect at end of 10:00 meridional fold, inferotemporal lattice, a small inferior hemorrhage, posterior vitreous base insertion  Outcome: Retina attached, normotensive  Date of Discharge: 04/04/2023  Discharge Disposition: Home  F/U: 04/05/23            Informed consent was obtained and placed in the medical record. The patient was properly identified and taken to the operating room. General anesthesia was begun by the anesthesia team.  The right eye was prepped and draped in the standard ophthalmic fashion taking care to exclude the lashes from the operative field.    Standard 25 gauge vitrectomy setup was achieved with all ports 3.75 mm posterior to the limbus. The [employer] visualization system was used. Core vitrectomy was performed. The hyaloid membrane was confirmed to be already elevated using the cutter on suction mode.  Because the of the appearance of a significantly posterior location of the vitreous base insertion, dilute triamcinolone was used to highlight the vitreous.  The peripheral vitreous was shaved to the vitreous base using scleral depression. The retina was inspected for 360 degrees to the ora rafi using scleral depression. There was a total retinal detachment with an 8:00 retinal tear, a very small 12:00 retinal tear at end of meridional fold, a suspicious defect at end of 10:00 meridional fold, a small inferotemporal laser patch, a small inferior retinal hemorrhage without at break, and  a posterior vitreous base insertion.  The breaks were marked with diathermy. A superotemporal drainage retinotomy was created using diathermy and suction. Fluid/air exchange was performed. The retina was lying nicely flat under air. Endolaser photocoagulation was placed around the breaks, the suspicious defect, the lattice, the inferior hemorrhage, and the retinotomy. The air was exchanged for 20$ SF6 gas through the infusion cannula using a chimney through one of the ports for exhaust. The ports were removed. The infusion cannula was removed.  All wounds were checked and noted not to be leaking. The eye was normotensive. A subconjunctival injection of vancomycin and dexamethasone was placed.     The eye was patched. A Ace shield was placed. The patient was awakened from general anesthesia by the anesthesia team having tolerated the procedure well.

## 2023-04-04 NOTE — ANESTHESIA RELEASE NOTE
Anesthesia Release from PACU Note    Patient: Ladarius Solis    Procedure(s) Performed: Procedure(s) (LRB):  REPAIR, RETINAL DETACHMENT, WITH VITRECTOMY (Right)    Anesthesia type: general    Post pain: Adequate analgesia    Post assessment: no apparent anesthetic complications and tolerated procedure well    Last Vitals:   Visit Vitals  /68 (BP Location: Left arm, Patient Position: Lying)   Pulse 81   Temp 36.1 °C (97 °F) (Temporal)   Resp 20   SpO2 98%       Post vital signs: stable    Level of consciousness: awake and alert     Nausea/Vomiting: no nausea/no vomiting    Complications: none    Airway Patency: patent    Respiratory: unassisted, spontaneous ventilation, room air    Cardiovascular: stable and blood pressure at baseline    Hydration: euvolemic

## 2023-04-04 NOTE — BRIEF OP NOTE
Date of Procedure: 04/04/2023     Pre-Op Dx: Macula off retinal detachment, right eye    Post Op Dx: Same    Procedure Performed: Repair of retinal detachment by pars plana vitrectomy, endolaser, injection of 20% SF6 gas, right eye    Attending Surgeon: CHANDRA Arroyo    Assistant Surgeon: JAYCE Lucio    Anesthesia: General    Estimated blood loss: Minimal    Complication: None    Specimen: None    Disposition: Stable to recovery    Findings: 8:00 retinal tear, very small 12:00 retinal tear at end of meridional fold, suspicious defect at end of 10:00 meridional fold, posterior vitreous base insertion    Outcome: Retina attached, normotensive    Date of Discharge: 04/04/2023    Discharge Disposition: Home    F/U: 04/05/23

## 2023-04-04 NOTE — ANESTHESIA PREPROCEDURE EVALUATION
04/04/2023  Ladarius Solis is a 67 y.o., male.  Past Medical History:   Diagnosis Date    Arthritis     Carpal tunnel syndrome, bilateral     Cervical spinal stenosis 2002    Colon polyps     Fatty liver     Hypertension     Overweight (BMI 25.0-29.9) 8/16/2019    Vertigo     left ear issues     Past Surgical History:   Procedure Laterality Date    ARTHROSCOPIC CHONDROPLASTY OF KNEE JOINT Left 10/17/2018    Procedure: ARTHROSCOPY, KNEE, WITH CHONDROPLASTY;  Surgeon: GRETEL Carr MD;  Location: [facility] OR 2ND FLR;  Service: Orthopedics;  Laterality: Left;    COLONOSCOPY      COLONOSCOPY N/A 9/25/2017    Procedure: COLONOSCOPY/emr;  Surgeon: Raul August MD;  Location: [facility] (2ND FLR);  Service: Endoscopy;  Laterality: N/A;    COLONOSCOPY N/A 3/9/2018    Procedure: COLONOSCOPY;  Surgeon: Raul August MD;  Location: [facility] (2ND FLR);  Service: Endoscopy;  Laterality: N/A;    COLONOSCOPY N/A 3/23/2023    Procedure: COLONOSCOPY;  Surgeon: Aleksander Grimes MD;  Location: [facility] (4TH FLR);  Service: Endoscopy;  Laterality: N/A;  2/13 instructions to portal-st  pre call done- AJ  does not want to come in earlier 3/22 EB    EPIDURAL STEROID INJECTION N/A 11/4/2021    Procedure: INJECTION, STEROID, EPIDURAL, C7-T1  NEED CONSENT;  Surgeon: Bozena Mena MD;  Location: [facility] PAIN MGT;  Service: Pain Management;  Laterality: N/A;    ESOPHAGOGASTRODUODENOSCOPY N/A 9/23/2019    Procedure: EGD (ESOPHAGOGASTRODUODENOSCOPY);  Surgeon: Dyllan Maloney MD;  Location: [facility];  Service: General;  Laterality: N/A;    INJECTION OF FACET JOINT Bilateral 8/6/2021    Procedure: FACET JOINT INJECTION BILATERAL L4/5 AND L5/S1 DIRECT REFERRAL NEEDS CONSENT;  Surgeon: Jasiel Aviles MD;  Location: [facility] PAIN MGT;  Service: Pain Management;  Laterality: Bilateral;    KNEE ARTHROSCOPY W/  MENISCECTOMY Left 10/17/2018    Procedure: ARTHROSCOPY, KNEE, WITH MENISCECTOMY;  Surgeon: GRETEL Carr MD;  Location: [facility] OR [address] [place] [address];  Service: Orthopedics;  Laterality: Left;  regional w/catheter donald Moraes/[employer] notified 10-12 LO           Pre-op Assessment    I have reviewed the Patient Summary Reports.    I have reviewed the NPO Status.   I have reviewed the Medications.     Review of Systems  Anesthesia Hx:  No problems with previous Anesthesia  History of prior surgery of interest to airway management or planning: Denies Family Hx of Anesthesia complications.   Denies Personal Hx of Anesthesia complications.   Social:  Non-Smoker    Cardiovascular:   Exercise tolerance: good Hypertension    Pulmonary:  Pulmonary Normal    Renal/:  Renal/ Normal     Hepatic/GI:   GERD, well controlled    Endocrine:  Endocrine Normal        Physical Exam  General: Well nourished    Airway:  Mallampati: III / II  Mouth Opening: Normal  TM Distance: Normal  Tongue: Normal    Dental:  Partial Dentures    Chest/Lungs:  Normal Respiratory Rate    Heart:  Rate: Normal        Anesthesia Plan  Type of Anesthesia, risks & benefits discussed:    Anesthesia Type: Gen Supraglottic Airway  Intra-op Monitoring Plan: Standard ASA Monitors  Induction:  IV  Informed Consent: Informed consent signed with the Patient and all parties understand the risks and agree with anesthesia plan.  All questions answered.   ASA Score: 2  Day of Surgery Review of History & Physical: H&P Update referred to the surgeon/provider.    Ready For Surgery From Anesthesia Perspective.     .

## 2023-04-04 NOTE — TRANSFER OF CARE
Anesthesia Transfer of Care Note    Patient: Ladarius Solis    Procedure(s) Performed: Procedure(s) (LRB):  REPAIR, RETINAL DETACHMENT, WITH VITRECTOMY (Right)    Patient location: PACU    Anesthesia Type: general    Transport from OR: Transported from OR on 6-10 L/min O2 by face mask with adequate spontaneous ventilation    Post pain: adequate analgesia    Post assessment: no apparent anesthetic complications and tolerated procedure well    Post vital signs: stable    Level of consciousness: sedated and responds to stimulation    Nausea/Vomiting: no nausea/vomiting    Complications: none    Transfer of care protocol was followed      Last vitals:   Visit Vitals  BP (!) 161/84 (BP Location: Left arm, Patient Position: Lying)   Pulse 76   Temp 36.6 °C (97.9 °F) (Temporal)   Resp 19   SpO2 97%

## 2023-04-04 NOTE — ANESTHESIA PROCEDURE NOTES
Intubation    Date/Time: 4/4/2023 7:18 AM  Performed by: Julienne Bojorquez CRNA  Authorized by: Amara Herrmann MD     Intubation:     Induction:  Intravenous    Intubated:  N/a    Mask Ventilation:  Not attempted    Attempts:  1    Attempted By:  CRNA    Difficult Airway Encountered?: No      Complications:  None    Airway Device:  Supraglottic airway/LMA    Airway Device Size:  4.5    Secured at:  The lips    Placement Verified By:  Capnometry    Complicating Factors:  None    Findings Post-Intubation:  BS equal bilateral and atraumatic/condition of teeth unchanged

## 2023-04-04 NOTE — H&P
Pre-Operative History & Physical  Ophthalmology      SUBJECTIVE:     History of Present Illness:  Patient is a 67 y.o. male presents with rhegmatogenous retinal detachment right eye. Here for surgical repair.    MEDICATIONS:   Facility-Administered Medications Prior to Admission   Medication    sodium chloride 0.9% flush 10 mL     PTA Medications   Medication Sig    lisinopriL 10 MG tablet Take 1 tablet (10 mg total) by mouth once daily.    meloxicam (MOBIC) 15 MG tablet Take 1 tablet (15 mg total) by mouth once daily.    GAVILYTE-C 240-22.72-6.72 -5.84 gram SolR Take 4,000 mLs by mouth once.       ALLERGIES: Review of patient's allergies indicates:  No Known Allergies    PAST MEDICAL HISTORY:   Past Medical History:   Diagnosis Date    Arthritis     Carpal tunnel syndrome, bilateral     Cervical spinal stenosis 2002    Colon polyps     Fatty liver     Hypertension     Overweight (BMI 25.0-29.9) 8/16/2019    Vertigo     left ear issues     PAST SURGICAL HISTORY:   Past Surgical History:   Procedure Laterality Date    ARTHROSCOPIC CHONDROPLASTY OF KNEE JOINT Left 10/17/2018    Procedure: ARTHROSCOPY, KNEE, WITH CHONDROPLASTY;  Surgeon: GRETEL Carr MD;  Location: [facility] OR 2ND FLR;  Service: Orthopedics;  Laterality: Left;    COLONOSCOPY      COLONOSCOPY N/A 9/25/2017    Procedure: COLONOSCOPY/emr;  Surgeon: Raul August MD;  Location: [facility] (2ND FLR);  Service: Endoscopy;  Laterality: N/A;    COLONOSCOPY N/A 3/9/2018    Procedure: COLONOSCOPY;  Surgeon: Raul August MD;  Location: [facility] (2ND FLR);  Service: Endoscopy;  Laterality: N/A;    COLONOSCOPY N/A 3/23/2023    Procedure: COLONOSCOPY;  Surgeon: Aleksander Grimes MD;  Location: [facility] ENDO (4TH FLR);  Service: Endoscopy;  Laterality: N/A;  2/13 instructions to portal-st  pre call done- AJ  does not want to come in earlier 3/22 EB    EPIDURAL STEROID INJECTION N/A 11/4/2021    Procedure: INJECTION, STEROID, EPIDURAL, C7-T1  NEED CONSENT;  Surgeon:  Bozena Mena MD;  Location: [facility] PAIN MGT;  Service: Pain Management;  Laterality: N/A;    ESOPHAGOGASTRODUODENOSCOPY N/A 9/23/2019    Procedure: EGD (ESOPHAGOGASTRODUODENOSCOPY);  Surgeon: Dyllan Maloney MD;  Location: [facility] ENDO;  Service: General;  Laterality: N/A;    INJECTION OF FACET JOINT Bilateral 8/6/2021    Procedure: FACET JOINT INJECTION BILATERAL L4/5 AND L5/S1 DIRECT REFERRAL NEEDS CONSENT;  Surgeon: Jasiel Aviles MD;  Location: [facility] PAIN MGT;  Service: Pain Management;  Laterality: Bilateral;    KNEE ARTHROSCOPY W/ MENISCECTOMY Left 10/17/2018    Procedure: ARTHROSCOPY, KNEE, WITH MENISCECTOMY;  Surgeon: GRETEL Carr MD;  Location: [facility] OR [address] [place] [address];  Service: Orthopedics;  Laterality: Left;  regional w/catheter adductor  Dimitry/[employer] notified 10-12 LO     PAST FAMILY HISTORY:   Family History   Problem Relation Age of Onset    No Known Problems Mother     Arthritis Father     Dementia Father     Heart disease Father     No Known Problems Brother     Diabetes Neg Hx     Cirrhosis Neg Hx      SOCIAL HISTORY:   Social History     Tobacco Use    Smoking status: Never    Smokeless tobacco: Never   Substance Use Topics    Alcohol use: No    Drug use: No        MENTAL STATUS: Alert    REVIEW OF SYSTEMS: Negative    OBJECTIVE:     Vital Signs (Most Recent)  Temp: 97.9 °F (36.6 °C) (04/04/23 0624)  Pulse: 76 (04/04/23 0624)  Resp: 19 (04/04/23 0624)  BP: (!) 161/84 (04/04/23 0624)  SpO2: 97 % (ra) (04/04/23 0624)    Physical Exam:  General: NAD  HEENT: AT/NC, pciol OU  Lungs: Adequate respirations  Heart: + pulses  Abdomen: Soft    ASSESSMENT/PLAN:     Patient is a 67 y.o. male with rhegmatogenous retinal detachment right eye     - Risks/benefits/alternatives of the procedure including, but not limited to, infection, bleeding, pain, ptosis, macular edema, corneal edema, persistent corneal defect, retinal tears, retinal detachment, epiretinal membrane, elevated intraocular pressure, hypotony,  possible need for strict post-op head positioning, possible temporary avoidance of air travel, loss of vision, loss of the eye, paralysis, and death were discussed with the patient and/or family. The patient/family voiced good understanding, the informed consent was signed, witnessed, and placed in chart. All patient and family questions were answered.   - Will proceed with 25G PPV/AFx/EL/gas vs oil and any other necessary procedures right eye  - Plan for general anesthesia   - Allergies reviewed: Review of patient's allergies indicates:  No Known Allergies  - patient is not taking blood thinners    Beau Dumont MD  Fellow, Retina Service

## 2023-04-04 NOTE — PATIENT INSTRUCTIONS
Right side down positioning in the post op area.    At home, lie with face pointing down or left side down positioning      DO NOT LIE FLAT ON BACK!!!

## 2023-04-04 NOTE — DISCHARGE SUMMARY
[facility] - [facility] (1st Fl)  Discharge Note  Short Stay    Date of Admission: 04/04/2023    Procedure(s) (LRB):  REPAIR, RETINAL DETACHMENT, WITH VITRECTOMY (Right)      OUTCOME: Patient tolerated treatment/procedure well without complication and is now ready for discharge.    DISPOSITION: Home or Self Care    FINAL DIAGNOSIS:  Retinal detachment, right eye    FOLLOWUP: In clinic    DISCHARGE INSTRUCTIONS:    Discharge Procedure Orders   Diet general     Sponge bath only until clinic visit     Lifting restrictions     Leave dressing on - Keep it clean, dry, and intact until clinic visit     Call MD for:  temperature >100.4     Call MD for:  persistent nausea and vomiting     Call MD for:  severe uncontrolled pain     Call MD for:  difficulty breathing, headache or visual disturbances     Call MD for:  redness, tenderness, or signs of infection (pain, swelling, redness, odor or green/yellow discharge around incision site)     Call MD for:  hives     Call MD for:  persistent dizziness or light-headedness     Call MD for:  extreme fatigue     Call MD for:        TIME SPENT ON DISCHARGE: 15 minutes

## 2023-04-05 ENCOUNTER — OFFICE VISIT (OUTPATIENT)
Dept: OPHTHALMOLOGY | Facility: CLINIC | Age: 68
End: 2023-04-05
Payer: MEDICARE

## 2023-04-05 DIAGNOSIS — H33.001 MACULA-OFF RHEGMATOGENOUS RETINAL DETACHMENT, RIGHT: ICD-10-CM

## 2023-04-05 DIAGNOSIS — Z98.890 POST-OPERATIVE STATE: Primary | ICD-10-CM

## 2023-04-05 PROCEDURE — 99499 NO LOS: ICD-10-PCS | Mod: S$GLB,,, | Performed by: STUDENT IN AN ORGANIZED HEALTH CARE EDUCATION/TRAINING PROGRAM

## 2023-04-05 PROCEDURE — 1101F PR PT FALLS ASSESS DOC 0-1 FALLS W/OUT INJ PAST YR: ICD-10-PCS | Mod: CPTII,S$GLB,, | Performed by: STUDENT IN AN ORGANIZED HEALTH CARE EDUCATION/TRAINING PROGRAM

## 2023-04-05 PROCEDURE — 99999 PR PBB SHADOW E&M-EST. PATIENT-LVL II: CPT | Mod: PBBFAC,,, | Performed by: STUDENT IN AN ORGANIZED HEALTH CARE EDUCATION/TRAINING PROGRAM

## 2023-04-05 PROCEDURE — 3288F FALL RISK ASSESSMENT DOCD: CPT | Mod: CPTII,S$GLB,, | Performed by: STUDENT IN AN ORGANIZED HEALTH CARE EDUCATION/TRAINING PROGRAM

## 2023-04-05 PROCEDURE — 1126F PR PAIN SEVERITY QUANTIFIED, NO PAIN PRESENT: ICD-10-PCS | Mod: CPTII,S$GLB,, | Performed by: STUDENT IN AN ORGANIZED HEALTH CARE EDUCATION/TRAINING PROGRAM

## 2023-04-05 PROCEDURE — 3288F PR FALLS RISK ASSESSMENT DOCUMENTED: ICD-10-PCS | Mod: CPTII,S$GLB,, | Performed by: STUDENT IN AN ORGANIZED HEALTH CARE EDUCATION/TRAINING PROGRAM

## 2023-04-05 PROCEDURE — 1159F MED LIST DOCD IN RCRD: CPT | Mod: CPTII,S$GLB,, | Performed by: STUDENT IN AN ORGANIZED HEALTH CARE EDUCATION/TRAINING PROGRAM

## 2023-04-05 PROCEDURE — 1159F PR MEDICATION LIST DOCUMENTED IN MEDICAL RECORD: ICD-10-PCS | Mod: CPTII,S$GLB,, | Performed by: STUDENT IN AN ORGANIZED HEALTH CARE EDUCATION/TRAINING PROGRAM

## 2023-04-05 PROCEDURE — 1101F PT FALLS ASSESS-DOCD LE1/YR: CPT | Mod: CPTII,S$GLB,, | Performed by: STUDENT IN AN ORGANIZED HEALTH CARE EDUCATION/TRAINING PROGRAM

## 2023-04-05 PROCEDURE — 1126F AMNT PAIN NOTED NONE PRSNT: CPT | Mod: CPTII,S$GLB,, | Performed by: STUDENT IN AN ORGANIZED HEALTH CARE EDUCATION/TRAINING PROGRAM

## 2023-04-05 PROCEDURE — 4010F PR ACE/ARB THEARPY RXD/TAKEN: ICD-10-PCS | Mod: CPTII,S$GLB,, | Performed by: STUDENT IN AN ORGANIZED HEALTH CARE EDUCATION/TRAINING PROGRAM

## 2023-04-05 PROCEDURE — 3044F HG A1C LEVEL LT 7.0%: CPT | Mod: CPTII,S$GLB,, | Performed by: STUDENT IN AN ORGANIZED HEALTH CARE EDUCATION/TRAINING PROGRAM

## 2023-04-05 PROCEDURE — 4010F ACE/ARB THERAPY RXD/TAKEN: CPT | Mod: CPTII,S$GLB,, | Performed by: STUDENT IN AN ORGANIZED HEALTH CARE EDUCATION/TRAINING PROGRAM

## 2023-04-05 PROCEDURE — 99999 PR PBB SHADOW E&M-EST. PATIENT-LVL II: ICD-10-PCS | Mod: PBBFAC,,, | Performed by: STUDENT IN AN ORGANIZED HEALTH CARE EDUCATION/TRAINING PROGRAM

## 2023-04-05 PROCEDURE — 99499 UNLISTED E&M SERVICE: CPT | Mod: S$GLB,,, | Performed by: STUDENT IN AN ORGANIZED HEALTH CARE EDUCATION/TRAINING PROGRAM

## 2023-04-05 PROCEDURE — 3044F PR MOST RECENT HEMOGLOBIN A1C LEVEL <7.0%: ICD-10-PCS | Mod: CPTII,S$GLB,, | Performed by: STUDENT IN AN ORGANIZED HEALTH CARE EDUCATION/TRAINING PROGRAM

## 2023-04-05 NOTE — PROGRESS NOTES
HPI    1day post op PPV OD   Last edited by Pam Varma on 4/5/2023  8:50 AM.            Assessment /Plan     For exam results, see Encounter Report.    Post-operative state    Macula-off rhegmatogenous retinal detachment, right        POD #1 s/p PPV/AFx/EL/SF6, right eye  - Doing well   - IOP 33, start CS BID  - start PF QID, Vigamox QID, Atropine QID, Maxitrol matheus qHS  - No vigorous activity, no lifting, bending, etc.  - Ace shield when sleeping  - Ace shield, glasses, or sunglasses all times for protection   - No shower   - Face down, left side down positioning  - RD/Endophthalmitis precautions       RTC 1 wk, sooner PRN if any problems (nicho pain, redness, dimming or loss of vision)

## 2023-04-10 ENCOUNTER — OFFICE VISIT (OUTPATIENT)
Dept: OPHTHALMOLOGY | Facility: CLINIC | Age: 68
End: 2023-04-10
Payer: MEDICARE

## 2023-04-10 DIAGNOSIS — H33.001 MACULA-OFF RHEGMATOGENOUS RETINAL DETACHMENT, RIGHT: Primary | ICD-10-CM

## 2023-04-10 PROCEDURE — 1159F PR MEDICATION LIST DOCUMENTED IN MEDICAL RECORD: ICD-10-PCS | Mod: CPTII,S$GLB,, | Performed by: OPHTHALMOLOGY

## 2023-04-10 PROCEDURE — 3288F PR FALLS RISK ASSESSMENT DOCUMENTED: ICD-10-PCS | Mod: CPTII,S$GLB,, | Performed by: OPHTHALMOLOGY

## 2023-04-10 PROCEDURE — 1126F PR PAIN SEVERITY QUANTIFIED, NO PAIN PRESENT: ICD-10-PCS | Mod: CPTII,S$GLB,, | Performed by: OPHTHALMOLOGY

## 2023-04-10 PROCEDURE — 1160F RVW MEDS BY RX/DR IN RCRD: CPT | Mod: CPTII,S$GLB,, | Performed by: OPHTHALMOLOGY

## 2023-04-10 PROCEDURE — 1159F MED LIST DOCD IN RCRD: CPT | Mod: CPTII,S$GLB,, | Performed by: OPHTHALMOLOGY

## 2023-04-10 PROCEDURE — 3044F HG A1C LEVEL LT 7.0%: CPT | Mod: CPTII,S$GLB,, | Performed by: OPHTHALMOLOGY

## 2023-04-10 PROCEDURE — 3288F FALL RISK ASSESSMENT DOCD: CPT | Mod: CPTII,S$GLB,, | Performed by: OPHTHALMOLOGY

## 2023-04-10 PROCEDURE — 99999 PR PBB SHADOW E&M-EST. PATIENT-LVL III: ICD-10-PCS | Mod: PBBFAC,,, | Performed by: OPHTHALMOLOGY

## 2023-04-10 PROCEDURE — 1160F PR REVIEW ALL MEDS BY PRESCRIBER/CLIN PHARMACIST DOCUMENTED: ICD-10-PCS | Mod: CPTII,S$GLB,, | Performed by: OPHTHALMOLOGY

## 2023-04-10 PROCEDURE — 3044F PR MOST RECENT HEMOGLOBIN A1C LEVEL <7.0%: ICD-10-PCS | Mod: CPTII,S$GLB,, | Performed by: OPHTHALMOLOGY

## 2023-04-10 PROCEDURE — 99024 PR POST-OP FOLLOW-UP VISIT: ICD-10-PCS | Mod: S$GLB,,, | Performed by: OPHTHALMOLOGY

## 2023-04-10 PROCEDURE — 4010F PR ACE/ARB THEARPY RXD/TAKEN: ICD-10-PCS | Mod: CPTII,S$GLB,, | Performed by: OPHTHALMOLOGY

## 2023-04-10 PROCEDURE — 99999 PR PBB SHADOW E&M-EST. PATIENT-LVL III: CPT | Mod: PBBFAC,,, | Performed by: OPHTHALMOLOGY

## 2023-04-10 PROCEDURE — 1101F PT FALLS ASSESS-DOCD LE1/YR: CPT | Mod: CPTII,S$GLB,, | Performed by: OPHTHALMOLOGY

## 2023-04-10 PROCEDURE — 99024 POSTOP FOLLOW-UP VISIT: CPT | Mod: S$GLB,,, | Performed by: OPHTHALMOLOGY

## 2023-04-10 PROCEDURE — 1126F AMNT PAIN NOTED NONE PRSNT: CPT | Mod: CPTII,S$GLB,, | Performed by: OPHTHALMOLOGY

## 2023-04-10 PROCEDURE — 1101F PR PT FALLS ASSESS DOC 0-1 FALLS W/OUT INJ PAST YR: ICD-10-PCS | Mod: CPTII,S$GLB,, | Performed by: OPHTHALMOLOGY

## 2023-04-10 PROCEDURE — 4010F ACE/ARB THERAPY RXD/TAKEN: CPT | Mod: CPTII,S$GLB,, | Performed by: OPHTHALMOLOGY

## 2023-04-10 NOTE — PROGRESS NOTES
HPI     Post-op Evaluation     Additional comments: 1 week Post op PPV OD             Comments    VA OD no changes in one week. No pain  Va still very blurry    GTTS:  PF QID OD  VIGAMOX QID OD  ATROPINE QID OD  COSOPT BID OD  Maxitrol HS OD            Last edited by True Arroyo MD on 4/10/2023  1:27 PM.            Assessment /Plan     For exam results, see Encounter Report.    Macula-off rhegmatogenous retinal detachment, right        POD #6 s/p PPV/AFx/EL/SF6, right eye  - Doing well   IOP good  - PF QID, Vigamox QID, Atropine QID, Maxitrol matheus qHS  - No vigorous activity, no lifting, bending, etc.  - Ace shield when sleeping  - Ace shield, glasses, or sunglasses all times for protection   - No shower   - Face down, left side down positioning half time in day and all night  - RD/Endophthalmitis precautions     RTC 1 wk, sooner PRN if any problems (nicho pain, redness, dimming or loss of vision)

## 2023-04-17 ENCOUNTER — TELEPHONE (OUTPATIENT)
Dept: OPTOMETRY | Facility: CLINIC | Age: 68
End: 2023-04-17
Payer: MEDICARE

## 2023-04-17 ENCOUNTER — OFFICE VISIT (OUTPATIENT)
Dept: OPHTHALMOLOGY | Facility: CLINIC | Age: 68
End: 2023-04-17
Payer: MEDICARE

## 2023-04-17 DIAGNOSIS — H33.001 MACULA-OFF RHEGMATOGENOUS RETINAL DETACHMENT, RIGHT: Primary | ICD-10-CM

## 2023-04-17 PROCEDURE — 1126F PR PAIN SEVERITY QUANTIFIED, NO PAIN PRESENT: ICD-10-PCS | Mod: CPTII,S$GLB,, | Performed by: OPHTHALMOLOGY

## 2023-04-17 PROCEDURE — 1159F MED LIST DOCD IN RCRD: CPT | Mod: CPTII,S$GLB,, | Performed by: OPHTHALMOLOGY

## 2023-04-17 PROCEDURE — 99024 POSTOP FOLLOW-UP VISIT: CPT | Mod: S$GLB,,, | Performed by: OPHTHALMOLOGY

## 2023-04-17 PROCEDURE — 99024 PR POST-OP FOLLOW-UP VISIT: ICD-10-PCS | Mod: S$GLB,,, | Performed by: OPHTHALMOLOGY

## 2023-04-17 PROCEDURE — 1101F PT FALLS ASSESS-DOCD LE1/YR: CPT | Mod: CPTII,S$GLB,, | Performed by: OPHTHALMOLOGY

## 2023-04-17 PROCEDURE — 4010F PR ACE/ARB THEARPY RXD/TAKEN: ICD-10-PCS | Mod: CPTII,S$GLB,, | Performed by: OPHTHALMOLOGY

## 2023-04-17 PROCEDURE — 1126F AMNT PAIN NOTED NONE PRSNT: CPT | Mod: CPTII,S$GLB,, | Performed by: OPHTHALMOLOGY

## 2023-04-17 PROCEDURE — 3288F PR FALLS RISK ASSESSMENT DOCUMENTED: ICD-10-PCS | Mod: CPTII,S$GLB,, | Performed by: OPHTHALMOLOGY

## 2023-04-17 PROCEDURE — 3288F FALL RISK ASSESSMENT DOCD: CPT | Mod: CPTII,S$GLB,, | Performed by: OPHTHALMOLOGY

## 2023-04-17 PROCEDURE — 3044F PR MOST RECENT HEMOGLOBIN A1C LEVEL <7.0%: ICD-10-PCS | Mod: CPTII,S$GLB,, | Performed by: OPHTHALMOLOGY

## 2023-04-17 PROCEDURE — 3044F HG A1C LEVEL LT 7.0%: CPT | Mod: CPTII,S$GLB,, | Performed by: OPHTHALMOLOGY

## 2023-04-17 PROCEDURE — 99999 PR PBB SHADOW E&M-EST. PATIENT-LVL III: CPT | Mod: PBBFAC,,, | Performed by: OPHTHALMOLOGY

## 2023-04-17 PROCEDURE — 1159F PR MEDICATION LIST DOCUMENTED IN MEDICAL RECORD: ICD-10-PCS | Mod: CPTII,S$GLB,, | Performed by: OPHTHALMOLOGY

## 2023-04-17 PROCEDURE — 92134 CPTRZ OPH DX IMG PST SGM RTA: CPT | Mod: S$GLB,,, | Performed by: OPHTHALMOLOGY

## 2023-04-17 PROCEDURE — 99999 PR PBB SHADOW E&M-EST. PATIENT-LVL III: ICD-10-PCS | Mod: PBBFAC,,, | Performed by: OPHTHALMOLOGY

## 2023-04-17 PROCEDURE — 4010F ACE/ARB THERAPY RXD/TAKEN: CPT | Mod: CPTII,S$GLB,, | Performed by: OPHTHALMOLOGY

## 2023-04-17 PROCEDURE — 1101F PR PT FALLS ASSESS DOC 0-1 FALLS W/OUT INJ PAST YR: ICD-10-PCS | Mod: CPTII,S$GLB,, | Performed by: OPHTHALMOLOGY

## 2023-04-17 PROCEDURE — 92134 POSTERIOR SEGMENT OCT RETINA (OCULAR COHERENCE TOMOGRAPHY)-BOTH EYES: ICD-10-PCS | Mod: S$GLB,,, | Performed by: OPHTHALMOLOGY

## 2023-04-17 NOTE — PROGRESS NOTES
Subjective:       Patient ID: Ladarius Solis is a 67 y.o. male      Chief Complaint   Patient presents with    Post-op Evaluation     History of Present Illness  HPI    Dls:  04/10/2023 Dr. Dunn     Pt is Present for 1 week Post op Od     Pt states he feels his vision has gotten better, Did some time laying face   down, tries left side down sleeping but ends up on back  No pain. Feels that he can see better superiorly    Gets Tired very easily. Has been Keeping right eye Closed Most of the day.       -Flashes  +Floaters OU   -Eye Pain  -Curtians/Veils    Eye Meds:   PF Qid OD   Vigamox Qid OD   Atropine Qid OD   Cosopt BID OD   Maxitrol HS OD -D/C       Last edited by True Arroyo MD on 4/17/2023  1:35 PM.        Imaging:    See report    Assessment/Plan:     1. Macula-off rhegmatogenous retinal detachment, right  Sequential RD  New IT breaks, inferior detachment into macula  Discussed RBA PPV, SB, SO vs gas  Recommend PPV with SB to support vit base as has new tears and vitreous found to be adherent and posteriorly inserted at last surgery    RBA discussed in detail, inc surgical failure, need for more surgery, loss of vision/eye, no recovery of vision, surgical failure, bleeding, infection, high eye pressure (glaucoma) cataract, etc.  Pt wishes to proceed.    25 ga PPV/SB/gas vs SO  GA    - Case Request Operating Room: REPAIR, RETINAL DETACHMENT, WITH VITRECTOMY    Follow up in about 1 day (around 4/18/2023), or if symptoms worsen or fail to improve, for Surgery.

## 2023-04-18 ENCOUNTER — HOSPITAL ENCOUNTER (OUTPATIENT)
Facility: HOSPITAL | Age: 68
Discharge: HOME | End: 2023-04-18
Attending: OPHTHALMOLOGY | Admitting: OPHTHALMOLOGY
Payer: MEDICARE

## 2023-04-18 ENCOUNTER — ANESTHESIA EVENT (OUTPATIENT)
Dept: SURGERY | Facility: HOSPITAL | Age: 68
End: 2023-04-18
Payer: MEDICARE

## 2023-04-18 ENCOUNTER — ANESTHESIA (OUTPATIENT)
Dept: SURGERY | Facility: HOSPITAL | Age: 68
End: 2023-04-18
Payer: MEDICARE

## 2023-04-18 VITALS
HEIGHT: 66 IN | WEIGHT: 180 LBS | SYSTOLIC BLOOD PRESSURE: 144 MMHG | HEART RATE: 89 BPM | OXYGEN SATURATION: 95 % | DIASTOLIC BLOOD PRESSURE: 79 MMHG | BODY MASS INDEX: 28.93 KG/M2 | TEMPERATURE: 98 F | RESPIRATION RATE: 17 BRPM

## 2023-04-18 DIAGNOSIS — H33.21 RETINAL DETACHMENT, RIGHT: ICD-10-CM

## 2023-04-18 DIAGNOSIS — H33.21 RIGHT RETINAL DETACHMENT: Primary | ICD-10-CM

## 2023-04-18 PROCEDURE — 63600175 PHARM REV CODE 636 W HCPCS: Performed by: OPHTHALMOLOGY

## 2023-04-18 PROCEDURE — D9220A PRA ANESTHESIA: Mod: CRNA,,, | Performed by: NURSE ANESTHETIST, CERTIFIED REGISTERED

## 2023-04-18 PROCEDURE — 71000044 HC DOSC ROUTINE RECOVERY FIRST HOUR: Performed by: OPHTHALMOLOGY

## 2023-04-18 PROCEDURE — 67108 REPAIR DETACHED RETINA: CPT | Mod: 79,RT,, | Performed by: OPHTHALMOLOGY

## 2023-04-18 PROCEDURE — 71000015 HC POSTOP RECOV 1ST HR: Performed by: OPHTHALMOLOGY

## 2023-04-18 PROCEDURE — 67108 PR REPAIR DETACH RETINA,W VITRECTOMY: ICD-10-PCS | Mod: 79,RT,, | Performed by: OPHTHALMOLOGY

## 2023-04-18 PROCEDURE — D9220A PRA ANESTHESIA: ICD-10-PCS | Mod: CRNA,,, | Performed by: NURSE ANESTHETIST, CERTIFIED REGISTERED

## 2023-04-18 PROCEDURE — 99499 NO LOS: ICD-10-PCS | Mod: ,,, | Performed by: STUDENT IN AN ORGANIZED HEALTH CARE EDUCATION/TRAINING PROGRAM

## 2023-04-18 PROCEDURE — 27200651 HC AIRWAY, LMA: Performed by: ANESTHESIOLOGY

## 2023-04-18 PROCEDURE — 99499 UNLISTED E&M SERVICE: CPT | Mod: ,,, | Performed by: STUDENT IN AN ORGANIZED HEALTH CARE EDUCATION/TRAINING PROGRAM

## 2023-04-18 PROCEDURE — 63600175 PHARM REV CODE 636 W HCPCS: Performed by: NURSE ANESTHETIST, CERTIFIED REGISTERED

## 2023-04-18 PROCEDURE — 27201423 OPTIME MED/SURG SUP & DEVICES STERILE SUPPLY: Performed by: OPHTHALMOLOGY

## 2023-04-18 PROCEDURE — D9220A PRA ANESTHESIA: Mod: ANES,,, | Performed by: ANESTHESIOLOGY

## 2023-04-18 PROCEDURE — 36000706: Performed by: OPHTHALMOLOGY

## 2023-04-18 PROCEDURE — D9220A PRA ANESTHESIA: ICD-10-PCS | Mod: ANES,,, | Performed by: ANESTHESIOLOGY

## 2023-04-18 PROCEDURE — 25000003 PHARM REV CODE 250: Performed by: OPHTHALMOLOGY

## 2023-04-18 PROCEDURE — 27800903 OPTIME MED/SURG SUP & DEVICES OTHER IMPLANTS: Performed by: OPHTHALMOLOGY

## 2023-04-18 PROCEDURE — 37000008 HC ANESTHESIA 1ST 15 MINUTES: Performed by: OPHTHALMOLOGY

## 2023-04-18 PROCEDURE — 36000707: Performed by: OPHTHALMOLOGY

## 2023-04-18 PROCEDURE — 25000003 PHARM REV CODE 250: Performed by: NURSE ANESTHETIST, CERTIFIED REGISTERED

## 2023-04-18 PROCEDURE — 25000003 PHARM REV CODE 250: Performed by: STUDENT IN AN ORGANIZED HEALTH CARE EDUCATION/TRAINING PROGRAM

## 2023-04-18 PROCEDURE — 37000009 HC ANESTHESIA EA ADD 15 MINS: Performed by: OPHTHALMOLOGY

## 2023-04-18 DEVICE — DEVICE SCLERAL BCKL 42 4.0MM: Type: IMPLANTABLE DEVICE | Site: EYE | Status: FUNCTIONAL

## 2023-04-18 RX ORDER — DEXAMETHASONE SODIUM PHOSPHATE 4 MG/ML
INJECTION, SOLUTION INTRA-ARTICULAR; INTRALESIONAL; INTRAMUSCULAR; INTRAVENOUS; SOFT TISSUE
Status: DISCONTINUED | OUTPATIENT
Start: 2023-04-18 | End: 2023-04-18 | Stop reason: HOSPADM

## 2023-04-18 RX ORDER — HYDROCODONE BITARTRATE AND ACETAMINOPHEN 5; 325 MG/1; MG/1
1 TABLET ORAL EVERY 6 HOURS PRN
Qty: 10 TABLET | Refills: 0 | Status: SHIPPED | OUTPATIENT
Start: 2023-04-18 | End: 2023-11-06

## 2023-04-18 RX ORDER — CYCLOPENTOLATE HYDROCHLORIDE 10 MG/ML
1 SOLUTION/ DROPS OPHTHALMIC
Status: DISCONTINUED | OUTPATIENT
Start: 2023-04-18 | End: 2023-04-18 | Stop reason: HOSPADM

## 2023-04-18 RX ORDER — DEXAMETHASONE SODIUM PHOSPHATE 4 MG/ML
INJECTION, SOLUTION INTRA-ARTICULAR; INTRALESIONAL; INTRAMUSCULAR; INTRAVENOUS; SOFT TISSUE
Status: DISCONTINUED
Start: 2023-04-18 | End: 2023-04-18 | Stop reason: HOSPADM

## 2023-04-18 RX ORDER — EPINEPHRINE 1 MG/ML
INJECTION, SOLUTION, CONCENTRATE INTRAVENOUS
Status: DISCONTINUED | OUTPATIENT
Start: 2023-04-18 | End: 2023-04-18 | Stop reason: HOSPADM

## 2023-04-18 RX ORDER — MOXIFLOXACIN 5 MG/ML
1 SOLUTION/ DROPS OPHTHALMIC
Status: DISCONTINUED | OUTPATIENT
Start: 2023-04-18 | End: 2023-04-18 | Stop reason: HOSPADM

## 2023-04-18 RX ORDER — FENTANYL CITRATE 50 UG/ML
INJECTION, SOLUTION INTRAMUSCULAR; INTRAVENOUS
Status: DISCONTINUED | OUTPATIENT
Start: 2023-04-18 | End: 2023-04-18

## 2023-04-18 RX ORDER — SODIUM CHLORIDE 0.9 % (FLUSH) 0.9 %
10 SYRINGE (ML) INJECTION
Status: DISCONTINUED | OUTPATIENT
Start: 2023-04-18 | End: 2023-04-18 | Stop reason: HOSPADM

## 2023-04-18 RX ORDER — GENTAMICIN SULFATE 40 MG/ML
INJECTION, SOLUTION INTRAMUSCULAR; INTRAVENOUS
Status: DISCONTINUED | OUTPATIENT
Start: 2023-04-18 | End: 2023-04-18 | Stop reason: HOSPADM

## 2023-04-18 RX ORDER — PHENYLEPHRINE HYDROCHLORIDE 10 MG/ML
INJECTION INTRAVENOUS
Status: DISCONTINUED | OUTPATIENT
Start: 2023-04-18 | End: 2023-04-18

## 2023-04-18 RX ORDER — GENTAMICIN SULFATE 40 MG/ML
INJECTION, SOLUTION INTRAMUSCULAR; INTRAVENOUS
Status: DISCONTINUED
Start: 2023-04-18 | End: 2023-04-18 | Stop reason: HOSPADM

## 2023-04-18 RX ORDER — MIDAZOLAM HYDROCHLORIDE 1 MG/ML
INJECTION, SOLUTION INTRAMUSCULAR; INTRAVENOUS
Status: DISCONTINUED | OUTPATIENT
Start: 2023-04-18 | End: 2023-04-18

## 2023-04-18 RX ORDER — NEOMYCIN SULFATE, POLYMYXIN B SULFATE, AND DEXAMETHASONE 3.5; 10000; 1 MG/G; [USP'U]/G; MG/G
OINTMENT OPHTHALMIC
Status: DISCONTINUED
Start: 2023-04-18 | End: 2023-04-18 | Stop reason: HOSPADM

## 2023-04-18 RX ORDER — HYDROCODONE BITARTRATE AND ACETAMINOPHEN 5; 325 MG/1; MG/1
1 TABLET ORAL EVERY 4 HOURS PRN
Status: DISCONTINUED | OUTPATIENT
Start: 2023-04-18 | End: 2023-04-18 | Stop reason: HOSPADM

## 2023-04-18 RX ORDER — EPHEDRINE SULFATE 50 MG/ML
INJECTION, SOLUTION INTRAVENOUS
Status: DISCONTINUED | OUTPATIENT
Start: 2023-04-18 | End: 2023-04-18

## 2023-04-18 RX ORDER — PROPOFOL 10 MG/ML
VIAL (ML) INTRAVENOUS
Status: DISCONTINUED | OUTPATIENT
Start: 2023-04-18 | End: 2023-04-18

## 2023-04-18 RX ORDER — PHENYLEPHRINE HYDROCHLORIDE 25 MG/ML
1 SOLUTION/ DROPS OPHTHALMIC
Status: DISCONTINUED | OUTPATIENT
Start: 2023-04-18 | End: 2023-04-18 | Stop reason: HOSPADM

## 2023-04-18 RX ORDER — ONDANSETRON 2 MG/ML
INJECTION INTRAMUSCULAR; INTRAVENOUS
Status: DISCONTINUED | OUTPATIENT
Start: 2023-04-18 | End: 2023-04-18

## 2023-04-18 RX ORDER — DEXAMETHASONE SODIUM PHOSPHATE 4 MG/ML
INJECTION, SOLUTION INTRA-ARTICULAR; INTRALESIONAL; INTRAMUSCULAR; INTRAVENOUS; SOFT TISSUE
Status: DISCONTINUED | OUTPATIENT
Start: 2023-04-18 | End: 2023-04-18

## 2023-04-18 RX ORDER — LIDOCAINE HYDROCHLORIDE 20 MG/ML
INJECTION, SOLUTION EPIDURAL; INFILTRATION; INTRACAUDAL; PERINEURAL
Status: DISCONTINUED
Start: 2023-04-18 | End: 2023-04-18 | Stop reason: HOSPADM

## 2023-04-18 RX ORDER — TETRACAINE HYDROCHLORIDE 5 MG/ML
1 SOLUTION OPHTHALMIC
Status: DISCONTINUED | OUTPATIENT
Start: 2023-04-18 | End: 2023-04-18 | Stop reason: HOSPADM

## 2023-04-18 RX ORDER — ACETAMINOPHEN 325 MG/1
325 TABLET ORAL EVERY 6 HOURS PRN
Refills: 0
Start: 2023-04-18 | End: 2023-11-06

## 2023-04-18 RX ORDER — PREDNISOLONE ACETATE 10 MG/ML
1 SUSPENSION/ DROPS OPHTHALMIC
Status: DISCONTINUED | OUTPATIENT
Start: 2023-04-18 | End: 2023-04-18 | Stop reason: HOSPADM

## 2023-04-18 RX ORDER — CEFAZOLIN SODIUM 1 G/3ML
INJECTION, POWDER, FOR SOLUTION INTRAMUSCULAR; INTRAVENOUS
Status: DISCONTINUED | OUTPATIENT
Start: 2023-04-18 | End: 2023-04-18

## 2023-04-18 RX ORDER — LIDOCAINE HYDROCHLORIDE 10 MG/ML
1 INJECTION, SOLUTION EPIDURAL; INFILTRATION; INTRACAUDAL; PERINEURAL ONCE
Status: DISCONTINUED | OUTPATIENT
Start: 2023-04-18 | End: 2023-04-18 | Stop reason: HOSPADM

## 2023-04-18 RX ORDER — VANCOMYCIN HYDROCHLORIDE 500 MG/10ML
INJECTION, POWDER, LYOPHILIZED, FOR SOLUTION INTRAVENOUS
Status: DISCONTINUED | OUTPATIENT
Start: 2023-04-18 | End: 2023-04-18 | Stop reason: HOSPADM

## 2023-04-18 RX ORDER — LIDOCAINE HYDROCHLORIDE 20 MG/ML
INJECTION INTRAVENOUS
Status: DISCONTINUED | OUTPATIENT
Start: 2023-04-18 | End: 2023-04-18

## 2023-04-18 RX ORDER — NEOMYCIN SULFATE, POLYMYXIN B SULFATE, AND DEXAMETHASONE 3.5; 10000; 1 MG/G; [USP'U]/G; MG/G
OINTMENT OPHTHALMIC
Status: DISCONTINUED | OUTPATIENT
Start: 2023-04-18 | End: 2023-04-18 | Stop reason: HOSPADM

## 2023-04-18 RX ORDER — ACETAMINOPHEN 325 MG/1
650 TABLET ORAL EVERY 4 HOURS PRN
Status: DISCONTINUED | OUTPATIENT
Start: 2023-04-18 | End: 2023-04-18 | Stop reason: HOSPADM

## 2023-04-18 RX ORDER — ATROPINE SULFATE 10 MG/ML
1 SOLUTION/ DROPS OPHTHALMIC
Status: DISCONTINUED | OUTPATIENT
Start: 2023-04-18 | End: 2023-04-18 | Stop reason: HOSPADM

## 2023-04-18 RX ADMIN — EPHEDRINE SULFATE 10 MG: 50 INJECTION INTRAVENOUS at 01:04

## 2023-04-18 RX ADMIN — ATROPINE SULFATE 1 DROP: 10 SOLUTION OPHTHALMIC at 09:04

## 2023-04-18 RX ADMIN — CYCLOPENTOLATE HYDROCHLORIDE 1 DROP: 10 SOLUTION/ DROPS OPHTHALMIC at 09:04

## 2023-04-18 RX ADMIN — DEXAMETHASONE SODIUM PHOSPHATE 4 MG: 4 INJECTION, SOLUTION INTRAMUSCULAR; INTRAVENOUS at 10:04

## 2023-04-18 RX ADMIN — PHENYLEPHRINE HYDROCHLORIDE 150 MCG: 10 INJECTION INTRAVENOUS at 11:04

## 2023-04-18 RX ADMIN — LIDOCAINE HYDROCHLORIDE 80 MG: 20 INJECTION INTRAVENOUS at 10:04

## 2023-04-18 RX ADMIN — SODIUM CHLORIDE: 0.9 INJECTION, SOLUTION INTRAVENOUS at 10:04

## 2023-04-18 RX ADMIN — PREDNISOLONE ACETATE 1 DROP: 10 SUSPENSION/ DROPS OPHTHALMIC at 09:04

## 2023-04-18 RX ADMIN — SODIUM CHLORIDE, SODIUM GLUCONATE, SODIUM ACETATE, POTASSIUM CHLORIDE, MAGNESIUM CHLORIDE, SODIUM PHOSPHATE, DIBASIC, AND POTASSIUM PHOSPHATE: .53; .5; .37; .037; .03; .012; .00082 INJECTION, SOLUTION INTRAVENOUS at 12:04

## 2023-04-18 RX ADMIN — TETRACAINE HYDROCHLORIDE 1 DROP: 5 SOLUTION OPHTHALMIC at 09:04

## 2023-04-18 RX ADMIN — PHENYLEPHRINE HYDROCHLORIDE 1 DROP: 25 SOLUTION/ DROPS OPHTHALMIC at 09:04

## 2023-04-18 RX ADMIN — PHENYLEPHRINE HYDROCHLORIDE 100 MCG: 10 INJECTION INTRAVENOUS at 11:04

## 2023-04-18 RX ADMIN — EPHEDRINE SULFATE 10 MG: 50 INJECTION INTRAVENOUS at 12:04

## 2023-04-18 RX ADMIN — FENTANYL CITRATE 50 MCG: 50 INJECTION, SOLUTION INTRAMUSCULAR; INTRAVENOUS at 10:04

## 2023-04-18 RX ADMIN — PROPOFOL 180 MG: 10 INJECTION, EMULSION INTRAVENOUS at 10:04

## 2023-04-18 RX ADMIN — CEFAZOLIN 2 G: 330 INJECTION, POWDER, FOR SOLUTION INTRAMUSCULAR; INTRAVENOUS at 10:04

## 2023-04-18 RX ADMIN — MOXIFLOXACIN OPHTHALMIC 1 DROP: 5 SOLUTION/ DROPS OPHTHALMIC at 09:04

## 2023-04-18 RX ADMIN — PHENYLEPHRINE HYDROCHLORIDE 200 MCG: 10 INJECTION INTRAVENOUS at 12:04

## 2023-04-18 RX ADMIN — ONDANSETRON 4 MG: 2 INJECTION INTRAMUSCULAR; INTRAVENOUS at 01:04

## 2023-04-18 RX ADMIN — PROPOFOL 30 MG: 10 INJECTION, EMULSION INTRAVENOUS at 11:04

## 2023-04-18 RX ADMIN — MIDAZOLAM HYDROCHLORIDE 2 MG: 1 INJECTION, SOLUTION INTRAMUSCULAR; INTRAVENOUS at 10:04

## 2023-04-18 NOTE — TRANSFER OF CARE
"Anesthesia Transfer of Care Note    Patient: Ladarius Solis    Procedure(s) Performed: Procedure(s) (LRB):  REPAIR, RETINAL DETACHMENT, WITH VITRECTOMY (Right)    Patient location: PACU    Anesthesia Type: general    Transport from OR: Transported from OR on 6-10 L/min O2 by face mask with adequate spontaneous ventilation    Post pain: adequate analgesia    Post assessment: no apparent anesthetic complications and tolerated procedure well    Post vital signs: stable    Level of consciousness: sedated and responds to stimulation    Nausea/Vomiting: no nausea/vomiting    Complications: none    Transfer of care protocol was followed      Last vitals:   Visit Vitals  /69 (BP Location: Right arm, Patient Position: Lying)   Pulse 84   Temp 36.6 °C (97.9 °F) (Temporal)   Resp 20   Ht 5' 6" (1.676 m)   Wt 81.6 kg (180 lb)   SpO2 100%   BMI 29.05 kg/m²     "

## 2023-04-18 NOTE — H&P
Pre-Operative History & Physical  Ophthalmology      SUBJECTIVE:     History of Present Illness:  Patient is a 67 y.o. male presents with Macula-off rhegmatogenous retinal detachment, right [H33.001]  Retinal detachment, right [H33.21].    MEDICATIONS:   Facility-Administered Medications Prior to Admission   Medication    sodium chloride 0.9% flush 10 mL     PTA Medications   Medication Sig    lisinopriL 10 MG tablet Take 1 tablet (10 mg total) by mouth once daily.    meloxicam (MOBIC) 15 MG tablet Take 1 tablet (15 mg total) by mouth once daily.    acetaminophen (TYLENOL) 325 MG tablet Take 1 tablet (325 mg total) by mouth every 6 (six) hours as needed for Pain.    GAVILYTE-C 240-22.72-6.72 -5.84 gram SolR Take 4,000 mLs by mouth once.       ALLERGIES: Review of patient's allergies indicates:  No Known Allergies    PAST MEDICAL HISTORY:   Past Medical History:   Diagnosis Date    Arthritis     Carpal tunnel syndrome, bilateral     Cervical spinal stenosis 2002    Colon polyps     Fatty liver     Hypertension     Overweight (BMI 25.0-29.9) 8/16/2019    Vertigo     left ear issues     PAST SURGICAL HISTORY:   Past Surgical History:   Procedure Laterality Date    ARTHROSCOPIC CHONDROPLASTY OF KNEE JOINT Left 10/17/2018    Procedure: ARTHROSCOPY, KNEE, WITH CHONDROPLASTY;  Surgeon: GRETEL Carr MD;  Location: [facility] OR [address] [place] [address];  Service: Orthopedics;  Laterality: Left;    COLONOSCOPY      COLONOSCOPY N/A 9/25/2017    Procedure: COLONOSCOPY/emr;  Surgeon: Raul August MD;  Location: [facility] (2ND FLR);  Service: Endoscopy;  Laterality: N/A;    COLONOSCOPY N/A 3/9/2018    Procedure: COLONOSCOPY;  Surgeon: Raul August MD;  Location: [facility] (2ND FLR);  Service: Endoscopy;  Laterality: N/A;    COLONOSCOPY N/A 3/23/2023    Procedure: COLONOSCOPY;  Surgeon: Aleksander Grimes MD;  Location: [facility] ENDO (4TH FLR);  Service: Endoscopy;  Laterality: N/A;  2/13 instructions to portal-st  pre call done- AJ  does not want to  come in earlier 3/22 EB    EPIDURAL STEROID INJECTION N/A 11/4/2021    Procedure: INJECTION, STEROID, EPIDURAL, C7-T1  NEED CONSENT;  Surgeon: Bozena Mena MD;  Location: [facility] PAIN MGT;  Service: Pain Management;  Laterality: N/A;    ESOPHAGOGASTRODUODENOSCOPY N/A 9/23/2019    Procedure: EGD (ESOPHAGOGASTRODUODENOSCOPY);  Surgeon: Dyllan Maloney MD;  Location: [facility];  Service: General;  Laterality: N/A;    INJECTION OF FACET JOINT Bilateral 8/6/2021    Procedure: FACET JOINT INJECTION BILATERAL L4/5 AND L5/S1 DIRECT REFERRAL NEEDS CONSENT;  Surgeon: Jasiel Aviles MD;  Location: [facility] PAIN MGT;  Service: Pain Management;  Laterality: Bilateral;    KNEE ARTHROSCOPY W/ MENISCECTOMY Left 10/17/2018    Procedure: ARTHROSCOPY, KNEE, WITH MENISCECTOMY;  Surgeon: GRETEL Carr MD;  Location: [facility] OR 2ND FLR;  Service: Orthopedics;  Laterality: Left;  regional w/catheter adductor  Dimitry/Linemil[employer] notified 10-12 LO    REPAIR OF RETINAL DETACHMENT WITH VITRECTOMY Right 4/4/2023    Procedure: REPAIR, RETINAL DETACHMENT, WITH VITRECTOMY;  Surgeon: True Arroyo MD;  [facility];  Service: Ophthalmology;  Laterality: Right;     PAST FAMILY HISTORY:   Family History   Problem Relation Age of Onset    No Known Problems Mother     Arthritis Father     Dementia Father     Heart disease Father     No Known Problems Brother     Diabetes Neg Hx     Cirrhosis Neg Hx      SOCIAL HISTORY:   Social History     Tobacco Use    Smoking status: Never    Smokeless tobacco: Never   Substance Use Topics    Alcohol use: No    Drug use: No        MENTAL STATUS: Alert    REVIEW OF SYSTEMS: Negative    OBJECTIVE:     Vital Signs (Most Recent)  Temp: 97.9 °F (36.6 °C) (04/18/23 0933)  Pulse: 77 (04/18/23 0933)  Resp: 16 (04/18/23 0933)  BP: (!) 186/86 (04/18/23 0933)  SpO2: 97 % (04/18/23 0933)    Physical Exam:  General: NAD  HEENT: AT/NC, sequential RD OS (see last Ophthalmology clinic note for full eye  exam)  Lungs: Adequate respirations  Heart: + pulses  Abdomen: Soft    ASSESSMENT/PLAN:     Patient is a 67 y.o. male with Macula-off rhegmatogenous retinal detachment, right [H33.001]  Retinal detachment, right [H33.21].    - Plan for surgical correction with 25 ga PPV/SB/gas vs SO under GA.   - Allergies reviewed: Review of patient's allergies indicates:  No Known Allergies  - Risks/benefits/alternatives of the procedure including, but not limited to scarring, bleeding, infection, loss or decreased vision, and/or need for possible repeat surgery discussed with the patient and family.  - Informed consent obtained prior to surgery and the patient/family voiced good understanding.    Lama Fatemeh MD  [facility]

## 2023-04-18 NOTE — OP NOTE
Date of Procedure: 04/18/2023   Pre-Op Dx: Retinal detachment, right eye  Post Op Dx: Same  Procedure Performed: Repair of retinal detachment by scleral buckle, pars plana vitrectomy, endolaser, injection of 15% C3Fi8 gas, right eye  Attending Surgeon: CHANDRA Arroyo  Assistant Surgeon: JAYCE Lucio  Anesthesia: General, peribulbar injection of 0.75% bupivicaine  Estimated blood loss: Minimal  Complication: None  Specimen: None  Disposition: Stable to recovery  Findings: small retinal break at 5:00, 8:30 and 9:00  Outcome: Retina attached  Date of Discharge: 04/18/2023  Discharge Disposition: Home  F/U: 04/19/23           Informed consent was obtained and placed in the medical record. The patient was properly identified and taken to the operating room. General anesthesia was begun by the anesthesia team.  The right eye was prepped and draped in the standard ophthalmic fashion taking care to exclude the lashes from the operative field.    A 360 degree conjunctival peritomy incision was made using Abel scissors.  Tenon's capsule was penetrated in the four oblique quadrants using curved Cardona scissors.  The conjunctiva was reflected posteriorly.  The four rectus muscles were sequentially hooked with a De Witt hook and secured with 2-0 silk sutures.  A Schepens retractor was used to examine the muscles and the sclera.  The muscles were properly secured, and all was normal with the sclera.  A 42 encircling band was placed under the rectus muscles and secured inferonasally with a 72 sleeve.  5-0 Merseleine suture was used to fixate the buckle at the equator in the four oblique quadrants.       Standard 25 gauge vitrectomy setup was achieved with all ports 3.75 mm posterior to the limbus. The Lumexis visualization system was used.  Gas was evacuated from the eye.  Vitrectomy had already been performed in a prior procedure.  The peripheral vitreous was again shaved to the vitreous base using scleral depression  while also examining for small breaks and escape of subretinal fluid.  There was a posterior vitreous base insertion.  There were noted to be small retinal breaks at 5:00, 8:30 and 9:00.  There was an inferior retinal detachment into the inferior macula.  The superior retina was attached.  The breaks from the prior surgery were attached with laser scar except for the 8:00 break.  The retina was inspected for 360 degrees to the ora rafi using scleral depression. There were no breaks found other than the ones previously mentioned.  There was good buckle height.  The breaks were marked with diathermy.   A temporal retinotomy was created with diathermy and suction.  Fluid/air exchange was performed.  The retina was lying nicely flat under air.  Endolaser photocoagulation was placed around the breaks and retinotomy.  15% C3F8 gas was exchanged for the air through the infusion cannula using a chimney through one of the ports for exhaust.  The ports were removed. The infusion cannula was removed.  The sclerotomies were closed with 7-0 Vicryl suture.  All wounds were checked and noted not to be leaking.      The buckle ends were trimmed.  The silk sutures were removed.  A peribulbar block consisting 5cc of 0.75% bupivicaine was performed using a blunt canula.  The conjunctiva was closed with 6-0 plain gut suture.      A subconjunctival injection of vancomycin and dexamethasone was placed.     The eye was patched. A Ace shield was placed. The patient was awakened from general anesthesia by the anesthesia team after having tolerated the procedure well.

## 2023-04-18 NOTE — BRIEF OP NOTE
Date of Procedure: 04/18/2023     Pre-Op Dx: Retinal detachment, right eye    Post Op Dx: Same    Procedure Performed: Repair of retinal detachment by scleral buckle, pars plana vitrectomy, endolaser, injection of 15% C3Fi8 gas, right eye    Attending Surgeon: CHANDRA Arroyo    Assistant Surgeon: JAYCE Lucio    Anesthesia: General, peribulbar injection of 0.75% bupivicaine    Estimated blood loss: Minimal    Complication: None    Specimen: None    Disposition: Stable to recovery    Findings: small retinal break at 5:00, 8:30 and 9:00    Outcome: Retina attached    Date of Discharge: 04/18/2023    Discharge Disposition: Home    F/U: 04/19/23

## 2023-04-18 NOTE — ANESTHESIA PROCEDURE NOTES
Intubation    Date/Time: 4/18/2023 10:40 AM  Performed by: Key Benites CRNA  Authorized by: Fortino Miles MD     Intubation:     Induction:  Intravenous    Intubated:  Postinduction    Mask Ventilation:  N/a    Attempts:  1    Attempted By:  CRNA    Difficult Airway Encountered?: No      Complications:  None    Airway Device:  Supraglottic airway/LMA    Airway Device Size:  4.5    Style/Cuff Inflation:  Cuffed (inflated to minimal occlusive pressure)    Secured at:  The lips    Placement Verified By:  Capnometry    Complicating Factors:  None    Findings Post-Intubation:  BS equal bilateral and atraumatic/condition of teeth unchanged

## 2023-04-18 NOTE — ANESTHESIA PREPROCEDURE EVALUATION
04/18/2023  Ladarius Solis is a 67 y.o., male.    Pre-operative evaluation for Procedure(s) (LRB):  REPAIR, RETINAL DETACHMENT, WITH VITRECTOMY (Right)    Ladarius Solis is a 67 y.o. male     LDA:     Prev airway:     Drips:     Patient Active Problem List   Diagnosis    Colon adenoma    Acute pain of left knee    Primary osteoarthritis of left knee    Neck pain    Bilateral carpal tunnel syndrome    Cervical radiculopathy at C7    Chondromalacia of left knee    Chronic pain of left knee    Right trigger finger    Trigger ring finger of right hand    Hypertension    Hepatic steatosis    Overweight (BMI 25.0-29.9)    GERD (gastroesophageal reflux disease)    Chronic abdominal pain    Functional dyspepsia    Chronic pain    Aortic atherosclerosis       Review of patient's allergies indicates:  No Known Allergies     Current Facility-Administered Medications on File Prior to Encounter   Medication Dose Route Frequency Provider Last Rate Last Admin    sodium chloride 0.9% flush 10 mL  10 mL Intravenous PRN Diego Carbajal MD        sodium hyaluronate (EUFLEXXA) 10 mg/mL(mw 2.4 -3.6 million) Syrg 20 mg  20 mg Intra-articular  W Toi Carr MD   20 mg at 05/10/18 8777     Current Outpatient Medications on File Prior to Encounter   Medication Sig Dispense Refill    acetaminophen (TYLENOL) 325 MG tablet Take 1 tablet (325 mg total) by mouth every 6 (six) hours as needed for Pain.  0    GAVILYTE-C 240-22.72-6.72 -5.84 gram SolR Take 4,000 mLs by mouth once.      lisinopriL 10 MG tablet Take 1 tablet (10 mg total) by mouth once daily. 90 tablet 1    meloxicam (MOBIC) 15 MG tablet Take 1 tablet (15 mg total) by mouth once daily. 30 tablet 3       Past Surgical History:   Procedure Laterality Date    ARTHROSCOPIC CHONDROPLASTY OF KNEE JOINT Left 10/17/2018    Procedure:  ARTHROSCOPY, KNEE, WITH CHONDROPLASTY;  Surgeon: GRETEL Carr MD;  Location: [facility] OR 2ND FLR;  Service: Orthopedics;  Laterality: Left;    COLONOSCOPY      COLONOSCOPY N/A 9/25/2017    Procedure: COLONOSCOPY/emr;  Surgeon: Raul August MD;  Location: [facility] ENDO (2ND FLR);  Service: Endoscopy;  Laterality: N/A;    COLONOSCOPY N/A 3/9/2018    Procedure: COLONOSCOPY;  Surgeon: Raul August MD;  Location: [facility] (2ND FLR);  Service: Endoscopy;  Laterality: N/A;    COLONOSCOPY N/A 3/23/2023    Procedure: COLONOSCOPY;  Surgeon: Aleksander Grimes MD;  Location: [facility] (4TH FLR);  Service: Endoscopy;  Laterality: N/A;  2/13 instructions to portal-st  pre call done- AJ  does not want to come in earlier 3/22 EB    EPIDURAL STEROID INJECTION N/A 11/4/2021    Procedure: INJECTION, STEROID, EPIDURAL, C7-T1  NEED CONSENT;  Surgeon: Bozena Mena MD;  Location: [facility] PAIN MGT;  Service: Pain Management;  Laterality: N/A;    ESOPHAGOGASTRODUODENOSCOPY N/A 9/23/2019    Procedure: EGD (ESOPHAGOGASTRODUODENOSCOPY);  Surgeon: Dyllan Maloney MD;  Location: [facility];  Service: General;  Laterality: N/A;    INJECTION OF FACET JOINT Bilateral 8/6/2021    Procedure: FACET JOINT INJECTION BILATERAL L4/5 AND L5/S1 DIRECT REFERRAL NEEDS CONSENT;  Surgeon: Jasiel Aviles MD;  Location: [facility] PAIN MGT;  Service: Pain Management;  Laterality: Bilateral;    KNEE ARTHROSCOPY W/ MENISCECTOMY Left 10/17/2018    Procedure: ARTHROSCOPY, KNEE, WITH MENISCECTOMY;  Surgeon: GRETEL Carr MD;  Location: [facility] OR 2ND FLR;  Service: Orthopedics;  Laterality: Left;  regional w/catheter adductor  Dimitry/[employer] notified 10-12 LO    REPAIR OF RETINAL DETACHMENT WITH VITRECTOMY Right 4/4/2023    Procedure: REPAIR, RETINAL DETACHMENT, WITH VITRECTOMY;  Surgeon: True Arroyo MD;  Location: [facility] 1ST FLR;  Service: Ophthalmology;  Laterality: Right;       Social History     Socioeconomic History    Marital status: Single    Occupational History    Occupation:    Tobacco Use    Smoking status: Never    Smokeless tobacco: Never   Substance and Sexual Activity    Alcohol use: No    Drug use: No     Social Determinants of Health     Financial Resource Strain: Low Risk     Difficulty of Paying Living Expenses: Not hard at all   Food Insecurity: No Food Insecurity    Worried About Running Out of Food in the Last Year: Never true    Ran Out of Food in the Last Year: Never true   Transportation Needs: No Transportation Needs    Lack of Transportation (Medical): No    Lack of Transportation (Non-Medical): No   Physical Activity: Inactive    Days of Exercise per Week: 0 days    Minutes of Exercise per Session: 0 min   Stress: No Stress Concern Present    Feeling of Stress : Not at all   Social Connections: Unknown    Frequency of Communication with Friends and Family: Never    Frequency of Social Gatherings with Friends and Family: Never    Active Member of Clubs or Organizations: Yes    Attends Club or Organization Meetings: More than 4 times per year    Marital Status: Never    Housing Stability: Low Risk     Unable to Pay for Housing in the Last Year: No    Number of Places Lived in the Last Year: 1    Unstable Housing in the Last Year: No         Vital Signs Range (Last 24H):         CBC: No results for input(s): WBC, RBC, HGB, HCT, PLT, MCV, MCH, MCHC in the last 72 hours.    CMP: No results for input(s): NA, K, CL, CO2, BUN, CREATININE, GLU, MG, PHOS, CALCIUM, ALBUMIN, PROT, ALKPHOS, ALT, AST, BILITOT in the last 72 hours.    INR  No results for input(s): PT, INR, PROTIME, APTT in the last 72 hours.        Diagnostic Studies:      EKD Echo:          Pre-op Assessment    I have reviewed the Patient Summary Reports.     I have reviewed the Nursing Notes.    I have reviewed the Medications.     Review of Systems  Anesthesia Hx:  No problems with previous Anesthesia    Social:  Non-Smoker     Hematology/Oncology:  Hematology Normal   Oncology Normal     EENT/Dental:EENT/Dental Normal   Pulmonary:  Pulmonary Normal    Renal/:  Renal/ Normal     Endocrine:  Endocrine Normal    Dermatological:  Skin Normal    Psych:  Psychiatric Normal           Physical Exam    Airway:  Mallampati: II   Mouth Opening: Normal  Tongue: Normal  Neck ROM: Normal ROM    Dental:    Chest/Lungs:  Clear to auscultation        Anesthesia Plan  Type of Anesthesia, risks & benefits discussed:    Anesthesia Type: Gen Supraglottic Airway  Intra-op Monitoring Plan: Standard ASA Monitors  Post Op Pain Control Plan: multimodal analgesia  Induction:  IV  Airway Plan: Direct  Informed Consent: Informed consent signed with the Patient representative and all parties understand the risks and agree with anesthesia plan.  All questions answered.   ASA Score: 2    Ready For Surgery From Anesthesia Perspective.     .

## 2023-04-19 ENCOUNTER — OFFICE VISIT (OUTPATIENT)
Dept: OPHTHALMOLOGY | Facility: CLINIC | Age: 68
End: 2023-04-19
Payer: MEDICARE

## 2023-04-19 DIAGNOSIS — Z98.890 POST-OPERATIVE STATE: Primary | ICD-10-CM

## 2023-04-19 PROCEDURE — 4010F PR ACE/ARB THEARPY RXD/TAKEN: ICD-10-PCS | Mod: CPTII,S$GLB,, | Performed by: STUDENT IN AN ORGANIZED HEALTH CARE EDUCATION/TRAINING PROGRAM

## 2023-04-19 PROCEDURE — 99024 PR POST-OP FOLLOW-UP VISIT: ICD-10-PCS | Mod: S$GLB,,, | Performed by: STUDENT IN AN ORGANIZED HEALTH CARE EDUCATION/TRAINING PROGRAM

## 2023-04-19 PROCEDURE — 3288F FALL RISK ASSESSMENT DOCD: CPT | Mod: CPTII,S$GLB,, | Performed by: STUDENT IN AN ORGANIZED HEALTH CARE EDUCATION/TRAINING PROGRAM

## 2023-04-19 PROCEDURE — 99999 PR PBB SHADOW E&M-EST. PATIENT-LVL II: CPT | Mod: PBBFAC,,, | Performed by: STUDENT IN AN ORGANIZED HEALTH CARE EDUCATION/TRAINING PROGRAM

## 2023-04-19 PROCEDURE — 4010F ACE/ARB THERAPY RXD/TAKEN: CPT | Mod: CPTII,S$GLB,, | Performed by: STUDENT IN AN ORGANIZED HEALTH CARE EDUCATION/TRAINING PROGRAM

## 2023-04-19 PROCEDURE — 1159F PR MEDICATION LIST DOCUMENTED IN MEDICAL RECORD: ICD-10-PCS | Mod: CPTII,S$GLB,, | Performed by: STUDENT IN AN ORGANIZED HEALTH CARE EDUCATION/TRAINING PROGRAM

## 2023-04-19 PROCEDURE — 3288F PR FALLS RISK ASSESSMENT DOCUMENTED: ICD-10-PCS | Mod: CPTII,S$GLB,, | Performed by: STUDENT IN AN ORGANIZED HEALTH CARE EDUCATION/TRAINING PROGRAM

## 2023-04-19 PROCEDURE — 1101F PT FALLS ASSESS-DOCD LE1/YR: CPT | Mod: CPTII,S$GLB,, | Performed by: STUDENT IN AN ORGANIZED HEALTH CARE EDUCATION/TRAINING PROGRAM

## 2023-04-19 PROCEDURE — 1126F PR PAIN SEVERITY QUANTIFIED, NO PAIN PRESENT: ICD-10-PCS | Mod: CPTII,S$GLB,, | Performed by: STUDENT IN AN ORGANIZED HEALTH CARE EDUCATION/TRAINING PROGRAM

## 2023-04-19 PROCEDURE — 1101F PR PT FALLS ASSESS DOC 0-1 FALLS W/OUT INJ PAST YR: ICD-10-PCS | Mod: CPTII,S$GLB,, | Performed by: STUDENT IN AN ORGANIZED HEALTH CARE EDUCATION/TRAINING PROGRAM

## 2023-04-19 PROCEDURE — 1126F AMNT PAIN NOTED NONE PRSNT: CPT | Mod: CPTII,S$GLB,, | Performed by: STUDENT IN AN ORGANIZED HEALTH CARE EDUCATION/TRAINING PROGRAM

## 2023-04-19 PROCEDURE — 3044F HG A1C LEVEL LT 7.0%: CPT | Mod: CPTII,S$GLB,, | Performed by: STUDENT IN AN ORGANIZED HEALTH CARE EDUCATION/TRAINING PROGRAM

## 2023-04-19 PROCEDURE — 99999 PR PBB SHADOW E&M-EST. PATIENT-LVL II: ICD-10-PCS | Mod: PBBFAC,,, | Performed by: STUDENT IN AN ORGANIZED HEALTH CARE EDUCATION/TRAINING PROGRAM

## 2023-04-19 PROCEDURE — 3044F PR MOST RECENT HEMOGLOBIN A1C LEVEL <7.0%: ICD-10-PCS | Mod: CPTII,S$GLB,, | Performed by: STUDENT IN AN ORGANIZED HEALTH CARE EDUCATION/TRAINING PROGRAM

## 2023-04-19 PROCEDURE — 1159F MED LIST DOCD IN RCRD: CPT | Mod: CPTII,S$GLB,, | Performed by: STUDENT IN AN ORGANIZED HEALTH CARE EDUCATION/TRAINING PROGRAM

## 2023-04-19 PROCEDURE — 99024 POSTOP FOLLOW-UP VISIT: CPT | Mod: S$GLB,,, | Performed by: STUDENT IN AN ORGANIZED HEALTH CARE EDUCATION/TRAINING PROGRAM

## 2023-04-19 NOTE — PROGRESS NOTES
HPI    POD #1- pt reports some throbbing pain OD. No other complaints.    S/p Repair of retinal detachment by scleral buckle, pars plana vitrectomy,   endolaser, injection of 15% C3Fi8 gas, right eye    Last edited by Ailyn Biswas on 4/19/2023  8:50 AM.            Assessment /Plan     For exam results, see Encounter Report.    Post-operative state         POD #1 s/p #42/72 SB/PPV/AFx/EL/C3F8, right eye  RRD right eye   - Doing well   - IOP OK   - start PF QID, Vigamox QID, Atropine QID, Maxitrol matheus qHS  - No vigorous activity, no lifting, bending, etc.  - Ace shield when sleeping  - Ace shield, glasses, or sunglasses all times for protection   - No shower   - Face down, left side down side down positioning  - RD/Endophthalmitis precautions       RTC 1 wk, sooner PRN if any problems (nicho pain, redness, dimming or loss of vision)

## 2023-04-23 ENCOUNTER — HOSPITAL ENCOUNTER (EMERGENCY)
Facility: HOSPITAL | Age: 68
Discharge: HOME | End: 2023-04-24
Attending: STUDENT IN AN ORGANIZED HEALTH CARE EDUCATION/TRAINING PROGRAM
Payer: MEDICARE

## 2023-04-23 DIAGNOSIS — R51.9 ACUTE NONINTRACTABLE HEADACHE, UNSPECIFIED HEADACHE TYPE: Primary | ICD-10-CM

## 2023-04-23 DIAGNOSIS — R11.2 NAUSEA AND VOMITING, UNSPECIFIED VOMITING TYPE: ICD-10-CM

## 2023-04-23 DIAGNOSIS — H40.051 ELEVATED IOP, RIGHT: ICD-10-CM

## 2023-04-23 DIAGNOSIS — G89.18 POSTOPERATIVE PAIN: ICD-10-CM

## 2023-04-23 DIAGNOSIS — Z86.69 HISTORY OF RETINAL DETACHMENT: ICD-10-CM

## 2023-04-23 DIAGNOSIS — I10 ELEVATED BLOOD PRESSURE READING WITH DIAGNOSIS OF HYPERTENSION: ICD-10-CM

## 2023-04-23 PROCEDURE — 25000003 PHARM REV CODE 250: Performed by: PHYSICIAN ASSISTANT

## 2023-04-23 PROCEDURE — 99285 EMERGENCY DEPT VISIT HI MDM: CPT | Mod: ,,, | Performed by: PHYSICIAN ASSISTANT

## 2023-04-23 PROCEDURE — 99285 PR EMERGENCY DEPT VISIT,LEVEL V: ICD-10-PCS | Mod: ,,, | Performed by: PHYSICIAN ASSISTANT

## 2023-04-23 PROCEDURE — 96361 HYDRATE IV INFUSION ADD-ON: CPT

## 2023-04-23 RX ORDER — MORPHINE SULFATE 4 MG/ML
4 INJECTION, SOLUTION INTRAMUSCULAR; INTRAVENOUS
Status: COMPLETED | OUTPATIENT
Start: 2023-04-23 | End: 2023-04-24

## 2023-04-23 RX ORDER — PROPARACAINE HYDROCHLORIDE 5 MG/ML
1 SOLUTION/ DROPS OPHTHALMIC
Status: COMPLETED | OUTPATIENT
Start: 2023-04-23 | End: 2023-04-23

## 2023-04-23 RX ORDER — ONDANSETRON 2 MG/ML
4 INJECTION INTRAMUSCULAR; INTRAVENOUS
Status: COMPLETED | OUTPATIENT
Start: 2023-04-23 | End: 2023-04-24

## 2023-04-23 RX ADMIN — SODIUM CHLORIDE 1000 ML: 0.9 INJECTION, SOLUTION INTRAVENOUS at 11:04

## 2023-04-23 RX ADMIN — PROPARACAINE HYDROCHLORIDE 1 DROP: 5 SOLUTION/ DROPS OPHTHALMIC at 11:04

## 2023-04-23 NOTE — Clinical Note
"Ladarius Carter" Will was seen and treated in our emergency department on 4/23/2023.  He may return to work on 04/25/2023.       If you have any questions or concerns, please don't hesitate to call.      Lorenza Thomas MD"

## 2023-04-24 VITALS
BODY MASS INDEX: 28.93 KG/M2 | RESPIRATION RATE: 18 BRPM | HEART RATE: 70 BPM | TEMPERATURE: 98 F | DIASTOLIC BLOOD PRESSURE: 99 MMHG | SYSTOLIC BLOOD PRESSURE: 190 MMHG | OXYGEN SATURATION: 96 % | HEIGHT: 66 IN | WEIGHT: 180 LBS

## 2023-04-24 LAB
ALBUMIN SERPL BCP-MCNC: 4 G/DL (ref 3.5–5.2)
ALP SERPL-CCNC: 68 U/L (ref 55–135)
ALT SERPL W/O P-5'-P-CCNC: 42 U/L (ref 10–44)
ANION GAP SERPL CALC-SCNC: 11 MMOL/L (ref 8–16)
AST SERPL-CCNC: 16 U/L (ref 10–40)
B-OH-BUTYR BLD STRIP-SCNC: 0.3 MMOL/L (ref 0–0.5)
BASOPHILS # BLD AUTO: 0.07 K/UL (ref 0–0.2)
BASOPHILS NFR BLD: 0.8 % (ref 0–1.9)
BILIRUB SERPL-MCNC: 0.8 MG/DL (ref 0.1–1)
BUN SERPL-MCNC: 20 MG/DL (ref 8–23)
CALCIUM SERPL-MCNC: 9.2 MG/DL (ref 8.7–10.5)
CHLORIDE SERPL-SCNC: 105 MMOL/L (ref 95–110)
CO2 SERPL-SCNC: 23 MMOL/L (ref 23–29)
CREAT SERPL-MCNC: 0.9 MG/DL (ref 0.5–1.4)
DIFFERENTIAL METHOD: ABNORMAL
EOSINOPHIL # BLD AUTO: 0.1 K/UL (ref 0–0.5)
EOSINOPHIL NFR BLD: 1.1 % (ref 0–8)
ERYTHROCYTE [DISTWIDTH] IN BLOOD BY AUTOMATED COUNT: 12.6 % (ref 11.5–14.5)
EST. GFR  (NO RACE VARIABLE): >60 ML/MIN/1.73 M^2
GLUCOSE SERPL-MCNC: 141 MG/DL (ref 70–110)
HCT VFR BLD AUTO: 45.8 % (ref 40–54)
HCV AB SERPL QL IA: NORMAL
HGB BLD-MCNC: 16.3 G/DL (ref 14–18)
HIV 1+2 AB+HIV1 P24 AG SERPL QL IA: NORMAL
IMM GRANULOCYTES # BLD AUTO: 0.08 K/UL (ref 0–0.04)
IMM GRANULOCYTES NFR BLD AUTO: 1 % (ref 0–0.5)
LIPASE SERPL-CCNC: 20 U/L (ref 4–60)
LYMPHOCYTES # BLD AUTO: 1.4 K/UL (ref 1–4.8)
LYMPHOCYTES NFR BLD: 16.6 % (ref 18–48)
MCH RBC QN AUTO: 31.5 PG (ref 27–31)
MCHC RBC AUTO-ENTMCNC: 35.6 G/DL (ref 32–36)
MCV RBC AUTO: 88 FL (ref 82–98)
MONOCYTES # BLD AUTO: 0.6 K/UL (ref 0.3–1)
MONOCYTES NFR BLD: 7.5 % (ref 4–15)
NEUTROPHILS # BLD AUTO: 6.2 K/UL (ref 1.8–7.7)
NEUTROPHILS NFR BLD: 73 % (ref 38–73)
NRBC BLD-RTO: 0 /100 WBC
PLATELET # BLD AUTO: 219 K/UL (ref 150–450)
PMV BLD AUTO: 11.1 FL (ref 9.2–12.9)
POTASSIUM SERPL-SCNC: 3.7 MMOL/L (ref 3.5–5.1)
PROT SERPL-MCNC: 6.7 G/DL (ref 6–8.4)
RBC # BLD AUTO: 5.18 M/UL (ref 4.6–6.2)
SODIUM SERPL-SCNC: 139 MMOL/L (ref 136–145)
WBC # BLD AUTO: 8.42 K/UL (ref 3.9–12.7)

## 2023-04-24 PROCEDURE — 25000003 PHARM REV CODE 250: Performed by: STUDENT IN AN ORGANIZED HEALTH CARE EDUCATION/TRAINING PROGRAM

## 2023-04-24 PROCEDURE — 86803 HEPATITIS C AB TEST: CPT | Performed by: PHYSICIAN ASSISTANT

## 2023-04-24 PROCEDURE — 63600175 PHARM REV CODE 636 W HCPCS: Performed by: STUDENT IN AN ORGANIZED HEALTH CARE EDUCATION/TRAINING PROGRAM

## 2023-04-24 PROCEDURE — 82010 KETONE BODYS QUAN: CPT | Performed by: PHYSICIAN ASSISTANT

## 2023-04-24 PROCEDURE — 96375 TX/PRO/DX INJ NEW DRUG ADDON: CPT

## 2023-04-24 PROCEDURE — 99285 EMERGENCY DEPT VISIT HI MDM: CPT | Mod: 25

## 2023-04-24 PROCEDURE — 85025 COMPLETE CBC W/AUTO DIFF WBC: CPT | Performed by: PHYSICIAN ASSISTANT

## 2023-04-24 PROCEDURE — 83690 ASSAY OF LIPASE: CPT | Performed by: PHYSICIAN ASSISTANT

## 2023-04-24 PROCEDURE — 63600175 PHARM REV CODE 636 W HCPCS: Performed by: PHYSICIAN ASSISTANT

## 2023-04-24 PROCEDURE — 80053 COMPREHEN METABOLIC PANEL: CPT | Performed by: STUDENT IN AN ORGANIZED HEALTH CARE EDUCATION/TRAINING PROGRAM

## 2023-04-24 PROCEDURE — 96361 HYDRATE IV INFUSION ADD-ON: CPT

## 2023-04-24 PROCEDURE — 96374 THER/PROPH/DIAG INJ IV PUSH: CPT

## 2023-04-24 PROCEDURE — 25000003 PHARM REV CODE 250: Performed by: PHYSICIAN ASSISTANT

## 2023-04-24 PROCEDURE — 87389 HIV-1 AG W/HIV-1&-2 AB AG IA: CPT | Performed by: PHYSICIAN ASSISTANT

## 2023-04-24 RX ORDER — ACETAZOLAMIDE 500 MG/1
500 CAPSULE, EXTENDED RELEASE ORAL 2 TIMES DAILY
Qty: 60 CAPSULE | Refills: 0 | Status: SHIPPED | OUTPATIENT
Start: 2023-04-24 | End: 2023-11-06

## 2023-04-24 RX ORDER — BRIMONIDINE TARTRATE 1 MG/ML
1 SOLUTION/ DROPS OPHTHALMIC 3 TIMES DAILY
Qty: 5 ML | Refills: 0 | Status: SHIPPED | OUTPATIENT
Start: 2023-04-24 | End: 2023-11-06

## 2023-04-24 RX ORDER — LISINOPRIL 10 MG/1
10 TABLET ORAL
Status: COMPLETED | OUTPATIENT
Start: 2023-04-24 | End: 2023-04-24

## 2023-04-24 RX ORDER — DORZOLAMIDE HYDROCHLORIDE AND TIMOLOL MALEATE 20; 5 MG/ML; MG/ML
1 SOLUTION/ DROPS OPHTHALMIC 2 TIMES DAILY
Qty: 1 EACH | Refills: 0 | Status: SHIPPED | OUTPATIENT
Start: 2023-04-24 | End: 2023-11-06

## 2023-04-24 RX ORDER — ACETAZOLAMIDE 500 MG/5ML
500 INJECTION, POWDER, LYOPHILIZED, FOR SOLUTION INTRAVENOUS ONCE
Status: COMPLETED | OUTPATIENT
Start: 2023-04-24 | End: 2023-04-24

## 2023-04-24 RX ORDER — LATANOPROST 50 UG/ML
1 SOLUTION/ DROPS OPHTHALMIC NIGHTLY
Qty: 2 ML | Refills: 0 | Status: SHIPPED | OUTPATIENT
Start: 2023-04-24 | End: 2023-11-06

## 2023-04-24 RX ORDER — PROCHLORPERAZINE EDISYLATE 5 MG/ML
10 INJECTION INTRAMUSCULAR; INTRAVENOUS ONCE
Status: COMPLETED | OUTPATIENT
Start: 2023-04-24 | End: 2023-04-24

## 2023-04-24 RX ADMIN — MORPHINE SULFATE 4 MG: 4 INJECTION INTRAVENOUS at 12:04

## 2023-04-24 RX ADMIN — ACETAZOLAMIDE SODIUM 500 MG: 500 INJECTION, POWDER, LYOPHILIZED, FOR SOLUTION INTRAVENOUS at 03:04

## 2023-04-24 RX ADMIN — LISINOPRIL 10 MG: 10 TABLET ORAL at 03:04

## 2023-04-24 RX ADMIN — ONDANSETRON 4 MG: 2 INJECTION INTRAMUSCULAR; INTRAVENOUS at 12:04

## 2023-04-24 RX ADMIN — PROCHLORPERAZINE EDISYLATE 10 MG: 5 INJECTION INTRAMUSCULAR; INTRAVENOUS at 05:04

## 2023-04-24 NOTE — PROVIDER PROGRESS NOTES - EMERGENCY DEPT.
Encounter Date: 4/23/2023    ED Physician Progress Notes        Physician Note:   I received this patient in sign out from the previous team.  I have discussed the patient's history and presentation in the ED with Dr. Thomas and Kartik MCGILL  The patient is a 67 y.o. male who presented to the ED with Post-op Problem (States following detached retina surgery he has headache that keeps getting worse. States feels feverish and has been unable to sleep)    Significant history and PE findings:  Patient recently had retina surgery after detached retina, has worsening headache. PA got intraocular pressures at 17, 21.  Significant diagnostic results:  Pending  Pending studies and/or consultations: ophthalmology, CT  Disposition:  Will continue to monitor, update patient on results of testing and determine appropriate additional treatment for the duration of stay in ED.  Pertinent lab and imaging findings are below.  Can Nascimento DO 1:11 AM 4/24/2023    CT read:  FINDINGS:  Intracranial compartment:     Ventricles and sulci are normal in size for age without evidence of hydrocephalus.     Postoperative change of right vitrectomy with expected postoperative intraorbital gas and fluid layering.  No retro-orbital fat stranding.     The brain parenchyma appears within normal limits.  No parenchymal mass, hemorrhage, edema or major vascular distribution infarct.     No extra-axial blood or fluid collections.     Skull/extracranial contents (limited evaluation):     No fracture. Mastoid air cells and paranasal sinuses are essentially clear.     Impression:     1. No evidence of acute intracranial pathology.  2. Postop change of right orbital vitrectomy.     Electronically signed by resident: Soniya Conway  Date:                                            04/24/2023  Time:                                           01:51     Electronically signed by: Reggie Valencia MD  Date:                                             04/24/2023  Time:                                           02:05    Ophthalmology at bedside for evaluation      3:07 AM  Ophthalmology reporting intraocular pressures to 40s,despite drops  Requesting diamox 500mg IV stat  Ordered and discussed with nurse    3:29 AM  Did not take his lisinopril, will order   Bp 192/92    5:00 AM  Spoke with ophthalmology, pressure is still elevated but he did not think that the elevated pressure 34 was causing the patient's headache.  I ordered the patient Compazine for headache control.  Retina fellow will see the patient in clinic in a couple of days.  They gave recommendations for multiple different eyedrops, and Diamox 250 mg twice per day.  Will write these prescriptions.    6:34 AM  Patient feeling better after Compazine  Patient is requesting discharge home.  He understands return precautions and follow-up instructions.  Prescription sent to pharmacy.

## 2023-04-24 NOTE — DISCHARGE INSTRUCTIONS
You were evaluated in the emergency department today for elevated intra-ocular pressure and headache.  Although there were no findings of concern to necessitate admission to the hospital or warrant immediate surgical intervention, disease exists on a spectrum and your disease process may progress.  If this is the case, please watch your symptoms and return to the emergency department if you feel worse and are unable to discuss care with your primary care doctor in follow up in the next several days.  Specific information regarding your complaint has been provided.  Thank you for choosing [facility]!      Recommendations:  - No vigorous activity, no lifting, bending, etc.  - Ace shield when sleeping  - Ace shield, glasses, or sunglasses all times for protection   - No shower   - Face down, left side down side down positioning  - Start PO Acetazolamide 250mg twice per day  - Start Cosopt twice per day right eye  - Start Brimonidine three times per day right eye  - Start Latanoprost every night right eye    Follow up in clinic with your ophthalmologist 4/27

## 2023-04-24 NOTE — ED TRIAGE NOTES
"Ladarius Solis, a 67 y.o. male presents to the ED w/ complaint of post op problem. Reports having a detached retina surgery in his R eye. Reports he is nauseated and "cant keep anything down". Reports headaches, feverish and unable to sleep    Adult Physical Assessment  LOC: Ladarius Solis, 67 y.o. male verified via two identifiers.  The patient is awake, alert, oriented and speaking appropriately at this time.  APPEARANCE: Patient resting comfortably and appears to be in no acute distress at this time. Patient is clean and well groomed, patient's clothing is properly fastened.  SKIN:The skin is warm and dry, color consistent with ethnicity, patient has normal skin turgor and moist mucus membranes, skin intact, no breakdown or brusing noted.  MUSCULOSKELETAL: Patient moving all extremities well, no obvious swelling or deformities noted.  RESPIRATORY: Airway is open and patent, respirations are spontaneous, patient has a normal effort and rate, no accessory muscle use noted.  CARDIAC: Patient has a normal rate and rhythm, no periphreal edema noted in any extremity, capillary refill < 3 seconds in all extremities  ABDOMEN: Soft and non tender to palpation, no abdominal distention noted. Bowel sounds present in all four quadrants.  NEUROLOGIC: Eyes open spontaneously, behavior appropriate to situation, follows commands, facial expression symmetrical, bilateral hand grasp equal and even, purposeful motor response noted, normal sensation in all extremities when touched with a finger.  Reports having a detached retina surgery. Reports he is nauseated and "cant keep anything down". Reports headaches, feverish and unable to sleep      Triage note:  Chief Complaint   Patient presents with    Post-op Problem     States following detached retina surgery he has headache that keeps getting worse. States feels feverish and has been unable to sleep     Review of patient's allergies indicates:  No Known Allergies  Past " Medical History:   Diagnosis Date    Arthritis     Carpal tunnel syndrome, bilateral     Cervical spinal stenosis 2002    Colon polyps     Fatty liver     Hypertension     Overweight (BMI 25.0-29.9) 8/16/2019    Vertigo     left ear issues

## 2023-04-24 NOTE — ED PROVIDER NOTES
Encounter Date: 4/23/2023       History     Chief Complaint   Patient presents with    Post-op Problem     States following detached retina surgery he has headache that keeps getting worse. States feels feverish and has been unable to sleep     The patient is a 67 year old male who has a past medical history of HTN, obesity, fatty liver, arthritis, vertigo, and spinal stenosis of cervical spine. He is s/p retinal detachment surgery of his right eye on 4/18/23. He presents to the ER for an emergent evaluation c/o right sided headache and periorbital pain with associated nausea and vomiting. He states that symptoms have been present since his surgery, but have been progressively getting worse. He describes the pain as constant and throbbing, and he indicates that the pain radiates from the back of his head to his right brow. He states that he has floaters, but otherwise he denies any vision changes. He states that his pain is severe in degree. He states that he has had difficulty sleeping at night due to the discomfort. He denies any pain with eye movement, drainage/discharge, flashes, or curtains/veils over vision. He reports compliance with prescribed eye drops. He has been taking Tylenol and Ibuprofen with no improvement in pain.         Review of patient's allergies indicates:  No Known Allergies  Past Medical History:   Diagnosis Date    Arthritis     Carpal tunnel syndrome, bilateral     Cervical spinal stenosis 2002    Colon polyps     Fatty liver     Hypertension     Overweight (BMI 25.0-29.9) 8/16/2019    Vertigo     left ear issues     Past Surgical History:   Procedure Laterality Date    ARTHROSCOPIC CHONDROPLASTY OF KNEE JOINT Left 10/17/2018    Procedure: ARTHROSCOPY, KNEE, WITH CHONDROPLASTY;  Surgeon: GRETEL Carr MD;  Location: [facility] OR [address] [place] [address];  Service: Orthopedics;  Laterality: Left;    COLONOSCOPY      COLONOSCOPY N/A 9/25/2017    Procedure: COLONOSCOPY/emr;  Surgeon: Raul August MD;   Location: [facility] (2ND FLR);  Service: Endoscopy;  Laterality: N/A;    COLONOSCOPY N/A 3/9/2018    Procedure: COLONOSCOPY;  Surgeon: Raul August MD;  Location: [facility] (2ND FLR);  Service: Endoscopy;  Laterality: N/A;    COLONOSCOPY N/A 3/23/2023    Procedure: COLONOSCOPY;  Surgeon: Aleksander Grimes MD;  Location: [facility] (4TH FLR);  Service: Endoscopy;  Laterality: N/A;  2/13 instructions to portal-st  pre call done- AJ  does not want to come in earlier 3/22 EB    EPIDURAL STEROID INJECTION N/A 11/4/2021    Procedure: INJECTION, STEROID, EPIDURAL, C7-T1  NEED CONSENT;  Surgeon: Bozena Mena MD;  Location: [facility] PAIN MGT;  Service: Pain Management;  Laterality: N/A;    ESOPHAGOGASTRODUODENOSCOPY N/A 9/23/2019    Procedure: EGD (ESOPHAGOGASTRODUODENOSCOPY);  Surgeon: Dyllan Maloney MD;  Location: [facility];  Service: General;  Laterality: N/A;    INJECTION OF FACET JOINT Bilateral 8/6/2021    Procedure: FACET JOINT INJECTION BILATERAL L4/5 AND L5/S1 DIRECT REFERRAL NEEDS CONSENT;  Surgeon: Jasiel Aviles MD;  Location: [facility] PAIN MGT;  Service: Pain Management;  Laterality: Bilateral;    KNEE ARTHROSCOPY W/ MENISCECTOMY Left 10/17/2018    Procedure: ARTHROSCOPY, KNEE, WITH MENISCECTOMY;  Surgeon: GRETEL Carr MD;  Location: [facility] OR 2ND FLR;  Service: Orthopedics;  Laterality: Left;  regional w/catheter adductor  Dimitry[employer] notified 10-12 LO    REPAIR OF RETINAL DETACHMENT WITH VITRECTOMY Right 4/4/2023    Procedure: REPAIR, RETINAL DETACHMENT, WITH VITRECTOMY;  Surgeon: True Arroyo MD;  Location: [facility] OR 1ST FLR;  Service: Ophthalmology;  Laterality: Right;    REPAIR OF RETINAL DETACHMENT WITH VITRECTOMY Right 4/18/2023    Procedure: REPAIR, RETINAL DETACHMENT, WITH VITRECTOMY;  Surgeon: True Arroyo MD;  Location: [facility] OR 1ST FLR;  Service: Ophthalmology;  Laterality: Right;  Scleral Buckle     Family History   Problem Relation Age of Onset    No Known Problems Mother      Arthritis Father     Dementia Father     Heart disease Father     No Known Problems Brother     Diabetes Neg Hx     Cirrhosis Neg Hx      Social History     Tobacco Use    Smoking status: Never    Smokeless tobacco: Never   Substance Use Topics    Alcohol use: No    Drug use: No     Review of Systems   Constitutional:  Negative for fever.   HENT:  Negative for ear pain, facial swelling, sinus pain, sore throat and tinnitus.    Eyes:  Positive for pain and redness. Negative for discharge.   Respiratory:  Negative for shortness of breath.    Cardiovascular:  Negative for chest pain.   Gastrointestinal:  Positive for nausea and vomiting. Negative for abdominal pain and diarrhea.   Musculoskeletal:  Negative for back pain, gait problem, neck pain and neck stiffness.   Skin:  Negative for rash.   Allergic/Immunologic: Negative for immunocompromised state.   Neurological:  Positive for headaches. Negative for dizziness, seizures, syncope, facial asymmetry, speech difficulty, weakness and numbness.   Psychiatric/Behavioral:  Negative for confusion.      Physical Exam     Initial Vitals [04/23/23 2244]   BP Pulse Resp Temp SpO2   (!) 191/93 84 20 98.2 °F (36.8 °C) 99 %      MAP       --         Physical Exam    Nursing note and vitals reviewed.  Constitutional: He appears well-developed and well-nourished. He is not diaphoretic. He appears distressed.   Alert and ambulatory. Pacing in exam room holding emesis bag, appears very uncomfortable.    HENT:   Head: Normocephalic.   Eyes:   Left eye: Unremarkable  Right eye: No proptosis. Pupil round and dilated. EOMI intact - denies pain with occular movement. Extensive sub-conjunctival hemorrhage. No hyphema. No drainage. IOP measured at 21 with tonopen at bedside after application of proparacaine drops.    Neck: Neck supple.   Normal range of motion.  Cardiovascular:  Normal rate.           Pulmonary/Chest: No respiratory distress.   Abdominal: Abdomen is soft. There is no  abdominal tenderness. There is no rebound and no guarding.   Musculoskeletal:         General: Normal range of motion.      Cervical back: Normal range of motion and neck supple.     Neurological: He is alert and oriented to person, place, and time. He has normal strength. No sensory deficit.   Normal speech. Normal gait. Oriented appropriately. 5/5 strength. No facial droop.    Skin: Skin is warm and dry. No rash noted.       ED Course   Procedures  Labs Reviewed   CBC W/ AUTO DIFFERENTIAL - Abnormal; Notable for the following components:       Result Value    MCH 31.5 (*)     Immature Granulocytes 1.0 (*)     Immature Grans (Abs) 0.08 (*)     Lymph % 16.6 (*)     All other components within normal limits   COMPREHENSIVE METABOLIC PANEL - Abnormal; Notable for the following components:    Glucose 141 (*)     All other components within normal limits   HIV 1 / 2 ANTIBODY    Narrative:     Release to patient->Immediate   HEPATITIS C ANTIBODY    Narrative:     Release to patient->Immediate   LIPASE   BETA - HYDROXYBUTYRATE, SERUM     Results for orders placed or performed during the hospital encounter of 04/23/23   HIV 1/2 Ag/Ab (4th Gen)   Result Value Ref Range    HIV 1/2 Ag/Ab Non-reactive Non-reactive   Hepatitis C Antibody   Result Value Ref Range    Hepatitis C Ab Non-reactive Non-reactive   CBC auto differential   Result Value Ref Range    WBC 8.42 3.90 - 12.70 K/uL    RBC 5.18 4.60 - 6.20 M/uL    Hemoglobin 16.3 14.0 - 18.0 g/dL    Hematocrit 45.8 40.0 - 54.0 %    MCV 88 82 - 98 fL    MCH 31.5 (H) 27.0 - 31.0 pg    MCHC 35.6 32.0 - 36.0 g/dL    RDW 12.6 11.5 - 14.5 %    Platelets 219 150 - 450 K/uL    MPV 11.1 9.2 - 12.9 fL    Immature Granulocytes 1.0 (H) 0.0 - 0.5 %    Gran # (ANC) 6.2 1.8 - 7.7 K/uL    Immature Grans (Abs) 0.08 (H) 0.00 - 0.04 K/uL    Lymph # 1.4 1.0 - 4.8 K/uL    Mono # 0.6 0.3 - 1.0 K/uL    Eos # 0.1 0.0 - 0.5 K/uL    Baso # 0.07 0.00 - 0.20 K/uL    nRBC 0 0 /100 WBC    Gran % 73.0 38.0 -  73.0 %    Lymph % 16.6 (L) 18.0 - 48.0 %    Mono % 7.5 4.0 - 15.0 %    Eosinophil % 1.1 0.0 - 8.0 %    Basophil % 0.8 0.0 - 1.9 %    Differential Method Automated    Lipase   Result Value Ref Range    Lipase 20 4 - 60 U/L   Beta - Hydroxybutyrate, Serum   Result Value Ref Range    Beta-Hydroxybutyrate 0.3 0.0 - 0.5 mmol/L   Comprehensive metabolic panel   Result Value Ref Range    Sodium 139 136 - 145 mmol/L    Potassium 3.7 3.5 - 5.1 mmol/L    Chloride 105 95 - 110 mmol/L    CO2 23 23 - 29 mmol/L    Glucose 141 (H) 70 - 110 mg/dL    BUN 20 8 - 23 mg/dL    Creatinine 0.9 0.5 - 1.4 mg/dL    Calcium 9.2 8.7 - 10.5 mg/dL    Total Protein 6.7 6.0 - 8.4 g/dL    Albumin 4.0 3.5 - 5.2 g/dL    Total Bilirubin 0.8 0.1 - 1.0 mg/dL    Alkaline Phosphatase 68 55 - 135 U/L    AST 16 10 - 40 U/L    ALT 42 10 - 44 U/L    Anion Gap 11 8 - 16 mmol/L    eGFR >60.0 >60 mL/min/1.73 m^2            Imaging Results              CT Head Without Contrast (Final result)  Result time 04/24/23 02:05:32      Final result by Reggie Valencia MD (04/24/23 02:05:32)                   Impression:      1. No evidence of acute intracranial pathology.  2. Postop change of right orbital vitrectomy.    Electronically signed by resident: Soniya Conway  Date:    04/24/2023  Time:    01:51    Electronically signed by: Reggie Valencia MD  Date:    04/24/2023  Time:    02:05               Narrative:    EXAMINATION:  CT HEAD WITHOUT CONTRAST    CLINICAL HISTORY:  Headache, new or worsening (Age >= 50y);Acute HA, severe, N/V s/p recent reintal surgery;    TECHNIQUE:  Low dose axial CT images obtained throughout the head without the use of intravenous contrast.  Axial, sagittal and coronal reconstructions were performed.    COMPARISON:  CT head without contrast 10/02/2006    FINDINGS:  Intracranial compartment:    Ventricles and sulci are normal in size for age without evidence of hydrocephalus.    Postoperative change of right vitrectomy with expected  postoperative intraorbital gas and fluid layering.  No retro-orbital fat stranding.    The brain parenchyma appears within normal limits.  No parenchymal mass, hemorrhage, edema or major vascular distribution infarct.    No extra-axial blood or fluid collections.    Skull/extracranial contents (limited evaluation):    No fracture. Mastoid air cells and paranasal sinuses are essentially clear.                                       Medications   sodium chloride 0.9% bolus 1,000 mL 1,000 mL (0 mLs Intravenous Stopped 4/24/23 0112)   sodium chloride 0.9% bolus 1,000 mL 1,000 mL (0 mLs Intravenous Stopped 4/24/23 0112)   ondansetron injection 4 mg (4 mg Intravenous Given 4/24/23 0049)   morphine injection 4 mg (4 mg Intravenous Given 4/24/23 0050)   proparacaine 0.5 % ophthalmic solution 1 drop (1 drop Right Eye Given 4/23/23 2345)   acetaZOLAMIDE injection 500 mg (500 mg Intravenous Given 4/24/23 0344)   lisinopriL tablet 10 mg (10 mg Oral Given 4/24/23 0344)   prochlorperazine injection Soln 10 mg (10 mg Intravenous Given 4/24/23 0559)     Medical Decision Making:   History:   Old Medical Records: I decided to obtain old medical records.  Initial Assessment:   66 yo male, s/p retinal detachment repair of right eye on 4/18 presenting to the ER for an emergent evaluation c/o right headache pain right periorbital pain, that began following surgery and has progressively gotten worse, now having trouble sleeping and with nausea and vomiting.   Differential Diagnosis:   Post-op complication, post-op pain, Increased IOP, ICH, infection, etc   Clinical Tests:   Lab Tests: Ordered and Reviewed  Radiological Study: Ordered and Reviewed  ED Management:  Vital signs reviewed - elevated blood pressure noted - afebrile   Records reviewed   I discussed the case in detail with the ER attending physician - will obtain labs, head CT, and urgent ophthalmology consultation   Pt vomiting and guarded, difficult to obtain IOP - after  proparacaine, able to get readings of 17 and then 21 using tonopen at bedside   IV fluids, IV Morphine, and IV Zofran ordered   Emergent ophthalmology consult ordered - paged   At end of my shift, ophthalmology consult still pending - pt reports slight improvement with meds - taken to CT - I discussed the case and signed out patient to the PM ER attending physician, who will assume further care and management                            Clinical Impression:   Final diagnoses:  [R51.9] Acute nonintractable headache, unspecified headache type (Primary)  [G89.18] Postoperative pain  [H40.051] Elevated IOP, right  [Z86.69] History of retinal detachment  [R11.2] Nausea and vomiting, unspecified vomiting type  [I10] Elevated blood pressure reading with diagnosis of hypertension        ED Disposition Condition    Discharge Stable          ED Prescriptions       Medication Sig Dispense Start Date End Date Auth. Provider    acetaZOLAMIDE (DIAMOX) 500 mg CpSR Take 1 capsule (500 mg total) by mouth 2 (two) times daily. 60 capsule 4/24/2023 5/24/2023 Can Nascimento,     dorzolamide-timolol 2-0.5% (COSOPT) 22.3-6.8 mg/mL ophthalmic solution Place 1 drop into the right eye 2 (two) times daily. 1 each 4/24/2023 5/24/2023 Can Nascimento DO    brimonidine 0.1% (ALPHAGAN P) 0.1 % Drop Place 1 drop into the right eye 3 (three) times daily. 5 mL 4/24/2023 5/24/2023 Can Nascimento DO    latanoprost 0.005 % ophthalmic solution Place 1 drop into the right eye every evening. 2 mL 4/24/2023 5/24/2023 Can Nascimento DO          Follow-up Information    None          Parker Verdugo PA-C  04/24/23 141       Parker Verdugo PA-C  04/24/23 1413

## 2023-04-24 NOTE — CONSULTS
"Consultation Report  Ophthalmology Service    Date: 04/24/2023    Chief complaint/Reason for Consult: "post-op pain, N/V, HA"     History of Present Illness: Ladarius Solis is a 67 y.o. male s/p SB/PPV/AFx/EL/C3F8 of the right eye on 4/18/23 who presents with 4 days of R sided periorbital pain/HA, nausea, vomiting, and "feeling feverish". Patient states that pain is a constant throbbing pain that radiates from back of head to right brow. Cool compresses to R periorbital region provide some relief. Using post op gtts as directed. Reports floaters that are stable. Denies any visual changes/decrease in vision, eye pain/pain w/ eye movement, diplopia, drainage, irritation, photophobia, flashes, or curtains/veils over vision.       POcularHx:   s/p PPV/AFx/EL/SF6, right eye 4/4/23   s/p SB/PPV/AFx/EL/C3F8, right eye 4/18/23  PCIOL, OU      Current eye gtts:   Post op OD: PF QID, Vigamox QID, Atropine QID, Maxitrol matheus qHS    Family Hx: Denies family history of glaucoma, macular degeneration, or blindness. family history includes Arthritis in his father; Dementia in his father; Heart disease in his father; No Known Problems in his brother and mother.     PMHx:  has a past medical history of Arthritis, Carpal tunnel syndrome, bilateral, Cervical spinal stenosis (2002), Colon polyps, Fatty liver, Hypertension, Overweight (BMI 25.0-29.9) (8/16/2019), and Vertigo.     PSurgHx:  has a past surgical history that includes Colonoscopy; Colonoscopy (N/A, 9/25/2017); Colonoscopy (N/A, 3/9/2018); Knee arthroscopy w/ meniscectomy (Left, 10/17/2018); Arthroscopic chondroplasty of knee joint (Left, 10/17/2018); Esophagogastroduodenoscopy (N/A, 9/23/2019); Injection of facet joint (Bilateral, 8/6/2021); Epidural steroid injection (N/A, 11/4/2021); Colonoscopy (N/A, 3/23/2023); Repair of retinal detachment with vitrectomy (Right, 4/4/2023); and Repair of retinal detachment with vitrectomy (Right, 4/18/2023).     Home Medications: "   Prior to Admission medications    Medication Sig Start Date End Date Taking? Authorizing Provider   acetaminophen (TYLENOL) 325 MG tablet Take 1 tablet (325 mg total) by mouth every 6 (six) hours as needed for Pain. 4/18/23   True Arroyo MD   SCOTTY-C 240-22.72-6.72 -5.84 gram SolR Take 4,000 mLs by mouth once. 2/13/23   Historical Provider   HYDROcodone-acetaminophen (NORCO) 5-325 mg per tablet Take 1 tablet by mouth every 6 (six) hours as needed for Pain. 4/18/23   True Arroyo MD   lisinopriL 10 MG tablet Take 1 tablet (10 mg total) by mouth once daily. 2/20/23   Nick Stanton MD   meloxicam (MOBIC) 15 MG tablet Take 1 tablet (15 mg total) by mouth once daily. 2/6/23   Nick Stanton MD        Medications this encounter:    prochlorperazine  10 mg Intravenous Once       Allergies: has No Known Allergies.     Social:  reports that he has never smoked. He has never used smokeless tobacco. He reports that he does not drink alcohol and does not use drugs.     ROS: As per HPI    Ocular examination/Dilated fundus examination:  Base Eye Exam       Visual Acuity (Snellen - Linear)         Right Left    Dist sc CF @ face 20/30              Tonometry (Tonopen, 2:08 AM)         Right Left    Pressure 41 15              Tonometry #2 (Applanation, 2:40 AM)         Right Left    Pressure 40 14              Tonometry #3 (Tonopen, 3:11 AM)         Right Left    Pressure 41 14              Tonometry #4 (Tonopen, 5:05 AM)         Right Left    Pressure 34 9              Pupils         Dark Light Shape React APD    Right 6 6 Round Pharm dilated None    Left 3 2 Round Brisk None              Visual Fields         Right Left     Full Full              Extraocular Movement         Right Left     Full, Ortho Full, Ortho              Neuro/Psych       Oriented x3: Yes    Mood/Affect: Normal              Dilation       Left eye: 1% Mydriacyl, 2.5% Phenylephrine @ 2:38 AM                  Slit Lamp and Fundus  Exam       External Exam         Right Left    External Normal Normal              Slit Lamp Exam         Right Left    Lids/Lashes Ptosis Ptosis, Dermatochalasis - upper lid    Conjunctiva/Sclera Good closure of conjunctiva, injection, BAYRON, suture exposed temp White and quiet    Cornea Diffuse MCE, PEEs Clear    Anterior Chamber Deep and quiet Deep and quiet    Iris dilated Round and reactive    Lens Posterior chamber intraocular lens Posterior chamber intraocular lens    Anterior Vitreous C3F8 Normal              Fundus Exam         Right Left    Disc Pink & sharp Pink & sharp    C/D Ratio 0.3 .35    Macula Flat Flat    Vessels Normal Normal    Periphery Retina flat 360 on scleral buckle with laser scars at 5:00, temporally, and temporal retinotomy flat with good laser Attached 360 without breaks, 90D, 28D                       Assessment/Plan:       POD #6 s/p #42/72 SB/PPV/AFx/EL/C3F8, right eye  Hx RRD right eye   - Pt reports throbbing R sided periorbital HA since POD#2-3. Denies visual changes, new flashes/floaters/curtains or veils over vision. Denies eye pain, worsening redness/irritation or drainage   - Current Regimen:  PF QID, Vigamox QID, Atropine QID, Maxitrol matheus qHS  - IOP 41 on presentation w/ diffuse MCE, no concerning signs suggestive of infection on exam, VA CF @ face    - Given 3 rounds of gtts IOP remained at 40 OD  - 1hr S/p 500mg IV Diamox, IOP 34 with some pain relief   - Plan as below, return/RD/Endophthalmitis precautions discussed        Recommendations:  - No vigorous activity, no lifting, bending, etc.  - Ace shield when sleeping  - Ace shield, glasses, or sunglasses all times for protection   - No shower   - Face down, left side down side down positioning  - RD/Endophthalmitis precautions   - Start PO Acetazolamide 250mg BID  - Start Cosopt BID OD  - Start Brimonidine TID OD  - Start Latanoprost qhs OD  - RTC as scheduled for POW1 visit (4/27/23) or sooner as (nicho pain, redness,  dimming or loss of vision)    Patient's Best Contact Number: [phone]    Beau Logan MD  [employer] [facility], PGY-2  04/24/2023  2:25 AM

## 2023-04-24 NOTE — ANESTHESIA POSTPROCEDURE EVALUATION
Anesthesia Post Evaluation    Patient: Ladarius Solis    Procedure(s) Performed: Procedure(s) (LRB):  REPAIR, RETINAL DETACHMENT, WITH VITRECTOMY (Right)    Final Anesthesia Type: general      Patient location during evaluation: PACU  Patient participation: Yes- Able to Participate  Level of consciousness: awake and alert  Post-procedure vital signs: reviewed and stable  Pain management: adequate  Airway patency: patent    PONV status at discharge: No PONV  Anesthetic complications: no      Cardiovascular status: blood pressure returned to baseline  Respiratory status: unassisted  Hydration status: euvolemic  Follow-up not needed.          Vitals Value Taken Time   /70 04/18/23 1416   Temp 36.6 °C (97.9 °F) 04/18/23 1326   Pulse 93 04/18/23 1429   Resp 26 04/18/23 1416   SpO2 95 % 04/18/23 1428   Vitals shown include unvalidated device data.      No case tracking events are documented in the log.      Pain/Kevin Score: No data recorded

## 2023-04-27 ENCOUNTER — PROCEDURE VISIT (OUTPATIENT)
Dept: OPHTHALMOLOGY | Facility: CLINIC | Age: 68
End: 2023-04-27
Payer: MEDICARE

## 2023-04-27 DIAGNOSIS — H33.001 MACULA-OFF RHEGMATOGENOUS RETINAL DETACHMENT, RIGHT: Primary | ICD-10-CM

## 2023-04-27 PROCEDURE — 99024 POSTOP FOLLOW-UP VISIT: CPT | Mod: S$GLB,,, | Performed by: OPHTHALMOLOGY

## 2023-04-27 PROCEDURE — 99024 PR POST-OP FOLLOW-UP VISIT: ICD-10-PCS | Mod: S$GLB,,, | Performed by: OPHTHALMOLOGY

## 2023-04-27 NOTE — PROGRESS NOTES
HPI    1 week Post op OD RRD    DLS: 04/19/2023 by  Dr. CHANDRA Arroyo MD      Pt states : my eyes are pretty comfortable now since the new meds.       ++Blurred but full vision OD  --Eye pain (soreness--rating 4 on pain scale)  ++Flashes/--Floaters  --Diplopia  ++Headaches (slightly due to condition of eye)    Eye Meds: Atropine Qid OD                     Cosopt BID OD                     Alphagan TID OD                    Latanoprost OD qhs                    Acetazolamide (Diamox) 250 mg BID  Pt stopped PF and Vigamox  POHx:   1. Macular-Off RRD OD  2. Pseudophakia OU   Last edited by True Arroyo MD on 4/30/2023  5:14 PM.            Assessment /Plan     For exam results, see Encounter Report.    Macula-off rhegmatogenous retinal detachment, right         POD #1 s/p #42/72 SB/PPV/AFx/EL/C3F8, right eye  RRD right eye   - Doing well with new IOP meds  - IOP OK     - No vigorous activity, no lifting, bending, etc.  - Ace shield when sleeping  - Face down, left side down side down positioning for sleeping  - RD/Endophthalmitis precautions     CPM with gtts as above and d/c Diamox 2 days before next visit  1 wk f/u    RTC 1 wk, sooner PRN if any problems (nicho pain, redness, dimming or loss of vision)

## 2023-05-01 ENCOUNTER — OFFICE VISIT (OUTPATIENT)
Dept: OPHTHALMOLOGY | Facility: CLINIC | Age: 68
End: 2023-05-01
Payer: MEDICARE

## 2023-05-01 DIAGNOSIS — H33.001 MACULA-OFF RHEGMATOGENOUS RETINAL DETACHMENT, RIGHT: Primary | ICD-10-CM

## 2023-05-01 PROCEDURE — 1125F PR PAIN SEVERITY QUANTIFIED, PAIN PRESENT: ICD-10-PCS | Mod: CPTII,S$GLB,, | Performed by: OPHTHALMOLOGY

## 2023-05-01 PROCEDURE — 3288F PR FALLS RISK ASSESSMENT DOCUMENTED: ICD-10-PCS | Mod: CPTII,S$GLB,, | Performed by: OPHTHALMOLOGY

## 2023-05-01 PROCEDURE — 99999 PR PBB SHADOW E&M-EST. PATIENT-LVL III: ICD-10-PCS | Mod: PBBFAC,,, | Performed by: OPHTHALMOLOGY

## 2023-05-01 PROCEDURE — 1160F PR REVIEW ALL MEDS BY PRESCRIBER/CLIN PHARMACIST DOCUMENTED: ICD-10-PCS | Mod: CPTII,S$GLB,, | Performed by: OPHTHALMOLOGY

## 2023-05-01 PROCEDURE — 99999 PR PBB SHADOW E&M-EST. PATIENT-LVL III: CPT | Mod: PBBFAC,,, | Performed by: OPHTHALMOLOGY

## 2023-05-01 PROCEDURE — 99024 PR POST-OP FOLLOW-UP VISIT: ICD-10-PCS | Mod: S$GLB,,, | Performed by: OPHTHALMOLOGY

## 2023-05-01 PROCEDURE — 1159F PR MEDICATION LIST DOCUMENTED IN MEDICAL RECORD: ICD-10-PCS | Mod: CPTII,S$GLB,, | Performed by: OPHTHALMOLOGY

## 2023-05-01 PROCEDURE — 3044F HG A1C LEVEL LT 7.0%: CPT | Mod: CPTII,S$GLB,, | Performed by: OPHTHALMOLOGY

## 2023-05-01 PROCEDURE — 3288F FALL RISK ASSESSMENT DOCD: CPT | Mod: CPTII,S$GLB,, | Performed by: OPHTHALMOLOGY

## 2023-05-01 PROCEDURE — 1101F PR PT FALLS ASSESS DOC 0-1 FALLS W/OUT INJ PAST YR: ICD-10-PCS | Mod: CPTII,S$GLB,, | Performed by: OPHTHALMOLOGY

## 2023-05-01 PROCEDURE — 3044F PR MOST RECENT HEMOGLOBIN A1C LEVEL <7.0%: ICD-10-PCS | Mod: CPTII,S$GLB,, | Performed by: OPHTHALMOLOGY

## 2023-05-01 PROCEDURE — 1125F AMNT PAIN NOTED PAIN PRSNT: CPT | Mod: CPTII,S$GLB,, | Performed by: OPHTHALMOLOGY

## 2023-05-01 PROCEDURE — 4010F PR ACE/ARB THEARPY RXD/TAKEN: ICD-10-PCS | Mod: CPTII,S$GLB,, | Performed by: OPHTHALMOLOGY

## 2023-05-01 PROCEDURE — 1160F RVW MEDS BY RX/DR IN RCRD: CPT | Mod: CPTII,S$GLB,, | Performed by: OPHTHALMOLOGY

## 2023-05-01 PROCEDURE — 99024 POSTOP FOLLOW-UP VISIT: CPT | Mod: S$GLB,,, | Performed by: OPHTHALMOLOGY

## 2023-05-01 PROCEDURE — 1159F MED LIST DOCD IN RCRD: CPT | Mod: CPTII,S$GLB,, | Performed by: OPHTHALMOLOGY

## 2023-05-01 PROCEDURE — 4010F ACE/ARB THERAPY RXD/TAKEN: CPT | Mod: CPTII,S$GLB,, | Performed by: OPHTHALMOLOGY

## 2023-05-01 PROCEDURE — 1101F PT FALLS ASSESS-DOCD LE1/YR: CPT | Mod: CPTII,S$GLB,, | Performed by: OPHTHALMOLOGY

## 2023-05-01 NOTE — PROGRESS NOTES
HPI    1 week Post op OD RRD    DLS: 04/27/2023 by  Dr. CHANDRA Arroyo MD      Pt states: my eye feel much better and at times I feel I have a lazy eye.   Prefers to have OD closed     Very blurry vision, bright and full  --Eye pain (soreness--rating 2 on pain scale)  --Flashes/--Floaters  --Diplopia  ++Headaches (slightly due to condition of eye)    Eye Meds:   Cosopt BID OD                     Alphagan TID OD  Latanoprost OD qhs                         POHx:   1. Macular-Off RRD OD  S/P Date of Procedure: 04/18/2023   Pre-Op Dx: Retinal detachment, right eye  Procedure Performed: Repair of retinal detachment by scleral buckle, pars   plana vitrectomy, endolaser, injection of 15% C3Fi8 gas, right eye  Attending Surgeon: CHANDRA Arroyo ///Assistant Surgeon: JAYCE Lucio    2. Pseudophakia OU   Last edited by True Arroyo MD on 5/2/2023  3:34 PM.            Assessment /Plan     For exam results, see Encounter Report.    Macula-off rhegmatogenous retinal detachment, right         POD #1 s/p #42/72 SB/PPV/AFx/EL/C3F8, right eye  RRD right eye   - Doing well with new IOP meds  - IOP OK     - No vigorous activity, no lifting, bending, etc.  - Ace shield when sleeping  - Gave pt eye patch so can cover eye without squeezing lid closed  - Face down, left side down side down positioning for sleeping  - RD/Endophthalmitis precautions     Add back Atropine 1/0  Otherwise CPM with gtts as above   Doing well off of Diamox     1 wk f/u    RTC 1 wk, sooner PRN if any problems (nicho pain, redness, dimming or loss of vision)

## 2023-05-01 NOTE — Clinical Note
Not sure why we have this patient scheduled for Alexis.  Was planning to see him in my clinic toward the end of next week.

## 2023-05-02 ENCOUNTER — TELEPHONE (OUTPATIENT)
Dept: OPHTHALMOLOGY | Facility: CLINIC | Age: 68
End: 2023-05-02
Payer: MEDICARE

## 2023-05-02 NOTE — TELEPHONE ENCOUNTER
"----- Message from Amy Hand sent at 5/2/2023  4:57 PM CDT -----    ----- Message -----  From: Willie Eng  Sent: 5/2/2023   4:33 PM CDT  To: [facility] Staff    Consult/Advisory:          Name Of Caller: Self      Contact Preference?: [phone]       Provider Name: Alexis      Does patient feel the need to be seen today? No      What is the nature of the call?: Returning call to office.          Additional Notes:  "Thank you for all that you do for our patients"         "

## 2023-05-05 ENCOUNTER — OFFICE VISIT (OUTPATIENT)
Dept: PSYCHIATRY | Facility: CLINIC | Age: 68
End: 2023-05-05
Payer: MEDICARE

## 2023-05-05 VITALS
DIASTOLIC BLOOD PRESSURE: 87 MMHG | SYSTOLIC BLOOD PRESSURE: 164 MMHG | BODY MASS INDEX: 27.88 KG/M2 | WEIGHT: 172.75 LBS | HEART RATE: 70 BPM

## 2023-05-05 DIAGNOSIS — F32.A DEPRESSION, UNSPECIFIED DEPRESSION TYPE: Primary | ICD-10-CM

## 2023-05-05 DIAGNOSIS — F41.1 GAD (GENERALIZED ANXIETY DISORDER): ICD-10-CM

## 2023-05-05 PROCEDURE — 99999 PR PBB SHADOW E&M-EST. PATIENT-LVL I: ICD-10-PCS | Mod: PBBFAC,,, | Performed by: NURSE PRACTITIONER

## 2023-05-05 PROCEDURE — 99214 PR OFFICE/OUTPT VISIT, EST, LEVL IV, 30-39 MIN: ICD-10-PCS | Mod: S$GLB,,, | Performed by: NURSE PRACTITIONER

## 2023-05-05 PROCEDURE — 4010F PR ACE/ARB THEARPY RXD/TAKEN: ICD-10-PCS | Mod: CPTII,S$GLB,, | Performed by: NURSE PRACTITIONER

## 2023-05-05 PROCEDURE — 99999 PR PBB SHADOW E&M-EST. PATIENT-LVL I: CPT | Mod: PBBFAC,,, | Performed by: NURSE PRACTITIONER

## 2023-05-05 PROCEDURE — 3044F PR MOST RECENT HEMOGLOBIN A1C LEVEL <7.0%: ICD-10-PCS | Mod: CPTII,S$GLB,, | Performed by: NURSE PRACTITIONER

## 2023-05-05 PROCEDURE — 3077F PR MOST RECENT SYSTOLIC BLOOD PRESSURE >= 140 MM HG: ICD-10-PCS | Mod: CPTII,S$GLB,, | Performed by: NURSE PRACTITIONER

## 2023-05-05 PROCEDURE — 3008F BODY MASS INDEX DOCD: CPT | Mod: CPTII,S$GLB,, | Performed by: NURSE PRACTITIONER

## 2023-05-05 PROCEDURE — 99214 OFFICE O/P EST MOD 30 MIN: CPT | Mod: S$GLB,,, | Performed by: NURSE PRACTITIONER

## 2023-05-05 PROCEDURE — 4010F ACE/ARB THERAPY RXD/TAKEN: CPT | Mod: CPTII,S$GLB,, | Performed by: NURSE PRACTITIONER

## 2023-05-05 PROCEDURE — 3079F DIAST BP 80-89 MM HG: CPT | Mod: CPTII,S$GLB,, | Performed by: NURSE PRACTITIONER

## 2023-05-05 PROCEDURE — 3079F PR MOST RECENT DIASTOLIC BLOOD PRESSURE 80-89 MM HG: ICD-10-PCS | Mod: CPTII,S$GLB,, | Performed by: NURSE PRACTITIONER

## 2023-05-05 PROCEDURE — 3077F SYST BP >= 140 MM HG: CPT | Mod: CPTII,S$GLB,, | Performed by: NURSE PRACTITIONER

## 2023-05-05 PROCEDURE — 3044F HG A1C LEVEL LT 7.0%: CPT | Mod: CPTII,S$GLB,, | Performed by: NURSE PRACTITIONER

## 2023-05-05 PROCEDURE — 3008F PR BODY MASS INDEX (BMI) DOCUMENTED: ICD-10-PCS | Mod: CPTII,S$GLB,, | Performed by: NURSE PRACTITIONER

## 2023-05-05 NOTE — PROGRESS NOTES
"5/5/2023 12:30 PM  Ladarius Solis   1955   [id number]                                                                   OUTPATIENT PSYCHIATRY FOLLOW- UP VISIT     Reason for Encounter:  Ladarius Solis, a 67 y.o. male,who presents today for follow up of depression, anxiety. Met with patient.     Interval History and Content of Current Session:     Today,  Pt with hx of osteoarthritis, depression, anxiety reports as a follow up from previous visit about 6 months prior. Has been taking a break    Reports since last visit had discontinued all my medications due to having nightmares. Reports has been "sleeping a lot." Reports has been anxious about right-eye retinal detachment.     Still having some issues with roommate - "she has no idea how to manage money, she acts out" and discuss distress related to this." Discussed re-establishing with another therapist since Dr. Blanc passed and emotional processing related to this.      Denies SI/HI, AVH        Psychosocial stressors: financial, social, family        Review Of Systems:      Medical Review Of Systems:  Pertinent items are noted in HPI.     Psychiatric Review Of Systems:  sleep: yes - improved, 7 to 8 hours per night with trazodone  appetite changes: no  weight changes: no  energy/anergy: no  interest/pleasure/anhedonia: no  somatic symptoms: no  libido: no  anxiety/panic: no  guilty/hopeless: no  S.I.B.s/risky behavior: no  any drugs: no  alcohol: no       Sleep: "more than 8 hours per night." "Always have restless sleep."   "Seem to be able to get back to sleep very easily."         Risk Parameters:  Patient reports no suicidal ideation  Patient reports no homicidal ideation  Patient reports no self-injurious behavior  Patient reports no violent behavior        Current meds- none     Compliance: yes     Side effects: none        Psychiatric, social, and family history reviewed this visit           Objective      ALL MEDICATIONS:     Current " Outpatient Medications:     lisinopriL 10 MG tablet, Take 1 tablet (10 mg total) by mouth once daily., Disp: 90 tablet, Rfl: 3    venlafaxine (EFFEXOR-XR) 75 MG 24 hr capsule, Take 1 capsule (75 mg total) by mouth once daily., Disp: 30 capsule, Rfl: 2  Venlafaxine ER 37.5 mg by mouth once daily  No current facility-administered medications for this visit.   Facility-Administered Medications Ordered in Other Visits:     sodium hyaluronate (EUFLEXXA) 10 mg/mL(mw 2.4 -3.6 million) Syrg 20 mg, 20 mg, Intra-articular, , W Toi Carr MD, 20 mg at 05/10/18 2086     ALLERGIES:  Review of patient's allergies indicates:  No Known Allergies     RELEVANT LABS/STUDIES:              Lab Results   Component Value Date     WBC 7.19 02/24/2021     HGB 14.6 02/24/2021     HCT 43.8 02/24/2021     MCV 91 02/24/2021      02/24/2021      BMP            Lab Results   Component Value Date      (L) 02/24/2021     K 4.3 02/24/2021      02/24/2021     CO2 26 02/24/2021     BUN 16 02/24/2021     CREATININE 1.1 02/24/2021     CALCIUM 9.0 02/24/2021     ANIONGAP 7 (L) 02/24/2021     ESTGFRAFRICA >60 02/24/2021     EGFRNONAA >60 02/24/2021                Lab Results   Component Value Date     ALT 27 02/24/2021     AST 18 02/24/2021     ALKPHOS 67 02/24/2021     BILITOT 0.6 02/24/2021                Lab Results   Component Value Date     TSH 2.292 02/24/2021                Lab Results   Component Value Date     HGBA1C 5.6 02/06/2019         Constitutional  Vitals:  Most recent vital signs, dated less than 90 days prior to this appointment, were reviewed.    There were no vitals filed for this visit.            PHYSICAL EXAM  General: well developed, well nourished  Neurologic:   Gait: Normal   Psychomotor signs:  No involuntary movements or tremor  AIMS: none     PSYCHIATRIC EXAM:     Mental Status Exam:  Appearance: unremarkable, age appropriate  Behavior/Cooperation: normal, cooperative  Speech: normal tone, normal rate,  normal pitch, normal volume  Language: uses words appropriately; NO aphasia or dysarthria  Mood: steady  Affect: full, congruent with mood and appropriate to content/situation  Thought Process: normal and logical  Thought Content: normal, no suicidality, no homicidality, delusions, or paranoia  Level of Consciousness: Alert and Oriented x3  Memory:  Intact  Attention/concentration: appropriate for age/education.   Fund of Knowledge: appears adequate  Insight:  Intact - improving  Judgment: Intact      Assessment and Diagnosis   Status/Progress: Based on the examination today, the patient's problem(s) is/are improved and adequately but not ideally controlled.  New problems have not been presented today.   Co-morbidities are not complicating management of the primary condition.  There are no active rule-out diagnoses for this patient at this time.      General Impression:   RUBIO  Cluster C traits versus OCPD  Rule out OCD  Social Anxiety  Rule out complex PTSD        Intervention/Counseling/Treatment Plan   Medication Management: -        None at this time  Labs: reviewed most recent  The treatment plan and follow up plan were reviewed with the patient.  Discussed with patient informed consent, risks vs. benefits, alternative treatments, side effect profile and the inherent unpredictability of individual responses to these treatments. The patient expresses understanding of the above and displays the capacity to agree with this current plan and had no other questions.  Encouraged Patient to keep future appointments.   Take medications as prescribed and abstain from substance abuse.   In the event of an emergency patient was advised to go to the emergency room.     Return to Clinic: 6 months     > than 50% of total time spend on coordination of care and counseling   (which included pts differential diagnosis and prognosis for psychiatric conditions, risks, benefits of treatments, instructions and adherence to treatment  plan, risk reduction, reviewing current psychiatric medication regimen, medical problems and social stressors. In addtion to possible discussion with other healthcare provider/s)     Add on Psychotherapy time:0  Total Face time: 25 min     The patient location is:  [place], at home  The chief complaint leading to consultation is: med f/u     Visit type: audiovisual     Face to Face time with patient: 20 min  25 minutes of total time spent on the encounter, which includes face to face time and non-face to face time preparing to see the patient (eg, review of tests), Obtaining and/or reviewing separately obtained history, Documenting clinical information in the electronic or other health record, Independently interpreting results (not separately reported) and communicating results to the patient/family/caregiver, or Care coordination (not separately reported).            Each patient to whom he or she provides medical services by telemedicine is:  (1) informed of the relationship between the physician and patient and the respective role of any other health care provider with respect to management of the patient; and (2) notified that he or she may decline to receive medical services by telemedicine and may withdraw from such care at any time.     Notes:      Stan Noland, MSN, APRN, PMHNP-BC  [facility]   5/5/2023 12:30 PM

## 2023-05-11 ENCOUNTER — PROCEDURE VISIT (OUTPATIENT)
Dept: OPHTHALMOLOGY | Facility: CLINIC | Age: 68
End: 2023-05-11
Payer: MEDICARE

## 2023-05-11 DIAGNOSIS — H33.001 MACULA-OFF RHEGMATOGENOUS RETINAL DETACHMENT, RIGHT: Primary | ICD-10-CM

## 2023-05-11 PROCEDURE — 99024 POSTOP FOLLOW-UP VISIT: CPT | Mod: S$GLB,,, | Performed by: OPHTHALMOLOGY

## 2023-05-11 PROCEDURE — 99024 PR POST-OP FOLLOW-UP VISIT: ICD-10-PCS | Mod: S$GLB,,, | Performed by: OPHTHALMOLOGY

## 2023-05-12 NOTE — PROGRESS NOTES
HPI    1 week Post op OD RRD    DLS: 05/01/2023 by  Dr. CHANDRA Arroyo MD    Vision is blurry but bright and full.  Has some occ dull ache but overall   feels good.    ---Flashes/--Floaters  --Diplopia      Eye Meds:   Cosopt BID OD                     Alphagan TID OD  Latanoprost OD qhs  Atropine OD qd            POHx:   1. Macular-Off RRD OD  S/P Date of Procedure: 04/18/2023   Pre-Op Dx: Retinal detachment, right eye  Procedure Performed: Repair of retinal detachment by scleral buckle, pars   plana vitrectomy, endolaser, injection of 15% C3Fi8 gas, right eye  Attending Surgeon: CHANDRA Arroyo ///Assistant Surgeon: JAYCE Lucio    2. Pseudophakia OU   Last edited by True Arroyo MD on 5/12/2023  2:17 PM.            Assessment /Plan     For exam results, see Encounter Report.    Macula-off rhegmatogenous retinal detachment, right         POW # 3 s/p #42/72 SB/PPV/AFx/EL/C3F8, right eye  - Doing well with IOP now  - Retina attached  - Ease into activity  - Ace shield when sleeping  - Can cont to use eye patch so can cover eye without squeezing lid closed  - RD/Endophthalmitis precautions     D/c Brim  Cont:  Cosopt 2/0  Atropine 1/0  Xal HS/0    RTC 2 wks, sooner PRN if any problems (nicho pain, redness, dimming or loss of vision)    Follow up in about 2 weeks (around 5/25/2023), or if symptoms worsen or fail to improve, for Post-op.

## 2023-05-15 RX ORDER — MELOXICAM 15 MG/1
15 TABLET ORAL DAILY
Qty: 30 TABLET | Refills: 3 | Status: SHIPPED | OUTPATIENT
Start: 2023-05-15 | End: 2023-08-29 | Stop reason: SDUPTHER

## 2023-05-15 NOTE — TELEPHONE ENCOUNTER
Refill Routing Note   Medication(s) are not appropriate for processing by [facility] for the following reason(s):      Medication outside of protocol    [facility] action(s):  Route Care Due:  None identified          Appointments  past 12m or future 3m with PCP    Date Provider   Last Visit   12/12/2022 Nick Stanton MD   Next Visit   Visit date not found Nick Stanton MD   ED visits in past 90 days: 2        Note composed:8:54 AM 05/15/2023

## 2023-05-25 ENCOUNTER — OFFICE VISIT (OUTPATIENT)
Dept: OPHTHALMOLOGY | Facility: CLINIC | Age: 68
End: 2023-05-25
Payer: MEDICARE

## 2023-05-25 DIAGNOSIS — H33.001 MACULA-OFF RHEGMATOGENOUS RETINAL DETACHMENT, RIGHT: Primary | ICD-10-CM

## 2023-05-25 PROCEDURE — 3044F PR MOST RECENT HEMOGLOBIN A1C LEVEL <7.0%: ICD-10-PCS | Mod: CPTII,S$GLB,, | Performed by: OPHTHALMOLOGY

## 2023-05-25 PROCEDURE — 3288F PR FALLS RISK ASSESSMENT DOCUMENTED: ICD-10-PCS | Mod: CPTII,S$GLB,, | Performed by: OPHTHALMOLOGY

## 2023-05-25 PROCEDURE — 99999 PR PBB SHADOW E&M-EST. PATIENT-LVL III: ICD-10-PCS | Mod: PBBFAC,,, | Performed by: OPHTHALMOLOGY

## 2023-05-25 PROCEDURE — 3288F FALL RISK ASSESSMENT DOCD: CPT | Mod: CPTII,S$GLB,, | Performed by: OPHTHALMOLOGY

## 2023-05-25 PROCEDURE — 1160F PR REVIEW ALL MEDS BY PRESCRIBER/CLIN PHARMACIST DOCUMENTED: ICD-10-PCS | Mod: CPTII,S$GLB,, | Performed by: OPHTHALMOLOGY

## 2023-05-25 PROCEDURE — 99999 PR PBB SHADOW E&M-EST. PATIENT-LVL III: CPT | Mod: PBBFAC,,, | Performed by: OPHTHALMOLOGY

## 2023-05-25 PROCEDURE — 3044F HG A1C LEVEL LT 7.0%: CPT | Mod: CPTII,S$GLB,, | Performed by: OPHTHALMOLOGY

## 2023-05-25 PROCEDURE — 4010F PR ACE/ARB THEARPY RXD/TAKEN: ICD-10-PCS | Mod: CPTII,S$GLB,, | Performed by: OPHTHALMOLOGY

## 2023-05-25 PROCEDURE — 1159F MED LIST DOCD IN RCRD: CPT | Mod: CPTII,S$GLB,, | Performed by: OPHTHALMOLOGY

## 2023-05-25 PROCEDURE — 1125F PR PAIN SEVERITY QUANTIFIED, PAIN PRESENT: ICD-10-PCS | Mod: CPTII,S$GLB,, | Performed by: OPHTHALMOLOGY

## 2023-05-25 PROCEDURE — 1101F PT FALLS ASSESS-DOCD LE1/YR: CPT | Mod: CPTII,S$GLB,, | Performed by: OPHTHALMOLOGY

## 2023-05-25 PROCEDURE — 1160F RVW MEDS BY RX/DR IN RCRD: CPT | Mod: CPTII,S$GLB,, | Performed by: OPHTHALMOLOGY

## 2023-05-25 PROCEDURE — 1125F AMNT PAIN NOTED PAIN PRSNT: CPT | Mod: CPTII,S$GLB,, | Performed by: OPHTHALMOLOGY

## 2023-05-25 PROCEDURE — 99024 POSTOP FOLLOW-UP VISIT: CPT | Mod: S$GLB,,, | Performed by: OPHTHALMOLOGY

## 2023-05-25 PROCEDURE — 1101F PR PT FALLS ASSESS DOC 0-1 FALLS W/OUT INJ PAST YR: ICD-10-PCS | Mod: CPTII,S$GLB,, | Performed by: OPHTHALMOLOGY

## 2023-05-25 PROCEDURE — 1159F PR MEDICATION LIST DOCUMENTED IN MEDICAL RECORD: ICD-10-PCS | Mod: CPTII,S$GLB,, | Performed by: OPHTHALMOLOGY

## 2023-05-25 PROCEDURE — 99024 PR POST-OP FOLLOW-UP VISIT: ICD-10-PCS | Mod: S$GLB,,, | Performed by: OPHTHALMOLOGY

## 2023-05-25 PROCEDURE — 4010F ACE/ARB THERAPY RXD/TAKEN: CPT | Mod: CPTII,S$GLB,, | Performed by: OPHTHALMOLOGY

## 2023-05-26 NOTE — PROGRESS NOTES
HPI    2 wk  Post op OD RRD   DLS: 05/01/2023 by  Dr. CHANDRA Arroyo MD    Vision is better and images are very clear.  I see some spots are clear   and other area's are wavy OD    ++Eye Pain (rating 3)  ---Flashes/++Floaters OD  --Diplopia  ++Slight Ha's (more noticeable when reading)  --Curtains/Shadows/veils      Eye Meds:   Cosopt BID OD                     Alphagan TID OD  Latanoprost OD qhs  Atropine OD qd            POHx:   1. Macular-Off RRD OD  S/P Date of Procedure: 04/18/2023   Pre-Op Dx: Retinal detachment, right eye  Procedure Performed: Repair of retinal detachment by scleral buckle, pars   plana vitrectomy, endolaser, injection of 15% C3Fi8 gas, right eye  Attending Surgeon: CHANDRA Arroyo ///Assistant Surgeon: JAYCE Lucio    2. Pseudophakia OU   Last edited by True Arroyo MD on 5/26/2023  8:52 AM.            Assessment /Plan     For exam results, see Encounter Report.    Macula-off rhegmatogenous retinal detachment, right         POW # 5 s/p #42/72 SB/PPV/AFx/EL/C3F8, right eye  - Doing well with IOP now  - Retina attached  - RD/Endophthalmitis precautions   -Eventual refraction     D/c Xalatan    Cont:  Cosopt 2/0  Atropine 1/0      RTC 3-4 wks, sooner PRN if any problems (nicho pain, redness, dimming or loss of vision)    Follow up in about 4 weeks (around 6/22/2023), or if symptoms worsen or fail to improve, for Post-op, OCT Mac.

## 2023-06-04 ENCOUNTER — PATIENT MESSAGE (OUTPATIENT)
Dept: OPHTHALMOLOGY | Facility: CLINIC | Age: 68
End: 2023-06-04
Payer: MEDICARE

## 2023-06-16 ENCOUNTER — OFFICE VISIT (OUTPATIENT)
Dept: OPHTHALMOLOGY | Facility: CLINIC | Age: 68
End: 2023-06-16
Payer: MEDICARE

## 2023-06-16 DIAGNOSIS — H33.001 MACULA-OFF RHEGMATOGENOUS RETINAL DETACHMENT, RIGHT: Primary | ICD-10-CM

## 2023-06-16 DIAGNOSIS — Z98.890 POST-OPERATIVE STATE: ICD-10-CM

## 2023-06-16 DIAGNOSIS — H33.001 RETINAL DETACHMENT, RHEGMATOGENOUS, RIGHT EYE: ICD-10-CM

## 2023-06-16 PROCEDURE — 99499 UNLISTED E&M SERVICE: CPT | Mod: S$GLB,,, | Performed by: OPHTHALMOLOGY

## 2023-06-16 PROCEDURE — 99999 PR PBB SHADOW E&M-EST. PATIENT-LVL III: CPT | Mod: PBBFAC,,, | Performed by: OPHTHALMOLOGY

## 2023-06-16 PROCEDURE — 99499 NO LOS: ICD-10-PCS | Mod: S$GLB,,, | Performed by: OPHTHALMOLOGY

## 2023-06-16 PROCEDURE — 99999 PR PBB SHADOW E&M-EST. PATIENT-LVL III: ICD-10-PCS | Mod: PBBFAC,,, | Performed by: OPHTHALMOLOGY

## 2023-06-16 NOTE — PROGRESS NOTES
HPI    4 wk PO / OCT   DLS-05/25/2023 Dr. Arroyo       Pt sts vision improving still seeing black bubble inf but smaller.   Occasional pain in OD. OS stable     (-)Flashes (+)Floaters  (+)Photophobia  (-)Glare    EYEMEDS:  Cosopt BID   AT's   Last edited by True Arroyo MD on 6/16/2023  1:29 PM.            Assessment /Plan     For exam results, see Encounter Report.    Macula-off rhegmatogenous retinal detachment, right    Retinal detachment, rhegmatogenous, right eye    Post-operative state         POW # 8 s/p #42/72 SB/PPV/AFx/EL/C3F8, right eye  - Doing well   IOP good  - Retina attached  - RD/Endophthalmitis precautions   Has become more myopic OD  Refer for refraction       Cont:  Cosopt 2/0 x 4 wks then d/c    RTC 6 wks, sooner PRN if any problems (nicho pain, redness, dimming or loss of vision)    Follow up in about 6 weeks (around 7/28/2023), or if symptoms worsen or fail to improve.

## 2023-07-27 ENCOUNTER — OFFICE VISIT (OUTPATIENT)
Dept: OPHTHALMOLOGY | Facility: CLINIC | Age: 68
End: 2023-07-27
Payer: MEDICARE

## 2023-07-27 DIAGNOSIS — H31.003 CHORIORETINAL SCAR, BOTH EYES: ICD-10-CM

## 2023-07-27 DIAGNOSIS — Z98.890 HISTORY OF DETACHED RETINA REPAIR: Primary | ICD-10-CM

## 2023-07-27 DIAGNOSIS — Z86.69 HISTORY OF DETACHED RETINA REPAIR: Primary | ICD-10-CM

## 2023-07-27 DIAGNOSIS — H33.311 RETINAL TEAR, RIGHT: ICD-10-CM

## 2023-07-27 PROCEDURE — 3288F FALL RISK ASSESSMENT DOCD: CPT | Mod: CPTII,S$GLB,, | Performed by: OPHTHALMOLOGY

## 2023-07-27 PROCEDURE — 99999 PR PBB SHADOW E&M-EST. PATIENT-LVL III: CPT | Mod: PBBFAC,,, | Performed by: OPHTHALMOLOGY

## 2023-07-27 PROCEDURE — 92134 CPTRZ OPH DX IMG PST SGM RTA: CPT | Mod: S$GLB,,, | Performed by: OPHTHALMOLOGY

## 2023-07-27 PROCEDURE — 4010F PR ACE/ARB THEARPY RXD/TAKEN: ICD-10-PCS | Mod: CPTII,S$GLB,, | Performed by: OPHTHALMOLOGY

## 2023-07-27 PROCEDURE — 1160F RVW MEDS BY RX/DR IN RCRD: CPT | Mod: CPTII,S$GLB,, | Performed by: OPHTHALMOLOGY

## 2023-07-27 PROCEDURE — 92201 OPSCPY EXTND RTA DRAW UNI/BI: CPT | Mod: S$GLB,,, | Performed by: OPHTHALMOLOGY

## 2023-07-27 PROCEDURE — 1101F PR PT FALLS ASSESS DOC 0-1 FALLS W/OUT INJ PAST YR: ICD-10-PCS | Mod: CPTII,S$GLB,, | Performed by: OPHTHALMOLOGY

## 2023-07-27 PROCEDURE — 1126F AMNT PAIN NOTED NONE PRSNT: CPT | Mod: CPTII,S$GLB,, | Performed by: OPHTHALMOLOGY

## 2023-07-27 PROCEDURE — 92014 COMPRE OPH EXAM EST PT 1/>: CPT | Mod: S$GLB,,, | Performed by: OPHTHALMOLOGY

## 2023-07-27 PROCEDURE — 1101F PT FALLS ASSESS-DOCD LE1/YR: CPT | Mod: CPTII,S$GLB,, | Performed by: OPHTHALMOLOGY

## 2023-07-27 PROCEDURE — 1160F PR REVIEW ALL MEDS BY PRESCRIBER/CLIN PHARMACIST DOCUMENTED: ICD-10-PCS | Mod: CPTII,S$GLB,, | Performed by: OPHTHALMOLOGY

## 2023-07-27 PROCEDURE — 4010F ACE/ARB THERAPY RXD/TAKEN: CPT | Mod: CPTII,S$GLB,, | Performed by: OPHTHALMOLOGY

## 2023-07-27 PROCEDURE — 92134 POSTERIOR SEGMENT OCT RETINA (OCULAR COHERENCE TOMOGRAPHY)-BOTH EYES: ICD-10-PCS | Mod: S$GLB,,, | Performed by: OPHTHALMOLOGY

## 2023-07-27 PROCEDURE — 1159F PR MEDICATION LIST DOCUMENTED IN MEDICAL RECORD: ICD-10-PCS | Mod: CPTII,S$GLB,, | Performed by: OPHTHALMOLOGY

## 2023-07-27 PROCEDURE — 1159F MED LIST DOCD IN RCRD: CPT | Mod: CPTII,S$GLB,, | Performed by: OPHTHALMOLOGY

## 2023-07-27 PROCEDURE — 92014 PR EYE EXAM, EST PATIENT,COMPREHESV: ICD-10-PCS | Mod: S$GLB,,, | Performed by: OPHTHALMOLOGY

## 2023-07-27 PROCEDURE — 3044F PR MOST RECENT HEMOGLOBIN A1C LEVEL <7.0%: ICD-10-PCS | Mod: CPTII,S$GLB,, | Performed by: OPHTHALMOLOGY

## 2023-07-27 PROCEDURE — 1126F PR PAIN SEVERITY QUANTIFIED, NO PAIN PRESENT: ICD-10-PCS | Mod: CPTII,S$GLB,, | Performed by: OPHTHALMOLOGY

## 2023-07-27 PROCEDURE — 99999 PR PBB SHADOW E&M-EST. PATIENT-LVL III: ICD-10-PCS | Mod: PBBFAC,,, | Performed by: OPHTHALMOLOGY

## 2023-07-27 PROCEDURE — 3288F PR FALLS RISK ASSESSMENT DOCUMENTED: ICD-10-PCS | Mod: CPTII,S$GLB,, | Performed by: OPHTHALMOLOGY

## 2023-07-27 PROCEDURE — 3044F HG A1C LEVEL LT 7.0%: CPT | Mod: CPTII,S$GLB,, | Performed by: OPHTHALMOLOGY

## 2023-07-27 PROCEDURE — 92201 PR OPHTHALMOSCOPY, EXT, W/RET DRAW/SCLERAL DEPR, I&R, UNI/BI: ICD-10-PCS | Mod: S$GLB,,, | Performed by: OPHTHALMOLOGY

## 2023-07-31 NOTE — PROGRESS NOTES
Subjective:       Patient ID: Ladarius Solis is a 67 y.o. male      Chief Complaint   Patient presents with    Post-op Evaluation     History of Present Illness  HPI    6wk PO / OCT   DLS-06/16/2023 Dr. Arroyo     Pt sts vision still blurred in OD, keeps eye closed in OD because blur   bothers him.  Wearing sunglasses or glasses makes him more comfortable and   opens eye.  When eye open has no pain or problem.  When squeezing eye   closed has some aching.    No problems OS.     (-)Flashes (+)Floaters OS  (+)Photophobia  (-)Glare    EYEMEDS:  No gtts   Last edited by True Arroyo MD on 7/27/2023  1:53 PM.        Imaging:    See report    Assessment/Plan:     1. History of detached retina repair  Doing well s/p mac off RD repair  Attached  Observe  Can continue sunglasses or glasses since finds more comfortable but no intraocular inflammation  Needs new refraction, has much better pinhole vision, myopic shift s/p SB    - Posterior Segment OCT Retina-Both eyes    2. Chorioretinal scar, both eyes  Stable.  S/p RD repair OD  OS without breaks    - Posterior Segment OCT Retina-Both eyes    3. Retinal tear, right  Stable with good retinopexy surrounding      Pathology of PVD, Retinal Tear, Retinal Detachment reviewed in great detail  RD precautions discussed in detail, patient expressed understanding  RTC immediately PRN (especially ANY change flashes, floaters, vision, visual field)      Follow up in about 4 months (around 11/27/2023), or if symptoms worsen or fail to improve, for Comprehensive Examination, OCT Mac.

## 2023-08-29 DIAGNOSIS — I10 ESSENTIAL HYPERTENSION: ICD-10-CM

## 2023-08-29 RX ORDER — LISINOPRIL 10 MG/1
10 TABLET ORAL DAILY
Qty: 90 TABLET | Refills: 1 | Status: SHIPPED | OUTPATIENT
Start: 2023-08-29 | End: 2023-11-06 | Stop reason: SDUPTHER

## 2023-08-29 RX ORDER — MELOXICAM 15 MG/1
15 TABLET ORAL DAILY
Qty: 30 TABLET | Refills: 3 | Status: SHIPPED | OUTPATIENT
Start: 2023-08-29 | End: 2024-02-29 | Stop reason: SDUPTHER

## 2023-08-29 NOTE — TELEPHONE ENCOUNTER
No care due was identified.  [facility] Embedded Care Due Messages. Reference number: [id number].   8/29/2023 4:56:41 AM CDT

## 2023-10-24 ENCOUNTER — OFFICE VISIT (OUTPATIENT)
Dept: OPTOMETRY | Facility: CLINIC | Age: 68
End: 2023-10-24
Payer: MEDICARE

## 2023-10-24 DIAGNOSIS — H52.203 MYOPIA WITH ASTIGMATISM AND PRESBYOPIA, BILATERAL: Primary | ICD-10-CM

## 2023-10-24 DIAGNOSIS — H52.4 MYOPIA WITH ASTIGMATISM AND PRESBYOPIA, BILATERAL: Primary | ICD-10-CM

## 2023-10-24 DIAGNOSIS — H52.13 MYOPIA WITH ASTIGMATISM AND PRESBYOPIA, BILATERAL: Primary | ICD-10-CM

## 2023-10-24 PROCEDURE — 4010F PR ACE/ARB THEARPY RXD/TAKEN: ICD-10-PCS | Mod: CPTII,S$GLB,, | Performed by: OPTOMETRIST

## 2023-10-24 PROCEDURE — 99999 PR PBB SHADOW E&M-EST. PATIENT-LVL III: ICD-10-PCS | Mod: PBBFAC,,, | Performed by: OPTOMETRIST

## 2023-10-24 PROCEDURE — 1160F RVW MEDS BY RX/DR IN RCRD: CPT | Mod: CPTII,S$GLB,, | Performed by: OPTOMETRIST

## 2023-10-24 PROCEDURE — 99999 PR PBB SHADOW E&M-EST. PATIENT-LVL III: CPT | Mod: PBBFAC,,, | Performed by: OPTOMETRIST

## 2023-10-24 PROCEDURE — 3044F HG A1C LEVEL LT 7.0%: CPT | Mod: CPTII,S$GLB,, | Performed by: OPTOMETRIST

## 2023-10-24 PROCEDURE — 1159F MED LIST DOCD IN RCRD: CPT | Mod: CPTII,S$GLB,, | Performed by: OPTOMETRIST

## 2023-10-24 PROCEDURE — 3288F PR FALLS RISK ASSESSMENT DOCUMENTED: ICD-10-PCS | Mod: CPTII,S$GLB,, | Performed by: OPTOMETRIST

## 2023-10-24 PROCEDURE — 1126F PR PAIN SEVERITY QUANTIFIED, NO PAIN PRESENT: ICD-10-PCS | Mod: CPTII,S$GLB,, | Performed by: OPTOMETRIST

## 2023-10-24 PROCEDURE — 1101F PR PT FALLS ASSESS DOC 0-1 FALLS W/OUT INJ PAST YR: ICD-10-PCS | Mod: CPTII,S$GLB,, | Performed by: OPTOMETRIST

## 2023-10-24 PROCEDURE — 1159F PR MEDICATION LIST DOCUMENTED IN MEDICAL RECORD: ICD-10-PCS | Mod: CPTII,S$GLB,, | Performed by: OPTOMETRIST

## 2023-10-24 PROCEDURE — 4010F ACE/ARB THERAPY RXD/TAKEN: CPT | Mod: CPTII,S$GLB,, | Performed by: OPTOMETRIST

## 2023-10-24 PROCEDURE — 92015 PR REFRACTION: ICD-10-PCS | Mod: S$GLB,,, | Performed by: OPTOMETRIST

## 2023-10-24 PROCEDURE — 92015 DETERMINE REFRACTIVE STATE: CPT | Mod: S$GLB,,, | Performed by: OPTOMETRIST

## 2023-10-24 PROCEDURE — 1101F PT FALLS ASSESS-DOCD LE1/YR: CPT | Mod: CPTII,S$GLB,, | Performed by: OPTOMETRIST

## 2023-10-24 PROCEDURE — 3044F PR MOST RECENT HEMOGLOBIN A1C LEVEL <7.0%: ICD-10-PCS | Mod: CPTII,S$GLB,, | Performed by: OPTOMETRIST

## 2023-10-24 PROCEDURE — 3288F FALL RISK ASSESSMENT DOCD: CPT | Mod: CPTII,S$GLB,, | Performed by: OPTOMETRIST

## 2023-10-24 PROCEDURE — 1160F PR REVIEW ALL MEDS BY PRESCRIBER/CLIN PHARMACIST DOCUMENTED: ICD-10-PCS | Mod: CPTII,S$GLB,, | Performed by: OPTOMETRIST

## 2023-10-24 PROCEDURE — 1126F AMNT PAIN NOTED NONE PRSNT: CPT | Mod: CPTII,S$GLB,, | Performed by: OPTOMETRIST

## 2023-10-24 NOTE — PROGRESS NOTES
HPI    AYDEN: 07/23 with Dr. Arroyo  Chief complaint (CC): Patient is here for annual refraction today.    Patient had RD repair and his prescription has changed since then.    Glasses? +  Contacts? -  H/o eye surgery, injections or laser: RD repair OU, retinopexy OD  H/o eye injury: -  Known eye conditions? See above  Family h/o eye conditions? Father with glaucoma  Eye gtts? AT's 1-2 times a day      (-) Flashes (-)  Floaters (-) Mucous   (-)  Tearing (-) Itching (-) Burning   (-) Headaches (+) Eye Pain/discomfort (-) Irritation   (-)  Redness (-) Double vision (-) Blurry vision    Diabetic? -  A1c? -      Last edited by Joselin Gill on 10/24/2023  1:13 PM.            Assessment /Plan     For exam results, see Encounter Report.    Myopia with astigmatism and presbyopia, bilateral      SRx released to patient. Patient educated on lens options. Cont f/u w/Dr Arroyo. RTC 1 year for routine exam.

## 2023-11-06 ENCOUNTER — OFFICE VISIT (OUTPATIENT)
Dept: INTERNAL MEDICINE | Facility: CLINIC | Age: 68
End: 2023-11-06
Payer: MEDICARE

## 2023-11-06 ENCOUNTER — LAB VISIT (OUTPATIENT)
Dept: LAB | Facility: HOSPITAL | Age: 68
End: 2023-11-06
Attending: INTERNAL MEDICINE
Payer: MEDICARE

## 2023-11-06 VITALS
HEIGHT: 66 IN | OXYGEN SATURATION: 98 % | BODY MASS INDEX: 27.25 KG/M2 | DIASTOLIC BLOOD PRESSURE: 94 MMHG | SYSTOLIC BLOOD PRESSURE: 168 MMHG | HEART RATE: 84 BPM | WEIGHT: 169.56 LBS

## 2023-11-06 DIAGNOSIS — I10 ESSENTIAL HYPERTENSION: ICD-10-CM

## 2023-11-06 DIAGNOSIS — R73.09 ELEVATED GLUCOSE LEVEL: ICD-10-CM

## 2023-11-06 DIAGNOSIS — M54.12 CERVICAL RADICULOPATHY AT C7: Primary | ICD-10-CM

## 2023-11-06 DIAGNOSIS — Z12.5 SCREENING FOR PROSTATE CANCER: ICD-10-CM

## 2023-11-06 DIAGNOSIS — G89.29 OTHER CHRONIC PAIN: ICD-10-CM

## 2023-11-06 DIAGNOSIS — M17.12 PRIMARY OSTEOARTHRITIS OF LEFT KNEE: ICD-10-CM

## 2023-11-06 DIAGNOSIS — I10 PRIMARY HYPERTENSION: ICD-10-CM

## 2023-11-06 DIAGNOSIS — M54.2 NECK PAIN: ICD-10-CM

## 2023-11-06 DIAGNOSIS — F41.1 GAD (GENERALIZED ANXIETY DISORDER): ICD-10-CM

## 2023-11-06 LAB
ANION GAP SERPL CALC-SCNC: 7 MMOL/L (ref 8–16)
BUN SERPL-MCNC: 20 MG/DL (ref 8–23)
CALCIUM SERPL-MCNC: 9.3 MG/DL (ref 8.7–10.5)
CHLORIDE SERPL-SCNC: 105 MMOL/L (ref 95–110)
CO2 SERPL-SCNC: 26 MMOL/L (ref 23–29)
COMPLEXED PSA SERPL-MCNC: 0.78 NG/ML (ref 0–4)
CREAT SERPL-MCNC: 1 MG/DL (ref 0.5–1.4)
EST. GFR  (NO RACE VARIABLE): >60 ML/MIN/1.73 M^2
ESTIMATED AVG GLUCOSE: 120 MG/DL (ref 68–131)
GLUCOSE SERPL-MCNC: 88 MG/DL (ref 70–110)
HBA1C MFR BLD: 5.8 % (ref 4–5.6)
POTASSIUM SERPL-SCNC: 4.1 MMOL/L (ref 3.5–5.1)
SODIUM SERPL-SCNC: 138 MMOL/L (ref 136–145)

## 2023-11-06 PROCEDURE — 3080F DIAST BP >= 90 MM HG: CPT | Mod: CPTII,S$GLB,, | Performed by: INTERNAL MEDICINE

## 2023-11-06 PROCEDURE — 1125F PR PAIN SEVERITY QUANTIFIED, PAIN PRESENT: ICD-10-PCS | Mod: CPTII,S$GLB,, | Performed by: INTERNAL MEDICINE

## 2023-11-06 PROCEDURE — 3008F BODY MASS INDEX DOCD: CPT | Mod: CPTII,S$GLB,, | Performed by: INTERNAL MEDICINE

## 2023-11-06 PROCEDURE — 1101F PT FALLS ASSESS-DOCD LE1/YR: CPT | Mod: CPTII,S$GLB,, | Performed by: INTERNAL MEDICINE

## 2023-11-06 PROCEDURE — 1159F MED LIST DOCD IN RCRD: CPT | Mod: CPTII,S$GLB,, | Performed by: INTERNAL MEDICINE

## 2023-11-06 PROCEDURE — 3008F PR BODY MASS INDEX (BMI) DOCUMENTED: ICD-10-PCS | Mod: CPTII,S$GLB,, | Performed by: INTERNAL MEDICINE

## 2023-11-06 PROCEDURE — 99214 PR OFFICE/OUTPT VISIT, EST, LEVL IV, 30-39 MIN: ICD-10-PCS | Mod: S$GLB,,, | Performed by: INTERNAL MEDICINE

## 2023-11-06 PROCEDURE — 36415 COLL VENOUS BLD VENIPUNCTURE: CPT | Performed by: INTERNAL MEDICINE

## 2023-11-06 PROCEDURE — 3077F PR MOST RECENT SYSTOLIC BLOOD PRESSURE >= 140 MM HG: ICD-10-PCS | Mod: CPTII,S$GLB,, | Performed by: INTERNAL MEDICINE

## 2023-11-06 PROCEDURE — 84153 ASSAY OF PSA TOTAL: CPT | Performed by: INTERNAL MEDICINE

## 2023-11-06 PROCEDURE — 3044F HG A1C LEVEL LT 7.0%: CPT | Mod: CPTII,S$GLB,, | Performed by: INTERNAL MEDICINE

## 2023-11-06 PROCEDURE — 83036 HEMOGLOBIN GLYCOSYLATED A1C: CPT | Performed by: INTERNAL MEDICINE

## 2023-11-06 PROCEDURE — 1101F PR PT FALLS ASSESS DOC 0-1 FALLS W/OUT INJ PAST YR: ICD-10-PCS | Mod: CPTII,S$GLB,, | Performed by: INTERNAL MEDICINE

## 2023-11-06 PROCEDURE — 1159F PR MEDICATION LIST DOCUMENTED IN MEDICAL RECORD: ICD-10-PCS | Mod: CPTII,S$GLB,, | Performed by: INTERNAL MEDICINE

## 2023-11-06 PROCEDURE — 3080F PR MOST RECENT DIASTOLIC BLOOD PRESSURE >= 90 MM HG: ICD-10-PCS | Mod: CPTII,S$GLB,, | Performed by: INTERNAL MEDICINE

## 2023-11-06 PROCEDURE — 3288F PR FALLS RISK ASSESSMENT DOCUMENTED: ICD-10-PCS | Mod: CPTII,S$GLB,, | Performed by: INTERNAL MEDICINE

## 2023-11-06 PROCEDURE — 99999 PR PBB SHADOW E&M-EST. PATIENT-LVL IV: ICD-10-PCS | Mod: PBBFAC,,, | Performed by: INTERNAL MEDICINE

## 2023-11-06 PROCEDURE — 99999 PR PBB SHADOW E&M-EST. PATIENT-LVL IV: CPT | Mod: PBBFAC,,, | Performed by: INTERNAL MEDICINE

## 2023-11-06 PROCEDURE — 4010F ACE/ARB THERAPY RXD/TAKEN: CPT | Mod: CPTII,S$GLB,, | Performed by: INTERNAL MEDICINE

## 2023-11-06 PROCEDURE — 4010F PR ACE/ARB THEARPY RXD/TAKEN: ICD-10-PCS | Mod: CPTII,S$GLB,, | Performed by: INTERNAL MEDICINE

## 2023-11-06 PROCEDURE — 3044F PR MOST RECENT HEMOGLOBIN A1C LEVEL <7.0%: ICD-10-PCS | Mod: CPTII,S$GLB,, | Performed by: INTERNAL MEDICINE

## 2023-11-06 PROCEDURE — 80048 BASIC METABOLIC PNL TOTAL CA: CPT | Performed by: INTERNAL MEDICINE

## 2023-11-06 PROCEDURE — 1125F AMNT PAIN NOTED PAIN PRSNT: CPT | Mod: CPTII,S$GLB,, | Performed by: INTERNAL MEDICINE

## 2023-11-06 PROCEDURE — 99214 OFFICE O/P EST MOD 30 MIN: CPT | Mod: S$GLB,,, | Performed by: INTERNAL MEDICINE

## 2023-11-06 PROCEDURE — 3288F FALL RISK ASSESSMENT DOCD: CPT | Mod: CPTII,S$GLB,, | Performed by: INTERNAL MEDICINE

## 2023-11-06 PROCEDURE — 3077F SYST BP >= 140 MM HG: CPT | Mod: CPTII,S$GLB,, | Performed by: INTERNAL MEDICINE

## 2023-11-06 RX ORDER — LISINOPRIL 20 MG/1
20 TABLET ORAL DAILY
Qty: 30 TABLET | Refills: 6 | Status: SHIPPED | OUTPATIENT
Start: 2023-11-06 | End: 2024-03-14 | Stop reason: SDUPTHER

## 2023-11-06 RX ORDER — CYCLOBENZAPRINE HCL 10 MG
10 TABLET ORAL 3 TIMES DAILY PRN
Qty: 30 TABLET | Refills: 1 | Status: SHIPPED | OUTPATIENT
Start: 2023-11-06 | End: 2023-12-06

## 2023-11-06 NOTE — PROGRESS NOTES
Subjective:       Patient ID: Ladarius Solis is a 68 y.o. male.    Chief Complaint: Back Pain    Patient complains of back and neck pain.  This is an acute flare up of a chronic problem.  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  He periodically uses meloxicam.  He has not used pain meds in a while.  He did use muscle relaxer sometime in the past.    He is also hoping to get back into psychology as his prior provider retired but he has never been able to get plugged in with someone else.  At this point he thinks his anxiety and even compulsive disorder affects him.    I would like him to do some blood work as well.  No significant radiation of pain into the arms or legs.  He has had some carpal tunnel type numbness which is chronic    Blood pressure is high incidentally.  Recheck after about 20 minutes was a little lower but still well above normal.  He has been on lisinopril 10 for years.  We will double that to start but if not working in a few weeks we will make an adjustment possibly changing medicine.    Back Pain  This is a recurrent problem. The current episode started more than 1 year ago. The problem occurs every several days. The problem has been waxing and waning since onset. The pain is present in the sacro-iliac. The quality of the pain is described as aching. The pain does not radiate. The pain is at a severity of 5/10. The pain is moderate. The pain is Worse during the day. The symptoms are aggravated by position. Stiffness is present At night. Associated symptoms include leg pain, numbness and pelvic pain. Pertinent negatives include no abdominal pain, bladder incontinence, bowel incontinence, chest pain, dysuria, fever, headaches, paresis, paresthesias, perianal numbness, tingling, weakness or weight loss. Risk factors include recent trauma. The  treatment provided mild relief.     Review of Systems   Constitutional:  Negative for fever and weight loss.   Cardiovascular:  Negative for chest pain.   Gastrointestinal:  Negative for abdominal pain and bowel incontinence.   Genitourinary:  Positive for pelvic pain. Negative for bladder incontinence, dysuria and hematuria.   Musculoskeletal:  Positive for back pain and leg pain.   Neurological:  Positive for numbness. Negative for tingling, weakness, headaches and paresthesias.           Past Medical History:   Diagnosis Date    Arthritis     Carpal tunnel syndrome, bilateral     Cervical spinal stenosis 2002    Colon polyps     Fatty liver     Hypertension     Overweight (BMI 25.0-29.9) 8/16/2019    Vertigo     left ear issues     Past Surgical History:   Procedure Laterality Date    ARTHROSCOPIC CHONDROPLASTY OF KNEE JOINT Left 10/17/2018    Procedure: ARTHROSCOPY, KNEE, WITH CHONDROPLASTY;  Surgeon: GRETEL Carr MD;  Location: [facility] OR [address] [place] [address];  Service: Orthopedics;  Laterality: Left;    COLONOSCOPY      COLONOSCOPY N/A 9/25/2017    Procedure: COLONOSCOPY/emr;  Surgeon: Raul August MD;  Location: [facility] (2ND FLR);  Service: Endoscopy;  Laterality: N/A;    COLONOSCOPY N/A 3/9/2018    Procedure: COLONOSCOPY;  Surgeon: Raul August MD;  Location: [facility] (2ND FLR);  Service: Endoscopy;  Laterality: N/A;    COLONOSCOPY N/A 3/23/2023    Procedure: COLONOSCOPY;  Surgeon: Aleksander Grimes MD;  Location: [facility] (4TH FLR);  Service: Endoscopy;  Laterality: N/A;  2/13 instructions to portal-st  pre call done- AJ  does not want to come in earlier 3/22 EB    EPIDURAL STEROID INJECTION N/A 11/4/2021    Procedure: INJECTION, STEROID, EPIDURAL, C7-T1  NEED CONSENT;  Surgeon: Bozena Mena MD;  Location: [facility] PAIN MGT;  Service: Pain Management;  Laterality: N/A;    ESOPHAGOGASTRODUODENOSCOPY N/A 9/23/2019    Procedure: EGD (ESOPHAGOGASTRODUODENOSCOPY);  Surgeon: Dyllan Maloney MD;  Location: [facility];   Service: General;  Laterality: N/A;    INJECTION OF FACET JOINT Bilateral 8/6/2021    Procedure: FACET JOINT INJECTION BILATERAL L4/5 AND L5/S1 DIRECT REFERRAL NEEDS CONSENT;  Surgeon: Jasiel Aviles MD;  Location: [facility];  Service: Pain Management;  Laterality: Bilateral;    KNEE ARTHROSCOPY W/ MENISCECTOMY Left 10/17/2018    Procedure: ARTHROSCOPY, KNEE, WITH MENISCECTOMY;  Surgeon: GRETEL Carr MD;  Location: [facility] OR 2ND FLR;  Service: Orthopedics;  Laterality: Left;  regional w/catheter adductor  Dimitry/[employer] notified 10-12 LO    REPAIR OF RETINAL DETACHMENT WITH VITRECTOMY Right 4/4/2023    Procedure: REPAIR, RETINAL DETACHMENT, WITH VITRECTOMY;  Surgeon: True Arroyo MD;  [facility];  Service: Ophthalmology;  Laterality: Right;    REPAIR OF RETINAL DETACHMENT WITH VITRECTOMY Right 4/18/2023    Procedure: REPAIR, RETINAL DETACHMENT, WITH VITRECTOMY;  Surgeon: True Arroyo MD;  Location: [facility] OR [address] [place] [address];  Service: Ophthalmology;  Laterality: Right;  Scleral Buckle      Patient Active Problem List   Diagnosis    Colon adenoma    Acute pain of left knee    Primary osteoarthritis of left knee    Neck pain    Bilateral carpal tunnel syndrome    Cervical radiculopathy at C7    Chondromalacia of left knee    Chronic pain of left knee    Right trigger finger    Trigger ring finger of right hand    Hypertension    Hepatic steatosis    Overweight (BMI 25.0-29.9)    GERD (gastroesophageal reflux disease)    Chronic abdominal pain    Functional dyspepsia    Chronic pain    Aortic atherosclerosis        Objective:      Physical Exam  Constitutional:       Appearance: Normal appearance.   Cardiovascular:      Rate and Rhythm: Normal rate.   Pulmonary:      Effort: Pulmonary effort is normal. No respiratory distress.   Musculoskeletal:         General: Tenderness present.   Neurological:      Mental Status: He is alert.      Sensory: No sensory deficit.      Motor: No weakness.       Coordination: Coordination normal.      Gait: Gait normal.         Assessment:       Problem List Items Addressed This Visit          Neuro    Cervical radiculopathy at C7 - Primary    Chronic pain       Cardiac/Vascular    Hypertension    Relevant Medications    lisinopriL (PRINIVIL,ZESTRIL) 20 MG tablet    Other Relevant Orders    Basic Metabolic Panel (Completed)       Orthopedic    Primary osteoarthritis of left knee    Neck pain     Other Visit Diagnoses       Essential hypertension        Relevant Medications    lisinopriL (PRINIVIL,ZESTRIL) 20 MG tablet    Other Relevant Orders    Basic Metabolic Panel (Completed)    Screening for prostate cancer        Relevant Orders    PSA, Screening (Completed)    Elevated glucose level        Relevant Orders    Hemoglobin A1C (Completed)    Basic Metabolic Panel (Completed)    RUBIO (generalized anxiety disorder)        Relevant Orders    Ambulatory referral/consult to Psychology            Plan:         Ladarius was seen today for back pain.    Diagnoses and all orders for this visit:    Cervical radiculopathy at C7  Comments:  Had cervical injections in the past. Did not last.    Other chronic pain    Neck pain    Primary osteoarthritis of left knee    Primary hypertension  -     Basic Metabolic Panel; Future    Essential hypertension  -     lisinopriL (PRINIVIL,ZESTRIL) 20 MG tablet; Take 1 tablet (20 mg total) by mouth once daily.  -     Basic Metabolic Panel; Future    Screening for prostate cancer  -     PSA, Screening; Future    Elevated glucose level  -     Hemoglobin A1C; Future  -     Basic Metabolic Panel; Future    RUBIO (generalized anxiety disorder)  -     Ambulatory referral/consult to Psychology; Future    Other orders  -     cyclobenzaprine (FLEXERIL) 10 MG tablet; Take 1 tablet (10 mg total) by mouth 3 (three) times daily as needed for Muscle spasms.                     Portions of this note may have been created with voice recognition software.  "Occasional "wrong-word" or "sound-a-like" substitutions may have occurred due to the inherent limitations of voice recognition software. Please, read the note carefully and recognize, using context, where substitutions have occurred.  Answers submitted by the patient for this visit:  Back Pain Questionnaire (Submitted on 10/31/2023)  Chief Complaint: Back pain  genital pain: No    "

## 2023-11-06 NOTE — PATIENT INSTRUCTIONS
Keep list of BP meds and see me in a few weeks to see if the BP adjustment is working. If not we will change to a stronger medication.   Muscle relaxer may cause drowsiness.

## 2023-11-10 ENCOUNTER — PATIENT MESSAGE (OUTPATIENT)
Dept: INTERNAL MEDICINE | Facility: CLINIC | Age: 68
End: 2023-11-10
Payer: MEDICARE

## 2023-11-20 ENCOUNTER — OFFICE VISIT (OUTPATIENT)
Dept: OPHTHALMOLOGY | Facility: CLINIC | Age: 68
End: 2023-11-20
Payer: MEDICARE

## 2023-11-20 DIAGNOSIS — H33.311 RETINAL TEAR, RIGHT: ICD-10-CM

## 2023-11-20 DIAGNOSIS — Z86.69 HISTORY OF DETACHED RETINA REPAIR: Primary | ICD-10-CM

## 2023-11-20 DIAGNOSIS — Z98.890 HISTORY OF DETACHED RETINA REPAIR: Primary | ICD-10-CM

## 2023-11-20 DIAGNOSIS — H31.003 CHORIORETINAL SCAR, BOTH EYES: ICD-10-CM

## 2023-11-20 PROCEDURE — 1160F PR REVIEW ALL MEDS BY PRESCRIBER/CLIN PHARMACIST DOCUMENTED: ICD-10-PCS | Mod: CPTII,S$GLB,, | Performed by: OPHTHALMOLOGY

## 2023-11-20 PROCEDURE — 4010F ACE/ARB THERAPY RXD/TAKEN: CPT | Mod: CPTII,S$GLB,, | Performed by: OPHTHALMOLOGY

## 2023-11-20 PROCEDURE — 3288F FALL RISK ASSESSMENT DOCD: CPT | Mod: CPTII,S$GLB,, | Performed by: OPHTHALMOLOGY

## 2023-11-20 PROCEDURE — 1126F PR PAIN SEVERITY QUANTIFIED, NO PAIN PRESENT: ICD-10-PCS | Mod: CPTII,S$GLB,, | Performed by: OPHTHALMOLOGY

## 2023-11-20 PROCEDURE — 92201 OPSCPY EXTND RTA DRAW UNI/BI: CPT | Mod: S$GLB,,, | Performed by: OPHTHALMOLOGY

## 2023-11-20 PROCEDURE — 92014 PR EYE EXAM, EST PATIENT,COMPREHESV: ICD-10-PCS | Mod: S$GLB,,, | Performed by: OPHTHALMOLOGY

## 2023-11-20 PROCEDURE — 1159F MED LIST DOCD IN RCRD: CPT | Mod: CPTII,S$GLB,, | Performed by: OPHTHALMOLOGY

## 2023-11-20 PROCEDURE — 99999 PR PBB SHADOW E&M-EST. PATIENT-LVL III: CPT | Mod: PBBFAC,,, | Performed by: OPHTHALMOLOGY

## 2023-11-20 PROCEDURE — 1101F PT FALLS ASSESS-DOCD LE1/YR: CPT | Mod: CPTII,S$GLB,, | Performed by: OPHTHALMOLOGY

## 2023-11-20 PROCEDURE — 99999 PR PBB SHADOW E&M-EST. PATIENT-LVL III: ICD-10-PCS | Mod: PBBFAC,,, | Performed by: OPHTHALMOLOGY

## 2023-11-20 PROCEDURE — 3288F PR FALLS RISK ASSESSMENT DOCUMENTED: ICD-10-PCS | Mod: CPTII,S$GLB,, | Performed by: OPHTHALMOLOGY

## 2023-11-20 PROCEDURE — 1159F PR MEDICATION LIST DOCUMENTED IN MEDICAL RECORD: ICD-10-PCS | Mod: CPTII,S$GLB,, | Performed by: OPHTHALMOLOGY

## 2023-11-20 PROCEDURE — 92134 CPTRZ OPH DX IMG PST SGM RTA: CPT | Mod: S$GLB,,, | Performed by: OPHTHALMOLOGY

## 2023-11-20 PROCEDURE — 1126F AMNT PAIN NOTED NONE PRSNT: CPT | Mod: CPTII,S$GLB,, | Performed by: OPHTHALMOLOGY

## 2023-11-20 PROCEDURE — 3044F PR MOST RECENT HEMOGLOBIN A1C LEVEL <7.0%: ICD-10-PCS | Mod: CPTII,S$GLB,, | Performed by: OPHTHALMOLOGY

## 2023-11-20 PROCEDURE — 1160F RVW MEDS BY RX/DR IN RCRD: CPT | Mod: CPTII,S$GLB,, | Performed by: OPHTHALMOLOGY

## 2023-11-20 PROCEDURE — 92014 COMPRE OPH EXAM EST PT 1/>: CPT | Mod: S$GLB,,, | Performed by: OPHTHALMOLOGY

## 2023-11-20 PROCEDURE — 4010F PR ACE/ARB THEARPY RXD/TAKEN: ICD-10-PCS | Mod: CPTII,S$GLB,, | Performed by: OPHTHALMOLOGY

## 2023-11-20 PROCEDURE — 92134 POSTERIOR SEGMENT OCT RETINA (OCULAR COHERENCE TOMOGRAPHY)-BOTH EYES: ICD-10-PCS | Mod: S$GLB,,, | Performed by: OPHTHALMOLOGY

## 2023-11-20 PROCEDURE — 1101F PR PT FALLS ASSESS DOC 0-1 FALLS W/OUT INJ PAST YR: ICD-10-PCS | Mod: CPTII,S$GLB,, | Performed by: OPHTHALMOLOGY

## 2023-11-20 PROCEDURE — 3044F HG A1C LEVEL LT 7.0%: CPT | Mod: CPTII,S$GLB,, | Performed by: OPHTHALMOLOGY

## 2023-11-20 PROCEDURE — 92201 PR OPHTHALMOSCOPY, EXT, W/RET DRAW/SCLERAL DEPR, I&R, UNI/BI: ICD-10-PCS | Mod: S$GLB,,, | Performed by: OPHTHALMOLOGY

## 2023-11-20 NOTE — PROGRESS NOTES
Subjective:       Patient ID: Ladarius Solis is a 67 y.o. male      Chief Complaint   Patient presents with    Concerns About Ocular Health     History of Present Illness  HPI    4 mo DFE/OCT   DLS- 07/27/2023 Dr. Arroyo     Pt sts OD sometimes feels sore it comes and goes since last visit he works   outside in a adi atmosphere and unsure if it could be allergies..    Just seen Dr. Masterson for update RX did not receive yet BCVA is 20/50 OD   and 20/20 OS.   Denies any eye pain   (-)Flashes (-)Floaters  (-)Photophobia  (-)Glare    EYEMEDS:  At's PRN   Last edited by True Arroyo MD on 11/20/2023  1:52 PM.        Imaging:    See report    Assessment/Plan:     1. History of detached retina repair  Doing well s/p mac off RD repair  Attached  Observe  Recommend to get new glasses.  Has Rx    - Posterior Segment OCT Retina-Both eyes    2. Chorioretinal scar, both eyes  Stable.  S/p RD repair OD  OS without breaks    - Posterior Segment OCT Retina-Both eyes    3. Retinal tear, right  Stable with good retinopexy surrounding      Pathology of PVD, Retinal Tear, Retinal Detachment reviewed in great detail  RD precautions discussed in detail, patient expressed understanding  RTC immediately PRN (especially ANY change flashes, floaters, vision, visual field)    Myopic optic discs, IOP good.  Eval with comprehensive eye doctor yearly also    Follow up in about 1 year (around 11/20/2024), or if symptoms worsen or fail to improve, for OCT Mac, Comprehensive Examination.

## 2023-12-14 ENCOUNTER — PATIENT OUTREACH (OUTPATIENT)
Dept: ADMINISTRATIVE | Facility: HOSPITAL | Age: 68
End: 2023-12-14
Payer: MEDICARE

## 2023-12-14 ENCOUNTER — PATIENT MESSAGE (OUTPATIENT)
Dept: ADMINISTRATIVE | Facility: HOSPITAL | Age: 68
End: 2023-12-14
Payer: MEDICARE

## 2023-12-14 NOTE — PROGRESS NOTES
Non-compliant report chart audits for [facility] Non-Compliant BP.. Pt does not have an upcoming appt. Pt needs OV or Nurse visit to check BP.        LVM for pt to contact the clinic.     Portal Message sent.

## 2023-12-19 ENCOUNTER — OFFICE VISIT (OUTPATIENT)
Dept: INTERNAL MEDICINE | Facility: CLINIC | Age: 68
End: 2023-12-19
Payer: MEDICARE

## 2023-12-19 VITALS
HEIGHT: 66 IN | SYSTOLIC BLOOD PRESSURE: 136 MMHG | HEART RATE: 88 BPM | OXYGEN SATURATION: 98 % | WEIGHT: 171.5 LBS | DIASTOLIC BLOOD PRESSURE: 88 MMHG | BODY MASS INDEX: 27.56 KG/M2

## 2023-12-19 DIAGNOSIS — M54.12 CERVICAL RADICULOPATHY AT C7: ICD-10-CM

## 2023-12-19 DIAGNOSIS — M65.341 TRIGGER RING FINGER OF RIGHT HAND: ICD-10-CM

## 2023-12-19 DIAGNOSIS — I10 PRIMARY HYPERTENSION: Primary | ICD-10-CM

## 2023-12-19 DIAGNOSIS — M54.2 NECK PAIN: ICD-10-CM

## 2023-12-19 DIAGNOSIS — G89.29 OTHER CHRONIC PAIN: ICD-10-CM

## 2023-12-19 PROCEDURE — 3008F PR BODY MASS INDEX (BMI) DOCUMENTED: ICD-10-PCS | Mod: CPTII,S$GLB,, | Performed by: INTERNAL MEDICINE

## 2023-12-19 PROCEDURE — 1159F PR MEDICATION LIST DOCUMENTED IN MEDICAL RECORD: ICD-10-PCS | Mod: CPTII,S$GLB,, | Performed by: INTERNAL MEDICINE

## 2023-12-19 PROCEDURE — 99214 OFFICE O/P EST MOD 30 MIN: CPT | Mod: S$GLB,,, | Performed by: INTERNAL MEDICINE

## 2023-12-19 PROCEDURE — 1125F PR PAIN SEVERITY QUANTIFIED, PAIN PRESENT: ICD-10-PCS | Mod: CPTII,S$GLB,, | Performed by: INTERNAL MEDICINE

## 2023-12-19 PROCEDURE — 1160F PR REVIEW ALL MEDS BY PRESCRIBER/CLIN PHARMACIST DOCUMENTED: ICD-10-PCS | Mod: CPTII,S$GLB,, | Performed by: INTERNAL MEDICINE

## 2023-12-19 PROCEDURE — 1101F PR PT FALLS ASSESS DOC 0-1 FALLS W/OUT INJ PAST YR: ICD-10-PCS | Mod: CPTII,S$GLB,, | Performed by: INTERNAL MEDICINE

## 2023-12-19 PROCEDURE — 99999 PR PBB SHADOW E&M-EST. PATIENT-LVL IV: ICD-10-PCS | Mod: PBBFAC,,, | Performed by: INTERNAL MEDICINE

## 2023-12-19 PROCEDURE — 3008F BODY MASS INDEX DOCD: CPT | Mod: CPTII,S$GLB,, | Performed by: INTERNAL MEDICINE

## 2023-12-19 PROCEDURE — 1160F RVW MEDS BY RX/DR IN RCRD: CPT | Mod: CPTII,S$GLB,, | Performed by: INTERNAL MEDICINE

## 2023-12-19 PROCEDURE — 1125F AMNT PAIN NOTED PAIN PRSNT: CPT | Mod: CPTII,S$GLB,, | Performed by: INTERNAL MEDICINE

## 2023-12-19 PROCEDURE — 4010F PR ACE/ARB THEARPY RXD/TAKEN: ICD-10-PCS | Mod: CPTII,S$GLB,, | Performed by: INTERNAL MEDICINE

## 2023-12-19 PROCEDURE — 3288F FALL RISK ASSESSMENT DOCD: CPT | Mod: CPTII,S$GLB,, | Performed by: INTERNAL MEDICINE

## 2023-12-19 PROCEDURE — 99999 PR PBB SHADOW E&M-EST. PATIENT-LVL IV: CPT | Mod: PBBFAC,,, | Performed by: INTERNAL MEDICINE

## 2023-12-19 PROCEDURE — 3288F PR FALLS RISK ASSESSMENT DOCUMENTED: ICD-10-PCS | Mod: CPTII,S$GLB,, | Performed by: INTERNAL MEDICINE

## 2023-12-19 PROCEDURE — 3044F HG A1C LEVEL LT 7.0%: CPT | Mod: CPTII,S$GLB,, | Performed by: INTERNAL MEDICINE

## 2023-12-19 PROCEDURE — 3044F PR MOST RECENT HEMOGLOBIN A1C LEVEL <7.0%: ICD-10-PCS | Mod: CPTII,S$GLB,, | Performed by: INTERNAL MEDICINE

## 2023-12-19 PROCEDURE — 1159F MED LIST DOCD IN RCRD: CPT | Mod: CPTII,S$GLB,, | Performed by: INTERNAL MEDICINE

## 2023-12-19 PROCEDURE — 4010F ACE/ARB THERAPY RXD/TAKEN: CPT | Mod: CPTII,S$GLB,, | Performed by: INTERNAL MEDICINE

## 2023-12-19 PROCEDURE — 3075F SYST BP GE 130 - 139MM HG: CPT | Mod: CPTII,S$GLB,, | Performed by: INTERNAL MEDICINE

## 2023-12-19 PROCEDURE — 3079F DIAST BP 80-89 MM HG: CPT | Mod: CPTII,S$GLB,, | Performed by: INTERNAL MEDICINE

## 2023-12-19 PROCEDURE — 3079F PR MOST RECENT DIASTOLIC BLOOD PRESSURE 80-89 MM HG: ICD-10-PCS | Mod: CPTII,S$GLB,, | Performed by: INTERNAL MEDICINE

## 2023-12-19 PROCEDURE — 99214 PR OFFICE/OUTPT VISIT, EST, LEVL IV, 30-39 MIN: ICD-10-PCS | Mod: S$GLB,,, | Performed by: INTERNAL MEDICINE

## 2023-12-19 PROCEDURE — 1101F PT FALLS ASSESS-DOCD LE1/YR: CPT | Mod: CPTII,S$GLB,, | Performed by: INTERNAL MEDICINE

## 2023-12-19 PROCEDURE — 3075F PR MOST RECENT SYSTOLIC BLOOD PRESS GE 130-139MM HG: ICD-10-PCS | Mod: CPTII,S$GLB,, | Performed by: INTERNAL MEDICINE

## 2023-12-19 RX ORDER — CYCLOBENZAPRINE HCL 10 MG
10 TABLET ORAL 3 TIMES DAILY PRN
COMMUNITY

## 2023-12-19 NOTE — PATIENT INSTRUCTIONS
"Patient Education       Low Salt Diet   About this topic   Sodium is a type of mineral found in many foods. It may also be called "salt." Sodium helps balance fluids in your body. Too much sodium may be bad for your health. You may have to limit the amount of sodium in your food.  Salt is known as sodium chloride. It is measured in grams (g) or milligrams (mg). Salt or sodium in our diet comes from 3 main sources:  Some sodium is naturally found in food.  We may add salt to our food when we eat or cook.  Processed foods give us most of the sodium in our diet.         What will the results be?   This diet may help lower your blood pressure. It may also help reduce extra water in your body. This may help kidney, heart, or liver problems.  What changes to diet are needed?   You need to know how much sodium is in the food you eat. Read food labels with care. Choose foods that have 5% or less sodium in one serving. Remember, if you eat more than one serving, you will be getting more sodium. It may take a while for your sense of taste to get used to food with less sodium. Be patient with yourself. You may be surprised at how well you will do.  Try to aim for a diet that has 2,300 mg (2.3 g) or less sodium in it each day. The Food & Drug Administration (FDA) has set up guidelines for food labels. These will help you make healthy choices. Look for these terms on food packages:  Sodium-free: Less than 5 mg in each serving. These are safe to eat.  Very low sodium: 35 mg of sodium or less in each serving. These are safe to eat.  Low sodium: 140 mg of sodium or less in each serving. You need to eat these with care.  Reduced sodium: At least 25% less sodium than is most often found in each serving. These foods can still be high in sodium.  Light in sodium: 50% less sodium in each serving.  Unsalted, no added salt, and without added salt: No salt is added during processing, but the food may still have sodium. Read the food label " and check the sodium content before eating.  What foods are good to eat?   You can control the amount of sodium in foods you make at home. Fresh foods that you cook are most often lower in sodium.  Regular bread, unsalted crackers, dry cereal, cooked rice, pasta, quinoa, and corn tortillas.  Fresh, frozen, low sodium, or salt-free canned vegetables. Limit vegetable juice or tomato juice to 1/2 cup (120 mL) each day.  Fresh, frozen, canned, or dried fruit without salt added  Nonfat or low-fat milk and yogurt, low-sodium cottage cheese, and other cheeses low in sodium.  Fresh or frozen beef, veal, lamb, pork, poultry, fish, shellfish  Low sodium canned meats, frozen dinners with less than 600 mg sodium  Corn, safflower, sunflower, and soybean oils and unsalted nuts and seeds  Egg and egg substitutes without added sodium  Dried peas, beans, and low-sodium peanut butter  All plain oils and low-sodium salad dressing  Low-sodium broths, soups, soy sauce, condiments, and snack foods  Pepper, herbs, spices, vinegar, lemon or lime juice  Low-sodium carbonated drinks  What foods should be limited or avoided?   Foods that are prepackaged or canned are most often high in sodium. Foods to limit or avoid include:  Salted breads, rolls, crackers, biscuits, cornbread  Quick breads, self-rising flours, biscuit mixes, regular bread crumbs, instant hot cereals  Commercially prepared rice, pasta, or stuffing mixes; potatoes and vegetable mixes  Regular canned vegetables and juices, vegetables with sauce, and pickled vegetables  Frozen vegetables with seasonings and sauces  Processed fruits with salt or sodium  Malted and chocolate milk and buttermilk  Regular and processed cheese and spreads  Smoked, cured, salted, or canned meat, fish, or poultry such as markham, sausages, sardines, chipped beef, hot dogs, cold cuts, and frozen breaded meats  Salted and canned peas, beans, and olives  Salted snack foods and nuts  Oils mixed with  high-sodium parts such as salad dressing  Meat tenderizers, seasoning salt, and most flavored vinegars  Ketchup, bouillon cubes, salt, sea salt, kosher salt, onion salt, garlic salt, and pink Himalayan salt  What can be done to prevent this health problem?   Check with your doctor before using salt substitutes. Also, check the labels when taking over-the-counter (OTC) drugs such as laxatives and antacids. These can have a high sodium content.  When do I need to call the doctor?   Call your doctor if you have questions about this diet. Talk to a dietitian. They can help you find hidden sources of sodium in the food you eat.  Helpful tips   Use the nutrition facts labels as a guide to look for foods lower in sodium.  Do not use salt when eating or cooking.  Select fresh fruits and vegetables for snacks.  If you are eating out, ask the  to cook your food without salt, or choose foods without sauces.  Season your food with herbs and spices.  Drain and rinse canned beans and vegetables that contain sodium.  Eat foods with potassium. Potassium helps counter the effects of sodium. This may help lower your blood pressure. Foods high in potassium include: Potatoes, tomatoes, bananas, oranges, cantaloupe, beans, and greens.  Where can I learn more?   American Heart Association  https://www.heart.org/en/healthy-living/healthy-eating/eat-smart/sodium/how-to-reduce-sodium   American Heart Association  https://www.heart.org/en/healthy-living/healthy-eating/eat-smart/nutrition-basics/food-packaging-claims   NHS  https://www.nhs.uk/live-well/eat-well/tips-for-a-lower-salt-diet/   UpToDate  http://www.[employer].com/contents/low-sodium-diet-beyond-the-basics   Last Reviewed Date   2021-10-11  Consumer Information Use and Disclaimer   This information is not specific medical advice and does not replace information you receive from your health care provider. This is only a brief summary of general information. It does NOT include  all information about conditions, illnesses, injuries, tests, procedures, treatments, therapies, discharge instructions or life-style choices that may apply to you. You must talk with your health care provider for complete information about your health and treatment options. This information should not be used to decide whether or not to accept your health care providers advice, instructions or recommendations. Only your health care provider has the knowledge and training to provide advice that is right for you.  Copyright   Copyright © 2021 [employer] and its affiliates and/or licensors. All rights reserved.

## 2023-12-19 NOTE — PROGRESS NOTES
Subjective:       Patient ID: Ladarius Solis is a 68 y.o. male.    Chief Complaint: Follow-up and Hypertension    Seen for follow-up hypertension and neck pain.  He also saw orthopedics for the trigger finger but has decided not to do surgery or an injection.    Blood pressure somewhat improved on the higher dose of lisinopril.  Is not monitoring his numbers and I encouraged him to do so.    He says his neck and back are fairly stable.  He infrequently uses the muscle relaxer.  Side effects discussed.      Review of Systems   Constitutional:  Negative for fever.   Cardiovascular:  Negative for chest pain.   Gastrointestinal:  Negative for abdominal pain.   Musculoskeletal:  Positive for arthralgias, neck pain and neck stiffness.        Trigger finger intermittent           Past Medical History:   Diagnosis Date    Arthritis     Carpal tunnel syndrome, bilateral     Cervical spinal stenosis 2002    Colon polyps     Fatty liver     Hypertension     Overweight (BMI 25.0-29.9) 8/16/2019    Vertigo     left ear issues     Past Surgical History:   Procedure Laterality Date    ARTHROSCOPIC CHONDROPLASTY OF KNEE JOINT Left 10/17/2018    Procedure: ARTHROSCOPY, KNEE, WITH CHONDROPLASTY;  Surgeon: GRETEL Carr MD;  Location: [facility] OR [address] [place] [address];  Service: Orthopedics;  Laterality: Left;    COLONOSCOPY      COLONOSCOPY N/A 9/25/2017    Procedure: COLONOSCOPY/emr;  Surgeon: Raul August MD;  Location: [facility] (2ND FLR);  Service: Endoscopy;  Laterality: N/A;    COLONOSCOPY N/A 3/9/2018    Procedure: COLONOSCOPY;  Surgeon: Raul August MD;  Location: [facility] (2ND FLR);  Service: Endoscopy;  Laterality: N/A;    COLONOSCOPY N/A 3/23/2023    Procedure: COLONOSCOPY;  Surgeon: Aleksander Grimes MD;  Location: [facility] (4TH FLR);  Service: Endoscopy;  Laterality: N/A;  2/13 instructions to portal-st  pre call done- AJ  does not want to come in earlier 3/22 EB    EPIDURAL STEROID INJECTION N/A 11/4/2021    Procedure:  INJECTION, STEROID, EPIDURAL, C7-T1  NEED CONSENT;  Surgeon: Bozena Mena MD;  Location: [facility] [place]IN [facility]GT;  Service: Pain Management;  Laterality: N/A;    ESOPHAGOGASTRODUODENOSCOPY N/A 9/23/2019    Procedure: EGD (ESOPHAGOGASTRODUODENOSCOPY);  Surgeon: Dyllan Maloney MD;  Location: [facility];  Service: General;  Laterality: N/A;    INJECTION OF FACET JOINT Bilateral 8/6/2021    Procedure: FACET JOINT INJECTION BILATERAL L4/5 AND L5/S1 DIRECT REFERRAL NEEDS CONSENT;  Surgeon: Jasiel Aviles MD;  Location: [facility] [place] PAIN MGT;  Service: Pain Management;  Laterality: Bilateral;    KNEE ARTHROSCOPY W/ MENISCECTOMY Left 10/17/2018    Procedure: ARTHROSCOPY, KNEE, WITH MENISCECTOMY;  Surgeon: GRETEL Carr MD;  Location: [facility] OR 2ND FLR;  Service: Orthopedics;  Laterality: Left;  regional w/catheter adductor  Dimitry/[employer] notified 10-12 LO    REPAIR OF RETINAL DETACHMENT WITH VITRECTOMY Right 4/4/2023    Procedure: REPAIR, RETINAL DETACHMENT, WITH VITRECTOMY;  Surgeon: True Arroyo MD;  Location: [facility] OR 1ST FLR;  Service: Ophthalmology;  Laterality: Right;    REPAIR OF RETINAL DETACHMENT WITH VITRECTOMY Right 4/18/2023    Procedure: REPAIR, RETINAL DETACHMENT, WITH VITRECTOMY;  Surgeon: True Arroyo MD;  Location: [facility] OR 1ST FLR;  Service: Ophthalmology;  Laterality: Right;  Scleral Buckle      Patient Active Problem List   Diagnosis    Colon adenoma    Acute pain of left knee    Primary osteoarthritis of left knee    Neck pain    Bilateral carpal tunnel syndrome    Cervical radiculopathy at C7    Chondromalacia of left knee    Chronic pain of left knee    Right trigger finger    Trigger ring finger of right hand    Hypertension    Hepatic steatosis    Overweight (BMI 25.0-29.9)    GERD (gastroesophageal reflux disease)    Chronic abdominal pain    Functional dyspepsia    Chronic pain    Aortic atherosclerosis        Objective:      Physical Exam  Constitutional:       Appearance: Normal  "appearance.   Cardiovascular:      Rate and Rhythm: Normal rate.      Pulses: Normal pulses.   Pulmonary:      Effort: Pulmonary effort is normal.      Breath sounds: No wheezing.   Abdominal:      Tenderness: There is no abdominal tenderness.   Musculoskeletal:      Cervical back: Rigidity present. No tenderness.      Comments: Trigger finger   Neurological:      General: No focal deficit present.      Mental Status: He is alert.   Psychiatric:         Mood and Affect: Mood normal.         Behavior: Behavior normal.         Assessment:       Problem List Items Addressed This Visit          Neuro    Cervical radiculopathy at C7    Chronic pain       Cardiac/Vascular    Hypertension - Primary       Orthopedic    Neck pain    Trigger ring finger of right hand       Plan:         Ladarius was seen today for follow-up and hypertension.    Diagnoses and all orders for this visit:    Primary hypertension    Cervical radiculopathy at C7    Other chronic pain    Neck pain    Trigger ring finger of right hand           Continue meds.  Monitor blood pressure.  Follow-up in 3 months and bring a list of readings.  Low-salt diet          Portions of this note may have been created with voice recognition software. Occasional "wrong-word" or "sound-a-like" substitutions may have occurred due to the inherent limitations of voice recognition software. Please, read the note carefully and recognize, using context, where substitutions have occurred.  "

## 2024-01-14 NOTE — PROGRESS NOTES
Individual Psychotherapy (PhD/LCSW)    4/26/2022    Site:  [facility]         Therapeutic Intervention: Met with patient.  Outpatient - Insight oriented psychotherapy 45 min - CPT code 06423    Chief complaint/reason for encounter: depression, anxiety, sleep, appetite, behavior, somatic and interpersonal     Interval history and content of current session: discussed medical problems, sleep, and being on a lot of pain, and does not want surgery, back concerns, coping skills addressed, likes to help others, discussed boundaries, with his room mate and her , right now the relationships are stable, states his room mate can be loud and argue a lot and tries to avoid this, he likes quit and beng calm, how to take care of himself, communications, and dealing with pain all addressed today. Also past family issues discussed as well.    Treatment plan:  · Target symptoms: depression, recurrent depression, anxiety , mood swings, mood disorder, adjustment  · Why chosen therapy is appropriate versus another modality: relevant to diagnosis, patient responds to this modality, evidence based practice  · Outcome monitoring methods: self-report, observation  · Therapeutic intervention type: insight oriented psychotherapy, behavior modifying psychotherapy, supportive psychotherapy    Risk parameters:  Patient reports no suicidal ideation  Patient reports no homicidal ideation  Patient reports no self-injurious behavior  Patient reports no violent behavior    Verbal deficits: None    Patient's response to intervention:  The patient's response to intervention is accepting.    Progress toward goals and other mental status changes:  The patient's progress toward goals is fair .    Diagnosis:     ICD-10-CM ICD-9-CM   1. RUBIO (generalized anxiety disorder)  F41.1 300.02       Plan:  individual psychotherapy and medication management by physician    Return to clinic: 2 weeks    Length of Service (minutes): 45      
Irregular Contractions

## 2024-02-29 RX ORDER — MELOXICAM 15 MG/1
15 TABLET ORAL DAILY
Qty: 30 TABLET | Refills: 3 | Status: SHIPPED | OUTPATIENT
Start: 2024-02-29 | End: 2024-06-12

## 2024-02-29 NOTE — TELEPHONE ENCOUNTER
Care Due:                  Date            Visit Type   Department     Provider  --------------------------------------------------------------------------------                                EP -                              PRIMARY      [facility] INTERNAL  Last Visit: 12-      CARE ([facility])   MELIDA Stanton                               -                              PRIMARY      [facility] INTERNAL  Next Visit: 03-      CARE ([facility])   MELIDA Stanton                                                            Last  Test          Frequency    Reason                     Performed    Due Date  --------------------------------------------------------------------------------    CBC.........  12 months..  meloxicam................  04- 04-    CMP.........  12 months..  meloxicam................  04- 04-    [employer] Embedded Care Due Messages. Reference number: [id number].   2/29/2024 9:59:00 AM CST

## 2024-03-14 DIAGNOSIS — I10 ESSENTIAL HYPERTENSION: ICD-10-CM

## 2024-03-14 RX ORDER — LISINOPRIL 20 MG/1
20 TABLET ORAL DAILY
Qty: 30 TABLET | Refills: 6 | OUTPATIENT
Start: 2024-03-14 | End: 2024-04-13

## 2024-03-14 NOTE — TELEPHONE ENCOUNTER
No care due was identified.  [facility] Embedded Care Due Messages. Reference number: [id number].   3/14/2024 10:15:27 AM CDT

## 2024-03-26 DIAGNOSIS — Z00.00 ENCOUNTER FOR MEDICARE ANNUAL WELLNESS EXAM: ICD-10-CM

## 2024-04-12 ENCOUNTER — OFFICE VISIT (OUTPATIENT)
Dept: INTERNAL MEDICINE | Facility: CLINIC | Age: 69
End: 2024-04-12
Payer: MEDICARE

## 2024-04-12 ENCOUNTER — PATIENT MESSAGE (OUTPATIENT)
Dept: BEHAVIORAL HEALTH | Facility: CLINIC | Age: 69
End: 2024-04-12
Payer: MEDICARE

## 2024-04-12 ENCOUNTER — LAB VISIT (OUTPATIENT)
Dept: LAB | Facility: HOSPITAL | Age: 69
End: 2024-04-12
Attending: INTERNAL MEDICINE
Payer: MEDICARE

## 2024-04-12 ENCOUNTER — TELEPHONE (OUTPATIENT)
Dept: BEHAVIORAL HEALTH | Facility: CLINIC | Age: 69
End: 2024-04-12
Payer: MEDICARE

## 2024-04-12 VITALS
BODY MASS INDEX: 26.93 KG/M2 | SYSTOLIC BLOOD PRESSURE: 160 MMHG | OXYGEN SATURATION: 98 % | HEART RATE: 70 BPM | DIASTOLIC BLOOD PRESSURE: 88 MMHG | WEIGHT: 167.56 LBS | HEIGHT: 66 IN

## 2024-04-12 DIAGNOSIS — R73.09 ELEVATED GLUCOSE LEVEL: ICD-10-CM

## 2024-04-12 DIAGNOSIS — R42 DIZZINESS: ICD-10-CM

## 2024-04-12 DIAGNOSIS — I10 PRIMARY HYPERTENSION: Primary | ICD-10-CM

## 2024-04-12 DIAGNOSIS — F41.9 ANXIETY: ICD-10-CM

## 2024-04-12 DIAGNOSIS — F43.9 SITUATIONAL STRESS: ICD-10-CM

## 2024-04-12 DIAGNOSIS — I10 PRIMARY HYPERTENSION: ICD-10-CM

## 2024-04-12 LAB
ALBUMIN SERPL BCP-MCNC: 3.8 G/DL (ref 3.5–5.2)
ALP SERPL-CCNC: 58 U/L (ref 55–135)
ALT SERPL W/O P-5'-P-CCNC: 33 U/L (ref 10–44)
ANION GAP SERPL CALC-SCNC: 4 MMOL/L (ref 8–16)
AST SERPL-CCNC: 22 U/L (ref 10–40)
BASOPHILS # BLD AUTO: 0.06 K/UL (ref 0–0.2)
BASOPHILS NFR BLD: 1 % (ref 0–1.9)
BILIRUB SERPL-MCNC: 0.7 MG/DL (ref 0.1–1)
BUN SERPL-MCNC: 20 MG/DL (ref 8–23)
CALCIUM SERPL-MCNC: 9.2 MG/DL (ref 8.7–10.5)
CHLORIDE SERPL-SCNC: 107 MMOL/L (ref 95–110)
CHOLEST SERPL-MCNC: 178 MG/DL (ref 120–199)
CHOLEST/HDLC SERPL: 3.9 {RATIO} (ref 2–5)
CO2 SERPL-SCNC: 27 MMOL/L (ref 23–29)
CREAT SERPL-MCNC: 1 MG/DL (ref 0.5–1.4)
DIFFERENTIAL METHOD BLD: ABNORMAL
EOSINOPHIL # BLD AUTO: 0.1 K/UL (ref 0–0.5)
EOSINOPHIL NFR BLD: 2.2 % (ref 0–8)
ERYTHROCYTE [DISTWIDTH] IN BLOOD BY AUTOMATED COUNT: 12.4 % (ref 11.5–14.5)
EST. GFR  (NO RACE VARIABLE): >60 ML/MIN/1.73 M^2
ESTIMATED AVG GLUCOSE: 123 MG/DL (ref 68–131)
GLUCOSE SERPL-MCNC: 100 MG/DL (ref 70–110)
HBA1C MFR BLD: 5.9 % (ref 4–5.6)
HCT VFR BLD AUTO: 41.1 % (ref 40–54)
HDLC SERPL-MCNC: 46 MG/DL (ref 40–75)
HDLC SERPL: 25.8 % (ref 20–50)
HGB BLD-MCNC: 13.8 G/DL (ref 14–18)
IMM GRANULOCYTES # BLD AUTO: 0.02 K/UL (ref 0–0.04)
IMM GRANULOCYTES NFR BLD AUTO: 0.3 % (ref 0–0.5)
LDLC SERPL CALC-MCNC: 103.2 MG/DL (ref 63–159)
LYMPHOCYTES # BLD AUTO: 1.6 K/UL (ref 1–4.8)
LYMPHOCYTES NFR BLD: 25.6 % (ref 18–48)
MCH RBC QN AUTO: 30.9 PG (ref 27–31)
MCHC RBC AUTO-ENTMCNC: 33.6 G/DL (ref 32–36)
MCV RBC AUTO: 92 FL (ref 82–98)
MONOCYTES # BLD AUTO: 0.4 K/UL (ref 0.3–1)
MONOCYTES NFR BLD: 7 % (ref 4–15)
NEUTROPHILS # BLD AUTO: 4 K/UL (ref 1.8–7.7)
NEUTROPHILS NFR BLD: 63.9 % (ref 38–73)
NONHDLC SERPL-MCNC: 132 MG/DL
NRBC BLD-RTO: 0 /100 WBC
PLATELET # BLD AUTO: 175 K/UL (ref 150–450)
PMV BLD AUTO: 11 FL (ref 9.2–12.9)
POTASSIUM SERPL-SCNC: 4 MMOL/L (ref 3.5–5.1)
PROT SERPL-MCNC: 6.3 G/DL (ref 6–8.4)
RBC # BLD AUTO: 4.46 M/UL (ref 4.6–6.2)
SODIUM SERPL-SCNC: 138 MMOL/L (ref 136–145)
TRIGL SERPL-MCNC: 144 MG/DL (ref 30–150)
TSH SERPL DL<=0.005 MIU/L-ACNC: 1.79 UIU/ML (ref 0.4–4)
WBC # BLD AUTO: 6.25 K/UL (ref 3.9–12.7)

## 2024-04-12 PROCEDURE — 1160F RVW MEDS BY RX/DR IN RCRD: CPT | Mod: CPTII,S$GLB,, | Performed by: INTERNAL MEDICINE

## 2024-04-12 PROCEDURE — 3077F SYST BP >= 140 MM HG: CPT | Mod: CPTII,S$GLB,, | Performed by: INTERNAL MEDICINE

## 2024-04-12 PROCEDURE — 99214 OFFICE O/P EST MOD 30 MIN: CPT | Mod: S$GLB,,, | Performed by: INTERNAL MEDICINE

## 2024-04-12 PROCEDURE — 3288F FALL RISK ASSESSMENT DOCD: CPT | Mod: CPTII,S$GLB,, | Performed by: INTERNAL MEDICINE

## 2024-04-12 PROCEDURE — 3079F DIAST BP 80-89 MM HG: CPT | Mod: CPTII,S$GLB,, | Performed by: INTERNAL MEDICINE

## 2024-04-12 PROCEDURE — 85025 COMPLETE CBC W/AUTO DIFF WBC: CPT | Performed by: INTERNAL MEDICINE

## 2024-04-12 PROCEDURE — 1101F PT FALLS ASSESS-DOCD LE1/YR: CPT | Mod: CPTII,S$GLB,, | Performed by: INTERNAL MEDICINE

## 2024-04-12 PROCEDURE — 36415 COLL VENOUS BLD VENIPUNCTURE: CPT | Performed by: INTERNAL MEDICINE

## 2024-04-12 PROCEDURE — 99999 PR PBB SHADOW E&M-EST. PATIENT-LVL IV: CPT | Mod: PBBFAC,,, | Performed by: INTERNAL MEDICINE

## 2024-04-12 PROCEDURE — 3008F BODY MASS INDEX DOCD: CPT | Mod: CPTII,S$GLB,, | Performed by: INTERNAL MEDICINE

## 2024-04-12 PROCEDURE — 80061 LIPID PANEL: CPT | Performed by: INTERNAL MEDICINE

## 2024-04-12 PROCEDURE — 1125F AMNT PAIN NOTED PAIN PRSNT: CPT | Mod: CPTII,S$GLB,, | Performed by: INTERNAL MEDICINE

## 2024-04-12 PROCEDURE — 1159F MED LIST DOCD IN RCRD: CPT | Mod: CPTII,S$GLB,, | Performed by: INTERNAL MEDICINE

## 2024-04-12 PROCEDURE — 84443 ASSAY THYROID STIM HORMONE: CPT | Performed by: INTERNAL MEDICINE

## 2024-04-12 PROCEDURE — 80053 COMPREHEN METABOLIC PANEL: CPT | Performed by: INTERNAL MEDICINE

## 2024-04-12 PROCEDURE — 83036 HEMOGLOBIN GLYCOSYLATED A1C: CPT | Performed by: INTERNAL MEDICINE

## 2024-04-12 PROCEDURE — 4010F ACE/ARB THERAPY RXD/TAKEN: CPT | Mod: CPTII,S$GLB,, | Performed by: INTERNAL MEDICINE

## 2024-04-12 NOTE — PROGRESS NOTES
Behavioral Health Community Health Worker  Initial Assessment  Completed by:  Tamica Santillan    Date:  4/12/2024    Patient Enrollment in Behavioral Health Program:  Patient verbalized understanding of Behavioral Health Integration services to include:  Patient understands that CHW, LCSW, PharmD and consulting Psychiatrist are members of the care team working collaboratively with his/her primary care provider: Yes  Patient understands that activation of their [facility] patient portal account is required for accessing the full scope of team services: Yes  Patient understands that some counseling sessions may occur via video: Yes  Clinic visits with the psychiatrist may be subject to a co-pay based on your insurance: Yes  Patient consents to enroll in BHI program: Yes    Assessments     Single Item Health Literacy Scale:  How often do you need to have someone help you read instructions, pamphlets or other written material from your doctor or pharmacy?: Sometimes    Promis 10:  Promis 10 Responses  In general, would you say your health is: Fair  In general, would you say your quality of life is: Fair  In general, how would you rate your physical health?: Fair  In general, how would you rate your mental health, including your mood and your ability to think?: Fair  In general, how would you rate your satisfaction with your social activities and relationships?: Fair  In general, please rate how well you carry out your usual social activities and roles. (This includes activities at home, at work and in your community, and responsibilities as a parent, child, spouse, employee, friend, etc.): Poor  To what extent are you able to carry out your everyday physical activities such as walking, climbing stairs, carrying groceries, or moving a chair? : Completely  How often have you been bothered by emotional problems such as feeling anxious, depressed or irritable?: Always  In the past 7 days, how would you rate your fatigue on  average?: Moderate  In the past 7 days, on a scale of 0 to 10 (where 0 is no pain and 10 is the worst pain imaginable) how would you rate your pain on average?: 4  Global Physical Health: 14  Global Mental health Score: 11    Depression PHQ:      4/12/2024     1:53 PM 4/12/2024     8:57 AM 3/16/2023    10:25 AM 8/29/2022     3:04 PM 5/23/2022     2:44 PM 9/16/2021     1:31 PM 8/25/2021     8:49 AM   PHQ-9 Depression Patient Health Questionnaire   Over the last two weeks how often have you been bothered by little interest or pleasure in doing things 3 0 2 1 0 0 0   Over the last two weeks how often have you been bothered by feeling down, depressed or hopeless 2 0 0 1 0 0 0   Over the last two weeks how often have you been bothered by trouble falling or staying asleep, or sleeping too much 3         Over the last two weeks how often have you been bothered by feeling tired or having little energy 2         Over the last two weeks how often have you been bothered by a poor appetite or overeating 2         Over the last two weeks how often have you been bothered by feeling bad about yourself - or that you are a failure or have let yourself or your family down 3         Over the last two weeks how often have you been bothered by trouble concentrating on things, such as reading the newspaper or watching television 2         Over the last two weeks how often have you been bothered by moving or speaking so slowly that other people could have noticed. 1         Over the last two weeks how often have you been bothered by thoughts that you would be better off dead, or of hurting yourself 1         If you checked off any problems, how difficult have these problems made it for you to do your work, take care of things at home or get along with other people? Extremely difficult         PHQ-9 Score 19                Generalized Anxiety Disorder 7-Item Scale:      4/12/2024     2:00 PM   GAD7   1. Feeling nervous, anxious, or on  edge? 3   2. Not being able to stop or control worrying? 3   3. Worrying too much about different things? 3   4. Trouble relaxing? 3   5. Being so restless that it is hard to sit still? 3   6. Becoming easily annoyed or irritable? 3   7. Feeling afraid as if something awful might happen? 3   8. If you checked off any problems, how difficult have these problems made it for you to do your work, take care of things at home, or get along with other people? 3   RUBIO-7 Score 21       History     Social History     Socioeconomic History    Marital status: Single   Occupational History    Occupation:    Tobacco Use    Smoking status: Never    Smokeless tobacco: Never   Substance and Sexual Activity    Alcohol use: No    Drug use: No     Social Determinants of Health     Financial Resource Strain: Low Risk  (11/14/2023)    Overall Financial Resource Strain (CARDIA)     Difficulty of Paying Living Expenses: Not hard at all   Food Insecurity: No Food Insecurity (11/14/2023)    Hunger Vital Sign     Worried About Running Out of Food in the Last Year: Never true     Ran Out of Food in the Last Year: Never true   Transportation Needs: No Transportation Needs (11/14/2023)    PRAPARE - Transportation     Lack of Transportation (Medical): No     Lack of Transportation (Non-Medical): No   Physical Activity: Inactive (11/14/2023)    Exercise Vital Sign     Days of Exercise per Week: 0 days     Minutes of Exercise per Session: 0 min   Stress: No Stress Concern Present (11/14/2023)    [place] [employer] - Occupational Stress Questionnaire     Feeling of Stress : Only a little   Social Connections: Unknown (11/14/2023)    Social Connection and Isolation Panel [NHANES]     Frequency of Communication with Friends and Family: Patient declined     Frequency of Social Gatherings with Friends and Family: Patient declined     Active Member of Clubs or Organizations: Yes     Attends Club or Organization  "Meetings: More than 4 times per year     Marital Status: Never    Housing Stability: Low Risk  (11/14/2023)    Housing Stability Vital Sign     Unable to Pay for Housing in the Last Year: No     Number of Places Lived in the Last Year: 1     Unstable Housing in the Last Year: No       Call Summary   Patient was referred to the BHI (Non-opioid) program by Primary Care Provider, Dr. Stanton CHW contacted Ladarius Solis who reports anxiety that limits [his/her] activities of daily living (ADLs).   Patient scored "19" on the PHQ9 and "21" on the RUBIO 7. Based on these scores patient is eligible for the Behavioral health Integration (Non-opioid) Program. CHW completed the intake and scheduled an appointment for patient with Carlos Enrique Hutchison LPC, on 4/15/24.   Patient has SI/H thoughts at times that it would be better if he were not here but he is not having the thoughts at this time. No plan to harm self just has thoughts at times. Patient given emergent information [id number] etc    "

## 2024-04-12 NOTE — PROGRESS NOTES
Subjective:       Patient ID: Ladarius Solis is a 68 y.o. male.    Chief Complaint: Follow-up    HPI: Here for HTN follow up.  Unfortunately he has not been checking his blood pressure so we discussed the digital hypertension program which he is eligible for.  He also said the Psychiatry/Psychology appointment consult I placed a few months ago never materialized.  He said he never got a call from anyone.  I explained he was probably supposed to reach out to them but we will assist.  Off and on over the years he has had a few mild dizzy spells and recently had 1.  His concern is that he worked in a parking lot of a grocery store and he became concerned he would fall in the parking lot in the path of a car so he decided to quit working but now he is somewhat stressed and anxious about that as well.  Wanted to discus a dizzy spell from 4/2/24. Was on commode and got up and he instantly felt dizzy. Felt off balance, unsteady, had to hold on. Lasted a few hours.   Has had a few brief episodes like this in the past.  He said he even went to the ER in the past.  No headache or head trauma.  Nothing unusual to eat or drink.  Did not measure his blood pressure.    Follow-up  Associated symptoms include arthralgias and neck pain. Pertinent negatives include no chest pain.     Review of Systems   Respiratory:  Negative for chest tightness and shortness of breath.    Cardiovascular:  Negative for chest pain.   Musculoskeletal:  Positive for arthralgias, back pain, gait problem (During the dizzy spell), neck pain and neck stiffness.   Neurological:  Positive for dizziness. Negative for light-headedness and memory loss.           Past Medical History:   Diagnosis Date    Arthritis     Carpal tunnel syndrome, bilateral     Cervical spinal stenosis 2002    Colon polyps     Fatty liver     Hypertension     Overweight (BMI 25.0-29.9) 8/16/2019    Vertigo     left ear issues     Past Surgical History:   Procedure Laterality Date     ARTHROSCOPIC CHONDROPLASTY OF KNEE JOINT Left 10/17/2018    Procedure: ARTHROSCOPY, KNEE, WITH CHONDROPLASTY;  Surgeon: GRETEL Carr MD;  Location: [facility] OR 2ND FLR;  Service: Orthopedics;  Laterality: Left;    COLONOSCOPY      COLONOSCOPY N/A 9/25/2017    Procedure: COLONOSCOPY/emr;  Surgeon: Raul August MD;  Location: [facility] (2ND FLR);  Service: Endoscopy;  Laterality: N/A;    COLONOSCOPY N/A 3/9/2018    Procedure: COLONOSCOPY;  Surgeon: Raul August MD;  Location: [facility] (2ND FLR);  Service: Endoscopy;  Laterality: N/A;    COLONOSCOPY N/A 3/23/2023    Procedure: COLONOSCOPY;  Surgeon: Aleksander Grimes MD;  Location: [facility] (4TH FLR);  Service: Endoscopy;  Laterality: N/A;  2/13 instructions to portal-st  pre call done- AJ  does not want to come in earlier 3/22 EB    EPIDURAL STEROID INJECTION N/A 11/4/2021    Procedure: INJECTION, STEROID, EPIDURAL, C7-T1  NEED CONSENT;  Surgeon: Bozena Mnea MD;  Location: [facility] PAIN MGT;  Service: Pain Management;  Laterality: N/A;    ESOPHAGOGASTRODUODENOSCOPY N/A 9/23/2019    Procedure: EGD (ESOPHAGOGASTRODUODENOSCOPY);  Surgeon: Dyllan Maloney MD;  Location: [facility];  Service: General;  Laterality: N/A;    INJECTION OF FACET JOINT Bilateral 8/6/2021    Procedure: FACET JOINT INJECTION BILATERAL L4/5 AND L5/S1 DIRECT REFERRAL NEEDS CONSENT;  Surgeon: Jasiel Aviles MD;  Location: [facility] PAIN MGT;  Service: Pain Management;  Laterality: Bilateral;    KNEE ARTHROSCOPY W/ MENISCECTOMY Left 10/17/2018    Procedure: ARTHROSCOPY, KNEE, WITH MENISCECTOMY;  Surgeon: GRETEL Carr MD;  Location: [facility] OR 2ND FLR;  Service: Orthopedics;  Laterality: Left;  regional w/catheter adductor  Dimitry/[employer] notified 10-12 LO    REPAIR OF RETINAL DETACHMENT WITH VITRECTOMY Right 4/4/2023    Procedure: REPAIR, RETINAL DETACHMENT, WITH VITRECTOMY;  Surgeon: True Arroyo MD;  Location: [facility] 1ST FLR;  Service: Ophthalmology;  Laterality: Right;    REPAIR  OF RETINAL DETACHMENT WITH VITRECTOMY Right 4/18/2023    Procedure: REPAIR, RETINAL DETACHMENT, WITH VITRECTOMY;  Surgeon: True Arroyo MD;  Location: [facility] OR [address] [place] [address];  Service: Ophthalmology;  Laterality: Right;  Scleral Buckle      Patient Active Problem List   Diagnosis    Colon adenoma    Acute pain of left knee    Primary osteoarthritis of left knee    Neck pain    Bilateral carpal tunnel syndrome    Cervical radiculopathy at C7    Chondromalacia of left knee    Chronic pain of left knee    Right trigger finger    Trigger ring finger of right hand    Hypertension    Hepatic steatosis    Overweight (BMI 25.0-29.9)    GERD (gastroesophageal reflux disease)    Chronic abdominal pain    Functional dyspepsia    Chronic pain    Aortic atherosclerosis        Objective:      Physical Exam  Constitutional:       General: He is not in acute distress.     Appearance: He is well-developed.   HENT:      Head: Normocephalic and atraumatic.      Right Ear: Tympanic membrane, ear canal and external ear normal. There is impacted cerumen (substantial wax removed from the right ear, not sure if affecting dizziness or hearing).      Left Ear: Tympanic membrane, ear canal and external ear normal.      Mouth/Throat:      Pharynx: No oropharyngeal exudate or posterior oropharyngeal erythema.   Eyes:      General: No scleral icterus.     Extraocular Movements: Extraocular movements intact.      Conjunctiva/sclera: Conjunctivae normal.      Pupils: Pupils are equal, round, and reactive to light.      Comments: No nystagmus or triggered dizziness   Neck:      Thyroid: No thyromegaly.      Comments: No supraclavicular nodes palpated  Cardiovascular:      Rate and Rhythm: Normal rate and regular rhythm.      Pulses: Normal pulses.      Heart sounds: Normal heart sounds. No murmur heard.  Pulmonary:      Effort: Pulmonary effort is normal.      Breath sounds: Normal breath sounds. No wheezing.   Abdominal:      General: Bowel  sounds are normal.      Palpations: Abdomen is soft. There is no mass.      Tenderness: There is no abdominal tenderness.   Musculoskeletal:         General: No tenderness.      Cervical back: Normal range of motion and neck supple.      Right lower leg: No edema.      Left lower leg: No edema.   Lymphadenopathy:      Cervical: No cervical adenopathy.   Skin:     Coloration: Skin is not jaundiced or pale.   Neurological:      General: No focal deficit present.      Mental Status: He is alert and oriented to person, place, and time.   Psychiatric:         Mood and Affect: Mood normal.         Behavior: Behavior normal.         Assessment:       Problem List Items Addressed This Visit          Cardiac/Vascular    Hypertension - Primary    Relevant Orders    Hypertension Digital Medicine (HDMP) Enrollment Order (Completed)    Lipid Panel    TSH    Hemoglobin A1C    Comprehensive Metabolic Panel    CBC Auto Differential     Other Visit Diagnoses       Anxiety        Relevant Orders    Ambulatory referral/consult to Primary Care Behavioral Health (Non-Opioids)    Lipid Panel    TSH    Hemoglobin A1C    Comprehensive Metabolic Panel    CBC Auto Differential    Situational stress        Relevant Orders    Ambulatory referral/consult to Primary Care Behavioral Health (Non-Opioids)    Lipid Panel    TSH    Hemoglobin A1C    Comprehensive Metabolic Panel    CBC Auto Differential    Dizziness        Relevant Orders    Lipid Panel    TSH    Hemoglobin A1C    Comprehensive Metabolic Panel    CBC Auto Differential    Elevated glucose level        Relevant Orders    Hemoglobin A1C            Plan:         Ladarius was seen today for follow-up.    Diagnoses and all orders for this visit:    Primary hypertension  -     Hypertension Digital Medicine (HDMP) Enrollment Order  -     Lipid Panel; Future  -     TSH; Future  -     Hemoglobin A1C; Future  -     Comprehensive Metabolic Panel; Future  -     CBC Auto Differential;  "Future    Anxiety  -     Ambulatory referral/consult to Primary Care Behavioral Health (Non-Opioids); Future  -     Lipid Panel; Future  -     TSH; Future  -     Hemoglobin A1C; Future  -     Comprehensive Metabolic Panel; Future  -     CBC Auto Differential; Future    Situational stress  -     Ambulatory referral/consult to Primary Care Behavioral Health (Non-Opioids); Future  -     Lipid Panel; Future  -     TSH; Future  -     Hemoglobin A1C; Future  -     Comprehensive Metabolic Panel; Future  -     CBC Auto Differential; Future    Dizziness  -     Lipid Panel; Future  -     TSH; Future  -     Hemoglobin A1C; Future  -     Comprehensive Metabolic Panel; Future  -     CBC Auto Differential; Future    Elevated glucose level  -     Hemoglobin A1C; Future           We will try to address all of his issues, get better monitoring and labs.  Follow-up with me in 2 months as well          Portions of this note may have been created with voice recognition software. Occasional "wrong-word" or "sound-a-like" substitutions may have occurred due to the inherent limitations of voice recognition software. Please, read the note carefully and recognize, using context, where substitutions have occurred.  "

## 2024-04-12 NOTE — PROGRESS NOTES
Primary Care Behavioral Health Integration: Initial  Date:  04/15/2024  Referral Source:  Nick Stanton MD  Type of Visit:  Video Session  Length of Appointment: 28 minutes spent face to face and 14  spent engaged in non face to face clinical tasks.  The patient location is:  [place] [address]  The patient phone number is: [phone]  Visit type: Virtual visit with synchronous audio and video  Each patient to whom he or she provides medical services by telemedicine is:  (1) informed of the relationship between the physician and patient and the respective role of any other health care provider with respect to management of the patient; and (2) notified that he or she may decline to receive medical services by telemedicine and may withdraw from such care at any time.    Chief Complaint/Reason for Encounter:  Anxiety and Depression    History of Present Illness: Ladarius Solis, a 68 y.o. male referred by Nick Stanton MD.  Patient was seen, examined and chart was reviewed. Met with patient.       LPC provided a description of the [facility] program, reviewed confidentialty and limits to confidentiality, and discussed safety planning in the event patient expereinces suicidal or homicidal ideation or plans to harm himself.  Patient began this Initial Assessment by stating he has a history of anxiety and depression.  Noted he was seeing Diego Blanc, Ph.D., [facility] for therapy.  Stated he was no longer able to see Dr. Blanc due to his unexpected death.  Reported symptoms include depressed mood, lack of motivation to complete tasks, increased anger/irritability, excessive worrying, insomnia, decreased appetite, difficulty concentrating, obsessions/compulsions, and suicidal ideation.  Patient reported he has fleeting thoughts of suicide, but he has not plan to harm himself or others.  Stated he quit his job on April 1, 2024.  Worked at [employer].  Noted  "quitting his job "left a big hole in me emotionally."  Reported he engages in checking behaviors (checking to see if doors are locked, turning lights on/off, and he does this while counting).  In addition to the above symptoms, patient stated he does not feel the need to interact with others.  Although he could not remember the specific names of his past psychiatric medications, (stated he thinks he may have taken Prozac and Effexor), he reported he experienced nightmares while taking them.  Stated he had to admit himself into a psychiatric facility in [place], [place] after taking care of his father who was diagnosed with dementia because patient was "experiencing psychosis."  Patient's stated goals for therapy are to effectively control his anger/irritability and to actively work on improving his mood.  [facility] sent patient Tool 1 - Identification of Triggers to Anxiety worksheet of the CBT Toolbox workbook.  Will review patient's completed worksheet during follow-up session on April 30, 2024.          Past Medical History:   Diagnosis Date    Arthritis     Carpal tunnel syndrome, bilateral     Cervical spinal stenosis 2002    Colon polyps     Fatty liver     Hypertension     Overweight (BMI 25.0-29.9) 8/16/2019    Vertigo     left ear issues         Current Outpatient Medications:     cyclobenzaprine (FLEXERIL) 10 MG tablet, Take 10 mg by mouth 3 (three) times daily as needed for Muscle spasms. (Patient not taking: Reported on 4/12/2024), Disp: , Rfl:     lisinopriL (PRINIVIL,ZESTRIL) 20 MG tablet, Take 1 tablet (20 mg total) by mouth once daily., Disp: 30 tablet, Rfl: 6    meloxicam (MOBIC) 15 MG tablet, Take 1 tablet (15 mg total) by mouth once daily., Disp: 30 tablet, Rfl: 3    Current Facility-Administered Medications:     sodium chloride 0.9% flush 10 mL, 10 mL, Intravenous, PRN, Diego Carbajal MD    Facility-Administered Medications Ordered in Other Visits:     sodium hyaluronate (EUFLEXXA) 10 mg/mL(mw 2.4 -3.6 " million) Syrg 20 mg, 20 mg, Intra-articular, , GRETEL Carr MD, 20 mg at 05/10/18 7483    Current symptoms:  Depression Symptoms: anhedonia, insomnia, fatigue, difficulty concentrating, decreased appetite, and SI.  Anxiety Symptoms: excessive worrying, restlessness, and obsessions/compulsions.  Sleep Difficulties: Patient reports difficulty falling asleep and Patient reports non-restful sleep  Manic Symptoms:  denies.  Psychosis: denies .    Risk assessment:  Patient reports suicidal ideation: Patient reported he has fleeting thoughts of suicide, but he does not have any plans to harm himself.    Patient reports no homicidal ideation  Patient reports no self-injurious behavior  Patient reports no violent behavior    Patient advised to call [phone] or present the the nearest ED if they experience suicidal or homicidal ideation, plan or intent.      Psychiatric History:  Diagnosis:    Current Psychiatric Medication: No They are interested in medication changes.   Medication Trial History:  Medication Trials: Yes    Outpatient Treatment: Yes - Patient stated he saw Dr. Guanaco Call [facility] from [id number]   Inpatient Treatment: Yes - [facility]    Suicide Attempts: Yes - After caring for his father who was diagnosed with dementia.   Access to Firearms: No   History of Trauma: No   Family Psychiatric History: Father - dementia, psychosis     Current and Past Substance Use:  Alcohol: Patient denies alcohol use.    Drugs: Denied.   Nicotine: denied   Caffeine:  Soft drinks almost daily     Mental Status Exam  General Appearance:  appears stated age, neatly dressed, well groomed   Speech: normal tone, normal rate, normal pitch, normal volume      Level of Cooperation: cooperative      Thought Processes: linear, logical, goal-directed   Mood: depressed      Thought Content: {relevant and appropriate   Affect: flat   Orientation: Oriented x4   Memory/Attention/Concentration: No gross cognitive deficits  made evident during conversation   Judgment & Insight: fair   Language  intact          No data to display                    4/12/2024     2:00 PM 7/12/2021     1:45 PM 12/5/2018     1:06 PM   GAD7   1. Feeling nervous, anxious, or on edge? 3 3 0   2. Not being able to stop or control worrying? 3 3 0   3. Worrying too much about different things? 3 3    4. Trouble relaxing? 3 3    5. Being so restless that it is hard to sit still? 3 3    6. Becoming easily annoyed or irritable? 3 3    7. Feeling afraid as if something awful might happen? 3 1    8. If you checked off any problems, how difficult have these problems made it for you to do your work, take care of things at home, or get along with other people? 3 2    RUBIO-7 Score 21 19        Impression: Initial appointment focused on gathering history, identifying treatment goals and developing a treatment plan.      Patient experiences depressed mood, lack of motivation to complete tasks, increased anger/irritability, excessive worrying, insomnia, decreased appetite, difficulty concentrating, obsessions/compulsions, and suicidal ideation as evidenced by fear of uncertainty, racing and intrusive thoughts, and irritability.  These symptoms are making it difficult for patient to function effectively.      Diagnosis:  No diagnosis found.    Treatment Goals and Plan:   Anxiety: reducing negative automatic thoughts, reducing physical symptoms of anxiety, and reducing time spent worrying (<30 minutes/day)  Depression: reducing excessive guilt, reducing fatigue, and reducing negative automatic thoughts    Future treatment will utilize CBT, Problem-solving Therapy, and Solution-focused Therapy.      Return to Clinic:  2 weeks

## 2024-04-13 ENCOUNTER — NURSE TRIAGE (OUTPATIENT)
Dept: ADMINISTRATIVE | Facility: CLINIC | Age: 69
End: 2024-04-13
Payer: MEDICARE

## 2024-04-13 ENCOUNTER — HOSPITAL ENCOUNTER (EMERGENCY)
Facility: HOSPITAL | Age: 69
Discharge: HOME | End: 2024-04-13
Attending: EMERGENCY MEDICINE
Payer: MEDICARE

## 2024-04-13 VITALS
WEIGHT: 168 LBS | DIASTOLIC BLOOD PRESSURE: 81 MMHG | OXYGEN SATURATION: 99 % | RESPIRATION RATE: 16 BRPM | TEMPERATURE: 98 F | HEART RATE: 64 BPM | SYSTOLIC BLOOD PRESSURE: 179 MMHG | BODY MASS INDEX: 27 KG/M2 | HEIGHT: 66 IN

## 2024-04-13 DIAGNOSIS — R51.9 ACUTE NONINTRACTABLE HEADACHE, UNSPECIFIED HEADACHE TYPE: ICD-10-CM

## 2024-04-13 DIAGNOSIS — I16.0 HYPERTENSIVE URGENCY: Primary | ICD-10-CM

## 2024-04-13 LAB
BASOPHILS # BLD AUTO: 0.04 K/UL (ref 0–0.2)
BASOPHILS NFR BLD: 0.7 % (ref 0–1.9)
BUN SERPL-MCNC: 17 MG/DL (ref 6–30)
CHLORIDE SERPL-SCNC: 104 MMOL/L (ref 95–110)
CREAT SERPL-MCNC: 1 MG/DL (ref 0.5–1.4)
CRP SERPL-MCNC: 0.9 MG/L (ref 0–8.2)
DIFFERENTIAL METHOD BLD: ABNORMAL
EOSINOPHIL # BLD AUTO: 0.1 K/UL (ref 0–0.5)
EOSINOPHIL NFR BLD: 2.1 % (ref 0–8)
ERYTHROCYTE [DISTWIDTH] IN BLOOD BY AUTOMATED COUNT: 12.3 % (ref 11.5–14.5)
ERYTHROCYTE [SEDIMENTATION RATE] IN BLOOD BY PHOTOMETRIC METHOD: <2 MM/HR (ref 0–23)
GLUCOSE SERPL-MCNC: 118 MG/DL (ref 70–110)
HCT VFR BLD AUTO: 41.3 % (ref 40–54)
HCT VFR BLD CALC: 40 %PCV (ref 36–54)
HGB BLD-MCNC: 13.9 G/DL (ref 14–18)
IMM GRANULOCYTES # BLD AUTO: 0.02 K/UL (ref 0–0.04)
IMM GRANULOCYTES NFR BLD AUTO: 0.3 % (ref 0–0.5)
INR PPP: 0.9 (ref 0.8–1.2)
LYMPHOCYTES # BLD AUTO: 1.2 K/UL (ref 1–4.8)
LYMPHOCYTES NFR BLD: 20.3 % (ref 18–48)
MAGNESIUM SERPL-MCNC: 2.2 MG/DL (ref 1.6–2.6)
MCH RBC QN AUTO: 30.4 PG (ref 27–31)
MCHC RBC AUTO-ENTMCNC: 33.7 G/DL (ref 32–36)
MCV RBC AUTO: 90 FL (ref 82–98)
MONOCYTES # BLD AUTO: 0.4 K/UL (ref 0.3–1)
MONOCYTES NFR BLD: 6.1 % (ref 4–15)
NEUTROPHILS # BLD AUTO: 4 K/UL (ref 1.8–7.7)
NEUTROPHILS NFR BLD: 70.5 % (ref 38–73)
NRBC BLD-RTO: 0 /100 WBC
PLATELET # BLD AUTO: 175 K/UL (ref 150–450)
PMV BLD AUTO: 10.5 FL (ref 9.2–12.9)
POC IONIZED CALCIUM: 1.14 MMOL/L (ref 1.06–1.42)
POC TCO2 (MEASURED): 25 MMOL/L (ref 23–29)
POTASSIUM BLD-SCNC: 4.9 MMOL/L (ref 3.5–5.1)
PROTHROMBIN TIME: 10.1 SEC (ref 9–12.5)
RBC # BLD AUTO: 4.57 M/UL (ref 4.6–6.2)
SAMPLE: ABNORMAL
SODIUM BLD-SCNC: 139 MMOL/L (ref 136–145)
WBC # BLD AUTO: 5.72 K/UL (ref 3.9–12.7)

## 2024-04-13 PROCEDURE — 86140 C-REACTIVE PROTEIN: CPT | Performed by: EMERGENCY MEDICINE

## 2024-04-13 PROCEDURE — 25500020 PHARM REV CODE 255: Performed by: EMERGENCY MEDICINE

## 2024-04-13 PROCEDURE — 80047 BASIC METABLC PNL IONIZED CA: CPT

## 2024-04-13 PROCEDURE — 96375 TX/PRO/DX INJ NEW DRUG ADDON: CPT | Mod: 59

## 2024-04-13 PROCEDURE — 25000003 PHARM REV CODE 250: Performed by: EMERGENCY MEDICINE

## 2024-04-13 PROCEDURE — 85025 COMPLETE CBC W/AUTO DIFF WBC: CPT | Performed by: EMERGENCY MEDICINE

## 2024-04-13 PROCEDURE — 96374 THER/PROPH/DIAG INJ IV PUSH: CPT | Mod: 59

## 2024-04-13 PROCEDURE — 85652 RBC SED RATE AUTOMATED: CPT | Performed by: EMERGENCY MEDICINE

## 2024-04-13 PROCEDURE — 83735 ASSAY OF MAGNESIUM: CPT | Performed by: EMERGENCY MEDICINE

## 2024-04-13 PROCEDURE — 99285 EMERGENCY DEPT VISIT HI MDM: CPT | Mod: 25

## 2024-04-13 PROCEDURE — 85610 PROTHROMBIN TIME: CPT | Performed by: EMERGENCY MEDICINE

## 2024-04-13 PROCEDURE — 63600175 PHARM REV CODE 636 W HCPCS: Performed by: EMERGENCY MEDICINE

## 2024-04-13 RX ORDER — LISINOPRIL 10 MG/1
10 TABLET ORAL
Status: COMPLETED | OUTPATIENT
Start: 2024-04-13 | End: 2024-04-13

## 2024-04-13 RX ORDER — ONDANSETRON 4 MG/1
4 TABLET, FILM COATED ORAL EVERY 8 HOURS PRN
Qty: 12 TABLET | Refills: 0 | Status: SHIPPED | OUTPATIENT
Start: 2024-04-13 | End: 2024-04-20

## 2024-04-13 RX ORDER — HYDRALAZINE HYDROCHLORIDE 20 MG/ML
10 INJECTION INTRAMUSCULAR; INTRAVENOUS
Status: COMPLETED | OUTPATIENT
Start: 2024-04-13 | End: 2024-04-13

## 2024-04-13 RX ORDER — MORPHINE SULFATE 4 MG/ML
4 INJECTION, SOLUTION INTRAMUSCULAR; INTRAVENOUS
Status: COMPLETED | OUTPATIENT
Start: 2024-04-13 | End: 2024-04-13

## 2024-04-13 RX ORDER — LISINOPRIL 20 MG/1
20 TABLET ORAL DAILY
Qty: 30 TABLET | Refills: 0 | Status: SHIPPED | OUTPATIENT
Start: 2024-04-13 | End: 2024-04-18

## 2024-04-13 RX ORDER — HYDROCODONE BITARTRATE AND ACETAMINOPHEN 5; 325 MG/1; MG/1
1 TABLET ORAL EVERY 6 HOURS PRN
Qty: 12 TABLET | Refills: 0 | Status: SHIPPED | OUTPATIENT
Start: 2024-04-13 | End: 2024-05-23

## 2024-04-13 RX ORDER — ONDANSETRON HYDROCHLORIDE 2 MG/ML
4 INJECTION, SOLUTION INTRAVENOUS
Status: COMPLETED | OUTPATIENT
Start: 2024-04-13 | End: 2024-04-13

## 2024-04-13 RX ADMIN — MORPHINE SULFATE 4 MG: 4 INJECTION INTRAVENOUS at 11:04

## 2024-04-13 RX ADMIN — LISINOPRIL 10 MG: 10 TABLET ORAL at 11:04

## 2024-04-13 RX ADMIN — ONDANSETRON 4 MG: 2 INJECTION INTRAMUSCULAR; INTRAVENOUS at 01:04

## 2024-04-13 RX ADMIN — IOHEXOL 100 ML: 350 INJECTION, SOLUTION INTRAVENOUS at 01:04

## 2024-04-13 RX ADMIN — HYDRALAZINE HYDROCHLORIDE 10 MG: 20 INJECTION, SOLUTION INTRAMUSCULAR; INTRAVENOUS at 11:04

## 2024-04-13 NOTE — ED PROVIDER NOTES
Chief complaint:  Hypertension and Headache      HPI:  Ladarius Solis is a 68 y.o. male presenting with acute onset of a moderate to severe persistent headache that has been going on for at least a week and possibly 2.  The patient is concerned that he is having an adverse reaction to meloxicam and this is causing his blood pressure to be elevated.  Upon arrival his blood pressures were between 190 and 215 systolic.  He states that he was seen by his primary care doctor on Friday and his blood pressure was 180 systolic.  He was enrolled in the hypertensive digital Medicine program but he states they did not adjust his blood pressure medications.  He states he took his 10 mg of lisinopril this morning with no improvement in his blood pressure.  No fevers or chills.  No numbness or weakness.  He does state that he had an episode of severe dizziness 2 weeks ago.      ROS: As per HPI and below:  Constitutional:  No fevers, no chills  Cardiac: no chest pain  Respiratory: no shortness of breath  Abdominal: no abdominal pain, no nausea, no vomiting  Neuro: no focal numbness, no focal weakness        Review of patient's allergies indicates:  No Known Allergies    Current Facility-Administered Medications on File Prior to Encounter   Medication Dose Route Frequency Provider Last Rate Last Admin    sodium chloride 0.9% flush 10 mL  10 mL Intravenous PRN Diego Carbajal MD        sodium hyaluronate (EUFLEXXA) 10 mg/mL(mw 2.4 -3.6 million) Syrg 20 mg  20 mg Intra-articular  Bruno, W MD Toi   20 mg at 05/10/18 9841     Current Outpatient Medications on File Prior to Encounter   Medication Sig Dispense Refill    meloxicam (MOBIC) 15 MG tablet Take 1 tablet (15 mg total) by mouth once daily. 30 tablet 3    [DISCONTINUED] lisinopriL (PRINIVIL,ZESTRIL) 20 MG tablet Take 1 tablet (20 mg total) by mouth once daily. 30 tablet 6    cyclobenzaprine (FLEXERIL) 10 MG tablet Take 10 mg by mouth 3 (three) times daily as needed  for Muscle spasms. (Patient not taking: Reported on 4/12/2024)         PMH:  As per HPI and below:  Past Medical History:   Diagnosis Date    Arthritis     Carpal tunnel syndrome, bilateral     Cervical spinal stenosis 2002    Colon polyps     Fatty liver     Hypertension     Overweight (BMI 25.0-29.9) 8/16/2019    Vertigo     left ear issues     Past Surgical History:   Procedure Laterality Date    ARTHROSCOPIC CHONDROPLASTY OF KNEE JOINT Left 10/17/2018    Procedure: ARTHROSCOPY, KNEE, WITH CHONDROPLASTY;  Surgeon: GRETEL Carr MD;  Location: [facility] OR 2ND FLR;  Service: Orthopedics;  Laterality: Left;    COLONOSCOPY      COLONOSCOPY N/A 9/25/2017    Procedure: COLONOSCOPY/emr;  Surgeon: Raul August MD;  Location: [facility] (2ND FLR);  Service: Endoscopy;  Laterality: N/A;    COLONOSCOPY N/A 3/9/2018    Procedure: COLONOSCOPY;  Surgeon: Raul August MD;  Location: [facility] (2ND FLR);  Service: Endoscopy;  Laterality: N/A;    COLONOSCOPY N/A 3/23/2023    Procedure: COLONOSCOPY;  Surgeon: Aleksander Grimes MD;  Location: [facility] (4TH FLR);  Service: Endoscopy;  Laterality: N/A;  2/13 instructions to portal-st  pre call done- AJ  does not want to come in earlier 3/22 EB    EPIDURAL STEROID INJECTION N/A 11/4/2021    Procedure: INJECTION, STEROID, EPIDURAL, C7-T1  NEED CONSENT;  Surgeon: Bozena Mena MD;  Location: [facility] PAIN T;  Service: Pain Management;  Laterality: N/A;    ESOPHAGOGASTRODUODENOSCOPY N/A 9/23/2019    Procedure: EGD (ESOPHAGOGASTRODUODENOSCOPY);  Surgeon: Dyllan Maloney MD;  Location: [facility];  Service: General;  Laterality: N/A;    INJECTION OF FACET JOINT Bilateral 8/6/2021    Procedure: FACET JOINT INJECTION BILATERAL L4/5 AND L5/S1 DIRECT REFERRAL NEEDS CONSENT;  Surgeon: Jasiel Aviles MD;  Location: [facility] PAIN MGT;  Service: Pain Management;  Laterality: Bilateral;    KNEE ARTHROSCOPY W/ MENISCECTOMY Left 10/17/2018    Procedure: ARTHROSCOPY, KNEE, WITH MENISCECTOMY;   Surgeon: GRETEL Carr MD;  Location: [facility] OR 2ND FLR;  Service: Orthopedics;  Laterality: Left;  regional w/catheter vaor  Dimitry/Biju notified 10-12 LO    REPAIR OF RETINAL DETACHMENT WITH VITRECTOMY Right 4/4/2023    Procedure: REPAIR, RETINAL DETACHMENT, WITH VITRECTOMY;  Surgeon: True Arroyo MD;  Location: [facility] OR 1ST FLR;  Service: Ophthalmology;  Laterality: Right;    REPAIR OF RETINAL DETACHMENT WITH VITRECTOMY Right 4/18/2023    Procedure: REPAIR, RETINAL DETACHMENT, WITH VITRECTOMY;  Surgeon: True Arroyo MD;  Location: [facility] OR 1ST FLR;  Service: Ophthalmology;  Laterality: Right;  Scleral Buckle       Social History     Socioeconomic History    Marital status: Single   Occupational History    Occupation:    Tobacco Use    Smoking status: Never    Smokeless tobacco: Never   Substance and Sexual Activity    Alcohol use: No    Drug use: No     Social Determinants of Health     Financial Resource Strain: Low Risk  (11/14/2023)    Overall Financial Resource Strain (CARDIA)     Difficulty of Paying Living Expenses: Not hard at all   Food Insecurity: No Food Insecurity (11/14/2023)    Hunger Vital Sign     Worried About Running Out of Food in the Last Year: Never true     Ran Out of Food in the Last Year: Never true   Transportation Needs: No Transportation Needs (11/14/2023)    PRAPARE - Transportation     Lack of Transportation (Medical): No     Lack of Transportation (Non-Medical): No   Physical Activity: Inactive (11/14/2023)    Exercise Vital Sign     Days of Exercise per Week: 0 days     Minutes of Exercise per Session: 0 min   Stress: No Stress Concern Present (11/14/2023)    [employer] [place] [employer] - Occupational Stress Questionnaire     Feeling of Stress : Only a little   Social Connections: Unknown (11/14/2023)    Social Connection and Isolation Panel [NHANES]     Frequency of Communication with Friends and Family: Patient declined      Frequency of Social Gatherings with Friends and Family: Patient declined     Active Member of Clubs or Organizations: Yes     Attends Club or Organization Meetings: More than 4 times per year     Marital Status: Never    Housing Stability: Low Risk  (11/14/2023)    Housing Stability Vital Sign     Unable to Pay for Housing in the Last Year: No     Number of Places Lived in the Last Year: 1     Unstable Housing in the Last Year: No       Family History   Problem Relation Name Age of Onset    No Known Problems Mother      Arthritis Father      Dementia Father      Heart disease Father      No Known Problems Brother      Diabetes Neg Hx      Cirrhosis Neg Hx         Physical Exam:    Vitals:    04/13/24 1202   BP: (!) 178/83   Pulse: 63   Resp: 16   Temp: 97.8 °F (36.6 °C)     Constitutional: Well-nourished, well-developed, in no acute distress, not cachectic  Eyes: PERRLA, EOMI, normal conjunctiva, normal sclera  ENT: Moist Mucous membranes  Respiratory: Clear to auscultation bilaterally, no wheezes, no crackles, no rhonchi  Cardiovascular: Regular rate and rhythm, no murmurs, no rubs, no gallops  Abdominal: Soft, nontender, nondistended, no guarding, no rebound  Musculoskeletal: Normal range of motion, no obvious deformity, normal capillary refill, head atraumatic, neck supple, no meningismus  Skin: no rash, no ecchymosis, no errythema, no discharge  Neurologic: Cranial nerves II through XII intact, no motor deficits, no sensory deficits, no cerebellar deficits  Psychological: Alert, oriented x3, normal affect, normal mood    Orders Placed This Encounter   Procedures    CTA Brain    CBC auto differential    Magnesium    Protime-INR    C-reactive protein    Sedimentation rate    Insert Saline lock IV       Medications   morphine injection 4 mg (4 mg Intravenous Given 4/13/24 1155)   hydrALAZINE injection 10 mg (10 mg Intravenous Given 4/13/24 1155)   lisinopriL tablet 10 mg (10 mg Oral Given 4/13/24 1155)    iohexoL (OMNIPAQUE 350) injection 100 mL (100 mLs Intravenous Given 4/13/24 1303)   ondansetron injection 4 mg (4 mg Intravenous Given 4/13/24 1327)         Labs Reviewed   CBC W/ AUTO DIFFERENTIAL - Abnormal; Notable for the following components:       Result Value    RBC 4.57 (*)     Hemoglobin 13.9 (*)     All other components within normal limits   ISTAT PROCEDURE - Abnormal; Notable for the following components:    POC Glucose 118 (*)     All other components within normal limits   MAGNESIUM   PROTIME-INR   C-REACTIVE PROTEIN   SEDIMENTATION RATE   ISTAT CHEM8       CTA Brain   Final Result      CTA head: Unremarkable CTA head without evidence for significant proximal stenosis or definite aneurysm.      CT head: No evidence for acute intracranial findings specifically without evidence for acute intracranial hemorrhage, hydrocephalus or definite intracranial enhancing lesion.         Electronically signed by: Trenton Toth DO   Date:    04/13/2024   Time:    13:32          Medical Decision Making  Nurses includes hypertensive urgency, life-threatening hypertensive emergency, subarachnoid hemorrhage, temporal arteritis     Patient presents with acute onset of a headache that has been present for at least a week and possibly to.  He states that he is concerned he is having an adverse reaction to meloxicam.  However his blood pressure was found to be in over 210 upon arrival.  He saw his primary care physician on Friday and they did not adjust his blood pressure medications at this time.  He is only on 10 mg of lisinopril.  Given this severe headache that he is stating as well as this episode of dizziness that he had about 2 weeks ago, I will obtain a CT of the head as well as some screening laboratories which were all negative on Friday.  Will also send ESR and CRP to rule out temporal arteritis    Patient's laboratories are unremarkable including ESR and CRP.  His CT of the head also was unremarkable for  subarachnoid hemorrhage or aneurysm.  Unclear etiology for his headache but it has improved with pain medication.  His blood pressure is also improved with IV and oral blood pressure medications.  Will discharge home with a short course of pain medication and nausea medication as well as adjustment in his home regimen by doubling his lisinopril 20 mg daily.    Amount and/or Complexity of Data Reviewed  Labs: ordered.  Radiology: ordered and independent interpretation performed.     Details: CTA head:  No acute process    Risk  Prescription drug management.  Drug therapy requiring intensive monitoring for toxicity.      Procedures          ASSESSMENT  1. Hypertensive urgency    2. Acute nonintractable headache, unspecified headache type          Disposition:  Discharged home in stable condition    New Prescriptions    HYDROCODONE-ACETAMINOPHEN (NORCO) 5-325 MG PER TABLET    Take 1 tablet by mouth every 6 (six) hours as needed for Pain.    LISINOPRIL (PRINIVIL,ZESTRIL) 20 MG TABLET    Take 1 tablet (20 mg total) by mouth once daily.    ONDANSETRON (ZOFRAN) 4 MG TABLET    Take 1 tablet (4 mg total) by mouth every 8 (eight) hours as needed.     Modified Medications    No medications on file     Discontinued Medications    LISINOPRIL (PRINIVIL,ZESTRIL) 20 MG TABLET    Take 1 tablet (20 mg total) by mouth once daily.          Iain Ware III, MD  04/13/24 1329

## 2024-04-13 NOTE — ED NOTES
I-STAT Chem-8+ Results:   Value Reference Range   Sodium 139 136-145 mmol/L   Potassium  4.9 3.5-5.1 mmol/L   Chloride 104  mmol/L   Ionized Calcium 1.14 1.06-1.42 mmol/L   CO2 (measured) 25 23-29 mmol/L   Glucose 118  mg/dL   BUN 17 6-30 mg/dL   Creatinine 1.0 0.5-1.4 mg/dL   Hematocrit 40 36-54%

## 2024-04-13 NOTE — ED NOTES
LOC: The patient is awake, alert, and oriented to self, place, time, and situation. Pt is calm and cooperative. Affect is appropriate.  Speech is appropriate and clear.       SKIN: The skin is warm and dry; color consistent with ethnicity.  Patient has normal skin turgor and moist mucus membranes.  Skin intact; no breakdown or bruising noted.     MUSCULOSKELETAL: Patient moving upper and lower extremities without difficulty; denies pain in the extremities or back.  Denies weakness.     RESPIRATORY: Airway is open and patent. Respirations spontaneous, even, easy, and non-labored.  Patient has a normal effort and rate.  No accessory muscle use noted. Denies cough.     CARDIAC:  No peripheral edema noted. No complaints of chest pain.  Reporting elevated BP.    ABDOMEN: Soft and non tender to palpation.  No distention noted. Pt denies abdominal pain; denies nausea, vomiting, diarrhea, or constipation.    NEUROLOGIC: Eyes open spontaneously.  Behavior appropriate to situation.  Follows commands; facial expression symmetrical.  Purposeful motor response noted; normal sensation in all extremities. Pt c/o headache;  dizziness; denies visual disturbances; denies loss of balance; denies unilateral weakness.

## 2024-04-13 NOTE — TELEPHONE ENCOUNTER
Pt was recently meloxicam 15mg for arthritis. He thinks the medicine might be causing dizziness and affecting his BP. He was seeing his PCP yesterday and it was elevated. Pcp entered him into a bp home monitoring program due to it. Dizziness began around April 2nd. Has been taking meloxicam since February. Pt feeling unbalanced this morning with a bad HA. Current bp during call 218/120. Took his lisinopril around 7am.     Dispo- go to ED now. Pt VU and voices intent to follow advisement.  Reason for Disposition   [1] Systolic BP  >= 160 OR Diastolic >= 100 AND [2] cardiac (e.g., breathing difficulty, chest pain) or neurologic symptoms (e.g., new-onset blurred or double vision, unsteady gait)    Additional Information   Negative: Difficult to awaken or acting confused (e.g., disoriented, slurred speech)   Negative: SEVERE difficulty breathing (e.g., struggling for each breath, speaks in single words)   Negative: [1] Weakness of the face, arm or leg on one side of the body AND [2] new-onset   Negative: [1] Numbness (i.e., loss of sensation) of the face, arm or leg on one side of the body AND [2] new-onset   Negative: [1] Chest pain lasts > 5 minutes AND [2] history of heart disease (i.e., heart attack, bypass surgery, angina, angioplasty, CHF)   Negative: [1] Chest pain AND [2] took nitrogylcerin AND [3] pain was not relieved   Negative: Sounds like a life-threatening emergency to the triager    Protocols used: Blood Pressure - High-A-

## 2024-04-14 ENCOUNTER — PATIENT MESSAGE (OUTPATIENT)
Dept: INTERNAL MEDICINE | Facility: CLINIC | Age: 69
End: 2024-04-14
Payer: MEDICARE

## 2024-04-15 ENCOUNTER — PATIENT MESSAGE (OUTPATIENT)
Dept: BEHAVIORAL HEALTH | Facility: CLINIC | Age: 69
End: 2024-04-15
Payer: MEDICARE

## 2024-04-15 ENCOUNTER — CLINICAL SUPPORT (OUTPATIENT)
Dept: BEHAVIORAL HEALTH | Facility: CLINIC | Age: 69
End: 2024-04-15
Payer: MEDICARE

## 2024-04-15 DIAGNOSIS — F43.9 SITUATIONAL STRESS: ICD-10-CM

## 2024-04-15 DIAGNOSIS — F41.1 GENERALIZED ANXIETY DISORDER: ICD-10-CM

## 2024-04-15 DIAGNOSIS — F41.9 ANXIETY: ICD-10-CM

## 2024-04-15 DIAGNOSIS — F33.1 MAJOR DEPRESSIVE DISORDER, RECURRENT, MODERATE: Primary | ICD-10-CM

## 2024-04-15 PROCEDURE — 90791 PSYCH DIAGNOSTIC EVALUATION: CPT | Mod: 95,,, | Performed by: COUNSELOR

## 2024-04-18 ENCOUNTER — PATIENT MESSAGE (OUTPATIENT)
Dept: OTHER | Facility: OTHER | Age: 69
End: 2024-04-18
Payer: MEDICARE

## 2024-04-18 RX ORDER — LOSARTAN POTASSIUM 50 MG/1
50 TABLET ORAL DAILY
Qty: 90 TABLET | Refills: 3 | Status: SHIPPED | OUTPATIENT
Start: 2024-04-18 | End: 2024-04-19 | Stop reason: DRUGHIGH

## 2024-04-18 NOTE — TELEPHONE ENCOUNTER
No care due was identified.  [facility] Embedded Care Due Messages. Reference number: [id number].   4/18/2024 10:55:01 AM CDT

## 2024-04-19 ENCOUNTER — PATIENT MESSAGE (OUTPATIENT)
Dept: ADMINISTRATIVE | Facility: OTHER | Age: 69
End: 2024-04-19
Payer: MEDICARE

## 2024-04-19 ENCOUNTER — PATIENT MESSAGE (OUTPATIENT)
Dept: OTHER | Facility: OTHER | Age: 69
End: 2024-04-19
Payer: MEDICARE

## 2024-04-29 NOTE — PROGRESS NOTES
"Primary Care Behavioral Health Integration: Follow-up  Date:  04/30/2024  Patient Name: Ladarius Solis  Referral Source:  Nick Stanton MD  Type of Visit:  Video Session  Site:  [facility]    Visit type: Virtual visit with synchronous audio and video  The patient location is:  [place] [address]  The patient location [place] is:  [address]  The patient phone number is: [phone]    Each patient to whom he or she provides medical services by telemedicine is:  (1) informed of the relationship between the physician and patient and the respective role of any other health care provider with respect to management of the patient; and (2) notified that he or she may decline to receive medical services by telemedicine and may withdraw from such care at any time.    History of Present Illness:  Ladarius Solis, a 68 y.o.  male with history of Anxiety disorders; generalized anxiety disorder [F41.1] and Major Depressive Disorder, Recurrent, Moderate (F33.1) referred by Nick Stanton MD.  Patient was seen, examined and chart was reviewed.    Met with patient.       Patient began this session by stating the fact that his blood pressure "out of control" has him depressed.  Reported he has been feeling especially depressed because he is not working.  LPC and patient reviewed his completed Tool 1 - Identification of Triggers to Depression worksheet of the CBT model.  Patient defined depression as a state during which he feels lonely and he lacks motivation to do things.  Stated he becomes angry and depressed when he tries to explain his feelings to others, and they do not understand what he is going through.  Patient stated he feels the most depressed when he feels his back is against the wall, and he has very few options to work with.  Reported he also feels depressed whenever he gets into any difficult situation, and he feels that there is no way out.  The " last time patient stated he felt depressed was when he learned a close friend whom he attended [employer] with had .  Stated he was very depressed when he learned his former therapist passed away.  Patient's triggers to depression include:  1.  Anger and 2.  When others offer their opinions and tell him what he should and should not do.  LPC sent patient Tool 2 - Feelings Identification and the Consider Alternate Viewpoints worksheet of the CBT Toolbox workbook.  Will review patient's completed worksheets during follow up session on May 14, 2024.              2024     5:24 AM 2024     9:12 PM   Results of the PHQ8   Little interest or pleasure in doing things Not at all Several days   Feeling down, depressed, or hopeless Several days Several days   Trouble falling or staying asleep, or sleeping too much Nearly every day Several days   Feeling tired or having little energy Nearly every day Several days   Poor appetite or overeating Not at all Several days   Feeling bad about yourself - or that you are a failure or have let yourself or your family down Not at all Several days   Trouble concentrating on things, such as reading the newspaper or watching television Not at all Several days   Moving or speaking so slowly that other people could have noticed. Or the opposite - being so fidgety or restless that you have been moving around a lot more than usual Not at all Several days   Total Score  7 8     PHQ8 Score : (P) 7  PHQ8 Interpretation: (P) Mild         2024     5:20 AM 2024     9:11 PM 2024     2:00 PM   GAD7   1. Feeling nervous, anxious, or on edge? 1 1 3   2. Not being able to stop or control worrying? 1 1 3   3. Worrying too much about different things? 1 1 3   4. Trouble relaxing? 3 1 3   5. Being so restless that it is hard to sit still? 3 1 3   6. Becoming easily annoyed or irritable? 3 1 3   7. Feeling afraid as if something awful might happen? 0 1 3   8. If you checked off any  problems, how difficult have these problems made it for you to do your work, take care of things at home, or get along with other people?   3   RUBIO-7 Score 12 7 21       Mental Status Exam  General Appearance:  unremarkable, age appropriate     Speech: normal tone, normal rate, normal pitch, normal volume         Level of Cooperation: cooperative        Thought Processes: normal and logical      Mood: depressed        Thought Content: normal, no suicidality, no homicidality, delusions, or paranoia     Affect: flat, sad    Orientation: Oriented x3     Memory: recent >  intact, remote >  intact     Attention Span & Concentration: intact     Fund of General Knowledge: intact and appropriate to age and level of education   Abstract Reasoning: interpretation of similarities was concrete   Judgment & Insight: fair       Language:  intact       Treatment plan:  Target symptoms: depression, anxiety   Why chosen therapy is appropriate versus another modality: relevant to diagnosis  Outcome monitoring methods: self-report  Therapeutic intervention type: insight oriented psychotherapy, behavior modifying psychotherapy, supportive psychotherapy    Risk parameters:  Patient reports no suicidal ideation  Patient reports no homicidal ideation  Patient reports no self-injurious behavior  Patient reports no violent behavior    Verbal deficits: None    Patient's response to intervention:  The patient's response to intervention is accepting.    Progress toward goals and other mental status changes:  The patient's progress toward goals is limited.    Patient advised to call [phone] or present the the nearest ED if they experience suicidal or homicidal ideation, plan or intent.       Impression:  My diagnostic impression is patient continues to experience depressed mood, anger, excessive worrying, difficulty relaxing, difficulty concentrating, and feeling on edge as evidenced by fear of uncertainty, racing and intrusive thoughts, and  irritability.  These symptoms are making it difficult for patient to function effectively.      Diagnosis:   Anxiety disorders; generalized anxiety disorder [F41.1] and Major Depressive Disorder, Recurrent, Moderate (F33.1)    Treatment Goals and Plan: Pt plans to continue CBT, Problem-solving Therapy, and Solution-focused Therapy    Future treatment will utilize CBT, Problem-solving Therapy, and Solution-focused Therapy    Return to Clinic: 2 weeks    Plan to discuss case with [facility] consulting psychiatrist and further recommendations to be potentially be made at that time.  Refer to psychiatry    Length of Appointment:  34 minutes spent face-to-face and 11  minutes spent in non face-to-face clinical care.

## 2024-04-30 ENCOUNTER — PATIENT OUTREACH (OUTPATIENT)
Dept: ADMINISTRATIVE | Facility: HOSPITAL | Age: 69
End: 2024-04-30
Payer: MEDICARE

## 2024-04-30 ENCOUNTER — PATIENT MESSAGE (OUTPATIENT)
Dept: BEHAVIORAL HEALTH | Facility: CLINIC | Age: 69
End: 2024-04-30
Payer: MEDICARE

## 2024-04-30 ENCOUNTER — CLINICAL SUPPORT (OUTPATIENT)
Dept: BEHAVIORAL HEALTH | Facility: CLINIC | Age: 69
End: 2024-04-30
Payer: MEDICARE

## 2024-04-30 VITALS — DIASTOLIC BLOOD PRESSURE: 82 MMHG | SYSTOLIC BLOOD PRESSURE: 122 MMHG

## 2024-04-30 DIAGNOSIS — F33.1 MAJOR DEPRESSIVE DISORDER, RECURRENT, MODERATE: Primary | ICD-10-CM

## 2024-04-30 DIAGNOSIS — F41.1 GENERALIZED ANXIETY DISORDER: ICD-10-CM

## 2024-04-30 PROCEDURE — 90832 PSYTX W PT 30 MINUTES: CPT | Mod: 95,,, | Performed by: COUNSELOR

## 2024-04-30 NOTE — PROGRESS NOTES
Health Maintenance Due   Topic Date Due    High Dose Statin  Never done    Shingles Vaccine (1 of 2) Never done    RSV Vaccine (Age 60+ and Pregnant patients) (1 - 1-dose 60+ series) Never done    Pneumococcal Vaccines (Age 65+) (2 of 2 - PPSV23 or PCV20) 01/06/2020    Influenza Vaccine (1) 09/01/2023    COVID-19 Vaccine (4 - 2023-24 season) 09/01/2023     Triggered LINKS and reconciled immunizations. Updated Care Everywhere. Checked pt's Hypertension Digital Medicine flow sheet for an updated BP reading. BP reading of 122/82 was taken from pt's Hypertension Digital Medicine flow sheet on 4/27/2024. Chart review completed.

## 2024-05-04 ENCOUNTER — PATIENT MESSAGE (OUTPATIENT)
Dept: INTERNAL MEDICINE | Facility: CLINIC | Age: 69
End: 2024-05-04
Payer: MEDICARE

## 2024-05-04 ENCOUNTER — LAB VISIT (OUTPATIENT)
Dept: LAB | Facility: HOSPITAL | Age: 69
End: 2024-05-04
Attending: INTERNAL MEDICINE
Payer: MEDICARE

## 2024-05-04 DIAGNOSIS — I10 PRIMARY HYPERTENSION: ICD-10-CM

## 2024-05-04 LAB
ANION GAP SERPL CALC-SCNC: 11 MMOL/L (ref 8–16)
BUN SERPL-MCNC: 32 MG/DL (ref 8–23)
CALCIUM SERPL-MCNC: 10 MG/DL (ref 8.7–10.5)
CHLORIDE SERPL-SCNC: 102 MMOL/L (ref 95–110)
CO2 SERPL-SCNC: 23 MMOL/L (ref 23–29)
CREAT SERPL-MCNC: 1.3 MG/DL (ref 0.5–1.4)
EST. GFR  (NO RACE VARIABLE): >60 ML/MIN/1.73 M^2
GLUCOSE SERPL-MCNC: 123 MG/DL (ref 70–110)
POTASSIUM SERPL-SCNC: 4.6 MMOL/L (ref 3.5–5.1)
SODIUM SERPL-SCNC: 136 MMOL/L (ref 136–145)

## 2024-05-04 PROCEDURE — 80048 BASIC METABOLIC PNL TOTAL CA: CPT | Performed by: INTERNAL MEDICINE

## 2024-05-04 PROCEDURE — 36415 COLL VENOUS BLD VENIPUNCTURE: CPT | Performed by: INTERNAL MEDICINE

## 2024-05-08 NOTE — PROGRESS NOTES
Primary Care Behavioral Health Integration: Follow-up  Date:  05/14/2024  Patient Name: Ladarius Solis  Referral Source:  Nick Stanton MD  Type of Visit:  Video Session  Site:  [facility]    Visit type: Virtual visit with synchronous audio and video  The patient location is:  [place] [address]  The patient location [place] is:  [place] [address]  The patient phone number is: [phone]    Each patient to whom he or she provides medical services by telemedicine is:  (1) informed of the relationship between the physician and patient and the respective role of any other health care provider with respect to management of the patient; and (2) notified that he or she may decline to receive medical services by telemedicine and may withdraw from such care at any time.    History of Present Illness:  Ladarius Solis, a 68 y.o.  male with history of Anxiety disorders; generalized anxiety disorder [F41.1] and Major Depressive Disorder, Recurrent, Moderate (F33.1) referred by Nick Stanton MD.  Patient was seen, examined and chart was reviewed.    Met with patient.       Patient began this session by stating he is still feeling depressed because he is unable to find a full-time job.  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  Noted he wants to find a position where he has very little interaction with others.  Stated his anxiety has increased because he has bills to pay and has little money.  Noted he does not like staying at home because he feels useless and unproductive.  Reported he will have to use his Social Security money to pay for his Medicare Part B.  LPC and patient reviewed his completed Tool 2 - Feelings Identification for Depression worksheet of the CBT model.  Patient identified the following feelings related to his depression:  1.  Loneliness; 2.  Exhausted; 3. Disinterested: 4.  "Joyless; and 5. Alone.  Patient noted he does not have a good opinion of himself, and he does not have high aspirations when it comes to looking for a job because he realizes his skills are limited.  We then reviewed his completed Tool 2 - Feelings Identification for Anxiety worksheet of the CBT model.  Patient identified the following feelings related to his anxiety:  1.  Overwhelmed; 2. Unbalanced; and 3. Worried.  Noted he engages in compulsive behaviors which include cleaning almost everything in his home and checking behaviors.  Stated he becomes frantic when "others do things that are not like I would do them."  [facility] requested patient's permission to consult about possible medications for his depression/anxiety with Dr. Paige Millard.  Patient stated he would like [facility] to consult with Dr. Millard.  [facility] sent patient the Thinking Errors sheet, the Tool 3 - Identification of Distorted Thoughts worksheet, and the Thoughts/Feelings Awareness Log of the CBT Toolbox workbook.  Will review this worksheet during follow-up appointment on May 28, 2024.              5/7/2024     5:58 AM 4/27/2024     5:24 AM 4/12/2024     9:12 PM   Results of the PHQ8   Little interest or pleasure in doing things Nearly every day Not at all Several days   Feeling down, depressed, or hopeless Nearly every day Several days Several days   Trouble falling or staying asleep, or sleeping too much More than half the days Nearly every day Several days   Feeling tired or having little energy Nearly every day Nearly every day Several days   Poor appetite or overeating More than half the days Not at all Several days   Feeling bad about yourself - or that you are a failure or have let yourself or your family down More than half the days Not at all Several days   Trouble concentrating on things, such as reading the newspaper or watching television More than half the days Not at all Several days   Moving or speaking so slowly that other people could " have noticed. Or the opposite - being so fidgety or restless that you have been moving around a lot more than usual Not at all Not at all Several days   Total Score  17 7 8     PHQ8 Score : (P) 17  PHQ8 Interpretation: (P) Moderately Severe         5/7/2024     5:57 AM 4/27/2024     5:20 AM 4/12/2024     9:11 PM   GAD7   1. Feeling nervous, anxious, or on edge? 1 1 1   2. Not being able to stop or control worrying? 1 1 1   3. Worrying too much about different things? 2 1 1   4. Trouble relaxing? 1 3 1   5. Being so restless that it is hard to sit still? 2 3 1   6. Becoming easily annoyed or irritable? 3 3 1   7. Feeling afraid as if something awful might happen? 2 0 1   RUBIO-7 Score 12 12 7       Mental Status Exam  General Appearance:  unremarkable, age appropriate     Speech: normal tone, normal rate, normal pitch, normal volume         Level of Cooperation: cooperative        Thought Processes: normal and logical      Mood: depressed        Thought Content: normal, no suicidality, no homicidality, delusions, or paranoia     Affect: flat, sad    Orientation: Oriented x3     Memory: recent >  intact, remote >  intact     Attention Span & Concentration: intact     Fund of General Knowledge: intact and appropriate to age and level of education   Abstract Reasoning: interpretation of similarities was concrete   Judgment & Insight: fair       Language:  intact       Treatment plan:  Target symptoms: depression, anxiety   Why chosen therapy is appropriate versus another modality: relevant to diagnosis  Outcome monitoring methods: self-report  Therapeutic intervention type: insight oriented psychotherapy, behavior modifying psychotherapy, supportive psychotherapy    Risk parameters:  Patient reports no suicidal ideation  Patient reports no homicidal ideation  Patient reports no self-injurious behavior  Patient reports no violent behavior    Verbal deficits: None    Patient's response to intervention:  The patient's  response to intervention is accepting.    Progress toward goals and other mental status changes:  The patient's progress toward goals is limited.    Patient advised to call [phone] or present the the nearest ED if they experience suicidal or homicidal ideation, plan or intent.       Impression:  My diagnostic impression is patient continues to experience depressed mood, anger, excessive worrying, difficulty relaxing, difficulty concentrating, and feeling on edge as evidenced by fear of uncertainty, racing and intrusive thoughts, and irritability.  These symptoms are making it difficult for patient to function effectively.      Diagnosis:   Anxiety disorders; generalized anxiety disorder [F41.1] and Major Depressive Disorder, Recurrent, Moderate (F33.1)    Treatment Goals and Plan: Pt plans to continue CBT, Problem-solving Therapy, and Solution-focused Therapy    Future treatment will utilize CBT, Problem-solving Therapy, and Solution-focused Therapy    Return to Clinic: 2 weeks    Plan to discuss case with [facility] consulting psychiatrist and further recommendations to be potentially be made at that time.  Refer to psychiatry    Length of Appointment:  31 minutes spent face-to-face and 10 minutes spent in non face-to-face clinical care.

## 2024-05-14 ENCOUNTER — CLINICAL SUPPORT (OUTPATIENT)
Dept: BEHAVIORAL HEALTH | Facility: CLINIC | Age: 69
End: 2024-05-14
Payer: MEDICARE

## 2024-05-14 ENCOUNTER — PATIENT MESSAGE (OUTPATIENT)
Dept: BEHAVIORAL HEALTH | Facility: CLINIC | Age: 69
End: 2024-05-14
Payer: MEDICARE

## 2024-05-14 DIAGNOSIS — F33.1 MAJOR DEPRESSIVE DISORDER, RECURRENT, MODERATE: Primary | ICD-10-CM

## 2024-05-14 PROCEDURE — 90832 PSYTX W PT 30 MINUTES: CPT | Mod: 95,,, | Performed by: COUNSELOR

## 2024-05-14 NOTE — Clinical Note
Jamia, Could you reach out to this patient to get history of previous medication trials, efficacy, and side effects?

## 2024-05-14 NOTE — PROGRESS NOTES
Patient discussed during [facility] meeting today, 05/14/2024.  Patient with poorly controlled symptoms of depression and anxiety.  He is interested in restarting medication.  He has been on various SSRIs/SNRIs in the past - the last one being Effexor.  He reports having sexual side effects to one of the medications.  There is also mention in the chart about nightmares due to one of his previous medications.  Will have our [facility] Psych PharmD reach out to patient to discuss previous medication trials.      Time: 12 minutes    The above treatment considerations and suggestions are based on consultations with the patients Behavioral Health Integration therapist and a review of information available in the patient's chart.  I have not personally examined the patient.  All recommendations should be implemented with consideration of the patients relevant prior history and current clinical status.  It remains the responsibility of the patient's Primary Care Physician to prescribe medications and to educate the patient on risks versus benefits, alternative treatments, side effect profile and the inherent unpredictability of individual responses to medications.  Please feel free to call me with any questions about the care of this patient.

## 2024-05-16 ENCOUNTER — TELEPHONE (OUTPATIENT)
Dept: PSYCHIATRY | Facility: CLINIC | Age: 69
End: 2024-05-16
Payer: MEDICARE

## 2024-05-16 NOTE — TELEPHONE ENCOUNTER
"68 y.o. male with a psychiatric hx of anxiety and depression referred by [facility] Psychiatrist Dr. Millard for med hx. Spoke with pt by phone today. Advised pt information discussed will be shared with the [facility] team (consulting psychiatrist and therapist). Pt agreed to proceed. Pt reports the following psych med hx:    - venlafaxine - SE - NMs; denies any notable benefits despite taking for almost 2 years; only reached 112.5mg/d d/t SE    - buspirone - doesn't recall benefits or SEs    - trazodone - initially helpful but effectiveness waned; denies SEs    Denies any psychotropics outside [facility]. Denies OTC meds/herbs/supplements. Denies Etoh, nicotine or recreational substances.     Primary target sxs for patient are anxiety sxs including mental restlessness, "I can't stop thinking because I'm not doing anything", worry nicho. About finances, feeling on edge/tense, feeling overwhelmed, restless sleep and feeling fatigued. Shares about his previous positive experience in Psychiatry dept. With former prescriber and therapist. Feels his "life [is] worse" than when last seen in psychiatry.     Expressed interest in starting SSRI or SNRI "anything that will help" to target current anxiety and mood. Addressed his concerns about med affordability given financial stressors; advised medication with generic availability can be considered to help lower his out of pocket costs. PVU.     "

## 2024-05-17 ENCOUNTER — PATIENT MESSAGE (OUTPATIENT)
Dept: ADMINISTRATIVE | Facility: OTHER | Age: 69
End: 2024-05-17
Payer: MEDICARE

## 2024-05-20 ENCOUNTER — TELEPHONE (OUTPATIENT)
Dept: INTERNAL MEDICINE | Facility: CLINIC | Age: 69
End: 2024-05-20
Payer: MEDICARE

## 2024-05-20 NOTE — TELEPHONE ENCOUNTER
Reasonable to see me or one of the doctors for the acute symptoms. If he needs an AWV, ok to keep that appointment but make sure the pt knows.

## 2024-05-20 NOTE — TELEPHONE ENCOUNTER
----- Message from Case Goncalves MA sent at 5/20/2024  9:59 AM CDT -----  Should pt be scheduled with you?  ----- Message -----  From: Dyllan Mckinney NP  Sent: 5/17/2024   8:27 PM CDT  To: Romy ESTRADA Staff    I received this message from pharmacy. I am seeing this patient for an AWV next Thursday and will not be able to address any acute problems. Just wanted to pass this along so patient could be scheduled with Dr Stanton if needed. Thanks, Henry Mijares  ----- Message -----  From: Cindy Centeno, PharmJORDAN  Sent: 5/17/2024  12:41 PM CDT  To: Dyllan Mckinney NP    Good afternoon,    I am following this patient for HTN Dig Med. He has an upcoming appt with you next Thursday. Just an FYI-he has been complaining of chest palpitations for the last week. He does have a significant hx of anxiety and HR has been up trending (highest was 110 a few days ago). He's not on any anxiety/depression medications currently.    Thanks,  Cindy Centeno, PharmD   Clinical Pharmacist  [facility]    [phone]

## 2024-05-21 ENCOUNTER — PATIENT MESSAGE (OUTPATIENT)
Dept: INTERNAL MEDICINE | Facility: CLINIC | Age: 69
End: 2024-05-21
Payer: MEDICARE

## 2024-05-21 ENCOUNTER — TELEPHONE (OUTPATIENT)
Dept: INTERNAL MEDICINE | Facility: CLINIC | Age: 69
End: 2024-05-21
Payer: MEDICARE

## 2024-05-21 RX ORDER — SERTRALINE HYDROCHLORIDE 50 MG/1
50 TABLET, FILM COATED ORAL DAILY
Qty: 30 TABLET | Refills: 11 | Status: SHIPPED | OUTPATIENT
Start: 2024-05-21 | End: 2025-05-21

## 2024-05-21 NOTE — TELEPHONE ENCOUNTER
----- Message from Cecilia Davila sent at 5/21/2024 10:29 AM CDT -----  Contact: Ladarius [phone]  Returning a phone call.    Who left a message for the patient:  Case Goncalves MA    Do they know what this is regarding:  yes    Would they like a phone call back or a response via [facility]: Call back    Ladarius states he had his phone next to him the whole time and did not miss a call. Please call Ladarius back for advice

## 2024-05-21 NOTE — TELEPHONE ENCOUNTER
Called pt; pt did not answer    Tried to leave a message, but 2 sharp bursts of static cut off the line    Unable to leave message

## 2024-05-21 NOTE — TELEPHONE ENCOUNTER
Called pt; pt answered    Pt reports BP to be within normal recommended ranges  Pt does report occasional palpitations upon waking, but pt is not concerned     Offered pt appt for palpitations, but pt did not feel the need to schedule    Pt is aware that appt on 5/23/24 is an AWV and everything it entails  Pt intends to bring BP cuff to appt to confirm that he is using it correctly and that it is accurate.     Pt verbalized great thanks for checking in on him

## 2024-05-22 NOTE — PROGRESS NOTES
"Primary Care Behavioral Health Integration: Follow-up  Date:  05/28/2024  Patient Name: Ladarius Solis  Referral Source:  Nick Stanton MD  Type of Visit:  Video Session  Site:  [facility]    Visit type: Virtual visit with synchronous audio and video  The patient location is:  [place] [address]  The patient location [place] is:  [place]  The patient phone number is: [phone]    Each patient to whom he or she provides medical services by telemedicine is:  (1) informed of the relationship between the physician and patient and the respective role of any other health care provider with respect to management of the patient; and (2) notified that he or she may decline to receive medical services by telemedicine and may withdraw from such care at any time.    History of Present Illness:  Ladarius Solis, a 68 y.o.  male with history of Anxiety disorders; generalized anxiety disorder [F41.1] and Major Depressive Disorder, Recurrent, Moderate (F33.1) referred by Nick Stanton MD.  Patient was seen, examined and chart was reviewed.    Met with patient.       Patient began this session by stating he had applied for his same position with previous employer, but they will not hire him.  Stated he is having a difficult time finding employment.  Stated he believes "it is because I am 68 years old, and my skills are limited."  LPC and patient reviewed the Thinking Errors sheet.  Patient reported he has engaged in or is currently experiencing the following cognitive distortions:  ignoring the good, blowing things up, mind-reading, fortune-telling, and negative labeling.  LPC and patient discussed each of these thinking errors.  Patient was able to provide examples ie blowing things up when his roommate criticizes him for not meeting her expectations, thinking he will amount to very little, low self-worth, etc.  LPC sent patient the Tool 4 - Generate A List " of Unhealthy Go-To Coping Skills of the CBT Toolbox workbook.  Will review patient's completed worksheet during follow-up appointment on June 11, 2024.            5/7/2024     5:58 AM 4/27/2024     5:24 AM 4/12/2024     9:12 PM   Results of the PHQ8   Little interest or pleasure in doing things Nearly every day Not at all Several days   Feeling down, depressed, or hopeless Nearly every day Several days Several days   Trouble falling or staying asleep, or sleeping too much More than half the days Nearly every day Several days   Feeling tired or having little energy Nearly every day Nearly every day Several days   Poor appetite or overeating More than half the days Not at all Several days   Feeling bad about yourself - or that you are a failure or have let yourself or your family down More than half the days Not at all Several days   Trouble concentrating on things, such as reading the newspaper or watching television More than half the days Not at all Several days   Moving or speaking so slowly that other people could have noticed. Or the opposite - being so fidgety or restless that you have been moving around a lot more than usual Not at all Not at all Several days   Total Score  17 7 8                  5/7/2024     5:57 AM 4/27/2024     5:20 AM 4/12/2024     9:11 PM   GAD7   1. Feeling nervous, anxious, or on edge? 1 1 1   2. Not being able to stop or control worrying? 1 1 1   3. Worrying too much about different things? 2 1 1   4. Trouble relaxing? 1 3 1   5. Being so restless that it is hard to sit still? 2 3 1   6. Becoming easily annoyed or irritable? 3 3 1   7. Feeling afraid as if something awful might happen? 2 0 1   RUBIO-7 Score 12 12 7       Mental Status Exam  General Appearance:  unremarkable, age appropriate     Speech: normal tone, normal rate, normal pitch, normal volume         Level of Cooperation: cooperative        Thought Processes: normal and logical      Mood: depressed        Thought Content:  normal, no suicidality, no homicidality, delusions, or paranoia     Affect: flat, sad    Orientation: Oriented x3     Memory: recent >  intact, remote >  intact     Attention Span & Concentration: intact     Fund of General Knowledge: intact and appropriate to age and level of education   Abstract Reasoning: interpretation of similarities was concrete   Judgment & Insight: fair       Language:  intact       Treatment plan:  Target symptoms: depression, anxiety   Why chosen therapy is appropriate versus another modality: relevant to diagnosis  Outcome monitoring methods: self-report  Therapeutic intervention type: insight oriented psychotherapy, behavior modifying psychotherapy, supportive psychotherapy    Risk parameters:  Patient reports no suicidal ideation  Patient reports no homicidal ideation  Patient reports no self-injurious behavior  Patient reports no violent behavior    Verbal deficits: None    Patient's response to intervention:  The patient's response to intervention is accepting.    Progress toward goals and other mental status changes:  The patient's progress toward goals is limited.    Patient advised to call [phone] or present the the nearest ED if they experience suicidal or homicidal ideation, plan or intent.       Impression:  My diagnostic impression is patient continues to experience depressed mood, anger, excessive worrying, difficulty relaxing, difficulty concentrating, and feeling on edge as evidenced by fear of uncertainty, racing and intrusive thoughts, and irritability.  These symptoms are making it difficult for patient to function effectively.      Diagnosis:   Anxiety disorders; generalized anxiety disorder [F41.1] and Major Depressive Disorder, Recurrent, Moderate (F33.1)    Treatment Goals and Plan: Pt plans to continue CBT, Problem-solving Therapy, and Solution-focused Therapy    Future treatment will utilize CBT, Problem-solving Therapy, and Solution-focused Therapy    Return to  Clinic: 2 weeks    Plan to discuss case with [facility] consulting psychiatrist and further recommendations to be potentially be made at that time.  Refer to psychiatry    Length of Appointment:  35 minutes spent face-to-face and 9 minutes spent in non face-to-face clinical care.

## 2024-05-23 ENCOUNTER — OFFICE VISIT (OUTPATIENT)
Dept: INTERNAL MEDICINE | Facility: CLINIC | Age: 69
End: 2024-05-23
Payer: MEDICARE

## 2024-05-23 VITALS
HEIGHT: 66 IN | BODY MASS INDEX: 25.61 KG/M2 | HEART RATE: 77 BPM | DIASTOLIC BLOOD PRESSURE: 66 MMHG | WEIGHT: 159.38 LBS | OXYGEN SATURATION: 97 % | SYSTOLIC BLOOD PRESSURE: 108 MMHG

## 2024-05-23 DIAGNOSIS — E66.3 OVERWEIGHT (BMI 25.0-29.9): ICD-10-CM

## 2024-05-23 DIAGNOSIS — K76.0 HEPATIC STEATOSIS: ICD-10-CM

## 2024-05-23 DIAGNOSIS — F32.1 CURRENT MODERATE EPISODE OF MAJOR DEPRESSIVE DISORDER, UNSPECIFIED WHETHER RECURRENT: ICD-10-CM

## 2024-05-23 DIAGNOSIS — M17.12 PRIMARY OSTEOARTHRITIS OF LEFT KNEE: ICD-10-CM

## 2024-05-23 DIAGNOSIS — M54.12 CERVICAL RADICULOPATHY AT C7: ICD-10-CM

## 2024-05-23 DIAGNOSIS — I70.0 AORTIC ATHEROSCLEROSIS: ICD-10-CM

## 2024-05-23 DIAGNOSIS — Z00.00 ENCOUNTER FOR PREVENTIVE HEALTH EXAMINATION: Primary | ICD-10-CM

## 2024-05-23 DIAGNOSIS — G89.29 OTHER CHRONIC PAIN: ICD-10-CM

## 2024-05-23 DIAGNOSIS — I10 PRIMARY HYPERTENSION: ICD-10-CM

## 2024-05-23 PROCEDURE — 3288F FALL RISK ASSESSMENT DOCD: CPT | Mod: CPTII,S$GLB,, | Performed by: NURSE PRACTITIONER

## 2024-05-23 PROCEDURE — 1101F PT FALLS ASSESS-DOCD LE1/YR: CPT | Mod: CPTII,S$GLB,, | Performed by: NURSE PRACTITIONER

## 2024-05-23 PROCEDURE — 3044F HG A1C LEVEL LT 7.0%: CPT | Mod: CPTII,S$GLB,, | Performed by: NURSE PRACTITIONER

## 2024-05-23 PROCEDURE — 1170F FXNL STATUS ASSESSED: CPT | Mod: CPTII,S$GLB,, | Performed by: NURSE PRACTITIONER

## 2024-05-23 PROCEDURE — 3078F DIAST BP <80 MM HG: CPT | Mod: CPTII,S$GLB,, | Performed by: NURSE PRACTITIONER

## 2024-05-23 PROCEDURE — 3074F SYST BP LT 130 MM HG: CPT | Mod: CPTII,S$GLB,, | Performed by: NURSE PRACTITIONER

## 2024-05-23 PROCEDURE — 1158F ADVNC CARE PLAN TLK DOCD: CPT | Mod: CPTII,S$GLB,, | Performed by: NURSE PRACTITIONER

## 2024-05-23 PROCEDURE — 4010F ACE/ARB THERAPY RXD/TAKEN: CPT | Mod: CPTII,S$GLB,, | Performed by: NURSE PRACTITIONER

## 2024-05-23 PROCEDURE — 1126F AMNT PAIN NOTED NONE PRSNT: CPT | Mod: CPTII,S$GLB,, | Performed by: NURSE PRACTITIONER

## 2024-05-23 PROCEDURE — 99999 PR PBB SHADOW E&M-EST. PATIENT-LVL IV: CPT | Mod: PBBFAC,,, | Performed by: NURSE PRACTITIONER

## 2024-05-23 PROCEDURE — G0439 PPPS, SUBSEQ VISIT: HCPCS | Mod: S$GLB,,, | Performed by: NURSE PRACTITIONER

## 2024-05-23 PROCEDURE — 1159F MED LIST DOCD IN RCRD: CPT | Mod: CPTII,S$GLB,, | Performed by: NURSE PRACTITIONER

## 2024-05-23 PROCEDURE — 1160F RVW MEDS BY RX/DR IN RCRD: CPT | Mod: CPTII,S$GLB,, | Performed by: NURSE PRACTITIONER

## 2024-05-23 NOTE — PROGRESS NOTES
"  Ladarius Solis presented for a follow-up Medicare AWV today. The following components were reviewed and updated:    Medical history  Family History  Social history  Allergies and Current Medications  Health Risk Assessment  Health Maintenance  Care Team    **See Completed Assessments for Annual Wellness visit with in the encounter summary    The following assessments were completed:  Depression Screening  Cognitive function Screening    Timed Get Up Test  Whisper Test      Opioid documentation:      Patient does not have a current opioid prescription.          Vitals:    05/23/24 1325   BP: 108/66   BP Location: Left arm   Patient Position: Sitting   BP Method: Small (Manual)   Pulse: 77   SpO2: 97%   Weight: 72.3 kg (159 lb 6.3 oz)   Height: 5' 6" (1.676 m)     Body mass index is 25.73 kg/m².       Physical Exam  Vitals and nursing note reviewed.   Constitutional:       Appearance: He is well-developed.   HENT:      Head: Normocephalic.   Cardiovascular:      Rate and Rhythm: Normal rate and regular rhythm.      Heart sounds: Normal heart sounds. No murmur heard.  Pulmonary:      Effort: Pulmonary effort is normal.      Breath sounds: Normal breath sounds.   Abdominal:      General: Bowel sounds are normal.      Palpations: Abdomen is soft.   Musculoskeletal:         General: Normal range of motion.   Skin:     General: Skin is warm and dry.   Neurological:      Mental Status: He is alert and oriented to person, place, and time.      Motor: No abnormal muscle tone.   Psychiatric:         Mood and Affect: Mood normal.            Diagnoses and health risks identified today and associated recommendations/orders:    1. Encounter for preventive health examination  Here for Health Risk Assessment/Annual Wellness Visit.  Health maintenance reviewed and updated. Follow up in one year.   Declines immunizations - PCV20, Shingrix    2. Primary hypertension  Chronic, at goal on current medications. Seen in ED 4/23/24 for " Hypertensive Urgency. Followed by PCP.     3. Aortic atherosclerosis  Chronic, stable. CT Abdomen/Pelvis 7/27/19.. Followed by PCP.     4. Current moderate episode of major depressive disorder, unspecified whether recurrent  Chronic, stable on current medication. PHQ-2 score 0. Denies SI/HI Followed by Psychiatry, PCP.     5. Overweight (BMI 25.0-29.9)  Chronic, weight decreased 12 pounds from 12/2023 with dietary modification. Reports he is not exercising. Discussed importance of physical activity.. Followed by PCP.     6. Hepatic steatosis  Chronic, stable. LFT WNL. Followed by PCP.     7. Other chronic pain  Chronic, stable. Pain level 0 today. Followed by PCP.     8. Cervical radiculopathy at C7  Chronic, stable. Pain level 0 today. Followed by PCP.     9. Primary osteoarthritis of left knee  Chronic, stable. Pain level 0 today. Followed by PCP.       Provided Ladarius with a 5-10 year written screening schedule and personal prevention plan. Recommendations were developed using the USPSTF age appropriate recommendations. Education, counseling, and referrals were provided as needed.  After Visit Summary printed and given to patient which includes a list of additional screenings\tests needed.    Follow up in 20 days (on 6/12/2024).with PCP      Hien Barrera NP

## 2024-05-23 NOTE — PATIENT INSTRUCTIONS
Counseling and Referral of Other Preventative  (Italic type indicates deductible and co-insurance are waived)    Patient Name: Ladarius Solis  Today's Date: 5/23/2024    Health Maintenance       Date Due Completion Date    High Dose Statin Never done ---    RSV Vaccine (Age 60+ and Pregnant patients) (1 - 1-dose 60+ series) Never done ---    COVID-19 Vaccine (4 - 2023-24 season) 09/01/2023 1/6/2022    Shingles Vaccine (1 of 2) 05/23/2025 (Originally 12/1/2007) ---    Pneumococcal Vaccines (Age 65+) (2 of 2 - PPSV23 or PCV20) 05/23/2025 (Originally 1/6/2020) 11/11/2019    Influenza Vaccine (Season Ended) 09/01/2024 9/28/2020    Hemoglobin A1c (Prediabetes) 04/12/2025 4/12/2024    Lipid Panel 04/12/2029 4/12/2024    Colorectal Cancer Screening 03/23/2030 3/23/2023    TETANUS VACCINE 03/12/2031 3/12/2021        No orders of the defined types were placed in this encounter.      The following information is provided to all patients.  This information is to help you find resources for any of the problems found today that may be affecting your health:                  Living healthy guide: www.[facility].[place].gov      Understanding Diabetes: www.diabetes.org      Eating healthy: www.cdc.gov/healthyweight      [facility][note 3] home safety checklist: www.cdc.gov/steadi/patient.html      Agency on Aging: www.goea.[place].gov      Alcoholics anonymous (AA): www.aa.org      Physical Activity: www.dion.nih.gov/qm8cnph      Tobacco use: www.quitwithusla.org

## 2024-05-28 ENCOUNTER — PATIENT MESSAGE (OUTPATIENT)
Dept: BEHAVIORAL HEALTH | Facility: CLINIC | Age: 69
End: 2024-05-28
Payer: MEDICARE

## 2024-05-28 ENCOUNTER — CLINICAL SUPPORT (OUTPATIENT)
Dept: BEHAVIORAL HEALTH | Facility: CLINIC | Age: 69
End: 2024-05-28
Payer: MEDICARE

## 2024-05-28 DIAGNOSIS — F33.1 MAJOR DEPRESSIVE DISORDER, RECURRENT, MODERATE: Primary | ICD-10-CM

## 2024-05-28 PROCEDURE — 90832 PSYTX W PT 30 MINUTES: CPT | Mod: 95,,, | Performed by: COUNSELOR

## 2024-06-10 ENCOUNTER — PATIENT MESSAGE (OUTPATIENT)
Dept: INTERNAL MEDICINE | Facility: CLINIC | Age: 69
End: 2024-06-10
Payer: MEDICARE

## 2024-06-10 ENCOUNTER — PATIENT MESSAGE (OUTPATIENT)
Dept: BEHAVIORAL HEALTH | Facility: CLINIC | Age: 69
End: 2024-06-10
Payer: MEDICARE

## 2024-06-10 ENCOUNTER — TELEPHONE (OUTPATIENT)
Dept: BEHAVIORAL HEALTH | Facility: CLINIC | Age: 69
End: 2024-06-10
Payer: MEDICARE

## 2024-06-10 NOTE — PROGRESS NOTES
Behavioral Health Community Health Worker  Follow-Up  Completed by:  Mila Menjivar     Date:  6/10/2024    Patient Enrollment in Behavioral Health Program:  Ladarius Solis was enrolled in the Behavioral Health Program on 4/12/2024    Assessments     Promis 10:      4/12/2024     1:49 PM   PROMIS-10 Questionnaire Scores   Global Physical Health 14   Global Mental health Score 11       Depression PHQ:      6/10/2024     9:34 AM   PHQ9   Little interest or pleasure in doing things 2   Feeling down, depressed, or hopeless 1   Trouble falling or staying asleep, or sleeping too much 0   Feeling tired or having little energy 0   Poor appetite or overeating 0   Feeling bad about yourself - or that you are a failure or have let yourself or your family down 0   Trouble concentrating on things, such as reading the newspaper or watching television 1   Moving or speaking so slowly that other people could have noticed. Or the opposite - being so fidgety or restless that you have been moving around a lot more than usual 0   PHQ-9 Total Score 4       Generalized Anxiety Disorder 7-Item Scale:      6/10/2024    10:02 AM   GAD7   1. Feeling nervous, anxious, or on edge? 0   2. Not being able to stop or control worrying? 0   3. Worrying too much about different things? 0   4. Trouble relaxing? 0   5. Being so restless that it is hard to sit still? 0   6. Becoming easily annoyed or irritable? 1   7. Feeling afraid as if something awful might happen? 0   8. If you checked off any problems, how difficult have these problems made it for you to do your work, take care of things at home, or get along with other people? 0   RUBIO-7 Score 1       Patients' Global Impression of Change (PGIC) Scale:  Since beginning treatment at this clinic, how would you describe the change (if any) in ACTIVITY LIMITATIONS, SYMPTOMS, EMOTIONS, and OVERALL QUALITY OF LIFE, related to your painful condition?  No Value exists for the : OHS#[id number]      In a similar  "way, please check the number below that matches your degree of change since beginning care at this clinic (Much better (0) - Much Worse (10)): No Value exists for the : OHS#[id number]        Much Better                                     No Change                                    Much Worse                        -----------------------------------------------------------------------------                        0       1       2       3       4       5       6       7      8       9      10                     Call Summary     Patient enrolled in the [facility] Program on 4/12/2024 with PHQ9 score of "19" and GAD7 score of " 21".  Patient updated the 2 monthly assessments on 6/10/2024 with a PHQ8 of "4" and GAD7 of "1".  Patient is scheduled for virtual follow up appointment on 6/11/2024 at 10:00am with Carlos Enrique Hutchison LPC.      "

## 2024-06-10 NOTE — PROGRESS NOTES
"Primary Care Behavioral Health Integration: Follow-up  Date:  06/11/2024  Patient Name: Ladarius Solis  Referral Source:  Nick Stanton MD  Type of Visit:  Video Session  Site:  [facility]    Visit type: Virtual visit with synchronous audio and video  The patient location is:  [place] [address]  The patient location [place] is:  [place]  The patient phone number is: [phone]    Each patient to whom he or she provides medical services by telemedicine is:  (1) informed of the relationship between the physician and patient and the respective role of any other health care provider with respect to management of the patient; and (2) notified that he or she may decline to receive medical services by telemedicine and may withdraw from such care at any time.    History of Present Illness:  Ladarius Solis, a 68 y.o.  male with history of Anxiety disorders; generalized anxiety disorder [F41.1] and Major Depressive Disorder, Recurrent, Moderate (F33.1) referred by Nick Stanton MD.  Patient was seen, examined and chart was reviewed.    Met with patient.       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  Stated [employer] has not responded to his requests to hire him back.  Patient reported he does not enjoy his new job.  Noted the workplace "is hot and I work in close-quarters with other coworkers.  They also have me doing busy-work like taking out the trash and sweeping the floors."  Reported he has no other choice but to work here because he needs to pay upcoming bills.  LPC and patient reviewed his completed Tool 4 - Generate List of Unhealthy Go-To Coping Skills worksheet of the CBT model.  Patient listed the following unhealthy go-to coping skills:  1.  Using various medications to either "get high" or "to try to overdose."  2.  Getting into frequent verbal altercations with his roommate.  NEVAEH sent " patient the Tool 5 - Recognition of Consequences worksheet of the CBT Toolbox workbook.  Will review patient's completed worksheet during follow-up appointment on July 2, 2024.              6/10/2024     9:34 AM 6/5/2024     9:13 AM 5/22/2024     4:16 PM 5/7/2024     5:58 AM 4/27/2024     5:24 AM 4/12/2024     9:12 PM   Results of the PHQ8   Little interest or pleasure in doing things More than half the days Several days Several days Nearly every day Not at all Several days   Feeling down, depressed, or hopeless Several days More than half the days Several days Nearly every day Several days Several days   Trouble falling or staying asleep, or sleeping too much Not at all Not at all Several days More than half the days Nearly every day Several days   Feeling tired or having little energy Not at all Not at all Several days Nearly every day Nearly every day Several days   Poor appetite or overeating Not at all Not at all Several days More than half the days Not at all Several days   Feeling bad about yourself - or that you are a failure or have let yourself or your family down Not at all Not at all Several days More than half the days Not at all Several days   Trouble concentrating on things, such as reading the newspaper or watching television Several days Not at all Several days More than half the days Not at all Several days   Moving or speaking so slowly that other people could have noticed. Or the opposite - being so fidgety or restless that you have been moving around a lot more than usual Not at all Not at all Not at all Not at all Not at all Several days   Total Score  4 3 7 17 7 8     PHQ8 Score : (P) 3  PHQ8 Interpretation: (P) Minimal or None         6/10/2024    10:02 AM 6/5/2024     9:12 AM 5/22/2024     4:15 PM   GAD7   1. Feeling nervous, anxious, or on edge? 0 1 1   2. Not being able to stop or control worrying? 0 1 1   3. Worrying too much about different things? 0 1 1   4. Trouble relaxing? 0 1 1   5.  Being so restless that it is hard to sit still? 0 2 1   6. Becoming easily annoyed or irritable? 1 1 1   7. Feeling afraid as if something awful might happen? 0 0 1   8. If you checked off any problems, how difficult have these problems made it for you to do your work, take care of things at home, or get along with other people? 0     RUBIO-7 Score 1 7 7       Mental Status Exam  General Appearance:  unremarkable, age appropriate     Speech: normal tone, normal rate, normal pitch, normal volume         Level of Cooperation: cooperative        Thought Processes: normal and logical      Mood: depressed        Thought Content: normal, no suicidality, no homicidality, delusions, or paranoia     Affect: flat, sad    Orientation: Oriented x3     Memory: recent >  intact, remote >  intact     Attention Span & Concentration: intact     Fund of General Knowledge: intact and appropriate to age and level of education   Abstract Reasoning: interpretation of similarities was concrete   Judgment & Insight: fair       Language:  intact       Treatment plan:  Target symptoms: depression, anxiety   Why chosen therapy is appropriate versus another modality: relevant to diagnosis  Outcome monitoring methods: self-report  Therapeutic intervention type: insight oriented psychotherapy, behavior modifying psychotherapy, supportive psychotherapy    Risk parameters:  Patient reports no suicidal ideation  Patient reports no homicidal ideation  Patient reports no self-injurious behavior  Patient reports no violent behavior    Verbal deficits: None    Patient's response to intervention:  The patient's response to intervention is accepting.    Progress toward goals and other mental status changes:  The patient's progress toward goals is limited.    Patient advised to call [phone] or present the the nearest ED if they experience suicidal or homicidal ideation, plan or intent.       Impression:  My diagnostic impression is patient continues to  experience depressed mood, anger, excessive worrying, difficulty relaxing, difficulty concentrating, and feeling on edge as evidenced by fear of uncertainty, racing and intrusive thoughts, and irritability.  These symptoms are making it difficult for patient to function effectively.      Diagnosis:   Anxiety disorders; generalized anxiety disorder [F41.1] and Major Depressive Disorder, Recurrent, Moderate (F33.1)    Treatment Goals and Plan: Pt plans to continue CBT, Problem-solving Therapy, and Solution-focused Therapy    Future treatment will utilize CBT, Problem-solving Therapy, and Solution-focused Therapy    Return to Clinic: 2 weeks    Plan to discuss case with [facility] consulting psychiatrist and further recommendations to be potentially be made at that time.  Refer to psychiatry    Length of Appointment:  28 minutes spent face-to-face and 10 minutes spent in non face-to-face clinical care.

## 2024-06-11 ENCOUNTER — PATIENT MESSAGE (OUTPATIENT)
Dept: BEHAVIORAL HEALTH | Facility: CLINIC | Age: 69
End: 2024-06-11
Payer: MEDICARE

## 2024-06-11 ENCOUNTER — CLINICAL SUPPORT (OUTPATIENT)
Dept: BEHAVIORAL HEALTH | Facility: CLINIC | Age: 69
End: 2024-06-11
Payer: MEDICARE

## 2024-06-11 DIAGNOSIS — F33.1 MAJOR DEPRESSIVE DISORDER, RECURRENT, MODERATE: Primary | ICD-10-CM

## 2024-06-11 DIAGNOSIS — F41.1 GENERALIZED ANXIETY DISORDER: ICD-10-CM

## 2024-06-11 PROCEDURE — 90832 PSYTX W PT 30 MINUTES: CPT | Mod: 95,,, | Performed by: COUNSELOR

## 2024-06-12 ENCOUNTER — OFFICE VISIT (OUTPATIENT)
Dept: INTERNAL MEDICINE | Facility: CLINIC | Age: 69
End: 2024-06-12
Payer: MEDICARE

## 2024-06-12 VITALS
SYSTOLIC BLOOD PRESSURE: 112 MMHG | WEIGHT: 157.88 LBS | DIASTOLIC BLOOD PRESSURE: 70 MMHG | HEIGHT: 66 IN | OXYGEN SATURATION: 99 % | HEART RATE: 81 BPM | BODY MASS INDEX: 25.37 KG/M2

## 2024-06-12 DIAGNOSIS — F41.9 ANXIETY: ICD-10-CM

## 2024-06-12 DIAGNOSIS — I10 PRIMARY HYPERTENSION: Primary | ICD-10-CM

## 2024-06-12 DIAGNOSIS — F32.A DEPRESSION, UNSPECIFIED DEPRESSION TYPE: ICD-10-CM

## 2024-06-12 PROCEDURE — 3074F SYST BP LT 130 MM HG: CPT | Mod: CPTII,S$GLB,, | Performed by: INTERNAL MEDICINE

## 2024-06-12 PROCEDURE — 3044F HG A1C LEVEL LT 7.0%: CPT | Mod: CPTII,S$GLB,, | Performed by: INTERNAL MEDICINE

## 2024-06-12 PROCEDURE — 1101F PT FALLS ASSESS-DOCD LE1/YR: CPT | Mod: CPTII,S$GLB,, | Performed by: INTERNAL MEDICINE

## 2024-06-12 PROCEDURE — 99999 PR PBB SHADOW E&M-EST. PATIENT-LVL III: CPT | Mod: PBBFAC,,, | Performed by: INTERNAL MEDICINE

## 2024-06-12 PROCEDURE — 3008F BODY MASS INDEX DOCD: CPT | Mod: CPTII,S$GLB,, | Performed by: INTERNAL MEDICINE

## 2024-06-12 PROCEDURE — 3288F FALL RISK ASSESSMENT DOCD: CPT | Mod: CPTII,S$GLB,, | Performed by: INTERNAL MEDICINE

## 2024-06-12 PROCEDURE — 1125F AMNT PAIN NOTED PAIN PRSNT: CPT | Mod: CPTII,S$GLB,, | Performed by: INTERNAL MEDICINE

## 2024-06-12 PROCEDURE — 3078F DIAST BP <80 MM HG: CPT | Mod: CPTII,S$GLB,, | Performed by: INTERNAL MEDICINE

## 2024-06-12 PROCEDURE — 4010F ACE/ARB THERAPY RXD/TAKEN: CPT | Mod: CPTII,S$GLB,, | Performed by: INTERNAL MEDICINE

## 2024-06-12 PROCEDURE — 1159F MED LIST DOCD IN RCRD: CPT | Mod: CPTII,S$GLB,, | Performed by: INTERNAL MEDICINE

## 2024-06-12 PROCEDURE — 99214 OFFICE O/P EST MOD 30 MIN: CPT | Mod: S$GLB,,, | Performed by: INTERNAL MEDICINE

## 2024-06-12 PROCEDURE — 1160F RVW MEDS BY RX/DR IN RCRD: CPT | Mod: CPTII,S$GLB,, | Performed by: INTERNAL MEDICINE

## 2024-06-12 NOTE — LETTER
June 12, 2024    Ladarius Solis  [address] [place] [address]             [facility]  [address]  [facility] [address]  Phone: [phone]  Fax: [phone] To whom it may concern:    Mr. Ladarius Solis is medically cleared to return to work.      If you have any questions or concerns, please don't hesitate to call.      Sincerely,         Nick Stanton MD

## 2024-06-12 NOTE — PROGRESS NOTES
Subjective:       Patient ID: Ladarius Solis is a 68 y.o. male.    Chief Complaint: Follow-up    HPI: Patient comes in for follow-up of anxiety and blood pressure.  He feels like he has doing a little better since starting Zoloft.  I did that through the recommendation of Psychiatry, Dr. Millard.  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  He is hoping to get back with [employer] but since he left them based on a medical condition he is afraid they will not higher him back.  He has put into applications and has not heard back from them so he asked me to write a letter stating that he was medically cleared to return to work which I will happily do.  No GI or  complaints.      Review of Systems   Constitutional:  Negative for fever.   Cardiovascular:  Negative for chest pain.   Gastrointestinal:  Negative for abdominal pain.   Psychiatric/Behavioral:  Positive for dysphoric mood (slight improvement). The patient is nervous/anxious (slight improvement).            Past Medical History:   Diagnosis Date    Arthritis     Carpal tunnel syndrome, bilateral     Cervical spinal stenosis 2002    Colon polyps     Fatty liver     Hypertension     Overweight (BMI 25.0-29.9) 8/16/2019    Vertigo     left ear issues     Past Surgical History:   Procedure Laterality Date    ARTHROSCOPIC CHONDROPLASTY OF KNEE JOINT Left 10/17/2018    Procedure: ARTHROSCOPY, KNEE, WITH CHONDROPLASTY;  Surgeon: GRETEL Carr MD;  Location: [facility] OR [address] [place] [address];  Service: Orthopedics;  Laterality: Left;    COLONOSCOPY      COLONOSCOPY N/A 9/25/2017    Procedure: COLONOSCOPY/emr;  Surgeon: Raul August MD;  Location: [address]);  Service: Endoscopy;  Laterality: N/A;    COLONOSCOPY N/A 3/9/2018    Procedure: COLONOSCOPY;  Surgeon: Raul August MD;  Location: [facility] ([address] [place] [address]);  Service: Endoscopy;  Laterality: N/A;     COLONOSCOPY N/A 3/23/2023    Procedure: COLONOSCOPY;  Surgeon: Aleksander Grimes MD;  Location: [facility] ENDO (4TH FLR);  Service: Endoscopy;  Laterality: N/A;  2/13 instructions to portal-st  pre call done- AJ  does not want to come in earlier 3/22 EB    EPIDURAL STEROID INJECTION N/A 11/4/2021    Procedure: INJECTION, STEROID, EPIDURAL, C7-T1  NEED CONSENT;  Surgeon: Bozena Mena MD;  Location: [facility] PAIN MGT;  Service: Pain Management;  Laterality: N/A;    ESOPHAGOGASTRODUODENOSCOPY N/A 9/23/2019    Procedure: EGD (ESOPHAGOGASTRODUODENOSCOPY);  Surgeon: Dyllan Maloney MD;  Location: [facility];  Service: General;  Laterality: N/A;    INJECTION OF FACET JOINT Bilateral 8/6/2021    Procedure: FACET JOINT INJECTION BILATERAL L4/5 AND L5/S1 DIRECT REFERRAL NEEDS CONSENT;  Surgeon: Jasiel Aviles MD;  Location: [facility] PAIN MGT;  Service: Pain Management;  Laterality: Bilateral;    KNEE ARTHROSCOPY W/ MENISCECTOMY Left 10/17/2018    Procedure: ARTHROSCOPY, KNEE, WITH MENISCECTOMY;  Surgeon: GRETEL Carr MD;  Location: [facility] OR 2ND FLR;  Service: Orthopedics;  Laterality: Left;  regional w/catheter adductor  Dimitry/[employer] notified 10-12 LO    REPAIR OF RETINAL DETACHMENT WITH VITRECTOMY Right 4/4/2023    Procedure: REPAIR, RETINAL DETACHMENT, WITH VITRECTOMY;  Surgeon: True Arroyo MD;  Location: [facility] OR 1ST FLR;  Service: Ophthalmology;  Laterality: Right;    REPAIR OF RETINAL DETACHMENT WITH VITRECTOMY Right 4/18/2023    Procedure: REPAIR, RETINAL DETACHMENT, WITH VITRECTOMY;  Surgeon: True Arroyo MD;  Location: [facility] OR 1ST FLR;  Service: Ophthalmology;  Laterality: Right;  Scleral Buckle      Patient Active Problem List   Diagnosis    Colon adenoma    Acute pain of left knee    Primary osteoarthritis of left knee    Neck pain    Bilateral carpal tunnel syndrome    Cervical radiculopathy at C7    Chondromalacia of left knee    Chronic pain of left knee    Right trigger finger    Trigger ring  finger of right hand    Hypertension    Hepatic steatosis    Overweight (BMI 25.0-29.9)    GERD (gastroesophageal reflux disease)    Chronic abdominal pain    Functional dyspepsia    Chronic pain    Aortic atherosclerosis    Current moderate episode of major depressive disorder        Objective:      Physical Exam  Constitutional:       General: He is not in acute distress.     Appearance: He is well-developed.   HENT:      Head: Normocephalic and atraumatic.      Right Ear: Tympanic membrane, ear canal and external ear normal.      Left Ear: Tympanic membrane, ear canal and external ear normal.      Mouth/Throat:      Pharynx: No oropharyngeal exudate or posterior oropharyngeal erythema.   Eyes:      General: No scleral icterus.     Conjunctiva/sclera: Conjunctivae normal.      Pupils: Pupils are equal, round, and reactive to light.   Neck:      Thyroid: No thyromegaly.      Comments: No supraclavicular nodes palpated  Cardiovascular:      Rate and Rhythm: Normal rate and regular rhythm.      Pulses: Normal pulses.      Heart sounds: Normal heart sounds. No murmur heard.  Pulmonary:      Effort: Pulmonary effort is normal.      Breath sounds: Normal breath sounds. No wheezing.   Abdominal:      General: Bowel sounds are normal.      Palpations: Abdomen is soft. There is no mass.      Tenderness: There is no abdominal tenderness.   Musculoskeletal:         General: No tenderness.      Cervical back: Normal range of motion and neck supple.      Right lower leg: No edema.      Left lower leg: No edema.   Lymphadenopathy:      Cervical: No cervical adenopathy.   Skin:     Coloration: Skin is not jaundiced or pale.   Neurological:      General: No focal deficit present.      Mental Status: He is alert and oriented to person, place, and time.   Psychiatric:         Mood and Affect: Mood normal.         Behavior: Behavior normal.         Assessment:       Problem List Items Addressed This Visit          Cardiac/Vascular  "   Hypertension - Primary     Other Visit Diagnoses       Depression, unspecified depression type        Continue medication    Anxiety        Continue medication            Plan:         Ladarius was seen today for follow-up.    Diagnoses and all orders for this visit:    Primary hypertension    Depression, unspecified depression type  Comments:  Continue medication    Anxiety  Comments:  Continue medication     Follow-up in about 3-4 months sooner p.r.n.                Portions of this note may have been created with voice recognition software. Occasional "wrong-word" or "sound-a-like" substitutions may have occurred due to the inherent limitations of voice recognition software. Please, read the note carefully and recognize, using context, where substitutions have occurred.  "

## 2024-07-02 ENCOUNTER — TELEPHONE (OUTPATIENT)
Dept: BEHAVIORAL HEALTH | Facility: CLINIC | Age: 69
End: 2024-07-02
Payer: MEDICARE

## 2024-07-02 NOTE — PROGRESS NOTES
CHW reached out to pt to reschedule appointment with Carlos Enrique Hutchison LPC. Pt started a new job and cannot reschedule yet since he will get his schedule first. CHW agreed to send patient my contact information so he could reschedule when possible, sent.

## 2024-07-11 ENCOUNTER — HOSPITAL ENCOUNTER (EMERGENCY)
Facility: HOSPITAL | Age: 69
Discharge: HOME | End: 2024-07-11
Attending: EMERGENCY MEDICINE
Payer: MEDICARE

## 2024-07-11 VITALS
DIASTOLIC BLOOD PRESSURE: 82 MMHG | SYSTOLIC BLOOD PRESSURE: 137 MMHG | HEART RATE: 70 BPM | TEMPERATURE: 98 F | WEIGHT: 158 LBS | RESPIRATION RATE: 15 BRPM | OXYGEN SATURATION: 98 % | BODY MASS INDEX: 25.5 KG/M2

## 2024-07-11 DIAGNOSIS — R05.9 COUGH: Primary | ICD-10-CM

## 2024-07-11 LAB
INFLUENZA A, MOLECULAR: NEGATIVE
INFLUENZA B, MOLECULAR: NEGATIVE
SARS-COV-2 RDRP RESP QL NAA+PROBE: NEGATIVE
SPECIMEN SOURCE: NORMAL

## 2024-07-11 PROCEDURE — U0002 COVID-19 LAB TEST NON-CDC: HCPCS | Performed by: STUDENT IN AN ORGANIZED HEALTH CARE EDUCATION/TRAINING PROGRAM

## 2024-07-11 PROCEDURE — 87502 INFLUENZA DNA AMP PROBE: CPT | Performed by: STUDENT IN AN ORGANIZED HEALTH CARE EDUCATION/TRAINING PROGRAM

## 2024-07-11 PROCEDURE — 99283 EMERGENCY DEPT VISIT LOW MDM: CPT | Mod: 25

## 2024-07-11 RX ORDER — PROMETHAZINE HYDROCHLORIDE AND DEXTROMETHORPHAN HYDROBROMIDE 6.25; 15 MG/5ML; MG/5ML
5 SYRUP ORAL EVERY 8 HOURS PRN
Qty: 118 ML | Refills: 0 | Status: SHIPPED | OUTPATIENT
Start: 2024-07-11 | End: 2024-07-21

## 2024-07-11 NOTE — ED PROVIDER NOTES
Encounter Date: 7/11/2024       History     Chief Complaint   Patient presents with    Cough     Sore throat and cough since yesterday     68-year-old male with history of HTN, vertigo, arthritis, and history as stated below who presents to the ED for chief complaint of cough of 2 days.  Patient states he has had a mild sore throat and cough.  He also endorses generalized fatigue.  He denies any fevers, chest pain, SOB, abdominal pain, nausea, vomiting, diarrhea, or changes in urination.  He states he has still been eating well and drinking fluids.  Denies any known sick contacts.    The history is provided by the patient. No  was used.     Review of patient's allergies indicates:  No Known Allergies  Past Medical History:   Diagnosis Date    Arthritis     Carpal tunnel syndrome, bilateral     Cervical spinal stenosis 2002    Colon polyps     Fatty liver     Hypertension     Overweight (BMI 25.0-29.9) 8/16/2019    Vertigo     left ear issues     Past Surgical History:   Procedure Laterality Date    ARTHROSCOPIC CHONDROPLASTY OF KNEE JOINT Left 10/17/2018    Procedure: ARTHROSCOPY, KNEE, WITH CHONDROPLASTY;  Surgeon: GRETEL Carr MD;  Location: [facility] OR [address] [place] [address];  Service: Orthopedics;  Laterality: Left;    COLONOSCOPY      COLONOSCOPY N/A 9/25/2017    Procedure: COLONOSCOPY/emr;  Surgeon: Raul August MD;  Location: [facility] (2ND FLR);  Service: Endoscopy;  Laterality: N/A;    COLONOSCOPY N/A 3/9/2018    Procedure: COLONOSCOPY;  Surgeon: Raul August MD;  Location: [facility] (2ND FLR);  Service: Endoscopy;  Laterality: N/A;    COLONOSCOPY N/A 3/23/2023    Procedure: COLONOSCOPY;  Surgeon: Aleksander Grimes MD;  Location: [facility] (4TH [facility]);  Service: Endoscopy;  Laterality: N/A;  2/13 instructions to portal-st  pre call done- AJ  does not want to come in earlier 3/22 EB    EPIDURAL STEROID INJECTION N/A 11/4/2021    Procedure: INJECTION, STEROID, EPIDURAL, C7-T1  NEED CONSENT;  Surgeon:  Bozena Mena MD;  Location: [facility] PAIN MGT;  Service: Pain Management;  Laterality: N/A;    ESOPHAGOGASTRODUODENOSCOPY N/A 9/23/2019    Procedure: EGD (ESOPHAGOGASTRODUODENOSCOPY);  Surgeon: Dyllan Maloney MD;  Location: [facility];  Service: General;  Laterality: N/A;    INJECTION OF FACET JOINT Bilateral 8/6/2021    Procedure: FACET JOINT INJECTION BILATERAL L4/5 AND L5/S1 DIRECT REFERRAL NEEDS CONSENT;  Surgeon: Jasiel Aviles MD;  Location: [facility] PAIN MGT;  Service: Pain Management;  Laterality: Bilateral;    KNEE ARTHROSCOPY W/ MENISCECTOMY Left 10/17/2018    Procedure: ARTHROSCOPY, KNEE, WITH MENISCECTOMY;  Surgeon: GRETEL Carr MD;  Location: [facility] OR [address] [place] [address];  Service: Orthopedics;  Laterality: Left;  regional w/catheter adductor  Dimitry/[employer] notified 10-12 LO    REPAIR OF RETINAL DETACHMENT WITH VITRECTOMY Right 4/4/2023    Procedure: REPAIR, RETINAL DETACHMENT, WITH VITRECTOMY;  Surgeon: True Arroyo MD;  Location: [facility] OR [address] [place] [address];  Service: Ophthalmology;  Laterality: Right;    REPAIR OF RETINAL DETACHMENT WITH VITRECTOMY Right 4/18/2023    Procedure: REPAIR, RETINAL DETACHMENT, WITH VITRECTOMY;  Surgeon: True Arroyo MD;  Location: [facility] OR [address] [place] [address];  Service: Ophthalmology;  Laterality: Right;  Scleral Buckle     Family History   Problem Relation Name Age of Onset    No Known Problems Mother      Arthritis Father      Dementia Father      Heart disease Father      No Known Problems Brother      Diabetes Neg Hx      Cirrhosis Neg Hx       Social History     Tobacco Use    Smoking status: Never    Smokeless tobacco: Never   Substance Use Topics    Alcohol use: No    Drug use: No     Review of Systems    Physical Exam     Initial Vitals [07/11/24 0450]   BP Pulse Resp Temp SpO2   137/82 70 15 98.1 °F (36.7 °C) 98 %      MAP       --         Physical Exam    Nursing note and vitals reviewed.  Constitutional: No distress.   He has sitting in no apparent distress   HENT:   Head:  Normocephalic.   Mouth/Throat: Oropharynx is clear and moist. No oropharyngeal exudate.   Eyes: Conjunctivae and EOM are normal. No scleral icterus.   Neck:   Normal range of motion.  Cardiovascular:  Normal rate, regular rhythm and normal heart sounds.           Pulmonary/Chest: Breath sounds normal. No respiratory distress. He has no wheezes. He has no rhonchi. He has no rales.   Abdominal: Abdomen is soft. He exhibits no distension. There is no abdominal tenderness. There is no rebound and no guarding.   Musculoskeletal:         General: Normal range of motion.      Cervical back: Normal range of motion.     Neurological: He is alert.   Skin: Skin is warm. Capillary refill takes less than 2 seconds.   Psychiatric: He has a normal mood and affect.         ED Course   Procedures  Labs Reviewed   INFLUENZA A & B BY MOLECULAR   SARS-COV-2 RNA AMPLIFICATION, QUAL          Imaging Results              X-Ray Chest PA And Lateral (Final result)  Result time 07/11/24 06:46:49      Final result by Aleksander Vann MD (07/11/24 06:46:49)                   Impression:      No significant intrathoracic abnormality.  No significant detrimental interval change in the appearance of the chest since 09/18/2022 is appreciated.      Electronically signed by: Aleksander Vann MD  Date:    07/11/2024  Time:    06:46               Narrative:    EXAMINATION:  XR CHEST PA AND LATERAL    TECHNIQUE:  Two views of the chest were obtained, with PA and lateral projections submitted.    COMPARISON:  Comparison is made to 09/18/2022.  Clinical information of cough and sore throat.    FINDINGS:  Heart size is normal, as is the appearance of the pulmonary vascularity.  Lung zones are clear, and are free of significant airspace consolidation or volume loss.  No pleural fluid.  No hilar or mediastinal mass lesion.  No pneumothorax.                                       Medications - No data to display  Medical Decision Making  68-year-old male who presents  with a cough.  Vitals are WNL.  On exam, lungs are clear to auscultation bilaterally.  Oropharynx is clear.  Please see physical exam findings above for additional details.  Differential diagnoses include but are not limited to URI, COVID, influenza.  Low suspicion of PNA, but will obtain a CXR for further evaluation in the setting of the patient's generalized fatigue.  Patient's influenza and COVID labs are negative.    Discussed clinical findings with patient and prescribed promethazine DM for cough.  Informed patient that he can take Tylenol or ibuprofen if fevers or body aches develop.  Discussed follow up with primary care provider and precautions for when to return to the emergency department.  Answered remaining questions.  Patient will be discharged to home.    Amount and/or Complexity of Data Reviewed  Radiology: ordered.    Risk  Prescription drug management.                                      Clinical Impression:  Final diagnoses:  [R05.9] Cough (Primary)          ED Disposition Condition    Discharge Stable          ED Prescriptions       Medication Sig Dispense Start Date End Date Auth. Provider    promethazine-dextromethorphan (PROMETHAZINE-DM) 6.25-15 mg/5 mL Syrp Take 5 mLs by mouth every 8 (eight) hours as needed. 118 mL 7/11/2024 7/21/2024 Kain Hylton MD          Follow-up Information       Follow up With Specialties Details Why Contact Info    Nick Stanton MD Internal Medicine Go in 1 week  [address]  [place] [address]  [phone]      [facility] - Emergency Dept Emergency Medicine Go to  If symptoms worsen [address] [facility] [id number] [phone]             Kain Hylton MD  Resident  07/11/24 6244

## 2024-07-11 NOTE — DISCHARGE INSTRUCTIONS
Diagnosis:   1. Cough      Home Care Instructions:  - if you develop fever or body aches, you may take Tylenol and ibuprofen  - You can take Promethazine DM every 8 hours as needed for cough and to help with sleep    Follow-Up Plan:  - Follow-up with: Primary care provider within 1 week    Return to the Emergency Department for symptoms including but not limited to: worsening symptoms, fever of 100.4° F or higher, shortness of breath or chest pain, vomiting with inability to hold down fluids, or other concerning symptoms.

## 2024-07-11 NOTE — ED NOTES
Patient identifiers for Ladarius Solis 68 y.o. male checked and correct.  Chief Complaint   Patient presents with    Cough     Sore throat and cough since yesterday     Past Medical History:   Diagnosis Date    Arthritis     Carpal tunnel syndrome, bilateral     Cervical spinal stenosis 2002    Colon polyps     Fatty liver     Hypertension     Overweight (BMI 25.0-29.9) 8/16/2019    Vertigo     left ear issues     Allergies reported: Review of patient's allergies indicates:  No Known Allergies      HEENT: Denies vision changes. Denies ear drainage or hearing loss. No c/o nasal drainage. Denies dysphagia or voice changes. +sore throat  Appearance: Pt awake, alert & oriented to person, place & time. Pt in no acute distress at present time. Pt is clean and well groomed with clothes appropriately fastened.   Skin: Skin warm, dry & intact. Color consistent with ethnicity. Mucous membranes moist. No breakdown or brusing noted.   Musculoskeletal: Patient moving all extremities well, no obvious swelling or deformities noted.   Respiratory: Respirations spontaneous, even, and non-labored. Visible chest rise noted. Airway is open and patent. No accessory muscle use noted. +non productive cough  Neurologic: Sensation is intact. Speech is clear and appropriate. Eyes open spontaneously, behavior appropriate to situation, follows commands, facial expression symmetrical, bilateral hand grasp equal and even, purposeful motor response noted.  Cardiac: All peripheral pulses present. No Bilateral lower extremity edema. Cap refill is <3 seconds.  Abdomen: Abdomen soft, non distended, non tender to palpation.   : Pt voids independently, denies dysuria, hematuria, frequency.

## 2024-09-23 ENCOUNTER — OFFICE VISIT (OUTPATIENT)
Dept: INTERNAL MEDICINE | Facility: CLINIC | Age: 69
End: 2024-09-23
Payer: MEDICARE

## 2024-09-23 VITALS
WEIGHT: 164 LBS | HEIGHT: 66 IN | DIASTOLIC BLOOD PRESSURE: 80 MMHG | OXYGEN SATURATION: 98 % | BODY MASS INDEX: 26.36 KG/M2 | HEART RATE: 77 BPM | SYSTOLIC BLOOD PRESSURE: 112 MMHG

## 2024-09-23 DIAGNOSIS — Z82.49 FAMILY HISTORY OF CHRONIC ISCHEMIC HEART DISEASE: ICD-10-CM

## 2024-09-23 DIAGNOSIS — Z82.49 FAMILY HISTORY OF HEART DISEASE: ICD-10-CM

## 2024-09-23 DIAGNOSIS — M54.12 CERVICAL RADICULOPATHY AT C7: ICD-10-CM

## 2024-09-23 DIAGNOSIS — I10 PRIMARY HYPERTENSION: Primary | ICD-10-CM

## 2024-09-23 DIAGNOSIS — R94.31 ABNORMAL EKG: ICD-10-CM

## 2024-09-23 DIAGNOSIS — R07.89 CHEST PRESSURE: ICD-10-CM

## 2024-09-23 LAB
OHS QRS DURATION: 134 MS
OHS QTC CALCULATION: 429 MS

## 2024-09-23 PROCEDURE — 1125F AMNT PAIN NOTED PAIN PRSNT: CPT | Mod: CPTII,S$GLB,, | Performed by: INTERNAL MEDICINE

## 2024-09-23 PROCEDURE — 4010F ACE/ARB THERAPY RXD/TAKEN: CPT | Mod: CPTII,S$GLB,, | Performed by: INTERNAL MEDICINE

## 2024-09-23 PROCEDURE — 99214 OFFICE O/P EST MOD 30 MIN: CPT | Mod: S$GLB,,, | Performed by: INTERNAL MEDICINE

## 2024-09-23 PROCEDURE — 3008F BODY MASS INDEX DOCD: CPT | Mod: CPTII,S$GLB,, | Performed by: INTERNAL MEDICINE

## 2024-09-23 PROCEDURE — 93010 ELECTROCARDIOGRAM REPORT: CPT | Mod: S$GLB,,, | Performed by: INTERNAL MEDICINE

## 2024-09-23 PROCEDURE — 99999 PR PBB SHADOW E&M-EST. PATIENT-LVL IV: CPT | Mod: PBBFAC,,, | Performed by: INTERNAL MEDICINE

## 2024-09-23 PROCEDURE — 1159F MED LIST DOCD IN RCRD: CPT | Mod: CPTII,S$GLB,, | Performed by: INTERNAL MEDICINE

## 2024-09-23 PROCEDURE — 93005 ELECTROCARDIOGRAM TRACING: CPT | Mod: S$GLB,,, | Performed by: INTERNAL MEDICINE

## 2024-09-23 PROCEDURE — 3074F SYST BP LT 130 MM HG: CPT | Mod: CPTII,S$GLB,, | Performed by: INTERNAL MEDICINE

## 2024-09-23 PROCEDURE — 3288F FALL RISK ASSESSMENT DOCD: CPT | Mod: CPTII,S$GLB,, | Performed by: INTERNAL MEDICINE

## 2024-09-23 PROCEDURE — 3044F HG A1C LEVEL LT 7.0%: CPT | Mod: CPTII,S$GLB,, | Performed by: INTERNAL MEDICINE

## 2024-09-23 PROCEDURE — 1101F PT FALLS ASSESS-DOCD LE1/YR: CPT | Mod: CPTII,S$GLB,, | Performed by: INTERNAL MEDICINE

## 2024-09-23 PROCEDURE — G2211 COMPLEX E/M VISIT ADD ON: HCPCS | Mod: S$GLB,,, | Performed by: INTERNAL MEDICINE

## 2024-09-23 PROCEDURE — 3079F DIAST BP 80-89 MM HG: CPT | Mod: CPTII,S$GLB,, | Performed by: INTERNAL MEDICINE

## 2024-09-23 PROCEDURE — 1160F RVW MEDS BY RX/DR IN RCRD: CPT | Mod: CPTII,S$GLB,, | Performed by: INTERNAL MEDICINE

## 2024-09-23 NOTE — PROGRESS NOTES
Subjective:       Patient ID: Ladarius Solis is a 68 y.o. male.    Chief Complaint: Follow-up    Patient with chronic musculoskeletal and spine issues, arthritis, comes in for follow-up and states he has been having some mild chest pressure.  He is not exerting himself or exercising much so he can not say if it is physical, related to his arthritis, or possibly even anxiety, indigestion or other.  He had a family history of heart disease.  We spent quite a bit of time reviewing old EKGs, old stress test and recent EKGs he said the symptoms are mild but definitely new in the last few weeks or longer and he did not want to ignore them.  In [id number] he had some echoes EKGs which showed normal sinus rhythm and a stress test that was negative.  There was a comment that he went into right bundle-branch block with increased heart rate on the stress test otherwise EKG showed normal sinus rhythm.  2019 EKG showed normal sinus rhythm, 2022 an ER visit showed right bundle-branch block and mentioned of possible inferior ischemia.  The preliminary reading on the EKG today shows right bundle-branch block and old inferior infarct age uncertain.  Patient has had family history of heart disease so we discussed seeing Cardiology to figure the best test such as a stress test for his bundle-branch block, possible EKG changes and to sort out this possible chest symptom         Review of Systems   Constitutional:  Negative for chills, fatigue and fever.   HENT:  Negative for nosebleeds and trouble swallowing.    Eyes:  Negative for pain and visual disturbance.   Respiratory:  Negative for cough, shortness of breath and wheezing.    Cardiovascular:  Positive for chest pain (see HPI). Negative for palpitations.   Gastrointestinal:  Negative for abdominal pain, constipation, diarrhea, nausea and vomiting.   Genitourinary:  Negative for difficulty urinating and hematuria.   Musculoskeletal:  Positive for arthralgias and back pain.  Negative for neck pain.   Integumentary:  Negative for rash.   Neurological:  Negative for dizziness and headaches.   Hematological:  Does not bruise/bleed easily.   Psychiatric/Behavioral:  Negative for dysphoric mood and sleep disturbance.            Past Medical History:   Diagnosis Date    Arthritis     Carpal tunnel syndrome, bilateral     Cervical spinal stenosis 2002    Colon polyps     Fatty liver     Hypertension     Overweight (BMI 25.0-29.9) 8/16/2019    Vertigo     left ear issues     Past Surgical History:   Procedure Laterality Date    ARTHROSCOPIC CHONDROPLASTY OF KNEE JOINT Left 10/17/2018    Procedure: ARTHROSCOPY, KNEE, WITH CHONDROPLASTY;  Surgeon: GRETEL Carr MD;  Location: [facility] OR 2ND FLR;  Service: Orthopedics;  Laterality: Left;    COLONOSCOPY      COLONOSCOPY N/A 9/25/2017    Procedure: COLONOSCOPY/emr;  Surgeon: Raul August MD;  Location: [facility] (2ND FLR);  Service: Endoscopy;  Laterality: N/A;    COLONOSCOPY N/A 3/9/2018    Procedure: COLONOSCOPY;  Surgeon: Raul August MD;  Location: [facility] (2ND FLR);  Service: Endoscopy;  Laterality: N/A;    COLONOSCOPY N/A 3/23/2023    Procedure: COLONOSCOPY;  Surgeon: Aleksander Grimes MD;  Location: [facility] (4TH FLR);  Service: Endoscopy;  Laterality: N/A;  2/13 instructions to portal-st  pre call done- AJ  does not want to come in earlier 3/22 EB    EPIDURAL STEROID INJECTION N/A 11/4/2021    Procedure: INJECTION, STEROID, EPIDURAL, C7-T1  NEED CONSENT;  Surgeon: Bozena Mena MD;  Location: [facility];  Service: Pain Management;  Laterality: N/A;    ESOPHAGOGASTRODUODENOSCOPY N/A 9/23/2019    Procedure: EGD (ESOPHAGOGASTRODUODENOSCOPY);  Surgeon: Dyllan Maloney MD;  Location: [facility] ENDO;  Service: General;  Laterality: N/A;    INJECTION OF FACET JOINT Bilateral 8/6/2021    Procedure: FACET JOINT INJECTION BILATERAL L4/5 AND L5/S1 DIRECT REFERRAL NEEDS CONSENT;  Surgeon: Jasiel Aviles MD;  Location: [facility] PAIN T;  Service:  Pain Management;  Laterality: Bilateral;    KNEE ARTHROSCOPY W/ MENISCECTOMY Left 10/17/2018    Procedure: ARTHROSCOPY, KNEE, WITH MENISCECTOMY;  Surgeon: GRETEL Carr MD;  Location: [facility] OR [address] [place] [address];  Service: Orthopedics;  Laterality: Left;  regional w/catheter adductor  Dimitry/[employer] notified 10-12 LO    REPAIR OF RETINAL DETACHMENT WITH VITRECTOMY Right 4/4/2023    Procedure: REPAIR, RETINAL DETACHMENT, WITH VITRECTOMY;  Surgeon: True Arroyo MD;  Location: [facility] OR 1ST FLR;  Service: Ophthalmology;  Laterality: Right;    REPAIR OF RETINAL DETACHMENT WITH VITRECTOMY Right 4/18/2023    Procedure: REPAIR, RETINAL DETACHMENT, WITH VITRECTOMY;  Surgeon: True Arroyo MD;  Location: [facility] OR [address] [place] [address];  Service: Ophthalmology;  Laterality: Right;  Scleral Buckle      Patient Active Problem List   Diagnosis    Colon adenoma    Acute pain of left knee    Primary osteoarthritis of left knee    Neck pain    Bilateral carpal tunnel syndrome    Cervical radiculopathy at C7    Chondromalacia of left knee    Chronic pain of left knee    Right trigger finger    Trigger ring finger of right hand    Hypertension    Hepatic steatosis    Overweight (BMI 25.0-29.9)    GERD (gastroesophageal reflux disease)    Chronic abdominal pain    Functional dyspepsia    Chronic pain    Aortic atherosclerosis    Current moderate episode of major depressive disorder        Objective:      Physical Exam  Constitutional:       General: He is not in acute distress.     Appearance: He is well-developed.   HENT:      Head: Normocephalic and atraumatic.      Right Ear: Tympanic membrane, ear canal and external ear normal.      Left Ear: Tympanic membrane, ear canal and external ear normal.      Mouth/Throat:      Pharynx: No oropharyngeal exudate or posterior oropharyngeal erythema.   Eyes:      General: No scleral icterus.     Conjunctiva/sclera: Conjunctivae normal.      Pupils: Pupils are equal, round, and reactive to light.    Neck:      Thyroid: No thyromegaly.      Comments: No supraclavicular nodes palpated  Cardiovascular:      Rate and Rhythm: Normal rate and regular rhythm.      Pulses: Normal pulses.      Heart sounds: Normal heart sounds. No murmur heard.  Pulmonary:      Effort: Pulmonary effort is normal.      Breath sounds: Normal breath sounds. No wheezing.   Chest:      Chest wall: No tenderness (No reproducible chest tenderness).   Abdominal:      General: Bowel sounds are normal.      Palpations: Abdomen is soft. There is no mass.      Tenderness: There is no abdominal tenderness.   Musculoskeletal:         General: Tenderness (neck/back) present.      Cervical back: Normal range of motion and neck supple.      Right lower leg: No edema.      Left lower leg: No edema.   Lymphadenopathy:      Cervical: No cervical adenopathy.   Skin:     Coloration: Skin is not jaundiced or pale.   Neurological:      General: No focal deficit present.      Mental Status: He is alert and oriented to person, place, and time.   Psychiatric:         Mood and Affect: Mood normal.         Behavior: Behavior normal.         Assessment:       Problem List Items Addressed This Visit          Neuro    Cervical radiculopathy at C7       Cardiac/Vascular    Hypertension - Primary    Relevant Orders    EKG 12-lead     Other Visit Diagnoses       Chest pressure        Relevant Orders    EKG 12-lead    Ambulatory referral/consult to Cardiology    Abnormal EKG        Relevant Orders    Ambulatory referral/consult to Cardiology    Family history of chronic ischemic heart disease        Family history of heart disease        Relevant Orders    Ambulatory referral/consult to Cardiology            Plan:         Ladarius was seen today for follow-up.    Diagnoses and all orders for this visit:    Primary hypertension  -     EKG 12-lead    Cervical radiculopathy at C7    Chest pressure  -     EKG 12-lead  -     Ambulatory referral/consult to Cardiology;  "Future    Abnormal EKG  -     Ambulatory referral/consult to Cardiology; Future    Family history of chronic ischemic heart disease    Family history of heart disease  -     Ambulatory referral/consult to Cardiology; Future       Avoid any clearly strenuous activity until cardiology consultation.  Call with any acute changes or problems.        As this patient's PCP, I am actively managing and/or treating their chronic medical conditions including HTN and have been for at least 1 year. This includes, but is not limited to, medication management, coordination of care, documentation review from their specialists and labs/imaging review where pertinent.        Portions of this note may have been created with voice recognition software. Occasional "wrong-word" or "sound-a-like" substitutions may have occurred due to the inherent limitations of voice recognition software. Please, read the note carefully and recognize, using context, where substitutions have occurred.  "

## 2024-10-16 ENCOUNTER — PATIENT MESSAGE (OUTPATIENT)
Dept: ADMINISTRATIVE | Facility: OTHER | Age: 69
End: 2024-10-16
Payer: MEDICARE

## 2024-10-25 ENCOUNTER — TELEPHONE (OUTPATIENT)
Dept: CARDIOLOGY | Facility: CLINIC | Age: 69
End: 2024-10-25
Payer: MEDICARE

## 2024-10-25 DIAGNOSIS — R07.89 CHEST PRESSURE: Primary | ICD-10-CM

## 2024-10-28 ENCOUNTER — HOSPITAL ENCOUNTER (OUTPATIENT)
Dept: CARDIOLOGY | Facility: CLINIC | Age: 69
Discharge: HOME | End: 2024-10-28
Payer: MEDICARE

## 2024-10-28 DIAGNOSIS — R07.89 CHEST PRESSURE: ICD-10-CM

## 2024-10-28 LAB
OHS QRS DURATION: 136 MS
OHS QTC CALCULATION: 412 MS

## 2024-10-28 PROCEDURE — 93005 ELECTROCARDIOGRAM TRACING: CPT | Mod: S$GLB,,, | Performed by: INTERNAL MEDICINE

## 2024-10-28 PROCEDURE — 93010 ELECTROCARDIOGRAM REPORT: CPT | Mod: S$GLB,,, | Performed by: INTERNAL MEDICINE

## 2024-10-29 ENCOUNTER — OFFICE VISIT (OUTPATIENT)
Dept: CARDIOLOGY | Facility: CLINIC | Age: 69
End: 2024-10-29
Payer: MEDICARE

## 2024-10-29 VITALS
DIASTOLIC BLOOD PRESSURE: 80 MMHG | OXYGEN SATURATION: 99 % | HEIGHT: 66 IN | SYSTOLIC BLOOD PRESSURE: 120 MMHG | WEIGHT: 160.69 LBS | HEART RATE: 56 BPM | BODY MASS INDEX: 25.83 KG/M2

## 2024-10-29 DIAGNOSIS — R94.31 ABNORMAL EKG: ICD-10-CM

## 2024-10-29 DIAGNOSIS — I70.0 AORTIC ATHEROSCLEROSIS: ICD-10-CM

## 2024-10-29 DIAGNOSIS — Z82.49 FAMILY HISTORY OF HEART DISEASE: ICD-10-CM

## 2024-10-29 DIAGNOSIS — R07.9 CHEST PAIN, UNSPECIFIED TYPE: Primary | ICD-10-CM

## 2024-10-29 DIAGNOSIS — I10 PRIMARY HYPERTENSION: ICD-10-CM

## 2024-10-29 DIAGNOSIS — R07.89 CHEST PRESSURE: ICD-10-CM

## 2024-10-29 PROCEDURE — 1101F PT FALLS ASSESS-DOCD LE1/YR: CPT | Mod: CPTII,S$GLB,, | Performed by: INTERNAL MEDICINE

## 2024-10-29 PROCEDURE — 1159F MED LIST DOCD IN RCRD: CPT | Mod: CPTII,S$GLB,, | Performed by: INTERNAL MEDICINE

## 2024-10-29 PROCEDURE — 3044F HG A1C LEVEL LT 7.0%: CPT | Mod: CPTII,S$GLB,, | Performed by: INTERNAL MEDICINE

## 2024-10-29 PROCEDURE — 99999 PR PBB SHADOW E&M-EST. PATIENT-LVL IV: CPT | Mod: PBBFAC,,, | Performed by: INTERNAL MEDICINE

## 2024-10-29 PROCEDURE — 1125F AMNT PAIN NOTED PAIN PRSNT: CPT | Mod: CPTII,S$GLB,, | Performed by: INTERNAL MEDICINE

## 2024-10-29 PROCEDURE — 3288F FALL RISK ASSESSMENT DOCD: CPT | Mod: CPTII,S$GLB,, | Performed by: INTERNAL MEDICINE

## 2024-10-29 PROCEDURE — 3008F BODY MASS INDEX DOCD: CPT | Mod: CPTII,S$GLB,, | Performed by: INTERNAL MEDICINE

## 2024-10-29 PROCEDURE — 4010F ACE/ARB THERAPY RXD/TAKEN: CPT | Mod: CPTII,S$GLB,, | Performed by: INTERNAL MEDICINE

## 2024-10-29 PROCEDURE — 99204 OFFICE O/P NEW MOD 45 MIN: CPT | Mod: S$GLB,,, | Performed by: INTERNAL MEDICINE

## 2024-10-29 PROCEDURE — 3079F DIAST BP 80-89 MM HG: CPT | Mod: CPTII,S$GLB,, | Performed by: INTERNAL MEDICINE

## 2024-10-29 PROCEDURE — 3074F SYST BP LT 130 MM HG: CPT | Mod: CPTII,S$GLB,, | Performed by: INTERNAL MEDICINE

## 2024-10-29 RX ORDER — ASPIRIN 81 MG/1
81 TABLET ORAL DAILY
Qty: 90 TABLET | Refills: 3 | Status: SHIPPED | OUTPATIENT
Start: 2024-10-29 | End: 2025-10-29

## 2024-10-29 RX ORDER — ROSUVASTATIN CALCIUM 5 MG/1
5 TABLET, COATED ORAL DAILY
Qty: 90 TABLET | Refills: 3 | Status: SHIPPED | OUTPATIENT
Start: 2024-10-29 | End: 2025-10-29

## 2024-11-04 ENCOUNTER — HOSPITAL ENCOUNTER (OUTPATIENT)
Dept: CARDIOLOGY | Facility: HOSPITAL | Age: 69
Discharge: HOME | End: 2024-11-04
Attending: INTERNAL MEDICINE
Payer: MEDICARE

## 2024-11-04 ENCOUNTER — TELEPHONE (OUTPATIENT)
Dept: CARDIOLOGY | Facility: CLINIC | Age: 69
End: 2024-11-04
Payer: MEDICARE

## 2024-11-04 VITALS — HEIGHT: 66 IN | WEIGHT: 160 LBS | BODY MASS INDEX: 25.71 KG/M2

## 2024-11-04 DIAGNOSIS — R07.89 CHEST PRESSURE: ICD-10-CM

## 2024-11-04 DIAGNOSIS — R94.31 ABNORMAL EKG: ICD-10-CM

## 2024-11-04 DIAGNOSIS — R94.39 ABNORMAL STRESS ECHOCARDIOGRAM: Primary | ICD-10-CM

## 2024-11-04 LAB
ASCENDING AORTA: 3.19 CM
AV AREA BY CONTINUOUS VTI: 2.6 CM2
AV INDEX (PROSTH): 0.8
AV LVOT MEAN GRADIENT: 2 MMHG
AV LVOT PEAK GRADIENT: 4 MMHG
AV MEAN GRADIENT: 3.4 MMHG
AV PEAK GRADIENT: 7.8 MMHG
AV VALVE AREA BY VELOCITY RATIO: 2.2 CM²
AV VALVE AREA: 2.5 CM2
AV VELOCITY RATIO: 0.71
BSA FOR ECHO PROCEDURE: 1.84 M2
CV ECHO LV RWT: 0.39 CM
CV STRESS BASE HR: 69 BPM
DIASTOLIC BLOOD PRESSURE: 68 MMHG
DOP CALC AO PEAK VEL: 1.4 M/S
DOP CALC AO VTI: 23.9 CM
DOP CALC LVOT AREA: 3.1 CM2
DOP CALC LVOT DIAMETER: 2 CM
DOP CALC LVOT PEAK VEL: 1 M/S
DOP CALC LVOT STROKE VOLUME: 60.3 CM3
DOP CALCLVOT PEAK VEL VTI: 19.2 CM
E WAVE DECELERATION TIME: 233.35 MS
E/A RATIO: 0.85
E/E' RATIO: 8.75 M/S
ECHO EF ESTIMATED: 55 %
ECHO LV POSTERIOR WALL: 0.8 CM (ref 0.6–1.1)
FRACTIONAL SHORTENING: 34.1 % (ref 28–44)
INTERVENTRICULAR SEPTUM: 0.9 CM (ref 0.6–1.1)
IVC DIAMETER: 1.25 CM
IVRT: 88.49 MS
LA MAJOR: 4.58 CM
LA MINOR: 4.99 CM
LA WIDTH: 2.99 CM
LEFT ATRIUM SIZE: 3.24 CM
LEFT ATRIUM VOLUME INDEX: 21.6 ML/M2
LEFT ATRIUM VOLUME: 39.33 CM3
LEFT INTERNAL DIMENSION IN SYSTOLE: 2.7 CM (ref 2.1–4)
LEFT VENTRICLE DIASTOLIC VOLUME INDEX: 34.1 ML/M2
LEFT VENTRICLE DIASTOLIC VOLUME: 62.07 ML
LEFT VENTRICLE MASS INDEX: 58 G/M2
LEFT VENTRICLE SYSTOLIC VOLUME INDEX: 15.2 ML/M2
LEFT VENTRICLE SYSTOLIC VOLUME: 27.64 ML
LEFT VENTRICULAR INTERNAL DIMENSION IN DIASTOLE: 4.1 CM (ref 3.5–6)
LEFT VENTRICULAR MASS: 105.6 G
LV LATERAL E/E' RATIO: 7
LV SEPTAL E/E' RATIO: 11.67
MV A" WAVE DURATION": 94.2 MS
MV PEAK A VEL: 0.82 M/S
MV PEAK E VEL: 0.7 M/S
OHS CV CPX 1 MINUTE RECOVERY HEART RATE: 83 BPM
OHS CV CPX 85 PERCENT MAX PREDICTED HEART RATE MALE: 131
OHS CV CPX ESTIMATED METS: 9
OHS CV CPX MAX PREDICTED HEART RATE: 154
OHS CV CPX PATIENT IS FEMALE: 0
OHS CV CPX PATIENT IS MALE: 1
OHS CV CPX PEAK DIASTOLIC BLOOD PRESSURE: 68 MMHG
OHS CV CPX PEAK HEAR RATE: 116 BPM
OHS CV CPX PEAK RATE PRESSURE PRODUCT: NORMAL
OHS CV CPX PEAK SYSTOLIC BLOOD PRESSURE: 131 MMHG
OHS CV CPX PERCENT MAX PREDICTED HEART RATE ACHIEVED: 75
OHS CV CPX RATE PRESSURE PRODUCT PRESENTING: 8349
OHS CV RV/LV RATIO: 0.93 CM
PISA TR MAX VEL: 2.02 M/S
PULM VEIN A" WAVE DURATION": 94.2 MS
PULM VEIN S/D RATIO: 1.55
PULMONIC VEIN PEAK A VELOCITY: 0.3 M/S
PV PEAK D VEL: 0.4 M/S
PV PEAK S VEL: 0.62 M/S
RA MAJOR: 4.98 CM
RA PRESSURE ESTIMATED: 3 MMHG
RA WIDTH: 2.95 CM
RIGHT VENTRICLE DIASTOLIC BASEL DIMENSION: 3.8 CM
RV TB RVSP: 5 MMHG
RV TISSUE DOPPLER FREE WALL SYSTOLIC VELOCITY 1 (APICAL 4 CHAMBER VIEW): 12.15 CM/S
SINUS: 3.66 CM
STJ: 2.62 CM
STRESS ECHO POST EXERCISE DUR MIN: 6 MINUTES
STRESS ECHO POST EXERCISE DUR SEC: 1 SECONDS
SYSTOLIC BLOOD PRESSURE: 121 MMHG
TDI LATERAL: 0.1 M/S
TDI SEPTAL: 0.06 M/S
TDI: 0.08 M/S
TR MAX PG: 16 MMHG
TRICUSPID ANNULAR PLANE SYSTOLIC EXCURSION: 2.39 CM
TV PEAK GRADIENT: 16 MMHG
TV REST PULMONARY ARTERY PRESSURE: 19 MMHG
Z-SCORE OF LEFT VENTRICULAR DIMENSION IN END DIASTOLE: -2.06
Z-SCORE OF LEFT VENTRICULAR DIMENSION IN END SYSTOLE: -1.12

## 2024-11-04 PROCEDURE — 93351 STRESS TTE COMPLETE: CPT

## 2024-11-04 PROCEDURE — 93351 STRESS TTE COMPLETE: CPT | Mod: 26,,, | Performed by: INTERNAL MEDICINE

## 2024-11-04 NOTE — TELEPHONE ENCOUNTER
----- Message from Trino Wang MD sent at 11/4/2024 12:01 PM CST -----  Regarding: Stress echo  Please let Mr. Solis know that I would like him to see Interventional about having an angiogram.  His stress echo was abnormal and we need to make sure there isn't an important blockage in one of his heart arteries.    Thanks.    Placing order now.    Trino

## 2024-11-20 NOTE — PROGRESS NOTES
"    Interventional Cardiology Clinic Note    General Cardiologist: Dr. Wang    HPI:     Ladarius Solis is a 68 y.o. male who presents for evaluation of abnormal stress echo .    Pt saw Dr. Wang 10/29/24 and discussed several months of centered chest pressure unrelated to exertion and neck pain. No associated dyspnea, lightheadedness, syncope, palpitations. Stress echo was ordered.   Stress echo results: "EKG portion positive for ischemia. Overall, this study is suggestive of myocardial ischemia, possibly with prior infarct, in the RCA territory.  Exercise capacity is below average, neck pain was reproduced with exertion, and the ECG was abnormal at stress."  Previous EKGs with RBBB and old inferior infarct age uncertain.    Patient endorses constant chest pains. Non-exertional. Denies alleviating factors. States that eating makes it worse. No shortness of breath, dyspnea on exertion, leg swelling, syncope. Endorses recently starting asa 81. No acute concerns at this time.    ROS:      Review of Systems   Cardiovascular:  Positive for chest pain. Negative for dyspnea on exertion, irregular heartbeat, leg swelling, near-syncope, orthopnea, palpitations, paroxysmal nocturnal dyspnea and syncope.   Respiratory:  Negative for shortness of breath.        PMH:     Past Medical History:   Diagnosis Date    Arthritis     Carpal tunnel syndrome, bilateral     Cervical spinal stenosis 2002    Colon polyps     Fatty liver     Hypertension     Overweight (BMI 25.0-29.9) 8/16/2019    Vertigo     left ear issues     Past Surgical History:   Procedure Laterality Date    ARTHROSCOPIC CHONDROPLASTY OF KNEE JOINT Left 10/17/2018    Procedure: ARTHROSCOPY, KNEE, WITH CHONDROPLASTY;  Surgeon: GRETEL Carr MD;  Location: [facility] OR [address] [place] [address];  Service: Orthopedics;  Laterality: Left;    COLONOSCOPY      COLONOSCOPY N/A 9/25/2017    Procedure: COLONOSCOPY/emr;  Surgeon: Raul August MD;  [facility]  Service: " Endoscopy;  Laterality: N/A;    COLONOSCOPY N/A 3/9/2018    Procedure: COLONOSCOPY;  Surgeon: Raul August MD;  Location: [facility] (2ND FLR);  Service: Endoscopy;  Laterality: N/A;    COLONOSCOPY N/A 3/23/2023    Procedure: COLONOSCOPY;  Surgeon: Aleksander Grimes MD;  Location: [facility] ENDO (4TH FLR);  Service: Endoscopy;  Laterality: N/A;  2/13 instructions to portal-st  pre call done- AJ  does not want to come in earlier 3/22 EB    EPIDURAL STEROID INJECTION N/A 11/4/2021    Procedure: INJECTION, STEROID, EPIDURAL, C7-T1  NEED CONSENT;  Surgeon: Bozena Mena MD;  Location: [facility] PAIN MGT;  Service: Pain Management;  Laterality: N/A;    ESOPHAGOGASTRODUODENOSCOPY N/A 9/23/2019    Procedure: EGD (ESOPHAGOGASTRODUODENOSCOPY);  Surgeon: Dyllan Maloney MD;  Location: [facility] ENDO;  Service: General;  Laterality: N/A;    INJECTION OF FACET JOINT Bilateral 8/6/2021    Procedure: FACET JOINT INJECTION BILATERAL L4/5 AND L5/S1 DIRECT REFERRAL NEEDS CONSENT;  Surgeon: Jasiel Aviles MD;  Location: [facility] PAIN MGT;  Service: Pain Management;  Laterality: Bilateral;    KNEE ARTHROSCOPY W/ MENISCECTOMY Left 10/17/2018    Procedure: ARTHROSCOPY, KNEE, WITH MENISCECTOMY;  Surgeon: GRETEL Carr MD;  Location: [facility] OR 2ND FLR;  Service: Orthopedics;  Laterality: Left;  regional w/catheter vaor  Dimitry/[employer] notified 10-12 LO    REPAIR OF RETINAL DETACHMENT WITH VITRECTOMY Right 4/4/2023    Procedure: REPAIR, RETINAL DETACHMENT, WITH VITRECTOMY;  Surgeon: True Arroyo MD;  Location: [facility] OR 1ST FLR;  Service: Ophthalmology;  Laterality: Right;    REPAIR OF RETINAL DETACHMENT WITH VITRECTOMY Right 4/18/2023    Procedure: REPAIR, RETINAL DETACHMENT, WITH VITRECTOMY;  Surgeon: True Arroyo MD;  Location: [facility] OR 1ST FLR;  Service: Ophthalmology;  Laterality: Right;  Scleral Buckle     Allergies:   Review of patient's allergies indicates:  No Known Allergies  Medications:     Current Outpatient Medications  "on File Prior to Visit   Medication Sig Dispense Refill    aspirin (ECOTRIN) 81 MG EC tablet Take 1 tablet (81 mg total) by mouth once daily. 90 tablet 3    rosuvastatin (CRESTOR) 5 MG tablet Take 1 tablet (5 mg total) by mouth once daily. 90 tablet 3    sertraline (ZOLOFT) 50 MG tablet Take 1 tablet (50 mg total) by mouth once daily. 30 tablet 11    valsartan-hydrochlorothiazide (DIOVAN-HCT) 160-12.5 mg per tablet Take 1 tablet by mouth once daily. 90 tablet 1     Current Facility-Administered Medications on File Prior to Visit   Medication Dose Route Frequency Provider Last Rate Last Admin    sodium hyaluronate (EUFLEXXA) 10 mg/mL(mw 2.4 -3.6 million) Syrg 20 mg  20 mg Intra-articular  Bruno, GRETEL Cm MD   20 mg at 05/10/18 [id number]     Social History:     Social History     Tobacco Use    Smoking status: Never    Smokeless tobacco: Never   Substance Use Topics    Alcohol use: No     Family History:     Family History   Problem Relation Name Age of Onset    No Known Problems Mother      Arthritis Father      Dementia Father      Heart disease Father      No Known Problems Brother      Diabetes Neg Hx      Cirrhosis Neg Hx       Physical Exam:   /65 (BP Location: Right arm, Patient Position: Sitting)   Pulse 75   Ht 5' 6" (1.676 m)   Wt 71.6 kg (157 lb 13.6 oz)   SpO2 98%   BMI 25.48 kg/m²        Physical Exam  Vitals and nursing note reviewed.   Constitutional:       General: He is not in acute distress.     Appearance: Normal appearance. He is not ill-appearing or toxic-appearing.   HENT:      Head: Normocephalic and atraumatic.   Eyes:      Pupils: Pupils are equal, round, and reactive to light.   Neck:      Vascular: No carotid bruit.   Cardiovascular:      Rate and Rhythm: Normal rate and regular rhythm.      Pulses: Normal pulses.      Heart sounds: Normal heart sounds. No murmur heard.     No friction rub. No gallop.   Pulmonary:      Effort: Pulmonary effort is normal. No respiratory distress.     "  Breath sounds: Normal breath sounds. No stridor. No wheezing or rales.   Musculoskeletal:         General: No swelling. Normal range of motion.      Cervical back: Normal range of motion.      Right lower leg: No edema.      Left lower leg: No edema.   Skin:     General: Skin is warm and dry.      Capillary Refill: Capillary refill takes less than 2 seconds.   Neurological:      General: No focal deficit present.      Mental Status: He is alert.        Labs:     Lab Results   Component Value Date     05/04/2024    K 4.6 05/04/2024     05/04/2024    CO2 23 05/04/2024    BUN 32 (H) 05/04/2024    CREATININE 1.3 05/04/2024    ANIONGAP 11 05/04/2024     Lab Results   Component Value Date    HGBA1C 5.9 (H) 04/12/2024     Lab Results   Component Value Date    BNP 28 09/18/2022    BNP 37 07/27/2019    BNP <10 02/06/2019    Lab Results   Component Value Date    WBC 5.72 04/13/2024    HGB 13.9 (L) 04/13/2024    HCT 40 04/13/2024    HCT 41.3 04/13/2024     04/13/2024    GRAN 4.0 04/13/2024    GRAN 70.5 04/13/2024     Lab Results   Component Value Date    CHOL 178 04/12/2024    HDL 46 04/12/2024    LDLCALC 103.2 04/12/2024    TRIG 144 04/12/2024          Lipids:  Recent Labs   Lab 04/12/24  1019   LDL Cholesterol 103.2   HDL 46      Renal:  Recent Labs   Lab 05/04/24  1001   Creatinine 1.3   Potassium 4.6   CO2 23   BUN 32 H     Liver:  Recent Labs   Lab 04/12/24  1019   AST 22   ALT 33       Imaging:     Stress Echo (11/4/2024):     Left Ventricle: The left ventricle is normal in size. Normal wall thickness. There is normal systolic function with a visually estimated ejection fraction of 60 - 65%. There is normal diastolic function.    Right Ventricle: Normal right ventricular cavity size. Wall thickness is normal. Systolic function is normal.    Aortic Valve: There is mild aortic valve sclerosis.  There appears to be partial fusion of the right and left coronary cusp leaflets.    Pulmonary Artery: The  estimated pulmonary artery systolic pressure is 19 mmHg.    IVC/SVC: Normal venous pressure at 3 mmHg.    Stress Protocol: The patient exercised for 6 minutes 1 seconds on a high ramp protocol, corresponding to a functional capacity of 9METS, achieving a peak heart rate of 116 bpm, which is 75% of the age predicted maximum heart rate. Their exercise capacity was normal. The patient reported lower neck pain 10/10 during the stress test. The test was stopped because the patient experienced shortness of breath.    Baseline ECG: The Baseline ECG reveals sinus rhythm. The axis is normal. The ST segments are normal.    Stress ECG: There are no arrhythmias during stress. There is normal blood pressure response with stress.    ECG Conclusion: The ECG portion of the study is positive for ischemia.    Post-stress Conclusion: The study is negative with no echocardiographic evidence of stress induced ischemia.    Overall, this study is suggestive of myocardial ischemia, possibly with prior infarct, in the RCA territory.  Exercise capacity is below average, neck pain was reproduced with exertion, and the ECG was abnormal at stress.    Assessment & Plan:     1. Abnormal stress echocardiogram    2. Chest pressure        Abnormal stress echocardiogram  Given the patient's symptoms and abnormal stress test, we will schedule for coronary angiography with intervention as needed.  The risks, benefits, and details of the procedure were explained to the patient who voiced understanding and gave consent.  We will attempt access via the right radial artery.  4 French catheters.  Pre load with aspirin and Plavix.        Signed:  Lela Chambers PA-C  Interventional Cardiology    Staff:    I have personally interviewed and examined the patient and concur with the PA's history, physical, assessment, and plan.    Jb Verma MD  Cardiology

## 2024-11-21 ENCOUNTER — OFFICE VISIT (OUTPATIENT)
Dept: CARDIOLOGY | Facility: CLINIC | Age: 69
End: 2024-11-21
Payer: MEDICARE

## 2024-11-21 VITALS
WEIGHT: 157.88 LBS | HEIGHT: 66 IN | BODY MASS INDEX: 25.37 KG/M2 | HEART RATE: 75 BPM | DIASTOLIC BLOOD PRESSURE: 65 MMHG | SYSTOLIC BLOOD PRESSURE: 123 MMHG | OXYGEN SATURATION: 98 %

## 2024-11-21 DIAGNOSIS — R07.89 CHEST PRESSURE: ICD-10-CM

## 2024-11-21 DIAGNOSIS — R94.39 ABNORMAL STRESS ECHOCARDIOGRAM: Primary | ICD-10-CM

## 2024-11-21 PROCEDURE — 99999 PR PBB SHADOW E&M-EST. PATIENT-LVL IV: CPT | Mod: PBBFAC,,, | Performed by: INTERNAL MEDICINE

## 2024-11-21 NOTE — ASSESSMENT & PLAN NOTE
Given the patient's symptoms and abnormal stress test, we will schedule for coronary angiography with intervention as needed.  The risks, benefits, and details of the procedure were explained to the patient who voiced understanding and gave consent.  We will attempt access via the right radial artery.  4 French catheters.  Pre load with aspirin and Plavix.

## 2024-11-22 ENCOUNTER — TELEPHONE (OUTPATIENT)
Dept: CARDIOLOGY | Facility: CLINIC | Age: 69
End: 2024-11-22
Payer: MEDICARE

## 2024-11-22 NOTE — TELEPHONE ENCOUNTER
Follow up contact with patient RE: procedure date.  Pt voiced his friend, Ms Guido needs to check with her work first before committing to a date.  Pt requested more time to make decision.  Set reminder to follow up with patient.

## 2024-11-26 ENCOUNTER — TELEPHONE (OUTPATIENT)
Dept: CARDIOLOGY | Facility: CLINIC | Age: 69
End: 2024-11-26
Payer: MEDICARE

## 2024-11-26 ENCOUNTER — EDUCATION (OUTPATIENT)
Dept: CARDIOLOGY | Facility: CLINIC | Age: 69
End: 2024-11-26
Payer: MEDICARE

## 2024-11-26 DIAGNOSIS — R94.39 ABNORMAL CARDIOVASCULAR STRESS TEST: Primary | ICD-10-CM

## 2024-11-26 RX ORDER — CLOPIDOGREL BISULFATE 300 MG/1
600 TABLET, FILM COATED ORAL ONCE
OUTPATIENT
Start: 2024-11-26 | End: 2024-11-26

## 2024-11-26 RX ORDER — DIPHENHYDRAMINE HCL 50 MG
50 CAPSULE ORAL ONCE
OUTPATIENT
Start: 2024-11-26 | End: 2024-11-26

## 2024-11-26 NOTE — TELEPHONE ENCOUNTER
Successful contact with patient -  discussed procedure dates offered, pt agrees to 1/21/25 for angiogram.  Plavix pre load ordered, pt takes daily aspirin.  Education pre procedure mailed to patient along with appointment letters.  Pre procedure labs to be done at internal med lab 1/13/25.

## 2024-11-26 NOTE — PROGRESS NOTES
If you need to change the date of your procedure, please call  Destiny AYALA [phone]  CALL THE OFFICE IF YOU HAVE ANY MEDICATIONS CHANGES BEFORE A PROCEDURE.    COMPLETE PRE PROCEDURE LABS ON MONDAY 1/13/25,  AN APPOINTMENT HAS BEEN MADE AT [facility].  YOU DO NOT NEED TO FAST FOR THESE LABS.       OUTPATIENT CATHETERIZATION INSTRUCTIONS    PROCEDURE CHECK IN:     You have been scheduled for a procedure in the catheterization lab on  TUESDAY, JANUARY 21, 2025     Please report to the Cardiology Waiting Area on the Third floor of the hospital and check in at ARRIVAL TIME 0600AM.   You will then be taken to the SSCU (Short Stay Cardiac Unit) and prepared for your procedure. Please be aware that this is not the time of your procedure but the time you are to arrive. The procedures are scheduled on an hourly basis; however, emergency cases take precedence over all other cases.         1. NOTHING TO EAT AFTER MIDNIGHT 12AM the morning of the procedure.  NO FOOD.  You may take your medications with small sips of water. SEE BELOW INSTRUCTIONS ON MEDICATIONS.     2.   CONTINUE WITH YOUR DAILY MEDICATIONS AND TAKE EVERYDAY INCLUDING THE MORNING OF THE PROCEDURE WITH WATER. Do not stop your Aspirin.  SEE BELOW.    3.  Diabetics:  NONE  IF TAKING OZEMPIC- THIS MUST BE HELD FOR ONE WEEK PRIOR TO PROCEDURE.    4. Heart Failure Patients: NONE        The procedure will take 1-2 hours to perform. After the procedure, you will return to SSCU on the third floor of the hospital. You will need to lie still (or keep your arm still) for the next 4 to 6 hours to minimize bleeding from the puncture site. Your family may remain in the room with you during this time.         You may be able to be discharged home that same afternoon if there is someone to drive you home and there were no complications. If you have one of the balloon, stent, or device procedures you may spend the night in the hospital. Your doctor will  determine, based on your progress, the date and time of your discharge. The results of your procedure will be discussed with you before you are discharged. Any further testing or procedures will be scheduled for you either before you leave or you will be called with these appointments.   YOU WILL NOT BE ABLE TO DRIVE HOME  THE DAY OF THE PROCEDURE.      If you should have any questions, concerns, or please call Destiny [phone]        Special Instructions:IMPORTANT TO HYDRATE  Drink plenty of water the day before the procedure  and the day after the procedure to flush your kidneys.     Continue daily medications, including aspirin the morning of the procedure.  See Special instructions for other medications.     IMPORTANT:  HOLD The following medications as directed:    HOLD VITAMINS -  wait to take those medications after the procedure    DO NOT STOP ASPIRIN.  Take the morning of the procedure.          THE ABOVE INSTRUCTIONS WERE GIVEN TO THE PATIENT VERBALLY AND THEY VERBALIZED UNDERSTANDING.  THEY DO NOT REQUIRE ANY SPECIAL NEEDS AND DO NOT HAVE ANY LEARNING BARRIERS.        Directions for Reporting to Cardiology Waiting Area in the Hospital  If you park in the Parking Garage:  Take elevators to the1st floor of the parking garage.  Continue past the gift shop, coffee shop, and piano.  Take a right and go to the gold elevators. (Elevator B)  Take the elevator to the 3rd floor.  Follow the arrow on the sign on the wall that says Cath Lab Registration/EP Lab Registration.  Follow the long hallway all the way around until you come to a big open area.  This is the registration area.  Check in at Reception Desk.     OR     If family is dropping you off:  Have them drop you off at the front of the Hospital under the green overhang.  Enter through the doors and take a right.  Take the 'E' elevators to the 3rd floor Cardiology Waiting Area.  Check in at the Reception Desk in the waiting room.

## 2025-01-13 ENCOUNTER — LAB VISIT (OUTPATIENT)
Dept: LAB | Facility: HOSPITAL | Age: 70
End: 2025-01-13
Payer: MEDICARE

## 2025-01-13 DIAGNOSIS — R94.39 ABNORMAL CARDIOVASCULAR STRESS TEST: ICD-10-CM

## 2025-01-13 LAB
ABO + RH BLD: NORMAL
ANION GAP SERPL CALC-SCNC: 8 MMOL/L (ref 8–16)
BLD GP AB SCN CELLS X3 SERPL QL: NORMAL
BUN SERPL-MCNC: 21 MG/DL (ref 8–23)
CALCIUM SERPL-MCNC: 9.9 MG/DL (ref 8.7–10.5)
CHLORIDE SERPL-SCNC: 104 MMOL/L (ref 95–110)
CO2 SERPL-SCNC: 27 MMOL/L (ref 23–29)
CREAT SERPL-MCNC: 1 MG/DL (ref 0.5–1.4)
ERYTHROCYTE [DISTWIDTH] IN BLOOD BY AUTOMATED COUNT: 13 % (ref 11.5–14.5)
EST. GFR  (NO RACE VARIABLE): >60 ML/MIN/1.73 M^2
GLUCOSE SERPL-MCNC: 122 MG/DL (ref 70–110)
HCT VFR BLD AUTO: 43.5 % (ref 40–54)
HGB BLD-MCNC: 13.7 G/DL (ref 14–18)
MCH RBC QN AUTO: 30.1 PG (ref 27–31)
MCHC RBC AUTO-ENTMCNC: 31.5 G/DL (ref 32–36)
MCV RBC AUTO: 96 FL (ref 82–98)
PLATELET # BLD AUTO: 199 K/UL (ref 150–450)
PMV BLD AUTO: 10.9 FL (ref 9.2–12.9)
POTASSIUM SERPL-SCNC: 4.8 MMOL/L (ref 3.5–5.1)
RBC # BLD AUTO: 4.55 M/UL (ref 4.6–6.2)
SODIUM SERPL-SCNC: 139 MMOL/L (ref 136–145)
SPECIMEN OUTDATE: NORMAL
WBC # BLD AUTO: 7.13 K/UL (ref 3.9–12.7)

## 2025-01-13 PROCEDURE — 85027 COMPLETE CBC AUTOMATED: CPT | Performed by: INTERNAL MEDICINE

## 2025-01-13 PROCEDURE — 36415 COLL VENOUS BLD VENIPUNCTURE: CPT | Performed by: INTERNAL MEDICINE

## 2025-01-13 PROCEDURE — 86850 RBC ANTIBODY SCREEN: CPT | Performed by: INTERNAL MEDICINE

## 2025-01-13 PROCEDURE — 80048 BASIC METABOLIC PNL TOTAL CA: CPT | Performed by: INTERNAL MEDICINE

## 2025-01-17 ENCOUNTER — PATIENT MESSAGE (OUTPATIENT)
Dept: CARDIOLOGY | Facility: CLINIC | Age: 70
End: 2025-01-17
Payer: MEDICARE

## 2025-01-23 ENCOUNTER — PATIENT MESSAGE (OUTPATIENT)
Dept: CARDIOLOGY | Facility: CLINIC | Age: 70
End: 2025-01-23
Payer: MEDICARE

## 2025-01-23 ENCOUNTER — TELEPHONE (OUTPATIENT)
Dept: CARDIOLOGY | Facility: CLINIC | Age: 70
End: 2025-01-23
Payer: MEDICARE

## 2025-01-23 NOTE — TELEPHONE ENCOUNTER
Attempted to reach patient via phone to discuss rescheduling.  No answer.  Sent patient my [facility] portal message.  Awaiting response.  Next available date 1/29/25, arrival 7am.

## 2025-01-24 ENCOUNTER — TELEPHONE (OUTPATIENT)
Dept: CARDIOLOGY | Facility: CLINIC | Age: 70
End: 2025-01-24
Payer: MEDICARE

## 2025-01-24 NOTE — TELEPHONE ENCOUNTER
Successful contact with patient -  discussed rescheduling his procedure.  Tentatively agrees to 2/4/25 with 8am arrival,  he voiced he would have to make final arrangements and make sure his friend his available for a ride.  Pt states he will call be back to confirm.  
[phone]

## 2025-01-27 ENCOUNTER — TELEPHONE (OUTPATIENT)
Dept: CARDIOLOGY | Facility: CLINIC | Age: 70
End: 2025-01-27
Payer: MEDICARE

## 2025-01-27 ENCOUNTER — PATIENT MESSAGE (OUTPATIENT)
Dept: CARDIOLOGY | Facility: CLINIC | Age: 70
End: 2025-01-27
Payer: MEDICARE

## 2025-01-27 NOTE — TELEPHONE ENCOUNTER
Confirmation that patient is good with rescheduling his procedure for 2/4/25.  New educational materials sent to patient.  Recheck creatinine the am of procedure.

## 2025-01-30 DIAGNOSIS — Z00.00 ENCOUNTER FOR MEDICARE ANNUAL WELLNESS EXAM: ICD-10-CM

## 2025-02-04 ENCOUNTER — HOSPITAL ENCOUNTER (OUTPATIENT)
Facility: HOSPITAL | Age: 70
Discharge: HOME | End: 2025-02-04
Attending: INTERNAL MEDICINE | Admitting: INTERNAL MEDICINE
Payer: MEDICARE

## 2025-02-04 VITALS
HEIGHT: 66 IN | HEART RATE: 67 BPM | RESPIRATION RATE: 18 BRPM | WEIGHT: 155.31 LBS | BODY MASS INDEX: 24.96 KG/M2 | OXYGEN SATURATION: 94 % | SYSTOLIC BLOOD PRESSURE: 107 MMHG | DIASTOLIC BLOOD PRESSURE: 69 MMHG | TEMPERATURE: 97 F

## 2025-02-04 DIAGNOSIS — R94.39 ABNORMAL CARDIOVASCULAR STRESS TEST: ICD-10-CM

## 2025-02-04 DIAGNOSIS — Z01.818 PREOP TESTING: ICD-10-CM

## 2025-02-04 DIAGNOSIS — R07.89 CHEST PRESSURE: Primary | ICD-10-CM

## 2025-02-04 LAB
ABO + RH BLD: NORMAL
BLD GP AB SCN CELLS X3 SERPL QL: NORMAL
CREAT SERPL-MCNC: 0.9 MG/DL (ref 0.5–1.4)
EST. GFR  (NO RACE VARIABLE): >60 ML/MIN/1.73 M^2
OHS QRS DURATION: 134 MS
OHS QTC CALCULATION: 424 MS
SPECIMEN OUTDATE: NORMAL

## 2025-02-04 PROCEDURE — 93005 ELECTROCARDIOGRAM TRACING: CPT

## 2025-02-04 PROCEDURE — C1769 GUIDE WIRE: HCPCS | Performed by: INTERNAL MEDICINE

## 2025-02-04 PROCEDURE — 63600175 PHARM REV CODE 636 W HCPCS: Performed by: INTERNAL MEDICINE

## 2025-02-04 PROCEDURE — 99153 MOD SED SAME PHYS/QHP EA: CPT | Performed by: INTERNAL MEDICINE

## 2025-02-04 PROCEDURE — 93010 ELECTROCARDIOGRAM REPORT: CPT | Mod: ,,, | Performed by: INTERNAL MEDICINE

## 2025-02-04 PROCEDURE — C1894 INTRO/SHEATH, NON-LASER: HCPCS | Performed by: INTERNAL MEDICINE

## 2025-02-04 PROCEDURE — 25000003 PHARM REV CODE 250: Performed by: INTERNAL MEDICINE

## 2025-02-04 PROCEDURE — 25500020 PHARM REV CODE 255: Performed by: INTERNAL MEDICINE

## 2025-02-04 PROCEDURE — 86901 BLOOD TYPING SEROLOGIC RH(D): CPT | Performed by: STUDENT IN AN ORGANIZED HEALTH CARE EDUCATION/TRAINING PROGRAM

## 2025-02-04 PROCEDURE — 99152 MOD SED SAME PHYS/QHP 5/>YRS: CPT | Performed by: INTERNAL MEDICINE

## 2025-02-04 PROCEDURE — 82565 ASSAY OF CREATININE: CPT | Performed by: INTERNAL MEDICINE

## 2025-02-04 PROCEDURE — 93458 L HRT ARTERY/VENTRICLE ANGIO: CPT | Mod: 26,,, | Performed by: INTERNAL MEDICINE

## 2025-02-04 PROCEDURE — 93458 L HRT ARTERY/VENTRICLE ANGIO: CPT | Performed by: INTERNAL MEDICINE

## 2025-02-04 PROCEDURE — C1887 CATHETER, GUIDING: HCPCS | Performed by: INTERNAL MEDICINE

## 2025-02-04 PROCEDURE — 99152 MOD SED SAME PHYS/QHP 5/>YRS: CPT | Mod: ,,, | Performed by: INTERNAL MEDICINE

## 2025-02-04 RX ORDER — MIDAZOLAM HYDROCHLORIDE 1 MG/ML
INJECTION INTRAMUSCULAR; INTRAVENOUS
Status: DISCONTINUED | OUTPATIENT
Start: 2025-02-04 | End: 2025-02-04 | Stop reason: HOSPADM

## 2025-02-04 RX ORDER — ONDANSETRON 8 MG/1
8 TABLET, ORALLY DISINTEGRATING ORAL EVERY 8 HOURS PRN
Status: DISCONTINUED | OUTPATIENT
Start: 2025-02-04 | End: 2025-02-04 | Stop reason: HOSPADM

## 2025-02-04 RX ORDER — CLOPIDOGREL BISULFATE 300 MG/1
600 TABLET, FILM COATED ORAL ONCE
Status: COMPLETED | OUTPATIENT
Start: 2025-02-04 | End: 2025-02-04

## 2025-02-04 RX ORDER — SODIUM CHLORIDE 9 MG/ML
INJECTION, SOLUTION INTRAVENOUS CONTINUOUS
Status: ACTIVE | OUTPATIENT
Start: 2025-02-04 | End: 2025-02-04

## 2025-02-04 RX ORDER — DIPHENHYDRAMINE HCL 50 MG
50 CAPSULE ORAL ONCE
Status: COMPLETED | OUTPATIENT
Start: 2025-02-04 | End: 2025-02-04

## 2025-02-04 RX ORDER — HEPARIN SODIUM 1000 [USP'U]/ML
INJECTION, SOLUTION INTRAVENOUS; SUBCUTANEOUS
Status: DISCONTINUED | OUTPATIENT
Start: 2025-02-04 | End: 2025-02-04 | Stop reason: HOSPADM

## 2025-02-04 RX ORDER — FENTANYL CITRATE 50 UG/ML
INJECTION, SOLUTION INTRAMUSCULAR; INTRAVENOUS
Status: DISCONTINUED | OUTPATIENT
Start: 2025-02-04 | End: 2025-02-04 | Stop reason: HOSPADM

## 2025-02-04 RX ORDER — LIDOCAINE HYDROCHLORIDE 20 MG/ML
INJECTION, SOLUTION EPIDURAL; INFILTRATION; INTRACAUDAL; PERINEURAL
Status: DISCONTINUED | OUTPATIENT
Start: 2025-02-04 | End: 2025-02-04 | Stop reason: HOSPADM

## 2025-02-04 RX ORDER — ACETAMINOPHEN 325 MG/1
650 TABLET ORAL EVERY 4 HOURS PRN
Status: DISCONTINUED | OUTPATIENT
Start: 2025-02-04 | End: 2025-02-04 | Stop reason: HOSPADM

## 2025-02-04 RX ORDER — HEPARIN SOD,PORCINE/0.9 % NACL 1000/500ML
INTRAVENOUS SOLUTION INTRAVENOUS
Status: DISCONTINUED | OUTPATIENT
Start: 2025-02-04 | End: 2025-02-04 | Stop reason: HOSPADM

## 2025-02-04 RX ADMIN — CLOPIDOGREL BISULFATE 600 MG: 300 TABLET, FILM COATED ORAL at 08:02

## 2025-02-04 RX ADMIN — DIPHENHYDRAMINE HYDROCHLORIDE 50 MG: 50 CAPSULE ORAL at 08:02

## 2025-02-04 NOTE — H&P
"[facility] - Short Stay Cardiac Unit  Interventional Cardiology  H&P    Patient Name: Ladarius Solis  MRN: [id number]  Admission Date: 2/4/2025  Code Status: Prior   Attending Provider: Jb Verma III, MD   Primary Care Physician: Nick Stanton MD  Principal Problem:<principal problem not specified>    Patient information was obtained from patient and ER records.     Subjective:        HPI:  69 y.o. male with HTN, obesity, fatty liver, arthritis, vertigo, and spinal stenosis of cervical spine. Presents for [facility] after an abnormal stress echo with Dr. Verma.     Patient endorses constant chest pains. Non-exertional. Denies alleviating factors. States that eating makes it worse. No shortness of breath, dyspnea on exertion, leg swelling, syncope. Endorses recently starting asa 81. No acute concerns at this time.    Pt saw Dr. Wang 10/29/24 and discussed several months of centered chest pressure unrelated to exertion and neck pain. No associated dyspnea, lightheadedness, syncope, palpitations. Stress echo was ordered.   Stress echo results: "EKG portion positive for ischemia. Overall, this study is suggestive of myocardial ischemia, possibly with prior infarct, in the RCA territory.  Exercise capacity is below average, neck pain was reproduced with exertion, and the ECG was abnormal at stress."  Previous EKGs with RBBB and old inferior infarct age uncertain.          Past Medical History:   Diagnosis Date    Arthritis     Carpal tunnel syndrome, bilateral     Cervical spinal stenosis 2002    Colon polyps     Fatty liver     Hypertension     Overweight (BMI 25.0-29.9) 8/16/2019    Vertigo     left ear issues       Past Surgical History:   Procedure Laterality Date    ARTHROSCOPIC CHONDROPLASTY OF KNEE JOINT Left 10/17/2018    Procedure: ARTHROSCOPY, KNEE, WITH CHONDROPLASTY;  Surgeon: GRETEL Carr MD;  Location: [facility] OR [address] [place] [address];  Service: Orthopedics;  Laterality: Left;    COLONOSCOPY      COLONOSCOPY N/A " 9/25/2017    Procedure: COLONOSCOPY/emr;  Surgeon: Raul August MD;  Location: [facility] (2ND FLR);  Service: Endoscopy;  Laterality: N/A;    COLONOSCOPY N/A 3/9/2018    Procedure: COLONOSCOPY;  Surgeon: Raul August MD;  Location: [facility] (2ND FLR);  Service: Endoscopy;  Laterality: N/A;    COLONOSCOPY N/A 3/23/2023    Procedure: COLONOSCOPY;  Surgeon: Aleksander Grimes MD;  Location: [facility] (4TH FLR);  Service: Endoscopy;  Laterality: N/A;  2/13 instructions to portal-st  pre call done- AJ  does not want to come in earlier 3/22 EB    EPIDURAL STEROID INJECTION N/A 11/4/2021    Procedure: INJECTION, STEROID, EPIDURAL, C7-T1  NEED CONSENT;  Surgeon: Bozena Mena MD;  Location: [facility] PAIN MGT;  Service: Pain Management;  Laterality: N/A;    ESOPHAGOGASTRODUODENOSCOPY N/A 9/23/2019    Procedure: EGD (ESOPHAGOGASTRODUODENOSCOPY);  Surgeon: Dyllan Maloney MD;  Location: [facility];  Service: General;  Laterality: N/A;    INJECTION OF FACET JOINT Bilateral 8/6/2021    Procedure: FACET JOINT INJECTION BILATERAL L4/5 AND L5/S1 DIRECT REFERRAL NEEDS CONSENT;  Surgeon: Jasiel Aviles MD;  Location: [facility] PAIN MGT;  Service: Pain Management;  Laterality: Bilateral;    KNEE ARTHROSCOPY W/ MENISCECTOMY Left 10/17/2018    Procedure: ARTHROSCOPY, KNEE, WITH MENISCECTOMY;  Surgeon: GRETEL Carr MD;  Location: [facility] OR 2ND FLR;  Service: Orthopedics;  Laterality: Left;  regional w/catheter adductor  Dimitry[employer] notified 10-12 LO    REPAIR OF RETINAL DETACHMENT WITH VITRECTOMY Right 4/4/2023    Procedure: REPAIR, RETINAL DETACHMENT, WITH VITRECTOMY;  Surgeon: True Arroyo MD;  Location: [facility] 1ST FLR;  Service: Ophthalmology;  Laterality: Right;    REPAIR OF RETINAL DETACHMENT WITH VITRECTOMY Right 4/18/2023    Procedure: REPAIR, RETINAL DETACHMENT, WITH VITRECTOMY;  Surgeon: True Arroyo MD;  [facility];  Service: Ophthalmology;  Laterality: Right;  Scleral Buckle       Review  of patient's allergies indicates:  No Known Allergies    PTA Medications   Medication Sig    aspirin (ECOTRIN) 81 MG EC tablet Take 1 tablet (81 mg total) by mouth once daily.    rosuvastatin (CRESTOR) 5 MG tablet Take 1 tablet (5 mg total) by mouth once daily.    sertraline (ZOLOFT) 50 MG tablet Take 1 tablet (50 mg total) by mouth once daily.    valsartan-hydrochlorothiazide (DIOVAN-HCT) 160-12.5 mg per tablet Take 1 tablet by mouth once daily.     Family History       Problem Relation (Age of Onset)    Arthritis Father    Dementia Father    Heart disease Father    No Known Problems Mother, Brother          Tobacco Use    Smoking status: Never    Smokeless tobacco: Never   Substance and Sexual Activity    Alcohol use: No    Drug use: No    Sexual activity: Not on file     Review of Systems   Constitutional: Negative for chills, decreased appetite, diaphoresis, fever, malaise/fatigue and weight gain.   HENT:  Negative for congestion and ear discharge.    Eyes:  Negative for blurred vision.   Cardiovascular:  Negative for chest pain, dyspnea on exertion, irregular heartbeat, leg swelling, orthopnea, palpitations and paroxysmal nocturnal dyspnea.   Respiratory:  Negative for cough, shortness of breath, snoring and sputum production.    Skin:  Negative for color change, dry skin and rash.   Musculoskeletal:  Negative for back pain, falls, joint pain and neck pain.   Gastrointestinal:  Negative for bloating, abdominal pain, constipation and diarrhea.   Genitourinary:  Negative for dysuria, flank pain, hematuria and hesitancy.   Neurological:  Negative for focal weakness, headaches, numbness and seizures.   Psychiatric/Behavioral:  Negative for altered mental status, depression and hallucinations.      Objective:     Vital Signs (Most Recent):  Pulse: 63 (02/04/25 0739)  Resp: 18 (02/04/25 0739)  BP: 113/67 (02/04/25 0744)  SpO2: 99 % (02/04/25 0739) Vital Signs (24h Range):  Pulse:  [63] 63  Resp:  [18] 18  SpO2:  [99  "%] 99 %  BP: (107-113)/(63-67) 113/67     Weight: 70.5 kg (155 lb 5 oz)  Body mass index is 25.07 kg/m².    SpO2: 99 %       No intake or output data in the 24 hours ending 02/04/25 0748    Lines/Drains/Airways       None                    Physical Exam  Constitutional:       General: He is not in acute distress.     Appearance: He is not ill-appearing.   HENT:      Nose: Nose normal.      Mouth/Throat:      Mouth: Mucous membranes are moist.   Eyes:      Pupils: Pupils are equal, round, and reactive to light.   Neck:      Comments: No JVD appreciated   Cardiovascular:      Rate and Rhythm: Normal rate and regular rhythm.      Pulses: Normal pulses.      Heart sounds: No murmur heard.  Pulmonary:      Effort: Pulmonary effort is normal. No respiratory distress.      Breath sounds: No wheezing or rales.   Abdominal:      General: Abdomen is flat. There is no distension.      Palpations: Abdomen is soft.      Tenderness: There is no right CVA tenderness or left CVA tenderness.   Musculoskeletal:         General: No swelling.      Cervical back: Normal range of motion and neck supple. No tenderness.      Right lower leg: No edema.      Left lower leg: No edema.   Skin:     General: Skin is warm.      Coloration: Skin is not pale.      Findings: No rash.   Neurological:      General: No focal deficit present.      Mental Status: He is alert and oriented to person, place, and time.      Motor: No weakness.            Significant Labs: ABG: No results for input(s): "PH", "PCO2", "HCO3", "POCSATURATED", "BE" in the last 48 hours., Blood Culture: No results for input(s): "LABBLOO" in the last 48 hours., BMP: No results for input(s): "GLU", "NA", "K", "CL", "CO2", "BUN", "CREATININE", "CALCIUM", "MG" in the last 48 hours., CMP No results for input(s): "NA", "K", "CL", "CO2", "GLU", "BUN", "CREATININE", "CALCIUM", "PROT", "ALBUMIN", "BILITOT", "ALKPHOS", "AST", "ALT", "ANIONGAP", "ESTGFRAFRICA", "EGFRNONAA" in the last 48 " "hours., CBC No results for input(s): "WBC", "HGB", "HCT", "PLT" in the last 48 hours., INR No results for input(s): "INR", "PROTIME" in the last 48 hours., Lipid Panel No results for input(s): "CHOL", "HDL", "LDLCALC", "TRIG", "CHOLHDL" in the last 48 hours.,   Pathology Results  (Last 10 years)                 03/23/23 1336  Specimen to Pathology, Surgery Gastrointestinal tract Final result    Narrative:  Pre-op Diagnosis: Colon cancer screening [Z12.11]   History of colon polyps [Z86.010]   Procedure(s):   COLONOSCOPY   Number of specimens: 1   Name of specimens:   Jar 1: Colon polyp   Which provider would you like to cc?->ABHISHEK MARIO   Release to patient->Immediate   Specimen total (fresh, frozen, permanent):->1           , Troponin No results for input(s): "TROPONINIHS" in the last 48 hours., and All pertinent lab results from the last 24 hours have been reviewed.     Assessment and Plan:     Cardiac/Vascular  Abnormal stress echocardiogram  Given the patient's symptoms and abnormal stress test, we will schedule for coronary angiography with intervention as needed.  The risks, benefits, and details of the procedure were explained to the patient who voiced understanding and gave consent.    We will attempt access via the right radial artery.    4 French catheters.    Pre load with aspirin and Plavix.           VTE Risk Mitigation (From admission, onward)      None              Brenna Segundo MD  Interventional Cardiology   [facility] - Short Stay Cardiac Unit      "

## 2025-02-04 NOTE — PLAN OF CARE
Received report from HEATHER Bro. Patient s/p [facility], AAOx3. VSS, no c/o pain or discomfort at this time, resp even and unlabored. Vasc band to R wrist is CDI. No active bleeding. No hematoma noted. Post procedure protocol reviewed with patient and patient's family. Understanding verbalized. Family members at bedside. Nurse call bell within reach. Will continue to monitor per post procedure protocol.

## 2025-02-04 NOTE — Clinical Note
FIT The catheter was inserted into the left ventricle. An angiography was performed of the right coronary arteries. Multiple views were taken.

## 2025-02-04 NOTE — PLAN OF CARE
Patient discharged per MD orders. Instructions given on medications, wound care, activity, signs of infection, when to call MD, and follow up appointments. Pt verbalized understanding. Telemetry and PIV removed. Patient awaiting transport to private vehicle.

## 2025-02-04 NOTE — HPI
"69 y.o. male with HTN, obesity, fatty liver, arthritis, vertigo, and spinal stenosis of cervical spine. Presents for [facility] after an abnormal stress echo with Dr. Verma.     Patient endorses constant chest pains. Non-exertional. Denies alleviating factors. States that eating makes it worse. No shortness of breath, dyspnea on exertion, leg swelling, syncope. Endorses recently starting asa 81. No acute concerns at this time.    Pt saw Dr. Wang 10/29/24 and discussed several months of centered chest pressure unrelated to exertion and neck pain. No associated dyspnea, lightheadedness, syncope, palpitations. Stress echo was ordered.   Stress echo results: "EKG portion positive for ischemia. Overall, this study is suggestive of myocardial ischemia, possibly with prior infarct, in the RCA territory.  Exercise capacity is below average, neck pain was reproduced with exertion, and the ECG was abnormal at stress."  Previous EKGs with RBBB and old inferior infarct age uncertain.        "

## 2025-02-04 NOTE — DISCHARGE SUMMARY
"[facility] - Short Stay Cardiac Unit  Interventional Cardiology  Discharge Summary      Patient Name: Ladarius Solis  MRN: [id number]  Admission Date: 2/4/2025  Hospital Length of Stay: 0 days  Discharge Date and Time:  02/04/2025 11:37 AM  Attending Physician: Jb Verma III, MD  Discharging Provider: Brenna Segundo MD  Primary Care Physician: Nick Stanton MD    HPI:  69 y.o. male with HTN, obesity, fatty liver, arthritis, vertigo, and spinal stenosis of cervical spine. Presents for [facility] after an abnormal stress echo with Dr. Verma.     Patient endorses constant chest pains. Non-exertional. Denies alleviating factors. States that eating makes it worse. No shortness of breath, dyspnea on exertion, leg swelling, syncope. Endorses recently starting asa 81. No acute concerns at this time.    Pt saw Dr. Wang 10/29/24 and discussed several months of centered chest pressure unrelated to exertion and neck pain. No associated dyspnea, lightheadedness, syncope, palpitations. Stress echo was ordered.   Stress echo results: "EKG portion positive for ischemia. Overall, this study is suggestive of myocardial ischemia, possibly with prior infarct, in the RCA territory.  Exercise capacity is below average, neck pain was reproduced with exertion, and the ECG was abnormal at stress."  Previous EKGs with RBBB and old inferior infarct age uncertain.          Procedure(s) (LRB):  Angiogram, Coronary, with Left Heart Cath (N/A)     Indwelling Lines/Drains at time of discharge:  Lines/Drains/Airways       None                   Hospital Course:  No notes on file    Goals of Care Treatment Preferences:  Code Status: Full Code          Significant Diagnostic Studies: Labs: BMP:   Recent Labs   Lab 02/04/25  0816   CREATININE 0.9   , CMP   Recent Labs   Lab 02/04/25  0816   CREATININE 0.9   , CBC No results for input(s): "WBC", "HGB", "HCT", "PLT" in the last 48 hours., INR   Lab Results   Component Value Date    INR 0.9 " "04/13/2024    INR 0.9 08/16/2019    INR 1.1 03/17/2006   , Lipid Panel   Lab Results   Component Value Date    CHOL 178 04/12/2024    HDL 46 04/12/2024    LDLCALC 103.2 04/12/2024    TRIG 144 04/12/2024    CHOLHDL 25.8 04/12/2024   , and Troponin No results for input(s): "TROPONINI" in the last 168 hours.    Pending Diagnostic Studies:       None          Cardiac/Vascular  Abnormal stress echocardiogram  Given the patient's symptoms and abnormal stress test, we will schedule for coronary angiography with intervention as needed.  The risks, benefits, and details of the procedure were explained to the patient who voiced understanding and gave consent.    We will attempt access via the right radial artery.    4 French catheters.    Pre load with aspirin and Plavix.     -- Medical therapy and cardiac rehab        Discharged Condition: stable    Follow Up:    Patient Instructions:      Ambulatory referral/consult to Cardiac Rehab   Standing Status: Future   Referral Priority: Routine Referral Type: Consultation   Referral Reason: Specialty Services Required   Requested Specialty: Cardiac Rehabilitation   Number of Visits Requested: 1     Medications:  Reconciled Home Medications:      Medication List        CONTINUE taking these medications      aspirin 81 MG EC tablet  Commonly known as: ECOTRIN  Take 1 tablet (81 mg total) by mouth once daily.     rosuvastatin 5 MG tablet  Commonly known as: CRESTOR  Take 1 tablet (5 mg total) by mouth once daily.     sertraline 50 MG tablet  Commonly known as: ZOLOFT  Take 1 tablet (50 mg total) by mouth once daily.     valsartan-hydrochlorothiazide 160-12.5 mg per tablet  Commonly known as: DIOVAN-HCT  Take 1 tablet by mouth once daily.              Time spent on the discharge of patient: 45 minutes    Brenna Segundo MD  Interventional Cardiology  [facility] - Short Stay Cardiac Unit    "

## 2025-02-04 NOTE — PLAN OF CARE
Vasc band removed per protocol over 1 hour period. Patient tolerated well. Gauze/tegaderm dressing placed. Will monitor.

## 2025-02-04 NOTE — ASSESSMENT & PLAN NOTE
Given the patient's symptoms and abnormal stress test, we will schedule for coronary angiography with intervention as needed.  The risks, benefits, and details of the procedure were explained to the patient who voiced understanding and gave consent.    We will attempt access via the right radial artery.    4 French catheters.    Pre load with aspirin and Plavix.     -- Medical therapy and cardiac rehab

## 2025-02-04 NOTE — NURSING
Patient arrived to room. PIV placed, labs sent. Admit assessment completed. Plan of care discussed with patient and sibling. Call light in reach.

## 2025-02-04 NOTE — BRIEF OP NOTE
Brief Operative Note:    : Jb Verma III, MD     Referring Physician: Jb Verma III     All Operators: Surgeon(s):  Brenna Segundo MD Garikapati, Kiran, MD [facility], Arthur G. III, MD     Preoperative Diagnosis: Abnormal cardiovascular stress test [R94.39]     Postop Diagnosis: Abnormal cardiovascular stress test [R94.39]    Treatments/Procedures: Procedure(s) (LRB):  Angiogram, Coronary, with Left Heart Cath (N/A)    Findings:Moderate coronary disease is present. See catheterization report for full details.    Estimated Blood loss: 20 cc    Specimens removed: No    Recommendations:   - Routine post-cath care as per orders  - IVF at 150 cc/hr for 2 hrs  - Cardiac rehab referral, Continue medical management, Risk factor reduction, Follow-up with outpatient cardiologist    Brenna Segundo

## 2025-02-04 NOTE — SUBJECTIVE & OBJECTIVE
Past Medical History:   Diagnosis Date    Arthritis     Carpal tunnel syndrome, bilateral     Cervical spinal stenosis 2002    Colon polyps     Fatty liver     Hypertension     Overweight (BMI 25.0-29.9) 8/16/2019    Vertigo     left ear issues       Past Surgical History:   Procedure Laterality Date    ARTHROSCOPIC CHONDROPLASTY OF KNEE JOINT Left 10/17/2018    Procedure: ARTHROSCOPY, KNEE, WITH CHONDROPLASTY;  Surgeon: GRETEL Carr MD;  Location: [facility] OR 2ND FLR;  Service: Orthopedics;  Laterality: Left;    COLONOSCOPY      COLONOSCOPY N/A 9/25/2017    Procedure: COLONOSCOPY/emr;  Surgeon: Raul August MD;  Location: [facility] (2ND FLR);  Service: Endoscopy;  Laterality: N/A;    COLONOSCOPY N/A 3/9/2018    Procedure: COLONOSCOPY;  Surgeon: Raul August MD;  Location: [facility] (2ND FLR);  Service: Endoscopy;  Laterality: N/A;    COLONOSCOPY N/A 3/23/2023    Procedure: COLONOSCOPY;  Surgeon: Aleksander Grimes MD;  Location: [facility] (4TH FLR);  Service: Endoscopy;  Laterality: N/A;  2/13 instructions to portal-st  pre call done- AJ  does not want to come in earlier 3/22 EB    EPIDURAL STEROID INJECTION N/A 11/4/2021    Procedure: INJECTION, STEROID, EPIDURAL, C7-T1  NEED CONSENT;  Surgeon: Bozena Mena MD;  Location: [facility] PAIN MGT;  Service: Pain Management;  Laterality: N/A;    ESOPHAGOGASTRODUODENOSCOPY N/A 9/23/2019    Procedure: EGD (ESOPHAGOGASTRODUODENOSCOPY);  Surgeon: Dyllan Maloney MD;  Location: [facility];  Service: General;  Laterality: N/A;    INJECTION OF FACET JOINT Bilateral 8/6/2021    Procedure: FACET JOINT INJECTION BILATERAL L4/5 AND L5/S1 DIRECT REFERRAL NEEDS CONSENT;  Surgeon: Jasiel Aviles MD;  Location: [facility] PAIN MGT;  Service: Pain Management;  Laterality: Bilateral;    KNEE ARTHROSCOPY W/ MENISCECTOMY Left 10/17/2018    Procedure: ARTHROSCOPY, KNEE, WITH MENISCECTOMY;  Surgeon: GRETEL Carr MD;  [facility];  Service: Orthopedics;  Laterality: Left;   regional w/catheter adductor  Dimitry/Linmendozac notified 10-12 LO    REPAIR OF RETINAL DETACHMENT WITH VITRECTOMY Right 4/4/2023    Procedure: REPAIR, RETINAL DETACHMENT, WITH VITRECTOMY;  Surgeon: True Arroyo MD;  Location: [facility] OR [address] [place] [address];  Service: Ophthalmology;  Laterality: Right;    REPAIR OF RETINAL DETACHMENT WITH VITRECTOMY Right 4/18/2023    Procedure: REPAIR, RETINAL DETACHMENT, WITH VITRECTOMY;  Surgeon: True Arroyo MD;  Location: [facility] OR [address] [place] [address];  Service: Ophthalmology;  Laterality: Right;  Scleral Buckle       Review of patient's allergies indicates:  No Known Allergies    PTA Medications   Medication Sig    aspirin (ECOTRIN) 81 MG EC tablet Take 1 tablet (81 mg total) by mouth once daily.    rosuvastatin (CRESTOR) 5 MG tablet Take 1 tablet (5 mg total) by mouth once daily.    sertraline (ZOLOFT) 50 MG tablet Take 1 tablet (50 mg total) by mouth once daily.    valsartan-hydrochlorothiazide (DIOVAN-HCT) 160-12.5 mg per tablet Take 1 tablet by mouth once daily.     Family History       Problem Relation (Age of Onset)    Arthritis Father    Dementia Father    Heart disease Father    No Known Problems Mother, Brother          Tobacco Use    Smoking status: Never    Smokeless tobacco: Never   Substance and Sexual Activity    Alcohol use: No    Drug use: No    Sexual activity: Not on file     Review of Systems   Constitutional: Negative for chills, decreased appetite, diaphoresis, fever, malaise/fatigue and weight gain.   HENT:  Negative for congestion and ear discharge.    Eyes:  Negative for blurred vision.   Cardiovascular:  Negative for chest pain, dyspnea on exertion, irregular heartbeat, leg swelling, orthopnea, palpitations and paroxysmal nocturnal dyspnea.   Respiratory:  Negative for cough, shortness of breath, snoring and sputum production.    Skin:  Negative for color change, dry skin and rash.   Musculoskeletal:  Negative for back pain, falls, joint pain and neck pain.    Gastrointestinal:  Negative for bloating, abdominal pain, constipation and diarrhea.   Genitourinary:  Negative for dysuria, flank pain, hematuria and hesitancy.   Neurological:  Negative for focal weakness, headaches, numbness and seizures.   Psychiatric/Behavioral:  Negative for altered mental status, depression and hallucinations.      Objective:     Vital Signs (Most Recent):  Pulse: 63 (02/04/25 0739)  Resp: 18 (02/04/25 0739)  BP: 113/67 (02/04/25 0744)  SpO2: 99 % (02/04/25 0739) Vital Signs (24h Range):  Pulse:  [63] 63  Resp:  [18] 18  SpO2:  [99 %] 99 %  BP: (107-113)/(63-67) 113/67     Weight: 70.5 kg (155 lb 5 oz)  Body mass index is 25.07 kg/m².    SpO2: 99 %       No intake or output data in the 24 hours ending 02/04/25 0748    Lines/Drains/Airways       None                    Physical Exam  Constitutional:       General: He is not in acute distress.     Appearance: He is not ill-appearing.   HENT:      Nose: Nose normal.      Mouth/Throat:      Mouth: Mucous membranes are moist.   Eyes:      Pupils: Pupils are equal, round, and reactive to light.   Neck:      Comments: No JVD appreciated   Cardiovascular:      Rate and Rhythm: Normal rate and regular rhythm.      Pulses: Normal pulses.      Heart sounds: No murmur heard.  Pulmonary:      Effort: Pulmonary effort is normal. No respiratory distress.      Breath sounds: No wheezing or rales.   Abdominal:      General: Abdomen is flat. There is no distension.      Palpations: Abdomen is soft.      Tenderness: There is no right CVA tenderness or left CVA tenderness.   Musculoskeletal:         General: No swelling.      Cervical back: Normal range of motion and neck supple. No tenderness.      Right lower leg: No edema.      Left lower leg: No edema.   Skin:     General: Skin is warm.      Coloration: Skin is not pale.      Findings: No rash.   Neurological:      General: No focal deficit present.      Mental Status: He is alert and oriented to person,  "place, and time.      Motor: No weakness.            Significant Labs: ABG: No results for input(s): "PH", "PCO2", "HCO3", "POCSATURATED", "BE" in the last 48 hours., Blood Culture: No results for input(s): "LABBLOO" in the last 48 hours., BMP: No results for input(s): "GLU", "NA", "K", "CL", "CO2", "BUN", "CREATININE", "CALCIUM", "MG" in the last 48 hours., CMP No results for input(s): "NA", "K", "CL", "CO2", "GLU", "BUN", "CREATININE", "CALCIUM", "PROT", "ALBUMIN", "BILITOT", "ALKPHOS", "AST", "ALT", "ANIONGAP", "ESTGFRAFRICA", "EGFRNONAA" in the last 48 hours., CBC No results for input(s): "WBC", "HGB", "HCT", "PLT" in the last 48 hours., INR No results for input(s): "INR", "PROTIME" in the last 48 hours., Lipid Panel No results for input(s): "CHOL", "HDL", "LDLCALC", "TRIG", "CHOLHDL" in the last 48 hours.,   Pathology Results  (Last 10 years)                 03/23/23 1336  Specimen to Pathology, Surgery Gastrointestinal tract Final result    Narrative:  Pre-op Diagnosis: Colon cancer screening [Z12.11]   History of colon polyps [Z86.010]   Procedure(s):   COLONOSCOPY   Number of specimens: 1   Name of specimens:   Jar 1: Colon polyp   Which provider would you like to cc?->ABHISHEK MARIO   Release to patient->Immediate   Specimen total (fresh, frozen, permanent):->1           , Troponin No results for input(s): "TROPONINIHS" in the last 48 hours., and All pertinent lab results from the last 24 hours have been reviewed.     "

## 2025-02-05 ENCOUNTER — TELEPHONE (OUTPATIENT)
Dept: CARDIAC REHAB | Facility: CLINIC | Age: 70
End: 2025-02-05
Payer: MEDICARE

## 2025-02-05 NOTE — LETTER
February 5, 2025    Ladarius Solis  [address] [address]             [facility]  [address] [facility]  [place] [id number]  Phone: [phone]                                  [facility]   [address] [facility]   [place] [address]  [phone]         [facility]   [address] [place] [address] [phone]         [facility]    [id number] [place] [address] [address] [place] [address] [phone]   Re: Ladarius Solis  Clinic number: [id number]    Dear Mr. Solis:    You were recently admitted to an [facility] facility for cardiac (heart) problem.  Your physician has referred you to [facility].  Cardiac Rehab Phase 2 is an educational and exercise program, conducted in a outpatient setting, proven to help reduce your risk for recurrent heart events.    Cardiac rehab has two major parts:    1. Exercise training to help you achieve cardiovascular fitness while learning how to exercise safely and improve muscle strength and endurance.  Your exercise prescription will be based on the results of the cardiopulmonary stress test (CPX) which will be done before entering the program and at completion.  2. Education, counseling and training to help you understand your heart condition and find ways to reduce your risk of future heart problems.  The cardiac rehab team will help you learn how to cope with the stress of adjusting to a new lifestyle and to deal with your fears about the future.    Phase 2 is a 36-session program, meeting 3 times a week for 12 weeks.  Each session consists of an hour of exercise and half-hour dedicated to the educational topic of the day.  Class days vary per location.  Please contact your nearest facility for details.    Through cardiac rehab you will learn:  About your heart condition, medical therapies, and medication  Risk factors in y our lifestyle contributing  to heart disease  New strategies to modify your risk factors  About a healthy diet that can lower your blood cholesterol, control weight, help prevent or control high blood pressure, and diabetes  How to stop smoking  How to manage stress    If you are interested in getting started, call the [facility] of your choosing.     Sincerely,     [facility]

## 2025-02-05 NOTE — TELEPHONE ENCOUNTER
Letter regarding Phase II cardiac rehab was sent to patient.  Will contact patient in 2 weeks to see if interested.  Also, information letter sent to [facility].  Keeley Austin RN  Cardiac Rehab Nurse

## 2025-02-07 ENCOUNTER — TELEPHONE (OUTPATIENT)
Dept: CARDIAC REHAB | Facility: CLINIC | Age: 70
End: 2025-02-07
Payer: MEDICARE

## 2025-02-07 DIAGNOSIS — I20.89 STABLE ANGINA: Primary | ICD-10-CM

## 2025-02-07 NOTE — TELEPHONE ENCOUNTER
Returned patient's call.  He is interested in attending Phase II cardiac rehab.  Insurance information sent for authorization.  Will contact patient back once information is returned.  Keeley Austin RN  Cardiac Rehab Nurse

## 2025-02-07 NOTE — TELEPHONE ENCOUNTER
----- Message from Teodora sent at 2/7/2025 12:22 PM CST -----  Patient is calling to schedule appointment. He can be reached at [phone].      Thank you

## 2025-02-14 DIAGNOSIS — R73.9 BLOOD GLUCOSE ELEVATED: ICD-10-CM

## 2025-02-14 DIAGNOSIS — I20.89 STABLE ANGINA: Primary | ICD-10-CM

## 2025-02-14 DIAGNOSIS — E78.00 PURE HYPERCHOLESTEROLEMIA: ICD-10-CM

## 2025-02-22 ENCOUNTER — OFFICE VISIT (OUTPATIENT)
Dept: URGENT CARE | Facility: CLINIC | Age: 70
End: 2025-02-22
Payer: MEDICARE

## 2025-02-22 VITALS
DIASTOLIC BLOOD PRESSURE: 72 MMHG | TEMPERATURE: 98 F | SYSTOLIC BLOOD PRESSURE: 116 MMHG | OXYGEN SATURATION: 97 % | WEIGHT: 155 LBS | HEART RATE: 66 BPM | RESPIRATION RATE: 18 BRPM | HEIGHT: 66 IN | BODY MASS INDEX: 24.91 KG/M2

## 2025-02-22 DIAGNOSIS — J06.9 VIRAL URI WITH COUGH: Primary | ICD-10-CM

## 2025-02-22 DIAGNOSIS — J02.9 SORE THROAT: ICD-10-CM

## 2025-02-22 DIAGNOSIS — R05.1 ACUTE COUGH: ICD-10-CM

## 2025-02-22 LAB
CTP QC/QA: YES
MOLECULAR STREP A: NEGATIVE
POC MOLECULAR INFLUENZA A AGN: NEGATIVE
POC MOLECULAR INFLUENZA B AGN: NEGATIVE
SARS CORONAVIRUS 2 ANTIGEN: NEGATIVE

## 2025-02-22 PROCEDURE — 87502 INFLUENZA DNA AMP PROBE: CPT | Mod: QW,S$GLB,, | Performed by: NURSE PRACTITIONER

## 2025-02-22 PROCEDURE — 87651 STREP A DNA AMP PROBE: CPT | Mod: QW,S$GLB,, | Performed by: NURSE PRACTITIONER

## 2025-02-22 PROCEDURE — 87811 SARS-COV-2 COVID19 W/OPTIC: CPT | Mod: QW,S$GLB,, | Performed by: NURSE PRACTITIONER

## 2025-02-22 PROCEDURE — 99214 OFFICE O/P EST MOD 30 MIN: CPT | Mod: S$GLB,,, | Performed by: NURSE PRACTITIONER

## 2025-02-22 NOTE — PROGRESS NOTES
"Subjective:      Patient ID: Ladarius Solis is a 69 y.o. male.    Vitals:  height is 5' 6" (1.676 m) and weight is 70.3 kg (155 lb). His oral temperature is 98.2 °F (36.8 °C). His blood pressure is 116/72 and his pulse is 66. His respiration is 18 and oxygen saturation is 97%.     Chief Complaint: Sore Throat (Entered by patient)    This is a 69 y.o. male who presents today with a chief complaint of  sore throat, cough, sneezing, a lot of mucus, nasal congestion x 4 days.  Provider note begins below    Patient states he will periodically get a runny nose sore throat and a cough.  In the past it has caused him to need hospitalization for pneumonia.  No fevers, headaches, or body aches.    Sore Throat   This is a new problem. The pain is at a severity of 6/10. Associated symptoms include coughing. Pertinent negatives include no abdominal pain, congestion, diarrhea, drooling, ear discharge, ear pain, headaches, hoarse voice, plugged ear sensation, neck pain, shortness of breath, stridor, swollen glands, trouble swallowing or vomiting. He has tried NSAIDs for the symptoms.     Constitution: Negative for sweating, fatigue and fever.   HENT:  Positive for sore throat. Negative for ear pain, ear discharge, drooling, congestion and trouble swallowing.    Neck: Negative for neck pain.   Respiratory:  Positive for cough. Negative for shortness of breath and stridor.    Gastrointestinal:  Negative for abdominal pain, vomiting and diarrhea.   Neurological:  Negative for headaches.      Objective:     Physical Exam   Constitutional: He is oriented to person, place, and time.   HENT:   Head: Normocephalic and atraumatic.   Ears:   Right Ear: Tympanic membrane, external ear and ear canal normal.   Left Ear: Tympanic membrane, external ear and ear canal normal.   Nose: Rhinorrhea present. No congestion.   Mouth/Throat: No oropharyngeal exudate or posterior oropharyngeal erythema.   Cardiovascular: Normal rate, regular rhythm " and normal heart sounds.   Pulmonary/Chest: Effort normal and breath sounds normal. No stridor. No respiratory distress. He has no wheezes. He has no rhonchi. He has no rales. He exhibits no tenderness.   Abdominal: Normal appearance.   Lymphadenopathy:     He has no cervical adenopathy.   Neurological: He is alert and oriented to person, place, and time.   Skin: Skin is dry.   Psychiatric: His behavior is normal. Mood normal.         Results for orders placed or performed in visit on 02/22/25   POCT Influenza A/B MOLECULAR    Collection Time: 02/22/25 11:50 AM   Result Value Ref Range    POC Molecular Influenza A Ag Negative Negative    POC Molecular Influenza B Ag Negative Negative     Acceptable Yes    SARS Coronavirus 2 Antigen, POCT Manual Read    Collection Time: 02/22/25 11:50 AM   Result Value Ref Range    SARS Coronavirus 2 Antigen Negative Negative, Presumptive Negative     Acceptable Yes    POCT Strep A, Molecular    Collection Time: 02/22/25 12:14 PM   Result Value Ref Range    Molecular Strep A, POC Negative Negative     Acceptable Yes       Assessment:     1. Viral URI with cough    2. Acute cough    3. Sore throat        Plan:   Flu and COVID test negative  Strep test negative  Patient has medications at home to treat symptoms.            Viral URI with cough    Acute cough  -     POCT Influenza A/B MOLECULAR  -     SARS Coronavirus 2 Antigen, POCT Manual Read    Sore throat  -     POCT Strep A, Molecular

## 2025-03-08 DIAGNOSIS — I10 PRIMARY HYPERTENSION: ICD-10-CM

## 2025-03-10 RX ORDER — VALSARTAN AND HYDROCHLOROTHIAZIDE 160; 12.5 MG/1; MG/1
1 TABLET, FILM COATED ORAL DAILY
Qty: 90 TABLET | Refills: 1 | Status: SHIPPED | OUTPATIENT
Start: 2025-03-10 | End: 2026-03-10

## 2025-03-10 NOTE — TELEPHONE ENCOUNTER
Refill Decision Note   Ladarius Solis  is requesting a refill authorization.  Brief Assessment and Rationale for Refill:  Approve     Medication Therapy Plan:         Comments:     Note composed:9:11 AM 03/10/2025

## 2025-03-10 NOTE — TELEPHONE ENCOUNTER
No care due was identified.  [facility] Embedded Care Due Messages. Reference number: [id number].   3/10/2025 9:08:07 AM CDT

## 2025-03-17 ENCOUNTER — HOSPITAL ENCOUNTER (OUTPATIENT)
Dept: CARDIOLOGY | Facility: HOSPITAL | Age: 70
Discharge: HOME | End: 2025-03-17
Attending: INTERNAL MEDICINE
Payer: MEDICARE

## 2025-03-17 VITALS
DIASTOLIC BLOOD PRESSURE: 65 MMHG | HEIGHT: 66 IN | WEIGHT: 158 LBS | HEART RATE: 59 BPM | SYSTOLIC BLOOD PRESSURE: 122 MMHG | BODY MASS INDEX: 25.39 KG/M2

## 2025-03-17 DIAGNOSIS — I20.89 STABLE ANGINA: ICD-10-CM

## 2025-03-17 LAB
CV STRESS BASE HR: 59 BPM
DIASTOLIC BLOOD PRESSURE: 65 MMHG
OHS CV CPX 1 MINUTE RECOVERY HEART RATE: 102 BPM
OHS CV CPX 85 PERCENT MAX PREDICTED HEART RATE MALE: 130
OHS CV CPX ABDOMINAL GIRTH: 36.2 CM
OHS CV CPX ANAEROBIC THRESHOLD DIASTOLIC BLOOD PRESSURE: 88 MMHG
OHS CV CPX ANAEROBIC THRESHOLD HEART RATE: 97
OHS CV CPX ANAEROBIC THRESHOLD RATE PRESSURE PRODUCT: NORMAL
OHS CV CPX ANAEROBIC THRESHOLD SYSTOLIC BLOOD PRESSURE: 156
OHS CV CPX DATA GRADE - AT: 6.4
OHS CV CPX DATA GRADE - PEAK: 12.2
OHS CV CPX DATA O2 SAT - PEAK: 98
OHS CV CPX DATA O2 SAT - REST: 99
OHS CV CPX DATA SPEED - AT: 2.2
OHS CV CPX DATA SPEED - PEAK: 3.4
OHS CV CPX DATA TIME - AT: 3
OHS CV CPX DATA TIME - PEAK: 5.88
OHS CV CPX DATA VE/VCO2 - AT: 34
OHS CV CPX DATA VE/VCO2 - PEAK: 31
OHS CV CPX DATA VE/VO2 - AT: 29
OHS CV CPX DATA VE/VO2 - PEAK: 31
OHS CV CPX DATA VO2 - AT: 16.9
OHS CV CPX DATA VO2 - PEAK: 24.1
OHS CV CPX DATA VO2 - REST: 4.9
OHS CV CPX ESTIMATED METS: 10
OHS CV CPX FEV1/FVC: 0.91
OHS CV CPX FORCED EXPIRATORY VOLUME: 2.53
OHS CV CPX FORCED VITAL CAPACITY (FVC): 2.77
OHS CV CPX HIGHEST VO: 33.1
OHS CV CPX MAX PREDICTED HEART RATE: 153
OHS CV CPX MAXIMAL VOLUNTARY VENTILATION (MVV) PREDICTED: 101.2
OHS CV CPX MAXIMAL VOLUNTARY VENTILATION (MVV): 96
OHS CV CPX MAXIUMUM EXERCISE VENTILATION (VE MAX): 62.3
OHS CV CPX PATIENT AGE: 67
OHS CV CPX PATIENT HEIGHT IN: 66
OHS CV CPX PATIENT IS FEMALE AGE 11-19: 0
OHS CV CPX PATIENT IS FEMALE AGE GREATER THAN 19: 0
OHS CV CPX PATIENT IS FEMALE AGE LESS THAN 11: 0
OHS CV CPX PATIENT IS FEMALE: 0
OHS CV CPX PATIENT IS MALE AGE 11-25: 0
OHS CV CPX PATIENT IS MALE AGE GREATER THAN 25: 1
OHS CV CPX PATIENT IS MALE AGE LESS THAN 11: 0
OHS CV CPX PATIENT IS MALE GREATER THAN 18: 1
OHS CV CPX PATIENT IS MALE LESS THAN OR EQUAL TO 18: 0
OHS CV CPX PATIENT IS MALE: 1
OHS CV CPX PATIENT WEIGHT RETURNED IN OZ: 2528
OHS CV CPX PEAK DIASTOLIC BLOOD PRESSURE: 72 MMHG
OHS CV CPX PEAK HEAR RATE: 142 BPM
OHS CV CPX PEAK RATE PRESSURE PRODUCT: NORMAL
OHS CV CPX PEAK SYSTOLIC BLOOD PRESSURE: 151 MMHG
OHS CV CPX PERCENT BODY FAT: 12.8
OHS CV CPX PERCENT MAX PREDICTED HEART RATE ACHIEVED: 93
OHS CV CPX PREDICTED VO2: 33.1 ML/KG/MIN
OHS CV CPX RATE PRESSURE PRODUCT PRESENTING: 7198
OHS CV CPX REST PET CO2: 33
OHS CV CPX VE/VCO2 SLOPE: 32.4
STRESS ECHO POST EXERCISE DUR MIN: 5 MINUTES
STRESS ECHO POST EXERCISE DUR SEC: 53 SECONDS
SYSTOLIC BLOOD PRESSURE: 122 MMHG

## 2025-03-17 PROCEDURE — 94621 CARDIOPULM EXERCISE TESTING: CPT | Mod: 26,,, | Performed by: INTERNAL MEDICINE

## 2025-03-17 PROCEDURE — 94621 CARDIOPULM EXERCISE TESTING: CPT

## 2025-03-18 ENCOUNTER — TELEPHONE (OUTPATIENT)
Dept: PHARMACY | Facility: CLINIC | Age: 70
End: 2025-03-18
Payer: MEDICARE

## 2025-03-18 ENCOUNTER — TELEPHONE (OUTPATIENT)
Dept: CARDIOLOGY | Facility: CLINIC | Age: 70
End: 2025-03-18
Payer: MEDICARE

## 2025-03-18 NOTE — TELEPHONE ENCOUNTER
Pt returned my call.  Discussed reason for low dose crestor 5mg daily.  Pt states he has always been on this dose and reports no adverse effects from the medication in the past.  Discussed with patient, per Dr Verma after review of his recent labs- RX change of crestor to 20mg once daily (#30 tabs) will be called in to his primary pharmacy [facility] and begin taking instead of the 5 mg tabs daily.  Pt verbalized understanding.  Recheck of lipids and CMP in 6-8 weeks to be planned.  Pt with good understanding of changes in medication prescribed.

## 2025-03-19 NOTE — TELEPHONE ENCOUNTER
[facility] Chart Review & Patient Outreach Details For Medication Adherence Project    Reason for Outreach Encounter: 3rd Party payor non-compliance report ([employer], [employer], C, etc)  2.  Patient Outreach Method: Reviewed patient chart   3.   Medication in question:    Hypertension Medications              valsartan-hydrochlorothiazide (DIOVAN-HCT) 160-12.5 mg per tablet Take 1 tablet by mouth once daily.                 Valsartan/hctz  last filled  3/15/25 for 90 day supply      4.  Reviewed and or Updates Made To: Patient Chart  5. Outreach Outcomes and/or actions taken: Patient filled medication and is on track to be adherent  Additional Notes:

## 2025-03-24 ENCOUNTER — TELEPHONE (OUTPATIENT)
Dept: CARDIAC REHAB | Facility: CLINIC | Age: 70
End: 2025-03-24
Payer: MEDICARE

## 2025-03-24 ENCOUNTER — TELEPHONE (OUTPATIENT)
Dept: CARDIOLOGY | Facility: CLINIC | Age: 70
End: 2025-03-24
Payer: MEDICARE

## 2025-03-24 NOTE — TELEPHONE ENCOUNTER
Patient had to cancel appointment for orientation.  Rescheduled to 4/1/2025 at 815 am.  Keeley Austin RN  Cardiac Rehab Nurse   infarction ([address]     Cholecystitis 4/14/2021    COVID-19 virus infection 7/1/2020    Diabetes mellitus ([address]     Diabetic peripheral neuropathy ([address] 6/8/2018    Elbow contusion 3/24/2014    GERD (gastroesophageal reflux disease)     ulcers    High anion gap metabolic acidosis 2/5/5822    Hypercholesteremia     Hyperlipidemia     Hypertension     ICD (implantable cardioverter-defibrillator) in place 2018    Left arm numbness 9/11/2010    MRSA (methicillin resistant staph aureus) culture positive 07/13/2018    foot wound    Multifocal pneumonia     Neuropathy     Neutrophilic leukocytosis 3/36/4069    NSTEMI (non-ST elevated myocardial infarction) ([address] 10/3/2018    Pneumonia due to COVID-19 virus     POTS (postural orthostatic tachycardia syndrome)     Psychiatric problem     anxiety, depression, bipolar    Sepsis ([address] 7/1/2020    SIRS (systemic inflammatory response syndrome) ([address] 4/14/2021    Skin ulcer of left foot with fat layer exposed ([address] 6/1/2018    Sleep apnea     does not use Cpap    ST elevation myocardial infarction (STEMI) of inferolateral wall ([address] 11/13/2018    Syncope     Type II or unspecified type diabetes mellitus without mention of complication, not stated as uncontrolled     Ulcer of foot due to secondary diabetes ([address] 8/15/2018    Wears glasses        Past Surgical History:        Procedure Laterality Date    CARDIAC CATHETERIZATION      CHOLECYSTECTOMY, LAPAROSCOPIC N/A 4/21/2021    LAPAROSCOPIC CHOLECYSTECTOMY WITH CHOLANGIOGRAM performed by Mandy Melissa MD at [place] [address]  10/06/2018    Bare Metal Stent to PDA    CORONARY ANGIOPLASTY WITH STENT PLACEMENT  10/07/2018    Bare Metal Stent to OM    CORONARY ANGIOPLASTY WITH STENT PLACEMENT  10/03/2018    CECE to Circ    DIAGNOSTIC CARDIAC CATH LAB PROCEDURE      FINGER SURGERY Left     Left Thumb    HERNIA REPAIR      VASCULAR SURGERY      VASECTOMY         Allergies: Patient has no known allergies. Medications Prior to Admission:    Prior to Admission medications    Medication Sig Start Date End Date Taking? Authorizing Provider   atorvastatin (LIPITOR) 80 MG tablet TAKE 1 TABLET BY MOUTH EVERY DAY 7/6/22   LAURA Blanco - CNP   prasugrel (EFFIENT) 10 MG TABS TAKE 1 TABLET BY MOUTH EVERY DAY 4/28/22   Arabella Burroughs MD   Omega-3 Fatty Acids (FISH OIL PO) Take 1 capsule by mouth daily     Historical Provider, MD   GARLIC PO Take 1 capsule by mouth daily     Historical Provider, MD   NOVOLOG FLEXPEN 100 UNIT/ML injection pen Inject 14 Units into the skin 3 times daily (before meals) Plus sliding scale - 0-3 units more 2/28/21   Historical Provider, MD   insulin detemir (LEVEMIR) 100 UNIT/ML injection vial Inject 30 Units into the skin 2 times daily    Historical Provider, MD   aspirin 81 MG chewable tablet Take 81 mg by mouth daily    Historical Provider, MD   Cholecalciferol (VITAMIN D3) 78342 units CAPS Take 1 capsule by mouth once a week     Historical Provider, MD   Dulaglutide 3 MG/0.5ML SOPN Inject 3 mg into the skin once a week Indications: Friday     Historical Provider, MD       Family History:       Problem Relation Age of Onset    High Blood Pressure Mother     Stroke Mother     Heart Disease Mother     COPD Mother     Heart Disease Father     Cancer Father         skin    Diabetes Sister     Cancer Sister     Heart Disease Brother     Diabetes Brother      Social History:   TOBACCO:   reports that he has quit smoking. His smoking use included cigarettes and cigars. He has a 24.00 pack-year smoking history. He has never used smokeless tobacco.  ETOH:   reports no history of alcohol use.   OCCUPATION:      REVIEW OF SYSTEMS:  A full review of systems was performed and is negative except for that which appears in the HPI    Physical Exam:    Vitals: BP (!) 152/83   Pulse 93   Temp 97.8 °F (36.6 °C) (Oral)   Resp 18   SpO2 98%   General appearance: WD/WN 59 y.o. year-old male who is alert, appears stated age and is cooperative  HEENT: Head: Normocephalic, no lesions, without obvious abnormality. Eye: Normal external eye, conjunctiva, lids cornea, PEERL. Ears: Normal external ears. Non-tender. Nose: Normal external nose, mucus membranes and septum. Pharynx: Dental Hygiene adequate. Normal buccal mucosa. Normal pharynx. Neck: no adenopathy, no carotid bruit, no JVD, supple, symmetrical, trachea midline and thyroid not enlarged, symmetric, no tenderness/mass/nodules  Lungs: clear to auscultation bilaterally and no use of accessory muscles  Heart: regular rate and rhythm, S1, S2 normal, no murmur, click, rub or gallop and normal apical impulse  Abdomen: soft, non-tender; bowel sounds normal; no masses, no organomegaly  Extremities: extremities atraumatic, no cyanosis or edema and Homans sign is negative, no sign of DVT. Capillary Refill: Acceptable < 3 seconds   Peripheral Pulses: +3 easily felt, not easily obliterated with pressures   Skin: Skin color, texture, turgor normal. No rashes or lesions on exposed skin  Neurologic: Neurovascularly intact without any focal sensory/motor deficits. Cranial nerves: II-XII intact, grossly non-focal. Gait was not tested.   Mental Status: Alert and oriented, thought content appropriate, normal insight        CBC:   Recent Labs     07/20/22  1824   WBC 8.6   HGB 15.4        BMP:    Recent Labs     07/20/22  1824      K 4.2   CL 99   CO2 27   BUN 16   CREATININE 1.0   GLUCOSE 304*     Troponin:   Recent Labs     07/20/22  1824 07/20/22  2150   TROPONINI 0.06* 0.03*     PT/INR:  No results found for: PTINR  U/A:    Lab Results   Component Value Date/Time    LEUKOCYTESUR Negative 10/06/2020 11:10 AM    RBCUA 9 10/06/2020 11:10 AM    SPECGRAV >1.030 10/06/2020 11:10 AM    UROBILINOGEN 0.2 10/06/2020 11:10 AM    BILIRUBINUR SMALL 10/06/2020 11:10 AM    BILIRUBINUR NEGATIVE 09/10/2010 08:37 AM    BLOODU Negative 10/06/2020 11:10 AM    GLUCOSEU >=1000 10/06/2020 11:10 AM    GLUCOSEU >=1000 09/10/2010 08:37 AM    PROTEINU 30 10/06/2020 11:10 AM         RAD:   I have independently reviewed and interpreted the imaging studies below and based my recommendations to the patient on those findings. XR CHEST (2 VW)    Result Date: 7/20/2022  EXAMINATION: TWO XRAY VIEWS OF THE CHEST 7/20/2022 6:24 pm COMPARISON: 03/18/2022 HISTORY: ORDERING SYSTEM PROVIDED HISTORY: CP, RUTH TECHNOLOGIST PROVIDED HISTORY: Reason for exam:->CP, SOB Reason for Exam: CP, SOB FINDINGS: The heart and pulmonary vascularity are within normal limits. There are no focal areas of consolidation or pleural effusion. The osseous structures are intact. Chronic elevation of the right hemidiaphragm is nonspecific. A left subclavian pacemaker is stable. No acute abnormality         EKG:   Read by ER in the absence of a Cardiologist shows  Rhythm: sinus tachycardia  Rate: tachycardia and 100-110  Axis: left  Ectopy: none  Conduction: normal  ST Segments: nonspecific changes  T Waves: non specific changes  Q Waves: none     Clinical Impression: sinus tachycardia      Assessment:   Principal Problem:    Chest pain  Active Problems:    Essential hypertension    HLD (hyperlipidemia)    DMII (diabetes mellitus, type 2) (HCC)    Elevated troponin (chronically elevated)    CAD, multiple vessel    ICD (implantable cardioverter-defibrillator) in place    Ischemic cardiomyopathy  Resolved Problems:    * No resolved hospital problems. *      Plan:       Chest pain - given his cardiac history, there is concern for possible unstable angina. We will keep him n.p.o. and follow serial cardiac enzymes. He will be monitored on telemetry. Cardiology has been consulted to determine what if any further testing should be done at this time. Elevated troponin (chronically elevated) - his initial troponin is 0.06. We will follow serial troponin levels.     CAD, multiple vessel -he reports a history of 4 stent placements. We will continue statin, aspirin, Effient and monitor as stated above    Ischemic cardiomyopathy -echocardiogram dated 3/18/2022 has a left ventricle ejection fraction of 50%. - ICD (implantable cardioverter-defibrillator) in place      Diabetes mellitus II - Lantus, SSI and when able to eat, start a carb control diet    Essential (primary) hypertension - continue home meds and monitor blood pressure    Hyperlipidemia - No current evidence of Rhabdomyolysis or other adverse effects. Continue statin therapy while in the hospital           Code Status: Full Code  Diet: Diet NPO Exceptions are: Ice Chips, Sips of Water with Meds  DVT Prophylaxis: Lovenox    (Advanced care planning has been discussed with patient and/or responsible family member and is reflected in the code status.  Further orders associated with this have been entered if appropriate)      Disposition: Anticipate that patient will remain in the hospital for less than 2 midnights depending on further evaluation and clinical course    Please note that over 50 minutes was spent in evaluating the patient, review of records and results, discussion with staff/family, etc.      Jhon Sanabria MD

## 2025-03-24 NOTE — TELEPHONE ENCOUNTER
----- Message from HEATHER Dixon sent at 3/24/2025  9:35 AM CDT -----  Regarding: pt trying to reschedule his cardac rehab appt  Please assist patient,He called the cardiac rehab phone number spoke with switch board staff and the message was sent to me by accidentCan you assist the patient with his request?Or whom does he call to reschedule the cardiac rehab appointment he currently has booked?Beatrice Verma

## 2025-03-24 NOTE — PROGRESS NOTES
HISTORY: S/P STABLE ANGINA (2-7-25), CAD, HTN, EF=60-65%(11-4-24)    ANTHROPOMETRICS:     PRE   Height (in) 66   Weight (lb) 158   BMI 25.5   Abdominal Girth (in) 36.2   % Body Fat 12.8%       LAB RESULTS:    Lab Results   Component Value Date    HGB 14.5 2025     Lab Results   Component Value Date    HCT 44.3 2025     Lab Results   Component Value Date    MPV 10.4 2025       Lab Results   Component Value Date    CHOL 142 2025     Lab Results   Component Value Date    HDL 43 2025     Lab Results   Component Value Date    LDLCALC 75.0 2025     Lab Results   Component Value Date    TRIG 120 2025     Lab Results   Component Value Date    CHOLHDL 30.3 2025       Lab Results   Component Value Date    GLUF 115 (H) 2025     Lab Results   Component Value Date    HGBA1C 5.9 (H) 2024        Lab Results   Component Value Date    HSCRP 0.90 2025         PSYCHOSOCIAL SCORES:    BOO    Subscales  Well-being subscale: Friendliness: 0  Well-being subscale: Physical Well Bein  Well-being subscale: Contentment: 0  Well-being subscale: Relaxation: 1  Symptom subscale: Hostility: 1  Symptom subscale: Somatic Symptoms: 5  Symptom subscale: Depression: 1  Symptom subscale: Anxiety: 2    Boo Ending Score  Total Score: Anxiety: 3  Total Score: Depression: 1  Total Score: Somatization: 7  Total Score: Hostility: 1            SF-36  36-Item Short Form Survey (SF-36) Scoring  Physical Functionin  Role limitations due to physical health: 100  Role limitations due to emotional problems: 100  Energy/fatigue: 45  Emotional well-bein  Social functionin  Pain: 47.5  General health: 60            PHQ-9:      2025     8:17 AM   PHQ-9 Depression Patient Health Questionnaire   Over the last two weeks how often have you been bothered by little interest or pleasure in doing things 0   Over the last two weeks how often have you been bothered by feeling down,  depressed or hopeless 0   Over the last two weeks how often have you been bothered by trouble falling or staying asleep, or sleeping too much 0   Over the last two weeks how often have you been bothered by feeling tired or having little energy 0   Over the last two weeks how often have you been bothered by a poor appetite or overeating 0   Over the last two weeks how often have you been bothered by feeling bad about yourself - or that you are a failure or have let yourself or your family down 0   Over the last two weeks how often have you been bothered by trouble concentrating on things, such as reading the newspaper or watching television 0   Over the last two weeks how often have you been bothered by moving or speaking so slowly that other people could have noticed. 0   Over the last two weeks how often have you been bothered by thoughts that you would be better off dead, or of hurting yourself 0   If you checked off any problems, how difficult have these problems made it for you to do your work, take care of things at home or get along with other people? Not difficult at all   PHQ-9 Score 0                   EDUCATION SCORES:     PRE   Education Score 80

## 2025-03-24 NOTE — PROGRESS NOTES
Session: Orientation     Cardiac Rehab Individual Treatment Plan - Initial Assessment      Patient Name: Ladarius Solis MRN: [id number]   : 1955   Age: 69 y.o.   Primary Diagnosis: STABLE ANGINA  Date of Event: 25  EF: 60-65%  Risk Stratification: moderate  Referring Physician: CASH   Exercise Assessment:     CPX/TM Date: 3-17-25 Results   RHR 59   Max    Peak VO2 (CPX only) 24.1   Actual METS (CPX only) 6.9   Estimated METS 10.0     Anthropometrics    Height 66 inches   Weight 158 lbs   BMI 25.5   Abdominal Girth 36.2   Body Composition 12.8%     ST Depression noted on Stress Test?:No  Angina with exercise?: No   Fall Risk: Yes   Assistive Devices:  independent   Currently exercising? Yes: Walking; Frequency: 5 days per week; Duration 10 to 15 minutes  Mr. Durand stated there were no limitations to exercise.     Exercise Plan:   Goals:  CR Exercise Goals: Attend Cardiac Rehab 3 times/week: In Progress  Home Aerobic Exercise: 2 additional days/week for 30-60 minutes: In Progress  Intensity of 12-15 on the Rate of Perceived Exertion (RPE) scale: In Progress  30% increase in entry estimated METS: 13.0 : In Progress  5 days/week for 30-60 minutes: In Progress  Demonstrate proper pulse taking technique: In Progress    Intervention:   Discussed importance of regular attendance to cardiac rehab class    Exercise Prescription:  THR Range    Mode: Treadmill  Recumbent Bike  Upright Bike  Nustep  Elliptical   Frequency:  3 days/week   Duration:  30 - 60 minutes   Intensity:  12 - 15 RPE   Resistance Training:  Yes: 5 lb weights with 10-15 reps based on strength and range of motion assessment     Home Prescription:  Mode Aerobic   Frequency: 2- 3 days/week   Duration: 30-60 minutes   Resistance Training: None        Education:  Orientation to Equipment; verbalizes understanding; Date: 3-25-25  Exercise Recommendations; verbalizes understanding; Date: 3-25-25  Exercise Safety; verbalizes  understanding; Date: 3-25-25  Class Preparation: verbalizes understanding; Date: 3-25-25  Signs and symptoms to report: verbalizes understanding; Date: 3-25-25  Caffeine/Hydration: verbalizes understanding; Date: 3-25-25  Exercise Terminology: verbalizes understanding; Date: 3-25-25  Resistance Training: verbalizes understanding; Date: 3-25-25    Comments:  I encouraged Mr. Durand to continue exercising at least 2 non-rehab days per week for at least 30 minutes in addition to attending Phase II cardiac rehab classes 3 days per week.  He stated understanding.    All consent forms were signed, proper attire and shoes were discussed.       Mr. Durand will begin Cardiac Rehab on Wednesday, April 9 at 3:30 pm.    The exercise prescription will be adjusted based on tolerance of exercise intensity by patient.    Hector Prado., CEP

## 2025-03-24 NOTE — TELEPHONE ENCOUNTER
----- Message from Teodora sent at 3/24/2025 11:17 AM CDT -----  Patient is calling in regards to rescheduling his appointment for 3-25-25. He can be reached at [phone].Thank you

## 2025-03-24 NOTE — TELEPHONE ENCOUNTER
Pt calling interventional cardiology department after missing cardiac rehab call from staff.  Explained to patient, I can assist however this is the wrong department.  Contact to cardiac rehab [phone] and attempt to help patient.

## 2025-03-31 ENCOUNTER — TELEPHONE (OUTPATIENT)
Dept: CARDIAC REHAB | Facility: CLINIC | Age: 70
End: 2025-03-31
Payer: MEDICARE

## 2025-04-01 ENCOUNTER — CLINICAL SUPPORT (OUTPATIENT)
Dept: CARDIAC REHAB | Facility: CLINIC | Age: 70
End: 2025-04-01
Payer: MEDICARE

## 2025-04-01 ENCOUNTER — TELEPHONE (OUTPATIENT)
Dept: CARDIAC REHAB | Facility: CLINIC | Age: 70
End: 2025-04-01

## 2025-04-01 VITALS — DIASTOLIC BLOOD PRESSURE: 64 MMHG | SYSTOLIC BLOOD PRESSURE: 92 MMHG | OXYGEN SATURATION: 97 % | HEART RATE: 71 BPM

## 2025-04-01 DIAGNOSIS — I20.89 STABLE ANGINA: ICD-10-CM

## 2025-04-01 PROCEDURE — 93798 PHYS/QHP OP CAR RHAB W/ECG: CPT | Mod: S$GLB,,, | Performed by: INTERNAL MEDICINE

## 2025-04-01 PROCEDURE — 99999 PR PBB SHADOW E&M-EST. PATIENT-LVL III: CPT | Mod: PBBFAC,,,

## 2025-04-01 NOTE — PROGRESS NOTES
Orientation   Cardiac Rehab Individual Treatment Plan - Initial Assessment      Patient Name: Ladarius Solis MRN: [id number]   : 1955   Age: 69 y.o.   Primary Diagnosis: stable angina, CAD, HTN    Nutrition Assessment:     Anthropometrics    Height 66 inches   Weight 158 lbs   BMI 25..5   Abdominal Girth 36.2   Body Composition 12.8       Drug Allergies and Intolerances:  Review of patient's allergies indicates:  No Known Allergies    Food Allergies and Intolerances:  NA    Past Medical History:  Past Medical History:   Diagnosis Date    Arthritis     Carpal tunnel syndrome, bilateral     Cervical spinal stenosis 2002    Colon polyps     Fatty liver     Hypertension     Overweight (BMI 25.0-29.9) 2019    Vertigo     left ear issues       Past Surgical History:  Past Surgical History:   Procedure Laterality Date    ANGIOGRAM, CORONARY, WITH LEFT HEART CATHETERIZATION N/A 2025    Procedure: Angiogram, Coronary, with Left Heart Cath;  Surgeon: Jb Verma III, MD;  Location: [facility] CATH LAB;  Service: Cardiology;  Laterality: N/A;    ARTHROSCOPIC CHONDROPLASTY OF KNEE JOINT Left 10/17/2018    Procedure: ARTHROSCOPY, KNEE, WITH CHONDROPLASTY;  Surgeon: GRETEL Carr MD;  [facility];  Service: Orthopedics;  Laterality: Left;    COLONOSCOPY      COLONOSCOPY N/A 2017    Procedure: COLONOSCOPY/emr;  Surgeon: Raul August MD;  Location: [facility] (2ND FLR);  Service: Endoscopy;  Laterality: N/A;    COLONOSCOPY N/A 3/9/2018    Procedure: COLONOSCOPY;  Surgeon: Raul August MD;  Location: [facility] (2ND FLR);  Service: Endoscopy;  Laterality: N/A;    COLONOSCOPY N/A 3/23/2023    Procedure: COLONOSCOPY;  Surgeon: Aleksander Grimes MD;  Location: [facility] ENDO (4TH FLR);  Service: Endoscopy;  Laterality: N/A;   instructions to portal-st  pre call done- AJ  does not want to come in earlier 3/22 EB    EPIDURAL STEROID INJECTION N/A 2021    Procedure: INJECTION, STEROID, EPIDURAL,  C7-T1  NEED CONSENT;  Surgeon: Bozena Mena MD;  Location: [facility] PAIN MGT;  Service: Pain Management;  Laterality: N/A;    ESOPHAGOGASTRODUODENOSCOPY N/A 9/23/2019    Procedure: EGD (ESOPHAGOGASTRODUODENOSCOPY);  Surgeon: Dyllan Maloney MD;  Location: [facility] ENDO;  Service: General;  Laterality: N/A;    INJECTION OF FACET JOINT Bilateral 8/6/2021    Procedure: FACET JOINT INJECTION BILATERAL L4/5 AND L5/S1 DIRECT REFERRAL NEEDS CONSENT;  Surgeon: Jasiel Aviles MD;  Location: [facility] PAIN MGT;  Service: Pain Management;  Laterality: Bilateral;    KNEE ARTHROSCOPY W/ MENISCECTOMY Left 10/17/2018    Procedure: ARTHROSCOPY, KNEE, WITH MENISCECTOMY;  Surgeon: GRETEL Carr MD;  Location: [facility] OR 2ND FLR;  Service: Orthopedics;  Laterality: Left;  regional w/catheter adductor  Dimitry/[employer] notified 10-12 LO    REPAIR OF RETINAL DETACHMENT WITH VITRECTOMY Right 4/4/2023    Procedure: REPAIR, RETINAL DETACHMENT, WITH VITRECTOMY;  Surgeon: True Arroyo MD;  [facility];  Service: Ophthalmology;  Laterality: Right;    REPAIR OF RETINAL DETACHMENT WITH VITRECTOMY Right 4/18/2023    Procedure: REPAIR, RETINAL DETACHMENT, WITH VITRECTOMY;  Surgeon: True Arroyo MD;  [facility];  Service: Ophthalmology;  Laterality: Right;  Scleral Buckle       Medications:  Current Outpatient Medications   Medication Sig    aspirin (ECOTRIN) 81 MG EC tablet Take 1 tablet (81 mg total) by mouth once daily.    rosuvastatin (CRESTOR) 5 MG tablet Take 1 tablet (5 mg total) by mouth once daily.    sertraline (ZOLOFT) 50 MG tablet Take 1 tablet (50 mg total) by mouth once daily.    valsartan-hydrochlorothiazide (DIOVAN-HCT) 160-12.5 mg per tablet Take 1 tablet by mouth once daily.     No current facility-administered medications for this visit.     Facility-Administered Medications Ordered in Other Visits   Medication    sodium hyaluronate (EUFLEXXA) 10 mg/mL(mw 2.4 -3.6 million) Syrg 20 mg        Vitamins and Supplements:  NA    Labs:  Patient confirms he is taking Crestor 5mg for cholesterol control.    Lab Results   Component Value Date    CHOL 142 03/17/2025     Lab Results   Component Value Date    HDL 43 03/17/2025     Lab Results   Component Value Date    LDLCALC 75.0 03/17/2025     Lab Results   Component Value Date    TRIG 120 03/17/2025     Lab Results   Component Value Date    CHOLHDL 30.3 03/17/2025         Lab Results   Component Value Date    GLUF 115 (H) 03/17/2025     Lab Results   Component Value Date    HGBA1C 5.9 (H) 04/12/2024       Nutrition/Diet History:  Patient eats 2 meals daily.    Seasons food with various seasoning/salt (roommate cooks).  Patient denies use of a salt shaker at the table on prepared foods.   Dines out 1-2 per week at restaurants.    Chooses fried foods 2-3 time(s) per month.    Chooses fish rarely   Beverages:  water and diet tea, sweet tea, coke occasionally  Alcohol: none    24 Hour Recall:  Breakfast: pepperoni pizza slice-made sandwich with honey wheat and ketchup  Lunch:skipped  Dinner:navy beans with sausage and white rice  Other: sweet tea throughout the day    Difficulty Chewing or Swallowing: Yes- ill fitting partial dentures so not wearing, pt does not have dental insurance and current  Current Exercise: See Exercise Physiologist Note  Food Safety/Food Preparation:  roommate   Living Arrangements/Family Support: Lives with friend  Cultural/Spiritual/Personal Preferences: not applicable   Barriers to Education: none identified  Stage of Change Related to Diet Habits: Action    Nutrition Diagnosis:  Food and nutrition related knowledge deficit related to the lack of prior nutrition education as evidenced by diet history and 24 hour recall    Nutrition Plan:   Goals:  LDL-C < 70 (for high risk patients)  Hgb A1c < 7%  BMI < 25 and abdominal girth < 40M/<35 F  2 gram sodium, Mediterranean diet  Rehab weight goal: -5  Fish intake (non-fried varieties) to  a goal of 2-3 servings per week.   Increase fruit and vegetable intake    Interventions/Recommendations:  Lab results reviewed and discussed  Nutrition Prescription:  Total Energy Estimated Needs: 9505-8642 Kcal/d for weight loss  Method for Estimating Needs: 25-28kcal/kg  Total Protein Estimated Needs: 72-93 g/d  Method for Estimating Needs: 1-1.3 g/Kg BW  Total Fluid Estimated Needs: 1 mL/Kcal  Dietitian Consult: No  Patient to participate in Cardiac Rehab sessions three times a week  Weekly Dietitian Weight Check  Encouraged patient to complete 3 day food diary  Follow Up Plan for Ongoing Self-Management Support    Education:  Mediterranean Diet; verbalizes understanding; Date: 4/1/25 , Food Labels  Person taught: patient  Preferred Learning Method: Verbal, Written  Education Needed/Provided: Nutrition counseling and education related to cardiac rehabilitation  Education Method: Weekly nutrition lectures on the Mediterranean diet, cooking, shopping, and dining out  Written Materials Provided: 3 Day Food Record, Introduction to Mediterranean Diet  Strategies Implemented: Motivational interviewing, Goal setting, Self-Monitoring, and Problem Solving    Comments:   Discussed ways to incorporate healthy snacks, eating on a schedule, and monitoring sodium intake for heart health.  Pt motivated and asks good questions- lives with couple who with wife who cooks meals and often prepares high sodium and red meat meals. Discussed easy alternatives to make modifications and substitutions. Reviewed label reading and provided handout    Diabetes  Is the patient diabetic? No      RD contact information provided.      Stephanie Landaverde MS, RDN/LDN

## 2025-04-01 NOTE — TELEPHONE ENCOUNTER
Dr. Verma,  Mr. Ladarius Solis MRN [id number] was oriented today for phase II cardiac rehab.  He is coming in with the diagnosis of stable angina.  After review of medication list with him, noted that he is not prescribed any nitrates at this time.  Is this necessary for him to have or do we proceed with cardiac rehab without it?  FYI:  BP today at rest was 92/64.  He denies any lightheadedness/dizziness.  I can be contacted at [phone] with any questions.  Thanks,  Keeley Austin RN  Cardiac Rehab Nurse

## 2025-04-01 NOTE — PROGRESS NOTES
Session: Orientation   Cardiac Rehab Individual Treatment Plan - Initial Assessment      Patient Name: Ladarius Solis MRN: [id number]   : 1955   Age: 69 y.o.   Date of Event: 2025   Primary Diagnosis: Stable angina    EF: 60-65%    Physical Assessment:   BP 92/64 (BP Location: Left arm, Patient Position: Standing)   Pulse 71   SpO2 97%     ASSESSMENT:  Heart Sounds: regular rate and rhythm  Prosthetic Valve: No  Lung Sounds: normal air entry, lungs clear to auscultation  Capillary Refill: normal  Left Radial Pulse: Normal (+2)  Right Radial Pulse: Normal (+2)  Left Pedal Pulse: Normal (+2)  Right Pedal Pulse: Normal (+2)  Right Edema: none  Left Edema none  Strength: normal  Range of Motion: full range of motion  Existing Limitations:      Site   [] Arthritis, bursitis    [] Amputation, atrophy    [x] Other: Neck/back/chest pain continuously   []       Diabetic patient's foot examination comments:  N/A  Incisional site: N/A  Special needs: cervical stenosis    Psychosocial Assessment:   Outcome Survey Tools:    BOO SCORES:    Subscales  Well-being subscale: Friendliness: 0  Well-being subscale: Physical Well Bein  Well-being subscale: Contentment: 0  Well-being subscale: Relaxation: 1  Symptom subscale: Hostility: 1  Symptom subscale: Somatic Symptoms: 5  Symptom subscale: Depression: 1  Symptom subscale: Anxiety: 2    Boo Ending Score  Total Score: Anxiety: 3  Total Score: Depression: 1  Total Score: Somatization: 7  Total Score: Hostility: 1            SF-36  36-Item Short Form Survey (SF-36) Scoring  Physical Functionin  Role limitations due to physical health: 100  Role limitations due to emotional problems: 100  Energy/fatigue: 45  Emotional well-bein  Social functionin  Pain: 47.5  General health: 60            PHQ-9:      2025     8:17 AM   PHQ-9 Depression Patient Health Questionnaire   Over the last two weeks how often have you been bothered by little interest  or pleasure in doing things 0   Over the last two weeks how often have you been bothered by feeling down, depressed or hopeless 0   Over the last two weeks how often have you been bothered by trouble falling or staying asleep, or sleeping too much 0   Over the last two weeks how often have you been bothered by feeling tired or having little energy 0   Over the last two weeks how often have you been bothered by a poor appetite or overeating 0   Over the last two weeks how often have you been bothered by feeling bad about yourself - or that you are a failure or have let yourself or your family down 0   Over the last two weeks how often have you been bothered by trouble concentrating on things, such as reading the newspaper or watching television 0   Over the last two weeks how often have you been bothered by moving or speaking so slowly that other people could have noticed. 0   Over the last two weeks how often have you been bothered by thoughts that you would be better off dead, or of hurting yourself 0   If you checked off any problems, how difficult have these problems made it for you to do your work, take care of things at home or get along with other people? Not difficult at all   PHQ-9 Score 0              Living Arrangements: has roommates that are  & wife  Family Support:  roommates  Self Reported: Anxiety and Stress  Displays: calmness  Medication:  pt takes Zoloft    Psychosocial Plan:   Goals:  Improved psychosocial coping strategies  Reduce manifestation of anxiety  Reduce manifestation of stress  Reduce manifestation of chronic pain  Maintain positive support system  Maintain positive outlook  Improve overall quality of life    Interventions/Recommendations:  Discussed Results of Surveys  Patient to Self Report Emotional Changes at Session Check In  Recommend Physical Activity  Recommend Attending Education Lectures  Notify MD: Yes  Program Referral: Yes  Pharmaceutical Intervention/Therapy:  Yes  Other Needs: not applicable  Stage of Readiness to Change: Contemplation    Education:  Coping Techniques; demonstrated understanding; Date: 4/1/2025  Stress Management; verbalizes understanding; Date: 4/1/2025  Stress; verbalizes understanding; Date: 4/1/2025    Comments:  Patient reports that he does experience stress at home.  He does have roommates that are  & states that he sometimes has issues with the wife.  He states that she cannot handle her anger & takes it out on him & her .  He states that his coping mechanism is to simply leave the situation.  States that he has seen someone in Psychiatry (Dr. Blanc) in the past, but the doctor passed away & he hasn't seen anyone else since then.  Will put referral in for psychiatry.  He states that he feels this will help him.  Is currently taking Zoloft & states that he has been taking it for some time.  Encouraged for patient to call some time this week after referral is place in today.      Other Core Components/Risk Factors Assessment:   RISK FACTORS:  hyperlipidemia, positive family history, stress, pre-diabetes.      Learning Barriers: None    Education Level:  Attended College/Technical School    Pre-test Score: 80%    Medication Compliance: has been compliant with taking medications    Other Core Components/Risk Factors Plan:   Goals:  Decrease cholesterol level: In Progress  Increase exercise tolerance: In Progress  Increase knowledge of CAD: In Progress  Decrease blood pressure: In Progress  Weight loss: In Progress  Demonstrate accurate pulse taking: In Progress  Identify and manage personal areas of stress: In Progress  Control diabetes by adjusting diet and exercise: In Progress  Learn more about healthy eating: In Progress    Interventions/Recommendations:  Recommend regular attendance for Cardiac Rehab: Exercise and Education Lectures  Encourage medication compliance  Individual Education/ Counseling: Yes  Physician Referral:  No    Education:    cholesterol, verbalizes understanding; Date: 4/1/2025  diabetes, verbalizes understanding; Date: 4/1/2025  fluid overload/CHF, verbalizes understanding; Date: 4/1/2025  hypertension, verbalizes understanding; Date: 4/1/2025  physical activity, verbalizes understanding; Date: 4/1/2025  risk factors, verbalizes understanding; Date: 4/1/2025  sodium, verbalizes understanding; Date: 4/1/2025  stress, verbalizes understanding; Date: 4/1/2025         Education method adapted to patients education level and preferred method of learning.  Method: explanation    Comments:  Patient will be working on decreasing risk factors for CAD.  He plans to attend lectures/exercise sessions on a consistent basis.  He states that weight loss is a goal, however BMI is at 25.2.  Will refer patient to psychiatry due to stress/anxiety.  Has seen someone in the past & plans to get reestablished with someone else.  He is considered to be pre-diabetic.  Instructed patient to notify staff of any symptoms that blood glucose is dropping(ie: shakiness, lightheaded, diaphoresis).      Other Core Components/Hypertension Assessment:   Resting BP:   BP Readings from Last 1 Encounters:   04/01/25 92/64     BP Diagnosis: Hypertensive  Patient reported symptoms: none    Medications:  Medication Prescribed? Adherent? Exception   Beta-blocker []Yes  [x]No  []Unknown []Yes  []No  []Unknown    ACEI/ARB [x]Yes  []No  []Unknown [x]Yes  []No  []Unknown Combination med.   Calcium Channel Blocker []Yes  [x]No  []Unknown []Yes  []No  []Unknown    Diuretic [x]Yes  []No  []Unknown [x]Yes  []No  []Unknown        Other Core Components/Hypertension Plan:   Goals:  Blood Pressure <130/80    Interventions/Recommendations:  Med Card Reconciled: Yes  Encourage medication compliance  Encourage sodium reduction  Encourage weight loss  Recommend physical activity  Educate on contributory factors  Reduce stress, anxiety, anger, depression, and/or chronic  pain  Encourage home blood pressure monitoring  Recommend daily weights    Education:    Hypertension; verbalizes understanding; Date: 4/1/2025  Coronary Artery Disease; verbalizes understanding; Date: 4/1/2025  Risk Factors; verbalizes understanding; Date: 4/1/2025  Stress; verbalizes understanding; Date: 4/1/2025         Comments:  Patient will be following recommendations to keep BP under good control.  Encouraged for patient to notify staff if experiencing lightheadedness/dizziness.      Does the patient have Heart Failure? No    Other Core Components/Tobacco Cessation Assessment:   Smoking Status: Lifetime Non-smoker  Primary Tobacco Type: N/A  Tobacco Usage: no  Smoking Cessation Barriers:  N/A  Stage of Readiness to Change: Maintenance    Other Core Components/Tobacco Cessation Plan:   Goals:  Maintain non-smoking status    Interventions:  Maintains non-smoking status    Education:    Risk Factors; verbalizes understanding; Date: 4/1/2025         Comments:  Non smoker.    Discussed Cardiac Rehab program in depth with patient.  Medication list updated per patient & marked as reviewed.  Patient has been instructed to notify staff of any problems while attending rehab (ie: chest pain, shortness of breath, lightheadedness, dizziness).  Patient has been instructed to monitor blood pressure readings outside of rehab & to keep a daily log of the readings.  Will contact Dr. Wang regarding prescription for NTG due to diagnosis of stable angina for cardiac rehab.      Keeley Austin RN  Cardiac Rehab Nurse

## 2025-04-02 ENCOUNTER — TELEPHONE (OUTPATIENT)
Dept: INTERNAL MEDICINE | Facility: CLINIC | Age: 70
End: 2025-04-02
Payer: MEDICARE

## 2025-04-02 DIAGNOSIS — F41.9 ANXIETY: Primary | ICD-10-CM

## 2025-04-02 DIAGNOSIS — F33.1 MODERATE EPISODE OF RECURRENT MAJOR DEPRESSIVE DISORDER: ICD-10-CM

## 2025-04-03 NOTE — TELEPHONE ENCOUNTER
Referrals placed to social work and psychiatry (for therapy, must call [phone] to schedule for [facility])

## 2025-04-08 ENCOUNTER — TELEPHONE (OUTPATIENT)
Dept: INTERNAL MEDICINE | Facility: CLINIC | Age: 70
End: 2025-04-08
Payer: MEDICARE

## 2025-04-16 ENCOUNTER — TELEPHONE (OUTPATIENT)
Dept: PHARMACY | Facility: CLINIC | Age: 70
End: 2025-04-16
Payer: MEDICARE

## 2025-04-16 ENCOUNTER — TELEPHONE (OUTPATIENT)
Dept: CARDIAC REHAB | Facility: CLINIC | Age: 70
End: 2025-04-16
Payer: MEDICARE

## 2025-04-16 NOTE — TELEPHONE ENCOUNTER
[facility]/Population Health Chart Review & Patient Outreach Details For Medication Adherence Project    Reason for Outreach Encounter: 3rd Party payor non-compliance report ([employer], [employer], [employer], etc)  2.  Patient Outreach Method: Reviewed patient chart   3.   Medication in question:    Hyperlipidemia Medications              rosuvastatin (CRESTOR) 5 MG tablet Take 1 tablet (5 mg total) by mouth once daily.                  LF 30 ds 4/15/25    4.  Reviewed and or Updates Made To: Patient Chart  5. Outreach Outcomes and/or actions taken: Patient filled medication and is on track to be adherent  Additional Notes:

## 2025-04-16 NOTE — TELEPHONE ENCOUNTER
Patient contacted us following orientation & prior to him beginning his exercise sessions for Phase II cardiac rehab.  He states that he will not be able to attend cardiac rehab because he found a job & his schedule will not allow for him to attend.    Keeley Austin RN  Cardiac Rehab Nurse

## 2025-04-23 ENCOUNTER — PATIENT MESSAGE (OUTPATIENT)
Dept: INTERNAL MEDICINE | Facility: CLINIC | Age: 70
End: 2025-04-23
Payer: MEDICARE

## 2025-04-24 ENCOUNTER — OFFICE VISIT (OUTPATIENT)
Dept: PSYCHIATRY | Facility: CLINIC | Age: 70
End: 2025-04-24
Payer: MEDICARE

## 2025-04-24 ENCOUNTER — DOCUMENTATION ONLY (OUTPATIENT)
Dept: CARDIAC REHAB | Facility: CLINIC | Age: 70
End: 2025-04-24
Payer: MEDICARE

## 2025-04-24 DIAGNOSIS — F41.9 ANXIETY: ICD-10-CM

## 2025-04-24 DIAGNOSIS — F33.1 MODERATE EPISODE OF RECURRENT MAJOR DEPRESSIVE DISORDER: ICD-10-CM

## 2025-04-24 PROCEDURE — 99999 PR PBB SHADOW E&M-EST. PATIENT-LVL I: CPT | Mod: PBBFAC,,, | Performed by: SOCIAL WORKER

## 2025-04-24 PROCEDURE — 90834 PSYTX W PT 45 MINUTES: CPT | Mod: S$GLB,,, | Performed by: SOCIAL WORKER

## 2025-04-24 NOTE — PROGRESS NOTES
Mr. Durand has completed 1 out of 36 exercise session of Phase II cardiac rehab.  He decided not to attend the rehab program because he had to return to work.    Session: Orientation     Cardiac Rehab Individual Treatment Plan - Initial Assessment      Patient Name: Ladarius Solis MRN: [id number]   : 1955   Age: 69 y.o.   Primary Diagnosis: STABLE ANGINA  Date of Event: 25  EF: 60-65%  Risk Stratification: moderate  Referring Physician: CASH   Exercise Assessment:     CPX/TM Date: 3-17-25 Results   RHR 59   Max    Peak VO2 (CPX only) 24.1   Actual METS (CPX only) 6.9   Estimated METS 10.0     Anthropometrics    Height 66 inches   Weight 158 lbs   BMI 25.5   Abdominal Girth 36.2   Body Composition 12.8%     ST Depression noted on Stress Test?:No  Angina with exercise?: No   Fall Risk: Yes   Assistive Devices:  independent   Currently exercising? Yes: Walking; Frequency: 5 days per week; Duration 10 to 15 minutes  Mr. Durand stated there were no limitations to exercise.     Exercise Plan:   Goals:  CR Exercise Goals: Attend Cardiac Rehab 3 times/week: In Progress  Home Aerobic Exercise: 2 additional days/week for 30-60 minutes: In Progress  Intensity of 12-15 on the Rate of Perceived Exertion (RPE) scale: In Progress  30% increase in entry estimated METS: 13.0 : In Progress  5 days/week for 30-60 minutes: In Progress  Demonstrate proper pulse taking technique: In Progress    Intervention:   Discussed importance of regular attendance to cardiac rehab class    Exercise Prescription:  THR Range    Mode: Treadmill  Recumbent Bike  Upright Bike  Nustep  Elliptical   Frequency:  3 days/week   Duration:  30 - 60 minutes   Intensity:  12 - 15 RPE   Resistance Training:  Yes: 5 lb weights with 10-15 reps based on strength and range of motion assessment     Home Prescription:  Mode Aerobic   Frequency: 2- 3 days/week   Duration: 30-60 minutes   Resistance Training: None        Education:  Orientation  to Equipment; verbalizes understanding; Date: 3-25-25  Exercise Recommendations; verbalizes understanding; Date: 3-25-25  Exercise Safety; verbalizes understanding; Date: 3-25-25  Class Preparation: verbalizes understanding; Date: 3-25-25  Signs and symptoms to report: verbalizes understanding; Date: 3-25-25  Caffeine/Hydration: verbalizes understanding; Date: 3-25-25  Exercise Terminology: verbalizes understanding; Date: 3-25-25  Resistance Training: verbalizes understanding; Date: 3-25-25    Comments:  I encouraged Mr. Durand to continue exercising at least 2 non-rehab days per week for at least 30 minutes in addition to attending Phase II cardiac rehab classes 3 days per week.  He stated understanding.    All consent forms were signed, proper attire and shoes were discussed.       Mr. Durand will begin Cardiac Rehab on  at 3:30 pm.    The exercise prescription will be adjusted based on tolerance of exercise intensity by patient.    Ginger Prado, CEP    Orientation   Cardiac Rehab Individual Treatment Plan - Initial Assessment      Patient Name: Ladarius Solis MRN: [id number]   : 1955   Age: 69 y.o.   Primary Diagnosis: stable angina, CAD, HTN    Nutrition Assessment:     Anthropometrics    Height 66 inches   Weight 158 lbs   BMI 25..5   Abdominal Girth 36.2   Body Composition 12.8       Drug Allergies and Intolerances:  Review of patient's allergies indicates:  No Known Allergies    Food Allergies and Intolerances:  NA    Past Medical History:  Past Medical History:   Diagnosis Date    Arthritis     Carpal tunnel syndrome, bilateral     Cervical spinal stenosis     Colon polyps     Fatty liver     Hypertension     Overweight (BMI 25.0-29.9) 2019    Vertigo     left ear issues       Past Surgical History:  Past Surgical History:   Procedure Laterality Date    ANGIOGRAM, CORONARY, WITH LEFT HEART CATHETERIZATION N/A 2025    Procedure: Angiogram, Coronary, with Left  Heart Cath;  Surgeon: Jb Verma III, MD;  Location: [facility] CATH LAB;  Service: Cardiology;  Laterality: N/A;    ARTHROSCOPIC CHONDROPLASTY OF KNEE JOINT Left 10/17/2018    Procedure: ARTHROSCOPY, KNEE, WITH CHONDROPLASTY;  Surgeon: GRETEL Carr MD;  Location: [facility] OR 2ND FLR;  Service: Orthopedics;  Laterality: Left;    COLONOSCOPY      COLONOSCOPY N/A 9/25/2017    Procedure: COLONOSCOPY/emr;  Surgeon: Raul August MD;  Location: [facility] (2ND FLR);  Service: Endoscopy;  Laterality: N/A;    COLONOSCOPY N/A 3/9/2018    Procedure: COLONOSCOPY;  Surgeon: Raul August MD;  Location: [facility] (2ND FLR);  Service: Endoscopy;  Laterality: N/A;    COLONOSCOPY N/A 3/23/2023    Procedure: COLONOSCOPY;  Surgeon: Aleksander Grimes MD;  Location: [facility] (4TH FLR);  Service: Endoscopy;  Laterality: N/A;  2/13 instructions to portal-st  pre call done- AJ  does not want to come in earlier 3/22 EB    EPIDURAL STEROID INJECTION N/A 11/4/2021    Procedure: INJECTION, STEROID, EPIDURAL, C7-T1  NEED CONSENT;  Surgeon: Bozena Mena MD;  Location: [facility] PAIN MGT;  Service: Pain Management;  Laterality: N/A;    ESOPHAGOGASTRODUODENOSCOPY N/A 9/23/2019    Procedure: EGD (ESOPHAGOGASTRODUODENOSCOPY);  Surgeon: Dyllan Maloney MD;  Location: [facility];  Service: General;  Laterality: N/A;    INJECTION OF FACET JOINT Bilateral 8/6/2021    Procedure: FACET JOINT INJECTION BILATERAL L4/5 AND L5/S1 DIRECT REFERRAL NEEDS CONSENT;  Surgeon: Jasiel Aviles MD;  Location: [facility] PAIN MGT;  Service: Pain Management;  Laterality: Bilateral;    KNEE ARTHROSCOPY W/ MENISCECTOMY Left 10/17/2018    Procedure: ARTHROSCOPY, KNEE, WITH MENISCECTOMY;  Surgeon: GRETEL Carr MD;  Location: [facility] OR 2ND FLR;  Service: Orthopedics;  Laterality: Left;  regional w/catheter adductor  Dimitry/[employer] notified 10-12 LO    REPAIR OF RETINAL DETACHMENT WITH VITRECTOMY Right 4/4/2023    Procedure: REPAIR, RETINAL DETACHMENT, WITH VITRECTOMY;   Surgeon: True Arroyo MD;  Location: [facility] OR [address] [place] [address];  Service: Ophthalmology;  Laterality: Right;    REPAIR OF RETINAL DETACHMENT WITH VITRECTOMY Right 4/18/2023    Procedure: REPAIR, RETINAL DETACHMENT, WITH VITRECTOMY;  Surgeon: True Arroyo MD;  Location: [facility] OR [address] [place] [address];  Service: Ophthalmology;  Laterality: Right;  Scleral Buckle       Medications:  Current Outpatient Medications   Medication Sig    aspirin (ECOTRIN) 81 MG EC tablet Take 1 tablet (81 mg total) by mouth once daily.    rosuvastatin (CRESTOR) 5 MG tablet Take 1 tablet (5 mg total) by mouth once daily. (Patient taking differently: Take 20 mg by mouth once daily.)    sertraline (ZOLOFT) 50 MG tablet Take 1 tablet (50 mg total) by mouth once daily.    valsartan-hydrochlorothiazide (DIOVAN-HCT) 160-12.5 mg per tablet Take 1 tablet by mouth once daily.     No current facility-administered medications for this visit.     Facility-Administered Medications Ordered in Other Visits   Medication    sodium hyaluronate (EUFLEXXA) 10 mg/mL(mw 2.4 -3.6 million) Syrg 20 mg       Vitamins and Supplements:  NA    Labs:  Patient confirms he is taking Crestor 5mg for cholesterol control.    Lab Results   Component Value Date    CHOL 142 03/17/2025     Lab Results   Component Value Date    HDL 43 03/17/2025     Lab Results   Component Value Date    LDLCALC 75.0 03/17/2025     Lab Results   Component Value Date    TRIG 120 03/17/2025     Lab Results   Component Value Date    CHOLHDL 30.3 03/17/2025         Lab Results   Component Value Date    GLUF 115 (H) 03/17/2025     Lab Results   Component Value Date    HGBA1C 5.9 (H) 04/12/2024       Nutrition/Diet History:  Patient eats 2 meals daily.    Seasons food with various seasoning/salt (roommate cooks).  Patient denies use of a salt shaker at the table on prepared foods.   Dines out 1-2 per week at restaurants.    Chooses fried foods 2-3 time(s) per month.    Chooses fish rarely   Beverages:  water and  diet tea, sweet tea, coke occasionally  Alcohol: none    24 Hour Recall:  Breakfast: pepperoni pizza slice-made sandwich with honey wheat and ketchup  Lunch:skipped  Dinner:navy beans with sausage and white rice  Other: sweet tea throughout the day    Difficulty Chewing or Swallowing: Yes- ill fitting partial dentures so not wearing, pt does not have dental insurance and current  Current Exercise: See Exercise Physiologist Note  Food Safety/Food Preparation:  roommate   Living Arrangements/Family Support: Lives with friend  Cultural/Spiritual/Personal Preferences: not applicable   Barriers to Education: none identified  Stage of Change Related to Diet Habits: Action    Nutrition Diagnosis:  Food and nutrition related knowledge deficit related to the lack of prior nutrition education as evidenced by diet history and 24 hour recall    Nutrition Plan:   Goals:  LDL-C < 70 (for high risk patients)  Hgb A1c < 7%  BMI < 25 and abdominal girth < 40M/<35 F  2 gram sodium, Mediterranean diet  Rehab weight goal: -5  Fish intake (non-fried varieties) to a goal of 2-3 servings per week.   Increase fruit and vegetable intake    Interventions/Recommendations:  Lab results reviewed and discussed  Nutrition Prescription:  Total Energy Estimated Needs: 6165-5196 Kcal/d for weight loss  Method for Estimating Needs: 25-28kcal/kg  Total Protein Estimated Needs: 72-93 g/d  Method for Estimating Needs: 1-1.3 g/Kg BW  Total Fluid Estimated Needs: 1 mL/Kcal  Dietitian Consult: No  Patient to participate in Cardiac Rehab sessions three times a week  Weekly Dietitian Weight Check  Encouraged patient to complete 3 day food diary  Follow Up Plan for Ongoing Self-Management Support    Education:  Mediterranean Diet; verbalizes understanding; Date: 4/1/25 , Food Labels  Person taught: patient  Preferred Learning Method: Verbal, Written  Education Needed/Provided: Nutrition counseling and education related to cardiac rehabilitation  Education  Method: Weekly nutrition lectures on the Mediterranean diet, cooking, shopping, and dining out  Written Materials Provided: 3 Day Food Record, Introduction to Mediterranean Diet  Strategies Implemented: Motivational interviewing, Goal setting, Self-Monitoring, and Problem Solving    Comments:   Discussed ways to incorporate healthy snacks, eating on a schedule, and monitoring sodium intake for heart health.  Pt motivated and asks good questions- lives with couple who with wife who cooks meals and often prepares high sodium and red meat meals. Discussed easy alternatives to make modifications and substitutions. Reviewed label reading and provided handout    Diabetes  Is the patient diabetic? No      RD contact information provided.      Stephanie Landaverde MS, RDN/LDN    Session: Orientation   Cardiac Rehab Individual Treatment Plan - Initial Assessment      Patient Name: Ladarius Solis MRN: [id number]   : 1955   Age: 69 y.o.   Date of Event: 2025   Primary Diagnosis: Stable angina    EF: 60-65%    Physical Assessment:   There were no vitals taken for this visit.    ASSESSMENT:  Heart Sounds: regular rate and rhythm  Prosthetic Valve: No  Lung Sounds: normal air entry, lungs clear to auscultation  Capillary Refill: normal  Left Radial Pulse: Normal (+2)  Right Radial Pulse: Normal (+2)  Left Pedal Pulse: Normal (+2)  Right Pedal Pulse: Normal (+2)  Right Edema: none  Left Edema none  Strength: normal  Range of Motion: full range of motion  Existing Limitations:      Site   [] Arthritis, bursitis    [] Amputation, atrophy    [x] Other: Neck/back/chest pain continuously   []       Diabetic patient's foot examination comments:  N/A  Incisional site: N/A  Special needs: cervical stenosis    Psychosocial Assessment:   Outcome Survey Tools:    BOO SCORES:               SF-36             PHQ-9:      2025     8:17 AM   PHQ-9 Depression Patient Health Questionnaire   Over the last two weeks how often have  you been bothered by little interest or pleasure in doing things 0   Over the last two weeks how often have you been bothered by feeling down, depressed or hopeless 0   Over the last two weeks how often have you been bothered by trouble falling or staying asleep, or sleeping too much 0   Over the last two weeks how often have you been bothered by feeling tired or having little energy 0   Over the last two weeks how often have you been bothered by a poor appetite or overeating 0   Over the last two weeks how often have you been bothered by feeling bad about yourself - or that you are a failure or have let yourself or your family down 0   Over the last two weeks how often have you been bothered by trouble concentrating on things, such as reading the newspaper or watching television 0   Over the last two weeks how often have you been bothered by moving or speaking so slowly that other people could have noticed. 0   Over the last two weeks how often have you been bothered by thoughts that you would be better off dead, or of hurting yourself 0   If you checked off any problems, how difficult have these problems made it for you to do your work, take care of things at home or get along with other people? Not difficult at all   PHQ-9 Score 0              Living Arrangements: has roommates that are  & wife  Family Support:  roommates  Self Reported: Anxiety and Stress  Displays: calmness  Medication:  pt takes Zoloft    Psychosocial Plan:   Goals:  Improved psychosocial coping strategies  Reduce manifestation of anxiety  Reduce manifestation of stress  Reduce manifestation of chronic pain  Maintain positive support system  Maintain positive outlook  Improve overall quality of life    Interventions/Recommendations:  Discussed Results of Surveys  Patient to Self Report Emotional Changes at Session Check In  Recommend Physical Activity  Recommend Attending Education Lectures  Notify MD: Yes  Program Referral:  Yes  Pharmaceutical Intervention/Therapy: Yes  Other Needs: not applicable  Stage of Readiness to Change: Contemplation    Education:  Coping Techniques; demonstrated understanding; Date: 4/1/2025  Stress Management; verbalizes understanding; Date: 4/1/2025  Stress; verbalizes understanding; Date: 4/1/2025    Comments:  Patient reports that he does experience stress at home.  He does have roommates that are  & states that he sometimes has issues with the wife.  He states that she cannot handle her anger & takes it out on him & her .  He states that his coping mechanism is to simply leave the situation.  States that he has seen someone in Psychiatry (Dr. Blanc) in the past, but the doctor passed away & he hasn't seen anyone else since then.  Will put referral in for psychiatry.  He states that he feels this will help him.  Is currently taking Zoloft & states that he has been taking it for some time.  Encouraged for patient to call some time this week after referral is place in today.      Other Core Components/Risk Factors Assessment:   RISK FACTORS:  hyperlipidemia, positive family history, stress, pre-diabetes.      Learning Barriers: None    Education Level:  Attended College/Technical School    Pre-test Score: 80%    Medication Compliance: has been compliant with taking medications    Other Core Components/Risk Factors Plan:   Goals:  Decrease cholesterol level: In Progress  Increase exercise tolerance: In Progress  Increase knowledge of CAD: In Progress  Decrease blood pressure: In Progress  Weight loss: In Progress  Demonstrate accurate pulse taking: In Progress  Identify and manage personal areas of stress: In Progress  Control diabetes by adjusting diet and exercise: In Progress  Learn more about healthy eating: In Progress    Interventions/Recommendations:  Recommend regular attendance for Cardiac Rehab: Exercise and Education Lectures  Encourage medication compliance  Individual Education/  Counseling: Yes  Physician Referral: No    Education:    cholesterol, verbalizes understanding; Date: 4/1/2025  diabetes, verbalizes understanding; Date: 4/1/2025  fluid overload/CHF, verbalizes understanding; Date: 4/1/2025  hypertension, verbalizes understanding; Date: 4/1/2025  physical activity, verbalizes understanding; Date: 4/1/2025  risk factors, verbalizes understanding; Date: 4/1/2025  sodium, verbalizes understanding; Date: 4/1/2025  stress, verbalizes understanding; Date: 4/1/2025         Education method adapted to patients education level and preferred method of learning.  Method: explanation    Comments:  Patient will be working on decreasing risk factors for CAD.  He plans to attend lectures/exercise sessions on a consistent basis.  He states that weight loss is a goal, however BMI is at 25.2.  Will refer patient to psychiatry due to stress/anxiety.  Has seen someone in the past & plans to get reestablished with someone else.  He is considered to be pre-diabetic.  Instructed patient to notify staff of any symptoms that blood glucose is dropping(ie: shakiness, lightheaded, diaphoresis).      Other Core Components/Hypertension Assessment:   Resting BP:   BP Readings from Last 1 Encounters:   04/01/25 92/64     BP Diagnosis: Hypertensive  Patient reported symptoms: none    Medications:  Medication Prescribed? Adherent? Exception   Beta-blocker []Yes  [x]No  []Unknown []Yes  []No  []Unknown    ACEI/ARB [x]Yes  []No  []Unknown [x]Yes  []No  []Unknown Combination med.   Calcium Channel Blocker []Yes  [x]No  []Unknown []Yes  []No  []Unknown    Diuretic [x]Yes  []No  []Unknown [x]Yes  []No  []Unknown        Other Core Components/Hypertension Plan:   Goals:  Blood Pressure <130/80    Interventions/Recommendations:  Med Card Reconciled: Yes  Encourage medication compliance  Encourage sodium reduction  Encourage weight loss  Recommend physical activity  Educate on contributory factors  Reduce stress, anxiety,  anger, depression, and/or chronic pain  Encourage home blood pressure monitoring  Recommend daily weights    Education:    Hypertension; verbalizes understanding; Date: 4/1/2025  Coronary Artery Disease; verbalizes understanding; Date: 4/1/2025  Risk Factors; verbalizes understanding; Date: 4/1/2025  Stress; verbalizes understanding; Date: 4/1/2025         Comments:  Patient will be following recommendations to keep BP under good control.  Encouraged for patient to notify staff if experiencing lightheadedness/dizziness.      Does the patient have Heart Failure? No    Other Core Components/Tobacco Cessation Assessment:   Smoking Status: Lifetime Non-smoker  Primary Tobacco Type: N/A  Tobacco Usage: no  Smoking Cessation Barriers:  N/A  Stage of Readiness to Change: Maintenance    Other Core Components/Tobacco Cessation Plan:   Goals:  Maintain non-smoking status    Interventions:  Maintains non-smoking status    Education:    Risk Factors; verbalizes understanding; Date: 4/1/2025         Comments:  Non smoker.    Discussed Cardiac Rehab program in depth with patient.  Medication list updated per patient & marked as reviewed.  Patient has been instructed to notify staff of any problems while attending rehab (ie: chest pain, shortness of breath, lightheadedness, dizziness).  Patient has been instructed to monitor blood pressure readings outside of rehab & to keep a daily log of the readings.  Will contact Dr. Wang regarding prescription for NTG due to diagnosis of stable angina for cardiac rehab.      Keeley Austin RN  Cardiac Rehab Nurse

## 2025-04-26 NOTE — PROGRESS NOTES
Individual Psychotherapy (PhD/LCSW)    4/24/2025    Site:  [facility]         Therapeutic Intervention: Met with patient.  Outpatient - Insight oriented psychotherapy 45 min - CPT code 84527 and Outpatient - Supportive psychotherapy 45 min - CPT Code 53224    Chief complaint/reason for encounter: depression and anxiety     Interval history and content of current session: Patient returned to the clinic today for follow up appointment.  He is wanting to reestablish care in the clinic.  He is familiar to the clinic, as he formerly saw Dr. Diego Blanc for individual therapy.  Discussed stress that he has regarding his living situation.  He lives with two members of his [employer] who are .  They constantly have disagreements, which is anxiety provoking for patient.  Discussed ways for patient to try and set boundaries with his roommates.  He is happy with his new job at [employer].     Treatment plan:  Target symptoms: depression, anxiety   Why chosen therapy is appropriate versus another modality: relevant to diagnosis, patient responds to this modality  Outcome monitoring methods: self-report, observation  Therapeutic intervention type: insight oriented psychotherapy, supportive psychotherapy    Risk parameters:  Patient reports no suicidal ideation  Patient reports no homicidal ideation  Patient reports no self-injurious behavior  Patient reports no violent behavior    Verbal deficits: None    Patient's response to intervention:  The patient's response to intervention is accepting.    Progress toward goals and other mental status changes:  The patient's progress toward goals is fair .    Diagnosis:     ICD-10-CM ICD-9-CM   1. Anxiety  F41.9 300.00   2. Moderate episode of recurrent major depressive disorder  F33.1 296.32       Plan:  individual psychotherapy and medication management by physician    Return to clinic: as scheduled    Length of Service (minutes): 45

## 2025-05-06 ENCOUNTER — TELEPHONE (OUTPATIENT)
Dept: CARDIOLOGY | Facility: CLINIC | Age: 70
End: 2025-05-06
Payer: MEDICARE

## 2025-05-06 DIAGNOSIS — I70.0 AORTIC ATHEROSCLEROSIS: Primary | ICD-10-CM

## 2025-05-06 DIAGNOSIS — I10 PRIMARY HYPERTENSION: ICD-10-CM

## 2025-05-06 DIAGNOSIS — I10 PRIMARY HYPERTENSION: Primary | ICD-10-CM

## 2025-05-06 DIAGNOSIS — I70.0 AORTIC ATHEROSCLEROSIS: ICD-10-CM

## 2025-05-06 NOTE — TELEPHONE ENCOUNTER
Successful contact with patient - reminder to complete fasting labs the morning of the appointment as requested by patient.  Lab appointment scheduled on patient behalf. Pt with good understanding of fasting for labs.

## 2025-05-11 RX ORDER — SERTRALINE HYDROCHLORIDE 50 MG/1
50 TABLET, FILM COATED ORAL
Qty: 90 TABLET | Refills: 1 | Status: SHIPPED | OUTPATIENT
Start: 2025-05-11

## 2025-05-15 ENCOUNTER — DOCUMENTATION ONLY (OUTPATIENT)
Dept: CARDIAC REHAB | Facility: CLINIC | Age: 70
End: 2025-05-15
Payer: MEDICARE

## 2025-05-15 ENCOUNTER — LAB VISIT (OUTPATIENT)
Dept: LAB | Facility: HOSPITAL | Age: 70
End: 2025-05-15
Attending: INTERNAL MEDICINE
Payer: MEDICARE

## 2025-05-15 ENCOUNTER — OFFICE VISIT (OUTPATIENT)
Dept: CARDIOLOGY | Facility: CLINIC | Age: 70
End: 2025-05-15
Payer: MEDICARE

## 2025-05-15 ENCOUNTER — TELEPHONE (OUTPATIENT)
Dept: PHARMACY | Facility: CLINIC | Age: 70
End: 2025-05-15
Payer: MEDICARE

## 2025-05-15 VITALS
HEART RATE: 61 BPM | HEIGHT: 66 IN | BODY MASS INDEX: 25.72 KG/M2 | SYSTOLIC BLOOD PRESSURE: 119 MMHG | OXYGEN SATURATION: 98 % | WEIGHT: 160.06 LBS | DIASTOLIC BLOOD PRESSURE: 63 MMHG

## 2025-05-15 DIAGNOSIS — I70.0 AORTIC ATHEROSCLEROSIS: ICD-10-CM

## 2025-05-15 DIAGNOSIS — I10 PRIMARY HYPERTENSION: ICD-10-CM

## 2025-05-15 DIAGNOSIS — I25.110 ATHEROSCLEROSIS OF NATIVE CORONARY ARTERY OF NATIVE HEART WITH UNSTABLE ANGINA PECTORIS: Primary | ICD-10-CM

## 2025-05-15 LAB
ALBUMIN SERPL BCP-MCNC: 3.9 G/DL (ref 3.5–5.2)
ALP SERPL-CCNC: 69 UNIT/L (ref 40–150)
ALT SERPL W/O P-5'-P-CCNC: 48 UNIT/L (ref 10–44)
ANION GAP (OHS): 6 MMOL/L (ref 8–16)
AST SERPL-CCNC: 26 UNIT/L (ref 11–45)
BILIRUB SERPL-MCNC: 0.6 MG/DL (ref 0.1–1)
BUN SERPL-MCNC: 15 MG/DL (ref 8–23)
CALCIUM SERPL-MCNC: 9.2 MG/DL (ref 8.7–10.5)
CHLORIDE SERPL-SCNC: 105 MMOL/L (ref 95–110)
CHOLEST SERPL-MCNC: 109 MG/DL (ref 120–199)
CHOLEST/HDLC SERPL: 2.1 {RATIO} (ref 2–5)
CO2 SERPL-SCNC: 30 MMOL/L (ref 23–29)
CREAT SERPL-MCNC: 1 MG/DL (ref 0.5–1.4)
GFR SERPLBLD CREATININE-BSD FMLA CKD-EPI: >60 ML/MIN/1.73/M2
GLUCOSE SERPL-MCNC: 142 MG/DL (ref 70–110)
HDLC SERPL-MCNC: 53 MG/DL (ref 40–75)
HDLC SERPL: 48.6 % (ref 20–50)
LDLC SERPL CALC-MCNC: 40.8 MG/DL (ref 63–159)
NONHDLC SERPL-MCNC: 56 MG/DL
POTASSIUM SERPL-SCNC: 4.6 MMOL/L (ref 3.5–5.1)
PROT SERPL-MCNC: 6.8 GM/DL (ref 6–8.4)
SODIUM SERPL-SCNC: 141 MMOL/L (ref 136–145)
TRIGL SERPL-MCNC: 76 MG/DL (ref 30–150)

## 2025-05-15 PROCEDURE — 3078F DIAST BP <80 MM HG: CPT | Mod: CPTII,S$GLB,, | Performed by: INTERNAL MEDICINE

## 2025-05-15 PROCEDURE — 3074F SYST BP LT 130 MM HG: CPT | Mod: CPTII,S$GLB,, | Performed by: INTERNAL MEDICINE

## 2025-05-15 PROCEDURE — 36415 COLL VENOUS BLD VENIPUNCTURE: CPT

## 2025-05-15 PROCEDURE — 1101F PT FALLS ASSESS-DOCD LE1/YR: CPT | Mod: CPTII,S$GLB,, | Performed by: INTERNAL MEDICINE

## 2025-05-15 PROCEDURE — 99213 OFFICE O/P EST LOW 20 MIN: CPT | Mod: S$GLB,,, | Performed by: INTERNAL MEDICINE

## 2025-05-15 PROCEDURE — 3288F FALL RISK ASSESSMENT DOCD: CPT | Mod: CPTII,S$GLB,, | Performed by: INTERNAL MEDICINE

## 2025-05-15 PROCEDURE — 99999 PR PBB SHADOW E&M-EST. PATIENT-LVL IV: CPT | Mod: PBBFAC,,, | Performed by: INTERNAL MEDICINE

## 2025-05-15 PROCEDURE — 82465 ASSAY BLD/SERUM CHOLESTEROL: CPT

## 2025-05-15 PROCEDURE — 3008F BODY MASS INDEX DOCD: CPT | Mod: CPTII,S$GLB,, | Performed by: INTERNAL MEDICINE

## 2025-05-15 PROCEDURE — 1126F AMNT PAIN NOTED NONE PRSNT: CPT | Mod: CPTII,S$GLB,, | Performed by: INTERNAL MEDICINE

## 2025-05-15 PROCEDURE — 80053 COMPREHEN METABOLIC PANEL: CPT

## 2025-05-15 PROCEDURE — 1159F MED LIST DOCD IN RCRD: CPT | Mod: CPTII,S$GLB,, | Performed by: INTERNAL MEDICINE

## 2025-05-15 NOTE — PROGRESS NOTES
Subjective:    Patient ID:  Ladarius Solis is a 69 y.o. male who presents for follow-up of No chief complaint on file.      Referring physician: No ref. provider found     Mr. Solis is a 69 year old man with HTN, fatty liver, arthritis, vertigo, and spinal stenosis of cervical spine presenting for follow up after [facility].    Patient initially referred to interventional cardiology for abnormal TTE stress suggesting myocardial ischemia. He underwent a LHC in February revealing multivessel disease with  of RCA and Left to right collaterals. Plan was made to optimize medical therapy. Patient completed CPX study consistent with deconditioning. He was going to go to Cardiac rehab but got a job at [employer] and has been unable to go. Patient reports less frequent CP and of less severity. He doesn't do anything to treat his CP. He has not changed his diet. Frequency associated with stress. He Describes th CP as a tightness in his chest, no associated SOB or diaphoresis. Occur with rest and activity. Patient reports some SBP of 130-140 at home, but mostly normal.         Past Medical History:   Diagnosis Date    Arthritis     Carpal tunnel syndrome, bilateral     Cervical spinal stenosis 2002    Colon polyps     Fatty liver     Hypertension     Overweight (BMI 25.0-29.9) 8/16/2019    Vertigo     left ear issues       Past Surgical History:   Procedure Laterality Date    ANGIOGRAM, CORONARY, WITH LEFT HEART CATHETERIZATION N/A 2/4/2025    Procedure: Angiogram, Coronary, with Left Heart Cath;  Surgeon: Jb Verma III, MD;  Location: [facility] CATH LAB;  Service: Cardiology;  Laterality: N/A;    ARTHROSCOPIC CHONDROPLASTY OF KNEE JOINT Left 10/17/2018    Procedure: ARTHROSCOPY, KNEE, WITH CHONDROPLASTY;  Surgeon: GRETEL Carr MD;  Location: [facility] OR [address] [place] [address];  Service: Orthopedics;  Laterality: Left;    COLONOSCOPY      COLONOSCOPY N/A 9/25/2017    Procedure: COLONOSCOPY/emr;  Surgeon: Raul August MD;  Location:  [facility] ENDO (2ND FLR);  Service: Endoscopy;  Laterality: N/A;    COLONOSCOPY N/A 3/9/2018    Procedure: COLONOSCOPY;  Surgeon: Raul August MD;  Location: [facility] (2ND FLR);  Service: Endoscopy;  Laterality: N/A;    COLONOSCOPY N/A 3/23/2023    Procedure: COLONOSCOPY;  Surgeon: Aleksander Grimes MD;  Location: [facility] (4TH FLR);  Service: Endoscopy;  Laterality: N/A;  2/13 instructions to portal-st  pre call done- AJ  does not want to come in earlier 3/22 EB    EPIDURAL STEROID INJECTION N/A 11/4/2021    Procedure: INJECTION, STEROID, EPIDURAL, C7-T1  NEED CONSENT;  Surgeon: Bozena Mena MD;  Location: [facility] PAIN MGT;  Service: Pain Management;  Laterality: N/A;    ESOPHAGOGASTRODUODENOSCOPY N/A 9/23/2019    Procedure: EGD (ESOPHAGOGASTRODUODENOSCOPY);  Surgeon: Dyllan Maloney MD;  Location: [facility];  Service: General;  Laterality: N/A;    INJECTION OF FACET JOINT Bilateral 8/6/2021    Procedure: FACET JOINT INJECTION BILATERAL L4/5 AND L5/S1 DIRECT REFERRAL NEEDS CONSENT;  Surgeon: Jasiel Aviles MD;  Location: [facility] PAIN MGT;  Service: Pain Management;  Laterality: Bilateral;    KNEE ARTHROSCOPY W/ MENISCECTOMY Left 10/17/2018    Procedure: ARTHROSCOPY, KNEE, WITH MENISCECTOMY;  Surgeon: GRETEL Carr MD;  Location: [facility] OR 2ND FLR;  Service: Orthopedics;  Laterality: Left;  regional w/catheter adductor  Dimitry/Linemiliano notified 10-12 LO    REPAIR OF RETINAL DETACHMENT WITH VITRECTOMY Right 4/4/2023    Procedure: REPAIR, RETINAL DETACHMENT, WITH VITRECTOMY;  Surgeon: True Arroyo MD;  Location: [facility] OR 1ST FLR;  Service: Ophthalmology;  Laterality: Right;    REPAIR OF RETINAL DETACHMENT WITH VITRECTOMY Right 4/18/2023    Procedure: REPAIR, RETINAL DETACHMENT, WITH VITRECTOMY;  Surgeon: True Arroyo MD;  Location: [facility] OR 1ST FLR;  Service: Ophthalmology;  Laterality: Right;  Scleral Buckle       Medications Ordered Prior to Encounter[1]    Review of patient's allergies indicates:  No  "Known Allergies    Social History[2]    Family History   Problem Relation Name Age of Onset    No Known Problems Mother      Arthritis Father      Dementia Father      Heart disease Father      No Known Problems Brother      Diabetes Neg Hx      Cirrhosis Neg Hx           Review of Systems   Constitutional: Negative for chills and fever.   Cardiovascular:  Positive for chest pain. Negative for dyspnea on exertion, orthopnea and palpitations.   Respiratory:  Negative for shortness of breath.    Psychiatric/Behavioral:  The patient is nervous/anxious.         Objective:     Vitals:    05/15/25 1027 05/15/25 1028   BP: 119/62 119/63   BP Location: Right arm Left arm   Patient Position: Sitting Sitting   Pulse: 62 61   SpO2: 98% 98%   Weight: 72.6 kg (160 lb 0.9 oz)    Height: 5' 6" (1.676 m)         Physical Exam  Constitutional:       General: He is not in acute distress.  Eyes:      General: No scleral icterus.  Cardiovascular:      Rate and Rhythm: Normal rate and regular rhythm.      Heart sounds: No murmur heard.  Pulmonary:      Effort: Pulmonary effort is normal. No respiratory distress.   Abdominal:      General: There is no distension.      Palpations: Abdomen is soft.   Musculoskeletal:      Right lower leg: Edema present.      Left lower leg: Edema present.   Skin:     General: Skin is warm and dry.   Neurological:      Mental Status: He is alert.           Lab Results   Component Value Date    WBC 6.53 03/17/2025    HGB 14.5 03/17/2025    HCT 44.3 03/17/2025    MCV 93 03/17/2025     03/17/2025       CMP  Sodium   Date Value Ref Range Status   01/13/2025 139 136 - 145 mmol/L Final     Potassium   Date Value Ref Range Status   01/13/2025 4.8 3.5 - 5.1 mmol/L Final     Chloride   Date Value Ref Range Status   01/13/2025 104 95 - 110 mmol/L Final     CO2   Date Value Ref Range Status   01/13/2025 27 23 - 29 mmol/L Final     Glucose   Date Value Ref Range Status   01/13/2025 122 (H) 70 - 110 mg/dL Final "     BUN   Date Value Ref Range Status   01/13/2025 21 8 - 23 mg/dL Final     Creatinine   Date Value Ref Range Status   02/04/2025 0.9 0.5 - 1.4 mg/dL Final     Calcium   Date Value Ref Range Status   01/13/2025 9.9 8.7 - 10.5 mg/dL Final     Total Protein   Date Value Ref Range Status   04/12/2024 6.3 6.0 - 8.4 g/dL Final     Albumin   Date Value Ref Range Status   04/12/2024 3.8 3.5 - 5.2 g/dL Final     Total Bilirubin   Date Value Ref Range Status   04/12/2024 0.7 0.1 - 1.0 mg/dL Final     Comment:     For infants and newborns, interpretation of results should be based  on gestational age, weight and in agreement with clinical  observations.    Premature Infant recommended reference ranges:  Up to 24 hours.............<8.0 mg/dL  Up to 48 hours............<12.0 mg/dL  3-5 days..................<15.0 mg/dL  6-29 days.................<15.0 mg/dL       Alkaline Phosphatase   Date Value Ref Range Status   04/12/2024 58 55 - 135 U/L Final     AST   Date Value Ref Range Status   04/12/2024 22 10 - 40 U/L Final     ALT   Date Value Ref Range Status   04/12/2024 33 10 - 44 U/L Final     Anion Gap   Date Value Ref Range Status   01/13/2025 8 8 - 16 mmol/L Final     eGFR   Date Value Ref Range Status   02/04/2025 >60.0 >60 mL/min/1.73 m^2 Final     Lab Results   Component Value Date    CHOL 142 03/17/2025    CHOL 178 04/12/2024    CHOL 174 02/06/2019      Lab Results   Component Value Date    HDL 43 03/17/2025    HDL 46 04/12/2024    HDL 44 02/06/2019     Lab Results   Component Value Date    LDLCALC 75.0 03/17/2025    LDLCALC 103.2 04/12/2024    LDLCALC 102.2 02/06/2019      Lab Results   Component Value Date    TRIG 120 03/17/2025    TRIG 144 04/12/2024    TRIG 139 02/06/2019     Lab Results   Component Value Date    TOTALCHOLEST 3.3 03/17/2025    TOTALCHOLEST 3.9 04/12/2024    TOTALCHOLEST 4.0 02/06/2019     Lab Results   Component Value Date    NONHDLCHOL 99 03/17/2025    NONHDLCHOL 132 04/12/2024    NONHDLCHOL 130  02/06/2019     Lab Results   Component Value Date    CHOLHDL 30.3 03/17/2025    CHOLHDL 25.8 04/12/2024    CHOLHDL 25.3 02/06/2019         Lab Results   Component Value Date    HGBA1C 5.9 (H) 04/12/2024           3/17/25 CPX:    Mild functional impairment associated with a normal breathing reserve, normal oxygen stauration, poor effort, and a normal AT. These findings are indicative of functional impairment secondary to poor effort, deconditioning.    The ECG portion of this study is negative for myocardial ischemia.    The patient's exercise capacity was mildly impaired.    There were no arrhythmias during stress.    There was no ST segment deviation noted during stress.    2/4/25 LHC:  Left Main   The vessel was visualized by angiography. There is minimal diffuse disease throughout the vessel.      Left Anterior Descending   There is mild diffuse disease throughout the vessel.   Mid LAD lesion was 30% stenosed.   Dist LAD lesion was 70% stenosed.      First Diagonal Branch   1st Diag lesion was 70% stenosed.      Left Circumflex   The vessel was visualized by angiography.      First Obtuse Marginal Branch   1st Mrg lesion was 55% stenosed.      Right Coronary Artery   The vessel was visualized by angiography.   Collaterals   Dist RCA filled by collaterals from Mid RCA.      Mid RCA to Dist RCA lesion was 100% stenosed.      Right Posterior Atrioventricular Artery   Collaterals   RPAV filled by collaterals from Dist Cx.          11/2024 TTE Stress:    Left Ventricle: The left ventricle is normal in size. Normal wall thickness. There is normal systolic function with a visually estimated ejection fraction of 60 - 65%. There is normal diastolic function.    Right Ventricle: Normal right ventricular cavity size. Wall thickness is normal. Systolic function is normal.    Aortic Valve: There is mild aortic valve sclerosis.  There appears to be partial fusion of the right and left coronary cusp leaflets.    Pulmonary  Artery: The estimated pulmonary artery systolic pressure is 19 mmHg.    IVC/SVC: Normal venous pressure at 3 mmHg.    Stress Protocol: The patient exercised for 6 minutes 1 seconds on a high ramp protocol, corresponding to a functional capacity of 9METS, achieving a peak heart rate of 116 bpm, which is 75% of the age predicted maximum heart rate. Their exercise capacity was normal. The patient reported lower neck pain 10/10 during the stress test. The test was stopped because the patient experienced shortness of breath.    Baseline ECG: The Baseline ECG reveals sinus rhythm. The axis is normal. The ST segments are normal.    Stress ECG: There are no arrhythmias during stress. There is normal blood pressure response with stress.    ECG Conclusion: The ECG portion of the study is positive for ischemia.    Post-stress Conclusion: The study is negative with no echocardiographic evidence of stress induced ischemia.    Overall, this study is suggestive of myocardial ischemia, possibly with prior infarct, in the RCA territory.  Exercise capacity is below average, neck pain was reproduced with exertion, and the ECG was abnormal at stress.    Assessment:       No diagnosis found.     Plan:       Mr. Solis is a 69 year old man with HTN, fatty liver, arthritis, vertigo, and spinal stenosis of cervical spine presenting for follow up after [facility].    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 We do not recommend coronary intervention in this clinical context and instead recommend maximizing medical therapy. Patient can return to general cardiology for follow up with Dr. Wang.           Plan:  - continue crestor 20mg  - continue ASA 81mg  - Valsartan-HCTZ 160-12.5mg  - General cardiology follow up for medical management of CAD in 3 months                [1]   Current Outpatient Medications on File Prior to Visit   Medication Sig Dispense Refill    aspirin (ECOTRIN) 81 MG EC tablet Take 1  tablet (81 mg total) by mouth once daily. 90 tablet 3    rosuvastatin (CRESTOR) 5 MG tablet Take 1 tablet (5 mg total) by mouth once daily. (Patient taking differently: Take 20 mg by mouth once daily.) 90 tablet 3    sertraline (ZOLOFT) 50 MG tablet Take 1 tablet by mouth once daily 90 tablet 1    valsartan-hydrochlorothiazide (DIOVAN-HCT) 160-12.5 mg per tablet Take 1 tablet by mouth once daily. 90 tablet 1     Current Facility-Administered Medications on File Prior to Visit   Medication Dose Route Frequency Provider Last Rate Last Admin    sodium hyaluronate (EUFLEXXA) 10 mg/mL(mw 2.4 -3.6 million) Syrg 20 mg  20 mg Intra-articular  Bruno, GRETEL Cm MD   20 mg at 05/10/18 6097   [2]   Social History  Tobacco Use    Smoking status: Never    Smokeless tobacco: Never   Substance Use Topics    Alcohol use: No    Drug use: No

## 2025-05-15 NOTE — PROGRESS NOTES
Mr. Durand has completed 1 out of 36 exercise session of Phase II cardiac rehab.  He decided not to attend the rehab program because he had to return to work.    Session: Orientation     Cardiac Rehab Individual Treatment Plan - Initial Assessment      Patient Name: Ladarius Solis MRN: [id number]   : 1955   Age: 69 y.o.   Primary Diagnosis: STABLE ANGINA  Date of Event: 25  EF: 60-65%  Risk Stratification: moderate  Referring Physician: CASH   Exercise Assessment:     CPX/TM Date: 3-17-25 Results   RHR 59   Max    Peak VO2 (CPX only) 24.1   Actual METS (CPX only) 6.9   Estimated METS 10.0     Anthropometrics    Height 66 inches   Weight 158 lbs   BMI 25.5   Abdominal Girth 36.2   Body Composition 12.8%     ST Depression noted on Stress Test?:No  Angina with exercise?: No   Fall Risk: Yes   Assistive Devices:  independent   Currently exercising? Yes: Walking; Frequency: 5 days per week; Duration 10 to 15 minutes  Mr. Durand stated there were no limitations to exercise.     Exercise Plan:   Goals:  CR Exercise Goals: Attend Cardiac Rehab 3 times/week: In Progress  Home Aerobic Exercise: 2 additional days/week for 30-60 minutes: In Progress  Intensity of 12-15 on the Rate of Perceived Exertion (RPE) scale: In Progress  30% increase in entry estimated METS: 13.0 : In Progress  5 days/week for 30-60 minutes: In Progress  Demonstrate proper pulse taking technique: In Progress    Intervention:   Discussed importance of regular attendance to cardiac rehab class    Exercise Prescription:  THR Range    Mode: Treadmill  Recumbent Bike  Upright Bike  Nustep  Elliptical   Frequency:  3 days/week   Duration:  30 - 60 minutes   Intensity:  12 - 15 RPE   Resistance Training:  Yes: 5 lb weights with 10-15 reps based on strength and range of motion assessment     Home Prescription:  Mode Aerobic   Frequency: 2- 3 days/week   Duration: 30-60 minutes   Resistance Training: None        Education:  Orientation  to Equipment; verbalizes understanding; Date: 3-25-25  Exercise Recommendations; verbalizes understanding; Date: 3-25-25  Exercise Safety; verbalizes understanding; Date: 3-25-25  Class Preparation: verbalizes understanding; Date: 3-25-25  Signs and symptoms to report: verbalizes understanding; Date: 3-25-25  Caffeine/Hydration: verbalizes understanding; Date: 3-25-25  Exercise Terminology: verbalizes understanding; Date: 3-25-25  Resistance Training: verbalizes understanding; Date: 3-25-25    Comments:  I encouraged Mr. Durand to continue exercising at least 2 non-rehab days per week for at least 30 minutes in addition to attending Phase II cardiac rehab classes 3 days per week.  He stated understanding.    All consent forms were signed, proper attire and shoes were discussed.       Mr. Durand will begin Cardiac Rehab on  at 3:30 pm.    The exercise prescription will be adjusted based on tolerance of exercise intensity by patient.    Ginger Prado, CEP    Orientation   Cardiac Rehab Individual Treatment Plan - Initial Assessment      Patient Name: Ladarius Solis MRN: [id number]   : 1955   Age: 69 y.o.   Primary Diagnosis: stable angina, CAD, HTN    Nutrition Assessment:     Anthropometrics    Height 66 inches   Weight 158 lbs   BMI 25..5   Abdominal Girth 36.2   Body Composition 12.8       Drug Allergies and Intolerances:  Review of patient's allergies indicates:  No Known Allergies    Food Allergies and Intolerances:  NA    Past Medical History:  Past Medical History:   Diagnosis Date    Arthritis     Carpal tunnel syndrome, bilateral     Cervical spinal stenosis     Colon polyps     Fatty liver     Hypertension     Overweight (BMI 25.0-29.9) 2019    Vertigo     left ear issues       Past Surgical History:  Past Surgical History:   Procedure Laterality Date    ANGIOGRAM, CORONARY, WITH LEFT HEART CATHETERIZATION N/A 2025    Procedure: Angiogram, Coronary, with Left  Heart Cath;  Surgeon: Jb Verma III, MD;  Location: [facility] CATH LAB;  Service: Cardiology;  Laterality: N/A;    ARTHROSCOPIC CHONDROPLASTY OF KNEE JOINT Left 10/17/2018    Procedure: ARTHROSCOPY, KNEE, WITH CHONDROPLASTY;  Surgeon: GRETEL Carr MD;  Location: [facility] OR 2ND FLR;  Service: Orthopedics;  Laterality: Left;    COLONOSCOPY      COLONOSCOPY N/A 9/25/2017    Procedure: COLONOSCOPY/emr;  Surgeon: Raul August MD;  Location: [facility] (2ND FLR);  Service: Endoscopy;  Laterality: N/A;    COLONOSCOPY N/A 3/9/2018    Procedure: COLONOSCOPY;  Surgeon: Raul August MD;  Location: [facility] (2ND FLR);  Service: Endoscopy;  Laterality: N/A;    COLONOSCOPY N/A 3/23/2023    Procedure: COLONOSCOPY;  Surgeon: Aleksander Grimes MD;  Location: [facility] (4TH FLR);  Service: Endoscopy;  Laterality: N/A;  2/13 instructions to portal-st  pre call done- AJ  does not want to come in earlier 3/22 EB    EPIDURAL STEROID INJECTION N/A 11/4/2021    Procedure: INJECTION, STEROID, EPIDURAL, C7-T1  NEED CONSENT;  Surgeon: Bozena Mena MD;  Location: [facility] PAIN MGT;  Service: Pain Management;  Laterality: N/A;    ESOPHAGOGASTRODUODENOSCOPY N/A 9/23/2019    Procedure: EGD (ESOPHAGOGASTRODUODENOSCOPY);  Surgeon: Dyllan Maloney MD;  Location: [facility];  Service: General;  Laterality: N/A;    INJECTION OF FACET JOINT Bilateral 8/6/2021    Procedure: FACET JOINT INJECTION BILATERAL L4/5 AND L5/S1 DIRECT REFERRAL NEEDS CONSENT;  Surgeon: Jasiel Aviles MD;  Location: [facility] PAIN MGT;  Service: Pain Management;  Laterality: Bilateral;    KNEE ARTHROSCOPY W/ MENISCECTOMY Left 10/17/2018    Procedure: ARTHROSCOPY, KNEE, WITH MENISCECTOMY;  Surgeon: GRETEL Carr MD;  Location: [facility] OR 2ND FLR;  Service: Orthopedics;  Laterality: Left;  regional w/catheter adductor  Dimitry/[employer] notified 10-12 LO    REPAIR OF RETINAL DETACHMENT WITH VITRECTOMY Right 4/4/2023    Procedure: REPAIR, RETINAL DETACHMENT, WITH VITRECTOMY;   Surgeon: True Arroyo MD;  Location: [address];  Service: Ophthalmology;  Laterality: Right;    REPAIR OF RETINAL DETACHMENT WITH VITRECTOMY Right 4/18/2023    Procedure: REPAIR, RETINAL DETACHMENT, WITH VITRECTOMY;  Surgeon: True Arroyo MD;  Location: [facility] OR [address] [place] [address];  Service: Ophthalmology;  Laterality: Right;  Scleral Buckle       Medications:  Current Outpatient Medications   Medication Sig    aspirin (ECOTRIN) 81 MG EC tablet Take 1 tablet (81 mg total) by mouth once daily.    rosuvastatin (CRESTOR) 5 MG tablet Take 1 tablet (5 mg total) by mouth once daily. (Patient taking differently: Take 20 mg by mouth once daily.)    sertraline (ZOLOFT) 50 MG tablet Take 1 tablet by mouth once daily    valsartan-hydrochlorothiazide (DIOVAN-HCT) 160-12.5 mg per tablet Take 1 tablet by mouth once daily.     No current facility-administered medications for this visit.     Facility-Administered Medications Ordered in Other Visits   Medication    sodium hyaluronate (EUFLEXXA) 10 mg/mL(mw 2.4 -3.6 million) Syrg 20 mg       Vitamins and Supplements:  NA    Labs:  Patient confirms he is taking Crestor 5mg for cholesterol control.    Lab Results   Component Value Date    CHOL 142 03/17/2025     Lab Results   Component Value Date    HDL 43 03/17/2025     Lab Results   Component Value Date    LDLCALC 75.0 03/17/2025     Lab Results   Component Value Date    TRIG 120 03/17/2025     Lab Results   Component Value Date    CHOLHDL 30.3 03/17/2025         Lab Results   Component Value Date    GLUF 115 (H) 03/17/2025     Lab Results   Component Value Date    HGBA1C 5.9 (H) 04/12/2024       Nutrition/Diet History:  Patient eats 2 meals daily.    Seasons food with various seasoning/salt (roommate cooks).  Patient denies use of a salt shaker at the table on prepared foods.   Dines out 1-2 per week at restaurants.    Chooses fried foods 2-3 time(s) per month.    Chooses fish rarely   Beverages:  water and diet tea, sweet  tea, coke occasionally  Alcohol: none    24 Hour Recall:  Breakfast: pepperoni pizza slice-made sandwich with honey wheat and ketchup  Lunch:skipped  Dinner:navy beans with sausage and white rice  Other: sweet tea throughout the day    Difficulty Chewing or Swallowing: Yes- ill fitting partial dentures so not wearing, pt does not have dental insurance and current  Current Exercise: See Exercise Physiologist Note  Food Safety/Food Preparation: roommate   Living Arrangements/Family Support: Lives with friend  Cultural/Spiritual/Personal Preferences: not applicable   Barriers to Education: none identified  Stage of Change Related to Diet Habits: Action    Nutrition Diagnosis:  Food and nutrition related knowledge deficit related to the lack of prior nutrition education as evidenced by diet history and 24 hour recall    Nutrition Plan:   Goals:  LDL-C < 70 (for high risk patients)  Hgb A1c < 7%  BMI < 25 and abdominal girth < 40M/<35 F  2 gram sodium, Mediterranean diet  Rehab weight goal: -5  Fish intake (non-fried varieties) to a goal of 2-3 servings per week.   Increase fruit and vegetable intake    Interventions/Recommendations:  Lab results reviewed and discussed  Nutrition Prescription:  Total Energy Estimated Needs: 9816-6914 Kcal/d for weight loss  Method for Estimating Needs: 25-28kcal/kg  Total Protein Estimated Needs: 72-93 g/d  Method for Estimating Needs: 1-1.3 g/Kg BW  Total Fluid Estimated Needs: 1 mL/Kcal  Dietitian Consult: No  Patient to participate in Cardiac Rehab sessions three times a week  Weekly Dietitian Weight Check  Encouraged patient to complete 3 day food diary  Follow Up Plan for Ongoing Self-Management Support    Education:  Mediterranean Diet; verbalizes understanding; Date: 4/1/25, Food Labels  Person taught: patient  Preferred Learning Method: Verbal, Written  Education Needed/Provided: Nutrition counseling and education related to cardiac rehabilitation  Education Method: Weekly  nutrition lectures on the Mediterranean diet, cooking, shopping, and dining out  Written Materials Provided: 3 Day Food Record, Introduction to Mediterranean Diet  Strategies Implemented: Motivational interviewing, Goal setting, Self-Monitoring, and Problem Solving    Comments:   Discussed ways to incorporate healthy snacks, eating on a schedule, and monitoring sodium intake for heart health.  Pt motivated and asks good questions- lives with couple who with wife who cooks meals and often prepares high sodium and red meat meals. Discussed easy alternatives to make modifications and substitutions. Reviewed label reading and provided handout    Diabetes  Is the patient diabetic? No      RD contact information provided.      Stephanie Landaverde MS, RDN/LDN    Session: Orientation   Cardiac Rehab Individual Treatment Plan - Initial Assessment      Patient Name: Ladarius Solis MRN: [id number]   : 1955   Age: 69 y.o.   Date of Event: 2025   Primary Diagnosis: Stable angina    EF: 60-65%    Physical Assessment:   There were no vitals taken for this visit.    ASSESSMENT:  Heart Sounds: regular rate and rhythm  Prosthetic Valve: No  Lung Sounds: normal air entry, lungs clear to auscultation  Capillary Refill: normal  Left Radial Pulse: Normal (+2)  Right Radial Pulse: Normal (+2)  Left Pedal Pulse: Normal (+2)  Right Pedal Pulse: Normal (+2)  Right Edema: none  Left Edema none  Strength: normal  Range of Motion: full range of motion  Existing Limitations:      Site   [] Arthritis, bursitis    [] Amputation, atrophy    [x] Other: Neck/back/chest pain continuously   []       Diabetic patient's foot examination comments: N/A  Incisional site: N/A  Special needs: cervical stenosis    Psychosocial Assessment:   Outcome Survey Tools:    BOO SCORES:               SF-36             PHQ-9:      2025     8:17 AM   PHQ-9 Depression Patient Health Questionnaire   Over the last two weeks how often have you been bothered  by little interest or pleasure in doing things 0   Over the last two weeks how often have you been bothered by feeling down, depressed or hopeless 0   Over the last two weeks how often have you been bothered by trouble falling or staying asleep, or sleeping too much 0   Over the last two weeks how often have you been bothered by feeling tired or having little energy 0   Over the last two weeks how often have you been bothered by a poor appetite or overeating 0   Over the last two weeks how often have you been bothered by feeling bad about yourself - or that you are a failure or have let yourself or your family down 0   Over the last two weeks how often have you been bothered by trouble concentrating on things, such as reading the newspaper or watching television 0   Over the last two weeks how often have you been bothered by moving or speaking so slowly that other people could have noticed. 0   Over the last two weeks how often have you been bothered by thoughts that you would be better off dead, or of hurting yourself 0   If you checked off any problems, how difficult have these problems made it for you to do your work, take care of things at home or get along with other people? Not difficult at all   PHQ-9 Score 0              Living Arrangements: has roommates that are  & wife  Family Support: roommates  Self Reported: Anxiety and Stress  Displays: calmness  Medication: pt takes Zoloft    Psychosocial Plan:   Goals:  Improved psychosocial coping strategies  Reduce manifestation of anxiety  Reduce manifestation of stress  Reduce manifestation of chronic pain  Maintain positive support system  Maintain positive outlook  Improve overall quality of life    Interventions/Recommendations:  Discussed Results of Surveys  Patient to Self Report Emotional Changes at Session Check In  Recommend Physical Activity  Recommend Attending Education Lectures  Notify MD: Yes  Program Referral: Yes  Pharmaceutical  Intervention/Therapy: Yes  Other Needs: not applicable  Stage of Readiness to Change: Contemplation    Education:  Coping Techniques; demonstrated understanding; Date: 4/1/2025  Stress Management; verbalizes understanding; Date: 4/1/2025  Stress; verbalizes understanding; Date: 4/1/2025    Comments:  Patient reports that he does experience stress at home.  He does have roommates that are  & states that he sometimes has issues with the wife.  He states that she cannot handle her anger & takes it out on him & her .  He states that his coping mechanism is to simply leave the situation.  States that he has seen someone in Psychiatry (Dr. Blanc) in the past, but the doctor passed away & he hasn't seen anyone else since then.  Will put referral in for psychiatry.  He states that he feels this will help him.  Is currently taking Zoloft & states that he has been taking it for some time.  Encouraged for patient to call some time this week after referral is place in today.      Other Core Components/Risk Factors Assessment:   RISK FACTORS:  hyperlipidemia, positive family history, stress, pre-diabetes.      Learning Barriers: None    Education Level:  Attended College/Technical School    Pre-test Score: 80%    Medication Compliance: has been compliant with taking medications    Other Core Components/Risk Factors Plan:   Goals:  Decrease cholesterol level: In Progress  Increase exercise tolerance: In Progress  Increase knowledge of CAD: In Progress  Decrease blood pressure: In Progress  Weight loss: In Progress  Demonstrate accurate pulse taking: In Progress  Identify and manage personal areas of stress: In Progress  Control diabetes by adjusting diet and exercise: In Progress  Learn more about healthy eating: In Progress    Interventions/Recommendations:  Recommend regular attendance for Cardiac Rehab: Exercise and Education Lectures  Encourage medication compliance  Individual Education/ Counseling:  Yes  Physician Referral: No    Education:    cholesterol, verbalizes understanding; Date: 4/1/2025  diabetes, verbalizes understanding; Date: 4/1/2025  fluid overload/CHF, verbalizes understanding; Date: 4/1/2025  hypertension, verbalizes understanding; Date: 4/1/2025  physical activity, verbalizes understanding; Date: 4/1/2025  risk factors, verbalizes understanding; Date: 4/1/2025  sodium, verbalizes understanding; Date: 4/1/2025  stress, verbalizes understanding; Date: 4/1/2025        Education method adapted to patients education level and preferred method of learning.  Method: explanation    Comments:  Patient will be working on decreasing risk factors for CAD.  He plans to attend lectures/exercise sessions on a consistent basis.  He states that weight loss is a goal, however BMI is at 25.2.  Will refer patient to psychiatry due to stress/anxiety.  Has seen someone in the past & plans to get reestablished with someone else.  He is considered to be pre-diabetic.  Instructed patient to notify staff of any symptoms that blood glucose is dropping(ie: shakiness, lightheaded, diaphoresis).      Other Core Components/Hypertension Assessment:   Resting BP:   BP Readings from Last 1 Encounters:   04/01/25 92/64     BP Diagnosis: Hypertensive  Patient reported symptoms: none    Medications:  Medication Prescribed? Adherent? Exception   Beta-blocker []Yes  [x]No  []Unknown []Yes  []No  []Unknown    ACEI/ARB [x]Yes  []No  []Unknown [x]Yes  []No  []Unknown Combination med.   Calcium Channel Blocker []Yes  [x]No  []Unknown []Yes  []No  []Unknown    Diuretic [x]Yes  []No  []Unknown [x]Yes  []No  []Unknown        Other Core Components/Hypertension Plan:   Goals:  Blood Pressure <130/80    Interventions/Recommendations:  Med Card Reconciled: Yes  Encourage medication compliance  Encourage sodium reduction  Encourage weight loss  Recommend physical activity  Educate on contributory factors  Reduce stress, anxiety, anger,  depression, and/or chronic pain  Encourage home blood pressure monitoring  Recommend daily weights    Education:    Hypertension; verbalizes understanding; Date: 4/1/2025  Coronary Artery Disease; verbalizes understanding; Date: 4/1/2025  Risk Factors; verbalizes understanding; Date: 4/1/2025  Stress; verbalizes understanding; Date: 4/1/2025        Comments:  Patient will be following recommendations to keep BP under good control.  Encouraged for patient to notify staff if experiencing lightheadedness/dizziness.      Does the patient have Heart Failure? No    Other Core Components/Tobacco Cessation Assessment:   Smoking Status: Lifetime Non-smoker  Primary Tobacco Type: N/A  Tobacco Usage: no  Smoking Cessation Barriers: N/A  Stage of Readiness to Change: Maintenance    Other Core Components/Tobacco Cessation Plan:   Goals:  Maintain non-smoking status    Interventions:  Maintains non-smoking status    Education:    Risk Factors; verbalizes understanding; Date: 4/1/2025        Comments:  Non smoker.    Discussed Cardiac Rehab program in depth with patient.  Medication list updated per patient & marked as reviewed.  Patient has been instructed to notify staff of any problems while attending rehab (ie: chest pain, shortness of breath, lightheadedness, dizziness).  Patient has been instructed to monitor blood pressure readings outside of rehab & to keep a daily log of the readings.  Will contact Dr. Wang regarding prescription for NTG due to diagnosis of stable angina for cardiac rehab.      Keeley Austin RN  Cardiac Rehab Nurse

## 2025-05-15 NOTE — TELEPHONE ENCOUNTER
[facility]Population [facility] Chart Review & Patient Outreach Details For Medication Adherence Project    Reason for Outreach Encounter: 3rd Party payor non-compliance report ([employer], [employer], [employer], etc)  2.  Patient Outreach Method: Reviewed patient chart   3.   Medication in question:    Hyperlipidemia Medications              rosuvastatin (CRESTOR) 5 MG tablet Take 1 tablet (5 mg total) by mouth once daily.                  LF 30 ds 5/11/25    4.  Reviewed and or Updates Made To: Patient Chart  5. Outreach Outcomes and/or actions taken: Patient filled medication and is on track to be adherent  Additional Notes:

## 2025-06-05 ENCOUNTER — DOCUMENTATION ONLY (OUTPATIENT)
Dept: CARDIAC REHAB | Facility: CLINIC | Age: 70
End: 2025-06-05
Payer: MEDICARE

## 2025-06-11 ENCOUNTER — PATIENT MESSAGE (OUTPATIENT)
Dept: ADMINISTRATIVE | Facility: OTHER | Age: 70
End: 2025-06-11
Payer: MEDICARE

## 2025-06-12 ENCOUNTER — TELEPHONE (OUTPATIENT)
Dept: PHARMACY | Facility: CLINIC | Age: 70
End: 2025-06-12
Payer: MEDICARE

## 2025-06-12 NOTE — TELEPHONE ENCOUNTER
[facility]Population [facility] Chart Review & Patient Outreach Details For Medication Adherence Project    Reason for Outreach Encounter: 3rd Party payor non-compliance report ([employer], [employer], C, etc)  2.  Patient Outreach Method: Reviewed Patient Chart  3.   Medication in question: valsartan/hctz   LAST FILLED: 6/3/25 for 90 day supply  Hypertension Medications              valsartan-hydrochlorothiazide (DIOVAN-HCT) 160-12.5 mg per tablet Take 1 tablet by mouth once daily.              4.  Reviewed and or Updates Made To: Patient Chart  5. Outreach Outcomes and/or actions taken: Patient filled medication and is on track to be adherent

## 2025-06-13 ENCOUNTER — TELEPHONE (OUTPATIENT)
Dept: PHARMACY | Facility: CLINIC | Age: 70
End: 2025-06-13
Payer: MEDICARE

## 2025-06-13 NOTE — TELEPHONE ENCOUNTER
[facility] Chart Review & Patient Outreach Details For Medication Adherence Project    Reason for Outreach Encounter: 3rd Party payor non-compliance report ([employer], [employer], C, etc)  2.  Patient Outreach Method: Reviewed patient chart   3.   Medication in question:    Hypertension Medications              valsartan-hydrochlorothiazide (DIOVAN-HCT) 160-12.5 mg per tablet Take 1 tablet by mouth once daily.                 LF 90d s 6/3/25    4.  Reviewed and or Updates Made To: Patient Chart  5. Outreach Outcomes and/or actions taken: Patient filled medication and is on track to be adherent  Additional Notes:

## 2025-06-26 ENCOUNTER — DOCUMENTATION ONLY (OUTPATIENT)
Dept: CARDIAC REHAB | Facility: CLINIC | Age: 70
End: 2025-06-26
Payer: MEDICARE

## 2025-06-26 NOTE — PROGRESS NOTES
Mr. Durand has completed 1 out of 36 exercise session of Phase II cardiac rehab.  He decided not to attend the rehab program because he had to return to work.    Session: Orientation     Cardiac Rehab Individual Treatment Plan - Initial Assessment      Patient Name: Ladarius Solis MRN: [id number]   : 1955   Age: 69 y.o.   Primary Diagnosis: STABLE ANGINA  Date of Event: 25  EF: 60-65%  Risk Stratification: moderate  Referring Physician: CASH   Exercise Assessment:     CPX/TM Date: 3-17-25 Results   RHR 59   Max    Peak VO2 (CPX only) 24.1   Actual METS (CPX only) 6.9   Estimated METS 10.0     Anthropometrics    Height 66 inches   Weight 158 lbs   BMI 25.5   Abdominal Girth 36.2   Body Composition 12.8%     ST Depression noted on Stress Test?:No  Angina with exercise?: No   Fall Risk: Yes   Assistive Devices:  independent   Currently exercising? Yes: Walking; Frequency: 5 days per week; Duration 10 to 15 minutes  Mr. Durand stated there were no limitations to exercise.     Exercise Plan:   Goals:  CR Exercise Goals: Attend Cardiac Rehab 3 times/week: In Progress  Home Aerobic Exercise: 2 additional days/week for 30-60 minutes: In Progress  Intensity of 12-15 on the Rate of Perceived Exertion (RPE) scale: In Progress  30% increase in entry estimated METS: 13.0 : In Progress  5 days/week for 30-60 minutes: In Progress  Demonstrate proper pulse taking technique: In Progress    Intervention:   Discussed importance of regular attendance to cardiac rehab class    Exercise Prescription:  THR Range    Mode: Treadmill  Recumbent Bike  Upright Bike  Nustep  Elliptical   Frequency:  3 days/week   Duration:  30 - 60 minutes   Intensity:  12 - 15 RPE   Resistance Training:  Yes: 5 lb weights with 10-15 reps based on strength and range of motion assessment     Home Prescription:  Mode Aerobic   Frequency: 2- 3 days/week   Duration: 30-60 minutes   Resistance Training: None        Education:  Orientation  to Equipment; verbalizes understanding; Date: 3-25-25  Exercise Recommendations; verbalizes understanding; Date: 3-25-25  Exercise Safety; verbalizes understanding; Date: 3-25-25  Class Preparation: verbalizes understanding; Date: 3-25-25  Signs and symptoms to report: verbalizes understanding; Date: 3-25-25  Caffeine/Hydration: verbalizes understanding; Date: 3-25-25  Exercise Terminology: verbalizes understanding; Date: 3-25-25  Resistance Training: verbalizes understanding; Date: 3-25-25    Comments:  I encouraged Mr. Durand to continue exercising at least 2 non-rehab days per week for at least 30 minutes in addition to attending Phase II cardiac rehab classes 3 days per week.  He stated understanding.    All consent forms were signed, proper attire and shoes were discussed.       Mr. Durand will begin Cardiac Rehab on  at 3:30 pm.    The exercise prescription will be adjusted based on tolerance of exercise intensity by patient.    Ginger Prado, CEP    Orientation   Cardiac Rehab Individual Treatment Plan - Initial Assessment      Patient Name: Ladarius Solis MRN: [id number]   : 1955   Age: 69 y.o.   Primary Diagnosis: stable angina, CAD, HTN    Nutrition Assessment:     Anthropometrics    Height 66 inches   Weight 158 lbs   BMI 25..5   Abdominal Girth 36.2   Body Composition 12.8       Drug Allergies and Intolerances:  Review of patient's allergies indicates:  No Known Allergies    Food Allergies and Intolerances:  NA    Past Medical History:  Past Medical History:   Diagnosis Date    Arthritis     Carpal tunnel syndrome, bilateral     Cervical spinal stenosis     Colon polyps     Fatty liver     Hypertension     Overweight (BMI 25.0-29.9) 2019    Vertigo     left ear issues       Past Surgical History:  Past Surgical History:   Procedure Laterality Date    ANGIOGRAM, CORONARY, WITH LEFT HEART CATHETERIZATION N/A 2025    Procedure: Angiogram, Coronary, with Left  Heart Cath;  Surgeon: Jb Verma III, MD;  Location: [facility] CATH LAB;  Service: Cardiology;  Laterality: N/A;    ARTHROSCOPIC CHONDROPLASTY OF KNEE JOINT Left 10/17/2018    Procedure: ARTHROSCOPY, KNEE, WITH CHONDROPLASTY;  Surgeon: GRETEL Carr MD;  Location: [facility] OR 2ND FLR;  Service: Orthopedics;  Laterality: Left;    COLONOSCOPY      COLONOSCOPY N/A 9/25/2017    Procedure: COLONOSCOPY/emr;  Surgeon: Raul August MD;  Location: [facility] (2ND FLR);  Service: Endoscopy;  Laterality: N/A;    COLONOSCOPY N/A 3/9/2018    Procedure: COLONOSCOPY;  Surgeon: Raul August MD;  Location: [facility] (2ND FLR);  Service: Endoscopy;  Laterality: N/A;    COLONOSCOPY N/A 3/23/2023    Procedure: COLONOSCOPY;  Surgeon: Aleksander Grimes MD;  Location: [facility] (4TH FLR);  Service: Endoscopy;  Laterality: N/A;  2/13 instructions to portal-st  pre call done- AJ  does not want to come in earlier 3/22 EB    EPIDURAL STEROID INJECTION N/A 11/4/2021    Procedure: INJECTION, STEROID, EPIDURAL, C7-T1  NEED CONSENT;  Surgeon: Bozena Mena MD;  Location: [facility] PAIN MGT;  Service: Pain Management;  Laterality: N/A;    ESOPHAGOGASTRODUODENOSCOPY N/A 9/23/2019    Procedure: EGD (ESOPHAGOGASTRODUODENOSCOPY);  Surgeon: Dyllan Maloney MD;  Location: [facility];  Service: General;  Laterality: N/A;    INJECTION OF FACET JOINT Bilateral 8/6/2021    Procedure: FACET JOINT INJECTION BILATERAL L4/5 AND L5/S1 DIRECT REFERRAL NEEDS CONSENT;  Surgeon: Jasiel Aviles MD;  Location: [facility] PAIN MGT;  Service: Pain Management;  Laterality: Bilateral;    KNEE ARTHROSCOPY W/ MENISCECTOMY Left 10/17/2018    Procedure: ARTHROSCOPY, KNEE, WITH MENISCECTOMY;  Surgeon: GRETEL Carr MD;  Location: [facility] OR 2ND FLR;  Service: Orthopedics;  Laterality: Left;  regional w/catheter adductor  Dimitry/[employer] notified 10-12 LO    REPAIR OF RETINAL DETACHMENT WITH VITRECTOMY Right 4/4/2023    Procedure: REPAIR, RETINAL DETACHMENT, WITH VITRECTOMY;   Surgeon: True Arroyo MD;  Location: [facility] OR [address] [place] [address];  Service: Ophthalmology;  Laterality: Right;    REPAIR OF RETINAL DETACHMENT WITH VITRECTOMY Right 4/18/2023    Procedure: REPAIR, RETINAL DETACHMENT, WITH VITRECTOMY;  Surgeon: True Arroyo MD;  Location: [facility] OR [address] [place] [address];  Service: Ophthalmology;  Laterality: Right;  Scleral Buckle       Medications:  Current Outpatient Medications   Medication Sig    aspirin (ECOTRIN) 81 MG EC tablet Take 1 tablet (81 mg total) by mouth once daily.    rosuvastatin (CRESTOR) 5 MG tablet Take 1 tablet (5 mg total) by mouth once daily. (Patient taking differently: Take 20 mg by mouth once daily.)    sertraline (ZOLOFT) 50 MG tablet Take 1 tablet by mouth once daily    valsartan-hydrochlorothiazide (DIOVAN-HCT) 160-12.5 mg per tablet Take 1 tablet by mouth once daily.     No current facility-administered medications for this visit.     Facility-Administered Medications Ordered in Other Visits   Medication    sodium hyaluronate (EUFLEXXA) 10 mg/mL(mw 2.4 -3.6 million) Syrg 20 mg       Vitamins and Supplements:  NA    Labs:  Patient confirms he is taking Crestor 5mg for cholesterol control.    Lab Results   Component Value Date    CHOL 109 (L) 05/15/2025     Lab Results   Component Value Date    HDL 53 05/15/2025     Lab Results   Component Value Date    LDLCALC 40.8 (L) 05/15/2025     Lab Results   Component Value Date    TRIG 76 05/15/2025     Lab Results   Component Value Date    CHOLHDL 48.6 05/15/2025         Lab Results   Component Value Date    GLUF 115 (H) 03/17/2025     Lab Results   Component Value Date    HGBA1C 5.9 (H) 04/12/2024       Nutrition/Diet History:  Patient eats 2 meals daily.    Seasons food with various seasoning/salt (roommate cooks).  Patient denies use of a salt shaker at the table on prepared foods.   Dines out 1-2 per week at restaurants.    Chooses fried foods 2-3 time(s) per month.    Chooses fish rarely   Beverages:  water and diet  tea, sweet tea, coke occasionally  Alcohol: none    24 Hour Recall:  Breakfast: pepperoni pizza slice-made sandwich with honey wheat and ketchup  Lunch:skipped  Dinner:navy beans with sausage and white rice  Other: sweet tea throughout the day    Difficulty Chewing or Swallowing: Yes- ill fitting partial dentures so not wearing, pt does not have dental insurance and current  Current Exercise: See Exercise Physiologist Note  Food Safety/Food Preparation: roommate   Living Arrangements/Family Support: Lives with friend  Cultural/Spiritual/Personal Preferences: not applicable   Barriers to Education: none identified  Stage of Change Related to Diet Habits: Action    Nutrition Diagnosis:  Food and nutrition related knowledge deficit related to the lack of prior nutrition education as evidenced by diet history and 24 hour recall    Nutrition Plan:   Goals:  LDL-C < 70 (for high risk patients)  Hgb A1c < 7%  BMI < 25 and abdominal girth < 40M/<35 F  2 gram sodium, Mediterranean diet  Rehab weight goal: -5  Fish intake (non-fried varieties) to a goal of 2-3 servings per week.   Increase fruit and vegetable intake    Interventions/Recommendations:  Lab results reviewed and discussed  Nutrition Prescription:  Total Energy Estimated Needs: 1641-1692 Kcal/d for weight loss  Method for Estimating Needs: 25-28kcal/kg  Total Protein Estimated Needs: 72-93 g/d  Method for Estimating Needs: 1-1.3 g/Kg BW  Total Fluid Estimated Needs: 1 mL/Kcal  Dietitian Consult: No  Patient to participate in Cardiac Rehab sessions three times a week  Weekly Dietitian Weight Check  Encouraged patient to complete 3 day food diary  Follow Up Plan for Ongoing Self-Management Support    Education:  Mediterranean Diet; verbalizes understanding; Date: 4/1/25, Food Labels  Person taught: patient  Preferred Learning Method: Verbal, Written  Education Needed/Provided: Nutrition counseling and education related to cardiac rehabilitation  Education Method:  Weekly nutrition lectures on the Mediterranean diet, cooking, shopping, and dining out  Written Materials Provided: 3 Day Food Record, Introduction to Mediterranean Diet  Strategies Implemented: Motivational interviewing, Goal setting, Self-Monitoring, and Problem Solving    Comments:   Discussed ways to incorporate healthy snacks, eating on a schedule, and monitoring sodium intake for heart health.  Pt motivated and asks good questions- lives with couple who with wife who cooks meals and often prepares high sodium and red meat meals. Discussed easy alternatives to make modifications and substitutions. Reviewed label reading and provided handout    Diabetes  Is the patient diabetic? No      RD contact information provided.      Stephanie Landaverde MS, RDN/LDN    Session: Orientation   Cardiac Rehab Individual Treatment Plan - Initial Assessment      Patient Name: Ladarius Solis MRN: [id number]   : 1955   Age: 69 y.o.   Date of Event: 2025   Primary Diagnosis: Stable angina    EF: 60-65%    Physical Assessment:   There were no vitals taken for this visit.    ASSESSMENT:  Heart Sounds: regular rate and rhythm  Prosthetic Valve: No  Lung Sounds: normal air entry, lungs clear to auscultation  Capillary Refill: normal  Left Radial Pulse: Normal (+2)  Right Radial Pulse: Normal (+2)  Left Pedal Pulse: Normal (+2)  Right Pedal Pulse: Normal (+2)  Right Edema: none  Left Edema none  Strength: normal  Range of Motion: full range of motion  Existing Limitations:      Site   [] Arthritis, bursitis    [] Amputation, atrophy    [x] Other: Neck/back/chest pain continuously   []       Diabetic patient's foot examination comments: N/A  Incisional site: N/A  Special needs: cervical stenosis    Psychosocial Assessment:   Outcome Survey Tools:    BOO SCORES:               SF-36             PHQ-9:      2025     8:17 AM   PHQ-9 Depression Patient Health Questionnaire   Over the last two weeks how often have you been  bothered by little interest or pleasure in doing things 0   Over the last two weeks how often have you been bothered by feeling down, depressed or hopeless 0   Over the last two weeks how often have you been bothered by trouble falling or staying asleep, or sleeping too much 0   Over the last two weeks how often have you been bothered by feeling tired or having little energy 0   Over the last two weeks how often have you been bothered by a poor appetite or overeating 0   Over the last two weeks how often have you been bothered by feeling bad about yourself - or that you are a failure or have let yourself or your family down 0   Over the last two weeks how often have you been bothered by trouble concentrating on things, such as reading the newspaper or watching television 0   Over the last two weeks how often have you been bothered by moving or speaking so slowly that other people could have noticed. 0   Over the last two weeks how often have you been bothered by thoughts that you would be better off dead, or of hurting yourself 0   If you checked off any problems, how difficult have these problems made it for you to do your work, take care of things at home or get along with other people? Not difficult at all   PHQ-9 Score 0              Living Arrangements: has roommates that are  & wife  Family Support: roommates  Self Reported: Anxiety and Stress  Displays: calmness  Medication: pt takes Zoloft    Psychosocial Plan:   Goals:  Improved psychosocial coping strategies  Reduce manifestation of anxiety  Reduce manifestation of stress  Reduce manifestation of chronic pain  Maintain positive support system  Maintain positive outlook  Improve overall quality of life    Interventions/Recommendations:  Discussed Results of Surveys  Patient to Self Report Emotional Changes at Session Check In  Recommend Physical Activity  Recommend Attending Education Lectures  Notify MD: Yes  Program Referral: Yes  Pharmaceutical  Intervention/Therapy: Yes  Other Needs: not applicable  Stage of Readiness to Change: Contemplation    Education:  Coping Techniques; demonstrated understanding; Date: 4/1/2025  Stress Management; verbalizes understanding; Date: 4/1/2025  Stress; verbalizes understanding; Date: 4/1/2025    Comments:  Patient reports that he does experience stress at home.  He does have roommates that are  & states that he sometimes has issues with the wife.  He states that she cannot handle her anger & takes it out on him & her .  He states that his coping mechanism is to simply leave the situation.  States that he has seen someone in Psychiatry (Dr. Blanc) in the past, but the doctor passed away & he hasn't seen anyone else since then.  Will put referral in for psychiatry.  He states that he feels this will help him.  Is currently taking Zoloft & states that he has been taking it for some time.  Encouraged for patient to call some time this week after referral is place in today.      Other Core Components/Risk Factors Assessment:   RISK FACTORS:  hyperlipidemia, positive family history, stress, pre-diabetes.      Learning Barriers: None    Education Level:  Attended College/Technical School    Pre-test Score: 80%    Medication Compliance: has been compliant with taking medications    Other Core Components/Risk Factors Plan:   Goals:  Decrease cholesterol level: In Progress  Increase exercise tolerance: In Progress  Increase knowledge of CAD: In Progress  Decrease blood pressure: In Progress  Weight loss: In Progress  Demonstrate accurate pulse taking: In Progress  Identify and manage personal areas of stress: In Progress  Control diabetes by adjusting diet and exercise: In Progress  Learn more about healthy eating: In Progress    Interventions/Recommendations:  Recommend regular attendance for Cardiac Rehab: Exercise and Education Lectures  Encourage medication compliance  Individual Education/ Counseling:  Yes  Physician Referral: No    Education:    cholesterol, verbalizes understanding; Date: 4/1/2025  diabetes, verbalizes understanding; Date: 4/1/2025  fluid overload/CHF, verbalizes understanding; Date: 4/1/2025  hypertension, verbalizes understanding; Date: 4/1/2025  physical activity, verbalizes understanding; Date: 4/1/2025  risk factors, verbalizes understanding; Date: 4/1/2025  sodium, verbalizes understanding; Date: 4/1/2025  stress, verbalizes understanding; Date: 4/1/2025        Education method adapted to patients education level and preferred method of learning.  Method: explanation    Comments:  Patient will be working on decreasing risk factors for CAD.  He plans to attend lectures/exercise sessions on a consistent basis.  He states that weight loss is a goal, however BMI is at 25.2.  Will refer patient to psychiatry due to stress/anxiety.  Has seen someone in the past & plans to get reestablished with someone else.  He is considered to be pre-diabetic.  Instructed patient to notify staff of any symptoms that blood glucose is dropping(ie: shakiness, lightheaded, diaphoresis).      Other Core Components/Hypertension Assessment:   Resting BP:   BP Readings from Last 1 Encounters:   05/15/25 119/63     BP Diagnosis: Hypertensive  Patient reported symptoms: none    Medications:  Medication Prescribed? Adherent? Exception   Beta-blocker []Yes  [x]No  []Unknown []Yes  []No  []Unknown    ACEI/ARB [x]Yes  []No  []Unknown [x]Yes  []No  []Unknown Combination med.   Calcium Channel Blocker []Yes  [x]No  []Unknown []Yes  []No  []Unknown    Diuretic [x]Yes  []No  []Unknown [x]Yes  []No  []Unknown        Other Core Components/Hypertension Plan:   Goals:  Blood Pressure <130/80    Interventions/Recommendations:  Med Card Reconciled: Yes  Encourage medication compliance  Encourage sodium reduction  Encourage weight loss  Recommend physical activity  Educate on contributory factors  Reduce stress, anxiety, anger,  depression, and/or chronic pain  Encourage home blood pressure monitoring  Recommend daily weights    Education:    Hypertension; verbalizes understanding; Date: 4/1/2025  Coronary Artery Disease; verbalizes understanding; Date: 4/1/2025  Risk Factors; verbalizes understanding; Date: 4/1/2025  Stress; verbalizes understanding; Date: 4/1/2025        Comments:  Patient will be following recommendations to keep BP under good control.  Encouraged for patient to notify staff if experiencing lightheadedness/dizziness.      Does the patient have Heart Failure? No    Other Core Components/Tobacco Cessation Assessment:   Smoking Status: Lifetime Non-smoker  Primary Tobacco Type: N/A  Tobacco Usage: no  Smoking Cessation Barriers: N/A  Stage of Readiness to Change: Maintenance    Other Core Components/Tobacco Cessation Plan:   Goals:  Maintain non-smoking status    Interventions:  Maintains non-smoking status    Education:    Risk Factors; verbalizes understanding; Date: 4/1/2025        Comments:  Non smoker.    Discussed Cardiac Rehab program in depth with patient.  Medication list updated per patient & marked as reviewed.  Patient has been instructed to notify staff of any problems while attending rehab (ie: chest pain, shortness of breath, lightheadedness, dizziness).  Patient has been instructed to monitor blood pressure readings outside of rehab & to keep a daily log of the readings.  Will contact Dr. Wang regarding prescription for NTG due to diagnosis of stable angina for cardiac rehab.      Keeley Austin RN  Cardiac Rehab Nurse

## 2025-06-26 NOTE — PLAN OF CARE
Admission process complete. Patient ready for procedure. Plan of care reviewed with patient. Preoperative fasting appropriate for procedure and sedation. Call light in reach and bed in lowest position.    Versacross wire advanced through the short 8 Fr sheath

## 2025-07-07 ENCOUNTER — PATIENT MESSAGE (OUTPATIENT)
Dept: CARDIOLOGY | Facility: CLINIC | Age: 70
End: 2025-07-07
Payer: MEDICARE

## 2025-07-09 RX ORDER — ASPIRIN 81 MG/1
81 TABLET ORAL DAILY
Qty: 90 TABLET | Refills: 3 | Status: SHIPPED | OUTPATIENT
Start: 2025-07-09 | End: 2026-07-09

## 2025-07-11 ENCOUNTER — OFFICE VISIT (OUTPATIENT)
Dept: PSYCHIATRY | Facility: CLINIC | Age: 70
End: 2025-07-11
Payer: MEDICARE

## 2025-07-11 ENCOUNTER — OFFICE VISIT (OUTPATIENT)
Dept: URGENT CARE | Facility: CLINIC | Age: 70
End: 2025-07-11
Payer: MEDICARE

## 2025-07-11 VITALS
BODY MASS INDEX: 25.71 KG/M2 | HEIGHT: 66 IN | HEART RATE: 87 BPM | WEIGHT: 160 LBS | SYSTOLIC BLOOD PRESSURE: 103 MMHG | TEMPERATURE: 98 F | RESPIRATION RATE: 18 BRPM | OXYGEN SATURATION: 95 % | DIASTOLIC BLOOD PRESSURE: 63 MMHG

## 2025-07-11 DIAGNOSIS — F33.1 MODERATE EPISODE OF RECURRENT MAJOR DEPRESSIVE DISORDER: ICD-10-CM

## 2025-07-11 DIAGNOSIS — R05.9 COUGH, UNSPECIFIED TYPE: ICD-10-CM

## 2025-07-11 DIAGNOSIS — F41.9 ANXIETY: Primary | ICD-10-CM

## 2025-07-11 DIAGNOSIS — J06.9 VIRAL URI WITH COUGH: Primary | ICD-10-CM

## 2025-07-11 LAB
CTP QC/QA: YES
SARS-COV+SARS-COV-2 AG RESP QL IA.RAPID: NEGATIVE

## 2025-07-11 NOTE — PROGRESS NOTES
"Subjective:      Patient ID: Ladarius Solis is a 69 y.o. male.    Vitals:  height is 5' 6" (1.676 m) and weight is 72.6 kg (160 lb). His oral temperature is 98.4 °F (36.9 °C). His blood pressure is 103/63 and his pulse is 87. His respiration is 18 and oxygen saturation is 95%.     Chief Complaint: Cough    Pt states that he is coming in for cough and congestion. Pt syms started 3 days ago.pt isn't in any pain. Pt self treated with OTC decongestant.     Cough  This is a new problem. Episode onset: 3 days ago. The problem has been unchanged. The problem occurs constantly. The cough is Non-productive. Nothing aggravates the symptoms. He has tried OTC cough suppressant for the symptoms. The treatment provided no relief.       Respiratory:  Positive for cough.       Objective:     Physical Exam   Constitutional: He is oriented to person, place, and time. He appears well-developed. He is cooperative.  Non-toxic appearance. He does not appear ill. No distress.      Comments:Patient sits comfortably in exam chair. Answers questions in complete sentences. Does not show any signs of distress or discoloration.        HENT:   Head: Normocephalic and atraumatic.   Ears:   Right Ear: Hearing, tympanic membrane, external ear and ear canal normal. no impacted cerumen  Left Ear: Hearing, tympanic membrane, external ear and ear canal normal. no impacted cerumen  Nose: Rhinorrhea and congestion present. No mucosal edema or nasal deformity. No epistaxis. Right sinus exhibits no maxillary sinus tenderness and no frontal sinus tenderness. Left sinus exhibits no maxillary sinus tenderness and no frontal sinus tenderness.   Mouth/Throat: Uvula is midline, oropharynx is clear and moist and mucous membranes are normal. No trismus in the jaw. Normal dentition. No uvula swelling. No oropharyngeal exudate, posterior oropharyngeal edema or posterior oropharyngeal erythema.   Eyes: Conjunctivae and lids are normal. No scleral icterus. "   Neck: Trachea normal and phonation normal. Neck supple. No edema present. No erythema present. No neck rigidity present.   Cardiovascular: Normal rate, regular rhythm, normal heart sounds and normal pulses.   No murmur heard.Exam reveals no gallop and no friction rub.   Pulmonary/Chest: Effort normal and breath sounds normal. No stridor. No respiratory distress. He has no decreased breath sounds. He has no wheezes. He has no rhonchi. He has no rales. He exhibits no tenderness.   Abdominal: Normal appearance.   Musculoskeletal: Normal range of motion.         General: No deformity. Normal range of motion.   Lymphadenopathy:     He has no cervical adenopathy.        Right cervical: No superficial cervical, no deep cervical and no posterior cervical adenopathy present.       Left cervical: No superficial cervical, no deep cervical and no posterior cervical adenopathy present.   Neurological: He is alert and oriented to person, place, and time. He exhibits normal muscle tone. Coordination normal.   Skin: Skin is warm, dry, intact, not diaphoretic and not pale.   Psychiatric: His speech is normal and behavior is normal. Judgment and thought content normal.   Nursing note and vitals reviewed.    Results for orders placed or performed in visit on 07/11/25   SARS Coronavirus 2 Antigen, POCT Manual Read    Collection Time: 07/11/25 12:23 PM   Result Value Ref Range    SARS Coronavirus 2 Antigen Negative Negative, Presumptive Negative     Acceptable Yes        Assessment:     1. Viral URI with cough    2. Cough, unspecified type        Plan:   BP slightly low in clinic. Common trend at previous visits. Advise to stay hydrated.    Viral URI with cough    Cough, unspecified type  -     SARS Coronavirus 2 Antigen, POCT Manual Read                  Patient Instructions   - Rest.    - Drink plenty of fluids. Increasing your fluid intake will help loosen up mucous.  - Viral upper respiratory infections typically  run their course in 10-14 days.      CONGESTION:  - Plain Mucinex to help thin mucous. Drinking plenty of water can have the same effect.   - You can take over-the-counter claritin, zyrtec, allegra, OR xyzal as directed. These are antihistamines that can help with runny nose, nasal congestion, sneezing, and helps to dry up post-nasal drip, which usually causes sore throat and cough.   - You can use Flonase (fluticasone) nasal spray as directed for sinus congestion and postnasal drip. This is a steroid nasal spray that works locally over time to decrease the inflammation in your nose/sinuses and help with allergic symptoms. This is not an quick- relief spray like afrin, but it works well if used daily.  Discontinue if you develop nose bleed  - Use nasal saline prior to Flonase.  - Use Ocean Spray Nasal Saline 1-3 puffs each nostril every 2-3 hours then blow out onto tissue. This is to irrigate the nasal passage way to clear the sinus openings. Use until sinus problem resolved.  - You can also try NasalCrom nasal spray, which has been shown to help reduce duration of symptoms when started within 24 hours of symptom onset. Okay to use with Flonase and other allergy medications.   - A Neti Pot with sterile saline can help break up nasal congestion and give relief.     COUGH:  - You can take Delsym to help with cough.     SORE THROAT:   - Chloraseptic throat spray can help numb the throat.   - Warm salt water gargles can help with sore throat.  - Warm tea with honey can help with sore throat and cough. Honey is a natural cough suppressant.     - Rest.    - Drink plenty of fluids.    - Acetaminophen (tylenol) or Ibuprofen (advil,motrin) as directed as needed for fever/pain. Avoid tylenol if you have a history of liver disease. Do not take ibuprofen if you have a history of GI bleeding, kidney disease, or if you take blood thinners.   - Ibuprofen dosing for adults: 400 mg by mouth every 4-6 hours as needed. Max: 2400  mg/day; Info: use lowest effective dose, shortest effective treatment duration; give w/ food if GI upset occurs.  - Tylenol dosing for adults: [By mouth route, immediate-release form] Dose: 325-1000 mg by mouth every 4-6h as needed; Max: 1 g/4h and 4 g/day from all sources. [By mouth route, extended-release form] Dose: 650-1300 mg Extended Release by mouth every 8h as needed; Max: 4 g/day from all sources.     - You must understand that you have received an Urgent Care treatment only and that you may be released before all of your medical problems are known or treated.   - You, the patient, will arrange for follow up care as instructed.   - If your condition worsens or fails to improve we recommend that you receive another evaluation at the ER immediately or contact your PCP to discuss your concerns or return here.   - Follow up with your PCP or specialty clinic as directed in the next 1-2 weeks if not improved or as needed.  You can call [phone] to schedule an appointment with the appropriate provider.    If your symptoms do not improve or worsen, go to the emergency room immediately.

## 2025-07-11 NOTE — PATIENT INSTRUCTIONS
- Rest.    - Drink plenty of fluids. Increasing your fluid intake will help loosen up mucous.  - Viral upper respiratory infections typically run their course in 10-14 days.      CONGESTION:  - Plain Mucinex to help thin mucous. Drinking plenty of water can have the same effect.   - You can take over-the-counter claritin, zyrtec, allegra, OR xyzal as directed. These are antihistamines that can help with runny nose, nasal congestion, sneezing, and helps to dry up post-nasal drip, which usually causes sore throat and cough.   - You can use Flonase (fluticasone) nasal spray as directed for sinus congestion and postnasal drip. This is a steroid nasal spray that works locally over time to decrease the inflammation in your nose/sinuses and help with allergic symptoms. This is not an quick- relief spray like afrin, but it works well if used daily.  Discontinue if you develop nose bleed  - Use nasal saline prior to Flonase.  - Use Ocean Spray Nasal Saline 1-3 puffs each nostril every 2-3 hours then blow out onto tissue. This is to irrigate the nasal passage way to clear the sinus openings. Use until sinus problem resolved.  - You can also try NasalCrom nasal spray, which has been shown to help reduce duration of symptoms when started within 24 hours of symptom onset. Okay to use with Flonase and other allergy medications.   - A Neti Pot with sterile saline can help break up nasal congestion and give relief.     COUGH:  - You can take Delsym to help with cough.     SORE THROAT:   - Chloraseptic throat spray can help numb the throat.   - Warm salt water gargles can help with sore throat.  - Warm tea with honey can help with sore throat and cough. Honey is a natural cough suppressant.     - Rest.    - Drink plenty of fluids.    - Acetaminophen (tylenol) or Ibuprofen (advil,motrin) as directed as needed for fever/pain. Avoid tylenol if you have a history of liver disease. Do not take ibuprofen if you have a history of GI  bleeding, kidney disease, or if you take blood thinners.   - Ibuprofen dosing for adults: 400 mg by mouth every 4-6 hours as needed. Max: 2400 mg/day; Info: use lowest effective dose, shortest effective treatment duration; give w/ food if GI upset occurs.  - Tylenol dosing for adults: [By mouth route, immediate-release form] Dose: 325-1000 mg by mouth every 4-6h as needed; Max: 1 g/4h and 4 g/day from all sources. [By mouth route, extended-release form] Dose: 650-1300 mg Extended Release by mouth every 8h as needed; Max: 4 g/day from all sources.     - You must understand that you have received an Urgent Care treatment only and that you may be released before all of your medical problems are known or treated.   - You, the patient, will arrange for follow up care as instructed.   - If your condition worsens or fails to improve we recommend that you receive another evaluation at the ER immediately or contact your PCP to discuss your concerns or return here.   - Follow up with your PCP or specialty clinic as directed in the next 1-2 weeks if not improved or as needed.  You can call [phone] to schedule an appointment with the appropriate provider.    If your symptoms do not improve or worsen, go to the emergency room immediately.
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
## 2025-07-12 ENCOUNTER — TELEPHONE (OUTPATIENT)
Dept: PHARMACY | Facility: CLINIC | Age: 70
End: 2025-07-12
Payer: MEDICARE

## 2025-07-12 NOTE — TELEPHONE ENCOUNTER
[facility]/Population Health Chart Review & Patient Outreach Details For Medication Adherence Project    Reason for Outreach Encounter: 3rd Party payor non-compliance report ([employer], [employer], [employer], etc)  2.  Patient Outreach Method: Reviewed patient chart  and [email or web address] message  3.   Medication in question:    Hyperlipidemia Medications              rosuvastatin (CRESTOR) 5 MG tablet Take 1 tablet (5 mg total) by mouth once daily.                  LF 30 ds 6/11/25    4.  Reviewed and or Updates Made To: Patient Chart  5. Outreach Outcomes and/or actions taken: Patient filled medication and is on track to be adherent and Patient reminded to  prescription  Additional Notes:

## 2025-07-16 RX ORDER — ASPIRIN 81 MG/1
81 TABLET ORAL DAILY
Qty: 90 TABLET | Refills: 3 | Status: SHIPPED | OUTPATIENT
Start: 2025-07-16 | End: 2026-07-16

## 2025-07-16 RX ORDER — ROSUVASTATIN CALCIUM 5 MG/1
5 TABLET, COATED ORAL DAILY
Qty: 90 TABLET | Refills: 0 | Status: SHIPPED | OUTPATIENT
Start: 2025-07-16

## 2025-07-16 NOTE — TELEPHONE ENCOUNTER
Refill Routing Note   Medication(s) are not appropriate for processing by [facility] for the following reason(s):        Outside of protocol    [facility] action(s):  Route  Approve        Medication Therapy Plan: Please resend order to pharmacy. Pharmacy did not receive previous order. Thanks      Appointments  past 12m or future 3m with PCP    Date Provider   Last Visit   10/29/2024 Trino Wang MD   Next Visit   Visit date not found Trino Wang MD   ED visits in past 90 days: 0        Note composed:10:53 AM 07/16/2025

## 2025-07-17 ENCOUNTER — DOCUMENTATION ONLY (OUTPATIENT)
Dept: CARDIAC REHAB | Facility: CLINIC | Age: 70
End: 2025-07-17
Payer: MEDICARE

## 2025-07-17 NOTE — PROGRESS NOTES
Mr. Durand has completed 1 out of 36 exercise session of Phase II cardiac rehab.  He decided not to attend the rehab program because he had to return to work.    Session: Orientation     Cardiac Rehab Individual Treatment Plan - Initial Assessment      Patient Name: Ladarius Solis MRN: [id number]   : 1955   Age: 69 y.o.   Primary Diagnosis: STABLE ANGINA  Date of Event: 25  EF: 60-65%  Risk Stratification: moderate  Referring Physician: CASH   Exercise Assessment:     CPX/TM Date: 3-17-25 Results   RHR 59   Max    Peak VO2 (CPX only) 24.1   Actual METS (CPX only) 6.9   Estimated METS 10.0     Anthropometrics    Height 66 inches   Weight 158 lbs   BMI 25.5   Abdominal Girth 36.2   Body Composition 12.8%     ST Depression noted on Stress Test?:No  Angina with exercise?: No   Fall Risk: Yes   Assistive Devices:  independent   Currently exercising? Yes: Walking; Frequency: 5 days per week; Duration 10 to 15 minutes  Mr. Durand stated there were no limitations to exercise.     Exercise Plan:   Goals:  CR Exercise Goals: Attend Cardiac Rehab 3 times/week: In Progress  Home Aerobic Exercise: 2 additional days/week for 30-60 minutes: In Progress  Intensity of 12-15 on the Rate of Perceived Exertion (RPE) scale: In Progress  30% increase in entry estimated METS: 13.0 : In Progress  5 days/week for 30-60 minutes: In Progress  Demonstrate proper pulse taking technique: In Progress    Intervention:   Discussed importance of regular attendance to cardiac rehab class    Exercise Prescription:  THR Range    Mode: Treadmill  Recumbent Bike  Upright Bike  Nustep  Elliptical   Frequency:  3 days/week   Duration:  30 - 60 minutes   Intensity:  12 - 15 RPE   Resistance Training:  Yes: 5 lb weights with 10-15 reps based on strength and range of motion assessment     Home Prescription:  Mode Aerobic   Frequency: 2- 3 days/week   Duration: 30-60 minutes   Resistance Training: None        Education:  Orientation  to Equipment; verbalizes understanding; Date: 3-25-25  Exercise Recommendations; verbalizes understanding; Date: 3-25-25  Exercise Safety; verbalizes understanding; Date: 3-25-25  Class Preparation: verbalizes understanding; Date: 3-25-25  Signs and symptoms to report: verbalizes understanding; Date: 3-25-25  Caffeine/Hydration: verbalizes understanding; Date: 3-25-25  Exercise Terminology: verbalizes understanding; Date: 3-25-25  Resistance Training: verbalizes understanding; Date: 3-25-25    Comments:  I encouraged Mr. Durand to continue exercising at least 2 non-rehab days per week for at least 30 minutes in addition to attending Phase II cardiac rehab classes 3 days per week.  He stated understanding.    All consent forms were signed, proper attire and shoes were discussed.       Mr. Durand will begin Cardiac Rehab on  at 3:30 pm.    The exercise prescription will be adjusted based on tolerance of exercise intensity by patient.    Ginger Prado, CEP    Orientation   Cardiac Rehab Individual Treatment Plan - Initial Assessment      Patient Name: Ladarius Solis MRN: [id number]   : 1955   Age: 69 y.o.   Primary Diagnosis: stable angina, CAD, HTN    Nutrition Assessment:     Anthropometrics    Height 66 inches   Weight 158 lbs   BMI 25..5   Abdominal Girth 36.2   Body Composition 12.8       Drug Allergies and Intolerances:  Review of patient's allergies indicates:  No Known Allergies    Food Allergies and Intolerances:  NA    Past Medical History:  Past Medical History:   Diagnosis Date    Arthritis     Carpal tunnel syndrome, bilateral     Cervical spinal stenosis     Colon polyps     Fatty liver     Hypertension     Overweight (BMI 25.0-29.9) 2019    Vertigo     left ear issues       Past Surgical History:  Past Surgical History:   Procedure Laterality Date    ANGIOGRAM, CORONARY, WITH LEFT HEART CATHETERIZATION N/A 2025    Procedure: Angiogram, Coronary, with Left  Heart Cath;  Surgeon: bJ Verma III, MD;  Location: [facility] CATH LAB;  Service: Cardiology;  Laterality: N/A;    ARTHROSCOPIC CHONDROPLASTY OF KNEE JOINT Left 10/17/2018    Procedure: ARTHROSCOPY, KNEE, WITH CHONDROPLASTY;  Surgeon: GRETEL Carr MD;  Location: [facility] OR 2ND FLR;  Service: Orthopedics;  Laterality: Left;    COLONOSCOPY      COLONOSCOPY N/A 9/25/2017    Procedure: COLONOSCOPY/emr;  Surgeon: Raul August MD;  Location: [facility] (2ND FLR);  Service: Endoscopy;  Laterality: N/A;    COLONOSCOPY N/A 3/9/2018    Procedure: COLONOSCOPY;  Surgeon: Raul August MD;  Location: [facility] (2ND FLR);  Service: Endoscopy;  Laterality: N/A;    COLONOSCOPY N/A 3/23/2023    Procedure: COLONOSCOPY;  Surgeon: Aleksander Grimes MD;  Location: [facility] (4TH FLR);  Service: Endoscopy;  Laterality: N/A;  2/13 instructions to portal-st  pre call done- AJ  does not want to come in earlier 3/22 EB    EPIDURAL STEROID INJECTION N/A 11/4/2021    Procedure: INJECTION, STEROID, EPIDURAL, C7-T1  NEED CONSENT;  Surgeon: Bozena Mena MD;  Location: [facility] PAIN MGT;  Service: Pain Management;  Laterality: N/A;    ESOPHAGOGASTRODUODENOSCOPY N/A 9/23/2019    Procedure: EGD (ESOPHAGOGASTRODUODENOSCOPY);  Surgeon: Dyllan Maloney MD;  Location: [facility];  Service: General;  Laterality: N/A;    INJECTION OF FACET JOINT Bilateral 8/6/2021    Procedure: FACET JOINT INJECTION BILATERAL L4/5 AND L5/S1 DIRECT REFERRAL NEEDS CONSENT;  Surgeon: Jasiel Aviles MD;  Location: [facility] PAIN MGT;  Service: Pain Management;  Laterality: Bilateral;    KNEE ARTHROSCOPY W/ MENISCECTOMY Left 10/17/2018    Procedure: ARTHROSCOPY, KNEE, WITH MENISCECTOMY;  Surgeon: GRETEL Carr MD;  Location: [facility] OR 2ND FLR;  Service: Orthopedics;  Laterality: Left;  regional w/catheter adductor  Dimitry/[employer] notified 10-12 LO    REPAIR OF RETINAL DETACHMENT WITH VITRECTOMY Right 4/4/2023    Procedure: REPAIR, RETINAL DETACHMENT, WITH VITRECTOMY;   Surgeon: True Arroyo MD;  Location: [facility] OR [address] [place] [address];  Service: Ophthalmology;  Laterality: Right;    REPAIR OF RETINAL DETACHMENT WITH VITRECTOMY Right 4/18/2023    Procedure: REPAIR, RETINAL DETACHMENT, WITH VITRECTOMY;  Surgeon: True Arroyo MD;  Location: [facility] OR [address] [place] [address];  Service: Ophthalmology;  Laterality: Right;  Scleral Buckle       Medications:  Current Outpatient Medications   Medication Sig    aspirin (ECOTRIN) 81 MG EC tablet Take 1 tablet (81 mg total) by mouth once daily.    rosuvastatin (CRESTOR) 5 MG tablet Take 1 tablet (5 mg total) by mouth once daily.    sertraline (ZOLOFT) 50 MG tablet Take 1 tablet by mouth once daily (Patient not taking: Reported on 7/11/2025)    valsartan-hydrochlorothiazide (DIOVAN-HCT) 160-12.5 mg per tablet Take 1 tablet by mouth once daily.     No current facility-administered medications for this visit.     Facility-Administered Medications Ordered in Other Visits   Medication    sodium hyaluronate (EUFLEXXA) 10 mg/mL(mw 2.4 -3.6 million) Syrg 20 mg       Vitamins and Supplements:  NA    Labs:  Patient confirms he is taking Crestor 5mg for cholesterol control.    Lab Results   Component Value Date    CHOL 109 (L) 05/15/2025     Lab Results   Component Value Date    HDL 53 05/15/2025     Lab Results   Component Value Date    LDLCALC 40.8 (L) 05/15/2025     Lab Results   Component Value Date    TRIG 76 05/15/2025     Lab Results   Component Value Date    CHOLHDL 48.6 05/15/2025         Lab Results   Component Value Date    GLUF 115 (H) 03/17/2025     Lab Results   Component Value Date    HGBA1C 5.9 (H) 04/12/2024       Nutrition/Diet History:  Patient eats 2 meals daily.    Seasons food with various seasoning/salt (roommate cooks).  Patient denies use of a salt shaker at the table on prepared foods.   Dines out 1-2 per week at restaurants.    Chooses fried foods 2-3 time(s) per month.    Chooses fish rarely   Beverages:  water and diet tea, sweet tea, coke  occasionally  Alcohol: none    24 Hour Recall:  Breakfast: pepperoni pizza slice-made sandwich with honey wheat and ketchup  Lunch:skipped  Dinner:navy beans with sausage and white rice  Other: sweet tea throughout the day    Difficulty Chewing or Swallowing: Yes- ill fitting partial dentures so not wearing, pt does not have dental insurance and current  Current Exercise: See Exercise Physiologist Note  Food Safety/Food Preparation: roommate   Living Arrangements/Family Support: Lives with friend  Cultural/Spiritual/Personal Preferences: not applicable   Barriers to Education: none identified  Stage of Change Related to Diet Habits: Action    Nutrition Diagnosis:  Food and nutrition related knowledge deficit related to the lack of prior nutrition education as evidenced by diet history and 24 hour recall    Nutrition Plan:   Goals:  LDL-C < 70 (for high risk patients)  Hgb A1c < 7%  BMI < 25 and abdominal girth < 40M/<35 F  2 gram sodium, Mediterranean diet  Rehab weight goal: -5  Fish intake (non-fried varieties) to a goal of 2-3 servings per week.   Increase fruit and vegetable intake    Interventions/Recommendations:  Lab results reviewed and discussed  Nutrition Prescription:  Total Energy Estimated Needs: 7024-4500 Kcal/d for weight loss  Method for Estimating Needs: 25-28kcal/kg  Total Protein Estimated Needs: 72-93 g/d  Method for Estimating Needs: 1-1.3 g/Kg BW  Total Fluid Estimated Needs: 1 mL/Kcal  Dietitian Consult: No  Patient to participate in Cardiac Rehab sessions three times a week  Weekly Dietitian Weight Check  Encouraged patient to complete 3 day food diary  Follow Up Plan for Ongoing Self-Management Support    Education:  Mediterranean Diet; verbalizes understanding; Date: 4/1/25, Food Labels  Person taught: patient  Preferred Learning Method: Verbal, Written  Education Needed/Provided: Nutrition counseling and education related to cardiac rehabilitation  Education Method: Weekly nutrition  lectures on the Mediterranean diet, cooking, shopping, and dining out  Written Materials Provided: 3 Day Food Record, Introduction to Mediterranean Diet  Strategies Implemented: Motivational interviewing, Goal setting, Self-Monitoring, and Problem Solving    Comments:   Discussed ways to incorporate healthy snacks, eating on a schedule, and monitoring sodium intake for heart health.  Pt motivated and asks good questions- lives with couple who with wife who cooks meals and often prepares high sodium and red meat meals. Discussed easy alternatives to make modifications and substitutions. Reviewed label reading and provided handout    Diabetes  Is the patient diabetic? No      RD contact information provided.      Stephanie Landaverde MS, RDN/LDN    Session: Orientation   Cardiac Rehab Individual Treatment Plan - Initial Assessment      Patient Name: Ladarius Solis MRN: [id number]   : 1955   Age: 69 y.o.   Date of Event: 2025   Primary Diagnosis: Stable angina    EF: 60-65%    Physical Assessment:   There were no vitals taken for this visit.    ASSESSMENT:  Heart Sounds: regular rate and rhythm  Prosthetic Valve: No  Lung Sounds: normal air entry, lungs clear to auscultation  Capillary Refill: normal  Left Radial Pulse: Normal (+2)  Right Radial Pulse: Normal (+2)  Left Pedal Pulse: Normal (+2)  Right Pedal Pulse: Normal (+2)  Right Edema: none  Left Edema none  Strength: normal  Range of Motion: full range of motion  Existing Limitations:      Site   [] Arthritis, bursitis    [] Amputation, atrophy    [x] Other: Neck/back/chest pain continuously   []       Diabetic patient's foot examination comments: N/A  Incisional site: N/A  Special needs: cervical stenosis    Psychosocial Assessment:   Outcome Survey Tools:    BOO SCORES:               SF-36             PHQ-9:      2025     8:17 AM   PHQ-9 Depression Patient Health Questionnaire   Over the last two weeks how often have you been bothered by little  interest or pleasure in doing things 0   Over the last two weeks how often have you been bothered by feeling down, depressed or hopeless 0   Over the last two weeks how often have you been bothered by trouble falling or staying asleep, or sleeping too much 0   Over the last two weeks how often have you been bothered by feeling tired or having little energy 0   Over the last two weeks how often have you been bothered by a poor appetite or overeating 0   Over the last two weeks how often have you been bothered by feeling bad about yourself - or that you are a failure or have let yourself or your family down 0   Over the last two weeks how often have you been bothered by trouble concentrating on things, such as reading the newspaper or watching television 0   Over the last two weeks how often have you been bothered by moving or speaking so slowly that other people could have noticed. 0   Over the last two weeks how often have you been bothered by thoughts that you would be better off dead, or of hurting yourself 0   If you checked off any problems, how difficult have these problems made it for you to do your work, take care of things at home or get along with other people? Not difficult at all   PHQ-9 Score 0              Living Arrangements: has roommates that are  & wife  Family Support: roommates  Self Reported: Anxiety and Stress  Displays: calmness  Medication: pt takes Zoloft    Psychosocial Plan:   Goals:  Improved psychosocial coping strategies  Reduce manifestation of anxiety  Reduce manifestation of stress  Reduce manifestation of chronic pain  Maintain positive support system  Maintain positive outlook  Improve overall quality of life    Interventions/Recommendations:  Discussed Results of Surveys  Patient to Self Report Emotional Changes at Session Check In  Recommend Physical Activity  Recommend Attending Education Lectures  Notify MD: Yes  Program Referral: Yes  Pharmaceutical Intervention/Therapy:  Yes  Other Needs: not applicable  Stage of Readiness to Change: Contemplation    Education:  Coping Techniques; demonstrated understanding; Date: 4/1/2025  Stress Management; verbalizes understanding; Date: 4/1/2025  Stress; verbalizes understanding; Date: 4/1/2025    Comments:  Patient reports that he does experience stress at home.  He does have roommates that are  & states that he sometimes has issues with the wife.  He states that she cannot handle her anger & takes it out on him & her .  He states that his coping mechanism is to simply leave the situation.  States that he has seen someone in Psychiatry (Dr. Blanc) in the past, but the doctor passed away & he hasn't seen anyone else since then.  Will put referral in for psychiatry.  He states that he feels this will help him.  Is currently taking Zoloft & states that he has been taking it for some time.  Encouraged for patient to call some time this week after referral is place in today.      Other Core Components/Risk Factors Assessment:   RISK FACTORS:  hyperlipidemia, positive family history, stress, pre-diabetes.      Learning Barriers: None    Education Level:  Attended College/Technical School    Pre-test Score: 80%    Medication Compliance: has been compliant with taking medications    Other Core Components/Risk Factors Plan:   Goals:  Decrease cholesterol level: In Progress  Increase exercise tolerance: In Progress  Increase knowledge of CAD: In Progress  Decrease blood pressure: In Progress  Weight loss: In Progress  Demonstrate accurate pulse taking: In Progress  Identify and manage personal areas of stress: In Progress  Control diabetes by adjusting diet and exercise: In Progress  Learn more about healthy eating: In Progress    Interventions/Recommendations:  Recommend regular attendance for Cardiac Rehab: Exercise and Education Lectures  Encourage medication compliance  Individual Education/ Counseling: Yes  Physician Referral:  No    Education:    cholesterol, verbalizes understanding; Date: 4/1/2025  diabetes, verbalizes understanding; Date: 4/1/2025  fluid overload/CHF, verbalizes understanding; Date: 4/1/2025  hypertension, verbalizes understanding; Date: 4/1/2025  physical activity, verbalizes understanding; Date: 4/1/2025  risk factors, verbalizes understanding; Date: 4/1/2025  sodium, verbalizes understanding; Date: 4/1/2025  stress, verbalizes understanding; Date: 4/1/2025        Education method adapted to patients education level and preferred method of learning.  Method: explanation    Comments:  Patient will be working on decreasing risk factors for CAD.  He plans to attend lectures/exercise sessions on a consistent basis.  He states that weight loss is a goal, however BMI is at 25.2.  Will refer patient to psychiatry due to stress/anxiety.  Has seen someone in the past & plans to get reestablished with someone else.  He is considered to be pre-diabetic.  Instructed patient to notify staff of any symptoms that blood glucose is dropping(ie: shakiness, lightheaded, diaphoresis).      Other Core Components/Hypertension Assessment:   Resting BP:   BP Readings from Last 1 Encounters:   07/11/25 103/63     BP Diagnosis: Hypertensive  Patient reported symptoms: none    Medications:  Medication Prescribed? Adherent? Exception   Beta-blocker []Yes  [x]No  []Unknown []Yes  []No  []Unknown    ACEI/ARB [x]Yes  []No  []Unknown [x]Yes  []No  []Unknown Combination med.   Calcium Channel Blocker []Yes  [x]No  []Unknown []Yes  []No  []Unknown    Diuretic [x]Yes  []No  []Unknown [x]Yes  []No  []Unknown        Other Core Components/Hypertension Plan:   Goals:  Blood Pressure <130/80    Interventions/Recommendations:  Med Card Reconciled: Yes  Encourage medication compliance  Encourage sodium reduction  Encourage weight loss  Recommend physical activity  Educate on contributory factors  Reduce stress, anxiety, anger, depression, and/or chronic  pain  Encourage home blood pressure monitoring  Recommend daily weights    Education:    Hypertension; verbalizes understanding; Date: 4/1/2025  Coronary Artery Disease; verbalizes understanding; Date: 4/1/2025  Risk Factors; verbalizes understanding; Date: 4/1/2025  Stress; verbalizes understanding; Date: 4/1/2025        Comments:  Patient will be following recommendations to keep BP under good control.  Encouraged for patient to notify staff if experiencing lightheadedness/dizziness.      Does the patient have Heart Failure? No    Other Core Components/Tobacco Cessation Assessment:   Smoking Status: Lifetime Non-smoker  Primary Tobacco Type: N/A  Tobacco Usage: no  Smoking Cessation Barriers: N/A  Stage of Readiness to Change: Maintenance    Other Core Components/Tobacco Cessation Plan:   Goals:  Maintain non-smoking status    Interventions:  Maintains non-smoking status    Education:    Risk Factors; verbalizes understanding; Date: 4/1/2025        Comments:  Non smoker.    Discussed Cardiac Rehab program in depth with patient.  Medication list updated per patient & marked as reviewed.  Patient has been instructed to notify staff of any problems while attending rehab (ie: chest pain, shortness of breath, lightheadedness, dizziness).  Patient has been instructed to monitor blood pressure readings outside of rehab & to keep a daily log of the readings.  Will contact Dr. Wang regarding prescription for NTG due to diagnosis of stable angina for cardiac rehab.      Keeley Austin RN  Cardiac Rehab Nurse

## 2025-07-18 ENCOUNTER — PATIENT MESSAGE (OUTPATIENT)
Dept: CARDIOLOGY | Facility: CLINIC | Age: 70
End: 2025-07-18
Payer: MEDICARE

## 2025-07-23 ENCOUNTER — PATIENT MESSAGE (OUTPATIENT)
Dept: CARDIOLOGY | Facility: CLINIC | Age: 70
End: 2025-07-23
Payer: MEDICARE

## 2025-07-23 RX ORDER — ROSUVASTATIN CALCIUM 20 MG/1
20 TABLET, COATED ORAL DAILY
Qty: 90 TABLET | Refills: 3 | Status: SHIPPED | OUTPATIENT
Start: 2025-07-23

## 2025-07-31 ENCOUNTER — OFFICE VISIT (OUTPATIENT)
Dept: PSYCHIATRY | Facility: CLINIC | Age: 70
End: 2025-07-31
Payer: MEDICARE

## 2025-07-31 DIAGNOSIS — F41.9 ANXIETY: Primary | ICD-10-CM

## 2025-07-31 DIAGNOSIS — F33.1 MODERATE EPISODE OF RECURRENT MAJOR DEPRESSIVE DISORDER: ICD-10-CM

## 2025-07-31 PROCEDURE — 90834 PSYTX W PT 45 MINUTES: CPT | Mod: S$GLB,,, | Performed by: SOCIAL WORKER

## 2025-08-06 ENCOUNTER — OFFICE VISIT (OUTPATIENT)
Dept: INTERNAL MEDICINE | Facility: CLINIC | Age: 70
End: 2025-08-06
Payer: MEDICARE

## 2025-08-06 VITALS
OXYGEN SATURATION: 98 % | HEIGHT: 66 IN | SYSTOLIC BLOOD PRESSURE: 108 MMHG | DIASTOLIC BLOOD PRESSURE: 66 MMHG | BODY MASS INDEX: 25.79 KG/M2 | WEIGHT: 160.5 LBS | HEART RATE: 70 BPM

## 2025-08-06 DIAGNOSIS — K21.9 GASTROESOPHAGEAL REFLUX DISEASE, UNSPECIFIED WHETHER ESOPHAGITIS PRESENT: ICD-10-CM

## 2025-08-06 DIAGNOSIS — F33.1 MODERATE EPISODE OF RECURRENT MAJOR DEPRESSIVE DISORDER: ICD-10-CM

## 2025-08-06 DIAGNOSIS — E66.3 OVERWEIGHT (BMI 25.0-29.9): ICD-10-CM

## 2025-08-06 DIAGNOSIS — Z74.09 OTHER REDUCED MOBILITY: ICD-10-CM

## 2025-08-06 DIAGNOSIS — M54.12 CERVICAL RADICULOPATHY AT C7: ICD-10-CM

## 2025-08-06 DIAGNOSIS — R10.9 CHRONIC ABDOMINAL PAIN: ICD-10-CM

## 2025-08-06 DIAGNOSIS — G89.29 OTHER CHRONIC PAIN: ICD-10-CM

## 2025-08-06 DIAGNOSIS — D12.6 COLON ADENOMA: ICD-10-CM

## 2025-08-06 DIAGNOSIS — Z12.83 SKIN CANCER SCREENING: ICD-10-CM

## 2025-08-06 DIAGNOSIS — I70.0 AORTIC ATHEROSCLEROSIS: ICD-10-CM

## 2025-08-06 DIAGNOSIS — G56.03 BILATERAL CARPAL TUNNEL SYNDROME: ICD-10-CM

## 2025-08-06 DIAGNOSIS — K76.0 HEPATIC STEATOSIS: ICD-10-CM

## 2025-08-06 DIAGNOSIS — Z12.5 PROSTATE CANCER SCREENING: ICD-10-CM

## 2025-08-06 DIAGNOSIS — Z00.00 ENCOUNTER FOR MEDICARE ANNUAL WELLNESS EXAM: Primary | ICD-10-CM

## 2025-08-06 DIAGNOSIS — I10 PRIMARY HYPERTENSION: ICD-10-CM

## 2025-08-06 DIAGNOSIS — G89.29 CHRONIC ABDOMINAL PAIN: ICD-10-CM

## 2025-08-06 DIAGNOSIS — R26.9 ABNORMALITY OF GAIT AND MOBILITY: ICD-10-CM

## 2025-08-06 DIAGNOSIS — M17.12 PRIMARY OSTEOARTHRITIS OF LEFT KNEE: ICD-10-CM

## 2025-08-06 PROCEDURE — 1160F RVW MEDS BY RX/DR IN RCRD: CPT | Mod: CPTII,S$GLB,,

## 2025-08-06 PROCEDURE — 1170F FXNL STATUS ASSESSED: CPT | Mod: CPTII,S$GLB,,

## 2025-08-06 PROCEDURE — 3078F DIAST BP <80 MM HG: CPT | Mod: CPTII,S$GLB,,

## 2025-08-06 PROCEDURE — 3288F FALL RISK ASSESSMENT DOCD: CPT | Mod: CPTII,S$GLB,,

## 2025-08-06 PROCEDURE — 1101F PT FALLS ASSESS-DOCD LE1/YR: CPT | Mod: CPTII,S$GLB,,

## 2025-08-06 PROCEDURE — 1125F AMNT PAIN NOTED PAIN PRSNT: CPT | Mod: CPTII,S$GLB,,

## 2025-08-06 PROCEDURE — 99999 PR PBB SHADOW E&M-EST. PATIENT-LVL V: CPT | Mod: PBBFAC,,,

## 2025-08-06 PROCEDURE — G0439 PPPS, SUBSEQ VISIT: HCPCS | Mod: S$GLB,,,

## 2025-08-06 PROCEDURE — 1159F MED LIST DOCD IN RCRD: CPT | Mod: CPTII,S$GLB,,

## 2025-08-06 PROCEDURE — 3074F SYST BP LT 130 MM HG: CPT | Mod: CPTII,S$GLB,,

## 2025-08-06 PROCEDURE — 1158F ADVNC CARE PLAN TLK DOCD: CPT | Mod: CPTII,S$GLB,,

## 2025-08-06 NOTE — PATIENT INSTRUCTIONS
Counseling and Referral of Other Preventative  (Italic type indicates deductible and co-insurance are waived)    Patient Name: Ladarius Solis  Today's Date: 8/6/2025    Health Maintenance       Date Due Completion Date    Shingles Vaccine (1 of 2) Never done ---    RSV Vaccine (Age 60+ and Pregnant patients) (1 - Risk 60-74 years 1-dose series) Never done ---    Pneumococcal Vaccines (Age 50+) (2 of 2 - PPSV23) 01/06/2020 11/11/2019    COVID-19 Vaccine (4 - 2024-25 season) 09/01/2024 1/6/2022    Influenza Vaccine (1) 09/01/2025 9/28/2020    High Dose Statin 08/06/2026 8/6/2025    Hemoglobin A1c (Diabetic Prevention Screening) 04/12/2027 4/12/2024    Colorectal Cancer Screening 03/23/2030 3/23/2023    Lipid Panel 05/15/2030 5/15/2025    TETANUS VACCINE 03/12/2031 3/12/2021        Orders Placed This Encounter   Procedures    PSA, Screening    Ambulatory referral/consult to Dermatology       The following information is provided to all patients.  This information is to help you find resources for any of the problems found today that may be affecting your health:                  Living healthy guide: www.[facility].[place].gov      Understanding Diabetes: www.diabetes.org      Eating healthy: www.cdc.gov/healthyweight      CDC home safety checklist: www.cdc.gov/steadi/patient.html      Agency on Aging: www.goea.[place].[facility]      Alcoholics anonymous (AA): www.aa.org      Physical Activity: www.dion.nih.gov/ac3xemp      Tobacco use: www.quitwithusla.org

## 2025-08-06 NOTE — PROGRESS NOTES
"  Ladarius Solis presented for a follow-up Medicare AWV today. The following components were reviewed and updated:    Medical history  Family History  Social history  Allergies and Current Medications  Health Risk Assessment  Health Maintenance  Care Team    **See Completed Assessments for Annual Wellness visit with in the encounter summary    The following assessments were completed:  Depression Screening  Cognitive function Screening  Timed Get Up Test  Whisper Test      Opioid documentation:      Patient does not have a current opioid prescription.          Vitals:    08/06/25 0845   BP: 108/66   BP Location: Right arm   Patient Position: Sitting   Pulse: 70   SpO2: 98%   Weight: 72.8 kg (160 lb 7.9 oz)   Height: 5' 6" (1.676 m)     Body mass index is 25.9 kg/m².       Physical Exam  Vitals reviewed.   Constitutional:       General: He is not in acute distress.     Appearance: He is not ill-appearing or toxic-appearing.   HENT:      Head: Normocephalic and atraumatic.      Right Ear: External ear normal.      Left Ear: External ear normal.      Nose: Nose normal.      Mouth/Throat:      Mouth: Mucous membranes are moist.   Eyes:      Extraocular Movements: Extraocular movements intact.      Pupils: Pupils are equal, round, and reactive to light.   Cardiovascular:      Rate and Rhythm: Normal rate.   Pulmonary:      Effort: Pulmonary effort is normal. No respiratory distress.      Breath sounds: Normal breath sounds.   Abdominal:      General: There is no distension.      Palpations: Abdomen is soft.      Tenderness: There is no abdominal tenderness.   Musculoskeletal:      Cervical back: Normal range of motion. No rigidity or tenderness.      Right lower leg: No edema.      Left lower leg: No edema.   Lymphadenopathy:      Cervical: No cervical adenopathy.   Neurological:      Mental Status: He is alert and oriented to person, place, and time.           Diagnoses and health risks identified today and associated " recommendations/orders:  1. Encounter for Medicare annual wellness exam  All age-related screenings and hm measures reviewed with patient. Declines vaccines today- vaccine card given. PSA screening ordered. Pt to schedule annual with pcp.  - Referral to Enhanced Annual Wellness Visit (eAWV) M+4    2. Prostate cancer screening  Stable. PSA ordered.   - PSA, Screening; Future    3. Skin cancer screening  Stable. Derm referral placed for skin screening.   - Ambulatory referral/consult to Dermatology; Future    4. Bilateral carpal tunnel syndrome  Stable.  F/w Ortho    5. Cervical radiculopathy at C7  Stable. Tylenol prn    6. Other chronic pain  Stable. Tylenol prn. Recommend regular exercise as tolerated.    7. Moderate episode of recurrent major depressive disorder  Stable. On zoloft. F/w  for counseling. Declines referral to psychology, psychiatry. Recommend regular exercise.     8. Aortic atherosclerosis  Stable. On crestor    9. Primary hypertension  Stable. On diovan    10. Overweight (BMI 25.0-29.9)  Stable. Work on low carb diet and get regular exercise.     11. Colon adenoma  Stable. C-scope utd    12. Chronic abdominal pain  Stable. No meds.     13. Gastroesophageal reflux disease, unspecified whether esophagitis present  Stable. No meds.     14. Hepatic steatosis  Stable. Work on weight loss with low carb diet and regular exercise.     15. Primary osteoarthritis of left knee  Stable. F/w Ortho. Receiving euflexxa injections.     16. Abnormality of gait and mobility  Stable. Recommend regular exercise as tolerated.     17. Other reduced mobility  Stable. Recommend regular exercise       Provided Ladarius with a 5-10 year written screening schedule and personal prevention plan. Recommendations were developed using the USPSTF age appropriate recommendations. Education, counseling, and referrals were provided as needed.  After Visit Summary printed and given to patient which includes a list of  additional screenings\tests needed.    Follow up in about 1 year (around 8/6/2026) for Annual Physical with PCP, Annual Medicare Wellness Visit.      Sandra Santillan NP      I offered to discuss advanced care planning, including how to pick a person who would make decisions for you if you were unable to make them for yourself, called a health care power of , and what kind of decisions you might make such as use of life sustaining treatments such as ventilators and tube feeding when faced with a life limiting illness recorded on a living will that they will need to know. (How you want to be cared for as you near the end of your natural life)     X Patient is interested in learning more about how to make advanced directives.  I provided them paperwork and offered to discuss this with them.   Class II - visualization of the soft palate, fauces, and uvula
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
## 2025-08-07 ENCOUNTER — DOCUMENTATION ONLY (OUTPATIENT)
Dept: CARDIAC REHAB | Facility: CLINIC | Age: 70
End: 2025-08-07
Payer: MEDICARE

## 2025-08-15 ENCOUNTER — TELEPHONE (OUTPATIENT)
Dept: CARDIOLOGY | Facility: CLINIC | Age: 70
End: 2025-08-15
Payer: MEDICARE

## 2025-08-15 ENCOUNTER — OFFICE VISIT (OUTPATIENT)
Dept: PSYCHIATRY | Facility: CLINIC | Age: 70
End: 2025-08-15
Payer: MEDICARE

## 2025-08-15 DIAGNOSIS — F41.9 ANXIETY: Primary | ICD-10-CM

## 2025-08-15 DIAGNOSIS — F33.1 MODERATE EPISODE OF RECURRENT MAJOR DEPRESSIVE DISORDER: ICD-10-CM

## 2025-08-15 PROCEDURE — 90834 PSYTX W PT 45 MINUTES: CPT | Mod: S$GLB,,, | Performed by: SOCIAL WORKER

## 2025-08-19 PROBLEM — I25.118 STABLE ANGINA PECTORIS DUE TO ARTERIOSCLEROSIS OF CORONARY ARTERY: Status: ACTIVE | Noted: 2025-08-19

## 2025-08-20 ENCOUNTER — OFFICE VISIT (OUTPATIENT)
Dept: CARDIOLOGY | Facility: CLINIC | Age: 70
End: 2025-08-20
Payer: MEDICARE

## 2025-08-20 VITALS
SYSTOLIC BLOOD PRESSURE: 136 MMHG | BODY MASS INDEX: 26.01 KG/M2 | HEIGHT: 66 IN | DIASTOLIC BLOOD PRESSURE: 73 MMHG | WEIGHT: 161.81 LBS | OXYGEN SATURATION: 100 % | HEART RATE: 58 BPM

## 2025-08-20 DIAGNOSIS — I25.118 STABLE ANGINA PECTORIS DUE TO ARTERIOSCLEROSIS OF CORONARY ARTERY: Primary | ICD-10-CM

## 2025-08-20 DIAGNOSIS — I10 PRIMARY HYPERTENSION: ICD-10-CM

## 2025-08-20 DIAGNOSIS — I70.0 AORTIC ATHEROSCLEROSIS: ICD-10-CM

## 2025-08-20 PROCEDURE — 99999 PR PBB SHADOW E&M-EST. PATIENT-LVL III: CPT | Mod: PBBFAC,GC,, | Performed by: STUDENT IN AN ORGANIZED HEALTH CARE EDUCATION/TRAINING PROGRAM

## 2025-08-20 RX ORDER — ISOSORBIDE MONONITRATE 30 MG/1
30 TABLET, EXTENDED RELEASE ORAL DAILY
Qty: 30 TABLET | Refills: 11 | Status: SHIPPED | OUTPATIENT
Start: 2025-08-20 | End: 2026-08-20

## 2025-08-24 ENCOUNTER — OFFICE VISIT (OUTPATIENT)
Dept: URGENT CARE | Facility: CLINIC | Age: 70
End: 2025-08-24
Payer: MEDICARE

## 2025-08-24 VITALS
HEIGHT: 66 IN | HEART RATE: 78 BPM | BODY MASS INDEX: 25.88 KG/M2 | SYSTOLIC BLOOD PRESSURE: 92 MMHG | DIASTOLIC BLOOD PRESSURE: 57 MMHG | TEMPERATURE: 99 F | WEIGHT: 161 LBS | RESPIRATION RATE: 16 BRPM | OXYGEN SATURATION: 95 %

## 2025-08-24 DIAGNOSIS — U07.1 COVID-19 VIRUS DETECTED: ICD-10-CM

## 2025-08-24 DIAGNOSIS — U07.1 COVID: Primary | ICD-10-CM

## 2025-08-24 LAB
CTP QC/QA: YES
SARS-COV+SARS-COV-2 AG RESP QL IA.RAPID: POSITIVE

## 2025-08-24 PROCEDURE — 87811 SARS-COV-2 COVID19 W/OPTIC: CPT | Mod: QW,S$GLB,, | Performed by: NURSE PRACTITIONER

## 2025-08-24 PROCEDURE — 99213 OFFICE O/P EST LOW 20 MIN: CPT | Mod: S$GLB,,, | Performed by: NURSE PRACTITIONER

## 2025-08-28 ENCOUNTER — DOCUMENTATION ONLY (OUTPATIENT)
Dept: CARDIAC REHAB | Facility: CLINIC | Age: 70
End: 2025-08-28
Payer: MEDICARE

## 2025-08-30 ENCOUNTER — OFFICE VISIT (OUTPATIENT)
Dept: URGENT CARE | Facility: CLINIC | Age: 70
End: 2025-08-30
Payer: MEDICARE

## 2025-09-05 ENCOUNTER — OFFICE VISIT (OUTPATIENT)
Dept: INTERNAL MEDICINE | Facility: CLINIC | Age: 70
End: 2025-09-05
Payer: MEDICARE

## 2025-09-05 VITALS
WEIGHT: 160.94 LBS | BODY MASS INDEX: 25.86 KG/M2 | HEART RATE: 82 BPM | OXYGEN SATURATION: 98 % | DIASTOLIC BLOOD PRESSURE: 60 MMHG | HEIGHT: 66 IN | SYSTOLIC BLOOD PRESSURE: 100 MMHG

## 2025-09-05 DIAGNOSIS — Z00.00 ROUTINE PHYSICAL EXAMINATION: Primary | ICD-10-CM

## 2025-09-05 DIAGNOSIS — M54.12 CERVICAL RADICULOPATHY AT C7: ICD-10-CM

## 2025-09-05 DIAGNOSIS — I10 PRIMARY HYPERTENSION: ICD-10-CM

## 2025-09-05 DIAGNOSIS — Z86.16 HISTORY OF COVID-19: ICD-10-CM

## 2025-09-05 DIAGNOSIS — F33.1 MODERATE EPISODE OF RECURRENT MAJOR DEPRESSIVE DISORDER: ICD-10-CM

## 2025-09-05 DIAGNOSIS — R73.09 ELEVATED GLUCOSE LEVEL: ICD-10-CM

## 2025-09-05 DIAGNOSIS — Z12.5 SCREENING FOR PROSTATE CANCER: ICD-10-CM

## 2025-09-05 PROCEDURE — 99999 PR PBB SHADOW E&M-EST. PATIENT-LVL III: CPT | Mod: PBBFAC,,, | Performed by: INTERNAL MEDICINE

## 2025-09-05 RX ORDER — VALSARTAN AND HYDROCHLOROTHIAZIDE 160; 12.5 MG/1; MG/1
1 TABLET, FILM COATED ORAL DAILY
Qty: 90 TABLET | Refills: 3 | Status: SHIPPED | OUTPATIENT
Start: 2025-09-05

## (undated) DEVICE — CATH INFINITI JUDKINS JR4

## (undated) DEVICE — DRESSING XEROFORM 1X8IN

## (undated) DEVICE — COVER PROXIMA MAYO STAND

## (undated) DEVICE — DRESSING EYE OVAL LF

## (undated) DEVICE — TRAY MUSCLE LID EYE

## (undated) DEVICE — HEMOSTAT VASC BAND REG 24CM

## (undated) DEVICE — NDL SPINAL 18GX3.5 SPINOCAN

## (undated) DEVICE — CORD BPLR SGL USE DISP STRL

## (undated) DEVICE — SOL BETADINE 5%

## (undated) DEVICE — PROBE ARTHSCP EDGE ENERGY 50

## (undated) DEVICE — PADDING CAST SPECIALIST 6X4YD

## (undated) DEVICE — PUMP COLD THERAPY

## (undated) DEVICE — DRAPE THREE-QTR REINF 53X77IN

## (undated) DEVICE — LENS VITRCTMY OPHTH 30DEG 59DE

## (undated) DEVICE — SOL WATER STRL IRR 1000ML

## (undated) DEVICE — PROBE ILLUM FLEX CURVE LASER

## (undated) DEVICE — COVER PROBE US 5.5X58L NON LTX

## (undated) DEVICE — SOL GONAK

## (undated) DEVICE — GAUZE SPONGE 4X4 12PLY

## (undated) DEVICE — GOWN SURGICAL X-LARGE

## (undated) DEVICE — DRESSING LEUKOPLAST FLEX 1X3IN

## (undated) DEVICE — SOL BALANCED SALT 500ML

## (undated) DEVICE — TRAY CATH LAB OMC

## (undated) DEVICE — SEE MEDLINE ITEM 146292

## (undated) DEVICE — PENCIL BIPOLAR 25G STR TIP

## (undated) DEVICE — SUT MONOCRYL 3-0 PS-1

## (undated) DEVICE — HOLDER TUBE

## (undated) DEVICE — OMNIPAQUE CONTRAST 350MG/100ML

## (undated) DEVICE — SHIELD FOX W/GARTER

## (undated) DEVICE — NDL 18GA X1 1/2 REG BEVEL

## (undated) DEVICE — DRAPE EMERALD 87X114.75X113

## (undated) DEVICE — DRAPE ANGIO BRACH 38X44IN

## (undated) DEVICE — SUT 7-0 VICRYL 18 TG160-8

## (undated) DEVICE — KIT ANTIFOG W/SPONG & FLUID

## (undated) DEVICE — KIT CUSTOM MANIFOLD

## (undated) DEVICE — COVER MAYO STAND REINFRCD 30

## (undated) DEVICE — PAD COLD THERAPY KNEE WRAP ON

## (undated) DEVICE — CATH INFINITI 4F 3DRC 100CM

## (undated) DEVICE — KIT GLIDESHEATH SLEND 6FR 10CM

## (undated) DEVICE — TRANSDUCER ADULT DISP

## (undated) DEVICE — SOL IRR NACL .9% 3000ML

## (undated) DEVICE — DRAPE EYE

## (undated) DEVICE — SEE MEDLINE ITEM 157150

## (undated) DEVICE — SOL BSS BALANCED SALT

## (undated) DEVICE — KIT GREY EYE

## (undated) DEVICE — GUIDEWIRE EMERALD .035IN 260CM

## (undated) DEVICE — CATH IMPULSE 5F 100CM FR4

## (undated) DEVICE — BANDAGE ACE ELASTIC 6"

## (undated) DEVICE — SPIKE SHORT LG BORE 1-WAY 2IN

## (undated) DEVICE — NDL HYPO A BEVEL 30X1/2

## (undated) DEVICE — TUBE SET INFLOW/OUTFLOW

## (undated) DEVICE — TRAY ARTHROSCOPY 2/CS

## (undated) DEVICE — DRAPE PLASTIC U 60X72

## (undated) DEVICE — GOWN SMART IMP BREATHABLE XXLG

## (undated) DEVICE — DRESSING XEROFORM FOIL PK 1X8

## (undated) DEVICE — CATH JACKY RADIAL 3.5 110CM

## (undated) DEVICE — CATH INFINITI 4F JL4 .042X100

## (undated) DEVICE — SEE MEDLINE ITEM 146313

## (undated) DEVICE — SUT ETHICON 3-0 BLK MONO PS

## (undated) DEVICE — PACK AUTO GAS FILL

## (undated) DEVICE — TOURNIQUET SB QC DP 34X4IN

## (undated) DEVICE — SYR 10CC LUER LOCK

## (undated) DEVICE — DRAPE STERI INSTRUMENT 1018

## (undated) DEVICE — SHEILD & GARTERS FOX METAL EYE

## (undated) DEVICE — SYR 30CC LUER LOCK

## (undated) DEVICE — DRESSING GAUZE 6PLY 4X4

## (undated) DEVICE — PAD EYE OVAL CNTOUR 1.62X2.62

## (undated) DEVICE — PAD DEFIB CADENCE ADULT R2

## (undated) DEVICE — TAPE SILK 3IN

## (undated) DEVICE — APPLICATOR CHLORAPREP ORN 26ML

## (undated) DEVICE — PAD ABD 8X10 STERILE